# Patient Record
Sex: FEMALE | Race: WHITE | NOT HISPANIC OR LATINO | Employment: OTHER | URBAN - METROPOLITAN AREA
[De-identification: names, ages, dates, MRNs, and addresses within clinical notes are randomized per-mention and may not be internally consistent; named-entity substitution may affect disease eponyms.]

---

## 2017-04-05 ENCOUNTER — HOSPITAL ENCOUNTER (OUTPATIENT)
Dept: MAMMOGRAPHY | Facility: HOSPITAL | Age: 77
Discharge: HOME | End: 2017-04-05
Admitting: SURGERY

## 2017-04-05 DIAGNOSIS — Z12.31 VISIT FOR SCREENING MAMMOGRAM: ICD-10-CM

## 2017-04-05 PROCEDURE — G0202 SCR MAMMO BI INCL CAD: HCPCS

## 2017-04-05 PROCEDURE — 77063 BREAST TOMOSYNTHESIS BI: CPT | Performed by: RADIOLOGY

## 2017-04-05 PROCEDURE — G0202 SCR MAMMO BI INCL CAD: HCPCS | Performed by: RADIOLOGY

## 2017-04-05 PROCEDURE — 77063 BREAST TOMOSYNTHESIS BI: CPT

## 2017-05-03 ENCOUNTER — OFFICE VISIT (OUTPATIENT)
Dept: OBSTETRICS AND GYNECOLOGY | Facility: CLINIC | Age: 77
End: 2017-05-03

## 2017-05-03 VITALS
SYSTOLIC BLOOD PRESSURE: 118 MMHG | BODY MASS INDEX: 21.19 KG/M2 | HEIGHT: 67 IN | WEIGHT: 135 LBS | DIASTOLIC BLOOD PRESSURE: 76 MMHG | RESPIRATION RATE: 14 BRPM

## 2017-05-03 DIAGNOSIS — D05.11 BREAST NEOPLASM, TIS (DCIS), RIGHT: Primary | ICD-10-CM

## 2017-05-03 DIAGNOSIS — M81.0 OSTEOPOROSIS: ICD-10-CM

## 2017-05-03 PROBLEM — K58.0 IRRITABLE BOWEL SYNDROME WITH DIARRHEA: Status: ACTIVE | Noted: 2017-05-03

## 2017-05-03 PROCEDURE — 99214 OFFICE O/P EST MOD 30 MIN: CPT | Performed by: OBSTETRICS & GYNECOLOGY

## 2017-05-03 RX ORDER — MONTELUKAST SODIUM 4 MG/1
1 TABLET, CHEWABLE ORAL DAILY
COMMUNITY
End: 2020-03-15

## 2017-05-03 RX ORDER — LEVOTHYROXINE SODIUM 88 UG/1
88 TABLET ORAL
COMMUNITY
End: 2017-05-03

## 2017-05-03 RX ORDER — BUSPIRONE HYDROCHLORIDE 15 MG/1
15 TABLET ORAL
COMMUNITY
End: 2017-05-03

## 2017-05-03 RX ORDER — SUCRALFATE 1 G/1
1 TABLET ORAL
COMMUNITY
End: 2017-05-03

## 2017-05-03 RX ORDER — ZOLPIDEM TARTRATE 5 MG/1
TABLET ORAL
Refills: 0 | COMMUNITY
Start: 2017-04-14 | End: 2017-05-03

## 2017-05-03 RX ORDER — GABAPENTIN 300 MG/1
300 CAPSULE ORAL
COMMUNITY
End: 2017-05-03

## 2017-05-03 RX ORDER — MEDROXYPROGESTERONE ACETATE 10 MG/1
10 TABLET ORAL
COMMUNITY
End: 2017-05-03

## 2017-05-03 RX ORDER — ZOLPIDEM TARTRATE 5 MG/1
5 TABLET ORAL DAILY
COMMUNITY
Start: 2017-04-14 | End: 2017-05-03

## 2017-05-03 RX ORDER — HYDROCODONE BITARTRATE AND ACETAMINOPHEN 5; 325 MG/1; MG/1
1 TABLET ORAL EVERY 6 HOURS
COMMUNITY
Start: 2017-04-14 | End: 2017-05-03

## 2017-05-03 RX ORDER — RALOXIFENE HYDROCHLORIDE 60 MG/1
TABLET, FILM COATED ORAL
Qty: 90 TABLET | Refills: 3 | Status: SHIPPED | OUTPATIENT
Start: 2017-05-03 | End: 2017-05-24

## 2017-05-03 RX ORDER — CALCITONIN SALMON 200 [IU]/.09ML
1 SPRAY, METERED NASAL
COMMUNITY
End: 2017-05-03

## 2017-05-03 RX ORDER — CEPHALEXIN 500 MG/1
CAPSULE ORAL
Refills: 0 | COMMUNITY
Start: 2017-04-14 | End: 2017-05-03

## 2017-05-03 RX ORDER — RALOXIFENE HYDROCHLORIDE 60 MG/1
60 TABLET, FILM COATED ORAL DAILY
Qty: 30 TABLET | Refills: 12 | Status: SHIPPED | OUTPATIENT
Start: 2017-05-03 | End: 2017-05-03 | Stop reason: SDUPTHER

## 2017-05-03 RX ORDER — LORAZEPAM 0.5 MG/1
0.5 TABLET ORAL DAILY
COMMUNITY
End: 2017-05-03

## 2017-05-03 RX ORDER — CHOLECALCIFEROL (VITAMIN D3) 125 MCG
1 CAPSULE ORAL
COMMUNITY
End: 2017-05-03

## 2017-05-24 ENCOUNTER — TELEPHONE (OUTPATIENT)
Dept: OBSTETRICS AND GYNECOLOGY | Facility: CLINIC | Age: 77
End: 2017-05-24

## 2017-07-25 ENCOUNTER — TRANSCRIBE ORDERS (OUTPATIENT)
Dept: ADMINISTRATIVE | Facility: HOSPITAL | Age: 77
End: 2017-07-25

## 2017-07-25 ENCOUNTER — HOSPITAL ENCOUNTER (OUTPATIENT)
Dept: GENERAL RADIOLOGY | Facility: HOSPITAL | Age: 77
Discharge: HOME | End: 2017-07-25
Admitting: NURSE PRACTITIONER

## 2017-07-25 DIAGNOSIS — M54.5 LOW BACK PAIN, UNSPECIFIED BACK PAIN LATERALITY, UNSPECIFIED CHRONICITY, WITH SCIATICA PRESENCE UNSPECIFIED: Primary | ICD-10-CM

## 2017-07-25 DIAGNOSIS — M25.552 LEFT HIP PAIN: ICD-10-CM

## 2017-07-25 PROCEDURE — 72110 X-RAY EXAM L-2 SPINE 4/>VWS: CPT

## 2017-07-25 PROCEDURE — 73502 X-RAY EXAM HIP UNI 2-3 VIEWS: CPT

## 2018-05-04 ENCOUNTER — OFFICE VISIT (OUTPATIENT)
Dept: OBSTETRICS AND GYNECOLOGY | Facility: CLINIC | Age: 78
End: 2018-05-04

## 2018-05-04 VITALS
SYSTOLIC BLOOD PRESSURE: 122 MMHG | DIASTOLIC BLOOD PRESSURE: 76 MMHG | WEIGHT: 136 LBS | BODY MASS INDEX: 21.3 KG/M2 | RESPIRATION RATE: 14 BRPM

## 2018-05-04 DIAGNOSIS — Z01.419 WELL WOMAN EXAM WITH ROUTINE GYNECOLOGICAL EXAM: Primary | ICD-10-CM

## 2018-05-04 PROBLEM — D05.10 DCIS (DUCTAL CARCINOMA IN SITU): Status: ACTIVE | Noted: 2017-04-01

## 2018-05-04 PROCEDURE — G0101 CA SCREEN;PELVIC/BREAST EXAM: HCPCS | Performed by: OBSTETRICS & GYNECOLOGY

## 2018-05-04 RX ORDER — METOPROLOL TARTRATE 50 MG/1
50 TABLET, FILM COATED ORAL DAILY PRN
Status: ON HOLD
Start: 2018-05-04 | End: 2020-03-16

## 2018-05-04 RX ORDER — OCTISALATE, AVOBENZONE, HOMOSALATE, AND OCTOCRYLENE 29.4; 29.4; 49; 25.48 MG/ML; MG/ML; MG/ML; MG/ML
1 LOTION TOPICAL DAILY
COMMUNITY

## 2018-05-04 RX ORDER — SODIUM PHOSPHATE,MONO-DIBASIC 19G-7G/118
1 ENEMA (ML) RECTAL
Status: ON HOLD | COMMUNITY
End: 2020-04-16

## 2018-05-04 RX ORDER — CALCITONIN SALMON 200 [IU]/.09ML
SPRAY, METERED NASAL
Qty: 11.1 ML | Refills: 12 | Status: SHIPPED | OUTPATIENT
Start: 2018-05-04 | End: 2019-05-08 | Stop reason: SDUPTHER

## 2018-05-04 RX ORDER — CALCITONIN SALMON 200 [IU]/.09ML
SPRAY, METERED NASAL
Qty: 1 UNITS | Refills: 12 | Status: SHIPPED | OUTPATIENT
Start: 2018-05-04 | End: 2018-05-04 | Stop reason: SDUPTHER

## 2018-05-04 NOTE — PROGRESS NOTES
Subjective   Chief Complaint   Patient presents with   • Gynecologic Exam     Neema Richard is a 77 y.o. year old  presenting to be seen in follow up regarding annual exam.  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  No treatment has been prescribed.  She is getting yearly mammography and breast MRIs which have all been normal.  Last year she was also on Miacalcin for osteoporosis.  NOt sure why she stopped it.  She has never taken bisphosphonates due to a sensitive stomach inferior medication.  She did at one point use Evista but D/C's it because some kind of a rash.    This past year she has not on hormone replacement therapy.  There has not been vaginal bleeding in the last 12 months.  Menopausal symptoms are not present.    SEXUAL Hx:  She is not currently sexually active.  In the past year there she has not been sexually active.    Condoms are not needed because she is not sexually active.  She would not like to be screened for STD's at today's exam.  [place] is painful: n/a  HEALTH Hx:  She exercises regularly: yes.  She wears her seat belt: yes.  She has concerns about domestic violence: no.  She has noticed changes in height: no  OTHER THINGS SHE WANTS TO DISCUSS TODAY:  Nothing else    The following portions of the patient's history were reviewed and updated as appropriate:problem list, current medications, allergies, past family history, past medical history, past social history and past surgical history.    Smoking status: Never Smoker                                                                 Smokeless tobacco: Not on file                       Review of Systems  Constitutional POS: nothing reported    NEG: anorexia or night sweats   Genitourinary POS: nothing reported    NEG: dysuria or hematuria   Gastointestinal POS: nothing reported    NEG: bloating, change in bowel habits, melena or reflux symptoms   Integument POS: nothing reported    NEG: moles  that are changing in size, shape, color or rashes   Breast POS: nothing reported    NEG: persistent breast lump, skin dimpling or nipple discharge        Objective   /76   Resp 14   Wt 61.7 kg (136 lb)   LMP  (LMP Unknown)   Breastfeeding? No   BMI 21.30 kg/m²     General:  well developed; well nourished  no acute distress   Skin:  No suspicious lesions seen   Thyroid: normal to inspection and palpation   Breasts:  Examined in supine position  Symmetric without masses or skin dimpling  Nipples normal without inversion, lesions or discharge  There are no palpable axillary nodes   Abdomen: soft, non-tender; no masses  no umbilical or inginual hernias are present  no hepato-splenomegaly   Pelvis: Clinical staff was present for exam  External genitalia:  normal appearance of the external genitalia including Bartholin's and [place] C Jones's glands.  :  urethral meatus normal;  Vaginal:  normal pink mucosa without prolapse or lesions.  Cervix:  normal appearance.  Uterus:  normal size, shape and consistency.  Adnexa:  non palpable bilaterally.  Rectal:  digital rectal exam not performed; anus visually normal appearing.        Assessment   1. Normal GYN exam in menopause  2. Menopausal female currently not on HRT - without significant symptoms affecting activities of daily living  3. DCIS - seeing heme/onc for surveillance  4. Osteoporosis - untreated  5. She is up to date on all relevant gynecologic and colorectal screenings     Plan   1. Pap was not done today.  I explained to Neema that the Pap smears are no longer recommended in patient's after 65 years of age.   I stressed to Neema that she still should be seen to be seen yearly for a full physical including breast and pelvic exam.  2. She was encouraged to get yearly MRI and mammograms.  She should report any palpable breast lump(s) or skin changes regardless of mammographic findings.  I explained to Neema that notification regarding her mammogram results will come  from the center performing the study.  Our office will not be routinely calling with mammogram results.  It is her responsibility to make sure that the results from the mammogram are communicated to her by the breast center.  If she has any questions about the results, she is welcome to call our office anytime.  3. The following data needs to be obtained to update her medical records: last mammogram and last breast MRI.  4. Recommended and that we resume treatment for her osteoporosis.  Duration of treatment needs to be 10 years if otherwise clinically stable.  I think her options at this point include resumption of Miacalcin or Prolia.  She is hesitant about using a long-acting medicine such as per Maye.  She does not remember having trouble with Miacalcin and wants to go that direction.  5. The importance of keeping all planned follow-up and taking all medications as prescribed was emphasized.  6. Follow up for annual exam 1 year    New Medications Ordered This Visit   Medications   • calcitonin, salmon, (MIACALCIN) 200 UNIT/ACT nasal spray     Sig: I spray in alternating nostrils daily.     Dispense:  1 Units     Refill:  12          This note was electronically signed.    Walter Caro M.D.  May 4, 2018    Note: Speech recognition transcription software may have been used to create portions of this document.  An attempt at proofreading has been made but errors in transcription could still be present.

## 2018-11-16 RX ORDER — VALACYCLOVIR HYDROCHLORIDE 1 G/1
1000 TABLET, FILM COATED ORAL DAILY
Qty: 5 TABLET | Refills: 0 | Status: SHIPPED | OUTPATIENT
Start: 2018-11-16 | End: 2020-03-15

## 2018-11-28 ENCOUNTER — APPOINTMENT (OUTPATIENT)
Dept: LAB | Facility: HOSPITAL | Age: 78
End: 2018-11-28

## 2018-11-28 ENCOUNTER — OFFICE VISIT (OUTPATIENT)
Dept: OBSTETRICS AND GYNECOLOGY | Facility: CLINIC | Age: 78
End: 2018-11-28

## 2018-11-28 VITALS
DIASTOLIC BLOOD PRESSURE: 76 MMHG | BODY MASS INDEX: 21.46 KG/M2 | RESPIRATION RATE: 14 BRPM | SYSTOLIC BLOOD PRESSURE: 128 MMHG | WEIGHT: 137 LBS

## 2018-11-28 DIAGNOSIS — N39.46 MIXED URGE AND STRESS INCONTINENCE: Primary | ICD-10-CM

## 2018-11-28 DIAGNOSIS — N81.3 CYSTOCELE AND RECTOCELE WITH COMPLETE UTEROVAGINAL PROLAPSE: ICD-10-CM

## 2018-11-28 DIAGNOSIS — N36.2 URETHRAL CARUNCLE: ICD-10-CM

## 2018-11-28 DIAGNOSIS — R73.03 PREDIABETES: ICD-10-CM

## 2018-11-28 LAB
BACTERIA UR QL AUTO: NORMAL /HPF
BILIRUB UR QL STRIP: NEGATIVE
CLARITY UR: CLEAR
COLOR UR: YELLOW
GLUCOSE UR STRIP-MCNC: NEGATIVE MG/DL
HBA1C MFR BLD: 6.6 % (ref 4.8–5.6)
HGB UR QL STRIP.AUTO: ABNORMAL
HYALINE CASTS UR QL AUTO: NORMAL /LPF
KETONES UR QL STRIP: NEGATIVE
LEUKOCYTE ESTERASE UR QL STRIP.AUTO: NEGATIVE
NITRITE UR QL STRIP: NEGATIVE
PH UR STRIP.AUTO: 8 [PH] (ref 5–8)
PROT UR QL STRIP: ABNORMAL
RBC # UR: NORMAL /HPF
REF LAB TEST METHOD: NORMAL
SP GR UR STRIP: 1.01 (ref 1–1.03)
SQUAMOUS #/AREA URNS HPF: NORMAL /HPF
UROBILINOGEN UR QL STRIP: ABNORMAL
WBC UR QL AUTO: NORMAL /HPF

## 2018-11-28 PROCEDURE — 99214 OFFICE O/P EST MOD 30 MIN: CPT | Performed by: OBSTETRICS & GYNECOLOGY

## 2018-11-28 PROCEDURE — 36415 COLL VENOUS BLD VENIPUNCTURE: CPT | Performed by: OBSTETRICS & GYNECOLOGY

## 2018-11-28 PROCEDURE — 83036 HEMOGLOBIN GLYCOSYLATED A1C: CPT | Performed by: OBSTETRICS & GYNECOLOGY

## 2018-11-28 PROCEDURE — 81001 URINALYSIS AUTO W/SCOPE: CPT | Performed by: OBSTETRICS & GYNECOLOGY

## 2018-11-28 NOTE — PROGRESS NOTES
"Subjective   Chief Complaint   Patient presents with   • Vaginal Prolapse     Neema Richard is a 78 y.o. year old .  No LMP recorded (lmp unknown). Patient is postmenopausal.  She presents to be seen because of new onset issues with her bladder.  This is been going on for about 4 weeks' time now.  Over the last month she has been exercising more.  She's been working out with a  and doing a lot of raking leaves.  The leaking happens sometimes once a day and sometimes multiple times.  It's gotten to the point that she is now wearing a pad.  She's never wetter\" so long is a pad is present but without a pad sometime she has had to change her undergarments.  There has been no blood or pain with urination.  She is a prediabetic and last had levels checked in the recent summer.  She notices an urge to go at times but sometimes leaks without urgency.    OTHER THINGS SHE WANTS TO DISCUSS TODAY:  Nothing else    The following portions of the patient's history were reviewed and updated as appropriate:no additional history reviewed    Social History    Tobacco Use      Smoking status: Never Smoker    Review of Systems  Constitutional POS: nothing reported    NEG: anorexia or night sweats   Genitourinary POS: see HPI    NEG: dysuria or hematuria   Gastointestinal POS: nothing reported    NEG: bloating, change in bowel habits, melena or reflux symptoms   Integument POS: nothing reported    NEG: moles that are changing in size, shape, color or rashes   Breast POS: nothing reported    NEG: persistent breast lump, skin dimpling or nipple discharge         Objective   /76   Resp 14   Wt 62.1 kg (137 lb)   LMP  (LMP Unknown)   BMI 21.46 kg/m²     General:  well developed; well nourished  no acute distress   Skin:  No suspicious lesions seen   Pelvis: Clinical staff was present for exam  External genitalia:  normal appearance of the external genitalia including Bartholin's and [place]'s glands.  :  " urethral meatus normal; prominent caruncle present;  Vaginal:  normal pink mucosa without prolapse or lesions.  Cervix:  normal appearance.  Uterus:  normal size, shape and consistency.  Adnexa:  non palpable bilaterally.  Rectal:  digital rectal exam not performed; anus visually normal appearing.  Cystocele GRADE 2  Rectocele GRADE 2  Uterine GRADE 2     Lab Review   No data reviewed    Imaging   No data reviewed        Assessment   1. Mixed urinary incontinence.  This is a new problem for which additional workup is indicated.  May be related to hypoestrogenism or urethral caruncle.  May be related to her cystocele with uterine descensus  2. Pelvic organ prolapse - this is a new finding.  Other than the leaking, it is asymptomatic and not affecting her quality of life.  Anticipate it is worse related to the recent exercise and heavy lifting  3. History of ductal carcinoma in situ of breast.  Currently off estrogen.  May be contributing to her leaking.  4. Prediabetes.  Cannot exclude contributing but I think it is not likely     Plan   1. The following tests were ordered today: HgBA1c and UA with culture if indicated.  It was explained to Neema that all lab test should be back within the one week after they are performed. She will be notified about the results, regardless of the findings. If she has not been contacted by the office within 2 weeks after the test has been performed, it is her responsibility to contact us to learn about her results.  2. The importance of keeping all planned follow-up and taking all medications as prescribed was emphasized.  3. Follow up pending labs.  May benefit from topical estrogen to the vagina and urethral regions assuming there is no other etiology for her leaking  4. Impact of lifting on progression of her prolapse was reviewed             This note was electronically signed.    Walter Caro M.D.  November 28, 2018    Note: Speech recognition transcription software may  have been used to create portions of this document.  An attempt at proofreading has been made but errors in transcription could still be present.

## 2018-11-28 NOTE — PATIENT INSTRUCTIONS
You will be given a special clean-catch kit that contains sterile wipes.  1. Tear open the provided sterile wipes so they are ready to use.  2. Take the lid off the sterile collection cup so it is ready to use.  3. Sit on the toilet with the legs spread apart.  4. Use two fingers to completely spread open the labia.  They must be held open until the urine has been collected.  5. Use the first wipe to clean the inner folds of the labia. Wipe from the front to the back.  6. Repeat the process using the second wipe.    To collect the urine sample:  1. While keeping the labia open, begin to urinate.    2. WITHOUT STOPPING bring the sterile urine collection cup into the middle of the urine stream and fill the cup about half full.    3. Remove the cup from the urine stream and set aside.  4. Finish voiding.  5. Close the cup with the lid provided.

## 2018-11-29 ENCOUNTER — TELEPHONE (OUTPATIENT)
Dept: OBSTETRICS AND GYNECOLOGY | Facility: CLINIC | Age: 78
End: 2018-11-29

## 2018-11-29 RX ORDER — OXYBUTYNIN CHLORIDE 10 MG/1
10 TABLET, EXTENDED RELEASE ORAL DAILY
Qty: 30 TABLET | Refills: 3 | Status: SHIPPED | OUTPATIENT
Start: 2018-11-29 | End: 2019-01-03 | Stop reason: SINTOL

## 2018-11-29 NOTE — TELEPHONE ENCOUNTER
There are 2 things like her to begin doing.  The first is application of Premarin cream daily to the urethral opening.  Second is beginning oxybutynin.  I like to see her back in about 6 weeks' time and see how that's working.    New Medications Ordered This Visit   Medications   • conjugated estrogens (PREMARIN) 0.625 MG/GM vaginal cream     Sig: Apply a small dollop to the urethral opening daily     Dispense:  1 each     Refill:  2   • oxybutynin XL (DITROPAN XL) 10 MG 24 hr tablet     Sig: Take 1 tablet by mouth Daily.     Dispense:  30 tablet     Refill:  3

## 2018-11-29 NOTE — TELEPHONE ENCOUNTER
----- Message from Susanne Fuentes RN sent at 11/29/2018  1:42 PM EST -----  She wishes to start new medication, as well as talk to you about it.  She also states the lesion you two discussed has reoccurred today and she is very uncomfortable with it.  ----- Message -----  From: Walter Caro MD  Sent: 11/28/2018   5:37 PM  To: [facility]    Please call Neema and let her know her lab test results were normal.  Given these findings I think it would be reasonable if she wishes to try her on some medicine to see if this helps her urinary symptoms.  If she would like to try medication limit me know.  I would initially try her on some bladder medication such as oxybutynin.  If she wants me to call a prescription in and talk to her about side effects and effectiveness let me know and we'll take care of that.

## 2019-01-03 ENCOUNTER — OFFICE VISIT (OUTPATIENT)
Dept: OBSTETRICS AND GYNECOLOGY | Facility: CLINIC | Age: 79
End: 2019-01-03

## 2019-01-03 VITALS — BODY MASS INDEX: 21.77 KG/M2 | RESPIRATION RATE: 14 BRPM | WEIGHT: 139 LBS

## 2019-01-03 DIAGNOSIS — N39.46 MIXED URGE AND STRESS INCONTINENCE: Primary | ICD-10-CM

## 2019-01-03 PROCEDURE — 99213 OFFICE O/P EST LOW 20 MIN: CPT | Performed by: OBSTETRICS & GYNECOLOGY

## 2019-01-03 RX ORDER — OXYBUTYNIN CHLORIDE 5 MG/1
5 TABLET ORAL 2 TIMES DAILY PRN
Qty: 60 TABLET | Refills: 5 | Status: SHIPPED | OUTPATIENT
Start: 2019-01-03 | End: 2019-01-16 | Stop reason: SINTOL

## 2019-01-03 NOTE — PROGRESS NOTES
Subjective   Chief Complaint   Patient presents with   • Medication Reaction     Neema Richard is a 78 y.o. year old .  No LMP recorded (lmp unknown). Patient is postmenopausal.  She presents to be seen because of follow-up on her overactive bladder.  She was recently begun on oxybutynin.  She's taking it once a day.  It is helping her urinary symptoms but she has noticed a significant issue with dry mouth.  The urinary problems are greater than 95% improved.  The only issue she has appears to be the new-onset of dry mouth.    She has had issues in the past with trouble emptying her bowels.  She is seeing Dr. Smyth.  She has loose stool which she attributes to potentially doing a gallbladder.  He's talked about using InterStim but she gets regular MRIs realize this could be an issue.  She would like to talk about the use of pessaries as an option to help some of her prolapse symptoms.    The following portions of the patient's history were reviewed and updated as appropriate:current medications and allergies    Social History    Tobacco Use      Smoking status: Never Smoker         Objective   Resp 14   Wt 63 kg (139 lb)   LMP  (LMP Unknown)   BMI 21.77 kg/m²     Lab Review   No data reviewed    Imaging   No data reviewed        Assessment   1. Mixed urinary incontinence significantly improved with anticholinergic therapy  2. Dry mouth side effects from anticholinergic therapy  Pelvic organ prolapse - not significantly different from prior exams.  It is now symptomatic and affecting her quality of life.     Plan   1. I think the first step would try to be to reduce the strength of the oxybutynin and increase the frequency.  If that does not fix both problems next step may be going to Myrbetriq  2. The importance of keeping all planned follow-up and taking all medications as prescribed was emphasized.  3. Follow up for recheck of symptoms 2-3 months   4. Consider trial of pessary per patient request    New  Medications Ordered This Visit   Medications   • oxybutynin (DITROPAN) 5 MG tablet     Sig: Take 1 tablet by mouth 2 (Two) Times a Day As Needed (leaking).     Dispense:  60 tablet     Refill:  5          This note was electronically signed.    Walter Caro M.D.  January 3, 2019    Total time spent today with Neema  was 20 minutes (level 3).  Off this time, 90% was spent face-to-face time coordinating care, answering her questions and counseling regarding pathophysiology of her presenting problem along with plans for any diagnositc work-up and treatment.    Note: Speech recognition transcription software may have been used to create portions of this document.  An attempt at proofreading has been made but errors in transcription could still be present.

## 2019-01-16 ENCOUNTER — OFFICE VISIT (OUTPATIENT)
Dept: OBSTETRICS AND GYNECOLOGY | Facility: CLINIC | Age: 79
End: 2019-01-16

## 2019-01-16 VITALS
BODY MASS INDEX: 21.61 KG/M2 | RESPIRATION RATE: 14 BRPM | SYSTOLIC BLOOD PRESSURE: 124 MMHG | DIASTOLIC BLOOD PRESSURE: 76 MMHG | WEIGHT: 138 LBS

## 2019-01-16 DIAGNOSIS — N39.46 MIXED URGE AND STRESS INCONTINENCE: ICD-10-CM

## 2019-01-16 DIAGNOSIS — N81.3 CYSTOCELE AND RECTOCELE WITH COMPLETE UTEROVAGINAL PROLAPSE: Primary | ICD-10-CM

## 2019-01-16 PROBLEM — Z46.89 PESSARY MAINTENANCE: Status: ACTIVE | Noted: 2019-01-16

## 2019-01-16 PROCEDURE — A4562 PESSARY, NON RUBBER,ANY TYPE: HCPCS | Performed by: OBSTETRICS & GYNECOLOGY

## 2019-01-16 PROCEDURE — 57160 INSERT PESSARY/OTHER DEVICE: CPT | Performed by: OBSTETRICS & GYNECOLOGY

## 2019-01-16 NOTE — PROGRESS NOTES
Pessary insertion    Date of procedure:  1/16/2019    Risks and benefits discussed? yes  All questions answered? yes  Consents given by the patient  Written consent obtained? no    Pessary placed: Foldable ring w/ support - #3 w/o urethral bar       Pessary's attempted without good fit: Foldable ring w/ support - #2 w/o urethral bar     Post procedure instructions: Call ASAP if increasing pain or trouble passing urine or bowels    Follow up for pessary recheck 2-3 weeks    This note was electronically signed.    Walter Caro M.D.  January 16, 2019

## 2019-01-16 NOTE — PROGRESS NOTES
And comes in today for a trial of pessary.  She also wants to talk about her urinary incontinence.  She was unable to even take the lower dose of Ditropan due to significant dry mouth.  Estrogens have helped the urinary incontinence to a small degree.  I think would be reasonable to try her on Myrbetriq at this point to see if that helps.  I do suspect with the added estrogen the Myrbetriq will be more effective with up regulation of estrogen receptors.    New Medications Ordered This Visit   Medications   • Mirabegron ER (MYRBETRIQ) 50 MG tablet sustained-release 24 hour 24 hr tablet     Sig: Take 50 mg by mouth Daily.     Dispense:  30 tablet     Refill:  3       Walter Caro M.D.  January 16, 2019  1:39 PM

## 2019-02-07 ENCOUNTER — OFFICE VISIT (OUTPATIENT)
Dept: OBSTETRICS AND GYNECOLOGY | Facility: CLINIC | Age: 79
End: 2019-02-07

## 2019-02-07 VITALS
BODY MASS INDEX: 21.61 KG/M2 | DIASTOLIC BLOOD PRESSURE: 80 MMHG | SYSTOLIC BLOOD PRESSURE: 122 MMHG | RESPIRATION RATE: 14 BRPM | WEIGHT: 138 LBS

## 2019-02-07 DIAGNOSIS — N39.46 MIXED URGE AND STRESS INCONTINENCE: ICD-10-CM

## 2019-02-07 DIAGNOSIS — Z46.89 PESSARY MAINTENANCE: Primary | ICD-10-CM

## 2019-02-07 PROCEDURE — 99213 OFFICE O/P EST LOW 20 MIN: CPT | Performed by: OBSTETRICS & GYNECOLOGY

## 2019-02-07 NOTE — PROGRESS NOTES
Subjective   Chief Complaint   Patient presents with   • Pessary Check     Neema Richard is a 78 y.o. year old who presents to be seen for follow-up of her pessary and her leaking.  Since going up to the higher dose of Myrbetriq the incontinence is at least 85% improved.    Overall she is satisfied with how this pessary is doing.  She is not aware of it being present.  · She is not removing the pessary herself.  · She is able to empty were bladder without difficulty.  · There is not significant urinary incontinence.  She does not have trouble with urinary urgency or frequency.  · There is not significant vaginal discharge present.  She is using nothing to help decrease her vaginal discharge.  · She is not having difficulty with bowel function.     OTHER THINGS SHE WANTS TO DISCUSS TODAY:  Nothing else    The following portions of the patient's history were reviewed and updated as appropriate:no additional history reviewed    Review of Systems  Constitutional POS: nothing reported    NEG: anorexia or night sweats   Genitourinary POS: see HPI       NEG: dysuria or hematuria   Gastointestinal POS: see HPI    NEG: bloating, change in bowel habits, melena or reflux symptoms   Integument POS: nothing reported    NEG: moles that are changing in size, shape, color or rashes        Objective   /80   Resp 14   Wt 62.6 kg (138 lb)   LMP  (LMP Unknown)   Breastfeeding? No   BMI 21.61 kg/m²     General:  well developed; well nourished  no acute distress   Pelvis: Clinical staff was present for exam  External genitalia:  normal appearance of the external genitalia including Bartholin's and [place]'s glands.  :  urethral meatus normal;  Vaginal: normal pink mucosa without prolapse or lesions; adequate support with pessary in place; no area of erosion or excess granulation tissue seen after pessary removed;  Cervix:  normal appearance.  Cystocele GRADE 0 with pessary  Rectocele GRADE 0 with pessary  Uterine GRADE 0 with  pessary     Lab Review   No data reviewed    Imaging   No data reviewed        Assessment   1. Symptomatic prolapse - well controlled with current pessary.  2. JOCE -significantly improved with pessary and Myrbetriq     Plan   1. Her pessary was removed, cleaned and replaced.  2. The importance of keeping all planned follow-up and taking all medications as prescribed was emphasized.  3. Follow up for pessary recheck 3-4 months         This note was electronically signed.    Walter Caro M.D.  February 7, 2019    Note: Speech recognition transcription software may have been used to create portions of this document.  An attempt at proofreading has been made but errors in transcription could still be present.

## 2019-05-05 NOTE — PROGRESS NOTES
Subjective   Chief Complaint   Patient presents with   • Bladder Prolapse   • Osteoporosis   • DCIS     Neema Richard is a 78 y.o. year old  presenting to be seen in follow up regarding her bone loss and her pessary.  Also here in f/u for her breast cancer.      She continues to see her oncologist in [place].  There is been no evidence of breast cancer recurrence.  Breast MRI and mammograms are being done at [facility].  She did have a prior colon polyp and is due for f/u scope ~ .  Exceptionally happy with Myrbetriq.  Her bladder is significantly better.    Continues with the Miacalcin nasal spray.  Has been on it about 3 years time now.  She is having no problems or side effects with this medication.    This past year she has not been on hormone replacement therapy.  She has not had any vaginal bleeding in the last 12 months.   Menopausal symptoms are not present.    SEXUAL Hx:  She is not currently sexually active.  In the past year there she has not been sexually active.    Condoms are not needed because she is not sexually active.  She would not like to be screened for STD's at today's exam.  [place] is painful: n/a  HEALTH Hx:  She exercises regularly: yes.  She wears her seat belt: yes.  She has concerns about domestic violence: no.  She has noticed changes in height: no  OTHER THINGS SHE WANTS TO DISCUSS TODAY:  Nothing else    The following portions of the patient's history were reviewed and updated as appropriate:problem list, current medications, allergies, past family history, past medical history, past social history and past surgical history.    Social History    Tobacco Use      Smoking status: Never Smoker    Review of Systems  Constitutional POS: nothing reported    NEG: anorexia or night sweats   Genitourinary POS: nothing reported    NEG: dysuria or hematuria   Gastointestinal POS: nothing reported    NEG: bloating, change in bowel habits, melena or reflux symptoms    Integument POS: nothing reported    NEG: moles that are changing in size, shape, color or rashes   Breast POS: nothing reported    NEG: persistent breast lump, skin dimpling or nipple discharge        Objective   /80   Resp 14   Wt 62.1 kg (137 lb)   LMP  (LMP Unknown)   Breastfeeding? No   BMI 21.46 kg/m²     General:  well developed; well nourished  no acute distress   Skin:  No suspicious lesions seen   Thyroid: normal to inspection and palpation   Breasts:  Examined in supine position  Symmetric without masses or skin dimpling  Nipples normal without inversion, lesions or discharge  There are no palpable axillary nodes   Abdomen: soft, non-tender; no masses  no umbilical or inguinal hernias are present  no hepato-splenomegaly   Pelvis: Clinical staff was present for exam  External genitalia:  normal appearance of the external genitalia including Bartholin's and Alliance's glands.  :  urethral meatus normal;  Vaginal:  normal pink mucosa without prolapse or lesions.  Pessary is well situated without signs of erosion or irritation  Cervix:  normal appearance.  Uterus:  normal size, shape and consistency.  Adnexa:  non palpable bilaterally.  Rectal:  digital rectal exam not performed; anus visually normal appearing.        Assessment   1. Osteoporosis - on therapy 5/2018.  Primary care recently ordered bone density.  Those results are not available.  2. Menopausal female currently not on HRT other than vaginal ERT - without significant symptoms affecting activities of daily living  3. Pelvic organ prolapse - not significantly different from prior exams.  It is minimally symptomatic with the pessary use and it is not affecting her quality of life.  4. H/o DCIS - TESSA x 2 years.  Follow-up in imaging being done in [place]  5. H/o benign colon polyp 2016  6. OAB - better with meds.  7. She is up to date on all relevant gynecologic and colorectal screenings     Plan   1. Pap was not done today.  I  explained to Neema that the Pap smears are no longer recommended in patient's after 65 years of age.   I stressed to Neema that she still should be seen to be seen yearly for a full physical including breast and pelvic exam.  2. She was encouraged to get yearly mammograms along with yearly breast MRI.  The studies should set up to stagger every 6 months.  She should report any palpable breast lump(s) or skin changes regardless of mammographic findings.  I explained to Neema that notification regarding her mammogram results will come from the center performing the study.  Our office will not be routinely calling with mammogram results.  It is her responsibility to make sure that the results from the mammogram are communicated to her by the breast center.  If she has any questions about the results, she is welcome to call our office anytime.  3. Pessary was removed, cleaned and replaced.  4. The following data needs to be obtained to update her medical records: last DEXA from PCP  5. Follow up for annual exam 1 year    New Medications Ordered This Visit   Medications   • calcitonin, salmon, (MIACALCIN) 200 UNIT/ACT nasal spray     Si spray by Alternating Nares route Daily.     Dispense:  11.1 mL     Refill:  12     **Patient requests 90 days supply**   • Mirabegron ER (MYRBETRIQ) 50 MG tablet sustained-release 24 hour 24 hr tablet     Sig: Take 50 mg by mouth Daily.     Dispense:  30 tablet     Refill:  3          This note was electronically signed.    Walter Caro M.D.  May 8, 2019    Note: Speech recognition transcription software may have been used to create portions of this document.  An attempt at proofreading has been made but errors in transcription could still be present.

## 2019-05-08 ENCOUNTER — OFFICE VISIT (OUTPATIENT)
Dept: OBSTETRICS AND GYNECOLOGY | Facility: CLINIC | Age: 79
End: 2019-05-08

## 2019-05-08 VITALS
RESPIRATION RATE: 14 BRPM | BODY MASS INDEX: 21.46 KG/M2 | DIASTOLIC BLOOD PRESSURE: 80 MMHG | WEIGHT: 137 LBS | SYSTOLIC BLOOD PRESSURE: 132 MMHG

## 2019-05-08 DIAGNOSIS — Z46.89 PESSARY MAINTENANCE: ICD-10-CM

## 2019-05-08 DIAGNOSIS — M81.0 AGE-RELATED OSTEOPOROSIS WITHOUT CURRENT PATHOLOGICAL FRACTURE: Primary | ICD-10-CM

## 2019-05-08 DIAGNOSIS — N81.3 CYSTOCELE AND RECTOCELE WITH COMPLETE UTEROVAGINAL PROLAPSE: ICD-10-CM

## 2019-05-08 DIAGNOSIS — N39.46 MIXED URGE AND STRESS INCONTINENCE: ICD-10-CM

## 2019-05-08 PROBLEM — E78.00 HIGH CHOLESTEROL: Status: ACTIVE | Noted: 2019-01-01

## 2019-05-08 PROCEDURE — 99214 OFFICE O/P EST MOD 30 MIN: CPT | Performed by: OBSTETRICS & GYNECOLOGY

## 2019-05-08 RX ORDER — CALCITONIN SALMON 200 [IU]/.09ML
1 SPRAY, METERED NASAL DAILY
Qty: 11.1 ML | Refills: 12 | Status: ON HOLD | OUTPATIENT
Start: 2019-05-08 | End: 2020-04-16

## 2019-08-27 RX ORDER — MIRABEGRON 50 MG/1
TABLET, FILM COATED, EXTENDED RELEASE ORAL
Qty: 30 TABLET | Refills: 0 | Status: SHIPPED | OUTPATIENT
Start: 2019-08-27 | End: 2019-09-25 | Stop reason: SDUPTHER

## 2019-09-25 RX ORDER — MIRABEGRON 50 MG/1
TABLET, FILM COATED, EXTENDED RELEASE ORAL
Qty: 30 TABLET | Refills: 0 | Status: SHIPPED | OUTPATIENT
Start: 2019-09-25 | End: 2019-10-28 | Stop reason: SDUPTHER

## 2019-09-27 RX ORDER — MIRABEGRON 50 MG/1
TABLET, FILM COATED, EXTENDED RELEASE ORAL
Qty: 30 TABLET | Refills: 0 | OUTPATIENT
Start: 2019-09-27

## 2019-10-28 RX ORDER — MIRABEGRON 50 MG/1
TABLET, FILM COATED, EXTENDED RELEASE ORAL
Qty: 30 TABLET | Refills: 7 | Status: SHIPPED | OUTPATIENT
Start: 2019-10-28 | End: 2020-09-17 | Stop reason: SDUPTHER

## 2020-02-13 ENCOUNTER — TRANSCRIBE ORDERS (OUTPATIENT)
Dept: ADMINISTRATIVE | Facility: HOSPITAL | Age: 80
End: 2020-02-13

## 2020-02-13 DIAGNOSIS — R10.814 ABDOMINAL TENDERNESS OF LEFT LOWER QUADRANT, REBOUND TENDERNESS PRESENCE NOT SPECIFIED: Primary | ICD-10-CM

## 2020-02-25 ENCOUNTER — HOSPITAL ENCOUNTER (OUTPATIENT)
Dept: CT IMAGING | Facility: HOSPITAL | Age: 80
Discharge: HOME | End: 2020-02-25
Admitting: INTERNAL MEDICINE

## 2020-02-25 DIAGNOSIS — R10.814 ABDOMINAL TENDERNESS OF LEFT LOWER QUADRANT, REBOUND TENDERNESS PRESENCE NOT SPECIFIED: ICD-10-CM

## 2020-02-25 PROCEDURE — 63710000001 BARIUM 2 % SUSPENSION: Performed by: INTERNAL MEDICINE

## 2020-02-25 PROCEDURE — 74177 CT ABD & PELVIS W/CONTRAST: CPT

## 2020-02-25 PROCEDURE — A9270 NON-COVERED ITEM OR SERVICE: HCPCS | Performed by: INTERNAL MEDICINE

## 2020-02-25 PROCEDURE — 25010000002 IOPAMIDOL 61 % SOLUTION: Performed by: INTERNAL MEDICINE

## 2020-02-25 RX ADMIN — IOPAMIDOL 60 ML: 612 INJECTION, SOLUTION INTRAVENOUS at 14:39

## 2020-02-25 RX ADMIN — BARIUM SULFATE 450 ML: 21 SUSPENSION ORAL at 13:30

## 2020-03-15 ENCOUNTER — HOSPITAL ENCOUNTER (INPATIENT)
Facility: HOSPITAL | Age: 80
LOS: 11 days | Discharge: HOME HEALTH | End: 2020-03-26
Attending: EMERGENCY MEDICINE | Admitting: COLON & RECTAL SURGERY

## 2020-03-15 ENCOUNTER — APPOINTMENT (OUTPATIENT)
Dept: CT IMAGING | Facility: HOSPITAL | Age: 80
End: 2020-03-15

## 2020-03-15 DIAGNOSIS — Z74.09 IMPAIRED MOBILITY AND ACTIVITIES OF DAILY LIVING: Primary | ICD-10-CM

## 2020-03-15 DIAGNOSIS — K57.20 PERFORATION OF SIGMOID COLON DUE TO DIVERTICULITIS: ICD-10-CM

## 2020-03-15 DIAGNOSIS — Z78.9 IMPAIRED MOBILITY AND ACTIVITIES OF DAILY LIVING: Primary | ICD-10-CM

## 2020-03-15 DIAGNOSIS — I10 ESSENTIAL HYPERTENSION: ICD-10-CM

## 2020-03-15 DIAGNOSIS — K57.92 DIVERTICULITIS: ICD-10-CM

## 2020-03-15 DIAGNOSIS — K65.9 PERITONITIS (HCC): ICD-10-CM

## 2020-03-15 PROBLEM — E87.20 LACTIC ACIDOSIS: Status: ACTIVE | Noted: 2020-03-15

## 2020-03-15 PROBLEM — N32.81 OVERACTIVE BLADDER: Status: ACTIVE | Noted: 2020-03-15

## 2020-03-15 PROBLEM — A41.9 SEPSIS: Status: ACTIVE | Noted: 2020-03-15

## 2020-03-15 PROBLEM — N17.9 ACUTE KIDNEY INJURY: Status: ACTIVE | Noted: 2020-03-15

## 2020-03-15 PROBLEM — A41.89 SEPSIS DUE TO OTHER ETIOLOGY (HCC): Status: ACTIVE | Noted: 2020-03-15

## 2020-03-15 PROBLEM — R73.9 ACUTE HYPERGLYCEMIA: Status: ACTIVE | Noted: 2020-03-15

## 2020-03-15 PROBLEM — R10.13 DYSPEPSIA: Status: ACTIVE | Noted: 2020-03-15

## 2020-03-15 PROBLEM — D72.9 NEUTROPHILIC LEUKOCYTOSIS: Status: ACTIVE | Noted: 2020-03-15

## 2020-03-15 PROBLEM — M19.90 ARTHRITIS: Status: ACTIVE | Noted: 2020-03-15

## 2020-03-15 PROBLEM — D72.828 NEUTROPHILIC LEUKOCYTOSIS: Status: ACTIVE | Noted: 2020-03-15

## 2020-03-15 LAB
ALBUMIN SERPL-MCNC: 4.2 G/DL (ref 3.5–5.2)
ALBUMIN/GLOB SERPL: 1.2 G/DL
ALP SERPL-CCNC: 97 U/L (ref 39–117)
ALT SERPL W P-5'-P-CCNC: 9 U/L (ref 1–33)
ANION GAP SERPL CALCULATED.3IONS-SCNC: 15 MMOL/L (ref 5–15)
AST SERPL-CCNC: 16 U/L (ref 1–32)
BASOPHILS # BLD AUTO: 0.06 10*3/MM3 (ref 0–0.2)
BASOPHILS NFR BLD AUTO: 0.4 % (ref 0–1.5)
BILIRUB SERPL-MCNC: 0.7 MG/DL (ref 0.2–1.2)
BUN BLD-MCNC: 21 MG/DL (ref 8–23)
BUN/CREAT SERPL: 21.6 (ref 7–25)
CALCIUM SPEC-SCNC: 8.6 MG/DL (ref 8.6–10.5)
CHLORIDE SERPL-SCNC: 102 MMOL/L (ref 98–107)
CO2 SERPL-SCNC: 21 MMOL/L (ref 22–29)
CREAT BLD-MCNC: 0.97 MG/DL (ref 0.57–1)
CRP SERPL-MCNC: 6.5 MG/DL (ref 0–0.5)
D-LACTATE SERPL-SCNC: 1.1 MMOL/L (ref 0.5–2)
D-LACTATE SERPL-SCNC: 2.9 MMOL/L (ref 0.5–2)
DEPRECATED RDW RBC AUTO: 43 FL (ref 37–54)
EOSINOPHIL # BLD AUTO: 0.03 10*3/MM3 (ref 0–0.4)
EOSINOPHIL NFR BLD AUTO: 0.2 % (ref 0.3–6.2)
ERYTHROCYTE [DISTWIDTH] IN BLOOD BY AUTOMATED COUNT: 13.2 % (ref 12.3–15.4)
ERYTHROCYTE [SEDIMENTATION RATE] IN BLOOD: 19 MM/HR (ref 0–30)
GFR SERPL CREATININE-BSD FRML MDRD: 56 ML/MIN/1.73
GLOBULIN UR ELPH-MCNC: 3.5 GM/DL
GLUCOSE BLD-MCNC: 257 MG/DL (ref 65–99)
HCT VFR BLD AUTO: 42.6 % (ref 34–46.6)
HGB BLD-MCNC: 13.6 G/DL (ref 12–15.9)
HOLD SPECIMEN: NORMAL
HOLD SPECIMEN: NORMAL
IMM GRANULOCYTES # BLD AUTO: 0.05 10*3/MM3 (ref 0–0.05)
IMM GRANULOCYTES NFR BLD AUTO: 0.3 % (ref 0–0.5)
LACTATE HOLD SPECIMEN: NORMAL
LIPASE SERPL-CCNC: 24 U/L (ref 13–60)
LYMPHOCYTES # BLD AUTO: 0.84 10*3/MM3 (ref 0.7–3.1)
LYMPHOCYTES NFR BLD AUTO: 5.6 % (ref 19.6–45.3)
MCH RBC QN AUTO: 28.3 PG (ref 26.6–33)
MCHC RBC AUTO-ENTMCNC: 31.9 G/DL (ref 31.5–35.7)
MCV RBC AUTO: 88.6 FL (ref 79–97)
MONOCYTES # BLD AUTO: 0.8 10*3/MM3 (ref 0.1–0.9)
MONOCYTES NFR BLD AUTO: 5.3 % (ref 5–12)
NEUTROPHILS # BLD AUTO: 13.27 10*3/MM3 (ref 1.7–7)
NEUTROPHILS NFR BLD AUTO: 88.2 % (ref 42.7–76)
NRBC BLD AUTO-RTO: 0 /100 WBC (ref 0–0.2)
PLATELET # BLD AUTO: 384 10*3/MM3 (ref 140–450)
PMV BLD AUTO: 10.1 FL (ref 6–12)
POTASSIUM BLD-SCNC: 4 MMOL/L (ref 3.5–5.2)
PROT SERPL-MCNC: 7.7 G/DL (ref 6–8.5)
RBC # BLD AUTO: 4.81 10*6/MM3 (ref 3.77–5.28)
SODIUM BLD-SCNC: 138 MMOL/L (ref 136–145)
WBC NRBC COR # BLD: 15.05 10*3/MM3 (ref 3.4–10.8)
WHOLE BLOOD HOLD SPECIMEN: NORMAL
WHOLE BLOOD HOLD SPECIMEN: NORMAL

## 2020-03-15 PROCEDURE — 99284 EMERGENCY DEPT VISIT MOD MDM: CPT

## 2020-03-15 PROCEDURE — 83690 ASSAY OF LIPASE: CPT

## 2020-03-15 PROCEDURE — 74176 CT ABD & PELVIS W/O CONTRAST: CPT

## 2020-03-15 PROCEDURE — 25010000002 MEROPENEM PER 100 MG: Performed by: EMERGENCY MEDICINE

## 2020-03-15 PROCEDURE — 83605 ASSAY OF LACTIC ACID: CPT | Performed by: EMERGENCY MEDICINE

## 2020-03-15 PROCEDURE — 25010000002 ONDANSETRON PER 1 MG: Performed by: EMERGENCY MEDICINE

## 2020-03-15 PROCEDURE — 25010000002 HYDROMORPHONE PER 4 MG: Performed by: EMERGENCY MEDICINE

## 2020-03-15 PROCEDURE — 85025 COMPLETE CBC W/AUTO DIFF WBC: CPT

## 2020-03-15 PROCEDURE — 85652 RBC SED RATE AUTOMATED: CPT | Performed by: PHYSICIAN ASSISTANT

## 2020-03-15 PROCEDURE — 83605 ASSAY OF LACTIC ACID: CPT

## 2020-03-15 PROCEDURE — 80053 COMPREHEN METABOLIC PANEL: CPT

## 2020-03-15 PROCEDURE — 99223 1ST HOSP IP/OBS HIGH 75: CPT | Performed by: HOSPITALIST

## 2020-03-15 PROCEDURE — 86140 C-REACTIVE PROTEIN: CPT | Performed by: PHYSICIAN ASSISTANT

## 2020-03-15 RX ORDER — SUCRALFATE 1 G/1
1 TABLET ORAL
Status: DISCONTINUED | OUTPATIENT
Start: 2020-03-16 | End: 2020-03-26 | Stop reason: HOSPADM

## 2020-03-15 RX ORDER — HYDROMORPHONE HYDROCHLORIDE 1 MG/ML
0.25 INJECTION, SOLUTION INTRAMUSCULAR; INTRAVENOUS; SUBCUTANEOUS ONCE
Status: COMPLETED | OUTPATIENT
Start: 2020-03-15 | End: 2020-03-15

## 2020-03-15 RX ORDER — ONDANSETRON 2 MG/ML
4 INJECTION INTRAMUSCULAR; INTRAVENOUS ONCE
Status: COMPLETED | OUTPATIENT
Start: 2020-03-15 | End: 2020-03-15

## 2020-03-15 RX ORDER — MORPHINE SULFATE 4 MG/ML
4 INJECTION, SOLUTION INTRAMUSCULAR; INTRAVENOUS
Status: DISPENSED | OUTPATIENT
Start: 2020-03-15 | End: 2020-03-25

## 2020-03-15 RX ORDER — LEVOTHYROXINE SODIUM 88 UG/1
88 TABLET ORAL
Status: DISCONTINUED | OUTPATIENT
Start: 2020-03-16 | End: 2020-03-26 | Stop reason: HOSPADM

## 2020-03-15 RX ORDER — MORPHINE SULFATE 2 MG/ML
2 INJECTION, SOLUTION INTRAMUSCULAR; INTRAVENOUS
Status: DISPENSED | OUTPATIENT
Start: 2020-03-15 | End: 2020-03-25

## 2020-03-15 RX ORDER — SODIUM CHLORIDE 0.9 % (FLUSH) 0.9 %
10 SYRINGE (ML) INJECTION EVERY 12 HOURS SCHEDULED
Status: DISCONTINUED | OUTPATIENT
Start: 2020-03-16 | End: 2020-03-20 | Stop reason: SDUPTHER

## 2020-03-15 RX ORDER — SODIUM CHLORIDE 0.9 % (FLUSH) 0.9 %
10 SYRINGE (ML) INJECTION AS NEEDED
Status: DISCONTINUED | OUTPATIENT
Start: 2020-03-15 | End: 2020-03-26 | Stop reason: HOSPADM

## 2020-03-15 RX ORDER — GABAPENTIN 300 MG/1
300 CAPSULE ORAL NIGHTLY
Status: DISCONTINUED | OUTPATIENT
Start: 2020-03-16 | End: 2020-03-17

## 2020-03-15 RX ORDER — ONDANSETRON 2 MG/ML
4 INJECTION INTRAMUSCULAR; INTRAVENOUS EVERY 6 HOURS PRN
Status: DISCONTINUED | OUTPATIENT
Start: 2020-03-15 | End: 2020-03-26 | Stop reason: HOSPADM

## 2020-03-15 RX ADMIN — ONDANSETRON 4 MG: 2 INJECTION INTRAMUSCULAR; INTRAVENOUS at 19:00

## 2020-03-15 RX ADMIN — HYDROMORPHONE HYDROCHLORIDE 0.25 MG: 1 INJECTION, SOLUTION INTRAMUSCULAR; INTRAVENOUS; SUBCUTANEOUS at 19:00

## 2020-03-15 RX ADMIN — MEROPENEM 1 G: 1 INJECTION, POWDER, FOR SOLUTION INTRAVENOUS at 20:52

## 2020-03-15 RX ADMIN — SODIUM CHLORIDE 1000 ML: 9 INJECTION, SOLUTION INTRAVENOUS at 18:58

## 2020-03-15 NOTE — ED PROVIDER NOTES
Subjective   Ms. Neema Richard is a 79 y.o female presenting to the emergency department with complaints of abdominal pain. Her abdominal pain began this morning and is diffuse across her lower abdomen. She rates her current pain at a 7/10 and complains of fatigue, fever, difficulty urinating, and diarrhea. She initially believed her diarrhea to be due to new medication. She has a history of diverticulitis and confirms her most recent attack was in December 2019. Her current pain location is not similar to her previous episodes of diverticulitis because this is diffuse instead of solely on the left side. She denies back pain and dysuria. There are no other acute symptoms at this time.      She received a CT scan on 02/25/2020 and the results can be found below:       1. Sigmoid diverticulitis. No bowel obstruction or abscess.       2. Leiomyomatous uterus.      History provided by:  Patient  Abdominal Pain   Pain location:  Periumbilical  Pain radiates to:  Does not radiate  Pain severity:  Severe (7/10)  Onset quality:  Sudden  Duration:  1 day  Timing:  Constant  Progression:  Worsening  Chronicity:  New  Relieved by:  None tried  Worsened by:  Nothing  Ineffective treatments:  None tried  Associated symptoms: diarrhea, fatigue and fever    Associated symptoms: no constipation and no dysuria        Review of Systems   Constitutional: Positive for fatigue and fever.   Gastrointestinal: Positive for abdominal pain and diarrhea. Negative for constipation.   Genitourinary: Positive for difficulty urinating. Negative for dysuria.   Musculoskeletal: Negative for back pain.   All other systems reviewed and are negative.      Past Medical History:   Diagnosis Date   • Acquired hypothyroidism 1980   • Colon polyp - sees Dr. 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 ER and IA (+)   • Essential hypertension 2012   • High cholesterol 2019   • Hx of herpes genitalis 2013   •  IBS-d (takes Buspar)        Allergies   Allergen Reactions   • Evista [Raloxifene] Rash   • Cephalexin Diarrhea   • Hydrochlorothiazide Diarrhea   • Meloxicam Other (See Comments)   • Metronidazole Diarrhea   • Oxybutynin Cough   • Propylthiouracil Other (See Comments)     Severally reduce WBC's    • Sulfa Antibiotics Hives   • Sulfacetamide Sodium-Sulfur Hives   • Sulfur Dioxide Hives       Past Surgical History:   Procedure Laterality Date   • BILATERAL BREAST REDUCTION Bilateral 1995   • BREAST LUMPECTOMY Left 2009   • CATARACT EXTRACTION, BILATERAL Bilateral 2018   • CERVICAL CONIZATION  1983   • FACIAL COSMETIC SURGERY  2001   • FACIAL COSMETIC SURGERY  2012   • HEMORRHOIDECTOMY  1974   • LAPAROSCOPIC CHOLECYSTECTOMY  2010   • REDUCTION MAMMAPLASTY Bilateral 04/13/2017    DCIS   • SINUS SURGERY  1992   • THYROIDECTOMY, PARTIAL  1963   • TONSILLECTOMY  1953   • VARICOSE VEIN SURGERY Bilateral 1981       Family History   Problem Relation Age of Onset   • Breast cancer Neg Hx    • Ovarian cancer Neg Hx        Social History     Socioeconomic History   • Marital status:      Spouse name: Not on file   • Number of children: Not on file   • Years of education: Not on file   • Highest education level: Not on file   Tobacco Use   • Smoking status: Never Smoker   Substance and Sexual Activity   • Alcohol use: No   • Drug use: No         Objective   Physical Exam   Constitutional: She is oriented to person, place, and time. She appears well-developed and well-nourished. No distress.   She appears uncomfortable but nontoxic   HENT:   Head: Normocephalic and atraumatic.   Eyes: Conjunctivae are normal. No scleral icterus.   Neck: Normal range of motion.   Cardiovascular: Normal rate, regular rhythm and normal heart sounds. Exam reveals no gallop and no friction rub.   No murmur heard.  Pulmonary/Chest: Effort normal and breath sounds normal. No respiratory distress. She has no wheezes.   Abdominal: Soft. There is  tenderness. There is no rebound and no guarding.   Diffuse lower abdominal tenderness without lateralization.   Musculoskeletal: Normal range of motion. She exhibits no tenderness or deformity.   Neurological: She is alert and oriented to person, place, and time.   Skin: Skin is warm and dry. She is not diaphoretic.   Psychiatric: She has a normal mood and affect. Her behavior is normal.   Nursing note and vitals reviewed.      Procedures         ED Course  ED Course as of Mar 19 2029   Sun Mar 15, 2020   1648 Dr. Mackey called and provided hx on patient that she is currently being treated for diverticulitis with Vancomycin but sent to ED for worsening pain. He requested Sed Rate and CRP be performed on patient.     [RT]   1945 Dr. Steward paged Dr. Gan, surgery.     [HV]   1948 Dr. Steward spoke to Dr. Gan, surgery, who is looking at CT scan images at this time.    [HV]   1952 Dr. Steward paged the hospitalist for admission    [HV]   1956 Dr. Steward spoke to Dr. Gan, surgery. Case was discussed in detail. He is happy to help if Dr. Vazquez is unable.    [HV]   1956 Dr. Steward paged Dr. Perez, colorectal surgery, who is on call for Dr. Vazquez.    [HV]   1959 Dr. Steward spoke to Dr. Joy, who will consult on this patient.    [HV]      ED Course User Index  [HV] Katherine Ewing  [RT] Mercedez Reeder PA     Recent Results (from the past 24 hour(s))   Comprehensive Metabolic Panel    Collection Time: 03/15/20  4:38 PM   Result Value Ref Range    Glucose 257 (H) 65 - 99 mg/dL    BUN 21 8 - 23 mg/dL    Creatinine 0.97 0.57 - 1.00 mg/dL    Sodium 138 136 - 145 mmol/L    Potassium 4.0 3.5 - 5.2 mmol/L    Chloride 102 98 - 107 mmol/L    CO2 21.0 (L) 22.0 - 29.0 mmol/L    Calcium 8.6 8.6 - 10.5 mg/dL    Total Protein 7.7 6.0 - 8.5 g/dL    Albumin 4.20 3.50 - 5.20 g/dL    ALT (SGPT) 9 1 - 33 U/L    AST (SGOT) 16 1 - 32 U/L    Alkaline Phosphatase 97 39 - 117 U/L    Total Bilirubin 0.7 0.2 - 1.2 mg/dL    eGFR Non African  Amer 56 (L) >60 mL/min/1.73    Globulin 3.5 gm/dL    A/G Ratio 1.2 g/dL    BUN/Creatinine Ratio 21.6 7.0 - 25.0    Anion Gap 15.0 5.0 - 15.0 mmol/L   Lipase    Collection Time: 03/15/20  4:38 PM   Result Value Ref Range    Lipase 24 13 - 60 U/L   Light Blue Top    Collection Time: 03/15/20  4:38 PM   Result Value Ref Range    Extra Tube hold for add-on    Green Top (Gel)    Collection Time: 03/15/20  4:38 PM   Result Value Ref Range    Extra Tube Hold for add-ons.    Lavender Top    Collection Time: 03/15/20  4:38 PM   Result Value Ref Range    Extra Tube hold for add-on    Gold Top - SST    Collection Time: 03/15/20  4:38 PM   Result Value Ref Range    Extra Tube Hold for add-ons.    CBC Auto Differential    Collection Time: 03/15/20  4:38 PM   Result Value Ref Range    WBC 15.05 (H) 3.40 - 10.80 10*3/mm3    RBC 4.81 3.77 - 5.28 10*6/mm3    Hemoglobin 13.6 12.0 - 15.9 g/dL    Hematocrit 42.6 34.0 - 46.6 %    MCV 88.6 79.0 - 97.0 fL    MCH 28.3 26.6 - 33.0 pg    MCHC 31.9 31.5 - 35.7 g/dL    RDW 13.2 12.3 - 15.4 %    RDW-SD 43.0 37.0 - 54.0 fl    MPV 10.1 6.0 - 12.0 fL    Platelets 384 140 - 450 10*3/mm3    Neutrophil % 88.2 (H) 42.7 - 76.0 %    Lymphocyte % 5.6 (L) 19.6 - 45.3 %    Monocyte % 5.3 5.0 - 12.0 %    Eosinophil % 0.2 (L) 0.3 - 6.2 %    Basophil % 0.4 0.0 - 1.5 %    Immature Grans % 0.3 0.0 - 0.5 %    Neutrophils, Absolute 13.27 (H) 1.70 - 7.00 10*3/mm3    Lymphocytes, Absolute 0.84 0.70 - 3.10 10*3/mm3    Monocytes, Absolute 0.80 0.10 - 0.90 10*3/mm3    Eosinophils, Absolute 0.03 0.00 - 0.40 10*3/mm3    Basophils, Absolute 0.06 0.00 - 0.20 10*3/mm3    Immature Grans, Absolute 0.05 0.00 - 0.05 10*3/mm3    nRBC 0.0 0.0 - 0.2 /100 WBC   Sedimentation Rate    Collection Time: 03/15/20  4:38 PM   Result Value Ref Range    Sed Rate 19 0 - 30 mm/hr   C-reactive Protein    Collection Time: 03/15/20  4:38 PM   Result Value Ref Range    C-Reactive Protein 6.50 (H) 0.00 - 0.50 mg/dL   Lactic Acid, Plasma     "Collection Time: 03/15/20  4:43 PM   Result Value Ref Range    Lactate 2.9 (C) 0.5 - 2.0 mmol/L     Note: In addition to lab results from this visit, the labs listed above may include labs taken at another facility or during a different encounter within the last 24 hours. Please correlate lab times with ED admission and discharge times for further clarification of the services performed during this visit.    CT Abdomen Pelvis Without Contrast    (Results Pending)     Vitals:    03/15/20 1614   BP: 116/49   BP Location: Left arm   Patient Position: Sitting   Pulse: 108   Resp: 16   Temp: 98.9 °F (37.2 °C)   TempSrc: Oral   SpO2: 95%   Weight: 61.2 kg (135 lb)   Height: 170.2 cm (67\")     Medications   sodium chloride 0.9 % flush 10 mL (has no administration in time range)   sodium chloride 0.9 % bolus 1,000 mL (has no administration in time range)   HYDROmorphone (DILAUDID) injection 0.25 mg (has no administration in time range)   ondansetron (ZOFRAN) injection 4 mg (has no administration in time range)     ECG/EMG Results (last 24 hours)     ** No results found for the last 24 hours. **        No orders to display                                              MDM    Final diagnoses:   Peritonitis (CMS/HCC)   Diverticulitis       Documentation assistance provided by cosme Ewing.  Information recorded by the cosme was done at my direction and has been verified and validated by me.     Katherine Ewing  03/15/20 1306       Darshan Steward MD  03/19/20 2030    "

## 2020-03-16 LAB
ANION GAP SERPL CALCULATED.3IONS-SCNC: 13 MMOL/L (ref 5–15)
BUN BLD-MCNC: 23 MG/DL (ref 8–23)
BUN/CREAT SERPL: 27.7 (ref 7–25)
CALCIUM SPEC-SCNC: 8.4 MG/DL (ref 8.6–10.5)
CHLORIDE SERPL-SCNC: 105 MMOL/L (ref 98–107)
CO2 SERPL-SCNC: 20 MMOL/L (ref 22–29)
CREAT BLD-MCNC: 0.83 MG/DL (ref 0.57–1)
DEPRECATED RDW RBC AUTO: 44.4 FL (ref 37–54)
ERYTHROCYTE [DISTWIDTH] IN BLOOD BY AUTOMATED COUNT: 13.5 % (ref 12.3–15.4)
GFR SERPL CREATININE-BSD FRML MDRD: 66 ML/MIN/1.73
GLUCOSE BLD-MCNC: 134 MG/DL (ref 65–99)
HBA1C MFR BLD: 6 % (ref 4.8–5.6)
HCT VFR BLD AUTO: 37.3 % (ref 34–46.6)
HGB BLD-MCNC: 11.9 G/DL (ref 12–15.9)
MCH RBC QN AUTO: 28.5 PG (ref 26.6–33)
MCHC RBC AUTO-ENTMCNC: 31.9 G/DL (ref 31.5–35.7)
MCV RBC AUTO: 89.2 FL (ref 79–97)
PLATELET # BLD AUTO: 341 10*3/MM3 (ref 140–450)
PMV BLD AUTO: 10.4 FL (ref 6–12)
POTASSIUM BLD-SCNC: 3.8 MMOL/L (ref 3.5–5.2)
RBC # BLD AUTO: 4.18 10*6/MM3 (ref 3.77–5.28)
SODIUM BLD-SCNC: 138 MMOL/L (ref 136–145)
WBC NRBC COR # BLD: 15.45 10*3/MM3 (ref 3.4–10.8)

## 2020-03-16 PROCEDURE — 83036 HEMOGLOBIN GLYCOSYLATED A1C: CPT | Performed by: NURSE PRACTITIONER

## 2020-03-16 PROCEDURE — 87040 BLOOD CULTURE FOR BACTERIA: CPT | Performed by: NURSE PRACTITIONER

## 2020-03-16 PROCEDURE — 25010000002 ONDANSETRON PER 1 MG: Performed by: NURSE PRACTITIONER

## 2020-03-16 PROCEDURE — 85027 COMPLETE CBC AUTOMATED: CPT | Performed by: NURSE PRACTITIONER

## 2020-03-16 PROCEDURE — 25010000002 MORPHINE PER 10 MG: Performed by: NURSE PRACTITIONER

## 2020-03-16 PROCEDURE — 25010000002 ERTAPENEM PER 500 MG: Performed by: NURSE PRACTITIONER

## 2020-03-16 PROCEDURE — 02HV33Z INSERTION OF INFUSION DEVICE INTO SUPERIOR VENA CAVA, PERCUTANEOUS APPROACH: ICD-10-PCS | Performed by: INTERNAL MEDICINE

## 2020-03-16 PROCEDURE — 80048 BASIC METABOLIC PNL TOTAL CA: CPT | Performed by: NURSE PRACTITIONER

## 2020-03-16 PROCEDURE — 99233 SBSQ HOSP IP/OBS HIGH 50: CPT | Performed by: INTERNAL MEDICINE

## 2020-03-16 RX ORDER — CALCITONIN SALMON 200 [IU]/.09ML
1 SPRAY, METERED NASAL DAILY
Status: DISCONTINUED | OUTPATIENT
Start: 2020-03-17 | End: 2020-03-26 | Stop reason: HOSPADM

## 2020-03-16 RX ORDER — LORAZEPAM 0.5 MG/1
0.5 TABLET ORAL EVERY 8 HOURS PRN
Status: DISCONTINUED | OUTPATIENT
Start: 2020-03-16 | End: 2020-03-26 | Stop reason: HOSPADM

## 2020-03-16 RX ORDER — AMLODIPINE BESYLATE 2.5 MG/1
2.5 TABLET ORAL DAILY
COMMUNITY
End: 2020-03-26 | Stop reason: HOSPADM

## 2020-03-16 RX ORDER — METOPROLOL SUCCINATE 100 MG/1
100 TABLET, EXTENDED RELEASE ORAL DAILY
COMMUNITY
End: 2020-09-17

## 2020-03-16 RX ORDER — METOPROLOL SUCCINATE 100 MG/1
100 TABLET, EXTENDED RELEASE ORAL DAILY
Status: DISCONTINUED | OUTPATIENT
Start: 2020-03-17 | End: 2020-03-26 | Stop reason: HOSPADM

## 2020-03-16 RX ORDER — SODIUM CHLORIDE 9 MG/ML
125 INJECTION, SOLUTION INTRAVENOUS CONTINUOUS
Status: ACTIVE | OUTPATIENT
Start: 2020-03-16 | End: 2020-03-17

## 2020-03-16 RX ORDER — AMLODIPINE BESYLATE 2.5 MG/1
2.5 TABLET ORAL DAILY
Status: DISCONTINUED | OUTPATIENT
Start: 2020-03-17 | End: 2020-03-16

## 2020-03-16 RX ORDER — RAMIPRIL 5 MG/1
5 CAPSULE ORAL DAILY
COMMUNITY
End: 2020-03-26 | Stop reason: HOSPADM

## 2020-03-16 RX ORDER — PRAVASTATIN SODIUM 40 MG
80 TABLET ORAL DAILY
COMMUNITY
End: 2022-11-04

## 2020-03-16 RX ORDER — RAMIPRIL 5 MG/1
5 CAPSULE ORAL DAILY
Status: DISCONTINUED | OUTPATIENT
Start: 2020-03-17 | End: 2020-03-16

## 2020-03-16 RX ADMIN — SODIUM CHLORIDE, PRESERVATIVE FREE 10 ML: 5 INJECTION INTRAVENOUS at 21:18

## 2020-03-16 RX ADMIN — BUSPIRONE HYDROCHLORIDE 15 MG: 10 TABLET ORAL at 21:23

## 2020-03-16 RX ADMIN — ONDANSETRON 4 MG: 2 INJECTION INTRAMUSCULAR; INTRAVENOUS at 11:19

## 2020-03-16 RX ADMIN — MORPHINE SULFATE 2 MG: 2 INJECTION, SOLUTION INTRAMUSCULAR; INTRAVENOUS at 11:19

## 2020-03-16 RX ADMIN — SUCRALFATE 1 G: 1 TABLET ORAL at 08:02

## 2020-03-16 RX ADMIN — MORPHINE SULFATE 2 MG: 2 INJECTION, SOLUTION INTRAMUSCULAR; INTRAVENOUS at 02:54

## 2020-03-16 RX ADMIN — SODIUM CHLORIDE, PRESERVATIVE FREE 10 ML: 5 INJECTION INTRAVENOUS at 00:04

## 2020-03-16 RX ADMIN — ERTAPENEM SODIUM 1 G: 1 INJECTION, POWDER, LYOPHILIZED, FOR SOLUTION INTRAMUSCULAR; INTRAVENOUS at 09:23

## 2020-03-16 RX ADMIN — SODIUM CHLORIDE, PRESERVATIVE FREE 10 ML: 5 INJECTION INTRAVENOUS at 08:15

## 2020-03-16 RX ADMIN — MORPHINE SULFATE 2 MG: 2 INJECTION, SOLUTION INTRAMUSCULAR; INTRAVENOUS at 08:19

## 2020-03-16 RX ADMIN — LEVOTHYROXINE SODIUM 88 MCG: 88 TABLET ORAL at 06:22

## 2020-03-16 RX ADMIN — SODIUM CHLORIDE 125 ML/HR: 9 INJECTION, SOLUTION INTRAVENOUS at 21:10

## 2020-03-16 RX ADMIN — MORPHINE SULFATE 2 MG: 2 INJECTION, SOLUTION INTRAMUSCULAR; INTRAVENOUS at 00:04

## 2020-03-16 RX ADMIN — MORPHINE SULFATE 4 MG: 4 INJECTION, SOLUTION INTRAMUSCULAR; INTRAVENOUS at 17:04

## 2020-03-16 RX ADMIN — ONDANSETRON 4 MG: 2 INJECTION INTRAMUSCULAR; INTRAVENOUS at 17:04

## 2020-03-16 RX ADMIN — GABAPENTIN 300 MG: 300 CAPSULE ORAL at 21:18

## 2020-03-16 RX ADMIN — ONDANSETRON 4 MG: 2 INJECTION INTRAMUSCULAR; INTRAVENOUS at 22:37

## 2020-03-16 RX ADMIN — SODIUM CHLORIDE 125 ML/HR: 9 INJECTION, SOLUTION INTRAVENOUS at 11:19

## 2020-03-16 RX ADMIN — MORPHINE SULFATE 2 MG: 2 INJECTION, SOLUTION INTRAMUSCULAR; INTRAVENOUS at 06:25

## 2020-03-16 RX ADMIN — SODIUM CHLORIDE 125 ML/HR: 9 INJECTION, SOLUTION INTRAVENOUS at 03:11

## 2020-03-16 RX ADMIN — SUCRALFATE 1 G: 1 TABLET ORAL at 17:04

## 2020-03-16 NOTE — PLAN OF CARE
Problem: Patient Care Overview  Goal: Plan of Care Review  Flowsheets (Taken 3/16/2020 0421)  Progress: no change  Plan of Care Reviewed With: patient  Outcome Summary: PT admitted from ED.  Rates pain 6-7/10, prn IV pain meds given with relief.  PT denies any other concerns or complaints at this time.

## 2020-03-16 NOTE — H&P
[facility] Medicine Services  HISTORY AND PHYSICAL    Patient Name: Neema Richard  : 1940  MRN: [id number]  Primary Care Physician: David Mackey MD  Date of admission: 3/15/2020      Subjective   Subjective     Chief Complaint: Abd pain    HPI:    Neema Richard is a 79 y.o. female presents to ED tonight c/o acute onset abd pain with N/V that began this morning. Pt has been treated with oral Vanc for the past five days. She continues to rate her current pain at a 7/10 and endorses fatigue, fever (100), difficulty urinating, and diarrhea.    PMHx: diverticulitis with most recent flare Dec 2019 (Dr. Vazquez); arthritis; hypothyroidism    ED workup: lactate 2.9; WBC 15; CRP 6.5; CT abd revealed extensive diverticulitis involving the descending colon and sigmoid colon with stranding and wall thickening with extraluminal air as well suggesting a microperforation. There is free fluid in the pelvis with no evidence of loculation to suggest abscess at this time.    Pt was scheduled to see Dr. Vazquez tomorrow morning.    Review of Systems   Constitutional: Positive for activity change, appetite change, fatigue and fever.   Gastrointestinal: Positive for abdominal pain, nausea and vomiting.   Musculoskeletal: Positive for back pain.      All other systems reviewed and are negative.     Personal History     Past Medical History:   Diagnosis Date   • Acquired hypothyroidism    • Colon polyp - sees Dr. Vazquez 2016   • DCIS (ductal carcinoma in situ) 2017    Dx at the time of breast reduction. ER and CO (+)   • Essential hypertension    • High cholesterol    • Hx of herpes genitalis    • IBS-d (takes Buspar)        Past Surgical History:   Procedure Laterality Date   • BILATERAL BREAST REDUCTION Bilateral    • BREAST LUMPECTOMY Left    • CATARACT EXTRACTION, BILATERAL Bilateral    • CERVICAL CONIZATION     • FACIAL COSMETIC SURGERY     • FACIAL COSMETIC  SURGERY     • HEMORRHOIDECTOMY     • LAPAROSCOPIC CHOLECYSTECTOMY     • REDUCTION MAMMAPLASTY Bilateral 2017    DCIS   • SINUS SURGERY     • THYROIDECTOMY, PARTIAL  1963   • TONSILLECTOMY     • VARICOSE VEIN SURGERY Bilateral        Family History: Father  age 59 from [place]; mother  age 83 emphysema.   Otherwise pertinent FHx was reviewed and unremarkable.       Social History:  reports that she has never smoked. She does not have any smokeless tobacco history on file. She reports that she does not drink alcohol or use drugs.  Social History     Social History Narrative   • Not on file       Medications:  Available home medication information reviewed.      Allergies   Allergen Reactions   • Evista [Raloxifene] Rash   • Cephalexin Diarrhea   • Hydrochlorothiazide Diarrhea   • Meloxicam Other (See Comments)   • Metronidazole Diarrhea   • Oxybutynin Cough   • Propylthiouracil Other (See Comments)     Severally reduce WBC's    • Sulfa Antibiotics Hives   • Sulfacetamide Sodium-Sulfur Hives   • Sulfur Dioxide Hives       Objective   Objective     Vital Signs:   Temp:  [98.9 °F (37.2 °C)] 98.9 °F (37.2 °C)  Heart Rate:  [] 79  Resp:  [16] 16  BP: ()/(47-53) 112/53        Physical Exam   Constitutional: She is oriented to person, place, and time. No distress.   HENT:   Head: Normocephalic and atraumatic.   Eyes: Pupils are equal, round, and reactive to light. EOM are normal.   Neck: Neck supple.   Cardiovascular: Normal rate and regular rhythm.   Abdominal: She exhibits distension. There is tenderness.   Soft, tender, BSx4   Musculoskeletal: She exhibits no edema.   Neurological: She is alert and oriented to person, place, and time.   Skin: She is not diaphoretic.   C/D/I   Psychiatric: She has a normal mood and affect. Her behavior is normal. Judgment and thought content normal.          Results Reviewed:  I have personally reviewed current lab and radiology  data.    Results from last 7 days   Lab Units 03/15/20  1638   WBC 10*3/mm3 15.05*   HEMOGLOBIN g/dL 13.6   HEMATOCRIT % 42.6   PLATELETS 10*3/mm3 384     Results from last 7 days   Lab Units 03/15/20  1643 03/15/20  1638   SODIUM mmol/L  --  138   POTASSIUM mmol/L  --  4.0   CHLORIDE mmol/L  --  102   CO2 mmol/L  --  21.0*   BUN mg/dL  --  21   CREATININE mg/dL  --  0.97   GLUCOSE mg/dL  --  257*   CALCIUM mg/dL  --  8.6   ALT (SGPT) U/L  --  9   AST (SGOT) U/L  --  16   LACTATE mmol/L 2.9*  --      Estimated Creatinine Clearance: 46.2 mL/min (by C-G formula based on SCr of 0.97 mg/dL).  Brief Urine Lab Results     None        Imaging Results (Last 24 Hours)     Procedure Component Value Units Date/Time    CT Abdomen Pelvis Without Contrast [[id number]] Collected:  03/15/20 1853     Updated:  03/15/20 1904    Narrative:       EXAMINATION: CT ABDOMEN/PELVIS WO CONTRAST - 03/15/2020      INDICATION: Lower abdominal pain and left flank pain, vomiting and  diarrhea.     TECHNIQUE: Multiple axial CT imaging is obtained of the abdomen and  pelvis without the administration of oral or intravenous contrast.     The radiation dose reduction device was turned on for each scan per the  ALARA (As Low as Reasonably Achievable) protocol.     COMPARISON: 02/25/2020     FINDINGS:      ABDOMEN: Atelectatic changes seen in the lung bases bilaterally. The  gallbladder has been surgically removed. The liver is homogeneous in  appearance. The spleen is unremarkable. Visualized pancreas is  homogeneous in appearance. The kidneys and adrenal glands are within  normal limits. No abdominal or retroperitoneal lymphadenopathy. Vascular  calcification seen of the abdominal aorta and iliac vessels. There is  moderate amount of stool seen diffusely throughout the colon. There are  diverticula identified throughout the entire colon. There is abnormal  wall thickening identified of the distal descending colon and entire  sigmoid colon suggesting  an extensive diverticulitis however there is  surrounding stranding and some fluid with some mild extraluminal air  identified just above the uterus suggesting a perforation. There is some  scattered air is well seen within the retroperitoneum. Again no definite  abscess.     PELVIS: A pessary is in place. There is some fluid identified in the  pelvis with extraluminal air suggesting a perforation with extensive  diverticulitis involving the distal descending and sigmoid colon. No  evidence of abscess. No pelvic adenopathy. Bony structures reveal  degenerative changes seen within the spine.       Impression:       There is extensive diverticulitis involving the distal  descending colon and sigmoid colon with stranding and wall thickening  with extraluminal air as well suggesting a microperforation. There is  free fluid in the pelvis with no evidence of loculation to suggest  abscess at this time.     DICTATED:   03/15/2020  EDITED/ls :   03/15/2020                    Assessment/Plan   Assessment & Plan     Active Hospital Problems    Diagnosis POA   • **Sepsis (CMS/HCC) [A41.9] Yes   • Acute diverticulitis [K57.92] Yes     Priority: High   • Perforation of sigmoid colon due to diverticulitis [K57.20] Unknown     Priority: High   • Dyspepsia [R10.13] Unknown     Priority: Low   • Lactic acidosis [E87.2] Unknown   • Neutrophilic leukocytosis [D72.9] Unknown   • Acute kidney injury (CMS/HCC) [N17.9] Unknown   • Acute hyperglycemia [R73.9] Unknown   • Arthritis [M19.90] Unknown   • Overactive bladder [N32.81] Unknown   • Essential hypertension [I10] Unknown   • Acquired hypothyroidism [E03.9] Yes     79yoF with acute abd pain found to have extensive diverticulitis with microperforation; admitted for sepsis.       Sepsis (glucose 257), (WBC 15), tachycardia, diverticulitis)  Neutrophilic leukocytosis  Lactic Acidosis (lactate 2.9)  Diverticulitis with microperforation  Sepsis protocol  Pain mgmt (avoid  NSAIDs)  Antiemetics  Abx: merrem  IV fluids  NPO  Labs in am  BCx2  Consult colorectal surgery, Dr. Vazquez - pt had appt with him on Monday  Consult general surgery      Acute Kidney Injury  GFR 56; CrCl 46  IV fluids  Monitor      Hyperglycemia in absence of Diabetes  Proteinuria   Glucose 257  Assess A1c (Nov 2018 - 6.6)  Monitor - may need SSI      HTN  Hold antihypertensives for now given hypotension - restart when appropriate      Hypothyroidism  Continue with levothyroxine      Arthritis  Continue gabapentin      Anxiety  Takes about 5mg Buspar daily (cuts 15mg pill into third)  Does not take ativan often (half tablet every other month) - hold for now      Overactive bladder  Continue Myrbetriq - pt to bring her own medication      Dyspepsia  Continue Carafate - pt tolerates well      DVT prophylaxis: keith/scds    CODE STATUS: Pt does have Advanced Directives. She has two children. Pt states dtr, Theodora can make decisions for her in the event she is unable. Theodora Mullen ([phone]) has access to pt's Advanced Directives, which are in her lock box.     Code Status and Medical Interventions:   Ordered at: 03/15/20 2134     Code Status:    CPR     Medical Interventions (Level of Support Prior to Arrest):    Full       Admission Status:  I believe this patient meets INPATIENT status due to recurrent diverticulitis with microperforation.  I feel patient’s risk for adverse outcomes and need for care warrant INPATIENT evaluation and I predict the patient’s care encounter to likely last beyond 2 midnights.      Electronically signed by LAUREEN Rodriguez, 03/15/20, 8:59 PM.      Attending   Admission Attestation       I have seen and examined the patient, performing an independent face-to-face diagnostic evaluation with plan of care reviewed and developed with the advanced practice clinician (APC).      Brief Summary Statement:   Neema Richard is a 79 y.o. female with past medical history significant for  "hypothyroidism, hypertension, hyperlipidemia, overactive bladder, and diverticular disease.  Patient presented to the ED today complaining of diffuse abdominal pain with associated nausea without vomiting.  She also reports subjective fever and chills, as well as diarrhea without any blood or mucus.  She reported that she has been struggling with her diverticular disease for years, but recently since December 2019, her disease has gotten worse.  She reported that she was treated in December with a 10-day course of antibiotics, which she cannot recall the name of, however her symptoms did not completely resolved.  She had another recurrent attack sometime in January 2020 where she was treated with again.  She recently was doing relatively well up until earlier this week when she reported some abdominal pain and follow-up with her PCP who started her on a medication, but again, patient cannot recall the name.  She stated that it has \"mycin in the name\".  Her PCP also referred her to Dr. Nuñez for evaluation and appointment was made, but patient felt that her abdominal pain was increasing and she cannot wait any further, so she presented to the ED instead.  Patient reported that Dr. Nuñez has performed her previous colonoscopies, last one was about 2 to 3 years ago.  No other complaints at this time, including chest pain, cough, shortness of air, dysuria, or gross hematuria.  Of note, patient reported that she has not been able to eat or drink anything much due to pain and nausea.    Remainder of detailed HPI is as noted by APC and has been reviewed and/or edited by me for completeness.    Attending Physical Exam:  General Assessment: No acute cardiopulmonary distress. Well developed and well nourished.  Nontoxic appearing.    HEENT: NCAT, PERRL, MM slightly dry.    Neck: Supple    CVS: RRR, S1S2 normal, no murmurs    Resp: CTAB, no adventitious sound    Abd: soft, diffusely tender, especially in the left lower " quadrant area, ND, normal BS, no guarding or peritoneal signs    Ext: No edema, both calves are symmetric and NTTP    Neuro: Nonfocal    Skin: W/D/I. No rash.    Psych: Affect is appropriate        Brief Assessment/Plan :  See detailed assessment and plan developed with APC which I have reviewed and/or edited for completeness.    Electronically signed by Mariana Cannon MD, 03/15/20, 11:47 PM.

## 2020-03-16 NOTE — PROGRESS NOTES
Discharge Planning Assessment  [facility]     Patient Name: Neema Richard  MRN: [id number]  Today's Date: 3/16/2020    Admit Date: 3/15/2020    Discharge Needs Assessment     Row Name 03/16/20 1145       Living Environment    Lives With  alone;other (see comments)    Name(s) of Who Lives With Patient  Patient reports having a tenant (rents a room) that is a RN.     Current Living Arrangements  home/apartment/condo    Primary Care Provided by  self    Provides Primary Care For  no one    Family Caregiver if Needed  other (see comments) Patient reports having a tenant (rents a room) that is a RN.     Quality of Family Relationships  supportive    Able to Return to Prior Arrangements  yes       Resource/Environmental Concerns    Transportation Concerns  car, none       Transition Planning    Transportation Anticipated  family or friend will provide       Discharge Needs Assessment    Readmission Within the Last 30 Days  no previous admission in last 30 days    Equipment Currently Used at Home  none    Provided Post Acute Provider List?  N/A    N/A Provider List Comment  as needed        Discharge Plan     Row Name 03/16/20 1153       Plan                                            Home    Plan Comments   Met with patient at discharge to discuss discharge planning.  Patient lives in [facility] with her tenant who is a RN. She reports being independent of ADL's. NO DME or HH. She reports her friend or tenant can transport her home.    Final Discharge Disposition Code  01 - home or self-care    Row Name 03/16/20 1152       Plan    Plan  Home        Destination      Coordination has not been started for this encounter.      Durable Medical Equipment      Coordination has not been started for this encounter.      Dialysis/Infusion      Coordination has not been started for this encounter.      Home Medical Care      Coordination has not been started for this encounter.      Therapy      Coordination has not been started for  this encounter.      Community Resources      Coordination has not been started for this encounter.          Demographic Summary     Row Name 03/16/20 1142       General Information    Admission Type  inpatient    Arrived From  emergency department    Referral Source  admission list    Reason for Consult  discharge planning        Functional Status     Row Name 03/16/20 1143       Functional Status    Usual Activity Tolerance  good    Current Activity Tolerance  good       Functional Status, IADL    Medications  independent    Meal Preparation  independent    Housekeeping  independent    Laundry  independent    Shopping  independent       Employment/    Employment/ Comments  Patient has Medicare can afford her medications.        Psychosocial    No documentation.       Abuse/Neglect    No documentation.       Legal    No documentation.       Substance Abuse    No documentation.       Patient Forms    No documentation.           Elisa Brown) GOLDIE Kim

## 2020-03-16 NOTE — CONSULTS
Chief Complaint: Abd pain     HPI:     Neema Richard is a 79 y.o. female presents to ED last night c/o acute on chronic onset abd pain with N/V that began yesterday.     Pt has been treated with oral Vanc for the past five days. She continues to rate her current pain at a 7/10 and endorses fatigue, fever (100), difficulty urinating, and diarrhea.     PMHx: diverticulitis with most recent flare Dec 2019 (Dr. Vazquez); arthritis; hypothyroidism    ED workup: lactate 2.9; WBC 15; CRP 6.5; CT abd revealed extensive diverticulitis involving the descending colon and sigmoid colon with stranding and wall thickening with extraluminal air as well suggesting a microperforation. There is free fluid in the pelvis with no evidence of loculation to suggest abscess at this time.       Review of Systems   Constitutional: Positive for activity change, appetite change, fatigue and fever.   Gastrointestinal: Positive for abdominal pain, nausea and vomiting.   Musculoskeletal: Positive for back pain.      All other systems reviewed and are negative.      Personal History      Medical History        Past Medical History:   Diagnosis Date   • Acquired hypothyroidism 1980   • Colon polyp - sees Dr. 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 ER and [place] (+)   • Essential hypertension 2012   • High cholesterol 2019   • Hx of herpes genitalis 2013   • IBS-d (takes Buspar)              Surgical History         Past Surgical History:   Procedure Laterality Date   • BILATERAL BREAST REDUCTION Bilateral 1995   • BREAST LUMPECTOMY Left 2009   • CATARACT EXTRACTION, BILATERAL Bilateral 2018   • CERVICAL CONIZATION   1983   • FACIAL COSMETIC SURGERY   2001   • FACIAL COSMETIC SURGERY   2012   • HEMORRHOIDECTOMY   1974   • LAPAROSCOPIC CHOLECYSTECTOMY   2010   • REDUCTION MAMMAPLASTY Bilateral 04/13/2017     DCIS   • SINUS SURGERY   1992   • THYROIDECTOMY, PARTIAL   1963   • TONSILLECTOMY   1953   •  VARICOSE VEIN SURGERY Bilateral             Family History: Father  age 59 from MI; mother  age 83 emphysema.   Otherwise pertinent FHx was reviewed and unremarkable.         Social History:  reports that she has never smoked. She does not have any smokeless tobacco history on file. She reports that she does not drink alcohol or use drugs.  Social History          Social History Narrative   • Not on file         Medications:  Available home medication information reviewed.              Allergies   Allergen Reactions   • Evista [Raloxifene] Rash   • Cephalexin Diarrhea   • Hydrochlorothiazide Diarrhea   • Meloxicam Other (See Comments)   • Metronidazole Diarrhea   • Oxybutynin Cough   • Propylthiouracil Other (See Comments)       Severally reduce WBC's    • Sulfa Antibiotics Hives   • Sulfacetamide Sodium-Sulfur Hives   • Sulfur Dioxide Hives            Objective      Objective      Vital Signs:   Temp:  [98.9 °F (37.2 °C)] 98.9 °F (37.2 °C)  Heart Rate:  [] 79  Resp:  [16] 16  BP: ()/(47-53) 112/53      Physical Exam   Constitutional: She is oriented to person, place, and time. No distress.   HENT:   Head: Normocephalic and atraumatic.   Eyes: Pupils are equal, round, and reactive to light. EOM are normal.   Neck: Neck supple.   Cardiovascular: Normal rate and regular rhythm.   Abdominal: She exhibits distension. There is tenderness.   Soft, tender, BSx4   Guarding and rebound in the suprapubic area.  Digital rectal exam without palpable deep pelvic abscess.  No palpable distal rectal mass.  Musculoskeletal: She exhibits no edema.   Neurological: She is alert and oriented to person, place, and time.   Skin: She is not diaphoretic.   C/D/I   Psychiatric: She has a normal mood and affect. Her behavior is normal. Judgment and thought content normal.            Results Reviewed:  I have personally reviewed current lab and radiology data.          Results from last 7 days   Lab Units  03/15/20  1638   WBC 10*3/mm3 15.05*   HEMOGLOBIN g/dL 13.6   HEMATOCRIT % 42.6   PLATELETS 10*3/mm3 384            Results from last 7 days   Lab Units 03/15/20  1643 03/15/20  1638   SODIUM mmol/L  --  138   POTASSIUM mmol/L  --  4.0   CHLORIDE mmol/L  --  102   CO2 mmol/L  --  21.0*   BUN mg/dL  --  21   CREATININE mg/dL  --  0.97   GLUCOSE mg/dL  --  257*   CALCIUM mg/dL  --  8.6   ALT (SGPT) U/L  --  9   AST (SGOT) U/L  --  16   LACTATE mmol/L 2.9*  --       Estimated Creatinine Clearance: 46.2 mL/min (by C-G formula based on SCr of 0.97 mg/dL).      Brief Urine Lab Results      None                   Imaging Results (Last 24 Hours)      Procedure Component Value Units Date/Time     CT Abdomen Pelvis Without Contrast [[id number]] Collected:  03/15/20 1853       Updated:  03/15/20 1904     Narrative:        EXAMINATION: CT ABDOMEN/PELVIS WO CONTRAST - 03/15/2020      INDICATION: Lower abdominal pain and left flank pain, vomiting and  diarrhea.     TECHNIQUE: Multiple axial CT imaging is obtained of the abdomen and  pelvis without the administration of oral or intravenous contrast.     The radiation dose reduction device was turned on for each scan per the  ALARA (As Low as Reasonably Achievable) protocol.     COMPARISON: 02/25/2020     FINDINGS:      ABDOMEN: Atelectatic changes seen in the lung bases bilaterally. The  gallbladder has been surgically removed. The liver is homogeneous in  appearance. The spleen is unremarkable. Visualized pancreas is  homogeneous in appearance. The kidneys and adrenal glands are within  normal limits. No abdominal or retroperitoneal lymphadenopathy. Vascular  calcification seen of the abdominal aorta and iliac vessels. There is  moderate amount of stool seen diffusely throughout the colon. There are  diverticula identified throughout the entire colon. There is abnormal  wall thickening identified of the distal descending colon and entire  sigmoid colon suggesting an extensive  diverticulitis however there is  surrounding stranding and some fluid with some mild extraluminal air  identified just above the uterus suggesting a perforation. There is some  scattered air is well seen within the retroperitoneum. Again no definite  abscess.     PELVIS: A pessary is in place. There is some fluid identified in the  pelvis with extraluminal air suggesting a perforation with extensive  diverticulitis involving the distal descending and sigmoid colon. No  evidence of abscess. No pelvic adenopathy. Bony structures reveal  degenerative changes seen within the spine.        Impression:        There is extensive diverticulitis involving the distal  descending colon and sigmoid colon with stranding and wall thickening  with extraluminal air as well suggesting a microperforation. There is  free fluid in the pelvis with no evidence of loculation to suggest  abscess at this time.     DICTATED:   03/15/2020  EDITED/ls :   03/15/2020                       Assessment/Plan      Assessment & Plan            Active Hospital Problems     Diagnosis POA   • **Sepsis (CMS/HCC) [A41.9] Yes   • Acute diverticulitis [K57.92] Yes       Priority: High   • Perforation of sigmoid colon due to diverticulitis [K57.20] Unknown       Priority: High   • Dyspepsia [R10.13] Unknown       Priority: Low   • Lactic acidosis [E87.2] Unknown   • Neutrophilic leukocytosis [D72.9] Unknown   • Acute kidney injury (CMS/HCC) [N17.9] Unknown   • Acute hyperglycemia [R73.9] Unknown   • Arthritis [M19.90] Unknown   • Overactive bladder [N32.81] Unknown   • Essential hypertension [I10] Unknown   • Acquired hypothyroidism [E03.9] Yes      79yoF with acute abd pain found to have extensive diverticulitis with microperforation; admitted for sepsis.         Sepsis (glucose 257), (WBC 15), tachycardia, diverticulitis)  Neutrophilic leukocytosis  Lactic Acidosis (lactate 2.9)  Diverticulitis with microperforation  Sepsis protocol  Pain mgmt (avoid  NSAIDs)  Antiemetics  Abx: merrem  IV fluids  NPO  Labs in am  BCx2       Acute Kidney Injury  GFR 56; CrCl 46  IV fluids  Monitor        Hyperglycemia in absence of Diabetes  Proteinuria   Glucose 257  Assess A1c (Nov 2018 - 6.6)  Monitor - may need SSI        HTN  Hold antihypertensives for now given hypotension - restart when appropriate        Hypothyroidism  Continue with levothyroxine        Arthritis  Continue gabapentin        Anxiety  Takes about 5mg Buspar daily (cuts 15mg pill into third)  Does not take ativan often (half tablet every other month) - hold for now        DVT prophylaxis: keith/scds      Contacted this weekend by Dr. Mackey regarding abdominal pain, history, suspected diverticulitis.    Further evaluation in the emergency room with findings of sigmoid inflammation, microperforation, likely diverticulitis.    Resolving sepsis, without ongoing tachycardia.    Still impressive exam with guarding and rebound.    Plans for consultation with ID for likely IV antibiotics as an outpatient if improves.    If lack of improvement or uncontrolled systemic inflammatory process or infection refractory to antibiotic therapy, may require surgery on this admission.  If surgery is required on this admission, likely to be urgent and good chance to require stoma.    We will follow closely.    Appreciate Dr. Mackey's communication over the weekend to facilitate care.

## 2020-03-16 NOTE — PLAN OF CARE
Problem: Pain, Chronic (Adult)  Goal: Identify Related Risk Factors and Signs and Symptoms  Outcome: Ongoing (interventions implemented as appropriate)  Note:   Pt. C/o abdominal pain this shift, given morphine and zofran prn. Rested in chair most of day, voiding well. Patient states not much appetite d/t nausea. Will continue to monitor.

## 2020-03-16 NOTE — PROGRESS NOTES
[facility] Medicine Services  PROGRESS NOTE    Patient Name: Neema Richard  : 1940  MRN: [id number]    Date of Admission: 3/15/2020  Primary Care Physician: David Mackey MD    Subjective   Subjective     CC: F/U diverticulitis with microperforation    HPI:  Feels weak.  Still with abdominal pain but slightly improved.    Review of Systems  Gen- No fevers, chills  CV- No chest pain, palpitations  Resp- No cough, dyspnea  GI- + abdominal pain, + bowel movement      Objective   Objective     Vital Signs:   Temp:  [98 °F (36.7 °C)-99.3 °F (37.4 °C)] 99.3 °F (37.4 °C)  Heart Rate:  [71-93] 93  Resp:  [16-18] 18  BP: ()/(45-60) 127/60        Physical Exam:  Constitutional: No acute distress, awake, alert, sitting up in chair  HENT: NCAT, mucous membranes moist  Respiratory: Clear to auscultation bilaterally, respiratory effort normal   Cardiovascular: RRR, no murmurs, rubs, or gallops  Gastrointestinal: Positive bowel sounds, soft, diffusely tender to palpation, nondistended  Musculoskeletal: No bilateral ankle edema  Psychiatric: Appropriate affect, cooperative  Neurologic: Cranial Nerves grossly intact to confrontation, speech clear  Skin: No rashes    Results Reviewed:  Results from last 7 days   Lab Units 20  0018 03/15/20  1638   WBC 10*3/mm3 15.45* 15.05*   HEMOGLOBIN g/dL 11.9* 13.6   HEMATOCRIT % 37.3 42.6   PLATELETS 10*3/mm3 341 384     Results from last 7 days   Lab Units 20  0018 03/15/20  1638   SODIUM mmol/L 138 138   POTASSIUM mmol/L 3.8 4.0   CHLORIDE mmol/L 105 102   CO2 mmol/L 20.0* 21.0*   BUN mg/dL 23 21   CREATININE mg/dL 0.83 0.97   GLUCOSE mg/dL 134* 257*   CALCIUM mg/dL 8.4* 8.6   ALT (SGPT) U/L  --  9   AST (SGOT) U/L  --  16     Estimated Creatinine Clearance: 54 mL/min (by C-G formula based on SCr of 0.83 mg/dL).    Microbiology Results Abnormal     None          Imaging Results (Last 24 Hours)     Procedure Component Value Units Date/Time     CT Abdomen Pelvis Without Contrast [[id number]] Collected:  03/15/20 1853     Updated:  03/16/20 0955    Narrative:       EXAMINATION: CT ABDOMEN/PELVIS WO CONTRAST - 03/15/2020      INDICATION: Lower abdominal pain and left flank pain, vomiting and  diarrhea.     TECHNIQUE: Multiple axial CT imaging is obtained of the abdomen and  pelvis without the administration of oral or intravenous contrast.     The radiation dose reduction device was turned on for each scan per the  ALARA (As Low as Reasonably Achievable) protocol.     COMPARISON: 02/25/2020     FINDINGS:      ABDOMEN: Atelectatic changes seen in the lung bases bilaterally. The  gallbladder has been surgically removed. The liver is homogeneous in  appearance. The spleen is unremarkable. Visualized pancreas is  homogeneous in appearance. The kidneys and adrenal glands are within  normal limits. No abdominal or retroperitoneal lymphadenopathy. Vascular  calcification seen of the abdominal aorta and iliac vessels. There is  moderate amount of stool seen diffusely throughout the colon. There are  diverticula identified throughout the entire colon. There is abnormal  wall thickening identified of the distal descending colon and entire  sigmoid colon suggesting an extensive diverticulitis however there is  surrounding stranding and some fluid with some mild extraluminal air  identified just above the uterus suggesting a perforation. There is some  scattered air is well seen within the retroperitoneum. Again no definite  abscess.     PELVIS: A pessary is in place. There is some fluid identified in the  pelvis with extraluminal air suggesting a perforation with extensive  diverticulitis involving the distal descending and sigmoid colon. No  evidence of abscess. No pelvic adenopathy. Bony structures reveal  degenerative changes seen within the spine.       Impression:       There is extensive diverticulitis involving the distal  descending colon and sigmoid colon  with stranding and wall thickening  with extraluminal air as well suggesting a microperforation. There is  free fluid in the pelvis with no evidence of loculation to suggest  abscess at this time.     DICTATED:   03/15/2020  EDITED/ls :   03/15/2020         This report was finalized on 3/16/2020 9:52 AM by Dr. Daria Malone MD.                  I have reviewed the medications:  Scheduled Meds:  busPIRone 15 mg Oral Daily   [START ON 3/17/2020] calcitonin (salmon) 1 spray Alternating Nares Daily   ertapenem 1 g Intravenous Q24H   gabapentin 300 mg Oral Nightly   levothyroxine 88 mcg Oral Q AM   [START ON 3/17/2020] metoprolol succinate  mg Oral Daily   [START ON 3/17/2020] Mirabegron ER 50 mg Oral Daily   sodium chloride 10 mL Intravenous Q12H   sucralfate 1 g Oral BID AC     Continuous Infusions:  sodium chloride 125 mL/hr Last Rate: 125 mL/hr (03/16/20 1119)     PRN Meds:.LORazepam  •  Morphine **OR** Morphine  •  ondansetron  •  sodium chloride  •  sodium chloride  •  sodium chloride    Assessment/Plan   Assessment & Plan     Active Hospital Problems    Diagnosis  POA   • **Perforation of sigmoid colon due to diverticulitis [K57.20]  Unknown   • Acute diverticulitis [K57.92]  Yes   • Sepsis (CMS/HCC) [A41.9]  Yes   • Lactic acidosis [E87.2]  Unknown   • Neutrophilic leukocytosis [D72.9]  Unknown   • Acute kidney injury (CMS/HCC) [N17.9]  Unknown   • Acute hyperglycemia [R73.9]  Unknown   • Arthritis [M19.90]  Unknown   • Overactive bladder [N32.81]  Unknown   • Dyspepsia [R10.13]  Unknown   • Essential hypertension [I10]  Unknown   • Acquired hypothyroidism [E03.9]  Yes      Resolved Hospital Problems   No resolved problems to display.        Brief Hospital Course to date:  Neema Richard is a 79 y.o. female admitted with diverticulitis/microperforation and sepsis.    Sepsis-resolved  Neutrophilic leukocytosis  Lactic Acidosis (lactate 2.9)  Diverticulitis with microperforation  -Continue Invanz per  ID  -Dr. Vazquez following  -Pain control      Hyperglycemia in absence of Diabetes  Proteinuria   -A1c 6.0%     HTN  -Restart metoprolol.  Resume other antihypertensives as tolerated.     Hypothyroidism  -Continue levothyroxine     Arthritis  -Continue gabapentin     Anxiety  -Resume buspar and ativan     Overactive bladder  -Continue Myrbetriq, patient supplied     Dyspepsia  -Continue Carafate     DVT Prophylaxis:  Mechanical    Disposition: I expect the patient to be discharged 2-3 days depending on clinical improvement    CODE STATUS:   Code Status and Medical Interventions:   Ordered at: 03/15/20 2134     Code Status:    CPR     Medical Interventions (Level of Support Prior to Arrest):    Full         Electronically signed by Angelina Dougherty MD, 03/16/20, 18:53.

## 2020-03-16 NOTE — CONSULTS
INFECTIOUS DISEASE CONSULT/INITIAL HOSPITAL VISIT    Neema Richard  1940  [id number]    Date of Consult: 3/16/2020    Admission Date: 3/15/2020      Requesting Provider: No ref. provider found  Evaluating Physician: Darshan Bejarano MD    Reason for Consultation: Acute sigmoid diverticulitis, with microperforation     History of present illness:    3/16/2020: Patient is a 79 y.o. female, seen today for acute sigmoid diverticulitis with microperforation.  She has had several episodes of diverticulitis in the past, the last being in December 2019, treated with an oral antibiotic by her PCP.   Her symptoms minimally improved. She began experiencing diarrhea, with low grade fever, and yesterday, developed worsening abdominal pain, prompting her presentation to the ED.  An abdominal Ct with extensive diverticulitis involving the descending colon and sigmoid colon with stranding and wall thickening, with extraluminal air suggesting a microperforation.   Admitting labs with a leukocytosis of 15, 000, lactic acidosis, and she has been afebrile. She is currently on Merrem and we were consulted for evaluation and treatment.     Past Medical History:   Diagnosis Date   • Acquired hypothyroidism 1980   • Colon polyp - sees Dr. 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 ER and [place] (+)   • Essential hypertension 2012   • High cholesterol 2019   • Hx of herpes genitalis 2013   • IBS-d (takes Buspar)        Past Surgical History:   Procedure Laterality Date   • BILATERAL BREAST REDUCTION Bilateral 1995   • BREAST LUMPECTOMY Left 2009   • CATARACT EXTRACTION, BILATERAL Bilateral 2018   • CERVICAL CONIZATION  1983   • FACIAL COSMETIC SURGERY  2001   • FACIAL COSMETIC SURGERY  2012   • HEMORRHOIDECTOMY  1974   • LAPAROSCOPIC CHOLECYSTECTOMY  2010   • REDUCTION MAMMAPLASTY Bilateral 04/13/2017    DCIS   • SINUS SURGERY  1992   • THYROIDECTOMY, PARTIAL  1963   • TONSILLECTOMY   1953   • VARICOSE VEIN SURGERY Bilateral 1981       Family History   Problem Relation Age of Onset   • Breast cancer Neg Hx    • Ovarian cancer Neg Hx        Social History     Socioeconomic History   • Marital status:      Spouse name: Not on file   • Number of children: Not on file   • Years of education: Not on file   • Highest education level: Not on file   Tobacco Use   • Smoking status: Never Smoker   Substance and Sexual Activity   • Alcohol use: No   • Drug use: No       Allergies   Allergen Reactions   • Evista [Raloxifene] Rash   • Cephalexin Diarrhea   • Hydrochlorothiazide Diarrhea   • Meloxicam Other (See Comments)   • Metronidazole Diarrhea   • Oxybutynin Cough   • Propylthiouracil Other (See Comments)     Severally reduce WBC's    • Sulfa Antibiotics Hives   • Sulfacetamide Sodium-Sulfur Hives   • Sulfur Dioxide Hives         Medication:    Current Facility-Administered Medications:   •  busPIRone (BUSPAR) tablet 15 mg, 15 mg, Oral, Daily, Angelina Dougherty MD  •  [START ON 3/17/2020] calcitonin (salmon) (MIACALCIN) nasal spray 1 spray, 1 spray, Alternating Nares, Daily, Angelina Dougherty MD  •  ertapenem (INVanz) 1 g/100 mL 0.9% NS VTB (mbp), 1 g, Intravenous, Q24H, Esperanza Benton, APRN, 1 g at 03/16/20 0923  •  gabapentin (NEURONTIN) capsule 300 mg, 300 mg, Oral, Nightly, Trang Cedillo, APRN  •  levothyroxine (SYNTHROID, LEVOTHROID) tablet 88 mcg, 88 mcg, Oral, Q AM, Trang Cedillo APRN, 88 mcg at 03/16/20 0622  •  LORazepam (ATIVAN) tablet 0.5 mg, 0.5 mg, Oral, Q8H PRN, Angelina Dougherty MD  •  [START ON 3/17/2020] metoprolol succinate XL (TOPROL-XL) 24 hr tablet 100 mg, 100 mg, Oral, Daily, Angelina Dougherty MD  •  [START ON 3/17/2020] Mirabegron ER (MYRBETRIQ) 24 hr tablet 50 mg- patient supplied med, 50 mg, Oral, Daily, Angelina Dougherty MD  •  morphine injection 2 mg, 2 mg, Intravenous, Q2H PRN, 2 mg at 03/16/20 1119 **OR** Morphine sulfate (PF) injection 4 mg, 4 mg, Intravenous, Q2H  PRN, Trang Cedillo, APRN, 4 mg at 03/16/20 1704  •  ondansetron (ZOFRAN) injection 4 mg, 4 mg, Intravenous, Q6H PRN, Trang Cedillo APRN, 4 mg at 03/16/20 1704  •  sodium chloride 0.9 % bolus 1,836 mL, 30 mL/kg, Intravenous, PRN, Trang Cedillo H, APRN  •  sodium chloride 0.9 % flush 10 mL, 10 mL, Intravenous, PRN, Darshan Steward MD  •  sodium chloride 0.9 % flush 10 mL, 10 mL, Intravenous, Q12H, Trang Cedillo, APRN, 10 mL at 03/16/20 0815  •  sodium chloride 0.9 % flush 10 mL, 10 mL, Intravenous, PRN, Trang Cedillo, APRN  •  sodium chloride 0.9 % infusion, 125 mL/hr, Intravenous, Continuous, Trang Cedillo, APRN, Last Rate: 125 mL/hr at 03/16/20 1119, 125 mL/hr at 03/16/20 1119  •  sucralfate (CARAFATE) tablet 1 g, 1 g, Oral, BID AC, Trang Cedillo APRN, 1 g at 03/16/20 1704    Antibiotics:  Anti-Infectives (From admission, onward)    Ordered     Dose/Rate Route Frequency Start Stop    03/16/20 0811  ertapenem (INVanz) 1 g/100 mL 0.9% NS VTB (mbp)     Joni Alfaro, [facility] reviewed the order on 03/16/20 1033.   Ordering Provider:  Esperanza Benton APRN    1 g  over 30 Minutes Intravenous Every 24 Hours 03/16/20 0900 03/23/20 0859    03/15/20 1954  meropenem (MERREM) 1 g/100 mL 0.9% NS VTB (mbp)     Ordering Provider:  Darshan Steward MD    1 g  over 30 Minutes Intravenous Once 03/15/20 1955 03/15/20 2122            Review of Systems:  Constitutional-- subjective  Fever, no chills or sweats.  + fatigue   HEENT-- No new vision, hearing or throat complaints.  No epistaxis or oral sores.  Denies odynophagia or dysphagia. No headache, photophobia or neck stiffness.  CV-- No chest pain, palpitation or syncope  Resp-- No SOB/cough/Hemoptysis  GI- + nausea,  No vomiting, + diarrhea.  No hematochezia, melena, or hematemesis. Denies jaundice or chronic liver disease. + diffuse abdominal pain   -- + overactive bladder  Lymph- no swollen lymph nodes in neck/axilla or groin.   Heme- No active  bruising or bleeding; no Hx of DVT or PE.  MS-- no swelling or pain in the bones or joints of arms/legs.  No new back pain.  Neuro-- No acute focal weakness or numbness in the arms or legs.  No seizures.  Skin--No rashes or lesions      Physical Exam:   Vital Signs  Temp (24hrs), Av.6 °F (37 °C), Min:98 °F (36.7 °C), Max:99.3 °F (37.4 °C)    Temp  Min: 98 °F (36.7 °C)  Max: 99.3 °F (37.4 °C)  BP  Min: 96/47  Max: 127/60  Pulse  Min: 71  Max: 93  Resp  Min: 16  Max: 18  SpO2  Min: 91 %  Max: 95 %    GENERAL: Awake and alert, in no acute distress.   HEENT: Normocephalic, atraumatic.   No conjunctival injection. No icterus. Oropharynx clear without evidence of thrush or exudate. No evidence of peridontal disease.    HEART: RRR; No murmur, rubs, gallops.   LUNGS: Clear to auscultation bilaterally without wheezing, rales, rhonchi. Normal respiratory effort. Nonlabored. No dullness.  ABDOMEN:  +tenderness bilateral lower quadrants, distended. Positive bowel sounds.  EXT:  No cyanosis, clubbing or edema. No cord.  :  Without Gaytan catheter.  MSK:  No joint effusions   SKIN: Warm and dry without cutaneous eruptions on Inspection/palpation.    NEURO: Oriented to PPT.  PSYCHIATRIC: Normal insight and judgement. Cooperative with PE    Laboratory Data    Results from last 7 days   Lab Units 20  0018 03/15/20  1638   WBC 10*3/mm3 15.45* 15.05*   HEMOGLOBIN g/dL 11.9* 13.6   HEMATOCRIT % 37.3 42.6   PLATELETS 10*3/mm3 341 384     Results from last 7 days   Lab Units 20  0018   SODIUM mmol/L 138   POTASSIUM mmol/L 3.8   CHLORIDE mmol/L 105   CO2 mmol/L 20.0*   BUN mg/dL 23   CREATININE mg/dL 0.83   GLUCOSE mg/dL 134*   CALCIUM mg/dL 8.4*     Results from last 7 days   Lab Units 03/15/20  1638   ALK PHOS U/L 97   BILIRUBIN mg/dL 0.7   ALT (SGPT) U/L 9   AST (SGOT) U/L 16     Results from last 7 days   Lab Units 03/15/20  1638   SED RATE mm/hr 19     Results from last 7 days   Lab Units 03/15/20  1638   CRP mg/dL  6.50*     Results from last 7 days   Lab Units 03/15/20  2226   LACTATE mmol/L 1.1             Estimated Creatinine Clearance: 54 mL/min (by C-G formula based on SCr of 0.83 mg/dL).  Lactate 2.9     Microbiology:    3/16: BC pending       Radiology:  Imaging Results (Last 72 Hours)     Procedure Component Value Units Date/Time    CT Abdomen Pelvis Without Contrast [[id number]] Collected:  03/15/20 1853     Updated:  03/16/20 0955    Narrative:       EXAMINATION: CT ABDOMEN/PELVIS WO CONTRAST - 03/15/2020      INDICATION: Lower abdominal pain and left flank pain, vomiting and  diarrhea.     TECHNIQUE: Multiple axial CT imaging is obtained of the abdomen and  pelvis without the administration of oral or intravenous contrast.     The radiation dose reduction device was turned on for each scan per the  ALARA (As Low as Reasonably Achievable) protocol.     COMPARISON: 02/25/2020     FINDINGS:      ABDOMEN: Atelectatic changes seen in the lung bases bilaterally. The  gallbladder has been surgically removed. The liver is homogeneous in  appearance. The spleen is unremarkable. Visualized pancreas is  homogeneous in appearance. The kidneys and adrenal glands are within  normal limits. No abdominal or retroperitoneal lymphadenopathy. Vascular  calcification seen of the abdominal aorta and iliac vessels. There is  moderate amount of stool seen diffusely throughout the colon. There are  diverticula identified throughout the entire colon. There is abnormal  wall thickening identified of the distal descending colon and entire  sigmoid colon suggesting an extensive diverticulitis however there is  surrounding stranding and some fluid with some mild extraluminal air  identified just above the uterus suggesting a perforation. There is some  scattered air is well seen within the retroperitoneum. Again no definite  abscess.     PELVIS: A pessary is in place. There is some fluid identified in the  pelvis with extraluminal air suggesting  a perforation with extensive  diverticulitis involving the distal descending and sigmoid colon. No  evidence of abscess. No pelvic adenopathy. Bony structures reveal  degenerative changes seen within the spine.       Impression:       There is extensive diverticulitis involving the distal  descending colon and sigmoid colon with stranding and wall thickening  with extraluminal air as well suggesting a microperforation. There is  free fluid in the pelvis with no evidence of loculation to suggest  abscess at this time.     DICTATED:   03/15/2020  EDITED/ls :   03/15/2020         This report was finalized on 3/16/2020 9:52 AM by Dr. Daria Malone MD.           I read her CT scan.    Impression:   1.  Acute sigmoid diverticulitis- with perforation   2.  Severe sepsis- with leukocytosis, tachycardia, lactic acidosis, uncontrolled diabetes mellitus, and known focus of infection   3.  Leukocytosis/neutrophilia, secondary to above     PLAN/RECOMMENDATIONS:   Thank you for asking us to see Neema Richard, I recommend the followin. Discontinue Merrem  2.  Ertapenem 1 g IV daily  3.  PICC line placement    Dr. Bejarano has obtained the history, performed the physical exam and formulated the above treatment plan.        Darshan Bejarano MD  3/16/2020  18:14

## 2020-03-17 ENCOUNTER — ANESTHESIA EVENT (OUTPATIENT)
Dept: PERIOP | Facility: HOSPITAL | Age: 80
End: 2020-03-17

## 2020-03-17 ENCOUNTER — ANESTHESIA (OUTPATIENT)
Dept: PERIOP | Facility: HOSPITAL | Age: 80
End: 2020-03-17

## 2020-03-17 ENCOUNTER — APPOINTMENT (OUTPATIENT)
Dept: CT IMAGING | Facility: HOSPITAL | Age: 80
End: 2020-03-17

## 2020-03-17 LAB
ABO GROUP BLD: NORMAL
ABO GROUP BLD: NORMAL
ANION GAP SERPL CALCULATED.3IONS-SCNC: 12 MMOL/L (ref 5–15)
BACTERIA UR QL AUTO: ABNORMAL /HPF
BILIRUB UR QL STRIP: NEGATIVE
BLD GP AB SCN SERPL QL: NEGATIVE
BUN BLD-MCNC: 28 MG/DL (ref 8–23)
BUN/CREAT SERPL: 41.2 (ref 7–25)
CALCIUM SPEC-SCNC: 8.6 MG/DL (ref 8.6–10.5)
CHLORIDE SERPL-SCNC: 108 MMOL/L (ref 98–107)
CLARITY UR: CLEAR
CO2 SERPL-SCNC: 19 MMOL/L (ref 22–29)
COARSE GRAN CASTS URNS QL MICRO: ABNORMAL /LPF
COLOR UR: YELLOW
CREAT BLD-MCNC: 0.68 MG/DL (ref 0.57–1)
D-LACTATE SERPL-SCNC: 1.4 MMOL/L (ref 0.5–2)
DEPRECATED RDW RBC AUTO: 47.5 FL (ref 37–54)
ERYTHROCYTE [DISTWIDTH] IN BLOOD BY AUTOMATED COUNT: 14 % (ref 12.3–15.4)
GFR SERPL CREATININE-BSD FRML MDRD: 84 ML/MIN/1.73
GLUCOSE BLD-MCNC: 94 MG/DL (ref 65–99)
GLUCOSE UR STRIP-MCNC: ABNORMAL MG/DL
HCT VFR BLD AUTO: 34.8 % (ref 34–46.6)
HGB BLD-MCNC: 10.8 G/DL (ref 12–15.9)
HGB UR QL STRIP.AUTO: ABNORMAL
HYALINE CASTS UR QL AUTO: ABNORMAL /LPF
KETONES UR QL STRIP: ABNORMAL
LEUKOCYTE ESTERASE UR QL STRIP.AUTO: NEGATIVE
MCH RBC QN AUTO: 28.8 PG (ref 26.6–33)
MCHC RBC AUTO-ENTMCNC: 31 G/DL (ref 31.5–35.7)
MCV RBC AUTO: 92.8 FL (ref 79–97)
NITRITE UR QL STRIP: NEGATIVE
PH UR STRIP.AUTO: <=5 [PH] (ref 5–8)
PLATELET # BLD AUTO: 280 10*3/MM3 (ref 140–450)
PMV BLD AUTO: 10.7 FL (ref 6–12)
POTASSIUM BLD-SCNC: 3.9 MMOL/L (ref 3.5–5.2)
PROT UR QL STRIP: ABNORMAL
RBC # BLD AUTO: 3.75 10*6/MM3 (ref 3.77–5.28)
RBC # UR: ABNORMAL /HPF
REF LAB TEST METHOD: ABNORMAL
RH BLD: NEGATIVE
RH BLD: NEGATIVE
SODIUM BLD-SCNC: 139 MMOL/L (ref 136–145)
SP GR UR STRIP: 1.04 (ref 1–1.03)
SQUAMOUS #/AREA URNS HPF: ABNORMAL /HPF
T&S EXPIRATION DATE: NORMAL
TRANS CELLS #/AREA URNS HPF: ABNORMAL /HPF
UROBILINOGEN UR QL STRIP: ABNORMAL
WBC NRBC COR # BLD: 14.46 10*3/MM3 (ref 3.4–10.8)
WBC UR QL AUTO: ABNORMAL /HPF

## 2020-03-17 PROCEDURE — 25010000002 ONDANSETRON PER 1 MG: Performed by: NURSE PRACTITIONER

## 2020-03-17 PROCEDURE — 88302 TISSUE EXAM BY PATHOLOGIST: CPT | Performed by: COLON & RECTAL SURGERY

## 2020-03-17 PROCEDURE — 86900 BLOOD TYPING SEROLOGIC ABO: CPT

## 2020-03-17 PROCEDURE — C1751 CATH, INF, PER/CENT/MIDLINE: HCPCS

## 2020-03-17 PROCEDURE — 25010000002 HYDROMORPHONE PER 4 MG: Performed by: NURSE ANESTHETIST, CERTIFIED REGISTERED

## 2020-03-17 PROCEDURE — 85027 COMPLETE CBC AUTOMATED: CPT | Performed by: INTERNAL MEDICINE

## 2020-03-17 PROCEDURE — 25010000002 KETOROLAC TROMETHAMINE PER 15 MG: Performed by: COLON & RECTAL SURGERY

## 2020-03-17 PROCEDURE — 86901 BLOOD TYPING SEROLOGIC RH(D): CPT

## 2020-03-17 PROCEDURE — 99232 SBSQ HOSP IP/OBS MODERATE 35: CPT | Performed by: INTERNAL MEDICINE

## 2020-03-17 PROCEDURE — 0D1L0Z4 BYPASS TRANSVERSE COLON TO CUTANEOUS, OPEN APPROACH: ICD-10-PCS | Performed by: COLON & RECTAL SURGERY

## 2020-03-17 PROCEDURE — 25010000002 MORPHINE PER 10 MG: Performed by: NURSE PRACTITIONER

## 2020-03-17 PROCEDURE — 87116 MYCOBACTERIA CULTURE: CPT | Performed by: COLON & RECTAL SURGERY

## 2020-03-17 PROCEDURE — 86850 RBC ANTIBODY SCREEN: CPT | Performed by: COLON & RECTAL SURGERY

## 2020-03-17 PROCEDURE — 88307 TISSUE EXAM BY PATHOLOGIST: CPT | Performed by: COLON & RECTAL SURGERY

## 2020-03-17 PROCEDURE — C1894 INTRO/SHEATH, NON-LASER: HCPCS

## 2020-03-17 PROCEDURE — 25010000002 IOPAMIDOL 61 % SOLUTION: Performed by: INTERNAL MEDICINE

## 2020-03-17 PROCEDURE — 80048 BASIC METABOLIC PNL TOTAL CA: CPT | Performed by: INTERNAL MEDICINE

## 2020-03-17 PROCEDURE — 0DTN0ZZ RESECTION OF SIGMOID COLON, OPEN APPROACH: ICD-10-PCS | Performed by: COLON & RECTAL SURGERY

## 2020-03-17 PROCEDURE — 25010000002 DEXAMETHASONE SODIUM PHOSPHATE 10 MG/ML SOLUTION: Performed by: ANESTHESIOLOGY

## 2020-03-17 PROCEDURE — 86901 BLOOD TYPING SEROLOGIC RH(D): CPT | Performed by: COLON & RECTAL SURGERY

## 2020-03-17 PROCEDURE — 25010000002 PHENYLEPHRINE PER 1 ML: Performed by: NURSE ANESTHETIST, CERTIFIED REGISTERED

## 2020-03-17 PROCEDURE — 0UT60ZZ RESECTION OF LEFT FALLOPIAN TUBE, OPEN APPROACH: ICD-10-PCS | Performed by: COLON & RECTAL SURGERY

## 2020-03-17 PROCEDURE — 25010000002 ONDANSETRON PER 1 MG: Performed by: COLON & RECTAL SURGERY

## 2020-03-17 PROCEDURE — 88305 TISSUE EXAM BY PATHOLOGIST: CPT | Performed by: COLON & RECTAL SURGERY

## 2020-03-17 PROCEDURE — 0UT10ZZ RESECTION OF LEFT OVARY, OPEN APPROACH: ICD-10-PCS | Performed by: COLON & RECTAL SURGERY

## 2020-03-17 PROCEDURE — 87205 SMEAR GRAM STAIN: CPT | Performed by: COLON & RECTAL SURGERY

## 2020-03-17 PROCEDURE — 87070 CULTURE OTHR SPECIMN AEROBIC: CPT | Performed by: COLON & RECTAL SURGERY

## 2020-03-17 PROCEDURE — 74178 CT ABD&PLV WO CNTR FLWD CNTR: CPT

## 2020-03-17 PROCEDURE — 25010000002 ONDANSETRON PER 1 MG: Performed by: ANESTHESIOLOGY

## 2020-03-17 PROCEDURE — 25010000002 FENTANYL CITRATE (PF) 100 MCG/2ML SOLUTION: Performed by: NURSE ANESTHETIST, CERTIFIED REGISTERED

## 2020-03-17 PROCEDURE — 25010000002 HEPARIN (PORCINE) PER 1000 UNITS: Performed by: COLON & RECTAL SURGERY

## 2020-03-17 PROCEDURE — 25010000002 NEOSTIGMINE 10 MG/10ML SOLUTION: Performed by: ANESTHESIOLOGY

## 2020-03-17 PROCEDURE — 87075 CULTR BACTERIA EXCEPT BLOOD: CPT | Performed by: COLON & RECTAL SURGERY

## 2020-03-17 PROCEDURE — 86900 BLOOD TYPING SEROLOGIC ABO: CPT | Performed by: COLON & RECTAL SURGERY

## 2020-03-17 PROCEDURE — 25010000002 PROPOFOL 10 MG/ML EMULSION: Performed by: NURSE ANESTHETIST, CERTIFIED REGISTERED

## 2020-03-17 PROCEDURE — 25810000003 SODIUM CHLORIDE 0.9 % WITH KCL 20 MEQ 20-0.9 MEQ/L-% SOLUTION: Performed by: COLON & RECTAL SURGERY

## 2020-03-17 PROCEDURE — 25010000002 BUPRENORPHINE PER 0.1 MG: Performed by: ANESTHESIOLOGY

## 2020-03-17 PROCEDURE — 87102 FUNGUS ISOLATION CULTURE: CPT | Performed by: COLON & RECTAL SURGERY

## 2020-03-17 PROCEDURE — 25010000002 ALBUMIN HUMAN 5% PER 50 ML: Performed by: ANESTHESIOLOGY

## 2020-03-17 PROCEDURE — 83605 ASSAY OF LACTIC ACID: CPT | Performed by: COLON & RECTAL SURGERY

## 2020-03-17 PROCEDURE — 25010000002 ERTAPENEM PER 500 MG: Performed by: NURSE PRACTITIONER

## 2020-03-17 PROCEDURE — 87206 SMEAR FLUORESCENT/ACID STAI: CPT | Performed by: COLON & RECTAL SURGERY

## 2020-03-17 PROCEDURE — 81001 URINALYSIS AUTO W/SCOPE: CPT | Performed by: EMERGENCY MEDICINE

## 2020-03-17 PROCEDURE — 0DTJ0ZZ RESECTION OF APPENDIX, OPEN APPROACH: ICD-10-PCS | Performed by: COLON & RECTAL SURGERY

## 2020-03-17 PROCEDURE — P9041 ALBUMIN (HUMAN),5%, 50ML: HCPCS | Performed by: ANESTHESIOLOGY

## 2020-03-17 DEVICE — PROXIMATE RELOADABLE LINEAR CUTTER WITH SAFETY LOCK-OUT, 75MM
Type: IMPLANTABLE DEVICE | Status: FUNCTIONAL
Brand: PROXIMATE

## 2020-03-17 DEVICE — CONTOUR CURVED CUTTER STAPLER
Type: IMPLANTABLE DEVICE | Status: FUNCTIONAL
Brand: CONTOUR

## 2020-03-17 DEVICE — PROXIMATE LINEAR CUTTER RELOAD, BLUE, 75MM
Type: IMPLANTABLE DEVICE | Status: FUNCTIONAL
Brand: PROXIMATE

## 2020-03-17 RX ORDER — HYDROMORPHONE HYDROCHLORIDE 1 MG/ML
0.25 INJECTION, SOLUTION INTRAMUSCULAR; INTRAVENOUS; SUBCUTANEOUS
Status: DISCONTINUED | OUTPATIENT
Start: 2020-03-17 | End: 2020-03-17 | Stop reason: HOSPADM

## 2020-03-17 RX ORDER — FENTANYL CITRATE 50 UG/ML
50 INJECTION, SOLUTION INTRAMUSCULAR; INTRAVENOUS
Status: DISCONTINUED | OUTPATIENT
Start: 2020-03-17 | End: 2020-03-17 | Stop reason: HOSPADM

## 2020-03-17 RX ORDER — ALBUMIN, HUMAN INJ 5% 5 %
500 SOLUTION INTRAVENOUS ONCE
Status: COMPLETED | OUTPATIENT
Start: 2020-03-17 | End: 2020-03-17

## 2020-03-17 RX ORDER — HYDROMORPHONE HYDROCHLORIDE 1 MG/ML
0.5 INJECTION, SOLUTION INTRAMUSCULAR; INTRAVENOUS; SUBCUTANEOUS
Status: DISCONTINUED | OUTPATIENT
Start: 2020-03-17 | End: 2020-03-26 | Stop reason: HOSPADM

## 2020-03-17 RX ORDER — HYDROCODONE BITARTRATE AND ACETAMINOPHEN 7.5; 325 MG/1; MG/1
1 TABLET ORAL EVERY 4 HOURS PRN
Status: DISCONTINUED | OUTPATIENT
Start: 2020-03-17 | End: 2020-03-19

## 2020-03-17 RX ORDER — SODIUM CHLORIDE AND POTASSIUM CHLORIDE 150; 900 MG/100ML; MG/100ML
75 INJECTION, SOLUTION INTRAVENOUS CONTINUOUS
Status: DISCONTINUED | OUTPATIENT
Start: 2020-03-17 | End: 2020-03-21

## 2020-03-17 RX ORDER — DEXAMETHASONE SODIUM PHOSPHATE 10 MG/ML
INJECTION, SOLUTION INTRAMUSCULAR; INTRAVENOUS
Status: COMPLETED | OUTPATIENT
Start: 2020-03-17 | End: 2020-03-17

## 2020-03-17 RX ORDER — GLYCOPYRROLATE 0.2 MG/ML
INJECTION INTRAMUSCULAR; INTRAVENOUS AS NEEDED
Status: DISCONTINUED | OUTPATIENT
Start: 2020-03-17 | End: 2020-03-17 | Stop reason: SURG

## 2020-03-17 RX ORDER — NALOXONE HCL 0.4 MG/ML
0.4 VIAL (ML) INJECTION
Status: DISCONTINUED | OUTPATIENT
Start: 2020-03-17 | End: 2020-03-26 | Stop reason: HOSPADM

## 2020-03-17 RX ORDER — MAGNESIUM HYDROXIDE 1200 MG/15ML
LIQUID ORAL AS NEEDED
Status: DISCONTINUED | OUTPATIENT
Start: 2020-03-17 | End: 2020-03-17 | Stop reason: HOSPADM

## 2020-03-17 RX ORDER — BUPIVACAINE HYDROCHLORIDE 2.5 MG/ML
INJECTION, SOLUTION EPIDURAL; INFILTRATION; INTRACAUDAL
Status: COMPLETED | OUTPATIENT
Start: 2020-03-17 | End: 2020-03-17

## 2020-03-17 RX ORDER — ONDANSETRON 2 MG/ML
4 INJECTION INTRAMUSCULAR; INTRAVENOUS EVERY 6 HOURS PRN
Status: DISCONTINUED | OUTPATIENT
Start: 2020-03-17 | End: 2020-03-20 | Stop reason: SDUPTHER

## 2020-03-17 RX ORDER — ROCURONIUM BROMIDE 10 MG/ML
INJECTION, SOLUTION INTRAVENOUS AS NEEDED
Status: DISCONTINUED | OUTPATIENT
Start: 2020-03-17 | End: 2020-03-17 | Stop reason: SURG

## 2020-03-17 RX ORDER — DIAZEPAM 5 MG/1
5 TABLET ORAL EVERY 6 HOURS PRN
Status: DISCONTINUED | OUTPATIENT
Start: 2020-03-17 | End: 2020-03-26 | Stop reason: HOSPADM

## 2020-03-17 RX ORDER — SODIUM CHLORIDE 0.9 % (FLUSH) 0.9 %
10 SYRINGE (ML) INJECTION AS NEEDED
Status: DISCONTINUED | OUTPATIENT
Start: 2020-03-17 | End: 2020-03-26 | Stop reason: HOSPADM

## 2020-03-17 RX ORDER — HEPARIN SODIUM 5000 [USP'U]/ML
5000 INJECTION, SOLUTION INTRAVENOUS; SUBCUTANEOUS EVERY 8 HOURS SCHEDULED
Status: DISCONTINUED | OUTPATIENT
Start: 2020-03-17 | End: 2020-03-17 | Stop reason: SDUPTHER

## 2020-03-17 RX ORDER — SODIUM CHLORIDE 0.9 % (FLUSH) 0.9 %
10 SYRINGE (ML) INJECTION EVERY 12 HOURS SCHEDULED
Status: DISCONTINUED | OUTPATIENT
Start: 2020-03-17 | End: 2020-03-26 | Stop reason: HOSPADM

## 2020-03-17 RX ORDER — LIDOCAINE HYDROCHLORIDE 10 MG/ML
INJECTION, SOLUTION EPIDURAL; INFILTRATION; INTRACAUDAL; PERINEURAL AS NEEDED
Status: DISCONTINUED | OUTPATIENT
Start: 2020-03-17 | End: 2020-03-17 | Stop reason: SURG

## 2020-03-17 RX ORDER — PROPOFOL 10 MG/ML
VIAL (ML) INTRAVENOUS AS NEEDED
Status: DISCONTINUED | OUTPATIENT
Start: 2020-03-17 | End: 2020-03-17 | Stop reason: SURG

## 2020-03-17 RX ORDER — NEOSTIGMINE METHYLSULFATE 1 MG/ML
INJECTION, SOLUTION INTRAVENOUS AS NEEDED
Status: DISCONTINUED | OUTPATIENT
Start: 2020-03-17 | End: 2020-03-17 | Stop reason: SURG

## 2020-03-17 RX ORDER — SODIUM CHLORIDE 0.9 % (FLUSH) 0.9 %
10 SYRINGE (ML) INJECTION EVERY 12 HOURS SCHEDULED
Status: DISCONTINUED | OUTPATIENT
Start: 2020-03-17 | End: 2020-03-17 | Stop reason: HOSPADM

## 2020-03-17 RX ORDER — SODIUM CHLORIDE 0.9 % (FLUSH) 0.9 %
10 SYRINGE (ML) INJECTION AS NEEDED
Status: DISCONTINUED | OUTPATIENT
Start: 2020-03-17 | End: 2020-03-17 | Stop reason: HOSPADM

## 2020-03-17 RX ORDER — SODIUM CHLORIDE, SODIUM LACTATE, POTASSIUM CHLORIDE, CALCIUM CHLORIDE 600; 310; 30; 20 MG/100ML; MG/100ML; MG/100ML; MG/100ML
INJECTION, SOLUTION INTRAVENOUS CONTINUOUS PRN
Status: DISCONTINUED | OUTPATIENT
Start: 2020-03-17 | End: 2020-03-17 | Stop reason: SURG

## 2020-03-17 RX ORDER — ONDANSETRON 2 MG/ML
INJECTION INTRAMUSCULAR; INTRAVENOUS AS NEEDED
Status: DISCONTINUED | OUTPATIENT
Start: 2020-03-17 | End: 2020-03-17 | Stop reason: SURG

## 2020-03-17 RX ORDER — GABAPENTIN 300 MG/1
300 CAPSULE ORAL NIGHTLY
Status: DISCONTINUED | OUTPATIENT
Start: 2020-03-20 | End: 2020-03-19

## 2020-03-17 RX ORDER — HEPARIN SODIUM 5000 [USP'U]/ML
5000 INJECTION, SOLUTION INTRAVENOUS; SUBCUTANEOUS EVERY 8 HOURS SCHEDULED
Status: DISCONTINUED | OUTPATIENT
Start: 2020-03-18 | End: 2020-03-26 | Stop reason: HOSPADM

## 2020-03-17 RX ORDER — BUPRENORPHINE HYDROCHLORIDE 0.32 MG/ML
INJECTION INTRAMUSCULAR; INTRAVENOUS
Status: COMPLETED | OUTPATIENT
Start: 2020-03-17 | End: 2020-03-17

## 2020-03-17 RX ORDER — KETOROLAC TROMETHAMINE 15 MG/ML
15 INJECTION, SOLUTION INTRAMUSCULAR; INTRAVENOUS EVERY 6 HOURS
Status: COMPLETED | OUTPATIENT
Start: 2020-03-17 | End: 2020-03-19

## 2020-03-17 RX ORDER — FAMOTIDINE 10 MG/ML
20 INJECTION, SOLUTION INTRAVENOUS
Status: COMPLETED | OUTPATIENT
Start: 2020-03-17 | End: 2020-03-17

## 2020-03-17 RX ORDER — ACETAMINOPHEN 325 MG/1
650 TABLET ORAL EVERY 8 HOURS
Status: DISCONTINUED | OUTPATIENT
Start: 2020-03-17 | End: 2020-03-26 | Stop reason: HOSPADM

## 2020-03-17 RX ORDER — GABAPENTIN 300 MG/1
300 CAPSULE ORAL 2 TIMES DAILY
Status: DISCONTINUED | OUTPATIENT
Start: 2020-03-17 | End: 2020-03-18

## 2020-03-17 RX ADMIN — LIDOCAINE HYDROCHLORIDE 30 MG: 10 INJECTION, SOLUTION EPIDURAL; INFILTRATION; INTRACAUDAL; PERINEURAL at 15:28

## 2020-03-17 RX ADMIN — PHENYLEPHRINE HYDROCHLORIDE 100 MCG: 10 INJECTION, SOLUTION INTRAMUSCULAR; INTRAVENOUS; SUBCUTANEOUS at 15:46

## 2020-03-17 RX ADMIN — GABAPENTIN 300 MG: 300 CAPSULE ORAL at 20:28

## 2020-03-17 RX ADMIN — ONDANSETRON 4 MG: 2 INJECTION INTRAMUSCULAR; INTRAVENOUS at 20:28

## 2020-03-17 RX ADMIN — LORAZEPAM 0.5 MG: 0.5 TABLET ORAL at 20:28

## 2020-03-17 RX ADMIN — ROCURONIUM BROMIDE 40 MG: 10 INJECTION INTRAVENOUS at 15:28

## 2020-03-17 RX ADMIN — PHENYLEPHRINE HYDROCHLORIDE 100 MCG: 10 INJECTION, SOLUTION INTRAMUSCULAR; INTRAVENOUS; SUBCUTANEOUS at 15:34

## 2020-03-17 RX ADMIN — ALBUMIN HUMAN 500 ML: 0.05 INJECTION, SOLUTION INTRAVENOUS at 18:06

## 2020-03-17 RX ADMIN — DEXAMETHASONE SODIUM PHOSPHATE 4 MG: 10 INJECTION INTRAMUSCULAR; INTRAVENOUS at 15:31

## 2020-03-17 RX ADMIN — CALCITONIN SALMON 1 SPRAY: 200 SPRAY, METERED NASAL at 08:17

## 2020-03-17 RX ADMIN — IOPAMIDOL 75 ML: 612 INJECTION, SOLUTION INTRAVENOUS at 10:21

## 2020-03-17 RX ADMIN — KETOROLAC TROMETHAMINE 15 MG: 15 INJECTION, SOLUTION INTRAMUSCULAR; INTRAVENOUS at 20:28

## 2020-03-17 RX ADMIN — ONDANSETRON 4 MG: 2 INJECTION INTRAMUSCULAR; INTRAVENOUS at 17:17

## 2020-03-17 RX ADMIN — ACETAMINOPHEN 650 MG: 325 TABLET, FILM COATED ORAL at 20:29

## 2020-03-17 RX ADMIN — SODIUM CHLORIDE, PRESERVATIVE FREE 10 ML: 5 INJECTION INTRAVENOUS at 08:19

## 2020-03-17 RX ADMIN — MORPHINE SULFATE 2 MG: 2 INJECTION, SOLUTION INTRAMUSCULAR; INTRAVENOUS at 08:17

## 2020-03-17 RX ADMIN — HEPARIN SODIUM 5000 UNITS: 5000 INJECTION, SOLUTION INTRAVENOUS; SUBCUTANEOUS at 12:43

## 2020-03-17 RX ADMIN — FAMOTIDINE 20 MG: 10 INJECTION INTRAVENOUS at 13:39

## 2020-03-17 RX ADMIN — HYDROMORPHONE HYDROCHLORIDE 0.25 MG: 1 INJECTION, SOLUTION INTRAMUSCULAR; INTRAVENOUS; SUBCUTANEOUS at 18:30

## 2020-03-17 RX ADMIN — GLYCOPYRROLATE 0.4 MG: 0.2 INJECTION INTRAMUSCULAR; INTRAVENOUS at 17:17

## 2020-03-17 RX ADMIN — FENTANYL CITRATE 50 MCG: 50 INJECTION INTRAMUSCULAR; INTRAVENOUS at 18:10

## 2020-03-17 RX ADMIN — POTASSIUM CHLORIDE AND SODIUM CHLORIDE 100 ML/HR: 900; 150 INJECTION, SOLUTION INTRAVENOUS at 19:15

## 2020-03-17 RX ADMIN — PHENYLEPHRINE HYDROCHLORIDE 100 MCG: 10 INJECTION, SOLUTION INTRAMUSCULAR; INTRAVENOUS; SUBCUTANEOUS at 15:52

## 2020-03-17 RX ADMIN — ROCURONIUM BROMIDE 10 MG: 10 INJECTION INTRAVENOUS at 15:58

## 2020-03-17 RX ADMIN — LEVOTHYROXINE SODIUM 88 MCG: 88 TABLET ORAL at 05:19

## 2020-03-17 RX ADMIN — BUPRENORPHINE HYDROCHLORIDE 0.3 MG: 0.32 INJECTION INTRAMUSCULAR; INTRAVENOUS at 15:31

## 2020-03-17 RX ADMIN — PHENYLEPHRINE HYDROCHLORIDE 200 MCG: 10 INJECTION, SOLUTION INTRAMUSCULAR; INTRAVENOUS; SUBCUTANEOUS at 15:35

## 2020-03-17 RX ADMIN — SODIUM CHLORIDE, POTASSIUM CHLORIDE, SODIUM LACTATE AND CALCIUM CHLORIDE: 600; 310; 30; 20 INJECTION, SOLUTION INTRAVENOUS at 17:01

## 2020-03-17 RX ADMIN — ERTAPENEM SODIUM 1 G: 1 INJECTION, POWDER, LYOPHILIZED, FOR SOLUTION INTRAMUSCULAR; INTRAVENOUS at 08:17

## 2020-03-17 RX ADMIN — PHENYLEPHRINE HYDROCHLORIDE 100 MCG: 10 INJECTION, SOLUTION INTRAMUSCULAR; INTRAVENOUS; SUBCUTANEOUS at 17:24

## 2020-03-17 RX ADMIN — FENTANYL CITRATE 50 MCG: 50 INJECTION INTRAMUSCULAR; INTRAVENOUS at 18:20

## 2020-03-17 RX ADMIN — SODIUM CHLORIDE 500 ML: 4.5 INJECTION, SOLUTION INTRAVENOUS at 08:17

## 2020-03-17 RX ADMIN — BUSPIRONE HYDROCHLORIDE 15 MG: 10 TABLET ORAL at 08:17

## 2020-03-17 RX ADMIN — NEOSTIGMINE 3 MG: 1 INJECTION INTRAVENOUS at 17:17

## 2020-03-17 RX ADMIN — SUCRALFATE 1 G: 1 TABLET ORAL at 05:19

## 2020-03-17 RX ADMIN — PROPOFOL 200 MG: 10 INJECTION, EMULSION INTRAVENOUS at 15:28

## 2020-03-17 RX ADMIN — ONDANSETRON 4 MG: 2 INJECTION INTRAMUSCULAR; INTRAVENOUS at 05:19

## 2020-03-17 RX ADMIN — SODIUM CHLORIDE, POTASSIUM CHLORIDE, SODIUM LACTATE AND CALCIUM CHLORIDE: 600; 310; 30; 20 INJECTION, SOLUTION INTRAVENOUS at 15:20

## 2020-03-17 RX ADMIN — MIRABEGRON 50 MG: 50 TABLET, FILM COATED, EXTENDED RELEASE ORAL at 08:17

## 2020-03-17 RX ADMIN — BUPIVACAINE HYDROCHLORIDE 60 ML: 2.5 INJECTION, SOLUTION EPIDURAL; INFILTRATION; INTRACAUDAL; PERINEURAL at 15:31

## 2020-03-17 NOTE — PROGRESS NOTES
Adult Nutrition  Assessment/PES    Patient Name:  Neema Richard  YOB: 1940  MRN: [id number]  Admit Date:  3/15/2020    Assessment Date:  3/17/2020        Reason for Assessment     Row Name 03/17/20 1340          Reason for Assessment    Reason For Assessment  -- education consult. 15 mins     Diagnosis  -- diverticullitis with micro-perforation, abd pain and free air per CT; pt in OR currently. H/o dyspepsia, HTN, hypothyroid.           Anthropometrics     Row Name 03/17/20 1342          Admit Weight    Admit Weight  -- ht=67in, pm=560my per stated wt on 3/15; BMI=21.1         Labs/Tests/Procedures/Meds     Row Name 03/17/20 1343          Labs/Procedures/Meds    Lab Results Reviewed  reviewed             Nutrition Prescription Ordered     Row Name 03/17/20 1350          Nutrition Prescription PO    Current PO Diet  NPO           Intervention Goal     Row Name 03/17/20 1351          Intervention Goal    PO  Initiate feeding   as approp post-op         Nutrition Intervention     Row Name 03/17/20 1351          Nutrition Intervention    RD/Tech Action  Follow Tx progress           Education/Evaluation     Row Name 03/17/20 1351 03/17/20 1338       Education    Education  --  Education not appropriate at this time pt in OR at this time; noted Dr Vazquez's notes today of anticipated colon resection/colostomy.  Will follow for initiation of diet orders inpt and for use at d/c,and will educate pt as approp.       Monitor/Evaluation    Monitor  Per protocol  --          Electronically signed by:  Becki Dutta, MS,RD,LD  03/17/20 13:51

## 2020-03-17 NOTE — OP NOTE
[facility] ([facility])    Open Low Anterior Resection  Mobilization of Splenic Flexure  Drainage of lower abdominal abscesses, cultures obtained.  Appendectomy  Left salpingo-oophorectomy  Distal Transverse End Colostomy - (redundant transverse colon noted)      Procedure Note    Neema Richard  3/17/2020    Pre-op Diagnosis: Diverticulitis, increased pain, CT with free air.    Post-op Diagnosis:   Feculent diffuse peritonitis, pelvic abscesses, lower abdominal pain    Anesthesia: General with Block    Staff:   Circulator: Marisa Kirkpatrick RN; Dominga Chavez, FELISHA; Palmira Arteaga, FELISHA  Scrub Person: Sujatha Mahan; Zachariah Manjarrez  Assistant: Flora De Santiago RNFA    Estimated Blood Loss: 100 cc    Specimens: Left and sigmoid colon, appendix, left ovary and tube        Drains: Lower abdominal/pelvic RADHA drain.    Findings: As above    Complications: None evident    The procedure was discussed at the bedside with patient and her daughter on 5 S.    There was increased pain and tenderness on evaluation earlier this morning, bolus was initiated.  CT demonstrated free air.  Heart rate has been about 100.    The procedure was again reviewed in the preoperative area.  The patient been counseled and marked by a stomal therapist.    With continued antibiotic therapy, noting initial response to therapy, and subsequent loss of response, the patient was taken to the operating room.    There is DVT prophylaxis      In the operating room general anesthesia was initiated, positioning was modified lithotomy, block was performed, the abdomen and perineum was prepped and draped.      Timeout was utilized.    Lower abdominal midline incision was performed with feculent peritonitis.  Copious washing with ultimately a total of 3 L warm fluid was used.  The contamination appeared to be arising from the sigmoid.  There was a contained abscess that appeared to have ruptured.  Cultures were obtained.  The  sigmoid and left colon were mobilized, the left ureter was identified and left posterior lateral to the plane of dissection.  The left ovary and tube was involved with the abscess/phlegmon.  The left colon was involved.  The distal transverse colon appeared suitable for creation of stoma.    The splenic flexure was completely mobilized.    The distal transverse colon was divided with JARON.  The mesentery was sequentially divided.  The left colic artery was suture-ligated.  The inferior mesenteric artery was suture-ligated.  The dissection continued to the distal sigmoid which was circumferentially cleared and divided with JARON.    The left ovary and tube was included in the specimen with suture ligation of the left ovarian vein and artery and tube at the base of the uterus.    Appendectomy was performed with LigaSure division of mesoappendix and JARON division of the cecum.    Copious washing ensued with finger fracture of loculations.    The colostomy site was developed through the rectus on the left.  The colon was delivered with ease noting thin abdominal wall and well mobilized colon and a redundant transverse colon.    There is good hemostasis throughout on exploration prior to delivery of the colostomy.  10 flat RADHA was used in the lower abdomen and pelvis.    The final counts were announced as correct, the midline was closed with internal retention of oh Vicryl No. 1 loop PDS    The incision was irrigated with dilute aseptic solution.  The midline was reapproximated skin clips with 2 Telfa carlos.    The colostomy was then developed with Brianna eversion.  There is good patency on digital exam.    The final counts were announced as correct.    The procedure appeared well tolerated.      Sravan Vazquez MD     Date: 3/17/2020  Time: 17:52

## 2020-03-17 NOTE — ANESTHESIA POSTPROCEDURE EVALUATION
Patient: Neema Richard    Procedure Summary     Date:  03/17/20 Room / Location:   [facility]  [facility]    Anesthesia Start:  1519 Anesthesia Stop:  1748    Procedure:  LOW ANTERIOR COLON RESECTION, MOBILIZATION OF SPLENIC FLEXURE, DRAINAGE OF ABSCESS AND COLOSTOMY CREATION (N/A Abdomen) Diagnosis:      Surgeon:  Sravan Vazquez MD Provider:  Simba Moore MD    Anesthesia Type:  general ASA Status:  2          Anesthesia Type: general    Vitals  Vitals Value Taken Time   BP 84/43 3/17/2020  5:48 PM   Temp 98.2 °F (36.8 °C) 3/17/2020  5:48 PM   Pulse 85 3/17/2020  5:48 PM   Resp     SpO2 94 % 3/17/2020  5:48 PM           Post Anesthesia Care and Evaluation    Patient location during evaluation: PACU  Patient participation: complete - patient participated  Level of consciousness: awake and alert  Pain score: 0  Pain management: adequate  Airway patency: patent  Anesthetic complications: No anesthetic complications  PONV Status: none  Cardiovascular status: hemodynamically stable and acceptable  Respiratory status: nonlabored ventilation, acceptable and nasal cannula  Hydration status: acceptable

## 2020-03-17 NOTE — PLAN OF CARE
Problem: Pain, Chronic (Adult)  Goal: Acceptable Pain/Comfort Level and Functional Ability  Outcome: Ongoing (interventions implemented as appropriate)  Flowsheets (Taken 3/17/2020 1652)  Acceptable Pain/Comfort Level and Functional Ability: making progress toward outcome     Problem: Patient Care Overview  Goal: Plan of Care Review  Outcome: Ongoing (interventions implemented as appropriate)  Flowsheets  Taken 3/16/2020 0421 by Piero Menjivar, RN  Progress: no change  Taken 3/17/2020 1607 by Marisa Kirkpatrick, RN  Plan of Care Reviewed With: patient  Taken 3/17/2020 1652 by Sylvia Lamb, RN  Outcome Summary: VSS, pt went to surgery for exploratory lap and ostomy placement.

## 2020-03-17 NOTE — PLAN OF CARE
Problem: Pain, Chronic (Adult)  Goal: Identify Related Risk Factors and Signs and Symptoms  Outcome: Outcome(s) achieved  Flowsheets (Taken 3/17/2020 5954)  Related Risk Factors (Chronic Pain): disease process; coping ineffective  Signs and Symptoms (Chronic Pain): verbalization of pain/discomfort for a prolonged time period     Problem: Patient Care Overview  Goal: Discharge Needs Assessment  Outcome: Ongoing (interventions implemented as appropriate)

## 2020-03-17 NOTE — ANESTHESIA PROCEDURE NOTES
Airway  Urgency: elective    Date/Time: 3/17/2020 3:29 PM  Airway not difficult    General Information and Staff    Patient location during procedure: OR  CRNA: oRsina Cooper CRNA    Indications and Patient Condition  Indications for airway management: airway protection    Preoxygenated: yes  MILS maintained throughout  Mask difficulty assessment: 2 - vent by mask + OA or adjuvant +/- NMBA    Final Airway Details  Final airway type: endotracheal airway      Successful airway: ETT  Cuffed: yes   Successful intubation technique: direct laryngoscopy  Facilitating devices/methods: cricoid pressure (Cricoid pressure for view)  Endotracheal tube insertion site: oral  Blade: Mullen  Blade size: 2  ETT size (mm): 7.0  Cormack-Lehane Classification: grade I - full view of glottis  Placement verified by: chest auscultation and capnometry   Cuff volume (mL): 6  Measured from: lips  ETT/EBT  to lips (cm): 22  Number of attempts at approach: 1  Assessment: lips, teeth, and gum same as pre-op and atraumatic intubation    Additional Comments  Pt to OR 11. Pt moved self to OR table. ASA monitors placed. Pre-O2 with 100% Oxygen. SIVI. Atraumatic intubation. +ETCO2, +BBS.

## 2020-03-17 NOTE — PROGRESS NOTES
[facility] Medicine Services  PROGRESS NOTE    Patient Name: Neema Richard  : 1940  MRN: [id number]    Date of Admission: 3/15/2020  Primary Care Physician: David Mackey MD    Subjective   Subjective     CC:  Abdominal pain, diverticulitis    HPI:  Complaining of worsening abdominal pain across bilateral lower quadrants.  No fevers    Review of Systems  Gen- No fevers, chills  CV- No chest pain, palpitations  Resp- No cough, dyspnea  GI- No N/V/D,  + abd pain       Objective   Objective     Vital Signs:   Temp:  [98.3 °F (36.8 °C)-99.3 °F (37.4 °C)] 99.2 °F (37.3 °C)  Heart Rate:  [] 110  Resp:  [18-20] 20  BP: (118-130)/(49-65) 130/65        Physical Exam:  Constitutional: No acute distress, awake, alert  HENT: NCAT, mucous membranes moist  Respiratory: Clear to auscultation bilaterally, respiratory effort normal   Cardiovascular: RRR, no murmurs, rubs, or gallops, palpable pedal pulses bilaterally  Gastrointestinal: Positive bowel sounds, soft, + tenderness diffusely across lower abdomen, nondistended  Musculoskeletal: No bilateral ankle edema  Psychiatric: Appropriate affect, cooperative  Neurologic: Oriented x 3, speech clear  Skin: No rashes    Results Reviewed:  Results from last 7 days   Lab Units 20  0451 20  0018 03/15/20  1638   WBC 10*3/mm3 14.46* 15.45* 15.05*   HEMOGLOBIN g/dL 10.8* 11.9* 13.6   HEMATOCRIT % 34.8 37.3 42.6   PLATELETS 10*3/mm3 280 341 384     Results from last 7 days   Lab Units 20  0451 20  0018 03/15/20  1638   SODIUM mmol/L 139 138 138   POTASSIUM mmol/L 3.9 3.8 4.0   CHLORIDE mmol/L 108* 105 102   CO2 mmol/L 19.0* 20.0* 21.0*   BUN mg/dL 28* 23 21   CREATININE mg/dL 0.68 0.83 0.97   GLUCOSE mg/dL 94 134* 257*   CALCIUM mg/dL 8.6 8.4* 8.6   ALT (SGPT) U/L  --   --  9   AST (SGOT) U/L  --   --  16     Estimated Creatinine Clearance: 56 mL/min (by C-G formula based on SCr of 0.68 mg/dL).    Microbiology Results Abnormal      Procedure Component Value - Date/Time    Blood Culture - Blood, Arm, Right [[id number]] Collected:  03/16/20 0018    Lab Status:  Preliminary result Specimen:  Blood from Arm, Right Updated:  03/17/20 0515     Blood Culture No growth at 24 hours    Blood Culture - Blood, Arm, Left [[id number]] Collected:  03/16/20 0018    Lab Status:  Preliminary result Specimen:  Blood from Arm, Left Updated:  03/17/20 0515     Blood Culture No growth at 24 hours          Imaging Results (Last 24 Hours)     Procedure Component Value Units Date/Time    CT Abdomen Pelvis With & Without Contrast [[id number]] Collected:  03/17/20 1046     Updated:  03/17/20 1150    Narrative:       EXAMINATION: CT ABDOMEN AND PELVIS W WO CONTRAST-03/17/2020:      INDICATION: Ongoing pain.     TECHNIQUE: 5 mm unenhanced images through the abdomen and pelvis with  subsequent post-IV contrast 5 mm portal venous phase and delayed venous  phase images.     The radiation dose reduction device was turned on for each scan per the  ALARA (As Low as Reasonably Achievable) protocol.     COMPARISON: 03/15/2020 abdomen and pelvis CT scan.     FINDINGS: The patient history indicates diverticulitis, persistent  abdominal pain. Previous 03/15/2020 exam report indicated extensive  distal descending colon and sigmoid colon diverticulitis, small amount  of extraluminal air, and some free fluid. Today's exam, however, shows  moderate intra-abdominal free air. There is new moderate right lower  lobe atelectasis and mild left lower lobe atelectasis. No significant  abnormalities are seen of the liver, spleen, pancreas, adrenal glands,  or kidneys. The bowel loops are normal in caliber.     Regarding the lower abdomen and pelvis, inflammatory fat stranding  around the descending colon is approximately stable. There appears to be  mild, new fat stranding of the retroperitoneum but this may actually  represent a trace amount of fluid extending along the paracolic  gutters,  and along the peritoneal margin anteriorly, rather than true  retroperitoneal inflammation. Free fluid in the pelvis appears mildly  increased. Note is again made of the patient's enlarged uterus and the  patient's ring-type pessary. Fluid and air collection seen on axial  portal venous phase image 64, series 6 is difficult to distinguish as  intraluminal within bowel, or contained fluid and air, especially seen  on the axial images. The coronal images show this collection,  respectively coronal images 46-36 suggest that this may be extraluminal.  No other potential discrete, drainable abscess is identified. The  bladder is normally distended and normal in appearance. Delayed images  show normal caliber renal collecting systems and ureters.       Impression:       1.  Interval development of intra-abdominal free air consistent with  perforation from the patient's diverticulitis.  2.  Mildly increased intrapelvic fat stranding as described, mildly  increased intrapelvic free fluid.  3.  Questionable 5 cm fluid and air collection in the central pelvis as  described above. Please refer to initial axial image 64, series 3.  4.  New moderate right lower lobe atelectasis and mild left lower lobe  atelectasis.     NOTE: Preliminary report was called to Dr. Sravan Vazquez at approximately  10:50 AM 03/17/2020.     D:  03/17/2020  E:  03/17/2020                        I have reviewed the medications:  Scheduled Meds:  [MAR Hold] busPIRone 15 mg Oral Daily   [MAR Hold] calcitonin (salmon) 1 spray Alternating Nares Daily   [MAR Hold] ertapenem 1 g Intravenous Q24H   gabapentin 300 mg Oral Nightly   [MAR Hold] heparin (porcine) 5,000 Units Subcutaneous Q8H   [MAR Hold] levothyroxine 88 mcg Oral Q AM   metoprolol succinate  mg Oral Daily   [MAR Hold] Mirabegron ER 50 mg Oral Daily   [MAR Hold] sodium chloride 10 mL Intravenous Q12H   [MAR Hold] sodium chloride 10 mL Intravenous Q12H   sodium chloride 10 mL  Intravenous Q12H   [MAR Hold] sucralfate 1 g Oral BID AC     Continuous Infusions:   PRN Meds:.[MAR Hold] LORazepam  •  [MAR Hold] Morphine **OR** [MAR Hold] Morphine  •  [MAR Hold] ondansetron  •  [MAR Hold] sodium chloride  •  [MAR Hold] sodium chloride  •  [MAR Hold] sodium chloride  •  [MAR Hold] sodium chloride  •  sodium chloride    Assessment/Plan   Assessment & Plan     Active Hospital Problems    Diagnosis  POA   • **Perforation of sigmoid colon due to diverticulitis [K57.20]  Unknown   • Acute diverticulitis [K57.92]  Yes   • Sepsis (CMS/HCC) [A41.9]  Yes   • Lactic acidosis [E87.2]  Unknown   • Neutrophilic leukocytosis [D72.9]  Unknown   • Acute kidney injury (CMS/HCC) [N17.9]  Unknown   • Acute hyperglycemia [R73.9]  Unknown   • Arthritis [M19.90]  Unknown   • Overactive bladder [N32.81]  Unknown   • Dyspepsia [R10.13]  Unknown   • Essential hypertension [I10]  Unknown   • Acquired hypothyroidism [E03.9]  Yes      Resolved Hospital Problems   No resolved problems to display.        Brief Hospital Course to date:  Neema Richard is a 79 y.o. female admitted for diverticulitis S microperforation and sepsis.  She underwent repeat CT imaging concerning for free air.  She is scheduled for OR this afternoon with possible colostomy.    Leukocytosis  Sepsis resolved  Diverticulitis with free air on CT  HTN  Controlled continue current medicines  Hypothyroid  Continue levothyroxine  Arthritis  Continue gabapentin  Overactive bladder  Dyspepsia      DVT Prophylaxis: Mechanical    Disposition: I expect the patient to be discharged to be determined depending on clinical improvement    CODE STATUS:   Code Status and Medical Interventions:   Ordered at: 03/15/20 2134     Code Status:    CPR     Medical Interventions (Level of Support Prior to Arrest):    Full         Electronically signed by Filomena Fuentes DO, 03/17/20, 15:13.

## 2020-03-17 NOTE — PROGRESS NOTES
INFECTIOUS DISEASE PROGRESS NOTE   Neema Richard  1940  [id number]      Admission Date: 3/15/2020      Requesting Provider: Filomena Fuentes DO  Evaluating Physician: Darshan Bejarano MD    Reason for Consultation: Acute sigmoid diverticulitis, with microperforation     History of present illness:  3/16/20:    3/16/2020: Patient is a 79 y.o. female, seen today for acute sigmoid diverticulitis with microperforation.  She has had several episodes of diverticulitis in the past, the last being in December 2019, treated with an oral antibiotic by her PCP.   Her symptoms minimally improved. She began experiencing diarrhea, with low grade fever, and yesterday, developed worsening abdominal pain, prompting her presentation to the ED.  An abdominal Ct with extensive diverticulitis involving the descending colon and sigmoid colon with stranding and wall thickening, with extraluminal air suggesting a microperforation.   Admitting labs with a leukocytosis of 15, 000, lactic acidosis, and she has been afebrile. She is currently on Merrem and we were consulted for evaluation and treatment.   3/17/20:  She feels better but still with abdominal tenderness.  Her daughter is coming to from [place] and she wants her tested for Covid-19.  She remains afebrile.  I saw her earlier this morning but she has now deteriorated.  She now has free air on her CT scan and is currently in the OR.    Past Medical History:   Diagnosis Date   • Acquired hypothyroidism 1980   • Colon polyp - sees Dr. 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 ER and MA (+)   • Essential hypertension 2012   • High cholesterol 2019   • Hx of herpes genitalis 2013   • IBS-d (takes Buspar)        Past Surgical History:   Procedure Laterality Date   • BILATERAL BREAST REDUCTION Bilateral 1995   • BREAST LUMPECTOMY Left 2009   • CATARACT EXTRACTION, BILATERAL Bilateral 2018   • CERVICAL CONIZATION  1983   •  FACIAL COSMETIC SURGERY  2001   • FACIAL COSMETIC SURGERY  2012   • HEMORRHOIDECTOMY  1974   • LAPAROSCOPIC CHOLECYSTECTOMY  2010   • REDUCTION MAMMAPLASTY Bilateral 04/13/2017    DCIS   • SINUS SURGERY  1992   • THYROIDECTOMY, PARTIAL  1963   • TONSILLECTOMY  1953   • VARICOSE VEIN SURGERY Bilateral 1981       Family History   Problem Relation Age of Onset   • Breast cancer Neg Hx    • Ovarian cancer Neg Hx        Social History     Socioeconomic History   • Marital status:      Spouse name: Not on file   • Number of children: Not on file   • Years of education: Not on file   • Highest education level: Not on file   Tobacco Use   • Smoking status: Never Smoker   Substance and Sexual Activity   • Alcohol use: No   • Drug use: No       Allergies   Allergen Reactions   • Evista [Raloxifene] Rash   • Cephalexin Diarrhea   • Hydrochlorothiazide Diarrhea   • Meloxicam Other (See Comments)   • Metronidazole Diarrhea   • Oxybutynin Cough   • Propylthiouracil Other (See Comments)     Severally reduce WBC's    • Sulfa Antibiotics Hives   • Sulfacetamide Sodium-Sulfur Hives   • Sulfur Dioxide Hives         Medication:    Current Facility-Administered Medications:   •  [MAR Hold] busPIRone (BUSPAR) tablet 15 mg, 15 mg, Oral, Daily, Angelina Dougherty MD, 15 mg at 03/17/20 0817  •  [MAR Hold] calcitonin (salmon) (MIACALCIN) nasal spray 1 spray, 1 spray, Alternating Nares, Daily, Angelina Dougherty MD, 1 spray at 03/17/20 0817  •  [MAR Hold] ertapenem (INVanz) 1 g/100 mL 0.9% NS VTB (mbp), 1 g, Intravenous, Q24H, Esperanza Benton APRN, 1 g at 03/17/20 0817  •  gabapentin (NEURONTIN) capsule 300 mg, 300 mg, Oral, Nightly, Trang Cedillo APRN, 300 mg at 03/16/20 2118  •  [MAR Hold] heparin (porcine) 5000 UNIT/ML injection 5,000 Units, 5,000 Units, Subcutaneous, Q8H, Sravan Vazquez MD, 5,000 Units at 03/17/20 1243  •  [MAR Hold] levothyroxine (SYNTHROID, LEVOTHROID) tablet 88 mcg, 88 mcg, Oral, Q AM, Trang Cedillo,  APRN, 88 mcg at 03/17/20 0519  •  [MAR Hold] LORazepam (ATIVAN) tablet 0.5 mg, 0.5 mg, Oral, Q8H PRN, Angelina Dougherty MD  •  metoprolol succinate XL (TOPROL-XL) 24 hr tablet 100 mg, 100 mg, Oral, Daily, Angelina Dougherty MD  •  [MAR Hold] Mirabegron ER (MYRBETRIQ) 24 hr tablet 50 mg- patient supplied med, 50 mg, Oral, Daily, Angelina Dougherty MD, 50 mg at 03/17/20 0817  •  [MAR Hold] morphine injection 2 mg, 2 mg, Intravenous, Q2H PRN, 2 mg at 03/17/20 0817 **OR** [MAR Hold] Morphine sulfate (PF) injection 4 mg, 4 mg, Intravenous, Q2H PRN, Trang Cedillo, APRN, 4 mg at 03/16/20 1704  •  [MAR Hold] ondansetron (ZOFRAN) injection 4 mg, 4 mg, Intravenous, Q6H PRN, Trang Cedillo, APRN, 4 mg at 03/17/20 0519  •  sodium chloride (NS) irrigation solution, , , PRN, Sravan Vazquez MD, 1,000 mL at 03/17/20 1603  •  [MAR Hold] sodium chloride 0.9 % bolus 1,836 mL, 30 mL/kg, Intravenous, PRN, Raphael Cedillossica H, APRN  •  [MAR Hold] sodium chloride 0.9 % flush 10 mL, 10 mL, Intravenous, PRN, Darshan Steward MD  •  [MAR Hold] sodium chloride 0.9 % flush 10 mL, 10 mL, Intravenous, Q12H, Raphael Cedillossica H, APRN, 10 mL at 03/17/20 0819  •  [MAR Hold] sodium chloride 0.9 % flush 10 mL, 10 mL, Intravenous, PRN, Raphael Cedillossica H, APRN  •  [MAR Hold] sodium chloride 0.9 % flush 10 mL, 10 mL, Intravenous, Q12H, Darshan Bejarano MD  •  [MAR Hold] sodium chloride 0.9 % flush 10 mL, 10 mL, Intravenous, PRN, Darshan Bejarano MD  •  sodium chloride 0.9 % flush 10 mL, 10 mL, Intravenous, Q12H, Simba Moore MD  •  sodium chloride 0.9 % flush 10 mL, 10 mL, Intravenous, PRN, Simba Moore MD  •  sterile water irrigation solution, , , PRN, Sravan Vazquez MD, 1,000 mL at 03/17/20 1553  •  [MAR Hold] sucralfate (CARAFATE) tablet 1 g, 1 g, Oral, BID AC, Trang Cedillo, APRN, 1 g at 03/17/20 0519    Facility-Administered Medications Ordered in Other Encounters:   •  lactated ringers infusion, , Intravenous, Continuous  PRN, Kenneth Rosina Maheshjennifer Casey CRNA  •  lidocaine PF 1% (XYLOCAINE) injection, , , PRN, Kenneth Rosina Hair Carmela CRNA, 30 mg at 20 1528  •  phenylephrine (HANS-SYNEPHRINE) injection, , Intravenous, PRN, Kenneth, Rosina Aragonjennifer Casey CRNA, 100 mcg at 20 1552  •  Propofol (DIPRIVAN) injection, , Intravenous, PRN, Kenneth, Rosina Hair Carmela CRNA, 200 mg at 20 1528  •  rocuronium (ZEMURON) injection, , Intravenous, PRN, KennethRosinajennifer Casey CRNA, 10 mg at 20 1558    Antibiotics:  Anti-Infectives (From admission, onward)    Ordered     Dose/Rate Route Frequency Start Stop    20 0811  [MAR Hold]  ertapenem (INVanz) 1 g/100 mL 0.9% NS VTB (mbp)     (MAR Hold since Tue 3/17/2020 at 1312. Reason: Unreviewed Transfer Orders.)   Joni Alfaro [facility] reviewed the order on 20 1033.   Ordering Provider:  Esperanza Benton APRN    1 g  over 30 Minutes Intravenous Every 24 Hours 20 0900 20 0859    03/15/20 1954  meropenem (MERREM) 1 g/100 mL 0.9% NS VTB (mbp)     Ordering Provider:  Darshan Steward MD    1 g  over 30 Minutes Intravenous Once 03/15/20 1955 03/15/20 2122                  Physical Exam:   Vital Signs  Temp (24hrs), Av.8 °F (37.1 °C), Min:98.3 °F (36.8 °C), Max:99.2 °F (37.3 °C)    Temp  Min: 98.3 °F (36.8 °C)  Max: 99.2 °F (37.3 °C)  BP  Min: 118/49  Max: 130/65  Pulse  Min: 81  Max: 110  Resp  Min: 18  Max: 20  SpO2  Min: 93 %  Max: 93 %    GENERAL: Awake and alert, in no acute distress.   HEENT: Normocephalic, atraumatic.   No conjunctival injection. No icterus. Oropharynx clear without evidence of thrush or exudate. No evidence of peridontal disease.    HEART: RRR; No murmur, rubs, gallops.   LUNGS: Clear to auscultation bilaterally without wheezing, rales, rhonchi. Normal respiratory effort. Nonlabored. No dullness.  ABDOMEN:   Slight decreased in abdominal tenderness bilateral lower quadrants, distended. Positive bowel sounds.  EXT:  No cyanosis, clubbing or edema. No cord.  :  Without  Gaytan catheter.  MSK:  No joint effusions   SKIN: Warm and dry without cutaneous eruptions on Inspection/palpation.    NEURO: Oriented to PPT.  PSYCHIATRIC: Normal insight and judgement. Cooperative with PE    Laboratory Data    Results from last 7 days   Lab Units 03/17/20  0451 03/16/20  0018 03/15/20  1638   WBC 10*3/mm3 14.46* 15.45* 15.05*   HEMOGLOBIN g/dL 10.8* 11.9* 13.6   HEMATOCRIT % 34.8 37.3 42.6   PLATELETS 10*3/mm3 280 341 384     Results from last 7 days   Lab Units 03/17/20  0451   SODIUM mmol/L 139   POTASSIUM mmol/L 3.9   CHLORIDE mmol/L 108*   CO2 mmol/L 19.0*   BUN mg/dL 28*   CREATININE mg/dL 0.68   GLUCOSE mg/dL 94   CALCIUM mg/dL 8.6     Results from last 7 days   Lab Units 03/15/20  1638   ALK PHOS U/L 97   BILIRUBIN mg/dL 0.7   ALT (SGPT) U/L 9   AST (SGOT) U/L 16     Results from last 7 days   Lab Units 03/15/20  1638   SED RATE mm/hr 19     Results from last 7 days   Lab Units 03/15/20  1638   CRP mg/dL 6.50*     Results from last 7 days   Lab Units 03/17/20  1143   LACTATE mmol/L 1.4             Estimated Creatinine Clearance: 56 mL/min (by C-G formula based on SCr of 0.68 mg/dL).  Lactate 2.9     Microbiology:    3/16: BC no growth        Radiology:  Imaging Results (Last 72 Hours)     Procedure Component Value Units Date/Time    CT Abdomen Pelvis With & Without Contrast [[id number]] Collected:  03/17/20 1046     Updated:  03/17/20 1150    Narrative:       EXAMINATION: CT ABDOMEN AND PELVIS W WO CONTRAST-03/17/2020:      INDICATION: Ongoing pain.     TECHNIQUE: 5 mm unenhanced images through the abdomen and pelvis with  subsequent post-IV contrast 5 mm portal venous phase and delayed venous  phase images.     The radiation dose reduction device was turned on for each scan per the  ALARA (As Low as Reasonably Achievable) protocol.     COMPARISON: 03/15/2020 abdomen and pelvis CT scan.     FINDINGS: The patient history indicates diverticulitis, persistent  abdominal pain. Previous  03/15/2020 exam report indicated extensive  distal descending colon and sigmoid colon diverticulitis, small amount  of extraluminal air, and some free fluid. Today's exam, however, shows  moderate intra-abdominal free air. There is new moderate right lower  lobe atelectasis and mild left lower lobe atelectasis. No significant  abnormalities are seen of the liver, spleen, pancreas, adrenal glands,  or kidneys. The bowel loops are normal in caliber.     Regarding the lower abdomen and pelvis, inflammatory fat stranding  around the descending colon is approximately stable. There appears to be  mild, new fat stranding of the retroperitoneum but this may actually  represent a trace amount of fluid extending along the paracolic gutters,  and along the peritoneal margin anteriorly, rather than true  retroperitoneal inflammation. Free fluid in the pelvis appears mildly  increased. Note is again made of the patient's enlarged uterus and the  patient's ring-type pessary. Fluid and air collection seen on axial  portal venous phase image 64, series 6 is difficult to distinguish as  intraluminal within bowel, or contained fluid and air, especially seen  on the axial images. The coronal images show this collection,  respectively coronal images 46-36 suggest that this may be extraluminal.  No other potential discrete, drainable abscess is identified. The  bladder is normally distended and normal in appearance. Delayed images  show normal caliber renal collecting systems and ureters.       Impression:       1.  Interval development of intra-abdominal free air consistent with  perforation from the patient's diverticulitis.  2.  Mildly increased intrapelvic fat stranding as described, mildly  increased intrapelvic free fluid.  3.  Questionable 5 cm fluid and air collection in the central pelvis as  described above. Please refer to initial axial image 64, series 3.  4.  New moderate right lower lobe atelectasis and mild left lower  lobe  atelectasis.     NOTE: Preliminary report was called to Dr. Sravan Vazquez at approximately  10:50 AM 03/17/2020.     D:  03/17/2020  E:  03/17/2020             CT Abdomen Pelvis Without Contrast [[id number]] Collected:  03/15/20 1853     Updated:  03/16/20 0955    Narrative:       EXAMINATION: CT ABDOMEN/PELVIS WO CONTRAST - 03/15/2020      INDICATION: Lower abdominal pain and left flank pain, vomiting and  diarrhea.     TECHNIQUE: Multiple axial CT imaging is obtained of the abdomen and  pelvis without the administration of oral or intravenous contrast.     The radiation dose reduction device was turned on for each scan per the  ALARA (As Low as Reasonably Achievable) protocol.     COMPARISON: 02/25/2020     FINDINGS:      ABDOMEN: Atelectatic changes seen in the lung bases bilaterally. The  gallbladder has been surgically removed. The liver is homogeneous in  appearance. The spleen is unremarkable. Visualized pancreas is  homogeneous in appearance. The kidneys and adrenal glands are within  normal limits. No abdominal or retroperitoneal lymphadenopathy. Vascular  calcification seen of the abdominal aorta and iliac vessels. There is  moderate amount of stool seen diffusely throughout the colon. There are  diverticula identified throughout the entire colon. There is abnormal  wall thickening identified of the distal descending colon and entire  sigmoid colon suggesting an extensive diverticulitis however there is  surrounding stranding and some fluid with some mild extraluminal air  identified just above the uterus suggesting a perforation. There is some  scattered air is well seen within the retroperitoneum. Again no definite  abscess.     PELVIS: A pessary is in place. There is some fluid identified in the  pelvis with extraluminal air suggesting a perforation with extensive  diverticulitis involving the distal descending and sigmoid colon. No  evidence of abscess. No pelvic adenopathy. Bony structures  reveal  degenerative changes seen within the spine.       Impression:       There is extensive diverticulitis involving the distal  descending colon and sigmoid colon with stranding and wall thickening  with extraluminal air as well suggesting a microperforation. There is  free fluid in the pelvis with no evidence of loculation to suggest  abscess at this time.     DICTATED:   03/15/2020  EDITED/ls :   03/15/2020         This report was finalized on 3/16/2020 9:52 AM by Dr. Daria Malone MD.               Impression:   1.  Acute sigmoid diverticulitis- with perforation and now with free air by CT scan  2.  Severe sepsis- with leukocytosis, tachycardia, lactic acidosis, uncontrolled diabetes mellitus, and known focus of infection   3.  Leukocytosis/neutrophilia, secondary to above     PLAN/RECOMMENDATIONS:   1.  Ertapenem 1 g IV daily  2.  PICC line placement  3.  Urgent surgical intervention    Dr. Bejarano has obtained the history, performed the physical exam and formulated the above treatment plan.        Darshan Bejarano MD  3/17/2020  16:08

## 2020-03-17 NOTE — PROGRESS NOTES
Full note to follow       Increased BUN    Tender in suprapubic region  Moderate distention    Persistent leukocytosis.  Will bolus and re-image to evaluate for abscess... Or other complications of sigmoid inflammation.

## 2020-03-17 NOTE — NURSING NOTE
Patient marked in her LLQ for a colostomy per Dr. Vazquez. Questions answered and encouragement provided.   Daughter at bedside.     Will follow daily for stomal support and education.   Thanks

## 2020-03-17 NOTE — PROGRESS NOTES
"[facility] ([facility])      Increasing abdominal pain  CT with free air  Heart rate about 100  Responded to bolus earlier  Briefly seen earlier prompting CT given abdominal pain and abdominal exam  Daughter at bedside, just came in from [place]      Vital Signs:  Blood pressure 118/49, pulse 81, temperature 98.3 °F (36.8 °C), temperature source Oral, resp. rate 18, height 170.2 cm (67\"), weight 61.2 kg (135 lb), SpO2 95 %, not currently breastfeeding.    Labs past 24 hours:  Lab Results (last 24 hours)     Procedure Component Value Units Date/Time    Lactic Acid, Plasma [[id number]] Collected:  03/17/20 1143    Specimen:  Blood Updated:  03/17/20 1200    Basic Metabolic Panel [[id number]]  (Abnormal) Collected:  03/17/20 0451    Specimen:  Blood Updated:  03/17/20 0717     Glucose 94 mg/dL      BUN 28 mg/dL      Creatinine 0.68 mg/dL      Sodium 139 mmol/L      Potassium 3.9 mmol/L      Chloride 108 mmol/L      CO2 19.0 mmol/L      Calcium 8.6 mg/dL      eGFR Non African Amer 84 mL/min/1.73      BUN/Creatinine Ratio 41.2     Anion Gap 12.0 mmol/L     Narrative:       GFR Normal >60  Chronic Kidney Disease <60  Kidney Failure <15      CBC (No Diff) [[id number]]  (Abnormal) Collected:  03/17/20 0451    Specimen:  Blood Updated:  03/17/20 0602     WBC 14.46 10*3/mm3      RBC 3.75 10*6/mm3      Hemoglobin 10.8 g/dL      Hematocrit 34.8 %      MCV 92.8 fL      MCH 28.8 pg      MCHC 31.0 g/dL      RDW 14.0 %      RDW-SD 47.5 fl      MPV 10.7 fL      Platelets 280 10*3/mm3     Blood Culture - Blood, Arm, Right [[id number]] Collected:  03/16/20 0018    Specimen:  Blood from Arm, Right Updated:  03/17/20 0515     Blood Culture No growth at 24 hours    Blood Culture - Blood, Arm, Left [[id number]] Collected:  03/16/20 0018    Specimen:  Blood from Arm, Left Updated:  03/17/20 0515     Blood Culture No growth at 24 hours          I/O last shift:  I/O this shift:  In: 360 [P.O.:360]  Out: 800 " [Urine:800]     PHYSICAL EXAM-  Uncomfortable but not in distress  I removed patient's tray of food out of the room, we have discussed n.p.o. status  cv- rsr, borderline tachycardia  Chest- clear, =  Abd-firm, progressive tenderness, now with lower abdominal guarding and rebound.      Assessment and Plan:  Progressive inflammatory and septic type changes  Responding to bolus  CT with free air  We have discussed plans for exploratory laparotomy, anticipate sigmoid resection, likely colostomy.  Baseline incontinence difficulties  Patient states that she may consider keeping the colostomy long-term  Depending on clinical course and how she feels with function.  With the patient and her daughter at the bedside, we have discussed surgery with risks including but not limited to bleeding, infection, other body system failure, potential ICU care, plans for stoma likely, consideration of interval surgery depending on clinical course and interval function, potential to require urologic reconstruction or interventions depending on findings and clinical course, goals of early ambulation postoperatively.    I have notified the OR after evaluating the patient, Dr Mackey updated as well...    His phone call to me on Sunday facilitated close hospital follow-up.      Sravan Vazquez MD  03/17/20  12:20

## 2020-03-17 NOTE — ANESTHESIA PROCEDURE NOTES
Bilateral TAP blocks      Patient reassessed immediately prior to procedure    Patient location during procedure: OR  Reason for block: at surgeon's request and post-op pain management  Performed by  CRNA: Rosina Cooper CRNA  Assisted by: Marisa Kirkpatrick RN  Preanesthetic Checklist  Completed: patient identified, site marked, surgical consent, pre-op evaluation, timeout performed, IV checked, risks and benefits discussed and monitors and equipment checked  Prep:  Pt Position: supine  Sterile barriers:cap, gloves, sterile barriers and mask  Prep: ChloraPrep  Patient monitoring: blood pressure monitoring, continuous pulse oximetry and EKG  Procedure  Performed under: general  Guidance:ultrasound guided  Images:still images not obtained    Laterality:Bilateral  Block Type:TAP  Injection Technique:single-shot  Needle Type:short-bevel and echogenic  Needle Gauge:20 G  Resistance on Injection: none    Medications Used: bupivacaine PF (MARCAINE) 0.25 % injection, 60 mL  dexamethasone sodium phosphate injection, 4 mg  buprenorphine (BUPRENEX) injection, 0.3 mg  Med admintered at 3/17/2020 3:31 PM      Medications  Comment:Block Injection:  LA dose divided between Right and Left block        Post Assessment  Injection Assessment: negative aspiration for heme, incremental injection and no paresthesia on injection  Patient Tolerance:comfortable throughout block  Complications:no  Additional Notes      Under Ultrasound guidance, a BBraun 4inch 360 degree needle was advanced with Normal Saline hydro dissection of tissue.  The Internal Oblique and Transversus Abdominus muscles where visualized.  At or before the aponeurosis of Internal Oblique, local anesthetic spread was visualized in the Transversus Abdominus Plane. Injection was made incrementally with aspiration every 5 mls.  There was no  intravascular injection,  injection pressure was normal, there was no neural injection, and the procedure was completed without  difficulty.  Thank You.

## 2020-03-17 NOTE — ANESTHESIA PREPROCEDURE EVALUATION
Anesthesia Evaluation     Patient summary reviewed and Nursing notes reviewed   no history of anesthetic complications:  NPO Solid Status: > 8 hours  NPO Liquid Status: > 8 hours           Airway   Mallampati: II  TM distance: >3 FB  Neck ROM: full  No difficulty expected  Dental      Pulmonary - negative pulmonary ROS and normal exam   Cardiovascular - normal exam    (+) hypertension, hyperlipidemia,       Neuro/Psych- negative ROS  GI/Hepatic/Renal/Endo    (+)   renal disease,     Musculoskeletal     Abdominal    Substance History      OB/GYN          Other   arthritis,    history of cancer                    Anesthesia Plan    ASA 2     general   (Tap)  intravenous induction     Anesthetic plan, all risks, benefits, and alternatives have been provided, discussed and informed consent has been obtained with: patient.    Plan discussed with CRNA.

## 2020-03-18 LAB
ANION GAP SERPL CALCULATED.3IONS-SCNC: 14 MMOL/L (ref 5–15)
BASOPHILS # BLD AUTO: 0.06 10*3/MM3 (ref 0–0.2)
BASOPHILS NFR BLD AUTO: 0.5 % (ref 0–1.5)
BUN BLD-MCNC: 30 MG/DL (ref 8–23)
BUN/CREAT SERPL: 41.1 (ref 7–25)
CALCIUM SPEC-SCNC: 8.4 MG/DL (ref 8.6–10.5)
CHLORIDE SERPL-SCNC: 109 MMOL/L (ref 98–107)
CO2 SERPL-SCNC: 19 MMOL/L (ref 22–29)
CREAT BLD-MCNC: 0.73 MG/DL (ref 0.57–1)
DEPRECATED RDW RBC AUTO: 47.1 FL (ref 37–54)
EOSINOPHIL # BLD AUTO: 0.04 10*3/MM3 (ref 0–0.4)
EOSINOPHIL NFR BLD AUTO: 0.3 % (ref 0.3–6.2)
ERYTHROCYTE [DISTWIDTH] IN BLOOD BY AUTOMATED COUNT: 13.9 % (ref 12.3–15.4)
GFR SERPL CREATININE-BSD FRML MDRD: 77 ML/MIN/1.73
GLUCOSE BLD-MCNC: 118 MG/DL (ref 65–99)
HCT VFR BLD AUTO: 31.2 % (ref 34–46.6)
HGB BLD-MCNC: 9.8 G/DL (ref 12–15.9)
IMM GRANULOCYTES # BLD AUTO: 0.04 10*3/MM3 (ref 0–0.05)
IMM GRANULOCYTES NFR BLD AUTO: 0.3 % (ref 0–0.5)
LYMPHOCYTES # BLD AUTO: 0.61 10*3/MM3 (ref 0.7–3.1)
LYMPHOCYTES NFR BLD AUTO: 5.1 % (ref 19.6–45.3)
MCH RBC QN AUTO: 28.7 PG (ref 26.6–33)
MCHC RBC AUTO-ENTMCNC: 31.4 G/DL (ref 31.5–35.7)
MCV RBC AUTO: 91.2 FL (ref 79–97)
MONOCYTES # BLD AUTO: 1.03 10*3/MM3 (ref 0.1–0.9)
MONOCYTES NFR BLD AUTO: 8.6 % (ref 5–12)
NEUTROPHILS # BLD AUTO: 10.19 10*3/MM3 (ref 1.7–7)
NEUTROPHILS NFR BLD AUTO: 85.2 % (ref 42.7–76)
NRBC BLD AUTO-RTO: 0 /100 WBC (ref 0–0.2)
PLATELET # BLD AUTO: 334 10*3/MM3 (ref 140–450)
PMV BLD AUTO: 10.4 FL (ref 6–12)
POTASSIUM BLD-SCNC: 4.1 MMOL/L (ref 3.5–5.2)
RBC # BLD AUTO: 3.42 10*6/MM3 (ref 3.77–5.28)
SODIUM BLD-SCNC: 142 MMOL/L (ref 136–145)
WBC NRBC COR # BLD: 11.97 10*3/MM3 (ref 3.4–10.8)

## 2020-03-18 PROCEDURE — 80048 BASIC METABOLIC PNL TOTAL CA: CPT | Performed by: COLON & RECTAL SURGERY

## 2020-03-18 PROCEDURE — 97165 OT EVAL LOW COMPLEX 30 MIN: CPT

## 2020-03-18 PROCEDURE — 25010000002 HEPARIN (PORCINE) PER 1000 UNITS: Performed by: COLON & RECTAL SURGERY

## 2020-03-18 PROCEDURE — 99232 SBSQ HOSP IP/OBS MODERATE 35: CPT | Performed by: INTERNAL MEDICINE

## 2020-03-18 PROCEDURE — 25010000002 ERTAPENEM PER 500 MG: Performed by: COLON & RECTAL SURGERY

## 2020-03-18 PROCEDURE — 97162 PT EVAL MOD COMPLEX 30 MIN: CPT

## 2020-03-18 PROCEDURE — 25810000003 SODIUM CHLORIDE 0.9 % WITH KCL 20 MEQ 20-0.9 MEQ/L-% SOLUTION: Performed by: COLON & RECTAL SURGERY

## 2020-03-18 PROCEDURE — 85025 COMPLETE CBC W/AUTO DIFF WBC: CPT | Performed by: COLON & RECTAL SURGERY

## 2020-03-18 PROCEDURE — 25010000002 KETOROLAC TROMETHAMINE PER 15 MG: Performed by: COLON & RECTAL SURGERY

## 2020-03-18 RX ADMIN — MIRABEGRON 50 MG: 50 TABLET, FILM COATED, EXTENDED RELEASE ORAL at 20:38

## 2020-03-18 RX ADMIN — ERTAPENEM SODIUM 1 G: 1 INJECTION, POWDER, LYOPHILIZED, FOR SOLUTION INTRAMUSCULAR; INTRAVENOUS at 08:59

## 2020-03-18 RX ADMIN — ACETAMINOPHEN 650 MG: 325 TABLET, FILM COATED ORAL at 20:37

## 2020-03-18 RX ADMIN — KETOROLAC TROMETHAMINE 15 MG: 15 INJECTION, SOLUTION INTRAMUSCULAR; INTRAVENOUS at 08:59

## 2020-03-18 RX ADMIN — HEPARIN SODIUM 5000 UNITS: 5000 INJECTION, SOLUTION INTRAVENOUS; SUBCUTANEOUS at 05:27

## 2020-03-18 RX ADMIN — KETOROLAC TROMETHAMINE 15 MG: 15 INJECTION, SOLUTION INTRAMUSCULAR; INTRAVENOUS at 20:37

## 2020-03-18 RX ADMIN — GABAPENTIN 300 MG: 300 CAPSULE ORAL at 09:00

## 2020-03-18 RX ADMIN — CALCITONIN SALMON 1 SPRAY: 200 SPRAY, METERED NASAL at 08:59

## 2020-03-18 RX ADMIN — ACETAMINOPHEN 650 MG: 325 TABLET, FILM COATED ORAL at 05:27

## 2020-03-18 RX ADMIN — HEPARIN SODIUM 5000 UNITS: 5000 INJECTION, SOLUTION INTRAVENOUS; SUBCUTANEOUS at 20:37

## 2020-03-18 RX ADMIN — LORAZEPAM 0.5 MG: 0.5 TABLET ORAL at 05:38

## 2020-03-18 RX ADMIN — KETOROLAC TROMETHAMINE 15 MG: 15 INJECTION, SOLUTION INTRAMUSCULAR; INTRAVENOUS at 13:48

## 2020-03-18 RX ADMIN — POTASSIUM CHLORIDE AND SODIUM CHLORIDE 100 ML/HR: 900; 150 INJECTION, SOLUTION INTRAVENOUS at 05:25

## 2020-03-18 RX ADMIN — POTASSIUM CHLORIDE AND SODIUM CHLORIDE 100 ML/HR: 900; 150 INJECTION, SOLUTION INTRAVENOUS at 16:02

## 2020-03-18 RX ADMIN — BUSPIRONE HYDROCHLORIDE 15 MG: 10 TABLET ORAL at 20:37

## 2020-03-18 RX ADMIN — SUCRALFATE 1 G: 1 TABLET ORAL at 05:27

## 2020-03-18 RX ADMIN — LEVOTHYROXINE SODIUM 88 MCG: 88 TABLET ORAL at 05:27

## 2020-03-18 RX ADMIN — SODIUM CHLORIDE, PRESERVATIVE FREE 10 ML: 5 INJECTION INTRAVENOUS at 20:38

## 2020-03-18 RX ADMIN — KETOROLAC TROMETHAMINE 15 MG: 15 INJECTION, SOLUTION INTRAMUSCULAR; INTRAVENOUS at 01:24

## 2020-03-18 RX ADMIN — MIRABEGRON 50 MG: 50 TABLET, FILM COATED, EXTENDED RELEASE ORAL at 01:24

## 2020-03-18 RX ADMIN — HEPARIN SODIUM 5000 UNITS: 5000 INJECTION, SOLUTION INTRAVENOUS; SUBCUTANEOUS at 13:48

## 2020-03-18 NOTE — PROGRESS NOTES
No problems  A little confusion, but very oriented and sharp at the same time  Daughter at bedside    Appears nontoxic  Abdomen soft  Edematous stoma    Surgical findings reviewed  Frequent ambulation encouraged.  NG fell out, will keep it out  Replace NG if nausea and vomiting  Okay for ice chips.

## 2020-03-18 NOTE — PROGRESS NOTES
[facility] Medicine Services  PROGRESS NOTE    Patient Name: Neema Richard  : 1940  MRN: [id number]    Date of Admission: 3/15/2020  Primary Care Physician: David Mackey MD    Subjective   Subjective     CC:  Abdominal pain/diverticulitis    HPI:  Status postop day 1 of distal transverse end colostomy.  Pain overall well controlled.  Daughter is at bedside    Review of Systems  Gen- No fevers, chills  CV- No chest pain, palpitations  Resp- No cough, dyspnea  GI- No N/V/D,  + abd pain +NG    Objective   Objective     Vital Signs:   Temp:  [97.7 °F (36.5 °C)-99.2 °F (37.3 °C)] 98.1 °F (36.7 °C)  Heart Rate:  [74-90] 80  Resp:  [16-22] 16  BP: ()/(38-62) 133/62        Physical Exam:  Constitutional: No acute distress, awake, alert  HENT: NCAT, mucous membranes moist +NG  Respiratory: Clear to auscultation bilaterally, respiratory effort normal   Cardiovascular: RRR, no murmurs  Gastrointestinal: Positive bowel sounds, soft, +ostomy with stoma  Musculoskeletal: No bilateral ankle edema  Psychiatric: Appropriate affect, cooperative  Neurologic: Oriented x 3, speech clear  Skin: No rashes    Results Reviewed:  Results from last 7 days   Lab Units 20  0444 20  0451 20  0018   WBC 10*3/mm3 11.97* 14.46* 15.45*   HEMOGLOBIN g/dL 9.8* 10.8* 11.9*   HEMATOCRIT % 31.2* 34.8 37.3   PLATELETS 10*3/mm3 334 280 341     Results from last 7 days   Lab Units 20  0539 20  0451 20  0018 03/15/20  1638   SODIUM mmol/L 142 139 138 138   POTASSIUM mmol/L 4.1 3.9 3.8 4.0   CHLORIDE mmol/L 109* 108* 105 102   CO2 mmol/L 19.0* 19.0* 20.0* 21.0*   BUN mg/dL 30* 28* 23 21   CREATININE mg/dL 0.73 0.68 0.83 0.97   GLUCOSE mg/dL 118* 94 134* 257*   CALCIUM mg/dL 8.4* 8.6 8.4* 8.6   ALT (SGPT) U/L  --   --   --  9   AST (SGOT) U/L  --   --   --  16     Estimated Creatinine Clearance: 56 mL/min (by C-G formula based on SCr of 0.73 mg/dL).    Microbiology Results Abnormal      Procedure Component Value - Date/Time    AFB Culture - Swab, Peritoneum [[id number]] Collected:  03/17/20 1559    Lab Status:  Preliminary result Specimen:  Swab from Peritoneum Updated:  03/18/20 1240     AFB Stain No acid fast bacilli seen on concentrated smear    Wound Culture - Swab, Peritoneum [[id number]]  (Abnormal) Collected:  03/17/20 1559    Lab Status:  Preliminary result Specimen:  Swab from Peritoneum Updated:  03/18/20 1016     Wound Culture Scant growth (1+) Gram Negative Bacilli     Gram Stain Many (4+) Red blood cells      Moderate (3+) WBCs seen      No organisms seen    Blood Culture - Blood, Arm, Right [[id number]] Collected:  03/16/20 0018    Lab Status:  Preliminary result Specimen:  Blood from Arm, Right Updated:  03/18/20 0515     Blood Culture No growth at 2 days    Blood Culture - Blood, Arm, Left [[id number]] Collected:  03/16/20 0018    Lab Status:  Preliminary result Specimen:  Blood from Arm, Left Updated:  03/18/20 0515     Blood Culture No growth at 2 days          Imaging Results (Last 24 Hours)     ** No results found for the last 24 hours. **               I have reviewed the medications:  Scheduled Meds:  acetaminophen 650 mg Oral Q8H   busPIRone 15 mg Oral Daily   calcitonin (salmon) 1 spray Alternating Nares Daily   ertapenem 1 g Intravenous Q24H   gabapentin 300 mg Oral BID   [START ON 3/20/2020] gabapentin 300 mg Oral Nightly   heparin (porcine) 5,000 Units Subcutaneous Q8H   ketorolac 15 mg Intravenous Q6H   levothyroxine 88 mcg Oral Q AM   metoprolol succinate  mg Oral Daily   Mirabegron ER 50 mg Oral Daily   sodium chloride 500 mL Intravenous Once   sodium chloride 10 mL Intravenous Q12H   sodium chloride 10 mL Intravenous Q12H   sucralfate 1 g Oral BID AC     Continuous Infusions:  sodium chloride 0.9 % with KCl 20 mEq 100 mL/hr Last Rate: 100 mL/hr (03/18/20 0525)     PRN Meds:.diazePAM  •  HYDROcodone-acetaminophen  •  HYDROmorphone **AND** naloxone  •  LORazepam  •   Morphine **OR** Morphine  •  ondansetron  •  ondansetron  •  sodium chloride  •  sodium chloride  •  sodium chloride  •  sodium chloride    Assessment/Plan   Assessment & Plan     Active Hospital Problems    Diagnosis  POA   • **Perforation of sigmoid colon due to diverticulitis [K57.20]  Unknown   • Acute diverticulitis [K57.92]  Yes   • Sepsis (CMS/HCC) [A41.9]  Yes   • Lactic acidosis [E87.2]  Unknown   • Neutrophilic leukocytosis [D72.9]  Unknown   • Acute kidney injury (CMS/HCC) [N17.9]  Unknown   • Acute hyperglycemia [R73.9]  Unknown   • Arthritis [M19.90]  Unknown   • Overactive bladder [N32.81]  Unknown   • Dyspepsia [R10.13]  Unknown   • Essential hypertension [I10]  Unknown   • Acquired hypothyroidism [E03.9]  Yes      Resolved Hospital Problems   No resolved problems to display.        Brief Hospital Course to date:  Neema Richard is a 79 y.o. female admitted for diverticulitis S microperforation and sepsis.  She underwent repeat CT imaging concerning for free air.  She went to surgery last night and is now postop day 1 transverse colostomy.  Her white count is improving    Leukocytosis  Sepsis resolved  Diverticulitis with free air on CT  S/p drainage of lower abdominal abcess, creation of distal transverse colostomy  Continue Invanz for total of 7 days    HTN  Controlled continue current medicines  Hypothyroid  Continue levothyroxine  Arthritis  Continue gabapentin  Will need HHC at discharge    Overactive bladder  Dyspepsia      DVT Prophylaxis: Mechanical secondary to recent surgery    Disposition: I expect the patient to be discharged TBD    CODE STATUS:   Code Status and Medical Interventions:   Ordered at: 03/17/20 1903     Level Of Support Discussed With:    Patient     Code Status:    CPR     Medical Interventions (Level of Support Prior to Arrest):    Full         Electronically signed by Filomena Fuentes DO, 03/18/20, 14:22.

## 2020-03-18 NOTE — PLAN OF CARE
Problem: Patient Care Overview  Goal: Plan of Care Review  Outcome: Ongoing (interventions implemented as appropriate)  Flowsheets (Taken 3/18/2020 1053)  Plan of Care Reviewed With: patient; daughter  Outcome Summary: PT carlos completed this date.  Min A x 2 for bed mobility.  Min A  x2 for STS transfers from EOB.  Repeated VC and tactile cues for hand placement. Ambulated ~20 feet with Min A x 2 and repeated assistance with direction changes and AD management. Skilled PT services warranted to improve safety and mobility.  Recommend  DC home with HH.

## 2020-03-18 NOTE — PLAN OF CARE
Problem: Pain, Chronic (Adult)  Goal: Acceptable Pain/Comfort Level and Functional Ability  Outcome: Ongoing (interventions implemented as appropriate)  Flowsheets (Taken 3/18/2020 0403 by Beatriz Jones, RN)  Acceptable Pain/Comfort Level and Functional Ability: making progress toward outcome     Problem: Patient Care Overview  Goal: Plan of Care Review  Outcome: Ongoing (interventions implemented as appropriate)  Flowsheets  Taken 3/18/2020 0403 by Beatriz Jones, RN  Progress: no change  Taken 3/18/2020 1055 by Aniket Mazariegos PT Student  Plan of Care Reviewed With: patient;daughter  Taken 3/18/2020 1507 by Sylvia Lamb, RN  Outcome Summary: VSS, pt up with PT, very drowsy. Denies pain or discomfort. Will continue to monitor.

## 2020-03-18 NOTE — PLAN OF CARE
Problem: Pain, Chronic (Adult)  Goal: Acceptable Pain/Comfort Level and Functional Ability  Outcome: Ongoing (interventions implemented as appropriate)  Flowsheets (Taken 3/18/2020 0403)  Acceptable Pain/Comfort Level and Functional Ability: making progress toward outcome     Problem: Patient Care Overview  Goal: Plan of Care Review  Outcome: Ongoing (interventions implemented as appropriate)  Flowsheets (Taken 3/18/2020 0403)  Progress: no change  Plan of Care Reviewed With: patient  Outcome Summary: VSS. 2L via NC. AxOx4. Pain controlled by scheduled meds. Rested well throughout night. Will continue to monitor.

## 2020-03-18 NOTE — PLAN OF CARE
Problem: Patient Care Overview  Goal: Plan of Care Review  Outcome: Ongoing (interventions implemented as appropriate)  Flowsheets (Taken 3/18/2020 0652)  Outcome Summary: Pt currently mod to max assist with LB ADLs following surgery. Pt declined mobility due to drowsiness and pain control. Provided AE to assist with safety and I with ADLs. Recommend pt home with assist and home health.

## 2020-03-18 NOTE — NURSING NOTE
Patient is a new distal transverse colostomy per Dr. Vazquez s/p low anterior resection r/t diverticulitis, abscesses, and free air on CT.     Dropped off education folder. Appliance is intact.   Stoma looks good and is viable.   Serosanguinous drainage in pouch.   NG-tube anchored and to LWS.     Patient very drowsy this morning.   Discussed POC and encouraged ambulation.   Will follow daily for stomal support and education.     Thanks

## 2020-03-18 NOTE — PROGRESS NOTES
"INFECTIOUS DISEASE PROGRESS NOTE   Neema Richard  1940  [id number]      Admission Date: 3/15/2020      Requesting Provider: Filomena Fuentes DO  Evaluating Physician: Darshan Bejarano MD    Reason for Consultation: Acute sigmoid diverticulitis, with microperforation     History of present illness:  3/16/20:    3/16/2020: Patient is a 79 y.o. female, seen today for acute sigmoid diverticulitis with microperforation.  She has had several episodes of diverticulitis in the past, the last being in December 2019, treated with an oral antibiotic by her PCP.   Her symptoms minimally improved. She began experiencing diarrhea, with low grade fever, and yesterday, developed worsening abdominal pain, prompting her presentation to the ED.  An abdominal Ct with extensive diverticulitis involving the descending colon and sigmoid colon with stranding and wall thickening, with extraluminal air suggesting a microperforation.   Admitting labs with a leukocytosis of 15, 000, lactic acidosis, and she has been afebrile. She is currently on Merrem and we were consulted for evaluation and treatment.   3/17/20:  She feels better but still with abdominal tenderness.  Her daughter is coming to from [place] and she wants her tested for Covid-19.  She remains afebrile.  I saw her earlier this morning but she has now deteriorated.  She now has free air on her CT scan and is currently in the OR.  3/18/20:  She is feeling \"some better this am\".  She denies any nausea. She complains of hiccups. She remains afebrile.      Past Medical History:   Diagnosis Date   • Acquired hypothyroidism 1980   • Colon polyp - sees Dr. 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 ER and [place] (+)   • Essential hypertension 2012   • High cholesterol 2019   • Hx of herpes genitalis 2013   • IBS-d (takes Buspar)        Past Surgical History:   Procedure Laterality Date   • BILATERAL BREAST REDUCTION Bilateral " 1995   • BREAST LUMPECTOMY Left 2009   • CATARACT EXTRACTION, BILATERAL Bilateral 2018   • CERVICAL CONIZATION  1983   • COLON RESECTION N/A 3/17/2020    Procedure: LOW ANTERIOR COLON RESECTION, MOBILIZATION OF SPLENIC FLEXURE, DRAINAGE OF ABSCESS AND COLOSTOMY CREATION;  Surgeon: Sravan Vazquez MD;  Location: [facility];  Service: General;  Laterality: N/A;   • FACIAL COSMETIC SURGERY  2001   • FACIAL COSMETIC SURGERY  2012   • HEMORRHOIDECTOMY  1974   • LAPAROSCOPIC CHOLECYSTECTOMY  2010   • REDUCTION MAMMAPLASTY Bilateral 04/13/2017    DCIS   • SINUS SURGERY  1992   • THYROIDECTOMY, PARTIAL  1963   • TONSILLECTOMY  1953   • VARICOSE VEIN SURGERY Bilateral 1981       Family History   Problem Relation Age of Onset   • Breast cancer Neg Hx    • Ovarian cancer Neg Hx        Social History     Socioeconomic History   • Marital status:      Spouse name: Not on file   • Number of children: Not on file   • Years of education: Not on file   • Highest education level: Not on file   Tobacco Use   • Smoking status: Never Smoker   Substance and Sexual Activity   • Alcohol use: No   • Drug use: No       Allergies   Allergen Reactions   • Evista [Raloxifene] Rash   • Cephalexin Diarrhea   • Hydrochlorothiazide Diarrhea   • Meloxicam Other (See Comments)   • Metronidazole Diarrhea   • Oxybutynin Cough   • Propylthiouracil Other (See Comments)     Severally reduce WBC's    • Sulfa Antibiotics Hives   • Sulfacetamide Sodium-Sulfur Hives   • Sulfur Dioxide Hives         Medication:    Current Facility-Administered Medications:   •  acetaminophen (TYLENOL) tablet 650 mg, 650 mg, Oral, Q8H, Sravan Vazquez MD, 650 mg at 03/18/20 0527  •  busPIRone (BUSPAR) tablet 15 mg, 15 mg, Oral, Daily, Sravan Vazquez MD, 15 mg at 03/17/20 0817  •  calcitonin (salmon) (MIACALCIN) nasal spray 1 spray, 1 spray, Alternating Nares, Daily, Sravan Vazquez MD, 1 spray at 03/18/20 0859  •  diazePAM (VALIUM) tablet 5 mg, 5 mg, Oral, Q6H PRN,  Sravan Vazquez MD  •  ertapenem (INVanz) 1 g/100 mL 0.9% NS VTB (mbp), 1 g, Intravenous, Q24H, Sravan Vazquez MD, 1 g at 03/18/20 0859  •  gabapentin (NEURONTIN) capsule 300 mg, 300 mg, Oral, BID, Sravan Vazquez MD, 300 mg at 03/18/20 0900  •  [START ON 3/20/2020] gabapentin (NEURONTIN) capsule 300 mg, 300 mg, Oral, Nightly, Sravan Vazquez MD  •  heparin (porcine) 5000 UNIT/ML injection 5,000 Units, 5,000 Units, Subcutaneous, Q8H, Sravan Vazquez MD, 5,000 Units at 03/18/20 1348  •  HYDROcodone-acetaminophen (NORCO) 7.5-325 MG per tablet 1 tablet, 1 tablet, Oral, Q4H PRN, Sravan Vazquez MD  •  HYDROmorphone (DILAUDID) injection 0.5 mg, 0.5 mg, Intravenous, Q2H PRN **AND** naloxone (NARCAN) injection 0.4 mg, 0.4 mg, Intravenous, Q5 Min PRN, Sravan Vazquez MD  •  ketorolac (TORADOL) injection 15 mg, 15 mg, Intravenous, Q6H, Sravan Vazquez MD, 15 mg at 03/18/20 1348  •  levothyroxine (SYNTHROID, LEVOTHROID) tablet 88 mcg, 88 mcg, Oral, Q AM, Sravan Vazquez MD, 88 mcg at 03/18/20 0527  •  LORazepam (ATIVAN) tablet 0.5 mg, 0.5 mg, Oral, Q8H PRN, Sravan Vazquez MD, 0.5 mg at 03/18/20 0538  •  metoprolol succinate XL (TOPROL-XL) 24 hr tablet 100 mg, 100 mg, Oral, Daily, Sravan Vazquez MD, Stopped at 03/18/20 0900  •  Mirabegron ER (MYRBETRIQ) 24 hr tablet 50 mg- patient supplied med, 50 mg, Oral, Daily, Sravan Vazquez MD, 50 mg at 03/18/20 0124  •  morphine injection 2 mg, 2 mg, Intravenous, Q2H PRN, 2 mg at 03/17/20 0817 **OR** Morphine sulfate (PF) injection 4 mg, 4 mg, Intravenous, Q2H PRN, Sravan Vazquez MD, 4 mg at 03/16/20 1704  •  ondansetron (ZOFRAN) injection 4 mg, 4 mg, Intravenous, Q6H PRN, Sravan Vazquez MD, 4 mg at 03/17/20 2028  •  ondansetron (ZOFRAN) injection 4 mg, 4 mg, Intravenous, Q6H PRN, Sravan Vazquez MD  •  sodium chloride 0.45 % bolus 500 mL, 500 mL, Intravenous, Once, Sravan Vazquez MD  •  sodium chloride 0.9 % bolus 1,836 mL, 30 mL/kg, Intravenous, PRN, Sravan Vazquez MD  •   sodium chloride 0.9 % flush 10 mL, 10 mL, Intravenous, PRN, Sravan Vazquez MD  •  sodium chloride 0.9 % flush 10 mL, 10 mL, Intravenous, Q12H, Sravan Vazquez MD, 10 mL at 20 0819  •  sodium chloride 0.9 % flush 10 mL, 10 mL, Intravenous, PRKELLIE, Sravan Vazquez MD  •  sodium chloride 0.9 % flush 10 mL, 10 mL, Intravenous, Q12H, Sravan Vazquez MD  •  sodium chloride 0.9 % flush 10 mL, 10 mL, Intravenous, PRN, Sravan Vazquez MD  •  sodium chloride 0.9 % with KCl 20 mEq/L infusion, 100 mL/hr, Intravenous, Continuous, Sravan Vazquez MD, Last Rate: 100 mL/hr at 20 1602, 100 mL/hr at 20 1602  •  sucralfate (CARAFATE) tablet 1 g, 1 g, Oral, BID AC, Sravan Vazquez MD, Stopped at 20 1730    Antibiotics:  Anti-Infectives (From admission, onward)    Ordered     Dose/Rate Route Frequency Start Stop    20 0811  ertapenem (INVanz) 1 g/100 mL 0.9% NS VTB (mbp)     Joni Alfaro, [facility] reviewed the order on 20 1033.   Ordering Provider:  Sravan Vazquez MD    1 g  over 30 Minutes Intravenous Every 24 Hours 20 0900 20 0859    03/15/20 1954  meropenem (MERREM) 1 g/100 mL 0.9% NS VTB (mbp)     Ordering Provider:  Darshan Steward MD    1 g  over 30 Minutes Intravenous Once 03/15/20 1955 03/15/20 2122                  Physical Exam:   Vital Signs  Temp (24hrs), Av.3 °F (36.8 °C), Min:97.7 °F (36.5 °C), Max:99.2 °F (37.3 °C)    Temp  Min: 97.7 °F (36.5 °C)  Max: 99.2 °F (37.3 °C)  BP  Min: 104/47  Max: 134/60  Pulse  Min: 76  Max: 92  Resp  Min: 16  Max: 20  SpO2  Min: 91 %  Max: 98 %    GENERAL: Awake and alert, in no acute distress.   HEENT: Normocephalic, atraumatic.   No conjunctival injection. No icterus. Oropharynx clear without evidence of thrush or exudate. No evidence of peridontal disease.    HEART: RRR; No murmur, rubs, gallops.   LUNGS: Clear to auscultation bilaterally without wheezing, rales, rhonchi. Normal respiratory effort. Nonlabored. No dullness.  ABDOMEN:   Ostomy/abdominal dressings in place   EXT:  No cyanosis, clubbing or edema. No cord.  :  Without Gaytan catheter.  MSK:  No joint effusions   SKIN: Warm and dry without cutaneous eruptions on Inspection/palpation.    NEURO: Oriented to PPT.  PSYCHIATRIC: Normal insight and judgement. Cooperative with PE  Right PICC site without redness    Laboratory Data    Results from last 7 days   Lab Units 03/18/20  0444 03/17/20  0451 03/16/20  0018   WBC 10*3/mm3 11.97* 14.46* 15.45*   HEMOGLOBIN g/dL 9.8* 10.8* 11.9*   HEMATOCRIT % 31.2* 34.8 37.3   PLATELETS 10*3/mm3 334 280 341     Results from last 7 days   Lab Units 03/18/20  0539   SODIUM mmol/L 142   POTASSIUM mmol/L 4.1   CHLORIDE mmol/L 109*   CO2 mmol/L 19.0*   BUN mg/dL 30*   CREATININE mg/dL 0.73   GLUCOSE mg/dL 118*   CALCIUM mg/dL 8.4*     Results from last 7 days   Lab Units 03/15/20  1638   ALK PHOS U/L 97   BILIRUBIN mg/dL 0.7   ALT (SGPT) U/L 9   AST (SGOT) U/L 16     Results from last 7 days   Lab Units 03/15/20  1638   SED RATE mm/hr 19     Results from last 7 days   Lab Units 03/15/20  1638   CRP mg/dL 6.50*     Results from last 7 days   Lab Units 03/17/20  1143   LACTATE mmol/L 1.4             Estimated Creatinine Clearance: 56 mL/min (by C-G formula based on SCr of 0.73 mg/dL).  Lactate 2.9     Microbiology:    3/16: BC no growth    3/17:  Peritoneum gms moderate WBCs, nos    Radiology:  Imaging Results (Last 72 Hours)     Procedure Component Value Units Date/Time    CT Abdomen Pelvis With & Without Contrast [[id number]] Collected:  03/17/20 1046     Updated:  03/17/20 1150    Narrative:       EXAMINATION: CT ABDOMEN AND PELVIS W WO CONTRAST-03/17/2020:      INDICATION: Ongoing pain.     TECHNIQUE: 5 mm unenhanced images through the abdomen and pelvis with  subsequent post-IV contrast 5 mm portal venous phase and delayed venous  phase images.     The radiation dose reduction device was turned on for each scan per the  ALARA (As Low as Reasonably  Achievable) protocol.     COMPARISON: 03/15/2020 abdomen and pelvis CT scan.     FINDINGS: The patient history indicates diverticulitis, persistent  abdominal pain. Previous 03/15/2020 exam report indicated extensive  distal descending colon and sigmoid colon diverticulitis, small amount  of extraluminal air, and some free fluid. Today's exam, however, shows  moderate intra-abdominal free air. There is new moderate right lower  lobe atelectasis and mild left lower lobe atelectasis. No significant  abnormalities are seen of the liver, spleen, pancreas, adrenal glands,  or kidneys. The bowel loops are normal in caliber.     Regarding the lower abdomen and pelvis, inflammatory fat stranding  around the descending colon is approximately stable. There appears to be  mild, new fat stranding of the retroperitoneum but this may actually  represent a trace amount of fluid extending along the paracolic gutters,  and along the peritoneal margin anteriorly, rather than true  retroperitoneal inflammation. Free fluid in the pelvis appears mildly  increased. Note is again made of the patient's enlarged uterus and the  patient's ring-type pessary. Fluid and air collection seen on axial  portal venous phase image 64, series 6 is difficult to distinguish as  intraluminal within bowel, or contained fluid and air, especially seen  on the axial images. The coronal images show this collection,  respectively coronal images 46-36 suggest that this may be extraluminal.  No other potential discrete, drainable abscess is identified. The  bladder is normally distended and normal in appearance. Delayed images  show normal caliber renal collecting systems and ureters.       Impression:       1.  Interval development of intra-abdominal free air consistent with  perforation from the patient's diverticulitis.  2.  Mildly increased intrapelvic fat stranding as described, mildly  increased intrapelvic free fluid.  3.  Questionable 5 cm fluid and air  collection in the central pelvis as  described above. Please refer to initial axial image 64, series 3.  4.  New moderate right lower lobe atelectasis and mild left lower lobe  atelectasis.     NOTE: Preliminary report was called to Dr. Sravan Vazquez at approximately  10:50 AM 03/17/2020.     D:  03/17/2020  E:  03/17/2020             CT Abdomen Pelvis Without Contrast [[id number]] Collected:  03/15/20 1853     Updated:  03/16/20 0955    Narrative:       EXAMINATION: CT ABDOMEN/PELVIS WO CONTRAST - 03/15/2020      INDICATION: Lower abdominal pain and left flank pain, vomiting and  diarrhea.     TECHNIQUE: Multiple axial CT imaging is obtained of the abdomen and  pelvis without the administration of oral or intravenous contrast.     The radiation dose reduction device was turned on for each scan per the  ALARA (As Low as Reasonably Achievable) protocol.     COMPARISON: 02/25/2020     FINDINGS:      ABDOMEN: Atelectatic changes seen in the lung bases bilaterally. The  gallbladder has been surgically removed. The liver is homogeneous in  appearance. The spleen is unremarkable. Visualized pancreas is  homogeneous in appearance. The kidneys and adrenal glands are within  normal limits. No abdominal or retroperitoneal lymphadenopathy. Vascular  calcification seen of the abdominal aorta and iliac vessels. There is  moderate amount of stool seen diffusely throughout the colon. There are  diverticula identified throughout the entire colon. There is abnormal  wall thickening identified of the distal descending colon and entire  sigmoid colon suggesting an extensive diverticulitis however there is  surrounding stranding and some fluid with some mild extraluminal air  identified just above the uterus suggesting a perforation. There is some  scattered air is well seen within the retroperitoneum. Again no definite  abscess.     PELVIS: A pessary is in place. There is some fluid identified in the  pelvis with extraluminal air  suggesting a perforation with extensive  diverticulitis involving the distal descending and sigmoid colon. No  evidence of abscess. No pelvic adenopathy. Bony structures reveal  degenerative changes seen within the spine.       Impression:       There is extensive diverticulitis involving the distal  descending colon and sigmoid colon with stranding and wall thickening  with extraluminal air as well suggesting a microperforation. There is  free fluid in the pelvis with no evidence of loculation to suggest  abscess at this time.     DICTATED:   03/15/2020  EDITED/ls :   03/15/2020         This report was finalized on 3/16/2020 9:52 AM by Dr. Daria Malone MD.               Impression:   1.  Acute sigmoid diverticulitis- s/p resection 3/17  2.  Severe sepsis- with leukocytosis, tachycardia, lactic acidosis, uncontrolled diabetes mellitus, and known focus of infection   3.  Leukocytosis/neutrophilia, secondary to above, improving   4.  Pelvic abscess    PLAN/RECOMMENDATIONS:   1.  Ertapenem 1 g IV daily  2.  Monitor cultures   3.  PICC line placement-done    Dr. Bejarano has obtained the history, performed the physical exam and formulated the above treatment plan.        Darshan Bejarano MD  3/18/2020  18:19

## 2020-03-18 NOTE — THERAPY EVALUATION
Patient Name: Neema Richard  : 1940    MRN: [id number]                              Today's Date: 3/18/2020       Admit Date: 3/15/2020    Visit Dx:     ICD-10-CM ICD-9-CM   1. Peritonitis (CMS/HCC) K65.9 567.9   2. Diverticulitis K57.92 562.11     Patient Active Problem List   Diagnosis   • Well woman exam with routine gynecological exam   • Osteoporosis - GYN   • Essential hypertension   • Acquired hypothyroidism   • IBS-d (takes Buspar)   • DCIS (ductal carcinoma in situ)   • Cystocele and rectocele with complete uterovaginal prolapse   • Mixed urge and stress incontinence   • Pessary maintenance   • High cholesterol   • Acute diverticulitis   • Sepsis (CMS/HCC)   • Lactic acidosis   • Neutrophilic leukocytosis   • Perforation of sigmoid colon due to diverticulitis   • Acute kidney injury (CMS/HCC)   • Acute hyperglycemia   • Arthritis   • Overactive bladder   • Dyspepsia     Past Medical History:   Diagnosis Date   • Acquired hypothyroidism    • Colon polyp - sees Dr. Vazquez 2016   • DCIS (ductal carcinoma in situ) 2017    Dx at the time of breast reduction. ER and NC (+)   • Essential hypertension    • High cholesterol    • Hx of herpes genitalis    • IBS-d (takes Buspar)      Past Surgical History:   Procedure Laterality Date   • BILATERAL BREAST REDUCTION Bilateral    • BREAST LUMPECTOMY Left    • CATARACT EXTRACTION, BILATERAL Bilateral    • CERVICAL CONIZATION     • COLON RESECTION N/A 3/17/2020    Procedure: LOW ANTERIOR COLON RESECTION, MOBILIZATION OF SPLENIC FLEXURE, DRAINAGE OF ABSCESS AND COLOSTOMY CREATION;  Surgeon: Sravan Vazquez MD;  Location: [facility];  Service: General;  Laterality: N/A;   • FACIAL COSMETIC SURGERY     • FACIAL COSMETIC SURGERY     • HEMORRHOIDECTOMY     • LAPAROSCOPIC CHOLECYSTECTOMY     • REDUCTION MAMMAPLASTY Bilateral 2017    DCIS   • SINUS SURGERY     • THYROIDECTOMY, PARTIAL  1963   • TONSILLECTOMY   1953   • VARICOSE VEIN SURGERY Bilateral 1981     General Information     [facility] Name 03/18/20 1055          PT Evaluation Time/Intention    Document Type  evaluation  (Pended)   -KM     Mode of Treatment  individual therapy;physical therapy  (Pended)   -[facility] Name 03/18/20 1055          General Information    Patient Profile Reviewed?  yes  (Pended)   -KM     Prior Level of Function  independent:;all household mobility;community mobility;ADL's  (Pended)   -KM     Existing Precautions/Restrictions  fall;other (see comments)  (Pended)  RADHA drain, Abd Incision, Colostomy, PICC line, NG Tube  -KM     Barriers to Rehab  cognitive status  (Pended)  Some confusion noted that daughted reports is atypical for her.    -[facility] Name 03/18/20 1055          Relationship/Environment    Lives With  alone  (Pended)   -KM     Row Name 03/18/20 1055          Resource/Environmental Concerns    Current Living Arrangements  home/apartment/condo  (Pended)   -KM     Row Name 03/18/20 1055          Home Main Entrance    Number of Stairs, Main Entrance  none  (Pended)   -KM     Stair Railings, Main Entrance  railings on both sides of stairs  (Pended)   -KM     Row Name 03/18/20 1055          Stairs Within Home, Primary    Number of Stairs, Within Home, Primary  ten  (Pended)   -KM     Stair Railings, Within Home, Primary  railings on both sides of stairs  (Pended)   -[facility] Name 03/18/20 1055          Cognitive Assessment/Intervention- PT/OT    Orientation Status (Cognition)  oriented x 3  (Pended)   -KM     Row Name 03/18/20 1055          Safety Issues, Functional Mobility    Safety Issues Affecting Function (Mobility)  ability to follow commands;at risk behavior observed;awareness of need for assistance;insight into deficits/self awareness;judgment;positioning of assistive device;safety precaution awareness;safety precautions follow-through/compliance;sequencing abilities  (Pended)   -KM     Impairments Affecting Function  (Mobility)  cognition;strength;balance;pain  (Pended)   -KM       User Key  (r) = Recorded By, (t) = Taken By, (c) = Cosigned By    Initials Name Provider Type    Aniket Sadler, PT Student PT Student        Mobility     Row Name 03/18/20 1055          Bed Mobility Assessment/Treatment    Bed Mobility Assessment/Treatment  rolling left;scooting/bridging;supine-sit;sit-supine  (Pended)   -KM     Rolling Left [place] (Bed Mobility)  minimum assist (75% patient effort);2 person assist  (Pended)   -KM     Scooting/Bridging [place] (Bed Mobility)  minimum assist (75% patient effort);2 person assist  (Pended)   -KM     Supine-Sit [place] (Bed Mobility)  2 person assist;minimum assist (75% patient effort)  (Pended)   -KM     Sit-Supine [place] (Bed Mobility)  minimum assist (75% patient effort);2 person assist  (Pended)   -KM     Assistive Device (Bed Mobility)  bed rails;head of bed elevated  (Pended)   -KM     Comment (Bed Mobility)  Educated on log rolling technique to minimize pain with bed mobility.    (Pended)   -KM     Row Name 03/18/20 1055          Transfer Assessment/Treatment    Comment (Transfers)  Repeated VC and tactile cues for hand placement, difficulty with performance secondary to confusion.    (Pended)   -     Row Name 03/18/20 1055          Sit-Stand Transfer    Sit-Stand [place] (Transfers)  minimum assist (75% patient effort);2 person assist  (Pended)   -KM     Assistive Device (Sit-Stand Transfers)  walker, front-wheeled  (Pended)   -KM     Row Name 03/18/20 1055          Gait/Stairs Assessment/Training    Gait/Stairs Assessment/Training  gait/ambulation independence;gait/ambulation assistive device;distance ambulated  (Pended)   -KM     [place] Level (Gait)  minimum assist (75% patient effort);2 person assist  (Pended)   -KM     Assistive Device (Gait)  walker, front-wheeled  (Pended)   -KM     Distance in Feet (Gait)  20 feet  (Pended)   -KM     Pattern (Gait)   "step-to  (Pended)   -KM     Deviations/Abnormal Patterns (Gait)  gait speed decreased;altagracia decreased;base of support, narrow  (Pended)   -KM     Bilateral Gait Deviations  forward flexed posture  (Pended)   -KM     [place] Level (Stairs)  not tested  (Pended)   -KM     Comment (Gait/Stairs)  Pt appeared \"out of it\" during ambulation.  VSS.  Repeated verbal and tactile cueing required for walker management around obstacles and doorways secondary to confusion.    (Pended)   -KM       User Key  (r) = Recorded By, (t) = Taken By, (c) = Cosigned By    Initials Name Provider Type     Aniket Mazariegos, PT Student PT Student        Obj/Interventions     Row Name 03/18/20 1055          General ROM    GENERAL ROM COMMENTS  BLE AROM Grossly WFL  (Pended)   -     Row Name 03/18/20 1055          MMT (Manual Muscle Testing)    General MMT Comments  B LE MMT Grossly assessed at 4/5  (Pended)   -[facility] Name 03/18/20 1055          Static Sitting Balance    Level of [place] (Unsupported Sitting, Static Balance)  contact guard assist;1 person assist  (Pended)   -KM     Sitting Position (Unsupported Sitting, Static Balance)  sitting on edge of bed  (Pended)   -KM     Time Able to Maintain Position (Unsupported Sitting, Static Balance)  1 to 2 minutes  (Pended)   -KM     Row Name 03/18/20 1055          Dynamic Sitting Balance    Level of [place], Reaches Outside Midline (Sitting, Dynamic Balance)  unable to perform activity  (Pended)   -KM     Row Name 03/18/20 1055          Static Standing Balance    Level of [place] (Supported Standing, Static Balance)  contact guard assist;2 person assist  (Pended)   -KM     Time Able to Maintain Position (Supported Standing, Static Balance)  45 to 60 seconds  (Pended)   -     Row Name 03/18/20 1055          Dynamic Standing Balance    Level of [place], Reaches Outside Midline (Standing, Dynamic Balance)  minimal assist, 75% patient effort;2 person assist  " (Pended)   -KM     Time Able to Maintain Position, Reaches Outside Midline (Standing, Dynamic Balance)  1 to 2 minutes  (Pended)   -KM     Assistive Device Utilized (Supported Standing, Dynamic Balance)  walker, rolling  (Pended)   -     Row Name 03/18/20 1055          Sensory Assessment/Intervention    Sensory General Assessment  no sensation deficits identified  (Pended)  B   -       User Key  (r) = Recorded By, (t) = Taken By, (c) = Cosigned By    Initials Name Provider Type    Aniket Sadler A, PT Student PT Student        Goals/Plan     Row Name 03/18/20 1055          Bed Mobility Goal 1 (PT)    Activity/Assistive Device (Bed Mobility Goal 1, PT)  bridging;rolling to left;rolling to right;sit to supine;supine to sit;scooting  (Pended)   -KM     [place] Level/Cues Needed (Bed Mobility Goal 1, PT)  independent  (Pended)   -KM     Time Frame (Bed Mobility Goal 1, PT)  long term goal (LTG);10 days  (Pended)   -KM     Progress/Outcomes (Bed Mobility Goal 1, PT)  goal ongoing  (Pended)   -     Row Name 03/18/20 1051          Transfer Goal 1 (PT)    Activity/Assistive Device (Transfer Goal 1, PT)  bed-to-chair/chair-to-bed;sit-to-stand/stand-to-sit  (Pended)   -KM     [place] Level/Cues Needed (Transfer Goal 1, PT)  independent  (Pended)   -KM     Time Frame (Transfer Goal 1, PT)  long term goal (LTG);10 days  (Pended)   -KM     Progress/Outcome (Transfer Goal 1, PT)  goal ongoing  (Pended)   -     Row Name 03/18/20 1056          Gait Training Goal 1 (PT)    Activity/Assistive Device (Gait Training Goal 1, PT)  gait (walking locomotion);decrease fall risk  (Pended)   -KM     [place] Level (Gait Training Goal 1, PT)  independent  (Pended)   -KM     Distance (Gait Goal 1, PT)  150 feet  (Pended)   -KM     Time Frame (Gait Training Goal 1, PT)  long term goal (LTG);10 days  (Pended)   -KM     Progress/Outcome (Gait Training Goal 1, PT)  goal ongoing  (Pended)   -     Row Name 03/18/20 1052           Stairs Goal 1 (PT)    Activity/Assistive Device (Stairs Goal 1, PT)  ascending stairs;descending stairs  (Pended)   -KM     [place] Level/Cues Needed (Stairs Goal 1, PT)  conditional independence  (Pended)   -KM     Number of Stairs (Stairs Goal 1, PT)  10 stairs  (Pended)   -KM     Time Frame (Stairs Goal 1, PT)  long term goal (LTG);10 days  (Pended)   -KM     Progress/Outcome (Stairs Goal 1, PT)  goal ongoing  (Pended)   -KM       User Key  (r) = Recorded By, (t) = Taken By, (c) = Cosigned By    Initials Name Provider Type     Aniket Mazariegos, PT Student PT Student        Clinical Impression     Row Name 03/18/20 1055          Pain Assessment    Additional Documentation  Pain Scale: Numbers Pre/Post-Treatment (Group)  (Pended)   -     Row Name 03/18/20 1055          Pain Scale: Numbers Pre/Post-Treatment    Pain Scale: Numbers, Pretreatment  4/10  (Pended)   -KM     Pain Scale: Numbers, Post-Treatment  5/10  (Pended)   -KM     Pain Location - Orientation  generalized  (Pended)   -KM     Pain Location  abdomen  (Pended)   -KM     Pain Intervention(s)  Repositioned;Ambulation/increased activity  (Pended)   -     Row Name 03/18/20 1055          Plan of Care Review    Plan of Care Reviewed With  patient;daughter  (Pended)   -KM     Outcome Summary  PT eval completed this date.  Min A x 2 for bed mobility.  Min A  x2 for STS transfers from EOB.  Repeated VC for hand placement.  Ambulated ~20 feet with Min A x 2 and repeated assistance with direction changes and AD management.  Recommend  DC home with HH.    (Pended)   -     Row Name 03/18/20 1055          Physical Therapy Clinical Impression    Criteria for Skilled Interventions Met (PT Clinical Impression)  yes;treatment indicated  (Pended)   -KM     Rehab Potential (PT Clinical Summary)  good, to achieve stated therapy goals  (Pended)   -     Row Name 03/18/20 1055          Vital Signs    Pre Systolic BP Rehab  133  (Pended)   -KM     Pre  Treatment Diastolic BP  62  (Pended)   -KM     Post Systolic BP Rehab  145  (Pended)   -KM     Post Treatment Diastolic BP  55  (Pended)   -KM     Pretreatment Heart Rate (beats/min)  96  (Pended)   -KM     Pre SpO2 (%)  95  (Pended)   -KM     O2 Delivery Pre Treatment  room air  (Pended)   -KM     Post SpO2 (%)  96  (Pended)   -KM     O2 Delivery Post Treatment  room air  (Pended)   -KM     Pre Patient Position  Supine  (Pended)   -KM     Post Patient Position  Sitting  (Pended)   -KM     Row Name 03/18/20 1055          Positioning and Restraints    Pre-Treatment Position  in bed  (Pended)   -KM     Post Treatment Position  chair  (Pended)   -KM     In Chair  reclined;call light within reach;encouraged to call for assist;exit alarm on;with family/caregiver;notified nsg;waffle cushion  (Pended)   -KM       User Key  (r) = Recorded By, (t) = Taken By, (c) = Cosigned By    Initials Name Provider Type    Aniket Sadler, PT Student PT Student        Outcome Measures     Row Name 03/18/20 1055          How much help from another person do you currently need...    Turning from your back to your side while in flat bed without using bedrails?  3  (Pended)   -KM     Moving from lying on back to sitting on the side of a flat bed without bedrails?  3  (Pended)   -KM     Moving to and from a bed to a chair (including a wheelchair)?  3  (Pended)   -KM     Standing up from a chair using your arms (e.g., wheelchair, bedside chair)?  3  (Pended)   -KM     Climbing 3-5 steps with a railing?  2  (Pended)   -KM     To walk in hospital room?  3  (Pended)   -KM     AM-PAC 6 Clicks Score (PT)  17  (Pended)   -CM (r) KM (t)     Row Name 03/18/20 1055          Functional Assessment    Outcome Measure Options  AM-PAC 6 Clicks Basic Mobility (PT)  (Pended)   -KM       User Key  (r) = Recorded By, (t) = Taken By, (c) = Cosigned By    Initials Name Provider Type    Sylvia Howard, RN Registered Nurse    Aniket Sadler, PT  Student PT Student          PT Recommendation and Plan  Planned Therapy Interventions (PT Eval): (P) balance training, bed mobility training, gait training, home exercise program, motor coordination training, neuromuscular re-education, patient/family education, postural re-education, stair training, strengthening, transfer training  Outcome Summary/Treatment Plan (PT)  Anticipated Discharge Disposition (PT): (P) home, home with home health  Plan of Care Reviewed With: (P) patient, daughter  Outcome Summary: (P) PT eval completed this date.  Min A x 2 for bed mobility.  Min A  x2 for STS transfers from EOB.  Repeated VC for hand placement.  Ambulated ~20 feet with Min A x 2 and repeated assistance with direction changes and AD management.  Recommend  DC home with HH.       Time Calculation:   PT Charges     Row Name 03/18/20 1055             Time Calculation    Start Time  1055  (Pended)   -KM      PT Received On  03/18/20  (Pended)   -KM      PT Goal Re-Cert Due Date  03/28/20  (Pended)   -KM        User Key  (r) = Recorded By, (t) = Taken By, (c) = Cosigned By    Initials Name Provider Type    Aniket Sadler, PT Student PT Student        Therapy Charges for Today     Code Description Service Date Service Provider Modifiers Qty    [id number] HC PT EVAL MOD COMPLEXITY 4 3/18/2020 Aniket Mazariegos, PT Student GP 1          PT G-Codes  Outcome Measure Options: (P) AM-PAC 6 Clicks Basic Mobility (PT)  AM-PAC 6 Clicks Score (PT): (P) 17    Aniket Mazariegos PT Student  3/18/2020

## 2020-03-18 NOTE — PROGRESS NOTES
Continued Stay Note  [facility]     Patient Name: Neema Richard  MRN: [id number]  Today's Date: 3/18/2020    Admit Date: 3/15/2020    Discharge Plan     Row Name 03/18/20 1200       Plan    Plan  Home with friend    Plan Comments    Met with patient and daughter Theodora at bedside to discuss discharge plan. Plan  Home with Home health with friend transporting. Discussed with patient and daughter home health options. Patient selected [facility]. Spoke with Brenden from [facility] to submit referral for home health services.       Final Discharge Disposition Code  01 - home or self-care        Discharge Codes    No documentation.             GOLDIE Mack (Kay)

## 2020-03-18 NOTE — THERAPY EVALUATION
Acute Care - Occupational Therapy Initial Evaluation  [facility]     Patient Name: Neema Richard  : 1940  MRN: [id number]  Today's Date: 3/18/2020             Admit Date: 3/15/2020       ICD-10-CM ICD-9-CM   1. Peritonitis (CMS/HCC) K65.9 567.9   2. Diverticulitis K57.92 562.11     Patient Active Problem List   Diagnosis   • Well woman exam with routine gynecological exam   • Osteoporosis - GYN   • Essential hypertension   • Acquired hypothyroidism   • IBS-d (takes Buspar)   • DCIS (ductal carcinoma in situ)   • Cystocele and rectocele with complete uterovaginal prolapse   • Mixed urge and stress incontinence   • Pessary maintenance   • High cholesterol   • Acute diverticulitis   • Sepsis (CMS/HCC)   • Lactic acidosis   • Neutrophilic leukocytosis   • Perforation of sigmoid colon due to diverticulitis   • Acute kidney injury (CMS/HCC)   • Acute hyperglycemia   • Arthritis   • Overactive bladder   • Dyspepsia     Past Medical History:   Diagnosis Date   • Acquired hypothyroidism    • Colon polyp - sees Dr. Vazquez 2016   • DCIS (ductal carcinoma in situ) 2017    Dx at the time of breast reduction. ER and HI (+)   • Essential hypertension    • High cholesterol    • Hx of herpes genitalis    • IBS-d (takes Buspar)      Past Surgical History:   Procedure Laterality Date   • BILATERAL BREAST REDUCTION Bilateral    • BREAST LUMPECTOMY Left    • CATARACT EXTRACTION, BILATERAL Bilateral    • CERVICAL CONIZATION     • COLON RESECTION N/A 3/17/2020    Procedure: LOW ANTERIOR COLON RESECTION, MOBILIZATION OF SPLENIC FLEXURE, DRAINAGE OF ABSCESS AND COLOSTOMY CREATION;  Surgeon: Sravan Vazquez MD;  Location: [facility];  Service: General;  Laterality: N/A;   • FACIAL COSMETIC SURGERY     • FACIAL COSMETIC SURGERY     • HEMORRHOIDECTOMY     • LAPAROSCOPIC CHOLECYSTECTOMY     • REDUCTION MAMMAPLASTY Bilateral 2017    DCIS   • SINUS SURGERY     • THYROIDECTOMY,  PARTIAL  1963   • TONSILLECTOMY  1953   • VARICOSE VEIN SURGERY Bilateral 1981          OT ASSESSMENT FLOWSHEET (last 12 hours)      Occupational Therapy Evaluation     Row Name 03/18/20 1427                   OT Evaluation Time/Intention    Subjective Information  complains of;fatigue  -AN        Document Type  evaluation  -AN        Mode of Treatment  occupational therapy  -AN        Symptoms Noted During/After Treatment  none  -AN           General Information    Patient Profile Reviewed?  yes  -AN        Patient Observations  cooperative;alert  -AN        Patient/Family Observations  family present  -AN        Prior Level of Function  independent:;all household mobility;community mobility;gait;transfer;ADL's  -AN        Equipment Currently Used at Home  shower chair built in   -AN        Pertinent History of Current Functional Problem  --  -AN        Existing Precautions/Restrictions  fall;other (see comments) abd incision, RADHA drain, NG tube, PICC, colostomy  -AN        Risks Reviewed  patient and family:;LOB;dizziness;increased discomfort;change in vital signs  -AN        Benefits Reviewed  patient and family:;improve function;increase independence;increase strength;increase balance  -AN           Relationship/Environment    Lives With  alone  -AN        Family Caregiver if Needed  child(sary), adult;other (see comments)  -AN           Resource/Environmental Concerns    Current Living Arrangements  home/apartment/condo  -AN           Home Main Entrance    Number of Stairs, Main Entrance  three  -AN           Stairs Within Home, Primary    Number of Stairs, Within Home, Primary  ten  -AN        Stair Railings, Within Home, Primary  railings on both sides of stairs  -AN           Cognitive Assessment/Interventions    Additional Documentation  Cognitive Assessment/Intervention (Group)  -AN           Cognitive Assessment/Intervention- PT/OT    Affect/Mental Status (Cognitive)  low arousal/lethargic  -AN         Orientation Status (Cognition)  oriented x 3  -AN        Follows Commands (Cognition)  WFL  -AN           Bed Mobility Assessment/Treatment    Comment (Bed Mobility)  pt up in chair  -AN           Transfer Assessment/Treatment    Comment (Transfers)  pt declined due to drowsiness, pain control  -AN           ADL Assessment/Intervention    BADL Assessment/Intervention  bathing;lower body dressing  -AN           Bathing Assessment/Intervention    Comment (Bathing)  provided long sponge with education for LB bathing  -AN           Lower Body Dressing Assessment/Training    Lower Body Dressing [place] Level  don;socks;minimum assist (75% patient effort)  -AN        Assistive Devices (Lower Body Dressing)  reacher;sock-aid  -AN        Comment (Lower Body Dressing)  provided sockaid, reacher, long shoe horn to assist with LB dressing  -AN           BADL Safety/Performance    Skilled BADL Treatment/Intervention  adaptive equipment training  -AN           General ROM    GENERAL ROM COMMENTS  BUE wFL  -AN           MMT (Manual Muscle Testing)    General MMT Comments  hands WFL, not formally tested UE due to abd incision and precautions  -AN           Motor Assessment/Interventions    Additional Documentation  Balance (Group);Gross Motor Coordination (Group);Therapeutic Exercise (Group)  -AN           Gross Motor Coordination    Gross Motor Skill, Impairments Detail  WFL  -AN           Positioning and Restraints    Pre-Treatment Position  sitting in chair/recliner  -AN        Post Treatment Position  chair  -AN        In Chair  reclined;call light within reach;encouraged to call for assist;exit alarm on;with family/caregiver  -AN           Pain Scale: Numbers Pre/Post-Treatment    Pain Scale: Numbers, Pretreatment  3/10  -AN        Pain Scale: Numbers, Post-Treatment  3/10  -AN        Pain Location  abdomen  -AN        Pain Intervention(s)  Rest  -AN           Wound 03/17/20 1714 upper;midline abdomen Incision    Wound -  Properties Group Date first assessed: 03/17/20  -EL Time first assessed: 1714  -EL Present on Hospital Admission: N  -EL Orientation: upper;midline  -EL Location: abdomen  -EL Primary Wound Type: Incision  -EL       Plan of Care Review    Plan of Care Reviewed With  patient;daughter  -AN           Clinical Impression (OT)    OT Diagnosis  impaired ADl I  -AN        Patient/Family Goals Statement (OT Eval)  agreeable to OT  -AN        Criteria for Skilled Therapeutic Interventions Met (OT Eval)  yes;treatment indicated  -AN        Rehab Potential (OT Eval)  good, to achieve stated therapy goals  -AN        Therapy Frequency (OT Eval)  daily  -AN        Anticipated Discharge Disposition (OT)  home with assist;home with home health  -AN           OT Goals    Transfer Goal Selection (OT)  transfer, OT goal 1  -AN        Bathing Goal Selection (OT)  bathing, OT goal 1  -AN        Dressing Goal Selection (OT)  dressing, OT goal 1  -AN        Toileting Goal Selection (OT)  --  -AN           Transfer Goal 1 (OT)    Activity/Assistive Device (Transfer Goal 1, OT)  bed-to-chair/chair-to-bed  -AN        Thor Level/Cues Needed (Transfer Goal 1, OT)  contact guard assist  -AN        Time Frame (Transfer Goal 1, OT)  10 days  -AN        Progress/Outcome (Transfer Goal 1, OT)  goal ongoing  -AN           Bathing Goal 1 (OT)    Activity/Assistive Device (Bathing Goal 1, OT)  lower body bathing;long-handled sponge;shower chair  -AN        Thor Level/Cues Needed (Bathing Goal 1, OT)  minimum assist (75% or more patient effort)  -AN        Time Frame (Bathing Goal 1, OT)  10 days  -AN        Progress/Outcomes (Bathing Goal 1, OT)  goal ongoing  -AN           Dressing Goal 1 (OT)    Activity/Assistive Device (Dressing Goal 1, OT)  other (see comments);reacher;sock-aid don pants socks  -AN        Thor/Cues Needed (Dressing Goal 1, OT)  minimum assist (75% or more patient effort)  -AN        Time Frame (Dressing  Goal 1, OT)  10 days  -AN        Progress/Outcome (Dressing Goal 1, OT)  goal ongoing  -AN           Living Environment    Home Accessibility  stairs to enter home;tub/shower is not walk in  -AN          User Key  (r) = Recorded By, (t) = Taken By, (c) = Cosigned By    Initials Name Effective Dates    Airam Clark OT 06/22/15 -     Dominga Earl RN 04/10/19 -                OT Recommendation and Plan  Outcome Summary/Treatment Plan (OT)  Anticipated Discharge Disposition (OT): home with assist, home with home health  Therapy Frequency (OT Eval): daily  Plan of Care Review  Plan of Care Reviewed With: patient, daughter  Plan of Care Reviewed With: patient, daughter  Outcome Summary: Pt currently mod to max assist with LB ADLs following surgery. Pt declined mobility due to drowsiness and pain control. Provided AE to assist with safety and I with ADLs. Recommend pt home with assist and home health.    Outcome Measures     Row Name 03/18/20 1427             How much help from another is currently needed...    Putting on and taking off regular lower body clothing?  2  -AN      Bathing (including washing, rinsing, and drying)  2  -AN      Toileting (which includes using toilet bed pan or urinal)  2  -AN      Putting on and taking off regular upper body clothing  2  -AN      Taking care of personal grooming (such as brushing teeth)  3  -AN      Eating meals  3  -AN      AM-PAC 6 Clicks Score (OT)  14  -AN         Functional Assessment    Outcome Measure Options  AM-PAC 6 Clicks Daily Activity (OT)  -AN        User Key  (r) = Recorded By, (t) = Taken By, (c) = Cosigned By    Initials Name Provider Type    Airam Clark OT Occupational Therapist          Time Calculation:   Time Calculation- OT     Row Name 03/18/20 1520             Time Calculation- OT    OT Start Time  1427  -AN      Total Timed Code Minutes- OT  0 minute(s)  -AN      OT Received On  03/18/20  -AN      OT Goal Re-Cert Due Date  03/28/20   -AN        User Key  (r) = Recorded By, (t) = Taken By, (c) = Cosigned By    Initials Name Provider Type    Airam Clark OT Occupational Therapist        Therapy Charges for Today     Code Description Service Date Service Provider Modifiers Qty    [id number]  OT EVAL LOW COMPLEXITY 4 3/18/2020 Airam Avilez OT GO 1               Airam Avilez OT  3/18/2020

## 2020-03-19 LAB
ALBUMIN SERPL-MCNC: 2.3 G/DL (ref 3.5–5.2)
ANION GAP SERPL CALCULATED.3IONS-SCNC: 11 MMOL/L (ref 5–15)
BASOPHILS # BLD AUTO: 0.04 10*3/MM3 (ref 0–0.2)
BASOPHILS NFR BLD AUTO: 0.3 % (ref 0–1.5)
BUN BLD-MCNC: 41 MG/DL (ref 8–23)
BUN/CREAT SERPL: 67.2 (ref 7–25)
CALCIUM SPEC-SCNC: 8 MG/DL (ref 8.6–10.5)
CHLORIDE SERPL-SCNC: 113 MMOL/L (ref 98–107)
CO2 SERPL-SCNC: 19 MMOL/L (ref 22–29)
CREAT BLD-MCNC: 0.61 MG/DL (ref 0.57–1)
CYTO UR: NORMAL
DEPRECATED RDW RBC AUTO: 46.8 FL (ref 37–54)
EOSINOPHIL # BLD AUTO: 0.05 10*3/MM3 (ref 0–0.4)
EOSINOPHIL NFR BLD AUTO: 0.3 % (ref 0.3–6.2)
ERYTHROCYTE [DISTWIDTH] IN BLOOD BY AUTOMATED COUNT: 14.1 % (ref 12.3–15.4)
GFR SERPL CREATININE-BSD FRML MDRD: 95 ML/MIN/1.73
GLUCOSE BLD-MCNC: 95 MG/DL (ref 65–99)
HCT VFR BLD AUTO: 28.8 % (ref 34–46.6)
HGB BLD-MCNC: 9.1 G/DL (ref 12–15.9)
IMM GRANULOCYTES # BLD AUTO: 0.13 10*3/MM3 (ref 0–0.05)
IMM GRANULOCYTES NFR BLD AUTO: 0.9 % (ref 0–0.5)
LAB AP CASE REPORT: NORMAL
LAB AP CLINICAL INFORMATION: NORMAL
LYMPHOCYTES # BLD AUTO: 1.09 10*3/MM3 (ref 0.7–3.1)
LYMPHOCYTES NFR BLD AUTO: 7.4 % (ref 19.6–45.3)
MCH RBC QN AUTO: 28.7 PG (ref 26.6–33)
MCHC RBC AUTO-ENTMCNC: 31.6 G/DL (ref 31.5–35.7)
MCV RBC AUTO: 90.9 FL (ref 79–97)
MONOCYTES # BLD AUTO: 1.42 10*3/MM3 (ref 0.1–0.9)
MONOCYTES NFR BLD AUTO: 9.6 % (ref 5–12)
NEUTROPHILS # BLD AUTO: 12.09 10*3/MM3 (ref 1.7–7)
NEUTROPHILS NFR BLD AUTO: 81.5 % (ref 42.7–76)
NRBC BLD AUTO-RTO: 0 /100 WBC (ref 0–0.2)
PATH REPORT.FINAL DX SPEC: NORMAL
PATH REPORT.GROSS SPEC: NORMAL
PHOSPHATE SERPL-MCNC: 2.9 MG/DL (ref 2.5–4.5)
PLATELET # BLD AUTO: 345 10*3/MM3 (ref 140–450)
PMV BLD AUTO: 10.1 FL (ref 6–12)
POTASSIUM BLD-SCNC: 4.2 MMOL/L (ref 3.5–5.2)
RBC # BLD AUTO: 3.17 10*6/MM3 (ref 3.77–5.28)
SODIUM BLD-SCNC: 143 MMOL/L (ref 136–145)
WBC NRBC COR # BLD: 14.82 10*3/MM3 (ref 3.4–10.8)

## 2020-03-19 PROCEDURE — 97530 THERAPEUTIC ACTIVITIES: CPT

## 2020-03-19 PROCEDURE — 25810000003 SODIUM CHLORIDE 0.9 % WITH KCL 20 MEQ 20-0.9 MEQ/L-% SOLUTION: Performed by: COLON & RECTAL SURGERY

## 2020-03-19 PROCEDURE — 97110 THERAPEUTIC EXERCISES: CPT

## 2020-03-19 PROCEDURE — 25010000002 ERTAPENEM PER 500 MG: Performed by: COLON & RECTAL SURGERY

## 2020-03-19 PROCEDURE — 25010000002 HEPARIN (PORCINE) PER 1000 UNITS: Performed by: COLON & RECTAL SURGERY

## 2020-03-19 PROCEDURE — 97116 GAIT TRAINING THERAPY: CPT

## 2020-03-19 PROCEDURE — 25010000002 KETOROLAC TROMETHAMINE PER 15 MG: Performed by: COLON & RECTAL SURGERY

## 2020-03-19 PROCEDURE — 85025 COMPLETE CBC W/AUTO DIFF WBC: CPT | Performed by: INTERNAL MEDICINE

## 2020-03-19 PROCEDURE — 99232 SBSQ HOSP IP/OBS MODERATE 35: CPT | Performed by: INTERNAL MEDICINE

## 2020-03-19 PROCEDURE — 80069 RENAL FUNCTION PANEL: CPT | Performed by: INTERNAL MEDICINE

## 2020-03-19 PROCEDURE — 97535 SELF CARE MNGMENT TRAINING: CPT

## 2020-03-19 RX ORDER — HYDROCODONE BITARTRATE AND ACETAMINOPHEN 5; 325 MG/1; MG/1
1 TABLET ORAL EVERY 4 HOURS PRN
Status: DISCONTINUED | OUTPATIENT
Start: 2020-03-19 | End: 2020-03-26 | Stop reason: HOSPADM

## 2020-03-19 RX ORDER — GABAPENTIN 300 MG/1
300 CAPSULE ORAL NIGHTLY
Status: DISCONTINUED | OUTPATIENT
Start: 2020-03-19 | End: 2020-03-26 | Stop reason: HOSPADM

## 2020-03-19 RX ADMIN — KETOROLAC TROMETHAMINE 15 MG: 15 INJECTION, SOLUTION INTRAMUSCULAR; INTRAVENOUS at 01:31

## 2020-03-19 RX ADMIN — KETOROLAC TROMETHAMINE 15 MG: 15 INJECTION, SOLUTION INTRAMUSCULAR; INTRAVENOUS at 09:14

## 2020-03-19 RX ADMIN — KETOROLAC TROMETHAMINE 15 MG: 15 INJECTION, SOLUTION INTRAMUSCULAR; INTRAVENOUS at 14:41

## 2020-03-19 RX ADMIN — SUCRALFATE 1 G: 1 TABLET ORAL at 17:11

## 2020-03-19 RX ADMIN — HEPARIN SODIUM 5000 UNITS: 5000 INJECTION, SOLUTION INTRAVENOUS; SUBCUTANEOUS at 14:41

## 2020-03-19 RX ADMIN — POTASSIUM CHLORIDE AND SODIUM CHLORIDE 100 ML/HR: 900; 150 INJECTION, SOLUTION INTRAVENOUS at 12:09

## 2020-03-19 RX ADMIN — POTASSIUM CHLORIDE AND SODIUM CHLORIDE 100 ML/HR: 900; 150 INJECTION, SOLUTION INTRAVENOUS at 22:54

## 2020-03-19 RX ADMIN — METOPROLOL SUCCINATE 100 MG: 100 TABLET, EXTENDED RELEASE ORAL at 09:14

## 2020-03-19 RX ADMIN — ACETAMINOPHEN 650 MG: 325 TABLET, FILM COATED ORAL at 05:24

## 2020-03-19 RX ADMIN — LEVOTHYROXINE SODIUM 88 MCG: 88 TABLET ORAL at 05:24

## 2020-03-19 RX ADMIN — ERTAPENEM SODIUM 1 G: 1 INJECTION, POWDER, LYOPHILIZED, FOR SOLUTION INTRAMUSCULAR; INTRAVENOUS at 09:15

## 2020-03-19 RX ADMIN — SUCRALFATE 1 G: 1 TABLET ORAL at 05:24

## 2020-03-19 RX ADMIN — HYDROCODONE BITARTRATE AND ACETAMINOPHEN 1 TABLET: 7.5; 325 TABLET ORAL at 11:42

## 2020-03-19 RX ADMIN — SODIUM CHLORIDE, PRESERVATIVE FREE 10 ML: 5 INJECTION INTRAVENOUS at 09:16

## 2020-03-19 RX ADMIN — POTASSIUM CHLORIDE AND SODIUM CHLORIDE 100 ML/HR: 900; 150 INJECTION, SOLUTION INTRAVENOUS at 01:36

## 2020-03-19 RX ADMIN — HEPARIN SODIUM 5000 UNITS: 5000 INJECTION, SOLUTION INTRAVENOUS; SUBCUTANEOUS at 05:24

## 2020-03-19 RX ADMIN — CALCITONIN SALMON 1 SPRAY: 200 SPRAY, METERED NASAL at 09:15

## 2020-03-19 RX ADMIN — ACETAMINOPHEN 650 MG: 325 TABLET, FILM COATED ORAL at 14:41

## 2020-03-19 NOTE — PROGRESS NOTES
White count up a little today but otherwise improving by all parameters  RADHA output without significant change.  Some air bubbling out of colostomy  Reflux and feeling [place], may need NG tube back  Taking clears, I have cautioned against significant oral intake until reflux type sensation resolves and more evidence of bowel function    Ongoing frequent ambulation encouraged  Abdomen is soft  Stoma appears pink and edematous  Without tachycardia    Ongoing antibiotic therapy  RADHA drainage will continue  CBC in the morning.  Diet as tolerated, do not force.  Packing removed from incision today, incision appears satisfactory.

## 2020-03-19 NOTE — THERAPY TREATMENT NOTE
Patient Name: Neema Richard  : 1940    MRN: [id number]                              Today's Date: 3/19/2020       Admit Date: 3/15/2020    Visit Dx:     ICD-10-CM ICD-9-CM   1. Peritonitis (CMS/HCC) K65.9 567.9   2. Diverticulitis K57.92 562.11     Patient Active Problem List   Diagnosis   • Well woman exam with routine gynecological exam   • Osteoporosis - GYN   • Essential hypertension   • Acquired hypothyroidism   • IBS-d (takes Buspar)   • DCIS (ductal carcinoma in situ)   • Cystocele and rectocele with complete uterovaginal prolapse   • Mixed urge and stress incontinence   • Pessary maintenance   • High cholesterol   • Acute diverticulitis   • Sepsis (CMS/HCC)   • Lactic acidosis   • Neutrophilic leukocytosis   • Perforation of sigmoid colon due to diverticulitis   • Acute kidney injury (CMS/HCC)   • Acute hyperglycemia   • Arthritis   • Overactive bladder   • Dyspepsia     Past Medical History:   Diagnosis Date   • Acquired hypothyroidism    • Colon polyp - sees Dr. Vazquez 2016   • DCIS (ductal carcinoma in situ) 2017    Dx at the time of breast reduction. ER and [place] (+)   • Essential hypertension    • High cholesterol    • Hx of herpes genitalis    • IBS-d (takes Buspar)      Past Surgical History:   Procedure Laterality Date   • BILATERAL BREAST REDUCTION Bilateral    • BREAST LUMPECTOMY Left    • CATARACT EXTRACTION, BILATERAL Bilateral    • CERVICAL CONIZATION     • COLON RESECTION N/A 3/17/2020    Procedure: LOW ANTERIOR COLON RESECTION, MOBILIZATION OF SPLENIC FLEXURE, DRAINAGE OF ABSCESS AND COLOSTOMY CREATION;  Surgeon: Sravan Vazquez MD;  Location: [facility];  Service: General;  Laterality: N/A;   • FACIAL COSMETIC SURGERY     • FACIAL COSMETIC SURGERY     • HEMORRHOIDECTOMY     • LAPAROSCOPIC CHOLECYSTECTOMY     • REDUCTION MAMMAPLASTY Bilateral 2017    DCIS   • SINUS SURGERY     • THYROIDECTOMY, PARTIAL  1963   • TONSILLECTOMY   1953   • VARICOSE VEIN SURGERY Bilateral 1981     General Information     Row Name 03/19/20 1008          PT Evaluation Time/Intention    Document Type  therapy note (daily note)  (Pended)   -KM     Mode of Treatment  individual therapy;physical therapy  (Pended)   -     Row Name 03/19/20 1008          General Information    Patient Profile Reviewed?  yes  (Pended)   -KM     Existing Precautions/Restrictions  fall;other (see comments)  (Pended)  abd incision, RADHA drain, NG tube, PICC, colostomy  -     Row Name 03/19/20 1008          Cognitive Assessment/Intervention- PT/OT    Orientation Status (Cognition)  oriented x 3  (Pended)   -     Cognitive Assessment/Intervention Comment  Patient continues to demonstrate difficulty with following commands secondary to confusion.    (Pended)   -     Row Name 03/19/20 1008          Safety Issues, Functional Mobility    Safety Issues Affecting Function (Mobility)  ability to follow commands;at risk behavior observed;awareness of need for assistance;insight into deficits/self awareness;positioning of assistive device;problem solving;judgment;safety precaution awareness;safety precautions follow-through/compliance;sequencing abilities  (Pended)   -KM     Impairments Affecting Function (Mobility)  cognition;strength;balance;pain  (Pended)   -KM       User Key  (r) = Recorded By, (t) = Taken By, (c) = Cosigned By    Initials Name Provider Type    Aniket Sadler, PT Student PT Student        Mobility     Row Name 03/19/20 1008          Bed Mobility Assessment/Treatment    Bed Mobility Assessment/Treatment  rolling left;scooting/bridging;supine-sit  (Pended)   -KM     Rolling Left [place] (Bed Mobility)  minimum assist (75% patient effort);2 person assist  (Pended)   -KM     Scooting/Bridging [place] (Bed Mobility)  minimum assist (75% patient effort);2 person assist  (Pended)   -KM     Supine-Sit [place] (Bed Mobility)  2 person assist;minimum assist (75%  patient effort)  (Pended)   -KM     Assistive Device (Bed Mobility)  bed rails;head of bed elevated  (Pended)   -KM     Comment (Bed Mobility)  Verbal and tactile cues were given with little follow through.  Does not appear that patient is understanding or able to accurately follow cues.    (Pended)   -KM     Row Name 03/19/20 1008          Transfer Assessment/Treatment    Comment (Transfers)  Verbal and tactile cues were given with little follow through.  Does not appear that patient is understanding or able to accurately follow cues.    (Pended)   -KM     Row Name 03/19/20 1008          Sit-Stand Transfer    Sit-Stand [place] (Transfers)  minimum assist (75% patient effort);2 person assist  (Pended)   -KM     Assistive Device (Sit-Stand Transfers)  walker, front-wheeled  (Pended)   -KM     Row Name 03/19/20 1008          Gait/Stairs Assessment/Training    Gait/Stairs Assessment/Training  gait/ambulation independence;gait/ambulation assistive device;distance ambulated  (Pended)   -KM     [place] Level (Gait)  minimum assist (75% patient effort);2 person assist  (Pended)   -KM     Assistive Device (Gait)  walker, front-wheeled  (Pended)   -KM     Distance in Feet (Gait)  20 feet  (Pended)   -KM     Pattern (Gait)  step-to  (Pended)   -KM     Deviations/Abnormal Patterns (Gait)  gait speed decreased;altagracia decreased;base of support, narrow  (Pended)   -KM     Comment (Gait/Stairs)  Pt continues to have difficulty following verbal and visual cues throughout ambulation.  Chair follow for safety this date.  2 person assist required secondary to poor safety awarness and sequencing abilities throughout.    (Pended)   -KM       User Key  (r) = Recorded By, (t) = Taken By, (c) = Cosigned By    Initials Name Provider Type    Aniket Sadler, PT Student PT Student        Obj/Interventions     Row Name 03/19/20 1008          Therapeutic Exercise    Lower Extremity (Therapeutic Exercise)  quad sets,  bilateral;gluteal sets  (Pended)   -KM     Lower Extremity Range of Motion (Therapeutic Exercise)  ankle dorsiflexion/plantar flexion, bilateral;hip abduction/adduction, bilateral  (Pended)   -KM     Exercise Type (Therapeutic Exercise)  AROM (active range of motion)  (Pended)   -KM     Sets/Reps (Therapeutic Exercise)  10x each  (Pended)   -KM     Expected Outcome (Therapeutic Exercise)  facilitate normal movement patterns;improve functional stability  (Pended)   -KM     Comment (Therapeutic Exercise)  Repeated VC for proper range.    (Pended)   -KM     Row Name 03/19/20 1008          Static Sitting Balance    Level of [place] (Unsupported Sitting, Static Balance)  contact guard assist;1 person assist  (Pended)   -KM     Sitting Position (Unsupported Sitting, Static Balance)  sitting on edge of bed  (Pended)   -KM     Time Able to Maintain Position (Unsupported Sitting, Static Balance)  1 to 2 minutes  (Pended)   -KM     Row Name 03/19/20 1008          Dynamic Sitting Balance    Level of [place], Reaches Outside Midline (Sitting, Dynamic Balance)  unable to perform activity  (Pended)   -KM     Row Name 03/19/20 1008          Static Standing Balance    Level of [place] (Supported Standing, Static Balance)  minimal assist, 75% patient effort;2 person assist  (Pended)   -KM     Time Able to Maintain Position (Supported Standing, Static Balance)  45 to 60 seconds  (Pended)   -KM     Assistive Device Utilized (Supported Standing, Static Balance)  walker, rolling  (Pended)   -KM     Row Name 03/19/20 1008          Dynamic Standing Balance    Level of [place], Reaches Outside Midline (Standing, Dynamic Balance)  minimal assist, 75% patient effort;2 person assist  (Pended)   -KM     Time Able to Maintain Position, Reaches Outside Midline (Standing, Dynamic Balance)  1 to 2 minutes  (Pended)   -KM     Assistive Device Utilized (Supported Standing, Dynamic Balance)  walker, rolling  (Pended)   -KM      Comment, Reaches Outside Midline (Standing, Dynamic Balance)  Ambulation  (Pended)   -       User Key  (r) = Recorded By, (t) = Taken By, (c) = Cosigned By    Initials Name Provider Type    Aniket Sadler, PT Student PT Student        Goals/Plan    No documentation.       Clinical Impression     Row Name 03/19/20 1008          Pain Assessment    Additional Documentation  Pain Scale: Numbers Pre/Post-Treatment (Group)  (Pended)   -     Row Name 03/19/20 1008          Pain Scale: Numbers Pre/Post-Treatment    Pain Scale: Numbers, Pretreatment  3/10  (Pended)   -KM     Pain Scale: Numbers, Post-Treatment  3/10  (Pended)   -KM     Pain Location - Orientation  generalized  (Pended)   -KM     Pain Location  abdomen  (Pended)   -KM     Pain Intervention(s)  Repositioned;Ambulation/increased activity  (Pended)   -     Row Name 03/19/20 1008          Plan of Care Review    Plan of Care Reviewed With  patient;daughter  (Pended)   -     Progress  no change  (Pended)   -     Row Name 03/19/20 1008          Physical Therapy Clinical Impression    Criteria for Skilled Interventions Met (PT Clinical Impression)  yes;treatment indicated  (Pended)   -KM     Rehab Potential (PT Clinical Summary)  good, to achieve stated therapy goals  (Pended)   -     Row Name 03/19/20 1008          Vital Signs    Pre Systolic BP Rehab  166  (Pended)   -KM     Pre Treatment Diastolic BP  76  (Pended)   -KM     Intra Systolic BP Rehab  169  (Pended)  173  -KM     Intra Treatment Diastolic BP  111  (Pended)  72  -KM     Post Systolic BP Rehab  166  (Pended)   -KM     Post Treatment Diastolic BP  78  (Pended)   -KM     Pretreatment Heart Rate (beats/min)  90  (Pended)   -KM     Intratreatment Heart Rate (beats/min)  99  (Pended)   -KM     Posttreatment Heart Rate (beats/min)  92  (Pended)   -KM     Pre SpO2 (%)  91  (Pended)   -KM     O2 Delivery Pre Treatment  room air  (Pended)   -KM     Post SpO2 (%)  90  (Pended)   -KM     O2  Delivery Post Treatment  room air  (Pended)   -KM     Pre Patient Position  Supine  (Pended)   -KM     Intra Patient Position  Sitting  (Pended)   -KM     Post Patient Position  Sitting  (Pended)   -KM     Row Name 03/19/20 1008          Positioning and Restraints    Pre-Treatment Position  in bed  (Pended)   -KM     Post Treatment Position  chair  (Pended)   -KM     In Chair  reclined;call light within reach;encouraged to call for assist;exit alarm on;with family/caregiver;notified nsg  (Pended)   -KM       User Key  (r) = Recorded By, (t) = Taken By, (c) = Cosigned By    Initials Name Provider Type    Aniket Sadler, PT Student PT Student        Outcome Measures     Row Name 03/19/20 1008          How much help from another person do you currently need...    Turning from your back to your side while in flat bed without using bedrails?  3  (Pended)   -KM     Moving from lying on back to sitting on the side of a flat bed without bedrails?  3  (Pended)   -KM     Moving to and from a bed to a chair (including a wheelchair)?  3  (Pended)   -KM     Standing up from a chair using your arms (e.g., wheelchair, bedside chair)?  3  (Pended)   -KM     Climbing 3-5 steps with a railing?  2  (Pended)   -KM     To walk in hospital room?  3  (Pended)   -KM     AM-PAC 6 Clicks Score (PT)  17  (Pended)   -KM     Row Name 03/19/20 1008          Functional Assessment    Outcome Measure Options  AM-PAC 6 Clicks Basic Mobility (PT)  (Pended)   -KM       User Key  (r) = Recorded By, (t) = Taken By, (c) = Cosigned By    Initials Name Provider Type    Aniket Sadler, PT Student PT Student          PT Recommendation and Plan  Planned Therapy Interventions (PT Eval): balance training, bed mobility training, gait training, home exercise program, motor coordination training, neuromuscular re-education, patient/family education, postural re-education, stair training, strengthening, transfer training  Outcome Summary/Treatment Plan  (PT)  Anticipated Discharge Disposition (PT): (P) home, home with home health  Plan of Care Reviewed With: (P) patient, daughter  Progress: (P) no change  Outcome Summary: (P) Pt continues to be confused and demonstrate difficulty with following all verbal and tactile cues.  Also attempted to sit before properly positioned multiple times during treatment.  /111 when initially standing, nursing was notified.  Bed mobility Min A x 2.  Transfers Min A x 2 for STS and chair to chair.  Ambulates approximately 20 feet with FWW and Min A x 2 to assist with equipment management.  Chair follow for safety. BP decreased to 166/78 at end of session. Continue with current POC.     Time Calculation:   PT Charges     Row Name 03/19/20 1008             Time Calculation    Start Time  1008  (Pended)   -KM      PT Received On  03/19/20  (Pended)   -KM      PT Goal Re-Cert Due Date  03/28/20  (Pended)   -KM         Timed Charges    75820 - PT Therapeutic Exercise Minutes  8  (Pended)   -KM      55168 - Gait Training Minutes   16  (Pended)   -KM      79280 - PT Therapeutic Activity Minutes  17  (Pended)   -KM        User Key  (r) = Recorded By, (t) = Taken By, (c) = Cosigned By    Initials Name Provider Type    Aniket Sadler, PT Student PT Student        Therapy Charges for Today     Code Description Service Date Service Provider Modifiers Qty    81619002976 HC PT EVAL MOD COMPLEXITY 4 3/18/2020 Aniket Mazariegos, PT Student GP 1    49346191390 HC PT THER PROC EA 15 MIN 3/19/2020 Aniket Mazariegos, PT Student GP 1    45526032481 HC GAIT TRAINING EA 15 MIN 3/19/2020 Aniket Mazariegos, PT Student GP 1    48407727313 HC PT THERAPEUTIC ACT EA 15 MIN 3/19/2020 Aniket Mazariegos, PT Student GP 1          PT G-Codes  Outcome Measure Options: (P) AM-PAC 6 Clicks Basic Mobility (PT)  AM-PAC 6 Clicks Score (PT): (P) 17  AM-PAC 6 Clicks Score (OT): 14    Ainket Mazariegos PT Student  3/19/2020

## 2020-03-19 NOTE — PROGRESS NOTES
[facility]  PROGRESS NOTE    Patient Name: Neema Richard  : 1940  MRN: [id number]    Date of Admission: 3/15/2020  Primary Care Physician: David Mackey MD    Subjective   Subjective     CC:  Abdominal pain, diverticulitis    HPI:  Asking to eat more than ice chips and boost, pain overall well controlled.  She seems a little confused today but able to answer most questions correctly      Review of Systems  Gen- No fevers, chills  CV- No chest pain, palpitations  Resp- No cough, dyspnea  GI- No N/V/D, + abd pain      Objective   Objective     Vital Signs:   Temp:  [97.4 °F (36.3 °C)-98.1 °F (36.7 °C)] 98 °F (36.7 °C)  Heart Rate:  [73-92] 88  Resp:  [16-18] 18  BP: (141-145)/(60-68) 145/68        Physical Exam:  Constitutional: No acute distress, awake, alert   HENT: NCAT, mucous membranes moist  Respiratory: Clear to auscultation bilaterally, mild conversational dypsnea  Cardiovascular: RRR, no murmurs  Gastrointestinal: Positive bowel sounds, soft, +ostomy with pink stoma  Musculoskeletal: No bilateral ankle edema  Psychiatric: Appropriate affect, cooperative  Neurologic: Oriented x 3,speech clear  Skin: No rashes    Results Reviewed:  Results from last 7 days   Lab Units 20  0515 20  0444 20  0451   WBC 10*3/mm3 14.82* 11.97* 14.46*   HEMOGLOBIN g/dL 9.1* 9.8* 10.8*   HEMATOCRIT % 28.8* 31.2* 34.8   PLATELETS 10*3/mm3 345 334 280     Results from last 7 days   Lab Units 20  0515 20  0539 20  0451  03/15/20  1638   SODIUM mmol/L 143 142 139   < > 138   POTASSIUM mmol/L 4.2 4.1 3.9   < > 4.0   CHLORIDE mmol/L 113* 109* 108*   < > 102   CO2 mmol/L 19.0* 19.0* 19.0*   < > 21.0*   BUN mg/dL 41* 30* 28*   < > 21   CREATININE mg/dL 0.61 0.73 0.68   < > 0.97   GLUCOSE mg/dL 95 118* 94   < > 257*   CALCIUM mg/dL 8.0* 8.4* 8.6   < > 8.6   ALT (SGPT) U/L  --   --   --   --  9   AST (SGOT) U/L  --   --   --   --  16    < > = values in  this interval not displayed.     Estimated Creatinine Clearance: 56 mL/min (by C-G formula based on SCr of 0.61 mg/dL).    Microbiology Results Abnormal     Procedure Component Value - Date/Time    Wound Culture - Swab, Peritoneum [[id number]] Collected:  03/17/20 1559    Lab Status:  Preliminary result Specimen:  Swab from Peritoneum Updated:  03/19/20 0731     Wound Culture Light growth (2+) Normal Fecal Syeda     Gram Stain Many (4+) Red blood cells      Moderate (3+) WBCs seen      No organisms seen    Blood Culture - Blood, Arm, Right [[id number]] Collected:  03/16/20 0018    Lab Status:  Preliminary result Specimen:  Blood from Arm, Right Updated:  03/19/20 0515     Blood Culture No growth at 3 days    Blood Culture - Blood, Arm, Left [[id number]] Collected:  03/16/20 0018    Lab Status:  Preliminary result Specimen:  Blood from Arm, Left Updated:  03/19/20 0515     Blood Culture No growth at 3 days    AFB Culture - Swab, Peritoneum [[id number]] Collected:  03/17/20 1559    Lab Status:  Preliminary result Specimen:  Swab from Peritoneum Updated:  03/18/20 1240     AFB Stain No acid fast bacilli seen on concentrated smear          Imaging Results (Last 24 Hours)     ** No results found for the last 24 hours. **               I have reviewed the medications:  Scheduled Meds:  acetaminophen 650 mg Oral Q8H   busPIRone 15 mg Oral Daily   calcitonin (salmon) 1 spray Alternating Nares Daily   ertapenem 1 g Intravenous Q24H   [START ON 3/20/2020] gabapentin 300 mg Oral Nightly   heparin (porcine) 5,000 Units Subcutaneous Q8H   ketorolac 15 mg Intravenous Q6H   levothyroxine 88 mcg Oral Q AM   metoprolol succinate  mg Oral Daily   Mirabegron ER 50 mg Oral Daily   sodium chloride 500 mL Intravenous Once   sodium chloride 10 mL Intravenous Q12H   sodium chloride 10 mL Intravenous Q12H   sucralfate 1 g Oral BID AC     Continuous Infusions:  sodium chloride 0.9 % with KCl 20 mEq 100 mL/hr Last Rate: 100 mL/hr (03/19/20  5239)     PRN Meds:.diazePAM  •  HYDROcodone-acetaminophen  •  HYDROmorphone **AND** naloxone  •  LORazepam  •  Morphine **OR** Morphine  •  ondansetron  •  ondansetron  •  sodium chloride  •  sodium chloride  •  sodium chloride  •  sodium chloride    Assessment/Plan   Assessment & Plan     Active Hospital Problems    Diagnosis  POA   • **Perforation of sigmoid colon due to diverticulitis [K57.20]  Unknown   • Acute diverticulitis [K57.92]  Yes   • Sepsis (CMS/HCC) [A41.9]  Yes   • Lactic acidosis [E87.2]  Unknown   • Neutrophilic leukocytosis [D72.9]  Unknown   • Acute kidney injury (CMS/HCC) [N17.9]  Unknown   • Acute hyperglycemia [R73.9]  Unknown   • Arthritis [M19.90]  Unknown   • Overactive bladder [N32.81]  Unknown   • Dyspepsia [R10.13]  Unknown   • Essential hypertension [I10]  Unknown   • Acquired hypothyroidism [E03.9]  Yes      Resolved Hospital Problems   No resolved problems to display.        Brief Hospital Course to date:  Neema Richard is a 79 y.o. female admitted for diverticulitis S microperforation and sepsis.  She underwent repeat CT imaging concerning for free air.  She went to surgery last night and is now postop day 1 transverse colostomy.  Her white count is improving     Leukocytosis  Sepsis resolved  Diverticulitis with free air on CT  S/p drainage of lower abdominal abcess, creation of distal transverse colostomy  Continue Invanz for total of 7 days     Dyspnea  Decrease IVF to 50 cc/hr, if worsening will heplock    HTN  Controlled continue current medicines  Hypothyroid  Continue levothyroxine  Arthritis  Continue gabapentin  Will need HHC at discharge     Overactive bladder  Dyspepsia DVT Prophylaxis: heparin    Disposition: I expect the patient to be discharged once medically stable from surgery standpoint    CODE STATUS:   Code Status and Medical Interventions:   Ordered at: 03/17/20 1903     Level Of Support Discussed With:    Patient     Code Status:    CPR     Medical Interventions  (Level of Support Prior to Arrest):    Full         Electronically signed by Filomena Stuart DO, 03/19/20, 13:43.

## 2020-03-19 NOTE — THERAPY TREATMENT NOTE
Acute Care - Occupational Therapy Treatment Note   [facility]     Patient Name: Neema Richard  : 1940  MRN: [id number]  Today's Date: 3/19/2020             Admit Date: 3/15/2020       ICD-10-CM ICD-9-CM   1. Peritonitis (CMS/HCC) K65.9 567.9   2. Diverticulitis K57.92 562.11     Patient Active Problem List   Diagnosis   • Well woman exam with routine gynecological exam   • Osteoporosis - GYN   • Essential hypertension   • Acquired hypothyroidism   • IBS-d (takes Buspar)   • DCIS (ductal carcinoma in situ)   • Cystocele and rectocele with complete uterovaginal prolapse   • Mixed urge and stress incontinence   • Pessary maintenance   • High cholesterol   • Acute diverticulitis   • Sepsis (CMS/HCC)   • Lactic acidosis   • Neutrophilic leukocytosis   • Perforation of sigmoid colon due to diverticulitis   • Acute kidney injury (CMS/HCC)   • Acute hyperglycemia   • Arthritis   • Overactive bladder   • Dyspepsia     Past Medical History:   Diagnosis Date   • Acquired hypothyroidism    • Colon polyp - sees Dr. Vazquez 2016   • DCIS (ductal carcinoma in situ) 2017    Dx at the time of breast reduction. ER and ID (+)   • Essential hypertension    • High cholesterol    • Hx of herpes genitalis    • IBS-d (takes Buspar)      Past Surgical History:   Procedure Laterality Date   • BILATERAL BREAST REDUCTION Bilateral    • BREAST LUMPECTOMY Left    • CATARACT EXTRACTION, BILATERAL Bilateral    • CERVICAL CONIZATION     • COLON RESECTION N/A 3/17/2020    Procedure: LOW ANTERIOR COLON RESECTION, MOBILIZATION OF SPLENIC FLEXURE, DRAINAGE OF ABSCESS AND COLOSTOMY CREATION;  Surgeon: Sravan Vazquez MD;  Location: [facility];  Service: General;  Laterality: N/A;   • FACIAL COSMETIC SURGERY     • FACIAL COSMETIC SURGERY     • HEMORRHOIDECTOMY     • LAPAROSCOPIC CHOLECYSTECTOMY     • REDUCTION MAMMAPLASTY Bilateral 2017    DCIS   • SINUS SURGERY     • THYROIDECTOMY,  PARTIAL  1963   • TONSILLECTOMY  1953   • VARICOSE VEIN SURGERY Bilateral 1981       Therapy Treatment    Rehabilitation Treatment Summary     Row Name 03/19/20 1345             Treatment Time/Intention    Discipline  occupational therapist  -AC      Document Type  therapy note (daily note)  -AC      Patient Effort  good  -AC      Existing Precautions/Restrictions  fall abdominal incision, RADHA drain, colostomy, PICC  -AC      Recorded by [AC] Sho Jimenez, OT 03/19/20 1451      Row Name 03/19/20 1345             Vital Signs    Pre Systolic BP Rehab  166  -AC      Pre Treatment Diastolic BP  78  -AC      Post Systolic BP Rehab  146  -AC      Post Treatment Diastolic BP  66  -AC      Pretreatment Heart Rate (beats/min)  68  -AC      Posttreatment Heart Rate (beats/min)  71  -AC      Pre SpO2 (%)  98  -AC      O2 Delivery Pre Treatment  supplemental O2  -AC      Post SpO2 (%)  96  -AC      O2 Delivery Post Treatment  supplemental O2  -AC      Pre Patient Position  Sitting  -AC      Intra Patient Position  Standing  -AC      Post Patient Position  Supine  -AC      Recorded by [AC] Sho Jimenez, OT 03/19/20 1451      Row Name 03/19/20 1345             Cognitive Assessment/Intervention    Additional Documentation  Cognitive Assessment/Intervention (Group)  -AC      Recorded by [AC] Sho Jimenez, OT 03/19/20 1451      Row Name 03/19/20 1345             Cognitive Assessment/Intervention- PT/OT    Affect/Mental Status (Cognitive)  low arousal/lethargic;confused  -AC      Orientation Status (Cognition)  oriented x 3  -AC      Follows Commands (Cognition)  follows one step commands;over 90% accuracy;verbal cues/prompting required  -AC      Recorded by [AC] Sho Jimenez, OT 03/19/20 1451      Row Name 03/19/20 1345             Safety Issues, Functional Mobility    Safety Issues Affecting Function (Mobility)  at risk behavior observed;judgment;safety precaution awareness;safety precautions follow-through/compliance  -AC       Impairments Affecting Function (Mobility)  balance;cognition;pain;strength  -AC      Recorded by [AC] Sho Jimenez, OT 03/19/20 1451      Row Name 03/19/20 1345             Bed Mobility Assessment/Treatment    Bed Mobility Assessment/Treatment  sit-supine  -AC      Sit-Supine [place] (Bed Mobility)  minimum assist (75% patient effort)  -AC      Assistive Device (Bed Mobility)  bed rails;head of bed elevated  -AC      Recorded by [AC] Sho Jimenez, OT 03/19/20 1451      Row Name 03/19/20 1345             Functional Mobility    Functional Mobility- Ind. Level  minimum assist (75% patient effort)  -AC      Functional Mobility- Device  rolling walker  -AC      Functional Mobility-Distance (Feet)  16  -AC      Recorded by [AC] Sho Jimenez, OT 03/19/20 1451      Row Name 03/19/20 1345             Transfer Assessment/Treatment    Transfer Assessment/Treatment  sit-stand transfer;stand-sit transfer;toilet transfer  -AC      Comment (Transfers)  VCs for hand placement  -AC      Recorded by [AC] Sho Jimenez, OT 03/19/20 1451      Row Name 03/19/20 1345             Sit-Stand Transfer    Sit-Stand [place] (Transfers)  verbal cues;minimum assist (75% patient effort)  -AC      Assistive Device (Sit-Stand Transfers)  walker, front-wheeled  -AC      Recorded by [AC] Sho Jimenez, OT 03/19/20 1451      Row Name 03/19/20 1345             Stand-Sit Transfer    Stand-Sit [place] (Transfers)  verbal cues;contact guard  -AC      Assistive Device (Stand-Sit Transfers)  walker, front-wheeled  -AC      Recorded by [AC] Sho Jimenez, OT 03/19/20 1451      Row Name 03/19/20 1345             Toilet Transfer    Type (Toilet Transfer)  sit-stand;stand-sit  -AC      [place] Level (Toilet Transfer)  minimum assist (75% patient effort)  -AC      Assistive Device (Toilet Transfer)  walker, 4-wheeled  -AC      Recorded by [AC] Sho Jimenez, OT 03/19/20 1451      Row Name 03/19/20 1345             ADL  Assessment/Intervention    71630 - OT Self Care/Mgmt Minutes  25  -AC      BADL Assessment/Intervention  lower body dressing;grooming;toileting  -AC      Recorded by [AC] Sho Jimenez, OT 03/19/20 1451      Row Name 03/19/20 1345             Lower Body Dressing Assessment/Training    Lower Body Dressing [place] Level  doff;don;socks;minimum assist (75% patient effort)  -AC      Assistive Devices (Lower Body Dressing)  reacher;sock-aid  -AC      Lower Body Dressing Position  unsupported sitting  -AC      Comment (Lower Body Dressing)  increased time to don sock on aide  -AC      Recorded by [AC] Sho Jimenez, OT 03/19/20 1451      Row Name 03/19/20 1345             Grooming Assessment/Training    [place] Level (Grooming)  oral care regimen;supervision  -AC      Grooming Position  sink side;supported standing  -AC      Recorded by [AC] Sho Jimenez, OT 03/19/20 1451      Row Name 03/19/20 1345             Toileting Assessment/Training    [place] Level (Toileting)  adjust/manage clothing;perform perineal hygiene;supervision;set up  -AC      Toileting Position  supported sitting;supported standing  -AC      Recorded by [AC] Sho Jimenez, OT 03/19/20 1451      Row Name 03/19/20 1345             BADL Safety/Performance    Impairments, BADL Safety/Performance  balance;cognition;pain;strength  -AC      Skilled BADL Treatment/Intervention  adaptive equipment training  -AC      Progress in BADL Status  improvement noted  -AC      Recorded by [AC] Sho Jimenez, OT 03/19/20 1451      Row Name 03/19/20 1345             Motor Skills Assessment/Interventions    Additional Documentation  Therapeutic Exercise (Group);Therapeutic Exercise Interventions (Group)  -AC      Recorded by [AC] Sho Jimenez, OT 03/19/20 1451      Row Name 03/19/20 1345             Therapeutic Exercise    20819 - OT Therapeutic Exercise Minutes  5  -AC      86498 - OT Therapeutic Activity Minutes  10  -AC      Recorded by [AC]  Sho Jimenez, OT 03/19/20 1451      Row Name 03/19/20 1345             Therapeutic Exercise    Upper Extremity Range of Motion (Therapeutic Exercise)  shoulder flexion/extension, bilateral;shoulder horizontal abduction/adduction, bilateral;elbow flexion/extension, bilateral  -AC      Exercise Type (Therapeutic Exercise)  AROM (active range of motion)  -AC      Position (Therapeutic Exercise)  supine  -AC      Sets/Reps (Therapeutic Exercise)  1/15  -AC      Expected Outcome (Therapeutic Exercise)  improve functional tolerance, self-care activity  -AC      Recorded by [AC] Sho Jimenez, OT 03/19/20 1451      Row Name 03/19/20 1345             Positioning and Restraints    Pre-Treatment Position  sitting in chair/recliner  -AC      Post Treatment Position  bed  -AC      In Chair  reclined;call light within reach;encouraged to call for assist;exit alarm on  -AC      Recorded by [AC] Sho Jimenez, OT 03/19/20 1451      Row Name 03/19/20 1345             Pain Scale: Numbers Pre/Post-Treatment    Pain Scale: Numbers, Pretreatment  2/10  -AC      Pain Scale: Numbers, Post-Treatment  2/10  -AC      Pain Location  abdomen  -AC      Pain Intervention(s)  Repositioned  -AC      Recorded by [AC] Sho Jimenez, OT 03/19/20 1451      Row Name                Wound 03/17/20 1714 upper;midline abdomen Incision    Wound - Properties Group Date first assessed: 03/17/20 [EL] Time first assessed: 1714 [EL] Present on Hospital Admission: N [EL] Orientation: upper;midline [EL] Location: abdomen [EL] Primary Wound Type: Incision [EL] Recorded by:  [EL] Dominga Chavez RN 03/17/20 1714    Row Name 03/19/20 1345             Plan of Care Review    Plan of Care Reviewed With  patient;daughter  -AC      Outcome Summary  Pt reuired min A to don/doff socks with sockaide,  supervision sinkside grooming,  supervsion with post toileting hygiene.   Pt required min A STS,  min A to ambulate 16 ft with RW.  pt is progressing, but limited by  decreased safety awareness, weakness and pain.  -AC      Recorded by [AC] Sho Jimenez, OT 03/19/20 1451      Row Name 03/19/20 1345             Outcome Summary/Treatment Plan (OT)    Daily Summary of Progress (OT)  progress toward functional goals is good  -AC      Anticipated Discharge Disposition (OT)  home with assist;home with home health  -AC      Recorded by [AC] Sho Jimenez, OT 03/19/20 1451        User Key  (r) = Recorded By, (t) = Taken By, (c) = Cosigned By    Initials Name Effective Dates Discipline    AC Sho Jimenez, OT 06/23/15 -  OT    Dominga Earl RN 04/10/19 -  Nurse        Wound 03/17/20 1714 upper;midline abdomen Incision (Active)   Dressing Appearance dry;intact 3/18/2020  8:00 PM   Closure FLOR 3/18/2020  8:00 PM   Base dressing in place, unable to visualize 3/18/2020  8:00 PM   Dressing Care, Wound low-adherent 3/18/2020  8:00 PM           OT Recommendation and Plan  Outcome Summary/Treatment Plan (OT)  Daily Summary of Progress (OT): progress toward functional goals is good  Anticipated Discharge Disposition (OT): home with assist, home with home health  Daily Summary of Progress (OT): progress toward functional goals is good  Plan of Care Review  Plan of Care Reviewed With: patient, daughter  Plan of Care Reviewed With: patient, daughter  Outcome Summary: Pt reuired min A to don/doff socks with sockaide,  supervision sinkside grooming,  supervsion with post toileting hygiene.   Pt required min A STS,  min A to ambulate 16 ft with RW.  pt is progressing, but limited by decreased safety awareness, weakness and pain.  Outcome Measures     Row Name 03/19/20 1345 03/18/20 1427          How much help from another is currently needed...    Putting on and taking off regular lower body clothing?  2  -AC  2  -AN     Bathing (including washing, rinsing, and drying)  2  -AC  2  -AN     Toileting (which includes using toilet bed pan or urinal)  3  -AC  2  -AN     Putting on and taking off  regular upper body clothing  3  -AC  2  -AN     Taking care of personal grooming (such as brushing teeth)  3  -AC  3  -AN     Eating meals  3 water and chips  -AC  3  -AN     AM-PAC 6 Clicks Score (OT)  16  -AC  14  -AN        Functional Assessment    Outcome Measure Options  AM-PAC 6 Clicks Daily Activity (OT)  -AC  AM-PAC 6 Clicks Daily Activity (OT)  -AN       User Key  (r) = Recorded By, (t) = Taken By, (c) = Cosigned By    Initials Name Provider Type     Sho Jimenez, OT Occupational Therapist    Airam Clark, OT Occupational Therapist           Time Calculation:   Time Calculation- OT     Row Name 03/19/20 1345 03/19/20 1008          Time Calculation- OT    OT Start Time  1345  -  --     OT Received On  03/19/20  -  --     OT Goal Re-Cert Due Date  03/28/20  -  --        Timed Charges    17097 - OT Therapeutic Exercise Minutes  5  -AC  --     08360 - Gait Training Minutes   --  16  (Pended)   -     50820 - OT Therapeutic Activity Minutes  10  -  --     47984 - OT Self Care/Mgmt Minutes  25  -AC  --       User Key  (r) = Recorded By, (t) = Taken By, (c) = Cosigned By    Initials Name Provider Type     Sho Jimenez, OT Occupational Therapist     Aniket Mazariegos, PT Student PT Student        Therapy Charges for Today     Code Description Service Date Service Provider Modifiers Qty    88427144340 HC OT THERAPEUTIC ACT EA 15 MIN 3/19/2020 Sho Jimenez, OT GO 1    09325599402 HC OT SELF CARE/MGMT/TRAIN EA 15 MIN 3/19/2020 Sho Jimenez OT GO 2               Sho Jimenez OT  3/19/2020

## 2020-03-19 NOTE — PLAN OF CARE
Problem: Pain, Chronic (Adult)  Goal: Acceptable Pain/Comfort Level and Functional Ability  Outcome: Ongoing (interventions implemented as appropriate)  Flowsheets (Taken 3/19/2020 4682)  Acceptable Pain/Comfort Level and Functional Ability: making progress toward outcome

## 2020-03-19 NOTE — PLAN OF CARE
Problem: Patient Care Overview  Goal: Plan of Care Review  Flowsheets (Taken 3/19/2020 9433)  Plan of Care Reviewed With: patient;daughter  Outcome Summary: Pt reuired min A to don/doff socks with sockaide,  supervision sinkside grooming,  supervsion with post toileting hygiene.   Pt required min A STS,  min A to ambulate 16 ft with RW.  Pt is progressing, but limited by decreased safety awareness, weakness and pain.

## 2020-03-19 NOTE — PLAN OF CARE
Problem: Patient Care Overview  Goal: Plan of Care Review  Outcome: Ongoing (interventions implemented as appropriate)  Flowsheets (Taken 3/19/2020 1008)  Progress: no change (Pended)  Plan of Care Reviewed With: patient;daughter (Pended)  Outcome Summary: Pt continues to be confused and demonstrate difficulty with following all verbal and tactile cues.  Also attempted to sit before properly positioned multiple times during treatment.  /111 when initially standing, nursing was notified.  Bed mobility Min A x 2.  Transfers Min A x 2 for STS and chair to chair.  Ambulates approximately 20 feet with FWW and Min A x 2 to assist with equipment management.  Chair follow for safety. BP decreased to 166/78 at end of session. Continue with current POC. (Pended)

## 2020-03-19 NOTE — NURSING NOTE
Appliance change performed r/t drainage form RADHA-drain site.     Stoma looks good. Some flatus.   Peristomal skin is slightly denuded. She has very sensitive skin and reacted poorly to Covaderm dressing over incision. Area cleaned and applied barrier spray to surface aorund her midline incision. Placed Optifoam dressing and Xeroform over her incision site. Hopefully this will no irritate her skin.     Placed a [place] Premier 8331.   Discussed POC, showering, and care needs.   Will plan for follow-up education tomorrow and have patient participate in stomal care.  Encouraged ambulation and questions answered.   Please contact [facility] nurse if needs arise.     Thanks

## 2020-03-19 NOTE — PROGRESS NOTES
"INFECTIOUS DISEASE PROGRESS NOTE   Neema Richard  1940  [id number]      Admission Date: 3/15/2020      Requesting Provider: Filomena Stuart DO  Evaluating Physician: Darshan Bejarano MD    Reason for Consultation: Acute sigmoid diverticulitis, with microperforation     History of present illness:  3/16/20:    3/16/2020: Patient is a 79 y.o. female, seen today for acute sigmoid diverticulitis with microperforation.  She has had several episodes of diverticulitis in the past, the last being in December 2019, treated with an oral antibiotic by her PCP.   Her symptoms minimally improved. She began experiencing diarrhea, with low grade fever, and yesterday, developed worsening abdominal pain, prompting her presentation to the ED.  An abdominal Ct with extensive diverticulitis involving the descending colon and sigmoid colon with stranding and wall thickening, with extraluminal air suggesting a microperforation.   Admitting labs with a leukocytosis of 15, 000, lactic acidosis, and she has been afebrile. She is currently on Merrem and we were consulted for evaluation and treatment.   3/17/20:  She feels better but still with abdominal tenderness.  Her daughter is coming to from [place] and she wants her tested for Covid-19.  She remains afebrile.  I saw her earlier this morning but she has now deteriorated.  She now has free air on her CT scan and is currently in the OR.  3/18/20:  She is feeling \"some better this am\".  She denies any nausea. She complains of hiccups. She remains afebrile.    3/19/20: She accidentally pulled NGT out yesterday. She is having some nausea, no vomiting.   She complains of not being able to get any rest.      Past Medical History:   Diagnosis Date   • Acquired hypothyroidism 1980   • Colon polyp - sees Dr. 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 ER and IA (+)   • Essential hypertension 2012   • High cholesterol 2019   • Hx of " herpes genitalis 2013   • IBS-d (takes Buspar)        Past Surgical History:   Procedure Laterality Date   • BILATERAL BREAST REDUCTION Bilateral 1995   • BREAST LUMPECTOMY Left 2009   • CATARACT EXTRACTION, BILATERAL Bilateral 2018   • CERVICAL CONIZATION  1983   • COLON RESECTION N/A 3/17/2020    Procedure: LOW ANTERIOR COLON RESECTION, MOBILIZATION OF SPLENIC FLEXURE, DRAINAGE OF ABSCESS AND COLOSTOMY CREATION;  Surgeon: Sravan Vazquez MD;  Location: [facility];  Service: General;  Laterality: N/A;   • FACIAL COSMETIC SURGERY  2001   • FACIAL COSMETIC SURGERY  2012   • HEMORRHOIDECTOMY  1974   • LAPAROSCOPIC CHOLECYSTECTOMY  2010   • REDUCTION MAMMAPLASTY Bilateral 04/13/2017    DCIS   • SINUS SURGERY  1992   • THYROIDECTOMY, PARTIAL  1963   • TONSILLECTOMY  1953   • VARICOSE VEIN SURGERY Bilateral 1981       Family History   Problem Relation Age of Onset   • Breast cancer Neg Hx    • Ovarian cancer Neg Hx        Social History     Socioeconomic History   • Marital status:      Spouse name: Not on file   • Number of children: Not on file   • Years of education: Not on file   • Highest education level: Not on file   Tobacco Use   • Smoking status: Never Smoker   Substance and Sexual Activity   • Alcohol use: No   • Drug use: No       Allergies   Allergen Reactions   • Evista [Raloxifene] Rash   • Cephalexin Diarrhea   • Hydrochlorothiazide Diarrhea   • Meloxicam Other (See Comments)   • Metronidazole Diarrhea   • Oxybutynin Cough   • Propylthiouracil Other (See Comments)     Severally reduce WBC's    • Sulfa Antibiotics Hives   • Sulfacetamide Sodium-Sulfur Hives   • Sulfur Dioxide Hives         Medication:    Current Facility-Administered Medications:   •  acetaminophen (TYLENOL) tablet 650 mg, 650 mg, Oral, Q8H, Sravan Vazquez MD, 650 mg at 03/19/20 1441  •  busPIRone (BUSPAR) tablet 15 mg, 15 mg, Oral, Daily, Sravan Vazquez MD, 15 mg at 03/18/20 2037  •  calcitonin (salmon) (MIACALCIN) nasal spray 1  spray, 1 spray, Alternating Nares, Daily, Sravan Vazquez MD, 1 spray at 03/19/20 0915  •  diazePAM (VALIUM) tablet 5 mg, 5 mg, Oral, Q6H PRN, Sravan Vazquez MD  •  ertapenem (INVanz) 1 g/100 mL 0.9% NS VTB (mbp), 1 g, Intravenous, Q24H, Sravan Vazquez MD, Last Rate: 200 mL/hr at 03/19/20 0915, 1 g at 03/19/20 0915  •  [START ON 3/20/2020] gabapentin (NEURONTIN) capsule 300 mg, 300 mg, Oral, Nightly, Sravan Vazquez MD  •  heparin (porcine) 5000 UNIT/ML injection 5,000 Units, 5,000 Units, Subcutaneous, Q8H, Sravan Vazquez MD, 5,000 Units at 03/19/20 1441  •  HYDROcodone-acetaminophen (NORCO) 7.5-325 MG per tablet 1 tablet, 1 tablet, Oral, Q4H PRN, Sravan Vazquez MD, 1 tablet at 03/19/20 1142  •  HYDROmorphone (DILAUDID) injection 0.5 mg, 0.5 mg, Intravenous, Q2H PRN **AND** naloxone (NARCAN) injection 0.4 mg, 0.4 mg, Intravenous, Q5 Min PRN, Sravan Vazquez MD  •  levothyroxine (SYNTHROID, LEVOTHROID) tablet 88 mcg, 88 mcg, Oral, Q AM, Sravan Vazquez MD, 88 mcg at 03/19/20 0524  •  LORazepam (ATIVAN) tablet 0.5 mg, 0.5 mg, Oral, Q8H PRN, Sravan Vazquez MD, 0.5 mg at 03/18/20 0538  •  metoprolol succinate XL (TOPROL-XL) 24 hr tablet 100 mg, 100 mg, Oral, Daily, Sravan Vazquez MD, 100 mg at 03/19/20 0914  •  Mirabegron ER (MYRBETRIQ) 24 hr tablet 50 mg- patient supplied med, 50 mg, Oral, Daily, Sravan Vazquez MD, 50 mg at 03/18/20 2038  •  morphine injection 2 mg, 2 mg, Intravenous, Q2H PRN, 2 mg at 03/17/20 0817 **OR** Morphine sulfate (PF) injection 4 mg, 4 mg, Intravenous, Q2H PRN, Sravan Vazquez MD, 4 mg at 03/16/20 1704  •  ondansetron (ZOFRAN) injection 4 mg, 4 mg, Intravenous, Q6H PRN, Sravan Vazquez MD, 4 mg at 03/17/20 2028  •  ondansetron (ZOFRAN) injection 4 mg, 4 mg, Intravenous, Q6H PRN, Sravan Vazquez MD  •  sodium chloride 0.45 % bolus 500 mL, 500 mL, Intravenous, Once, Sravan Vazquez MD  •  sodium chloride 0.9 % bolus 1,836 mL, 30 mL/kg, Intravenous, PRN, Sravan Vazquez MD  •  sodium  chloride 0.9 % flush 10 mL, 10 mL, Intravenous, PRN, Sravan Vazquez MD  •  sodium chloride 0.9 % flush 10 mL, 10 mL, Intravenous, Q12H, Sravan Vazquez MD, 10 mL at 20 2038  •  sodium chloride 0.9 % flush 10 mL, 10 mL, Intravenous, PRN, Sravan Vazquez MD  •  sodium chloride 0.9 % flush 10 mL, 10 mL, Intravenous, Q12H, Sravan Vazquez MD, 10 mL at 20 0916  •  sodium chloride 0.9 % flush 10 mL, 10 mL, Intravenous, PRN, Sravan Vazquez MD  •  sodium chloride 0.9 % with KCl 20 mEq/L infusion, 100 mL/hr, Intravenous, Continuous, Sravan Vazquez MD, Last Rate: 100 mL/hr at 20 1209, 100 mL/hr at 20 1209  •  sucralfate (CARAFATE) tablet 1 g, 1 g, Oral, BID AC, Sravan Vazquez MD, 1 g at 20 1711    Antibiotics:  Anti-Infectives (From admission, onward)    Ordered     Dose/Rate Route Frequency Start Stop    20 0811  ertapenem (INVanz) 1 g/100 mL 0.9% NS VTB (mbp)     Joni Aflaro, [facility] reviewed the order on 20 1033.   Ordering Provider:  Sravan Vazquez MD    1 g  over 30 Minutes Intravenous Every 24 Hours 20 0900 20 0859    03/15/20 1954  meropenem (MERREM) 1 g/100 mL 0.9% NS VTB (mbp)     Ordering Provider:  Darshan Steward MD    1 g  over 30 Minutes Intravenous Once 03/15/20 1955 03/15/20 2122                  Physical Exam:   Vital Signs  Temp (24hrs), Av.7 °F (36.5 °C), Min:97.4 °F (36.3 °C), Max:98 °F (36.7 °C)    Temp  Min: 97.4 °F (36.3 °C)  Max: 98 °F (36.7 °C)  BP  Min: 141/60  Max: 145/68  Pulse  Min: 66  Max: 91  Resp  Min: 16  Max: 18  SpO2  Min: 93 %  Max: 98 %    GENERAL: Awake and alert, in no acute distress.   HEENT: Normocephalic, atraumatic.   No conjunctival injection. No icterus. Oropharynx clear without evidence of thrush or exudate. No evidence of peridontal disease.    HEART: RRR; No murmur, rubs, gallops.   LUNGS: Clear to auscultation bilaterally without wheezing, rales, rhonchi. Normal respiratory effort. Nonlabored. No dullness.  ABDOMEN:   Ostomy/abdominal dressings in place RADHA in place   EXT:  No cyanosis, clubbing or edema. No cord.  :  Without Gaytan catheter.  MSK:  No joint effusions   SKIN: Warm and dry without cutaneous eruptions on Inspection/palpation.    NEURO: Oriented to PPT.  She appears to be mildly confused.  Right PICC site without redness    Laboratory Data    Results from last 7 days   Lab Units 03/19/20  0515 03/18/20  0444 03/17/20  0451   WBC 10*3/mm3 14.82* 11.97* 14.46*   HEMOGLOBIN g/dL 9.1* 9.8* 10.8*   HEMATOCRIT % 28.8* 31.2* 34.8   PLATELETS 10*3/mm3 345 334 280     Results from last 7 days   Lab Units 03/19/20  0515   SODIUM mmol/L 143   POTASSIUM mmol/L 4.2   CHLORIDE mmol/L 113*   CO2 mmol/L 19.0*   BUN mg/dL 41*   CREATININE mg/dL 0.61   GLUCOSE mg/dL 95   CALCIUM mg/dL 8.0*     Results from last 7 days   Lab Units 03/15/20  1638   ALK PHOS U/L 97   BILIRUBIN mg/dL 0.7   ALT (SGPT) U/L 9   AST (SGOT) U/L 16     Results from last 7 days   Lab Units 03/15/20  1638   SED RATE mm/hr 19     Results from last 7 days   Lab Units 03/15/20  1638   CRP mg/dL 6.50*     Results from last 7 days   Lab Units 03/17/20  1143   LACTATE mmol/L 1.4             Estimated Creatinine Clearance: 56 mL/min (by C-G formula based on SCr of 0.61 mg/dL).  Lactate 2.9     Microbiology:    3/16: BC no growth    3/17:  Peritoneum gms moderate WBCs, nos- Gram negative bacilli     Radiology:  Imaging Results (Last 72 Hours)     Procedure Component Value Units Date/Time    CT Abdomen Pelvis With & Without Contrast [[id number]] Collected:  03/17/20 1046     Updated:  03/17/20 1150    Narrative:       EXAMINATION: CT ABDOMEN AND PELVIS W WO CONTRAST-03/17/2020:      INDICATION: Ongoing pain.     TECHNIQUE: 5 mm unenhanced images through the abdomen and pelvis with  subsequent post-IV contrast 5 mm portal venous phase and delayed venous  phase images.     The radiation dose reduction device was turned on for each scan per the  ALARA (As Low as Reasonably  Achievable) protocol.     COMPARISON: 03/15/2020 abdomen and pelvis CT scan.     FINDINGS: The patient history indicates diverticulitis, persistent  abdominal pain. Previous 03/15/2020 exam report indicated extensive  distal descending colon and sigmoid colon diverticulitis, small amount  of extraluminal air, and some free fluid. Today's exam, however, shows  moderate intra-abdominal free air. There is new moderate right lower  lobe atelectasis and mild left lower lobe atelectasis. No significant  abnormalities are seen of the liver, spleen, pancreas, adrenal glands,  or kidneys. The bowel loops are normal in caliber.     Regarding the lower abdomen and pelvis, inflammatory fat stranding  around the descending colon is approximately stable. There appears to be  mild, new fat stranding of the retroperitoneum but this may actually  represent a trace amount of fluid extending along the paracolic gutters,  and along the peritoneal margin anteriorly, rather than true  retroperitoneal inflammation. Free fluid in the pelvis appears mildly  increased. Note is again made of the patient's enlarged uterus and the  patient's ring-type pessary. Fluid and air collection seen on axial  portal venous phase image 64, series 6 is difficult to distinguish as  intraluminal within bowel, or contained fluid and air, especially seen  on the axial images. The coronal images show this collection,  respectively coronal images 46-36 suggest that this may be extraluminal.  No other potential discrete, drainable abscess is identified. The  bladder is normally distended and normal in appearance. Delayed images  show normal caliber renal collecting systems and ureters.       Impression:       1.  Interval development of intra-abdominal free air consistent with  perforation from the patient's diverticulitis.  2.  Mildly increased intrapelvic fat stranding as described, mildly  increased intrapelvic free fluid.  3.  Questionable 5 cm fluid and air  collection in the central pelvis as  described above. Please refer to initial axial image 64, series 3.  4.  New moderate right lower lobe atelectasis and mild left lower lobe  atelectasis.     NOTE: Preliminary report was called to Dr. Sravan Vazquez at approximately  10:50 AM 03/17/2020.     D:  03/17/2020  E:  03/17/2020                     Impression:   1.  Acute sigmoid diverticulitis- s/p resection 3/17  2.  Severe sepsis- with leukocytosis, tachycardia, lactic acidosis, uncontrolled diabetes mellitus, and known focus of infection   3.  Leukocytosis/neutrophilia, secondary to above,  4.  Pelvic abscess    PLAN/RECOMMENDATIONS:   1.  Continue ertapenem 1 g IV daily  2.  Monitor cultures       Dr. Bejarano has obtained the history, performed the physical exam and formulated the above treatment plan.        Darshan Bejarano MD  3/19/2020  19:56

## 2020-03-20 ENCOUNTER — APPOINTMENT (OUTPATIENT)
Dept: GENERAL RADIOLOGY | Facility: HOSPITAL | Age: 80
End: 2020-03-20

## 2020-03-20 ENCOUNTER — APPOINTMENT (OUTPATIENT)
Dept: CT IMAGING | Facility: HOSPITAL | Age: 80
End: 2020-03-20

## 2020-03-20 LAB
BACTERIA SPEC AEROBE CULT: NORMAL
BASOPHILS # BLD AUTO: 0.07 10*3/MM3 (ref 0–0.2)
BASOPHILS NFR BLD AUTO: 0.4 % (ref 0–1.5)
D-LACTATE SERPL-SCNC: 1.2 MMOL/L (ref 0.5–2)
DEPRECATED RDW RBC AUTO: 47.5 FL (ref 37–54)
EOSINOPHIL # BLD AUTO: 0.29 10*3/MM3 (ref 0–0.4)
EOSINOPHIL NFR BLD AUTO: 1.6 % (ref 0.3–6.2)
ERYTHROCYTE [DISTWIDTH] IN BLOOD BY AUTOMATED COUNT: 14.3 % (ref 12.3–15.4)
GRAM STN SPEC: NORMAL
HCT VFR BLD AUTO: 29.8 % (ref 34–46.6)
HGB BLD-MCNC: 9.6 G/DL (ref 12–15.9)
IMM GRANULOCYTES # BLD AUTO: 0.52 10*3/MM3 (ref 0–0.05)
IMM GRANULOCYTES NFR BLD AUTO: 2.9 % (ref 0–0.5)
LYMPHOCYTES # BLD AUTO: 1.46 10*3/MM3 (ref 0.7–3.1)
LYMPHOCYTES NFR BLD AUTO: 8.3 % (ref 19.6–45.3)
MCH RBC QN AUTO: 29.1 PG (ref 26.6–33)
MCHC RBC AUTO-ENTMCNC: 32.2 G/DL (ref 31.5–35.7)
MCV RBC AUTO: 90.3 FL (ref 79–97)
MONOCYTES # BLD AUTO: 1.46 10*3/MM3 (ref 0.1–0.9)
MONOCYTES NFR BLD AUTO: 8.3 % (ref 5–12)
NEUTROPHILS # BLD AUTO: 13.88 10*3/MM3 (ref 1.7–7)
NEUTROPHILS NFR BLD AUTO: 78.5 % (ref 42.7–76)
NRBC BLD AUTO-RTO: 0 /100 WBC (ref 0–0.2)
PLATELET # BLD AUTO: 401 10*3/MM3 (ref 140–450)
PMV BLD AUTO: 10 FL (ref 6–12)
RBC # BLD AUTO: 3.3 10*6/MM3 (ref 3.77–5.28)
WBC NRBC COR # BLD: 17.68 10*3/MM3 (ref 3.4–10.8)

## 2020-03-20 PROCEDURE — 25010000002 ONDANSETRON PER 1 MG: Performed by: COLON & RECTAL SURGERY

## 2020-03-20 PROCEDURE — 3E0336Z INTRODUCTION OF NUTRITIONAL SUBSTANCE INTO PERIPHERAL VEIN, PERCUTANEOUS APPROACH: ICD-10-PCS | Performed by: FAMILY MEDICINE

## 2020-03-20 PROCEDURE — 02HV33Z INSERTION OF INFUSION DEVICE INTO SUPERIOR VENA CAVA, PERCUTANEOUS APPROACH: ICD-10-PCS | Performed by: COLON & RECTAL SURGERY

## 2020-03-20 PROCEDURE — 85025 COMPLETE CBC W/AUTO DIFF WBC: CPT | Performed by: COLON & RECTAL SURGERY

## 2020-03-20 PROCEDURE — 0100U HC BIOFIRE FILMARRAY RESP PANEL 2: CPT | Performed by: INTERNAL MEDICINE

## 2020-03-20 PROCEDURE — 25010000002 IOPAMIDOL 61 % SOLUTION: Performed by: INTERNAL MEDICINE

## 2020-03-20 PROCEDURE — 83605 ASSAY OF LACTIC ACID: CPT | Performed by: COLON & RECTAL SURGERY

## 2020-03-20 PROCEDURE — 74178 CT ABD&PLV WO CNTR FLWD CNTR: CPT

## 2020-03-20 PROCEDURE — 71046 X-RAY EXAM CHEST 2 VIEWS: CPT

## 2020-03-20 PROCEDURE — 25010000002 HEPARIN (PORCINE) PER 1000 UNITS: Performed by: COLON & RECTAL SURGERY

## 2020-03-20 PROCEDURE — 25810000003 SODIUM CHLORIDE 0.9 % WITH KCL 20 MEQ 20-0.9 MEQ/L-% SOLUTION: Performed by: COLON & RECTAL SURGERY

## 2020-03-20 PROCEDURE — 87641 MR-STAPH DNA AMP PROBE: CPT | Performed by: INTERNAL MEDICINE

## 2020-03-20 PROCEDURE — 97116 GAIT TRAINING THERAPY: CPT

## 2020-03-20 PROCEDURE — 25010000002 ERTAPENEM PER 500 MG: Performed by: COLON & RECTAL SURGERY

## 2020-03-20 PROCEDURE — 97110 THERAPEUTIC EXERCISES: CPT

## 2020-03-20 PROCEDURE — 97530 THERAPEUTIC ACTIVITIES: CPT

## 2020-03-20 PROCEDURE — 99232 SBSQ HOSP IP/OBS MODERATE 35: CPT | Performed by: INTERNAL MEDICINE

## 2020-03-20 RX ADMIN — HEPARIN SODIUM 5000 UNITS: 5000 INJECTION, SOLUTION INTRAVENOUS; SUBCUTANEOUS at 22:21

## 2020-03-20 RX ADMIN — MIRABEGRON 50 MG: 50 TABLET, FILM COATED, EXTENDED RELEASE ORAL at 22:15

## 2020-03-20 RX ADMIN — HYDROCODONE BITARTRATE AND ACETAMINOPHEN 1 TABLET: 5; 325 TABLET ORAL at 04:11

## 2020-03-20 RX ADMIN — HEPARIN SODIUM 5000 UNITS: 5000 INJECTION, SOLUTION INTRAVENOUS; SUBCUTANEOUS at 16:01

## 2020-03-20 RX ADMIN — SUCRALFATE 1 G: 1 TABLET ORAL at 06:59

## 2020-03-20 RX ADMIN — SODIUM CHLORIDE, PRESERVATIVE FREE 10 ML: 5 INJECTION INTRAVENOUS at 22:21

## 2020-03-20 RX ADMIN — ONDANSETRON 4 MG: 2 INJECTION INTRAMUSCULAR; INTRAVENOUS at 17:32

## 2020-03-20 RX ADMIN — ACETAMINOPHEN 650 MG: 325 TABLET, FILM COATED ORAL at 22:21

## 2020-03-20 RX ADMIN — SODIUM CHLORIDE, PRESERVATIVE FREE 10 ML: 5 INJECTION INTRAVENOUS at 09:20

## 2020-03-20 RX ADMIN — METOPROLOL SUCCINATE 100 MG: 100 TABLET, EXTENDED RELEASE ORAL at 08:10

## 2020-03-20 RX ADMIN — ERTAPENEM SODIUM 1 G: 1 INJECTION, POWDER, LYOPHILIZED, FOR SOLUTION INTRAMUSCULAR; INTRAVENOUS at 08:07

## 2020-03-20 RX ADMIN — IOPAMIDOL 85 ML: 612 INJECTION, SOLUTION INTRAVENOUS at 19:00

## 2020-03-20 RX ADMIN — BUSPIRONE HYDROCHLORIDE 15 MG: 10 TABLET ORAL at 22:21

## 2020-03-20 RX ADMIN — LEVOTHYROXINE SODIUM 88 MCG: 88 TABLET ORAL at 06:59

## 2020-03-20 RX ADMIN — CALCITONIN SALMON 1 SPRAY: 200 SPRAY, METERED NASAL at 08:10

## 2020-03-20 RX ADMIN — ONDANSETRON 4 MG: 2 INJECTION INTRAMUSCULAR; INTRAVENOUS at 04:12

## 2020-03-20 RX ADMIN — DOXYCYCLINE 100 MG: 100 INJECTION, POWDER, LYOPHILIZED, FOR SOLUTION INTRAVENOUS at 23:58

## 2020-03-20 RX ADMIN — GABAPENTIN 300 MG: 300 CAPSULE ORAL at 22:15

## 2020-03-20 RX ADMIN — POTASSIUM CHLORIDE AND SODIUM CHLORIDE 100 ML/HR: 900; 150 INJECTION, SOLUTION INTRAVENOUS at 08:07

## 2020-03-20 RX ADMIN — HEPARIN SODIUM 5000 UNITS: 5000 INJECTION, SOLUTION INTRAVENOUS; SUBCUTANEOUS at 06:59

## 2020-03-20 RX ADMIN — ACETAMINOPHEN 650 MG: 325 TABLET, FILM COATED ORAL at 16:00

## 2020-03-20 RX ADMIN — ACETAMINOPHEN 650 MG: 325 TABLET, FILM COATED ORAL at 06:59

## 2020-03-20 NOTE — PROGRESS NOTES
Continued Stay Note  [facility]     Patient Name: Neema Richard  MRN: [id number]  Today's Date: 3/20/2020    Admit Date: 3/15/2020    Discharge Plan     Row Name 03/20/20 1247       Plan    Plan  Home with [facility]    Plan Comments  Met with patient at bedside. Plan Home [facility] with friend transporting. Will need order for home health. Patient denies any needs at this time.    Final Discharge Disposition Code  06 - home with home health care        Discharge Codes    No documentation.             GOLDIE Mack (Kay)

## 2020-03-20 NOTE — PROGRESS NOTES
Without stoma output  A little nauseated  Not tachycardic  Increasing white count now 17,000  Daughter at bedside  Limited physical activity    Awake, unsettling dreams last night  Heart rate is regular  Abdomen is mild to moderately distended  Incision satisfactory yesterday, carlos are out  Edematous stoma    Plans for further evaluation of leukocytosis with CT scan of abdomen and pelvis  PICC line and TPN for nutritional supplementation until bowels working  Activity with walking in the hallways has been encouraged.  NG tube if nausea and vomiting.

## 2020-03-20 NOTE — PROGRESS NOTES
Decreased breath sounds at right base on further eval after review of labs    She may have pneumonia, on 2L NC    Will place NG and Scan to evaluate abdomen/ lower lung fields.  Will check CXR  Will check Lactic Acid.    I appreciate Ritu, RNs help.

## 2020-03-20 NOTE — PLAN OF CARE
Problem: Patient Care Overview  Goal: Plan of Care Review  Flowsheets (Taken 3/20/2020 0643)  Progress: no change  Plan of Care Reviewed With: patient  Outcome Summary: VSS, pt confused and appears anxious, pt repeatedly ask to go home and scared about corona virus, pt alert but disoriented to place, time, and situation. pt refused all medication and BP taken.  Daughter was  called to help her relax and reoriented her.  pt appears more relax and less anxious after daughter's present. will continue to monitor.     Problem: Fall Risk (Adult)  Goal: Absence of Fall  Flowsheets (Taken 3/20/2020 0643)  Absence of Fall: making progress toward outcome

## 2020-03-20 NOTE — PLAN OF CARE
Problem: Patient Care Overview  Goal: Plan of Care Review  Flowsheets  Taken 3/20/2020 1054  Plan of Care Reviewed With: patient  Taken 3/20/2020 1052  Outcome Summary: Pt alert and following directives, transitions to eob with cues and min assist, transfers and ambul with c.g.assist 30 ft with r wx. Fatigues with activity but demonstrating progress

## 2020-03-20 NOTE — PROGRESS NOTES
Continued Stay Note  [facility]     Patient Name: Neema Richard  MRN: [id number]  Today's Date: 3/20/2020    Admit Date: 3/15/2020    Discharge Plan     Row Name 03/20/20 9360       Plan    Plan Comments  CM notified by MIRI Vega to arrange home health for skilled nursing for ostomy care and physical therapy for pt at time of discharge through [facility]. CM left message with Brenden at [facility] in regards to orders and CM will notify at time of pt's discharge. ANAIS/MIRI will continue to follow.     Row Name 03/20/20 1247       Plan    Plan  Home with [facility]    Plan Comments  Met with patient at bedside. Plan Home [facility] with friend transporting. Will need order for home health. Patient denies any needs at this time.    Final Discharge Disposition Code  06 - home with home health care        Discharge Codes    No documentation.             Ayleen Ricks RN

## 2020-03-20 NOTE — PROGRESS NOTES
Clinical Nutrition   Reason For Visit: [facility]    Patient Name: Neema Richard  YOB: 1940  MRN: [id number]  Date of Encounter: 03/20/20 14:02  Admission date: 3/15/2020      RD recommends initiating nutrition support if patient's bowel function does not return and nutrition intake does not improve within the next 1-2 days - patient has had minimal PO intake the past 1 week during this admission.      Nutrition Assessment     Admission Problem List:  N/V/Abdominal pain  Sigmoid diverticulitis with microperforation  Peritonitis  Pelvic abscesses  Sepsis  DASH  Lactic acidosis      Applicable PMH:  HTN  IBS-D  Dyspepsia  Diverticulitis  S/p CCY      Applicable medical tests/procedures since admission:  (3/17) s/p low anterior resection, mobilization splenic flexure, drainage of abdominal abscesses, appendectomy, left salpingo-oophorectomy, distal transverse end colostomy.       Reported/Observed/Food/Nutrition Related History   Patient reports she started solid food diet today, ate only a few bites of eggs and cereal. Patient aware that MD wants patient to take PO very slowly until bowel function returns. Patient requests vanilla Boost, prefers over Ensure. States she weighed 135 lbs prior to admission. Patient denies any colostomy stool output at this time. RD notes patient is having some flatus per MD note.      Anthropometrics   Height: 67 in  Weight: 135 lbs (stated weight 3/15 per [facility] doc)  BMI: 21.1  BMI classification: Normal: 18.5-24.9kg/m2   IBW: 135 lbs    UBW: 135 lbs per pt  Weight change: RD suspects patient has lost weight and now weighs less than 135 lbs; bed scale inaccurate at this time.      Labs reviewed   Labs reviewed: Yes    Medications reviewed   Medications reviewed: Yes  Pertinent: antibiotic, carafate  GTT: IVF @ 100 ml/hr  PRN: [email or web address]    Current Nutrition Prescription   PO: Diet Regular; GI Soft  Oral Nutrition Supplement: Boost Plus 3x daily; Ensure 3x daily (RD notes duplicate orders,  "will fix)    Evaluation of Received Nutrient/Fluid Intake: insufficient data      Nutrition Diagnosis     Problem Inadequate oral intake   Etiology Clinical condition   Signs/Symptoms Patient with minimal PO intake during this admission, just began solid food diet and ate only \"bites\" at breakfast     Intervention   Intervention: Follow treatment progress, Care plan reviewed, Adjusted supplement, Encourage intake     RD recommends initiating nutrition support if patient's bowel function does not return and nutrition intake does not improve within the next 1-2 days - patient has had minimal PO intake the past 1 week during this admission.    Goal:   General: Nutrition support treatment  PO: Tolerate PO, Increase intake     Monitoring/Evaluation:     Monitoring/Evaluation: Per protocol, I&O, PO intake, Supplement intake, Pertinent labs, Weight, GI status, Symptoms  Will Continue to follow per protocol  Karen Menjivar RD  Time Spent: 40 min  "

## 2020-03-20 NOTE — THERAPY TREATMENT NOTE
Acute Care - Occupational Therapy Treatment Note   [place]     Patient Name: Neema Richard  : 1940  MRN: [id number]  Today's Date: 3/20/2020             Admit Date: 3/15/2020       ICD-10-CM ICD-9-CM   1. Impaired mobility and activities of daily living Z74.09 799.89   2. Peritonitis (CMS/HCC) K65.9 567.9   3. Diverticulitis K57.92 562.11   4. Essential hypertension I10 401.9   5. Perforation of sigmoid colon due to diverticulitis K57.20 562.11     Patient Active Problem List   Diagnosis   • Well woman exam with routine gynecological exam   • Osteoporosis - GYN   • Essential hypertension   • Acquired hypothyroidism   • IBS-d (takes Buspar)   • DCIS (ductal carcinoma in situ)   • Cystocele and rectocele with complete uterovaginal prolapse   • Mixed urge and stress incontinence   • Pessary maintenance   • High cholesterol   • Acute diverticulitis   • Sepsis (CMS/HCC)   • Lactic acidosis   • Neutrophilic leukocytosis   • Perforation of sigmoid colon due to diverticulitis   • Acute kidney injury (CMS/HCC)   • Acute hyperglycemia   • Arthritis   • Overactive bladder   • Dyspepsia     Past Medical History:   Diagnosis Date   • Acquired hypothyroidism    • Colon polyp - sees Dr. Vazquez 2016   • DCIS (ductal carcinoma in situ) 2017    Dx at the time of breast reduction. ER and VT (+)   • Essential hypertension    • High cholesterol    • Hx of herpes genitalis    • IBS-d (takes Buspar)      Past Surgical History:   Procedure Laterality Date   • BILATERAL BREAST REDUCTION Bilateral    • BREAST LUMPECTOMY Left    • CATARACT EXTRACTION, BILATERAL Bilateral    • CERVICAL CONIZATION     • COLON RESECTION N/A 3/17/2020    Procedure: LOW ANTERIOR COLON RESECTION, MOBILIZATION OF SPLENIC FLEXURE, DRAINAGE OF ABSCESS AND COLOSTOMY CREATION;  Surgeon: Sravan Vazquez MD;  Location: [facility];  Service: General;  Laterality: N/A;   • FACIAL COSMETIC SURGERY     • FACIAL COSMETIC  SURGERY  2012   • HEMORRHOIDECTOMY  1974   • LAPAROSCOPIC CHOLECYSTECTOMY  2010   • REDUCTION MAMMAPLASTY Bilateral 04/13/2017    DCIS   • SINUS SURGERY  1992   • THYROIDECTOMY, PARTIAL  1963   • TONSILLECTOMY  1953   • VARICOSE VEIN SURGERY Bilateral 1981       Therapy Treatment    Rehabilitation Treatment Summary     Row Name 03/20/20 1435             Treatment Time/Intention    Discipline  occupational therapist  -AC      Document Type  therapy note (daily note)  -AC      Patient Effort  good  -AC      Existing Precautions/Restrictions  fall RADHA drain, ostomy, RADHA drain, confusion  -AC      Recorded by [AC] Sho Jimenez, OT 03/20/20 1528      Row Name 03/20/20 1435             Vital Signs    O2 Delivery Pre Treatment  room air  -AC      Intra SpO2 (%)  82  -AC      O2 Delivery Intra Treatment  room air  -AC      Post SpO2 (%)  95  -AC      O2 Delivery Post Treatment  supplemental O2  -AC      Pre Patient Position  Supine  -AC      Intra Patient Position  Standing  -AC      Post Patient Position  Sitting  -AC      Recorded by [AC] Sho Jimenez, OT 03/20/20 1528      Row Name 03/20/20 1435             Cognitive Assessment/Intervention- PT/OT    Affect/Mental Status (Cognitive)  confused;low arousal/lethargic  -AC      Orientation Status (Cognition)  oriented x 3  -AC      Follows Commands (Cognition)  follows one step commands;75-90% accuracy;increased processing time needed;repetition of directions required  -AC      Recorded by [AC] Sho Jimenez, OT 03/20/20 1528      Row Name 03/20/20 1435             Safety Issues, Functional Mobility    Safety Issues Affecting Function (Mobility)  judgment;safety precaution awareness;safety precautions follow-through/compliance  -AC      Impairments Affecting Function (Mobility)  balance;cognition;endurance/activity tolerance;pain;strength  -AC      Recorded by [AC] Sho Jimenez, OT 03/20/20 1528      Row Name 03/20/20 1435             Bed Mobility Assessment/Treatment     Bed Mobility Assessment/Treatment  supine-sit  -AC      Supine-Sit [place] (Bed Mobility)  minimum assist (75% patient effort)  -AC      Assistive Device (Bed Mobility)  bed rails;head of bed elevated  -AC      Recorded by [AC] Sho Jimenez, OT 03/20/20 1528      Row Name 03/20/20 1435             Functional Mobility    Functional Mobility- Ind. Level  minimum assist (75% patient effort)  -AC      Functional Mobility- Device  rolling walker  -AC      Functional Mobility-Distance (Feet)  20  -AC      Functional Mobility- Comment  VCs to maneuver walker safely  -AC      Recorded by [AC] Sho Jimenez, OT 03/20/20 1528      Row Name 03/20/20 1435             Transfer Assessment/Treatment    Transfer Assessment/Treatment  sit-stand transfer;stand-sit transfer;toilet transfer  -AC      Recorded by [AC] Sho Jimenez, OT 03/20/20 1528      Row Name 03/20/20 1435             Sit-Stand Transfer    Sit-Stand [place] (Transfers)  verbal cues;minimum assist (75% patient effort)  -AC      Assistive Device (Sit-Stand Transfers)  walker, front-wheeled  -AC      Recorded by [AC] Sho Jimenez, OT 03/20/20 1528      Row Name 03/20/20 1435             Stand-Sit Transfer    Stand-Sit [place] (Transfers)  verbal cues;contact guard  -AC      Assistive Device (Stand-Sit Transfers)  walker, front-wheeled  -AC      Recorded by [AC] Sho Jimenez, OT 03/20/20 1528      Row Name 03/20/20 1435             Toilet Transfer    Type (Toilet Transfer)  sit-stand;stand-sit  -AC      [place] Level (Toilet Transfer)  minimum assist (75% patient effort)  -AC      Assistive Device (Toilet Transfer)  walker, front-wheeled  -AC      Recorded by [AC] Sho Jimenez, OT 03/20/20 1528      Row Name 03/20/20 1435             ADL Assessment/Intervention    30319 - OT Self Care/Mgmt Minutes  5  -AC      BADL Assessment/Intervention  toileting  -AC      Recorded by [AC] Sho Jimenez, OT 03/20/20 1528      Row Name 03/20/20 1435              Lower Body Dressing Assessment/Training    Comment (Lower Body Dressing)  pt declined LBD  -AC      Recorded by [AC] Sho Jimenez, OT 03/20/20 1528      Row Name 03/20/20 1435             Grooming Assessment/Training    Comment (Grooming)  pt declined grooming stating she just had a bath  -AC      Recorded by [AC] Sho Jimenez, OT 03/20/20 1528      Row Name 03/20/20 1435             Toileting Assessment/Training    [place] Level (Toileting)  perform perineal hygiene;supervision;adjust/manage clothing;minimum assist (75% patient effort)  -AC      Toileting Position  supported sitting;supported standing  -AC      Recorded by [AC] Sho Jimenez, OT 03/20/20 1528      Row Name 03/20/20 1435             BADL Safety/Performance    Impairments, BADL Safety/Performance  balance;cognition;endurance/activity tolerance;pain;strength  -AC      Recorded by [AC] Sho Jimenez, OT 03/20/20 1528      Row Name 03/20/20 1435             Therapeutic Exercise    60357 - OT Therapeutic Exercise Minutes  8  -AC      39719 - OT Therapeutic Activity Minutes  10  -AC      Recorded by [AC] Sho Jimenez, OT 03/20/20 1528      Row Name 03/20/20 1435             Therapeutic Exercise    Upper Extremity Range of Motion (Therapeutic Exercise)  shoulder flexion/extension, bilateral;shoulder horizontal abduction/adduction, bilateral;elbow flexion/extension, bilateral  -AC      Exercise Type (Therapeutic Exercise)  AROM (active range of motion)  -AC      Position (Therapeutic Exercise)  seated  -AC      Sets/Reps (Therapeutic Exercise)  1/15  -AC      Expected Outcome (Therapeutic Exercise)  improve functional tolerance, self-care activity  -AC      Recorded by [AC] Sho Jimenez, OT 03/20/20 1528      Row Name 03/20/20 1435             Positioning and Restraints    Pre-Treatment Position  in bed  -AC      Post Treatment Position  chair  -AC      In Chair  reclined;call light within reach;encouraged to call for assist;exit  alarm on  -AC      Recorded by [AC] Sho Jimenez, OT 03/20/20 1528      Row Name 03/20/20 1435             Pain Assessment    Additional Documentation  Pain Scale: Numbers Pre/Post-Treatment (Group)  -AC      Recorded by [AC] Sho Jimenez, OT 03/20/20 1528      Row Name 03/20/20 1435             Pain Scale: Numbers Pre/Post-Treatment    Pain Scale: Numbers, Pretreatment  3/10  -AC      Pain Scale: Numbers, Post-Treatment  5/10  -AC      Pain Location - Orientation  incisional  -AC      Pain Location  abdomen  -AC      Pain Intervention(s)  Repositioned  -AC      Recorded by [AC] Sho Jimenez, OT 03/20/20 1528      Row Name                Wound 03/17/20 1714 upper;midline abdomen Incision    Wound - Properties Group Date first assessed: 03/17/20 [EL] Time first assessed: 1714 [EL] Present on Hospital Admission: N [EL] Orientation: upper;midline [EL] Location: abdomen [EL] Primary Wound Type: Incision [EL] Recorded by:  [EL] Dominga Chavez RN 03/17/20 1714    Row Name 03/20/20 1435             Plan of Care Review    Plan of Care Reviewed With  patient  -AC      Outcome Summary  Pt  continues with some confusion and decreased safety awareness.  Pt required min A supine to sit,  min A with transfers and to ambulate 20 ft with RW.  Pt required supervsion with post toileting hygiene, min A clothing mgmt. Pt with good tolerance to complete 15 reps BUE AROM.  Continue with OT POC to advance pt toward increased independence and safety with self care.   -AC      Recorded by [AC] Sho Jimenez, OT 03/20/20 1528      Row Name 03/20/20 1435             Outcome Summary/Treatment Plan (OT)    Daily Summary of Progress (OT)  progress toward functional goals is good  -AC      Recorded by [AC] Sho Jimenez, OT 03/20/20 1528        User Key  (r) = Recorded By, (t) = Taken By, (c) = Cosigned By    Initials Name Effective Dates Discipline    Sho [place], OT 06/23/15 -  OT    Dominga Earl RN 04/10/19 -  Nurse         Wound 03/17/20 1714 upper;midline abdomen Incision (Active)   Dressing Appearance dry;intact 3/19/2020  8:00 PM   Closure Sutures 3/19/2020  8:00 PM   Base blanchable;clean;dry;pink 3/19/2020  8:00 PM   Periwound intact;dry;pink;blanchable 3/19/2020  8:00 PM   Periwound Temperature warm 3/19/2020  8:00 PM   Periwound Skin Turgor soft 3/19/2020  8:00 PM   Edges rolled/closed 3/19/2020  8:00 PM   Drainage Characteristics/Odor serosanguineous 3/19/2020  5:00 PM   Drainage Amount scant 3/19/2020  5:00 PM   Dressing Care, Wound dressing changed;low-adherent 3/19/2020  5:00 PM   Periwound Care, Wound dry periwound area maintained 3/19/2020  8:00 PM           OT Recommendation and Plan  Outcome Summary/Treatment Plan (OT)  Daily Summary of Progress (OT): progress toward functional goals is good  Anticipated Discharge Disposition (OT): home with assist, home with home health  Daily Summary of Progress (OT): progress toward functional goals is good  Plan of Care Review  Plan of Care Reviewed With: patient  Plan of Care Reviewed With: patient  Outcome Summary: Pt  continues with some confusion and decreased safety awareness.  Pt required min A supine to sit,  min A with transfers and to ambulate 20 ft with RW.  Pt required supervsion with post toileting hygiene, min A clothing mgmt. Pt with good tolerance to complete 15 reps BUE AROM.  Continue with OT POC to advance pt toward increased independence and safety with self care.   Outcome Measures     Row Name 03/20/20 1435 03/19/20 1345 03/18/20 1427       How much help from another is currently needed...    Putting on and taking off regular lower body clothing?  2  -AC  2  -AC  2  -AN    Bathing (including washing, rinsing, and drying)  2  -AC  2  -AC  2  -AN    Toileting (which includes using toilet bed pan or urinal)  3  -AC  3  -AC  2  -AN    Putting on and taking off regular upper body clothing  3  -AC  3  -AC  2  -AN    Taking care of personal grooming (such as  brushing teeth)  3  -AC  3  -AC  3  -AN    Eating meals  3  -AC  3 water and chips  -AC  3  -AN    AM-PAC 6 Clicks Score (OT)  16  -AC  16  -AC  14  -AN       Functional Assessment    Outcome Measure Options  --  AM-PAC 6 Clicks Daily Activity (OT)  -AC  AM-PAC 6 Clicks Daily Activity (OT)  -AN      User Key  (r) = Recorded By, (t) = Taken By, (c) = Cosigned By    Initials Name Provider Type     Sho Jimenez, OT Occupational Therapist    Airam Clark, OT Occupational Therapist           Time Calculation:   Time Calculation- OT     Row Name 03/20/20 1435 03/20/20 1055          Time Calculation- OT    OT Start Time  1435  -  --     OT Received On  03/20/20  -  --     OT Goal Re-Cert Due Date  03/28/20  -  --        Timed Charges    71135 - OT Therapeutic Exercise Minutes  8  -AC  --     51611 - Gait Training Minutes   --  11  -KM     68208 - OT Therapeutic Activity Minutes  10  -AC  --     51941 - OT Self Care/Mgmt Minutes  5  -AC  --       User Key  (r) = Recorded By, (t) = Taken By, (c) = Cosigned By    Initials Name Provider Type     Sho Jimenez, OT Occupational Therapist    Liliya Ahn, PT Physical Therapist        Therapy Charges for Today     Code Description Service Date Service Provider Modifiers Qty    70662024244 HC OT THERAPEUTIC ACT EA 15 MIN 3/19/2020 Sho Jimenez OT GO 1    26926198401 HC OT SELF CARE/MGMT/TRAIN EA 15 MIN 3/19/2020 Sho Jimenez, OT GO 2    50492968273 HC OT THER PROC EA 15 MIN 3/20/2020 Sho Jimenez, OT GO 1    09368214620 HC OT THERAPEUTIC ACT EA 15 MIN 3/20/2020 Sho Jimenez, OT GO 1               Sho Jimenez OT  3/20/2020

## 2020-03-20 NOTE — PROGRESS NOTES
[facility] Medicine Services  PROGRESS NOTE    Patient Name: Neema Richard  : 1940  MRN: [id number]    Date of Admission: 3/15/2020  Primary Care Physician: David Mackey MD    Subjective   Subjective     CC:  Abdominal pain, diverticulitis    HPI:  Daughter says she had a rough night, not sleeping well, feeling nauseous. She tried to eat breakfast this am but I told her to hold eating anything if she feels sick    Review of Systems  Gen- No fevers, chills  CV- No chest pain, palpitations  Resp- No cough, dyspnea  GI- + N no V/D, mild abd pain       Objective   Objective     Vital Signs:   Temp:  [97.7 °F (36.5 °C)-98 °F (36.7 °C)] 97.7 °F (36.5 °C)  Heart Rate:  [] 84  Resp:  [16-20] 20  BP: (143-153)/(67-74) 153/74        Physical Exam:  Constitutional: No acute distress, awake, alert  HENT: NCAT, mucous membranes moist  Respiratory: Clear to auscultation bilaterally, respiratory effort normal   Cardiovascular: RRR, no murmurs  Gastrointestinal: Positive bowel sounds, soft, tender, + ostomy with pink stoma, no output  Musculoskeletal: No bilateral ankle edema  Psychiatric: Appropriate affect, cooperative  Neurologic: Oriented x 3, strength symmetric in all extremities, speech clear  Skin: No rashes    Results Reviewed:  Results from last 7 days   Lab Units 20  0604 20  0515 20  0444   WBC 10*3/mm3 17.68* 14.82* 11.97*   HEMOGLOBIN g/dL 9.6* 9.1* 9.8*   HEMATOCRIT % 29.8* 28.8* 31.2*   PLATELETS 10*3/mm3 401 345 334     Results from last 7 days   Lab Units 20  0515 20  0539 20  0451  03/15/20  1638   SODIUM mmol/L 143 142 139   < > 138   POTASSIUM mmol/L 4.2 4.1 3.9   < > 4.0   CHLORIDE mmol/L 113* 109* 108*   < > 102   CO2 mmol/L 19.0* 19.0* 19.0*   < > 21.0*   BUN mg/dL 41* 30* 28*   < > 21   CREATININE mg/dL 0.61 0.73 0.68   < > 0.97   GLUCOSE mg/dL 95 118* 94   < > 257*   CALCIUM mg/dL 8.0* 8.4* 8.6   < > 8.6   ALT (SGPT) U/L  --   --    --   --  9   AST (SGOT) U/L  --   --   --   --  16    < > = values in this interval not displayed.     Estimated Creatinine Clearance: 56 mL/min (by C-G formula based on SCr of 0.61 mg/dL).    Microbiology Results Abnormal     Procedure Component Value - Date/Time    Wound Culture - Swab, Peritoneum [[id number]] Collected:  03/17/20 1559    Lab Status:  Final result Specimen:  Swab from Peritoneum Updated:  03/20/20 0805     Wound Culture Light growth (2+) Normal Fecal Syeda     Gram Stain Many (4+) Red blood cells      Moderate (3+) WBCs seen      No organisms seen    Anaerobic Culture - Swab, Peritoneum [[id number]] Collected:  03/17/20 1559    Lab Status:  Preliminary result Specimen:  Swab from Peritoneum Updated:  03/20/20 0709     Anaerobic Culture No anaerobes isolated at 3 days    Blood Culture - Blood, Arm, Right [[id number]] Collected:  03/16/20 0018    Lab Status:  Preliminary result Specimen:  Blood from Arm, Right Updated:  03/20/20 0515     Blood Culture No growth at 4 days    Blood Culture - Blood, Arm, Left [[id number]] Collected:  03/16/20 0018    Lab Status:  Preliminary result Specimen:  Blood from Arm, Left Updated:  03/20/20 0515     Blood Culture No growth at 4 days    AFB Culture - Swab, Peritoneum [[id number]] Collected:  03/17/20 1559    Lab Status:  Preliminary result Specimen:  Swab from Peritoneum Updated:  03/18/20 1240     AFB Stain No acid fast bacilli seen on concentrated smear          Imaging Results (Last 24 Hours)     ** No results found for the last 24 hours. **               I have reviewed the medications:  Scheduled Meds:  acetaminophen 650 mg Oral Q8H   busPIRone 15 mg Oral Daily   calcitonin (salmon) 1 spray Alternating Nares Daily   ertapenem 1 g Intravenous Q24H   gabapentin 300 mg Oral Nightly   heparin (porcine) 5,000 Units Subcutaneous Q8H   levothyroxine 88 mcg Oral Q AM   metoprolol succinate  mg Oral Daily   Mirabegron ER 50 mg Oral Daily   sodium chloride 500 mL  Intravenous Once   sodium chloride 10 mL Intravenous Q12H   sucralfate 1 g Oral BID AC     Continuous Infusions:  sodium chloride 0.9 % with KCl 20 mEq 100 mL/hr Last Rate: 100 mL/hr (03/20/20 0807)     PRN Meds:.diazePAM  •  HYDROcodone-acetaminophen  •  HYDROmorphone **AND** naloxone  •  LORazepam  •  Morphine **OR** Morphine  •  ondansetron  •  sodium chloride  •  sodium chloride  •  sodium chloride  •  sodium chloride    Assessment/Plan   Assessment & Plan     Active Hospital Problems    Diagnosis  POA   • **Perforation of sigmoid colon due to diverticulitis [K57.20]  Unknown   • Acute diverticulitis [K57.92]  Yes   • Sepsis (CMS/HCC) [A41.9]  Yes   • Lactic acidosis [E87.2]  Unknown   • Neutrophilic leukocytosis [D72.9]  Unknown   • Acute kidney injury (CMS/HCC) [N17.9]  Unknown   • Acute hyperglycemia [R73.9]  Unknown   • Arthritis [M19.90]  Unknown   • Overactive bladder [N32.81]  Unknown   • Dyspepsia [R10.13]  Unknown   • Essential hypertension [I10]  Unknown   • Acquired hypothyroidism [E03.9]  Yes      Resolved Hospital Problems   No resolved problems to display.        Brief Hospital Course to date:  Neema Richard is a 79 y.o. female  admitted for diverticulitis S microperforation and sepsis.  She underwent repeat CT imaging concerning for free air.  She went to surgery last night and is post-op day 2 of transverse colostomy.  Her white count is up today.  She is feeling more nauseous. Currently on invanz per ID      Leukocytosis  Worsening  Continue ertapenem  ID following    Sepsis resolved    Diverticulitis with free air on CT  S/p drainage of lower abdominal abcess, creation of distal transverse colostomy  Continue Invanz for total of 7 days     Dyspnea  Decrease IVF to 50 cc/hr, if worsening will heplock     HTN  Controlled continue current medicines    Hypothyroid  Continue levothyroxine    Arthritis  Continue gabapentin  Will need HHC at discharge     Overactive bladder  Dyspepsia     DVT  Prophylaxis:  heparin    Disposition: I expect the patient to be discharged once stable from surgery standpoint    CODE STATUS:   Code Status and Medical Interventions:   Ordered at: 03/17/20 1903     Level Of Support Discussed With:    Patient     Code Status:    CPR     Medical Interventions (Level of Support Prior to Arrest):    Full         Electronically signed by Filomena Stuart DO, 03/20/20, 13:45.

## 2020-03-20 NOTE — PLAN OF CARE
Problem: Patient Care Overview  Goal: Plan of Care Review  Flowsheets (Taken 3/20/2020 9073)  Plan of Care Reviewed With: patient  Outcome Summary: Pt  continues with some confusion and decreased safety awareness.  Pt required min A supine to sit,  min A with transfers and to ambulate 20 ft with RW.  Pt required supervsion with post toileting hygiene, min A clothing mgmt. Pt with good tolerance to complete 15 reps BUE AROM.  Continue with OT POC to advance pt toward increased independence and safety with self care.

## 2020-03-20 NOTE — NURSING NOTE
Appliance is intact. Stoma looks good.   Some flatus. No output.     Patient stated that she had a very bad night.   Very drowsy at this time. Education not appropriate.     Pain is managed at this time.  Encouraged ambulation.  Questions answered and encouragement provided.     Will need education over the weekend or Monday depending on how she is doing.     Thanks

## 2020-03-20 NOTE — THERAPY TREATMENT NOTE
Patient Name: Neema Richard  : 1940    MRN: [id number]                              Today's Date: 3/20/2020       Admit Date: 3/15/2020    Visit Dx:     ICD-10-CM ICD-9-CM   1. Peritonitis (CMS/HCC) K65.9 567.9   2. Diverticulitis K57.92 562.11     Patient Active Problem List   Diagnosis   • Well woman exam with routine gynecological exam   • Osteoporosis - GYN   • Essential hypertension   • Acquired hypothyroidism   • IBS-d (takes Buspar)   • DCIS (ductal carcinoma in situ)   • Cystocele and rectocele with complete uterovaginal prolapse   • Mixed urge and stress incontinence   • Pessary maintenance   • High cholesterol   • Acute diverticulitis   • Sepsis (CMS/HCC)   • Lactic acidosis   • Neutrophilic leukocytosis   • Perforation of sigmoid colon due to diverticulitis   • Acute kidney injury (CMS/HCC)   • Acute hyperglycemia   • Arthritis   • Overactive bladder   • Dyspepsia     Past Medical History:   Diagnosis Date   • Acquired hypothyroidism    • Colon polyp - sees Dr. Vazquez 2016   • DCIS (ductal carcinoma in situ) 2017    Dx at the time of breast reduction. ER and CA (+)   • Essential hypertension    • High cholesterol    • Hx of herpes genitalis    • IBS-d (takes Buspar)      Past Surgical History:   Procedure Laterality Date   • BILATERAL BREAST REDUCTION Bilateral    • BREAST LUMPECTOMY Left    • CATARACT EXTRACTION, BILATERAL Bilateral    • CERVICAL CONIZATION     • COLON RESECTION N/A 3/17/2020    Procedure: LOW ANTERIOR COLON RESECTION, MOBILIZATION OF SPLENIC FLEXURE, DRAINAGE OF ABSCESS AND COLOSTOMY CREATION;  Surgeon: Sravan Vazquez MD;  Location: [facility];  Service: General;  Laterality: N/A;   • FACIAL COSMETIC SURGERY     • FACIAL COSMETIC SURGERY     • HEMORRHOIDECTOMY     • LAPAROSCOPIC CHOLECYSTECTOMY     • REDUCTION MAMMAPLASTY Bilateral 2017    DCIS   • SINUS SURGERY     • THYROIDECTOMY, PARTIAL  1963   • TONSILLECTOMY   1953   • VARICOSE VEIN SURGERY Bilateral 1981     General Information     Row Name 03/20/20 1045          PT Evaluation Time/Intention    Document Type  therapy note (daily note)  -KM     Mode of Treatment  physical therapy  -     Row Name 03/20/20 1045          General Information    Patient Profile Reviewed?  yes  -KM     Existing Precautions/Restrictions  fall RADHA drain, ostomy, abdominal incision  -KM     Row Name 03/20/20 1045          Cognitive Assessment/Intervention- PT/OT    Orientation Status (Cognition)  oriented x 3  -KM     Row Name 03/20/20 1045          Safety Issues, Functional Mobility    Safety Issues Affecting Function (Mobility)  safety precaution awareness;safety precautions follow-through/compliance  -KM     Impairments Affecting Function (Mobility)  balance;cognition;pain;strength  -KM       User Key  (r) = Recorded By, (t) = Taken By, (c) = Cosigned By    Initials Name Provider Type    KM Liliya Mendoza, PT Physical Therapist        Mobility     Row Name 03/20/20 1048          Bed Mobility Assessment/Treatment    Bed Mobility Assessment/Treatment  sit-supine;scooting/bridging  -KM     Scooting/Bridging [place] (Bed Mobility)  minimum assist (75% patient effort)  -KM     Supine-Sit [place] (Bed Mobility)  minimum assist (75% patient effort)  -KM     Assistive Device (Bed Mobility)  bed rails;head of bed elevated  -     Row Name 03/20/20 1048          Sit-Stand Transfer    Sit-Stand [place] (Transfers)  verbal cues;minimum assist (75% patient effort)  -KM     Assistive Device (Sit-Stand Transfers)  walker, front-wheeled  -KM     Row Name 03/20/20 1048          Gait/Stairs Assessment/Training    Gait/Stairs Assessment/Training  gait/ambulation independence;gait/ambulation assistive device;distance ambulated  -KM     [place] Level (Gait)  contact guard assist with iv pole and O2  -KM     Assistive Device (Gait)  walker, front-wheeled  -KM     Distance in Feet (Gait)   30  -KM     Pattern (Gait)  step-through  -KM     Deviations/Abnormal Patterns (Gait)  gait speed decreased;altagracia decreased;base of support, narrow  -KM     Bilateral Gait Deviations  forward flexed posture  -KM     Comment (Gait/Stairs)  Pt ambul bed to bathroom and to recliner with c.g.assist and cues for sequencing r wx. LImited by fatigue and pain  -KM       User Key  (r) = Recorded By, (t) = Taken By, (c) = Cosigned By    Initials Name Provider Type    Liliya Ahn, PT Physical Therapist        Obj/Interventions     Row Name 03/20/20 1051          Therapeutic Exercise    Lower Extremity (Therapeutic Exercise)  gastroc stretch, bilateral  -KM     Lower Extremity Range of Motion (Therapeutic Exercise)  ankle dorsiflexion/plantar flexion, bilateral;hip internal/external rotation, bilateral  -KM     Exercise Type (Therapeutic Exercise)  AROM (active range of motion)  -KM     Position (Therapeutic Exercise)  seated  -KM     Sets/Reps (Therapeutic Exercise)  10x  -KM     Row Name 03/20/20 1051          Static Sitting Balance    Level of [place] (Unsupported Sitting, Static Balance)  standby assist  -KM     Sitting Position (Unsupported Sitting, Static Balance)  sitting on edge of bed  -KM     Row Name 03/20/20 1051          Static Standing Balance    Level of [place] (Supported Standing, Static Balance)  contact guard assist  -KM     Assistive Device Utilized (Supported Standing, Static Balance)  walker, rolling  -KM     Row Name 03/20/20 1051          Dynamic Standing Balance    Level of [place], Reaches Outside Midline (Standing, Dynamic Balance)  minimal assist, 75% patient effort  -KM     Assistive Device Utilized (Supported Standing, Dynamic Balance)  walker, rolling  -KM       User Key  (r) = Recorded By, (t) = Taken By, (c) = Cosigned By    Initials Name Provider Type    Liliya Ahn, PT Physical Therapist        Goals/Plan    No documentation.       Clinical  Impression     Row Name 03/20/20 1052          Pain Scale: Numbers Pre/Post-Treatment    Pain Scale: Numbers, Pretreatment  3/10  -KM     Pain Scale: Numbers, Post-Treatment  3/10  -KM     Pain Location - Orientation  incisional  -KM     Pain Location  abdomen  -KM     Row Name 03/20/20 1052          Plan of Care Review    Plan of Care Reviewed With  patient;caregiver  -KM     Progress  improving  -KM     Outcome Summary  Pt alert and following directives, transitions to eob with cues and min assist, transfers and ambul with c.g.assist 30 ft with r wx. Fatigues with activity but demonstrating progress  -KM     Row Name 03/20/20 1052          Positioning and Restraints    Pre-Treatment Position  in bed  -KM     Post Treatment Position  chair  -KM     In Chair  reclined;call light within reach;encouraged to call for assist;exit alarm on;with family/caregiver  -KM       User Key  (r) = Recorded By, (t) = Taken By, (c) = Cosigned By    Initials Name Provider Type    Liliya Ahn, PT Physical Therapist        Outcome Measures     Row Name 03/20/20 1054          How much help from another person do you currently need...    Turning from your back to your side while in flat bed without using bedrails?  3  -KM     Moving from lying on back to sitting on the side of a flat bed without bedrails?  3  -KM     Moving to and from a bed to a chair (including a wheelchair)?  3  -KM     Standing up from a chair using your arms (e.g., wheelchair, bedside chair)?  3  -KM     Climbing 3-5 steps with a railing?  2  -KM     To walk in hospital room?  3  -KM     AM-PAC 6 Clicks Score (PT)  17  -KM     Row Name 03/20/20 1054          Functional Assessment    Outcome Measure Options  AM-PAC 6 Clicks Basic Mobility (PT)  -KM       User Key  (r) = Recorded By, (t) = Taken By, (c) = Cosigned By    Initials Name Provider Type    Liliya Ahn, PT Physical Therapist          PT Recommendation and Plan     Plan of Care  Reviewed With: patient  Progress: improving  Outcome Summary: Pt alert and following directives, transitions to eob with cues and min assist, transfers and ambul with c.g.assist 30 ft with r wx. Fatigues with activity but demonstrating progress     Time Calculation:   PT Charges     Row Name 03/20/20 1055             Time Calculation    Start Time  0846  -KM      PT Received On  03/20/20  -KM      PT Goal Re-Cert Due Date  03/28/20  -KM         Time Calculation- PT    Total Timed Code Minutes- PT  23 minute(s)  -KM         Timed Charges    95916 - Gait Training Minutes   11  -KM      94957 - PT Therapeutic Activity Minutes  12  -KM        User Key  (r) = Recorded By, (t) = Taken By, (c) = Cosigned By    Initials Name Provider Type    Liliya Ahn, PT Physical Therapist        Therapy Charges for Today     Code Description Service Date Service Provider Modifiers Qty    41187036743 HC GAIT TRAINING EA 15 MIN 3/20/2020 Liliya Mendoza, PT GP 1    82808146573 HC PT THERAPEUTIC ACT EA 15 MIN 3/20/2020 Liliya Mendoza, PT GP 1          PT G-Codes  Outcome Measure Options: AM-PAC 6 Clicks Basic Mobility (PT)  AM-PAC 6 Clicks Score (PT): 17  AM-PAC 6 Clicks Score (OT): 16    Liliya Mendoza PT  3/20/2020

## 2020-03-21 LAB
ALBUMIN SERPL-MCNC: 2 G/DL (ref 3.5–5.2)
ALBUMIN SERPL-MCNC: 2 G/DL (ref 3.5–5.2)
ALBUMIN/GLOB SERPL: 0.7 G/DL
ALP SERPL-CCNC: 68 U/L (ref 39–117)
ALT SERPL W P-5'-P-CCNC: 7 U/L (ref 1–33)
ANION GAP SERPL CALCULATED.3IONS-SCNC: 11 MMOL/L (ref 5–15)
ANION GAP SERPL CALCULATED.3IONS-SCNC: 13 MMOL/L (ref 5–15)
AST SERPL-CCNC: 13 U/L (ref 1–32)
B PARAPERT DNA SPEC QL NAA+PROBE: NOT DETECTED
B PERT DNA SPEC QL NAA+PROBE: NOT DETECTED
BACTERIA SPEC AEROBE CULT: NORMAL
BACTERIA SPEC AEROBE CULT: NORMAL
BASOPHILS # BLD AUTO: 0.14 10*3/MM3 (ref 0–0.2)
BASOPHILS NFR BLD AUTO: 0.8 % (ref 0–1.5)
BILIRUB SERPL-MCNC: 0.2 MG/DL (ref 0.2–1.2)
BUN BLD-MCNC: 19 MG/DL (ref 8–23)
BUN BLD-MCNC: 21 MG/DL (ref 8–23)
BUN/CREAT SERPL: 52.5 (ref 7–25)
BUN/CREAT SERPL: 55.9 (ref 7–25)
BURR CELLS BLD QL SMEAR: NORMAL
C PNEUM DNA NPH QL NAA+NON-PROBE: NOT DETECTED
CA-I SERPL ISE-MCNC: 1.22 MMOL/L (ref 1.12–1.32)
CALCIUM SPEC-SCNC: 7.5 MG/DL (ref 8.6–10.5)
CALCIUM SPEC-SCNC: 8 MG/DL (ref 8.6–10.5)
CHLORIDE SERPL-SCNC: 106 MMOL/L (ref 98–107)
CHLORIDE SERPL-SCNC: 108 MMOL/L (ref 98–107)
CHOLEST SERPL-MCNC: 70 MG/DL (ref 0–200)
CO2 SERPL-SCNC: 20 MMOL/L (ref 22–29)
CO2 SERPL-SCNC: 21 MMOL/L (ref 22–29)
CREAT BLD-MCNC: 0.34 MG/DL (ref 0.57–1)
CREAT BLD-MCNC: 0.4 MG/DL (ref 0.57–1)
CRP SERPL-MCNC: 19.18 MG/DL (ref 0–0.5)
CRP SERPL-MCNC: 19.98 MG/DL (ref 0–0.5)
DEPRECATED RDW RBC AUTO: 49.7 FL (ref 37–54)
EOSINOPHIL # BLD AUTO: 0.73 10*3/MM3 (ref 0–0.4)
EOSINOPHIL NFR BLD AUTO: 4.4 % (ref 0.3–6.2)
ERYTHROCYTE [DISTWIDTH] IN BLOOD BY AUTOMATED COUNT: 14.5 % (ref 12.3–15.4)
FLUAV H1 2009 PAND RNA NPH QL NAA+PROBE: NOT DETECTED
FLUAV H1 HA GENE NPH QL NAA+PROBE: NOT DETECTED
FLUAV H3 RNA NPH QL NAA+PROBE: NOT DETECTED
FLUAV SUBTYP SPEC NAA+PROBE: NOT DETECTED
FLUBV RNA ISLT QL NAA+PROBE: NOT DETECTED
GFR SERPL CREATININE-BSD FRML MDRD: >150 ML/MIN/1.73
GFR SERPL CREATININE-BSD FRML MDRD: >150 ML/MIN/1.73
GLOBULIN UR ELPH-MCNC: 2.7 GM/DL
GLUCOSE BLD-MCNC: 88 MG/DL (ref 65–99)
GLUCOSE BLD-MCNC: 92 MG/DL (ref 65–99)
GLUCOSE BLDC GLUCOMTR-MCNC: 81 MG/DL (ref 70–130)
HADV DNA SPEC NAA+PROBE: NOT DETECTED
HCOV 229E RNA SPEC QL NAA+PROBE: NOT DETECTED
HCOV HKU1 RNA SPEC QL NAA+PROBE: NOT DETECTED
HCOV NL63 RNA SPEC QL NAA+PROBE: NOT DETECTED
HCOV OC43 RNA SPEC QL NAA+PROBE: NOT DETECTED
HCT VFR BLD AUTO: 32.2 % (ref 34–46.6)
HGB BLD-MCNC: 9.9 G/DL (ref 12–15.9)
HMPV RNA NPH QL NAA+NON-PROBE: NOT DETECTED
HPIV1 RNA SPEC QL NAA+PROBE: NOT DETECTED
HPIV2 RNA SPEC QL NAA+PROBE: NOT DETECTED
HPIV3 RNA NPH QL NAA+PROBE: NOT DETECTED
HPIV4 P GENE NPH QL NAA+PROBE: NOT DETECTED
IMM GRANULOCYTES # BLD AUTO: 0.95 10*3/MM3 (ref 0–0.05)
IMM GRANULOCYTES NFR BLD AUTO: 5.8 % (ref 0–0.5)
LDH SERPL-CCNC: 243 U/L (ref 135–214)
LYMPHOCYTES # BLD AUTO: 2.33 10*3/MM3 (ref 0.7–3.1)
LYMPHOCYTES NFR BLD AUTO: 14.1 % (ref 19.6–45.3)
M PNEUMO IGG SER IA-ACNC: NOT DETECTED
MAGNESIUM SERPL-MCNC: 1.6 MG/DL (ref 1.6–2.4)
MAGNESIUM SERPL-MCNC: 1.8 MG/DL (ref 1.6–2.4)
MCH RBC QN AUTO: 28.9 PG (ref 26.6–33)
MCHC RBC AUTO-ENTMCNC: 30.7 G/DL (ref 31.5–35.7)
MCV RBC AUTO: 93.9 FL (ref 79–97)
MONOCYTES # BLD AUTO: 1.38 10*3/MM3 (ref 0.1–0.9)
MONOCYTES NFR BLD AUTO: 8.4 % (ref 5–12)
MRSA DNA SPEC QL NAA+PROBE: NEGATIVE
NEUTROPHILS # BLD AUTO: 10.96 10*3/MM3 (ref 1.7–7)
NEUTROPHILS NFR BLD AUTO: 66.5 % (ref 42.7–76)
NRBC BLD AUTO-RTO: 0 /100 WBC (ref 0–0.2)
NT-PROBNP SERPL-MCNC: 2558 PG/ML (ref 5–1800)
PHOSPHATE SERPL-MCNC: 2.8 MG/DL (ref 2.5–4.5)
PHOSPHATE SERPL-MCNC: 3.4 MG/DL (ref 2.5–4.5)
PLAT MORPH BLD: NORMAL
PLATELET # BLD AUTO: 418 10*3/MM3 (ref 140–450)
PMV BLD AUTO: 9.6 FL (ref 6–12)
POTASSIUM BLD-SCNC: 4.1 MMOL/L (ref 3.5–5.2)
POTASSIUM BLD-SCNC: 4.5 MMOL/L (ref 3.5–5.2)
PREALB SERPL-MCNC: 3.7 MG/DL (ref 20–40)
PROCALCITONIN SERPL-MCNC: 1.67 NG/ML (ref 0.1–0.25)
PROT SERPL-MCNC: 4.7 G/DL (ref 6–8.5)
RBC # BLD AUTO: 3.43 10*6/MM3 (ref 3.77–5.28)
RHINOVIRUS RNA SPEC NAA+PROBE: NOT DETECTED
RSV RNA NPH QL NAA+NON-PROBE: NOT DETECTED
S PNEUM AG SPEC QL LA: NEGATIVE
SODIUM BLD-SCNC: 139 MMOL/L (ref 136–145)
SODIUM BLD-SCNC: 140 MMOL/L (ref 136–145)
TOXIC GRANULATION: NORMAL
TRIGL SERPL-MCNC: 172 MG/DL (ref 0–150)
WBC NRBC COR # BLD: 16.49 10*3/MM3 (ref 3.4–10.8)

## 2020-03-21 PROCEDURE — 99233 SBSQ HOSP IP/OBS HIGH 50: CPT | Performed by: INTERNAL MEDICINE

## 2020-03-21 PROCEDURE — 83615 LACTATE (LD) (LDH) ENZYME: CPT | Performed by: INTERNAL MEDICINE

## 2020-03-21 PROCEDURE — 85007 BL SMEAR W/DIFF WBC COUNT: CPT | Performed by: COLON & RECTAL SURGERY

## 2020-03-21 PROCEDURE — 80069 RENAL FUNCTION PANEL: CPT | Performed by: INTERNAL MEDICINE

## 2020-03-21 PROCEDURE — 83735 ASSAY OF MAGNESIUM: CPT

## 2020-03-21 PROCEDURE — 25010000002 MAGNESIUM SULFATE PER 500 MG OF MAGNESIUM

## 2020-03-21 PROCEDURE — 86140 C-REACTIVE PROTEIN: CPT

## 2020-03-21 PROCEDURE — 25010000003 MAGNESIUM SULFATE 4 GM/100ML SOLUTION: Performed by: INTERNAL MEDICINE

## 2020-03-21 PROCEDURE — 87040 BLOOD CULTURE FOR BACTERIA: CPT | Performed by: INTERNAL MEDICINE

## 2020-03-21 PROCEDURE — 84100 ASSAY OF PHOSPHORUS: CPT

## 2020-03-21 PROCEDURE — 86140 C-REACTIVE PROTEIN: CPT | Performed by: INTERNAL MEDICINE

## 2020-03-21 PROCEDURE — 82962 GLUCOSE BLOOD TEST: CPT

## 2020-03-21 PROCEDURE — 25010000002 HEPARIN (PORCINE) PER 1000 UNITS: Performed by: COLON & RECTAL SURGERY

## 2020-03-21 PROCEDURE — 25010000002 VANCOMYCIN 10 G RECONSTITUTED SOLUTION

## 2020-03-21 PROCEDURE — 25810000003 SODIUM CHLORIDE 0.9 % WITH KCL 20 MEQ 20-0.9 MEQ/L-% SOLUTION: Performed by: COLON & RECTAL SURGERY

## 2020-03-21 PROCEDURE — 85025 COMPLETE CBC W/AUTO DIFF WBC: CPT | Performed by: COLON & RECTAL SURGERY

## 2020-03-21 PROCEDURE — 25010000002 ERTAPENEM PER 500 MG: Performed by: COLON & RECTAL SURGERY

## 2020-03-21 PROCEDURE — 82330 ASSAY OF CALCIUM: CPT

## 2020-03-21 PROCEDURE — 80053 COMPREHEN METABOLIC PANEL: CPT

## 2020-03-21 PROCEDURE — U0002 COVID-19 LAB TEST NON-CDC: HCPCS | Performed by: INTERNAL MEDICINE

## 2020-03-21 PROCEDURE — 25010000002 ONDANSETRON PER 1 MG: Performed by: COLON & RECTAL SURGERY

## 2020-03-21 PROCEDURE — 82465 ASSAY BLD/SERUM CHOLESTEROL: CPT

## 2020-03-21 PROCEDURE — 83880 ASSAY OF NATRIURETIC PEPTIDE: CPT | Performed by: INTERNAL MEDICINE

## 2020-03-21 PROCEDURE — 87635 SARS-COV-2 COVID-19 AMP PRB: CPT | Performed by: INTERNAL MEDICINE

## 2020-03-21 PROCEDURE — 84145 PROCALCITONIN (PCT): CPT | Performed by: INTERNAL MEDICINE

## 2020-03-21 PROCEDURE — 25010000002 POTASSIUM CHLORIDE PER 2 MEQ OF POTASSIUM

## 2020-03-21 PROCEDURE — 25010000002 CALCIUM GLUCONATE PER 10 ML

## 2020-03-21 PROCEDURE — 84134 ASSAY OF PREALBUMIN: CPT

## 2020-03-21 PROCEDURE — 84478 ASSAY OF TRIGLYCERIDES: CPT

## 2020-03-21 PROCEDURE — 87899 AGENT NOS ASSAY W/OPTIC: CPT | Performed by: INTERNAL MEDICINE

## 2020-03-21 RX ORDER — MAGNESIUM SULFATE HEPTAHYDRATE 40 MG/ML
2 INJECTION, SOLUTION INTRAVENOUS AS NEEDED
Status: DISCONTINUED | OUTPATIENT
Start: 2020-03-21 | End: 2020-03-26 | Stop reason: HOSPADM

## 2020-03-21 RX ORDER — MAGNESIUM SULFATE HEPTAHYDRATE 40 MG/ML
4 INJECTION, SOLUTION INTRAVENOUS AS NEEDED
Status: DISCONTINUED | OUTPATIENT
Start: 2020-03-21 | End: 2020-03-26 | Stop reason: HOSPADM

## 2020-03-21 RX ADMIN — MAGNESIUM SULFATE 4 G: 4 INJECTION INTRAVENOUS at 16:52

## 2020-03-21 RX ADMIN — CALCIUM GLUCONATE: 94 INJECTION, SOLUTION INTRAVENOUS at 17:01

## 2020-03-21 RX ADMIN — SUCRALFATE 1 G: 1 TABLET ORAL at 16:53

## 2020-03-21 RX ADMIN — GABAPENTIN 300 MG: 300 CAPSULE ORAL at 21:07

## 2020-03-21 RX ADMIN — DOXYCYCLINE 100 MG: 100 INJECTION, POWDER, LYOPHILIZED, FOR SOLUTION INTRAVENOUS at 21:08

## 2020-03-21 RX ADMIN — SMOFLIPID 200 ML: 6; 6; 5; 3 INJECTION, EMULSION INTRAVENOUS at 17:02

## 2020-03-21 RX ADMIN — MIRABEGRON 50 MG: 50 TABLET, FILM COATED, EXTENDED RELEASE ORAL at 21:07

## 2020-03-21 RX ADMIN — POTASSIUM CHLORIDE AND SODIUM CHLORIDE 75 ML/HR: 900; 150 INJECTION, SOLUTION INTRAVENOUS at 11:23

## 2020-03-21 RX ADMIN — ERTAPENEM SODIUM 1 G: 1 INJECTION, POWDER, LYOPHILIZED, FOR SOLUTION INTRAMUSCULAR; INTRAVENOUS at 09:52

## 2020-03-21 RX ADMIN — ACETAMINOPHEN 650 MG: 325 TABLET, FILM COATED ORAL at 21:07

## 2020-03-21 RX ADMIN — HEPARIN SODIUM 5000 UNITS: 5000 INJECTION, SOLUTION INTRAVENOUS; SUBCUTANEOUS at 13:45

## 2020-03-21 RX ADMIN — ACETAMINOPHEN 650 MG: 325 TABLET, FILM COATED ORAL at 13:46

## 2020-03-21 RX ADMIN — CALCITONIN SALMON 1 SPRAY: 200 SPRAY, METERED NASAL at 09:53

## 2020-03-21 RX ADMIN — DOXYCYCLINE 100 MG: 100 INJECTION, POWDER, LYOPHILIZED, FOR SOLUTION INTRAVENOUS at 12:14

## 2020-03-21 RX ADMIN — SODIUM CHLORIDE, PRESERVATIVE FREE 10 ML: 5 INJECTION INTRAVENOUS at 21:08

## 2020-03-21 RX ADMIN — SODIUM CHLORIDE, PRESERVATIVE FREE 10 ML: 5 INJECTION INTRAVENOUS at 09:53

## 2020-03-21 RX ADMIN — LEVOTHYROXINE SODIUM 88 MCG: 88 TABLET ORAL at 05:59

## 2020-03-21 RX ADMIN — METOPROLOL SUCCINATE 100 MG: 100 TABLET, EXTENDED RELEASE ORAL at 09:52

## 2020-03-21 RX ADMIN — VANCOMYCIN HYDROCHLORIDE 1250 MG: 10 INJECTION, POWDER, LYOPHILIZED, FOR SOLUTION INTRAVENOUS at 02:11

## 2020-03-21 RX ADMIN — ONDANSETRON 4 MG: 2 INJECTION INTRAMUSCULAR; INTRAVENOUS at 13:46

## 2020-03-21 RX ADMIN — ACETAMINOPHEN 650 MG: 325 TABLET, FILM COATED ORAL at 05:59

## 2020-03-21 RX ADMIN — HEPARIN SODIUM 5000 UNITS: 5000 INJECTION, SOLUTION INTRAVENOUS; SUBCUTANEOUS at 21:07

## 2020-03-21 RX ADMIN — BUSPIRONE HYDROCHLORIDE 15 MG: 10 TABLET ORAL at 21:07

## 2020-03-21 RX ADMIN — SUCRALFATE 1 G: 1 TABLET ORAL at 09:52

## 2020-03-21 RX ADMIN — HEPARIN SODIUM 5000 UNITS: 5000 INJECTION, SOLUTION INTRAVENOUS; SUBCUTANEOUS at 05:59

## 2020-03-21 NOTE — PROGRESS NOTES
"Adult Nutrition  Assessment/PES    Patient Name:  Neema Richard  YOB: 1940  MRN: [id number]  Admit Date:  3/15/2020    Assessment Date:  3/21/2020    Comments:      Reason for Assessment     Row Name 03/21/20 1240          Reason for Assessment    Reason For Assessment  other (see comments) RECEIVED CALL FROM [facility] RE: STARTING TPN.      Diagnosis  -- SEE PREVIOUS NUTRITION NOTES. POSSIBLE PNA           Anthropometrics     Row Name 03/21/20 1242          Anthropometrics    Height  170.2 cm (67\")        Ideal Body Weight (IBW)    Ideal Body Weight (IBW) (kg)  61.86         Labs/Tests/Procedures/Meds     Row Name 03/21/20 1241          Labs/Procedures/Meds    Lab Results Reviewed  reviewed        Medications    Pertinent Medications Reviewed  reviewed         Physical Findings     Row Name 03/21/20 1242          Physical Findings    Gastrointestinal  other (see comments) HYPO BS. ABD DISTENDED & TENDER.      Skin  surgical incision PER NURSING DOCUMENTATION         Estimated/Assessed Needs     Row Name 03/21/20 1242          Calculation Measurements    Weight Used For Calculations  61.2 kg (135 lb)     Height  170.2 cm (67\")        Estimated/Assessed Needs    Additional Documentation  Calpine-St. Jeor Equation (Group);Calorie Requirements (Group);Protein Requirements (Group);Fluid Requirements (Group)        Calorie Requirements    Estimated Calorie Requirement (kcal/day)  1471     Estimated Calorie Need Method  Calpine-St Jeor        Calpine-St. Jeor Equation    RMR (Calpine-St. Jeor Equation)  1124.985        Protein Requirements    Weight Used For Protein Calculations  61.2 kg (135 lb)     Est Protein Requirement Amount (gms/kg)  -- 1.25-1.5 G/KG/DAY. EST PROTEIN NEEDS RANGE: 77-92 G/DAY         Fluid Requirements    Estimated Fluid Requirements (mL/day)  1842     Estimated Fluid Requirement Method  RDA Method 30 ML/KG/DAY     RDA Method (mL)  1842               Nutrition Prescription Ordered     Row " Name 03/21/20 1248          Nutrition Prescription PO    Current PO Diet  NPO                 Problem/Interventions:  Problem 1     Row Name 03/21/20 1248          Nutrition Diagnoses Problem 1    Problem 1  Inadequate Nutrient Intake     Etiology (related to)  MNT for Treatment/Condition     Signs/Symptoms (evidenced by)  NPO               Intervention Goal     Row Name 03/21/20 1248          Intervention Goal    General  Nutrition support treatment     TF/PN  Inititiate TF/PN         Nutrition Intervention     Row Name 03/21/20 1248          Nutrition Intervention    RD/Tech Action  Care plan reviewd;Follow Tx progress;Recommend/ordered     Recommended/Ordered  PN         Nutrition Prescription     Row Name 03/21/20 1248          Nutrition Prescription PN    Parenteral Prescription  PN begin/change;Parenteral to supply     PN Route  PICC     Dextrose Concentration (%)  -- 12%     Amino Acid Concentration (%)  5.0%     PN Goal Rate (mL/hr)  70 mL/hr     PN Starting Rate (mL/hr)  30 mL/hr, WITH INCREASE IN RATE BY 30 ML/HR Q 8 HRS UNTIL GOAL RATE ACHIEVED.      PN Goal Volume (mL)  1680 mL     Lipid Concentration (%)  20% SMOF LIPIDS     Lipid Volume (mL)  200 mL     Lipid Frequency  Daily     New PN Prescription Ordered?  -- SPOKE WITH [facility] WHO WILL ENTER ORDERS        PN to Supply    NPC/Day  1085 NPC/day     Kcal/Day  1421 Kcal/day     Protein (gm/day)  84 gm/day        Other Orders    Other  WILL ORDER NUTRITION PANEL FOR MONITORING TPN         Education/Evaluation     Row Name 03/21/20 1254          Monitor/Evaluation    Monitor  Per protocol;I&O;Pertinent labs;PN delivery/tolerance;Weight;Skin status;Symptoms           Electronically signed by:  Maddie Steele RD  03/21/20 12:53

## 2020-03-21 NOTE — PLAN OF CARE
Problem: Patient Care Overview  Goal: Plan of Care Review  Flowsheets (Taken 3/21/2020 1260)  Plan of Care Reviewed With: patient  Note:   Pt a/o, VSS, 1.5 LNC, no c/o of pain at this time. Good colostomy output. Pt was up to the toilet several times a day.  TPN started today. Continue monitoring.

## 2020-03-21 NOTE — SIGNIFICANT NOTE
"    [facility] Medicine Services  CROSS COVER NOTE        EVENT:  CT abdomen findings    Nursing called, ground glass infiltrates RML on CT abdomen, radiology report state \"COVID 19 cannot be excluded.\"    Chart reviewed, here with acute sigmoid diverticulitis, s/p resection 3/17    Patient denies fever or chills. She denies sick contacts or exposure to individual with COVID 19. No recent travel. No dyspnea. She does endorse an occasional non-productive cough.        OBJECTIVE DATA:  Vitals:    03/20/20 1904   BP: 165/92   Pulse: 80   Resp: 18   Temp: 98.2 °F (36.8 °C)   SpO2:      Focused exam  Gen - alert and oriented, non toxic  Pulm- crackles RML and RLL  ABD- Ostomy with Drain, mild distention and mild tenderness generalized.    ACTION TAKEN:      Possible pneumonia, novel coronavirus (COVID 19) appears unlikely given history, notwithstanding CT findings  - will treat as pneumonia for now  >>> blood cultures x 2  >>> check viral PCR panel, sputum culture, procalcitonin, CRP, LDH, proBNP, S pneumonia urinary antigen   >>> start Vanc and doxycycline, check MRSA nares  >>> continue Invanz      FOLLOW UP REQUIRED:  Follow up respiratory PCR, am labs.      Electronically signed by Kirill Grant MD, 03/20/20, 10:51 PM.              "

## 2020-03-21 NOTE — PROGRESS NOTES
Pharmacy Parenteral Nutrition Evaluation    Neema Richard is a  79 y.o. female receiving TPN.     Indication: Ileus associated with diffuse peritonitis  Consulting Physician: George    Labs  Results from last 7 days   Lab Units 03/21/20  0705 03/19/20  0515 03/18/20  0539  03/15/20  1638   SODIUM mmol/L 139 143 142   < > 138   POTASSIUM mmol/L 4.1 4.2 4.1   < > 4.0   CHLORIDE mmol/L 106 113* 109*   < > 102   CO2 mmol/L 20.0* 19.0* 19.0*   < > 21.0*   BUN mg/dL 21 41* 30*   < > 21   CREATININE mg/dL 0.40* 0.61 0.73   < > 0.97   CALCIUM mg/dL 8.0* 8.0* 8.4*   < > 8.6   BILIRUBIN mg/dL  --   --   --   --  0.7   ALK PHOS U/L  --   --   --   --  97   ALT (SGPT) U/L  --   --   --   --  9   AST (SGOT) U/L  --   --   --   --  16   GLUCOSE mg/dL 92 95 118*   < > 257*    < > = values in this interval not displayed.       Results from last 7 days   Lab Units 03/21/20  0705 03/19/20  0515   PHOSPHORUS mg/dL 2.8 2.9       Results from last 7 days   Lab Units 03/21/20  0705 03/20/20  0604 03/19/20  0515   WBC 10*3/mm3 16.49* 17.68* 14.82*   HEMOGLOBIN g/dL 9.9* 9.6* 9.1*   HEMATOCRIT % 32.2* 29.8* 28.8*   PLATELETS 10*3/mm3 418 401 345       No results found for: TRIG    estimated creatinine clearance is 56 mL/min (A) (by C-G formula based on SCr of 0.4 mg/dL (L)).    Intake & Output (last 3 days)         03/18 0701 - 03/19 0700 03/19 0701 - 03/20 0700 03/20 0701 - 03/21 0700 03/21 0701 - 03/22 0700    P.O.  120      I.V. (mL/kg)  1621.3 (26.5) 842.6 (13.8)     IV Piggyback   450     Total Intake(mL/kg)  1741.3 (28.5) 1292.6 (21.1)     Urine (mL/kg/hr) 550 (0.4) 800 (0.5) 1900 (1.3) 0 (0)    Drains 60 100 80     Stool   0 50    Total Output  50    Net -610 +841.3 -687.4 -50            Urine Unmeasured Occurrence 251 x 4 x 2 x 3 x            Dietitian Recommendations  Diet Orders (active) (From admission, onward)       Start     Ordered    03/21/20 0001  NPO Diet  Diet Effective Midnight      03/20/20 1632    03/20/20  1414  Dietary Nutrition Supplements Boost Plus; vanilla  Daily With Breakfast, Lunch & Dinner     Comments:  Boost Plus (vanilla) 3x daily    03/20/20 1413    03/17/20 2200  Snack: Chewing gum x30 minutes three times daily to increase saliva flow and provide gas pain relief. Do not give to ileostomy patients.  3 Times Daily     Comments:  Chewing gum x30 minutes three times daily to increase saliva flow and provide gas pain relief.      Do not give to ileostomy patients.    03/17/20 1903                      Current TPN Regimen Recommendation:  Dextrose 12% / Amino Acid 5% at goal rate of 70 mL/hr.  20% Lipid Emulsion 200mL every 24 hours.    Assessment/Plan:  Pharmacy to dose TPN ordered for ileus associated with diffuse peritonitis. TPN to be started 3/21/20 through PICC line.  Initial macronutrient recommendations per RD. 12%D, 5%AA, starting at a rate of 30mL/hr and increase by 30mL/hr Q8hr until goal rate of 70mL/hr is reached. Standard electrolytes ordered. Will consider adjustments tomorrow after TPN at goal rate.  Low dose SSI ordered per protocol. Patient is diabetic, A1C of 6.00%.  Will order electrolyte replacements exogenously if needed.  Pharmacy will continue to follow and adjust dose based on renal function, electrolyte levels, and clinical status.    Eveline Rosas, PharmD  Pharmacy Resident  3/21/2020  13:07

## 2020-03-21 NOTE — PROGRESS NOTES
Moved to pulmonary isolation in the coronavirus jiang on the [facility].    Patient states that she feels well  Good stoma output, gas and stool per stoma  Returning bowel function  Abdomen is softer    Constellation of findings suggest pulmonary infection with slightly delayed return of bowel function  Patient appears to be improving without evidence of acute respiratory distress at this time.  Appears well-hydrated at this point.    Patient has been n.p.o.  Regular diet ordered with Vanella flavor protein supplements at patient request.  Frequent ambulation encouraged.    I am hopeful the patient can be cleared to return to 5 [address]., postop surgery floor.

## 2020-03-21 NOTE — NURSING NOTE
PICC consult noted, patient now has working IV and single lumen PICC in place. IF PICC is still needed we will check back on Monday. Thank you.

## 2020-03-21 NOTE — PROGRESS NOTES
"INFECTIOUS DISEASE PROGRESS NOTE       Neema Richard  1940  [id number]      Admission Date: 3/15/2020      Requesting Provider: Filomena Stuart DO  Evaluating Physician: Darshan Bejarano MD    Reason for Consultation: Acute sigmoid diverticulitis, with microperforation     History of present illness:  3/16/20:    3/16/2020: Patient is a 79 y.o. female, seen today for acute sigmoid diverticulitis with microperforation.  She has had several episodes of diverticulitis in the past, the last being in December 2019, treated with an oral antibiotic by her PCP.   Her symptoms minimally improved. She began experiencing diarrhea, with low grade fever, and yesterday, developed worsening abdominal pain, prompting her presentation to the ED.  An abdominal Ct with extensive diverticulitis involving the descending colon and sigmoid colon with stranding and wall thickening, with extraluminal air suggesting a microperforation.   Admitting labs with a leukocytosis of 15, 000, lactic acidosis, and she has been afebrile. She is currently on Merrem and we were consulted for evaluation and treatment.   3/17/20:  She feels better but still with abdominal tenderness.  Her daughter is coming to from [place] and she wants her tested for Covid-19.  She remains afebrile.  I saw her earlier this morning but she has now deteriorated.  She now has free air on her CT scan and is currently in the OR.  3/18/20:  She is feeling \"some better this am\".  She denies any nausea. She complains of hiccups. She remains afebrile.    3/19/20: She accidentally pulled NGT out yesterday. She is having some nausea, no vomiting.   She complains of not being able to get any rest.    3/20/2020: She had increased abdominal discomfort earlier today but it is decreased today.  She and her daughter indicate that her confusion has improved and her narcotic doses have been reduced.  She remains afebrile.  3/21/2020: She was moved to airborne precautions/contact " precautions due to her chest CT findings.  She has been afebrile over the day.  She has now been started on TPN.  The RN staff indicates that she has been stable over the day.  She has denied dyspnea. A COVID19 PCR is currently pending at  and should be back tomorrow.    Past Medical History:   Diagnosis Date   • Acquired hypothyroidism 1980   • Colon polyp - sees Dr. 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 ER and [place] (+)   • Essential hypertension 2012   • High cholesterol 2019   • Hx of herpes genitalis 2013   • IBS-d (takes Buspar)        Past Surgical History:   Procedure Laterality Date   • BILATERAL BREAST REDUCTION Bilateral 1995   • BREAST LUMPECTOMY Left 2009   • CATARACT EXTRACTION, BILATERAL Bilateral 2018   • CERVICAL CONIZATION  1983   • COLON RESECTION N/A 3/17/2020    Procedure: LOW ANTERIOR COLON RESECTION, MOBILIZATION OF SPLENIC FLEXURE, DRAINAGE OF ABSCESS AND COLOSTOMY CREATION;  Surgeon: Sravan Vazquez MD;  Location: [facility];  Service: General;  Laterality: N/A;   • FACIAL COSMETIC SURGERY  2001   • FACIAL COSMETIC SURGERY  2012   • HEMORRHOIDECTOMY  1974   • LAPAROSCOPIC CHOLECYSTECTOMY  2010   • REDUCTION MAMMAPLASTY Bilateral 04/13/2017    DCIS   • SINUS SURGERY  1992   • THYROIDECTOMY, PARTIAL  1963   • TONSILLECTOMY  1953   • VARICOSE VEIN SURGERY Bilateral 1981       Family History   Problem Relation Age of Onset   • Breast cancer Neg Hx    • Ovarian cancer Neg Hx        Social History     Socioeconomic History   • Marital status:      Spouse name: Not on file   • Number of children: Not on file   • Years of education: Not on file   • Highest education level: Not on file   Tobacco Use   • Smoking status: Never Smoker   Substance and Sexual Activity   • Alcohol use: No   • Drug use: No       Allergies   Allergen Reactions   • Evista [Raloxifene] Rash   • Cephalexin Diarrhea   • Hydrochlorothiazide Diarrhea   • Meloxicam  Other (See Comments)   • Metronidazole Diarrhea   • Oxybutynin Cough   • Propylthiouracil Other (See Comments)     Severally reduce WBC's    • Sulfa Antibiotics Hives   • Sulfacetamide Sodium-Sulfur Hives   • Sulfur Dioxide Hives         Medication:    Current Facility-Administered Medications:   •  acetaminophen (TYLENOL) tablet 650 mg, 650 mg, Oral, Q8H, Sravan Vazquez MD, 650 mg at 03/21/20 1346  •  Adult Central 2-in-1 TPN, , Intravenous, Cyclic TPN - See Admin Instructions, Eveline Rosas, [facility], Last Rate: 30 mL/hr at 03/21/20 1701  •  busPIRone (BUSPAR) tablet 15 mg, 15 mg, Oral, Daily, Sravan Vazquez MD, 15 mg at 03/20/20 2221  •  calcitonin (salmon) (MIACALCIN) nasal spray 1 spray, 1 spray, Alternating Nares, Daily, Sravan Vazquez MD, 1 spray at 03/21/20 0953  •  diazePAM (VALIUM) tablet 5 mg, 5 mg, Oral, Q6H PRN, Sravan Vazquez MD  •  doxycycline (VIBRAMYCIN) 100 mg/100 mL 0.9% NS MBP, 100 mg, Intravenous, Q12H, Kirill Grant MD, 100 mg at 03/21/20 1214  •  ertapenem (INVanz) 1 g/100 mL 0.9% NS VTB (mbp), 1 g, Intravenous, Q24H, Darshan Bejarano MD, Last Rate: 0 mL/hr at 03/20/20 0840, 1 g at 03/21/20 0952  •  Fat Emul Fish Oil/Plant Based (SMOFLIPID) 20 % emulsion 200 mL, 200 mL, Intravenous, Q24H (TPN), Eveline Rosas, [facility], Last Rate: 16.67 mL/hr at 03/21/20 1702, 200 mL at 03/21/20 1702  •  gabapentin (NEURONTIN) capsule 300 mg, 300 mg, Oral, Nightly, Minerva Steele APRN, 300 mg at 03/20/20 2215  •  heparin (porcine) 5000 UNIT/ML injection 5,000 Units, 5,000 Units, Subcutaneous, Q8H, Sravan Vazquez MD, 5,000 Units at 03/21/20 1345  •  HYDROcodone-acetaminophen (NORCO) 5-325 MG per tablet 1 tablet, 1 tablet, Oral, Q4H PRN, Sravan Vazquez MD, 1 tablet at 03/20/20 0411  •  HYDROmorphone (DILAUDID) injection 0.5 mg, 0.5 mg, Intravenous, Q2H PRN **AND** naloxone (NARCAN) injection 0.4 mg, 0.4 mg, Intravenous, Q5 Min PRN, Sravan Vazquez MD  •  insulin regular (humuLIN R,novoLIN R) injection  2-7 Units, 2-7 Units, Subcutaneous, Q6H, Eveline Rosas, RPH, Stopped at 03/21/20 1322  •  levothyroxine (SYNTHROID, LEVOTHROID) tablet 88 mcg, 88 mcg, Oral, Q AM, Sravan Vazquez MD, 88 mcg at 03/21/20 0559  •  LORazepam (ATIVAN) tablet 0.5 mg, 0.5 mg, Oral, Q8H PRN, Sravan Vazquez MD, 0.5 mg at 03/18/20 0538  •  Magnesium Sulfate 2 gram Bolus, followed by 8 gram infusion (total Mg dose 10 grams)- Mg less than or equal to 1mg/dL, 2 g, Intravenous, PRN **OR** Magnesium Sulfate 2 gram / 50mL Infusion (GIVE X 3 BAGS TO EQUAL 6GM TOTAL DOSE) - Mg 1.1 - 1.5 mg/dl, 2 g, Intravenous, PRN **OR** Magnesium Sulfate 4 gram infusion- Mg 1.6-1.9 mg/dL, 4 g, Intravenous, PRN, Jennifer Laurent DO, Last Rate: 25 mL/hr at 03/21/20 1652, 4 g at 03/21/20 1652  •  metoprolol succinate XL (TOPROL-XL) 24 hr tablet 100 mg, 100 mg, Oral, Daily, Sravan Vazquez MD, 100 mg at 03/21/20 0952  •  Mirabegron ER (MYRBETRIQ) 24 hr tablet 50 mg- patient supplied med, 50 mg, Oral, Daily, Sravan Vazquez MD, 50 mg at 03/20/20 2215  •  morphine injection 2 mg, 2 mg, Intravenous, Q2H PRN, 2 mg at 03/17/20 0817 **OR** Morphine sulfate (PF) injection 4 mg, 4 mg, Intravenous, Q2H PRN, Sravan Vazquez MD, 4 mg at 03/16/20 1704  •  ondansetron (ZOFRAN) injection 4 mg, 4 mg, Intravenous, Q6H PRN, Sravan Vazquez MD, 4 mg at 03/21/20 1346  •  Pharmacy to Dose TPN, , Does not apply, Continuous PRN, Sravan Vazquez MD  •  sodium chloride 0.45 % bolus 500 mL, 500 mL, Intravenous, Once, Sravan Vazquez MD  •  sodium chloride 0.9 % bolus 1,836 mL, 30 mL/kg, Intravenous, PRN, Sravan Vazquez MD  •  sodium chloride 0.9 % flush 10 mL, 10 mL, Intravenous, PRN, Sravan Vazquez MD  •  sodium chloride 0.9 % flush 10 mL, 10 mL, Intravenous, PRN, Sravan Vazquez MD  •  sodium chloride 0.9 % flush 10 mL, 10 mL, Intravenous, Q12H, Sravan Vazquez MD, 10 mL at 03/21/20 0953  •  sodium chloride 0.9 % flush 10 mL, 10 mL, Intravenous, PRN, Sravan Vazquez MD  •  sucralfate  (CARAFATE) tablet 1 g, 1 g, Oral, BID AC, Sravan Vazquez MD, 1 g at 20 1653    Antibiotics:  Anti-Infectives (From admission, onward)    Ordered     Dose/Rate Route Frequency Start Stop    20 2250  doxycycline (VIBRAMYCIN) 100 mg/100 mL 0.9% NS MBP     Eveline Rosas [facility] reviewed the order on 20 1145.   Ordering Provider:  Kirill Grant MD    100 mg  over 60 Minutes Intravenous Every 12 Hours Scheduled 20 2359 20 225  vancomycin 1250 mg/250 mL 0.9% NS IVPB ([facility])     Ordering Provider:  Alan Raygoza [facility]    20 mg/kg × 61.2 kg  over 90 Minutes Intravenous Once 20 2345 20 0341    20 0811  ertapenem (INVanz) 1 g/100 mL 0.9% NS VTB (mbp)     Eveline Rosas RPH reviewed the order on 20 1145.   Ordering Provider:  Darshan Bejarano MD    1 g  over 30 Minutes Intravenous Every 24 Hours 20 0900 20 0859    03/15/20 1954  meropenem (MERREM) 1 g/100 mL 0.9% NS VTB (mbp)     Ordering Provider:  Darshan Steward MD    1 g  over 30 Minutes Intravenous Once 03/15/20 1955 03/15/20 2122                  Physical Exam:   Vital Signs  Temp (24hrs), Av.1 °F (36.7 °C), Min:97.7 °F (36.5 °C), Max:98.4 °F (36.9 °C)    Temp  Min: 97.7 °F (36.5 °C)  Max: 98.4 °F (36.9 °C)  BP  Min: 143/72  Max: 165/92  Pulse  Min: 68  Max: 97  Resp  Min: 16  Max: 20  SpO2  Min: 90 %  Max: 94 %    I did not enter room today due to the fact that she is on airborne precautions and we have a critical shortage of personal protective equipment.    Laboratory Data    Results from last 7 days   Lab Units 20  0705 20  0604 20  0515   WBC 10*3/mm3 16.49* 17.68* 14.82*   HEMOGLOBIN g/dL 9.9* 9.6* 9.1*   HEMATOCRIT % 32.2* 29.8* 28.8*   PLATELETS 10*3/mm3 418 401 345     Results from last 7 days   Lab Units 20  1525   SODIUM mmol/L 140   POTASSIUM mmol/L 4.5   CHLORIDE mmol/L 108*   CO2 mmol/L 21.0*   BUN mg/dL 19   CREATININE mg/dL 0.34*   GLUCOSE  mg/dL 88   CALCIUM mg/dL 7.5*     Results from last 7 days   Lab Units 03/21/20  1525   ALK PHOS U/L 68   BILIRUBIN mg/dL 0.2   ALT (SGPT) U/L 7   AST (SGOT) U/L 13     Results from last 7 days   Lab Units 03/15/20  1638   SED RATE mm/hr 19     Results from last 7 days   Lab Units 03/21/20  1525   CRP mg/dL 19.98*     Results from last 7 days   Lab Units 03/20/20  1911   LACTATE mmol/L 1.2             Estimated Creatinine Clearance: 56 mL/min (A) (by C-G formula based on SCr of 0.34 mg/dL (L)).  Lactate 2.9     Microbiology:    3/16: BC no growth    3/17:  Peritoneum gms moderate WBCs, nos- Gram negative bacilli     Radiology:  Imaging Results (Last 72 Hours)     Procedure Component Value Units Date/Time    CT Abdomen Pelvis With & Without Contrast [[id number]] Collected:  03/20/20 1817     Updated:  03/21/20 1019    Narrative:       EXAMINATION: CT ABDOMEN/PELVIS WWO CONTRAST - 03/20/2020     INDICATION: Z74.09-Other reduced mobility; K65.9-Peritonitis,  unspecified; K57.92-Diverticulitis of intestine, part unspecified,  without perforation or abscess without bleeding; I10-Essential (primary)  hypertension; K57.20-Diverticulitis of large intestine with perforation  and abscess without bleeding.      TECHNIQUE: CT abdomen and pelvis with and without intravenous contrast.     The radiation dose reduction device was turned on for each scan per the  ALARA (As Low as Reasonably Achievable) protocol.     COMPARISON: CT dated 03/17/2020.     FINDINGS: Lung bases demonstrate abnormal midlung opacifications  greatest in the right middle lobe new from prior recent comparison as  these are  developed in the interim along with increasing but trace to  small volume bilateral pleural effusions without adjacent atelectasis.  Liver without focal lesion. Gallbladder is surgically absent. No biliary  dilatation. Pancreas and spleen unremarkable with perisplenic and  perihepatic ascites as well as mesenteric edema. Adrenals  without  distinct nodule. Kidneys without hydronephrosis or hydroureter.  Atherosclerotic nonaneurysmal abdominal aorta. Left lower quadrant  ostomy in place with soft tissue drain and pessary in place within the  pelvis without loculated fluid collection. Postsurgical changes from  ventral abdominal wall repair with diffuse body wall edema.       Impression:       Partially visualized lung bases demonstrate development of  groundglass opacifications in the right middle lobe inferiorly scattered  with concern for atypical etiology or aspiration, however COVID-19 not  excluded given interval development and overall appearance on partial  imaging of the lungs. Trace to small bilateral pleural effusions with  increase in size from recent 03/17/2020 comparison. Postsurgical changes  of the abdomen without intra-abdominal free air demonstrating early  postoperative appearance of trace ascites with soft tissue drain in  place and mesenteric edema however no loculated fluid collection with  left lower quadrant ostomy formation and ventral abdominal wall  postsurgical changes. Diffuse body wall edema.     DICTATED:   03/20/2020  EDITED/ls :   03/20/2020      This report was finalized on 3/21/2020 10:16 AM by Dr. Arturo Joseph.       XR Chest PA & Lateral [[id number]] Collected:  03/20/20 1805     Updated:  03/21/20 1019    Narrative:          EXAMINATION: XR CHEST PA AND LATERAL - 03/20/2020     INDICATION: Z74.09-Other reduced mobility; K65.9-Peritonitis,  unspecified; K57.92-Diverticulitis of intestine, part unspecified,  without perforation or abscess without bleeding; I10-Essential (primary)  hypertension; K57.20-Diverticulitis of large intestine with perforation  and abscess without bleeding.      COMPARISON: None.     FINDINGS: Cardiac size borderline enlarged. Bibasilar opacifications  adjacent to small volume bilateral pleural effusions with right arm PICC  in place and terminating at the cavoatrial junction. No  pneumothorax.             Impression:       Bibasilar opacifications of atelectasis and/or airspace  disease greatest in the right adjacent to small volume bilateral pleural  effusions with right arm PICC in place and terminating at the cavoatrial  junction.     DICTATED:   03/20/2020  EDITED/ls :   03/20/2020      This report was finalized on 3/21/2020 10:16 AM by Dr. Arturo Joseph.       CT Abdomen Pelvis With & Without Contrast [[id number]] Collected:  03/17/20 1046     Updated:  03/20/20 1612    Narrative:       EXAMINATION: CT ABDOMEN AND PELVIS W WO CONTRAST-03/17/2020:      INDICATION: Ongoing pain.     TECHNIQUE: 5 mm unenhanced images through the abdomen and pelvis with  subsequent post-IV contrast 5 mm portal venous phase and delayed venous  phase images.     The radiation dose reduction device was turned on for each scan per the  ALARA (As Low as Reasonably Achievable) protocol.     COMPARISON: 03/15/2020 abdomen and pelvis CT scan.     FINDINGS: The patient history indicates diverticulitis, persistent  abdominal pain. Previous 03/15/2020 exam report indicated extensive  distal descending colon and sigmoid colon diverticulitis, small amount  of extraluminal air, and some free fluid. Today's exam, however, shows  moderate intra-abdominal free air. There is new moderate right lower  lobe atelectasis and mild left lower lobe atelectasis. No significant  abnormalities are seen of the liver, spleen, pancreas, adrenal glands,  or kidneys. The bowel loops are normal in caliber.     Regarding the lower abdomen and pelvis, inflammatory fat stranding  around the descending colon is approximately stable. There appears to be  mild, new fat stranding of the retroperitoneum but this may actually  represent a trace amount of fluid extending along the paracolic gutters,  and along the peritoneal margin anteriorly, rather than true  retroperitoneal inflammation. Free fluid in the pelvis appears mildly  increased. Note is  again made of the patient's enlarged uterus and the  patient's ring-type pessary. Fluid and air collection seen on axial  portal venous phase image 64, series 6 is difficult to distinguish as  intraluminal within bowel, or contained fluid and air, especially seen  on the axial images. The coronal images show this collection,  respectively coronal images 46-36 suggest that this may be extraluminal.  No other potential discrete, drainable abscess is identified. The  bladder is normally distended and normal in appearance. Delayed images  show normal caliber renal collecting systems and ureters.       Impression:       1.  Interval development of intra-abdominal free air consistent with  perforation from the patient's diverticulitis.  2.  Mildly increased intrapelvic fat stranding as described, mildly  increased intrapelvic free fluid.  3.  Questionable 5 cm fluid and air collection in the central pelvis as  described above. Please refer to initial axial image 64, series 3.  4.  New moderate right lower lobe atelectasis and mild left lower lobe  atelectasis.     NOTE: Preliminary report was called to Dr. Sravan Vazquez at approximately  10:50 AM 03/17/2020.     D:  03/17/2020  E:  03/17/2020           This report was finalized on 3/20/2020 4:09 PM by Dr. Rasheed Vidales MD.           CT scan from 3/20:  Impression:     Partially visualized lung bases demonstrate development of  groundglass opacifications in the right middle lobe inferiorly scattered  with concern for atypical etiology or aspiration, however COVID-19 not  excluded given interval development and overall appearance on partial  imaging of the lungs. Trace to small bilateral pleural effusions with  increase in size from recent 03/17/2020 comparison. Postsurgical changes  of the abdomen without intra-abdominal free air demonstrating early  postoperative appearance of trace ascites with soft tissue drain in  place and mesenteric edema however no loculated fluid  collection with  left lower quadrant ostomy formation and ventral abdominal wall  postsurgical changes. Diffuse body wall edema.     I read her CT scan      Impression:   1.  Acute sigmoid diverticulitis- s/p resection 3/17  2.  Severe sepsis- with leukocytosis, tachycardia, lactic acidosis, uncontrolled diabetes mellitus, and known focus of infection   3.  Leukocytosis/neutrophilia- secondary to above,  4.  Pelvic abscess  5.  Encephalopathy-this is most likely secondary to narcotics but if this worsens, I will consider switching her ertapenem to alternative antibiotic therapy.  6.  Pulmonary infiltrate/atelectasis- she is being evaluated for COVID19 although this seems unlikely since she clearly presented with severe diverticulitis with perforation and intra-abdominal infection.  I think this is probably more atelectasis.  She is clinically stable today with no fevers or worsening hypoxemia.    PLAN/RECOMMENDATIONS:   1.  Continue ertapenem 1 g IV daily  2.  Monitor cultures       I discussed this very complex and difficult issue with her daughter and detail via telephone.  I also discussed her situation with Dr. Laurent.  I discussed her situation with Dr. Vazquez and the RN staff.  I spent over 45 minutes on her care today.    Darshan Bejarano MD  3/21/2020  18:45

## 2020-03-21 NOTE — PLAN OF CARE
Problem: Pain, Chronic (Adult)  Goal: Acceptable Pain/Comfort Level and Functional Ability  Outcome: Ongoing (interventions implemented as appropriate)  Flowsheets (Taken 3/21/2020 0447)  Acceptable Pain/Comfort Level and Functional Ability: making progress toward outcome     Problem: Patient Care Overview  Goal: Plan of Care Review  Outcome: Ongoing (interventions implemented as appropriate)  Flowsheets (Taken 3/21/2020 0447)  Plan of Care Reviewed With: patient  Outcome Summary: Pt transferred to  for precautionary reasons. Possible rule out covid. Rested well. No complaints of pain. Did require 2L NC to maintain sats greater than 90%.  IV abx initiated. Plan of care discussed with pt. Verbalized understanding. Will continue to monitor.  Goal: Individualization and Mutuality  Outcome: Ongoing (interventions implemented as appropriate)  Goal: Discharge Needs Assessment  Outcome: Ongoing (interventions implemented as appropriate)  Goal: Interprofessional Rounds/Family Conf  Outcome: Ongoing (interventions implemented as appropriate)     Problem: Fall Risk (Adult)  Goal: Identify Related Risk Factors and Signs and Symptoms  Outcome: Ongoing (interventions implemented as appropriate)  Flowsheets (Taken 3/21/2020 0447)  Related Risk Factors (Fall Risk): age-related changes; gait/mobility problems; fatigue/slow reaction  Signs and Symptoms (Fall Risk): presence of risk factors  Goal: Absence of Fall  Outcome: Ongoing (interventions implemented as appropriate)  Flowsheets (Taken 3/21/2020 0447)  Absence of Fall: making progress toward outcome     Problem: Infection, Risk/Actual (Adult)  Goal: Identify Related Risk Factors and Signs and Symptoms  Outcome: Ongoing (interventions implemented as appropriate)  Flowsheets (Taken 3/21/2020 0447)  Related Risk Factors (Infection, Risk/Actual): skin integrity impairment; age extremes; treatment plan  Signs and Symptoms (Infection, Risk/Actual): skin cool/clammy; lab value  changes; weakness; respiratory rate increase  Goal: Infection Prevention/Resolution  Outcome: Ongoing (interventions implemented as appropriate)  Flowsheets (Taken 3/21/2020 6169)  Infection Prevention/Resolution: making progress toward outcome

## 2020-03-21 NOTE — PROGRESS NOTES
"INFECTIOUS DISEASE PROGRESS NOTE   Neema Richard  1940  [id number]      Admission Date: 3/15/2020      Requesting Provider: Filomena Stuart DO  Evaluating Physician: Darshan Bejarano MD    Reason for Consultation: Acute sigmoid diverticulitis, with microperforation     History of present illness:  3/16/20:    3/16/2020: Patient is a 79 y.o. female, seen today for acute sigmoid diverticulitis with microperforation.  She has had several episodes of diverticulitis in the past, the last being in December 2019, treated with an oral antibiotic by her PCP.   Her symptoms minimally improved. She began experiencing diarrhea, with low grade fever, and yesterday, developed worsening abdominal pain, prompting her presentation to the ED.  An abdominal Ct with extensive diverticulitis involving the descending colon and sigmoid colon with stranding and wall thickening, with extraluminal air suggesting a microperforation.   Admitting labs with a leukocytosis of 15, 000, lactic acidosis, and she has been afebrile. She is currently on Merrem and we were consulted for evaluation and treatment.   3/17/20:  She feels better but still with abdominal tenderness.  Her daughter is coming to from [place] and she wants her tested for Covid-19.  She remains afebrile.  I saw her earlier this morning but she has now deteriorated.  She now has free air on her CT scan and is currently in the OR.  3/18/20:  She is feeling \"some better this am\".  She denies any nausea. She complains of hiccups. She remains afebrile.    3/19/20: She accidentally pulled NGT out yesterday. She is having some nausea, no vomiting.   She complains of not being able to get any rest.    3/20/2020: She had increased abdominal discomfort earlier today but it is decreased today.  She and her daughter indicate that her confusion has improved and her narcotic doses have been reduced.  She remains afebrile.    Past Medical History:   Diagnosis Date   • Acquired " hypothyroidism 1980   • Colon polyp - sees Dr. 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 ER and AL (+)   • Essential hypertension 2012   • High cholesterol 2019   • Hx of herpes genitalis 2013   • IBS-d (takes Buspar)        Past Surgical History:   Procedure Laterality Date   • BILATERAL BREAST REDUCTION Bilateral 1995   • BREAST LUMPECTOMY Left 2009   • CATARACT EXTRACTION, BILATERAL Bilateral 2018   • CERVICAL CONIZATION  1983   • COLON RESECTION N/A 3/17/2020    Procedure: LOW ANTERIOR COLON RESECTION, MOBILIZATION OF SPLENIC FLEXURE, DRAINAGE OF ABSCESS AND COLOSTOMY CREATION;  Surgeon: Sravan Vazquez MD;  Location: [facility];  Service: General;  Laterality: N/A;   • FACIAL COSMETIC SURGERY  2001   • FACIAL COSMETIC SURGERY  2012   • HEMORRHOIDECTOMY  1974   • LAPAROSCOPIC CHOLECYSTECTOMY  2010   • REDUCTION MAMMAPLASTY Bilateral 04/13/2017    DCIS   • SINUS SURGERY  1992   • THYROIDECTOMY, PARTIAL  1963   • TONSILLECTOMY  1953   • VARICOSE VEIN SURGERY Bilateral 1981       Family History   Problem Relation Age of Onset   • Breast cancer Neg Hx    • Ovarian cancer Neg Hx        Social History     Socioeconomic History   • Marital status:      Spouse name: Not on file   • Number of children: Not on file   • Years of education: Not on file   • Highest education level: Not on file   Tobacco Use   • Smoking status: Never Smoker   Substance and Sexual Activity   • Alcohol use: No   • Drug use: No       Allergies   Allergen Reactions   • Evista [Raloxifene] Rash   • Cephalexin Diarrhea   • Hydrochlorothiazide Diarrhea   • Meloxicam Other (See Comments)   • Metronidazole Diarrhea   • Oxybutynin Cough   • Propylthiouracil Other (See Comments)     Severally reduce WBC's    • Sulfa Antibiotics Hives   • Sulfacetamide Sodium-Sulfur Hives   • Sulfur Dioxide Hives         Medication:    Current Facility-Administered Medications:   •  acetaminophen (TYLENOL)  tablet 650 mg, 650 mg, Oral, Q8H, Sravan Vazquez MD, 650 mg at 03/20/20 1600  •  busPIRone (BUSPAR) tablet 15 mg, 15 mg, Oral, Daily, Sravan Vazquez MD, 15 mg at 03/18/20 2037  •  calcitonin (salmon) (MIACALCIN) nasal spray 1 spray, 1 spray, Alternating Nares, Daily, Sravan Vazquez MD, 1 spray at 03/20/20 0810  •  diazePAM (VALIUM) tablet 5 mg, 5 mg, Oral, Q6H PRN, Sravan Vazquez MD  •  ertapenem (INVanz) 1 g/100 mL 0.9% NS VTB (mbp), 1 g, Intravenous, Q24H, Sravan Vazquez MD, Stopped at 03/20/20 0840  •  gabapentin (NEURONTIN) capsule 300 mg, 300 mg, Oral, Nightly, Minerva Steele APRN  •  heparin (porcine) 5000 UNIT/ML injection 5,000 Units, 5,000 Units, Subcutaneous, Q8H, Sravan Vazquez MD, 5,000 Units at 03/20/20 1601  •  HYDROcodone-acetaminophen (NORCO) 5-325 MG per tablet 1 tablet, 1 tablet, Oral, Q4H PRN, Sravan Vazquez MD, 1 tablet at 03/20/20 0411  •  HYDROmorphone (DILAUDID) injection 0.5 mg, 0.5 mg, Intravenous, Q2H PRN **AND** naloxone (NARCAN) injection 0.4 mg, 0.4 mg, Intravenous, Q5 Min PRN, Sravan Vazquez MD  •  levothyroxine (SYNTHROID, LEVOTHROID) tablet 88 mcg, 88 mcg, Oral, Q AM, Sravan Vazquez MD, 88 mcg at 03/20/20 0659  •  LORazepam (ATIVAN) tablet 0.5 mg, 0.5 mg, Oral, Q8H PRN, Sravan Vazquez MD, 0.5 mg at 03/18/20 0538  •  metoprolol succinate XL (TOPROL-XL) 24 hr tablet 100 mg, 100 mg, Oral, Daily, Sravan Vazquez MD, 100 mg at 03/20/20 0810  •  Mirabegron ER (MYRBETRIQ) 24 hr tablet 50 mg- patient supplied med, 50 mg, Oral, Daily, Sravan Vazquez MD, 50 mg at 03/18/20 2038  •  morphine injection 2 mg, 2 mg, Intravenous, Q2H PRN, 2 mg at 03/17/20 0817 **OR** Morphine sulfate (PF) injection 4 mg, 4 mg, Intravenous, Q2H PRN, Sravan Vazquez MD, 4 mg at 03/16/20 1704  •  ondansetron (ZOFRAN) injection 4 mg, 4 mg, Intravenous, Q6H PRN, Sravan Vazquez MD, 4 mg at 03/20/20 1732  •  Pharmacy to Dose TPN, , Does not apply, Continuous PRN, Sravan Vazquez MD  •  sodium chloride  0.45 % bolus 500 mL, 500 mL, Intravenous, Once, Sravan Vazquez MD  •  sodium chloride 0.9 % bolus 1,836 mL, 30 mL/kg, Intravenous, PRN, Sravan Vazquez MD  •  sodium chloride 0.9 % flush 10 mL, 10 mL, Intravenous, PRN, Sravan Vazquez MD  •  sodium chloride 0.9 % flush 10 mL, 10 mL, Intravenous, PRN, Sravan Vazquez MD  •  sodium chloride 0.9 % flush 10 mL, 10 mL, Intravenous, Q12H, Sravan Vazquez MD, 10 mL at 20 0920  •  sodium chloride 0.9 % flush 10 mL, 10 mL, Intravenous, PRN, Sravan Vazquez MD  •  sodium chloride 0.9 % with KCl 20 mEq/L infusion, 75 mL/hr, Intravenous, Continuous, Sravan Vazquez MD, Last Rate: 75 mL/hr at 20, 75 mL/hr at 20  •  sucralfate (CARAFATE) tablet 1 g, 1 g, Oral, BID AC, Sravan Vazquez MD, 1 g at 20 0659    Antibiotics:  Anti-Infectives (From admission, onward)    Ordered     Dose/Rate Route Frequency Start Stop    20 0811  ertapenem (INVanz) 1 g/100 mL 0.9% NS VTB (mbp)     Joni Alfaro [facility] reviewed the order on 20 1033.   Ordering Provider:  Sravan Vazquez MD    1 g  over 30 Minutes Intravenous Every 24 Hours 20 0900 20 0859    03/15/20 1954  meropenem (MERREM) 1 g/100 mL 0.9% NS VTB (mbp)     Ordering Provider:  Darshan Steward MD    1 g  over 30 Minutes Intravenous Once 03/15/20 1955 03/15/20 2122                  Physical Exam:   Vital Signs  Temp (24hrs), Av °F (36.7 °C), Min:97.7 °F (36.5 °C), Max:98.2 °F (36.8 °C)    Temp  Min: 97.7 °F (36.5 °C)  Max: 98.2 °F (36.8 °C)  BP  Min: 153/74  Max: 165/92  Pulse  Min: 77  Max: 100  Resp  Min: 18  Max: 20  SpO2  Min: 93 %  Max: 93 %    GENERAL: Awake and alert, in no acute distress.   HEENT: Normocephalic, atraumatic.   No conjunctival injection. No icterus. Oropharynx clear without evidence of thrush or exudate. No evidence of peridontal disease.    HEART: RRR; No murmur, rubs, gallops.   LUNGS: Clear to auscultation bilaterally without wheezing, rales, rhonchi.  Normal respiratory effort. Nonlabored. No dullness.  ABDOMEN:  Ostomy/abdominal dressings in place RADHA in place   EXT:  No cyanosis, clubbing or edema. No cord.  :  Without Gaytan catheter.  MSK:  No joint effusions   SKIN: Warm and dry without cutaneous eruptions on Inspection/palpation.    NEURO: Oriented to PPT.  She appears to be mildly confused.  Right PICC site without redness    Laboratory Data    Results from last 7 days   Lab Units 03/20/20  0604 03/19/20  0515 03/18/20  0444   WBC 10*3/mm3 17.68* 14.82* 11.97*   HEMOGLOBIN g/dL 9.6* 9.1* 9.8*   HEMATOCRIT % 29.8* 28.8* 31.2*   PLATELETS 10*3/mm3 401 345 334     Results from last 7 days   Lab Units 03/19/20  0515   SODIUM mmol/L 143   POTASSIUM mmol/L 4.2   CHLORIDE mmol/L 113*   CO2 mmol/L 19.0*   BUN mg/dL 41*   CREATININE mg/dL 0.61   GLUCOSE mg/dL 95   CALCIUM mg/dL 8.0*     Results from last 7 days   Lab Units 03/15/20  1638   ALK PHOS U/L 97   BILIRUBIN mg/dL 0.7   ALT (SGPT) U/L 9   AST (SGOT) U/L 16     Results from last 7 days   Lab Units 03/15/20  1638   SED RATE mm/hr 19     Results from last 7 days   Lab Units 03/15/20  1638   CRP mg/dL 6.50*     Results from last 7 days   Lab Units 03/20/20  1911   LACTATE mmol/L 1.2             Estimated Creatinine Clearance: 56 mL/min (by C-G formula based on SCr of 0.61 mg/dL).  Lactate 2.9     Microbiology:    3/16: BC no growth    3/17:  Peritoneum gms moderate WBCs, nos- Gram negative bacilli     Radiology:  Imaging Results (Last 72 Hours)     Procedure Component Value Units Date/Time    XR Chest PA & Lateral [[id number]] Collected:  03/20/20 1805     Updated:  03/20/20 1924    Narrative:          EXAMINATION: XR CHEST PA AND LATERAL - 03/20/2020     INDICATION: Z74.09-Other reduced mobility; K65.9-Peritonitis,  unspecified; K57.92-Diverticulitis of intestine, part unspecified,  without perforation or abscess without bleeding; I10-Essential (primary)  hypertension; K57.20-Diverticulitis of large intestine  with perforation  and abscess without bleeding.      COMPARISON: None.     FINDINGS: Cardiac size borderline enlarged. Bibasilar opacifications  adjacent to small volume bilateral pleural effusions with right arm PICC  in place and terminating at the cavoatrial junction. No pneumothorax.             Impression:       Bibasilar opacifications of atelectasis and/or airspace  disease greatest in the right adjacent to small volume bilateral pleural  effusions with right arm PICC in place and terminating at the cavoatrial  junction.     DICTATED:   03/20/2020  EDITED/ls :   03/20/2020        CT Abdomen Pelvis With & Without Contrast [[id number]] Collected:  03/20/20 1817     Updated:  03/20/20 1923    Narrative:       EXAMINATION: CT ABDOMEN/PELVIS WWO CONTRAST - 03/20/2020     INDICATION: Z74.09-Other reduced mobility; K65.9-Peritonitis,  unspecified; K57.92-Diverticulitis of intestine, part unspecified,  without perforation or abscess without bleeding; I10-Essential (primary)  hypertension; K57.20-Diverticulitis of large intestine with perforation  and abscess without bleeding.      TECHNIQUE: CT abdomen and pelvis with and without intravenous contrast.     The radiation dose reduction device was turned on for each scan per the  ALARA (As Low as Reasonably Achievable) protocol.     COMPARISON: CT dated 03/17/2020.     FINDINGS: Lung bases demonstrate abnormal midlung opacifications  greatest in the right middle lobe new from prior recent comparison as  these are  developed in the interim along with increasing but trace to  small volume bilateral pleural effusions without adjacent atelectasis.  Liver without focal lesion. Gallbladder is surgically absent. No biliary  dilatation. Pancreas and spleen unremarkable with perisplenic and  perihepatic ascites as well as mesenteric edema. Adrenals without  distinct nodule. Kidneys without hydronephrosis or hydroureter.  Atherosclerotic nonaneurysmal abdominal aorta. Left lower  quadrant  ostomy in place with soft tissue drain and pessary in place within the  pelvis without loculated fluid collection. Postsurgical changes from  ventral abdominal wall repair with diffuse body wall edema.       Impression:       Partially visualized lung bases demonstrate development of  groundglass opacifications in the right middle lobe inferiorly scattered  with concern for atypical etiology or aspiration, however COVID-19 not  excluded given interval development and overall appearance on partial  imaging of the lungs. Trace to small bilateral pleural effusions with  increase in size from recent 03/17/2020 comparison. Postsurgical changes  of the abdomen without intra-abdominal free air demonstrating early  postoperative appearance of trace ascites with soft tissue drain in  place and mesenteric edema however no loculated fluid collection with  left lower quadrant ostomy formation and ventral abdominal wall  postsurgical changes. Diffuse body wall edema.     DICTATED:   03/20/2020  EDITED/ls :   03/20/2020        CT Abdomen Pelvis With & Without Contrast [[id number]] Collected:  03/17/20 1046     Updated:  03/20/20 1612    Narrative:       EXAMINATION: CT ABDOMEN AND PELVIS W WO CONTRAST-03/17/2020:      INDICATION: Ongoing pain.     TECHNIQUE: 5 mm unenhanced images through the abdomen and pelvis with  subsequent post-IV contrast 5 mm portal venous phase and delayed venous  phase images.     The radiation dose reduction device was turned on for each scan per the  ALARA (As Low as Reasonably Achievable) protocol.     COMPARISON: 03/15/2020 abdomen and pelvis CT scan.     FINDINGS: The patient history indicates diverticulitis, persistent  abdominal pain. Previous 03/15/2020 exam report indicated extensive  distal descending colon and sigmoid colon diverticulitis, small amount  of extraluminal air, and some free fluid. Today's exam, however, shows  moderate intra-abdominal free air. There is new moderate  right lower  lobe atelectasis and mild left lower lobe atelectasis. No significant  abnormalities are seen of the liver, spleen, pancreas, adrenal glands,  or kidneys. The bowel loops are normal in caliber.     Regarding the lower abdomen and pelvis, inflammatory fat stranding  around the descending colon is approximately stable. There appears to be  mild, new fat stranding of the retroperitoneum but this may actually  represent a trace amount of fluid extending along the paracolic gutters,  and along the peritoneal margin anteriorly, rather than true  retroperitoneal inflammation. Free fluid in the pelvis appears mildly  increased. Note is again made of the patient's enlarged uterus and the  patient's ring-type pessary. Fluid and air collection seen on axial  portal venous phase image 64, series 6 is difficult to distinguish as  intraluminal within bowel, or contained fluid and air, especially seen  on the axial images. The coronal images show this collection,  respectively coronal images 46-36 suggest that this may be extraluminal.  No other potential discrete, drainable abscess is identified. The  bladder is normally distended and normal in appearance. Delayed images  show normal caliber renal collecting systems and ureters.       Impression:       1.  Interval development of intra-abdominal free air consistent with  perforation from the patient's diverticulitis.  2.  Mildly increased intrapelvic fat stranding as described, mildly  increased intrapelvic free fluid.  3.  Questionable 5 cm fluid and air collection in the central pelvis as  described above. Please refer to initial axial image 64, series 3.  4.  New moderate right lower lobe atelectasis and mild left lower lobe  atelectasis.     NOTE: Preliminary report was called to Dr. Sravan Vazquez at approximately  10:50 AM 03/17/2020.     D:  03/17/2020  E:  03/17/2020           This report was finalized on 3/20/2020 4:09 PM by Dr. Rasheed Vidales MD.           CT scan  from 3/20:  Impression:     Partially visualized lung bases demonstrate development of  groundglass opacifications in the right middle lobe inferiorly scattered  with concern for atypical etiology or aspiration, however COVID-19 not  excluded given interval development and overall appearance on partial  imaging of the lungs. Trace to small bilateral pleural effusions with  increase in size from recent 03/17/2020 comparison. Postsurgical changes  of the abdomen without intra-abdominal free air demonstrating early  postoperative appearance of trace ascites with soft tissue drain in  place and mesenteric edema however no loculated fluid collection with  left lower quadrant ostomy formation and ventral abdominal wall  postsurgical changes. Diffuse body wall edema.     I read her CT scan      Impression:   1.  Acute sigmoid diverticulitis- s/p resection 3/17  2.  Severe sepsis- with leukocytosis, tachycardia, lactic acidosis, uncontrolled diabetes mellitus, and known focus of infection   3.  Leukocytosis/neutrophilia- secondary to above,  4.  Pelvic abscess  5.  Encephalopathy-this is most likely secondary to narcotics but if this worsens, I will consider switching her ertapenem to alternative antibiotic therapy.    PLAN/RECOMMENDATIONS:   1.  Continue ertapenem 1 g IV daily  2.  Monitor cultures       3/21/2020: Addendum  Comment: I reviewed her CT scan with Dr. Vidales last evening.  This is not a classic presentation for COVID19.  I do not think she needs to be evaluated for COVID19.  I have asked the radiologist to restructure the way CT scans are being read so that CT scans revealing atelectasis are not read as: Cannot exclude COVID19.  Unfortunately, I meant to put this comment in the chart last night but have been overwhelmed with tasks, telephone calls, and issues regarding COVID19.          Darshan Bejarano MD  3/20/2020  20:10

## 2020-03-21 NOTE — PROGRESS NOTES
[facility] Medicine Services  PROGRESS NOTE    Patient Name: Neema Richard  : 1940  MRN: [id number]    Date of Admission: 3/15/2020  Primary Care Physician: David Mackey MD    Subjective   Subjective     CC:  Diverticulitis; concern for covid 19 with ground glass opacities     HPI:  Last night, patient with imaging concerning for ground glass opacities. Transferred to  with covid testing pending. Reviewed with patient. Abd soft and has been tolerating boost.     Review of Systems  Gen- No fevers, chills  CV- No chest pain, palpitations  Resp- No cough, dyspnea  GI- No N/V/D, + abd pain    Objective   Objective     Vital Signs:   Temp:  [97.7 °F (36.5 °C)-98.4 °F (36.9 °C)] 98.1 °F (36.7 °C)  Heart Rate:  [68-97] 79  Resp:  [16-18] 16  BP: (143-165)/(63-92) 150/63        Physical Exam:  Constitutional: No acute distress, awake, alert  HENT: NCAT, mucous membranes moist  Respiratory: Clear to auscultation bilaterally, respiratory effort normal   Cardiovascular: RRR, no murmurs, rubs, or gallops, palpable pedal pulses bilaterally  Gastrointestinal: Positive bowel sounds, soft, ostomy with good output   Musculoskeletal: No bilateral ankle edema  Psychiatric: Appropriate affect, cooperative  Neurologic: Oriented x 3, strength symmetric in all extremities, Cranial Nerves grossly intact to confrontation, speech clear  Skin: No rashes    Results Reviewed:  Results from last 7 days   Lab Units 20  0705 20  0126 20  0604 20  0515   WBC 10*3/mm3 16.49*  --  17.68* 14.82*   HEMOGLOBIN g/dL 9.9*  --  9.6* 9.1*   HEMATOCRIT % 32.2*  --  29.8* 28.8*   PLATELETS 10*3/mm3 418  --  401 345   PROCALCITONIN ng/mL  --  1.67*  --   --      Results from last 7 days   Lab Units 20  0705 20  0515 20  0539  03/15/20  1638   SODIUM mmol/L 139 143 142   < > 138   POTASSIUM mmol/L 4.1 4.2 4.1   < > 4.0   CHLORIDE mmol/L 106 113* 109*   < > 102   CO2 mmol/L 20.0*  19.0* 19.0*   < > 21.0*   BUN mg/dL 21 41* 30*   < > 21   CREATININE mg/dL 0.40* 0.61 0.73   < > 0.97   GLUCOSE mg/dL 92 95 118*   < > 257*   CALCIUM mg/dL 8.0* 8.0* 8.4*   < > 8.6   ALT (SGPT) U/L  --   --   --   --  9   AST (SGOT) U/L  --   --   --   --  16   PROBNP pg/mL 2,558.0*  --   --   --   --     < > = values in this interval not displayed.     Estimated Creatinine Clearance: 56 mL/min (A) (by C-G formula based on SCr of 0.4 mg/dL (L)).    Microbiology Results Abnormal     Procedure Component Value - Date/Time    S. Pneumo Ag Urine or CSF - Urine, Urine, Clean Catch [[id number]]  (Normal) Collected:  03/21/20 0618    Lab Status:  Final result Specimen:  Urine, Clean Catch Updated:  03/21/20 1338     Strep Pneumo Ag Negative    MRSA Screen, PCR - Swab, Nares [[id number]]  (Normal) Collected:  03/20/20 2359    Lab Status:  Final result Specimen:  Swab from Nares Updated:  03/21/20 0745     MRSA PCR Negative    Narrative:       MRSA Negative    Blood Culture - Blood, Arm, Right [[id number]] Collected:  03/16/20 0018    Lab Status:  Final result Specimen:  Blood from Arm, Right Updated:  03/21/20 0515     Blood Culture No growth at 5 days    Blood Culture - Blood, Arm, Left [[id number]] Collected:  03/16/20 0018    Lab Status:  Final result Specimen:  Blood from Arm, Left Updated:  03/21/20 0515     Blood Culture No growth at 5 days    Respiratory Panel, PCR - Swab, Nasopharynx [[id number]]  (Normal) Collected:  03/20/20 2359    Lab Status:  Final result Specimen:  Swab from Nasopharynx Updated:  03/21/20 0208     ADENOVIRUS, PCR Not Detected     Coronavirus 229E Not Detected     Coronavirus HKU1 Not Detected     Coronavirus NL63 Not Detected     Coronavirus OC43 Not Detected     Human Metapneumovirus Not Detected     Human Rhinovirus/Enterovirus Not Detected     Influenza B PCR Not Detected     Parainfluenza Virus 1 Not Detected     Parainfluenza Virus 2 Not Detected     Parainfluenza Virus 3 Not Detected      Parainfluenza Virus 4 Not Detected     Bordetella pertussis pcr Not Detected     Influenza A H1 2009 PCR Not Detected     Chlamydophila pneumoniae PCR Not Detected     Mycoplasma pneumo by PCR Not Detected     Influenza A PCR Not Detected     Influenza A H3 Not Detected     Influenza A H1 Not Detected     RSV, PCR Not Detected     Bordetella parapertussis PCR Not Detected    Narrative:       The coronavirus on the RVP is NOT COVID-19 and is NOT indicative of infection with COVID-19.     Wound Culture - Swab, Peritoneum [[id number]] Collected:  03/17/20 1559    Lab Status:  Final result Specimen:  Swab from Peritoneum Updated:  03/20/20 0805     Wound Culture Light growth (2+) Normal Fecal Syeda     Gram Stain Many (4+) Red blood cells      Moderate (3+) WBCs seen      No organisms seen    Anaerobic Culture - Swab, Peritoneum [[id number]] Collected:  03/17/20 1559    Lab Status:  Preliminary result Specimen:  Swab from Peritoneum Updated:  03/20/20 0709     Anaerobic Culture No anaerobes isolated at 3 days    AFB Culture - Swab, Peritoneum [[id number]] Collected:  03/17/20 1559    Lab Status:  Preliminary result Specimen:  Swab from Peritoneum Updated:  03/18/20 1240     AFB Stain No acid fast bacilli seen on concentrated smear          Imaging Results (Last 24 Hours)     Procedure Component Value Units Date/Time    CT Abdomen Pelvis With & Without Contrast [[id number]] Collected:  03/20/20 1817     Updated:  03/21/20 1019    Narrative:       EXAMINATION: CT ABDOMEN/PELVIS WWO CONTRAST - 03/20/2020     INDICATION: Z74.09-Other reduced mobility; K65.9-Peritonitis,  unspecified; K57.92-Diverticulitis of intestine, part unspecified,  without perforation or abscess without bleeding; I10-Essential (primary)  hypertension; K57.20-Diverticulitis of large intestine with perforation  and abscess without bleeding.      TECHNIQUE: CT abdomen and pelvis with and without intravenous contrast.     The radiation dose reduction device  was turned on for each scan per the  ALARA (As Low as Reasonably Achievable) protocol.     COMPARISON: CT dated 03/17/2020.     FINDINGS: Lung bases demonstrate abnormal midlung opacifications  greatest in the right middle lobe new from prior recent comparison as  these are  developed in the interim along with increasing but trace to  small volume bilateral pleural effusions without adjacent atelectasis.  Liver without focal lesion. Gallbladder is surgically absent. No biliary  dilatation. Pancreas and spleen unremarkable with perisplenic and  perihepatic ascites as well as mesenteric edema. Adrenals without  distinct nodule. Kidneys without hydronephrosis or hydroureter.  Atherosclerotic nonaneurysmal abdominal aorta. Left lower quadrant  ostomy in place with soft tissue drain and pessary in place within the  pelvis without loculated fluid collection. Postsurgical changes from  ventral abdominal wall repair with diffuse body wall edema.       Impression:       Partially visualized lung bases demonstrate development of  groundglass opacifications in the right middle lobe inferiorly scattered  with concern for atypical etiology or aspiration, however COVID-19 not  excluded given interval development and overall appearance on partial  imaging of the lungs. Trace to small bilateral pleural effusions with  increase in size from recent 03/17/2020 comparison. Postsurgical changes  of the abdomen without intra-abdominal free air demonstrating early  postoperative appearance of trace ascites with soft tissue drain in  place and mesenteric edema however no loculated fluid collection with  left lower quadrant ostomy formation and ventral abdominal wall  postsurgical changes. Diffuse body wall edema.     DICTATED:   03/20/2020  EDITED/ls :   03/20/2020      This report was finalized on 3/21/2020 10:16 AM by Dr. Arturo Joseph.       XR Chest PA & Lateral [[id number]] Collected:  03/20/20 1806     Updated:  03/21/20 1010     Narrative:          EXAMINATION: XR CHEST PA AND LATERAL - 03/20/2020     INDICATION: Z74.09-Other reduced mobility; K65.9-Peritonitis,  unspecified; K57.92-Diverticulitis of intestine, part unspecified,  without perforation or abscess without bleeding; I10-Essential (primary)  hypertension; K57.20-Diverticulitis of large intestine with perforation  and abscess without bleeding.      COMPARISON: None.     FINDINGS: Cardiac size borderline enlarged. Bibasilar opacifications  adjacent to small volume bilateral pleural effusions with right arm PICC  in place and terminating at the cavoatrial junction. No pneumothorax.             Impression:       Bibasilar opacifications of atelectasis and/or airspace  disease greatest in the right adjacent to small volume bilateral pleural  effusions with right arm PICC in place and terminating at the cavoatrial  junction.     DICTATED:   03/20/2020  EDITED/ls :   03/20/2020      This report was finalized on 3/21/2020 10:16 AM by Dr. Arturo Joseph.       CT Abdomen Pelvis With & Without Contrast [[id number]] Collected:  03/17/20 1046     Updated:  03/20/20 1612    Narrative:       EXAMINATION: CT ABDOMEN AND PELVIS W WO CONTRAST-03/17/2020:      INDICATION: Ongoing pain.     TECHNIQUE: 5 mm unenhanced images through the abdomen and pelvis with  subsequent post-IV contrast 5 mm portal venous phase and delayed venous  phase images.     The radiation dose reduction device was turned on for each scan per the  ALARA (As Low as Reasonably Achievable) protocol.     COMPARISON: 03/15/2020 abdomen and pelvis CT scan.     FINDINGS: The patient history indicates diverticulitis, persistent  abdominal pain. Previous 03/15/2020 exam report indicated extensive  distal descending colon and sigmoid colon diverticulitis, small amount  of extraluminal air, and some free fluid. Today's exam, however, shows  moderate intra-abdominal free air. There is new moderate right lower  lobe atelectasis and mild  left lower lobe atelectasis. No significant  abnormalities are seen of the liver, spleen, pancreas, adrenal glands,  or kidneys. The bowel loops are normal in caliber.     Regarding the lower abdomen and pelvis, inflammatory fat stranding  around the descending colon is approximately stable. There appears to be  mild, new fat stranding of the retroperitoneum but this may actually  represent a trace amount of fluid extending along the paracolic gutters,  and along the peritoneal margin anteriorly, rather than true  retroperitoneal inflammation. Free fluid in the pelvis appears mildly  increased. Note is again made of the patient's enlarged uterus and the  patient's ring-type pessary. Fluid and air collection seen on axial  portal venous phase image 64, series 6 is difficult to distinguish as  intraluminal within bowel, or contained fluid and air, especially seen  on the axial images. The coronal images show this collection,  respectively coronal images 46-36 suggest that this may be extraluminal.  No other potential discrete, drainable abscess is identified. The  bladder is normally distended and normal in appearance. Delayed images  show normal caliber renal collecting systems and ureters.       Impression:       1.  Interval development of intra-abdominal free air consistent with  perforation from the patient's diverticulitis.  2.  Mildly increased intrapelvic fat stranding as described, mildly  increased intrapelvic free fluid.  3.  Questionable 5 cm fluid and air collection in the central pelvis as  described above. Please refer to initial axial image 64, series 3.  4.  New moderate right lower lobe atelectasis and mild left lower lobe  atelectasis.     NOTE: Preliminary report was called to Dr. Sravan Vazquez at approximately  10:50 AM 03/17/2020.     D:  03/17/2020  E:  03/17/2020           This report was finalized on 3/20/2020 4:09 PM by Dr. Rasheed Vidales MD.                  I have reviewed the  medications:  Scheduled Meds:  acetaminophen 650 mg Oral Q8H   busPIRone 15 mg Oral Daily   calcitonin (salmon) 1 spray Alternating Nares Daily   doxycycline 100 mg Intravenous Q12H   ertapenem 1 g Intravenous Q24H   Fat Emul Fish Oil/Plant Based 200 mL Intravenous Q24H (TPN)   gabapentin 300 mg Oral Nightly   heparin (porcine) 5,000 Units Subcutaneous Q8H   insulin regular 2-7 Units Subcutaneous Q6H   levothyroxine 88 mcg Oral Q AM   metoprolol succinate  mg Oral Daily   Mirabegron ER 50 mg Oral Daily   sodium chloride 500 mL Intravenous Once   sodium chloride 10 mL Intravenous Q12H   sucralfate 1 g Oral BID AC     Continuous Infusions:  Adult Central 2-in-1 TPN     Pharmacy to Dose TPN     sodium chloride 0.9 % with KCl 20 mEq 75 mL/hr Last Rate: 75 mL/hr (03/21/20 1123)     PRN Meds:.diazePAM  •  HYDROcodone-acetaminophen  •  HYDROmorphone **AND** naloxone  •  LORazepam  •  magnesium sulfate **OR** magnesium sulfate **OR** magnesium sulfate  •  Morphine **OR** Morphine  •  ondansetron  •  Pharmacy to Dose TPN  •  sodium chloride  •  sodium chloride  •  sodium chloride  •  sodium chloride    Assessment/Plan   Assessment & Plan     Active Hospital Problems    Diagnosis  POA   • **Perforation of sigmoid colon due to diverticulitis [K57.20]  Unknown   • Acute diverticulitis [K57.92]  Yes   • Sepsis (CMS/HCC) [A41.9]  Yes   • Lactic acidosis [E87.2]  Unknown   • Neutrophilic leukocytosis [D72.9]  Unknown   • Acute kidney injury (CMS/HCC) [N17.9]  Unknown   • Acute hyperglycemia [R73.9]  Unknown   • Arthritis [M19.90]  Unknown   • Overactive bladder [N32.81]  Unknown   • Dyspepsia [R10.13]  Unknown   • Essential hypertension [I10]  Unknown   • Acquired hypothyroidism [E03.9]  Yes      Resolved Hospital Problems   No resolved problems to display.        Brief Hospital Course to date:  Neema Richard is a 79 y.o. female  admitted for diverticulitis S microperforation and sepsis.  She underwent repeat CT imaging  concerning for free air.  She went to surgery 3/17 for transverse colostomy.  Patient had CT abd/pel 3/20 with noted ground glass opacities that could not rule out Covid -19. Patient was started on vanc and doxy in addition to invanz and transferred to  3/21. Covid - 19 testing sent to [facility].       Ground glass opacities  Hypoxia   Likely PNA   - Can't rule out covid 19 on imaging read; resp PCR negative, strep pneumo negative   - Covid 19 - [facility] pending   - airborne precautions   - Continue invanz and doxy -- vanc discontinued with negative PCR   - Currently stable on 2L    Leukocytosis - stable   Continue ertapenem/doxy   ID following     Sepsis resolved     Diverticulitis with free air on CT  S/p drainage of lower abdominal abcess, creation of distal transverse colostomy  Continue Invanz for total of 7 days  - repeat imaging reviewed  - Continue TPN and GI soft diet      HTN  Controlled continue current medicines     Hypothyroid  Continue levothyroxine     Arthritis  Continue gabapentin  Will need HHC at discharge     Overactive bladder  Dyspepsia      DVT Prophylaxis:  heparin     Disposition: I expect the patient to be discharged once stable from surgery standpoint      COVID-19 RISK SCREEN     1. Has the patient been exposed to a known COVID-19 (+) person or a person under investigation (PUI) for COVID-19 infection?  -- No     2. Has the patient traveled to or are they from a Level III endemic country? --  No    3. Has the patient traveled to or are they from a local community endemic area?   --  Yes    4. Does the patient have baseline higher exposure risk such as working in healthcare field or currently residing in healthcare facility?  --  No   Helpful websites:  https://www.cdc.gov/coronavirus/2019-ncov/travelers/map-and-travel-notices.html#travel-1  [email or web address]            CODE STATUS:   Code Status and Medical Interventions:   Ordered at: 03/17/20 1903     Level Of  Support Discussed With:    Patient     Code Status:    CPR     Medical Interventions (Level of Support Prior to Arrest):    Full         Electronically signed by Jennifer Laurent DO, 03/21/20, 15:48.

## 2020-03-22 LAB
ALBUMIN SERPL-MCNC: 2.2 G/DL (ref 3.5–5.2)
ALBUMIN/GLOB SERPL: 0.8 G/DL
ALP SERPL-CCNC: 61 U/L (ref 39–117)
ALT SERPL W P-5'-P-CCNC: 6 U/L (ref 1–33)
ANION GAP SERPL CALCULATED.3IONS-SCNC: 9 MMOL/L (ref 5–15)
AST SERPL-CCNC: 12 U/L (ref 1–32)
BACTERIA SPEC ANAEROBE CULT: NORMAL
BASOPHILS # BLD AUTO: 0.11 10*3/MM3 (ref 0–0.2)
BASOPHILS NFR BLD AUTO: 0.6 % (ref 0–1.5)
BILIRUB SERPL-MCNC: 0.2 MG/DL (ref 0.2–1.2)
BUN BLD-MCNC: 16 MG/DL (ref 8–23)
BUN/CREAT SERPL: 36.4 (ref 7–25)
CA-I SERPL ISE-MCNC: 1.23 MMOL/L (ref 1.12–1.32)
CALCIUM SPEC-SCNC: 8 MG/DL (ref 8.6–10.5)
CHLORIDE SERPL-SCNC: 103 MMOL/L (ref 98–107)
CO2 SERPL-SCNC: 25 MMOL/L (ref 22–29)
CREAT BLD-MCNC: 0.44 MG/DL (ref 0.57–1)
DEPRECATED RDW RBC AUTO: 46.5 FL (ref 37–54)
EOSINOPHIL # BLD AUTO: 1.34 10*3/MM3 (ref 0–0.4)
EOSINOPHIL NFR BLD AUTO: 7.1 % (ref 0.3–6.2)
ERYTHROCYTE [DISTWIDTH] IN BLOOD BY AUTOMATED COUNT: 14.3 % (ref 12.3–15.4)
GFR SERPL CREATININE-BSD FRML MDRD: 138 ML/MIN/1.73
GLOBULIN UR ELPH-MCNC: 2.8 GM/DL
GLUCOSE BLD-MCNC: 152 MG/DL (ref 65–99)
GLUCOSE BLDC GLUCOMTR-MCNC: 132 MG/DL (ref 70–130)
GLUCOSE BLDC GLUCOMTR-MCNC: 145 MG/DL (ref 70–130)
GLUCOSE BLDC GLUCOMTR-MCNC: 168 MG/DL (ref 70–130)
GLUCOSE BLDC GLUCOMTR-MCNC: 182 MG/DL (ref 70–130)
HCT VFR BLD AUTO: 31.5 % (ref 34–46.6)
HGB BLD-MCNC: 10.1 G/DL (ref 12–15.9)
IMM GRANULOCYTES # BLD AUTO: 1.74 10*3/MM3 (ref 0–0.05)
IMM GRANULOCYTES NFR BLD AUTO: 9.2 % (ref 0–0.5)
LARGE PLATELETS: NORMAL
LYMPHOCYTES # BLD AUTO: 1.85 10*3/MM3 (ref 0.7–3.1)
LYMPHOCYTES NFR BLD AUTO: 9.8 % (ref 19.6–45.3)
MAGNESIUM SERPL-MCNC: 2.1 MG/DL (ref 1.6–2.4)
MCH RBC QN AUTO: 28.7 PG (ref 26.6–33)
MCHC RBC AUTO-ENTMCNC: 32.1 G/DL (ref 31.5–35.7)
MCV RBC AUTO: 89.5 FL (ref 79–97)
MONOCYTES # BLD AUTO: 1.24 10*3/MM3 (ref 0.1–0.9)
MONOCYTES NFR BLD AUTO: 6.5 % (ref 5–12)
NEUTROPHILS # BLD AUTO: 12.68 10*3/MM3 (ref 1.7–7)
NEUTROPHILS NFR BLD AUTO: 66.8 % (ref 42.7–76)
NRBC BLD AUTO-RTO: 0.1 /100 WBC (ref 0–0.2)
PHOSPHATE SERPL-MCNC: 2.7 MG/DL (ref 2.5–4.5)
PLATELET # BLD AUTO: 500 10*3/MM3 (ref 140–450)
PMV BLD AUTO: 9.9 FL (ref 6–12)
POTASSIUM BLD-SCNC: 3.6 MMOL/L (ref 3.5–5.2)
PREALB SERPL-MCNC: 4.1 MG/DL (ref 20–40)
PROT SERPL-MCNC: 5 G/DL (ref 6–8.5)
RBC # BLD AUTO: 3.52 10*6/MM3 (ref 3.77–5.28)
RBC MORPH BLD: NORMAL
REF LAB TEST METHOD: NORMAL
SARS-COV-2 RNA RESP QL NAA+PROBE: NOT DETECTED
SODIUM BLD-SCNC: 137 MMOL/L (ref 136–145)
WBC MORPH BLD: NORMAL
WBC NRBC COR # BLD: 18.96 10*3/MM3 (ref 3.4–10.8)

## 2020-03-22 PROCEDURE — 99232 SBSQ HOSP IP/OBS MODERATE 35: CPT | Performed by: INTERNAL MEDICINE

## 2020-03-22 PROCEDURE — 82330 ASSAY OF CALCIUM: CPT

## 2020-03-22 PROCEDURE — 25010000002 ERTAPENEM PER 500 MG: Performed by: INTERNAL MEDICINE

## 2020-03-22 PROCEDURE — 63710000001 INSULIN REGULAR HUMAN PER 5 UNITS

## 2020-03-22 PROCEDURE — 25010000002 MAGNESIUM SULFATE PER 500 MG OF MAGNESIUM

## 2020-03-22 PROCEDURE — 82962 GLUCOSE BLOOD TEST: CPT

## 2020-03-22 PROCEDURE — 84100 ASSAY OF PHOSPHORUS: CPT

## 2020-03-22 PROCEDURE — 85007 BL SMEAR W/DIFF WBC COUNT: CPT | Performed by: COLON & RECTAL SURGERY

## 2020-03-22 PROCEDURE — 83735 ASSAY OF MAGNESIUM: CPT

## 2020-03-22 PROCEDURE — 84134 ASSAY OF PREALBUMIN: CPT | Performed by: COLON & RECTAL SURGERY

## 2020-03-22 PROCEDURE — 25010000002 CALCIUM GLUCONATE PER 10 ML

## 2020-03-22 PROCEDURE — 25010000002 HEPARIN (PORCINE) PER 1000 UNITS: Performed by: COLON & RECTAL SURGERY

## 2020-03-22 PROCEDURE — 25010000002 ONDANSETRON PER 1 MG: Performed by: COLON & RECTAL SURGERY

## 2020-03-22 PROCEDURE — 85025 COMPLETE CBC W/AUTO DIFF WBC: CPT | Performed by: COLON & RECTAL SURGERY

## 2020-03-22 PROCEDURE — 80053 COMPREHEN METABOLIC PANEL: CPT

## 2020-03-22 PROCEDURE — 25010000002 POTASSIUM CHLORIDE PER 2 MEQ OF POTASSIUM

## 2020-03-22 RX ADMIN — SODIUM CHLORIDE, PRESERVATIVE FREE 10 ML: 5 INJECTION INTRAVENOUS at 09:23

## 2020-03-22 RX ADMIN — HEPARIN SODIUM 5000 UNITS: 5000 INJECTION, SOLUTION INTRAVENOUS; SUBCUTANEOUS at 06:48

## 2020-03-22 RX ADMIN — METOPROLOL SUCCINATE 100 MG: 100 TABLET, EXTENDED RELEASE ORAL at 09:21

## 2020-03-22 RX ADMIN — INSULIN HUMAN 2 UNITS: 100 INJECTION, SOLUTION PARENTERAL at 13:20

## 2020-03-22 RX ADMIN — SUCRALFATE 1 G: 1 TABLET ORAL at 18:36

## 2020-03-22 RX ADMIN — SUCRALFATE 1 G: 1 TABLET ORAL at 06:48

## 2020-03-22 RX ADMIN — SODIUM CHLORIDE, PRESERVATIVE FREE 10 ML: 5 INJECTION INTRAVENOUS at 23:13

## 2020-03-22 RX ADMIN — INSULIN HUMAN 2 UNITS: 100 INJECTION, SOLUTION PARENTERAL at 18:25

## 2020-03-22 RX ADMIN — HEPARIN SODIUM 5000 UNITS: 5000 INJECTION, SOLUTION INTRAVENOUS; SUBCUTANEOUS at 23:12

## 2020-03-22 RX ADMIN — ERTAPENEM SODIUM 1 G: 1 INJECTION, POWDER, LYOPHILIZED, FOR SOLUTION INTRAMUSCULAR; INTRAVENOUS at 09:18

## 2020-03-22 RX ADMIN — MIRABEGRON 50 MG: 50 TABLET, FILM COATED, EXTENDED RELEASE ORAL at 21:50

## 2020-03-22 RX ADMIN — ONDANSETRON 4 MG: 2 INJECTION INTRAMUSCULAR; INTRAVENOUS at 15:42

## 2020-03-22 RX ADMIN — CALCIUM GLUCONATE: 94 INJECTION, SOLUTION INTRAVENOUS at 18:26

## 2020-03-22 RX ADMIN — BUSPIRONE HYDROCHLORIDE 2.5 MG: 10 TABLET ORAL at 22:37

## 2020-03-22 RX ADMIN — SMOFLIPID 200 ML: 6; 6; 5; 3 INJECTION, EMULSION INTRAVENOUS at 18:26

## 2020-03-22 RX ADMIN — LEVOTHYROXINE SODIUM 88 MCG: 88 TABLET ORAL at 06:47

## 2020-03-22 RX ADMIN — GABAPENTIN 300 MG: 300 CAPSULE ORAL at 22:39

## 2020-03-22 RX ADMIN — ACETAMINOPHEN 650 MG: 325 TABLET, FILM COATED ORAL at 13:21

## 2020-03-22 RX ADMIN — CALCITONIN SALMON 1 SPRAY: 200 SPRAY, METERED NASAL at 09:23

## 2020-03-22 RX ADMIN — DOXYCYCLINE 100 MG: 100 INJECTION, POWDER, LYOPHILIZED, FOR SOLUTION INTRAVENOUS at 22:44

## 2020-03-22 RX ADMIN — ACETAMINOPHEN 650 MG: 325 TABLET, FILM COATED ORAL at 06:48

## 2020-03-22 RX ADMIN — HEPARIN SODIUM 5000 UNITS: 5000 INJECTION, SOLUTION INTRAVENOUS; SUBCUTANEOUS at 13:20

## 2020-03-22 RX ADMIN — DOXYCYCLINE 100 MG: 100 INJECTION, POWDER, LYOPHILIZED, FOR SOLUTION INTRAVENOUS at 09:19

## 2020-03-22 NOTE — PLAN OF CARE
Problem: Patient Care Overview  Goal: Plan of Care Review  Outcome: Ongoing (interventions implemented as appropriate)  Flowsheets (Taken 3/22/2020 0346)  Progress: improving  Plan of Care Reviewed With: patient  Outcome Summary: No resp distress noted; Cont on O2@1.5L; Pain controlled with sched med; Cont on ABX therapy without adverse reaction noted; Maintained on contact and airborne precaution; Abdominal dressing CDI; VSS; Will cont to monitor

## 2020-03-22 NOTE — PROGRESS NOTES
Discussed with Dr Bejarano face-to face yesterday  Discussed with Dr Laurent by phone    I have been asked to see her on an as-needed basis by both providers in an effort to preserve PPE equipment on the rule out coronavirus jiang.    Yesterday, I did examine the patient prior to these discussions.    I was excited to see stoma output, gas, stool.    Differential including aspiration pneumonia, coronavirus, abdominal source of leukocytosis in a delayed fashion.  It could be a rebound leukocytosis noting surgical intervention likely with an hours of profound contamination of the peritoneal cavity without time to mount leukocytosis prior to surgery.     I have also looked for other sources of progressive leukocytosis which included CT scan of the abdomen about 40 hours ago which included imaging of the lower chest culminating in findings suggestive of aspiration and less likely coronavirus.    It is reassuring that she is not tachycardic and that she appeared to clinically be making progress yesterday.    Incisional carlos have been removed, incision should be at low risk for complications by virtue of having kept the skin open in multiple locations to permit drainage given the contaminated nature of the procedure.    I am concerned based on the increasing white count and the malnutrition evident by clinical course and low prealbumin level checked today.    Soft diet and protein supplement drinks discussed with patient yesterday to be taken as needed    TPN until evidence of enteral function and caloric absorption given fragile nutritional status.    Please let me know if questions arise.

## 2020-03-22 NOTE — PROGRESS NOTES
"INFECTIOUS DISEASE PROGRESS NOTE       Neema Richard  1940  [id number]      Admission Date: 3/15/2020      Requesting Provider: Filomena Stuart DO  Evaluating Physician: Darshan Bejarano MD    Reason for Consultation: Acute sigmoid diverticulitis, with microperforation     History of present illness:  3/16/20:    3/16/2020: Patient is a 79 y.o. female, seen today for acute sigmoid diverticulitis with microperforation.  She has had several episodes of diverticulitis in the past, the last being in December 2019, treated with an oral antibiotic by her PCP.   Her symptoms minimally improved. She began experiencing diarrhea, with low grade fever, and yesterday, developed worsening abdominal pain, prompting her presentation to the ED.  An abdominal Ct with extensive diverticulitis involving the descending colon and sigmoid colon with stranding and wall thickening, with extraluminal air suggesting a microperforation.   Admitting labs with a leukocytosis of 15, 000, lactic acidosis, and she has been afebrile. She is currently on Merrem and we were consulted for evaluation and treatment.   3/17/20:  She feels better but still with abdominal tenderness.  Her daughter is coming to from [place] and she wants her tested for Covid-19.  She remains afebrile.  I saw her earlier this morning but she has now deteriorated.  She now has free air on her CT scan and is currently in the OR.  3/18/20:  She is feeling \"some better this am\".  She denies any nausea. She complains of hiccups. She remains afebrile.    3/19/20: She accidentally pulled NGT out yesterday. She is having some nausea, no vomiting.   She complains of not being able to get any rest.    3/20/2020: She had increased abdominal discomfort earlier today but it is decreased today.  She and her daughter indicate that her confusion has improved and her narcotic doses have been reduced.  She remains afebrile.  3/21/2020: She was moved to airborne precautions/contact " precautions due to her chest CT findings.  She has been afebrile over the day.  She has now been started on TPN.  The RN staff indicates that she has been stable over the day.  She has denied dyspnea. A COVID19 PCR is currently pending at  and should be back tomorrow.  3/22/2020: Her COVID19 PCR was negative.  She has decreased dyspnea.  She denies nausea and vomiting.  She has some abdominal distention but denies severe abdominal pain.    Past Medical History:   Diagnosis Date   • Acquired hypothyroidism 1980   • Colon polyp - sees Dr. 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 ER and NC (+)   • Essential hypertension 2012   • High cholesterol 2019   • Hx of herpes genitalis 2013   • IBS-d (takes Buspar)        Past Surgical History:   Procedure Laterality Date   • BILATERAL BREAST REDUCTION Bilateral 1995   • BREAST LUMPECTOMY Left 2009   • CATARACT EXTRACTION, BILATERAL Bilateral 2018   • CERVICAL CONIZATION  1983   • COLON RESECTION N/A 3/17/2020    Procedure: LOW ANTERIOR COLON RESECTION, MOBILIZATION OF SPLENIC FLEXURE, DRAINAGE OF ABSCESS AND COLOSTOMY CREATION;  Surgeon: Sravan Vazquez MD;  Location: [facility];  Service: General;  Laterality: N/A;   • FACIAL COSMETIC SURGERY  2001   • FACIAL COSMETIC SURGERY  2012   • HEMORRHOIDECTOMY  1974   • LAPAROSCOPIC CHOLECYSTECTOMY  2010   • REDUCTION MAMMAPLASTY Bilateral 04/13/2017    DCIS   • SINUS SURGERY  1992   • THYROIDECTOMY, PARTIAL  1963   • TONSILLECTOMY  1953   • VARICOSE VEIN SURGERY Bilateral 1981       Family History   Problem Relation Age of Onset   • Breast cancer Neg Hx    • Ovarian cancer Neg Hx        Social History     Socioeconomic History   • Marital status:      Spouse name: Not on file   • Number of children: Not on file   • Years of education: Not on file   • Highest education level: Not on file   Tobacco Use   • Smoking status: Never Smoker   Substance and Sexual Activity    • Alcohol use: No   • Drug use: No       Allergies   Allergen Reactions   • Evista [Raloxifene] Rash   • Cephalexin Diarrhea   • Hydrochlorothiazide Diarrhea   • Meloxicam Other (See Comments)   • Metronidazole Diarrhea   • Oxybutynin Cough   • Propylthiouracil Other (See Comments)     Severally reduce WBC's    • Sulfa Antibiotics Hives   • Sulfacetamide Sodium-Sulfur Hives   • Sulfur Dioxide Hives         Medication:    Current Facility-Administered Medications:   •  acetaminophen (TYLENOL) tablet 650 mg, 650 mg, Oral, Q8H, Sravan Vazquez MD, 650 mg at 03/22/20 1321  •  Adult Central 2-in-1 TPN, , Intravenous, Continuous, Eveline Rosas, [facility], Last Rate: 70 mL/hr at 03/22/20 1826  •  busPIRone (BUSPAR) tablet 15 mg, 15 mg, Oral, Daily, Sravan Vazquez MD, 15 mg at 03/21/20 2107  •  calcitonin (salmon) (MIACALCIN) nasal spray 1 spray, 1 spray, Alternating Nares, Daily, Sravan Vazquez MD, 1 spray at 03/22/20 0923  •  diazePAM (VALIUM) tablet 5 mg, 5 mg, Oral, Q6H PRN, Sravan Vazquez MD  •  doxycycline (VIBRAMYCIN) 100 mg/100 mL 0.9% NS MBP, 100 mg, Intravenous, Q12H, Kirill Grant MD, 100 mg at 03/22/20 0919  •  ertapenem (INVanz) 1 g/100 mL 0.9% NS VTB (mbp), 1 g, Intravenous, Q24H, Darshan Bejarano MD, Last Rate: 0 mL/hr at 03/20/20 0840, 1 g at 03/22/20 0918  •  Fat Emul Fish Oil/Plant Based (SMOFLIPID) 20 % emulsion 200 mL, 200 mL, Intravenous, Q24H (TPN), Eveline Rosas XIOMARA, Last Rate: 16.67 mL/hr at 03/22/20 1826, 200 mL at 03/22/20 1826  •  gabapentin (NEURONTIN) capsule 300 mg, 300 mg, Oral, Nightly, Minerva Steele, APRN, 300 mg at 03/21/20 2107  •  heparin (porcine) 5000 UNIT/ML injection 5,000 Units, 5,000 Units, Subcutaneous, Q8H, Sravan Vazquez MD, 5,000 Units at 03/22/20 1320  •  HYDROcodone-acetaminophen (NORCO) 5-325 MG per tablet 1 tablet, 1 tablet, Oral, Q4H PRN, Sravan Vazquez MD, 1 tablet at 03/20/20 0411  •  HYDROmorphone (DILAUDID) injection 0.5 mg, 0.5 mg, Intravenous, Q2H PRN  **AND** naloxone (NARCAN) injection 0.4 mg, 0.4 mg, Intravenous, Q5 Min PRN, Sravan Vazquez MD  •  insulin regular (humuLIN R,novoLIN R) injection 2-7 Units, 2-7 Units, Subcutaneous, Q6H, Eveline Rosas, [facility], 2 Units at 03/22/20 1825  •  levothyroxine (SYNTHROID, LEVOTHROID) tablet 88 mcg, 88 mcg, Oral, Q AM, Sravan Vazquez MD, 88 mcg at 03/22/20 0647  •  LORazepam (ATIVAN) tablet 0.5 mg, 0.5 mg, Oral, Q8H PRN, Sravan Vazquez MD, 0.5 mg at 03/18/20 0538  •  Magnesium Sulfate 2 gram Bolus, followed by 8 gram infusion (total Mg dose 10 grams)- Mg less than or equal to 1mg/dL, 2 g, Intravenous, PRN **OR** Magnesium Sulfate 2 gram / 50mL Infusion (GIVE X 3 BAGS TO EQUAL 6GM TOTAL DOSE) - Mg 1.1 - 1.5 mg/dl, 2 g, Intravenous, PRN **OR** Magnesium Sulfate 4 gram infusion- Mg 1.6-1.9 mg/dL, 4 g, Intravenous, PRN, Jennifer Laurent DO, Last Rate: 25 mL/hr at 03/21/20 1652, 4 g at 03/21/20 1652  •  metoprolol succinate XL (TOPROL-XL) 24 hr tablet 100 mg, 100 mg, Oral, Daily, Sravan Vazquez MD, 100 mg at 03/22/20 0921  •  Mirabegron ER (MYRBETRIQ) 24 hr tablet 50 mg- patient supplied med, 50 mg, Oral, Daily, Sravan Vazquez MD, 50 mg at 03/21/20 2107  •  morphine injection 2 mg, 2 mg, Intravenous, Q2H PRN, 2 mg at 03/17/20 0817 **OR** Morphine sulfate (PF) injection 4 mg, 4 mg, Intravenous, Q2H PRN, Sravan Vazquez MD, 4 mg at 03/16/20 1704  •  ondansetron (ZOFRAN) injection 4 mg, 4 mg, Intravenous, Q6H PRN, Sravan Vazquez MD, 4 mg at 03/22/20 1542  •  Pharmacy to Dose TPN, , Does not apply, Continuous PRN, Sravan Vazquez MD  •  sodium chloride 0.45 % bolus 500 mL, 500 mL, Intravenous, Once, Sravan Vazquez MD  •  sodium chloride 0.9 % bolus 1,836 mL, 30 mL/kg, Intravenous, PRN, Sravan aVzquez MD  •  sodium chloride 0.9 % flush 10 mL, 10 mL, Intravenous, PRN, Sravan Vazquez MD  •  sodium chloride 0.9 % flush 10 mL, 10 mL, Intravenous, PRN, Sravan Vazquez MD  •  sodium chloride 0.9 % flush 10 mL, 10 mL, Intravenous,  Q12H, Sravan Vazquez MD, 10 mL at 20 0923  •  sodium chloride 0.9 % flush 10 mL, 10 mL, Intravenous, PRN, Sravan Vazquez MD  •  sucralfate (CARAFATE) tablet 1 g, 1 g, Oral, BID AC, Sravan Vazquez MD, 1 g at 20 1836    Antibiotics:  Anti-Infectives (From admission, onward)    Ordered     Dose/Rate Route Frequency Start Stop    20 2250  doxycycline (VIBRAMYCIN) 100 mg/100 mL 0.9% NS MBP     Eveline Rosas [facility] reviewed the order on 20 1145.   Ordering Provider:  Kirill Grant MD    100 mg  over 60 Minutes Intravenous Every 12 Hours Scheduled 20 2359 20 225  vancomycin 1250 mg/250 mL 0.9% NS IVPB ([facility])     Ordering Provider:  Alan Raygoza RPH    20 mg/kg × 61.2 kg  over 90 Minutes Intravenous Once 20 2345 20 0341    20 0811  ertapenem (INVanz) 1 g/100 mL 0.9% NS VTB (mbp)     Eveline Rosas XIOMARA reviewed the order on 20 1145.   Ordering Provider:  Darshan Bejarano MD    1 g  over 30 Minutes Intravenous Every 24 Hours 20 0900 20 0859    03/15/20 1954  meropenem (MERREM) 1 g/100 mL 0.9% NS VTB (mbp)     Ordering Provider:  Darshan Steward MD    1 g  over 30 Minutes Intravenous Once 03/15/20 1955 03/15/20 2122                  Physical Exam:   Vital Signs  Temp (24hrs), Av.5 °F (36.9 °C), Min:97.7 °F (36.5 °C), Max:100.3 °F (37.9 °C)    Temp  Min: 97.7 °F (36.5 °C)  Max: 100.3 °F (37.9 °C)  BP  Min: 131/59  Max: 156/69  Pulse  Min: 61  Max: 101  Resp  Min: 16  Max: 18  SpO2  Min: 91 %  Max: 94 %    General: She is alert and in no acute distress.  She appears less ill.  Skin: She has no diffuse rash.  HEENT: She has no labial ulcers.  Lungs: She has minimal scattered rhonchi.  Cardiovascular: She has no murmur  Abdomen: She has mild abdominal distention.  Neurologic: She is alert and oriented x3.  Motor exam reveals 5/5 strength bilaterally      Laboratory Data    Results from last 7 days   Lab Units 20  0639  03/21/20  0705 03/20/20  0604   WBC 10*3/mm3 18.96* 16.49* 17.68*   HEMOGLOBIN g/dL 10.1* 9.9* 9.6*   HEMATOCRIT % 31.5* 32.2* 29.8*   PLATELETS 10*3/mm3 500* 418 401     Results from last 7 days   Lab Units 03/22/20  0639   SODIUM mmol/L 137   POTASSIUM mmol/L 3.6   CHLORIDE mmol/L 103   CO2 mmol/L 25.0   BUN mg/dL 16   CREATININE mg/dL 0.44*   GLUCOSE mg/dL 152*   CALCIUM mg/dL 8.0*     Results from last 7 days   Lab Units 03/22/20  0639   ALK PHOS U/L 61   BILIRUBIN mg/dL 0.2   ALT (SGPT) U/L 6   AST (SGOT) U/L 12         Results from last 7 days   Lab Units 03/21/20  1525   CRP mg/dL 19.98*     Results from last 7 days   Lab Units 03/20/20  1911   LACTATE mmol/L 1.2             Estimated Creatinine Clearance: 57.9 mL/min (A) (by C-G formula based on SCr of 0.44 mg/dL (L)).  Lactate 2.9     Microbiology:    3/16: BC no growth    3/17:  Peritoneum gms moderate WBCs, nos- Gram negative bacilli     Radiology:  Imaging Results (Last 72 Hours)     Procedure Component Value Units Date/Time    CT Abdomen Pelvis With & Without Contrast [[id number]] Collected:  03/20/20 1817     Updated:  03/21/20 1019    Narrative:       EXAMINATION: CT ABDOMEN/PELVIS WWO CONTRAST - 03/20/2020     INDICATION: Z74.09-Other reduced mobility; K65.9-Peritonitis,  unspecified; K57.92-Diverticulitis of intestine, part unspecified,  without perforation or abscess without bleeding; I10-Essential (primary)  hypertension; K57.20-Diverticulitis of large intestine with perforation  and abscess without bleeding.      TECHNIQUE: CT abdomen and pelvis with and without intravenous contrast.     The radiation dose reduction device was turned on for each scan per the  ALARA (As Low as Reasonably Achievable) protocol.     COMPARISON: CT dated 03/17/2020.     FINDINGS: Lung bases demonstrate abnormal midlung opacifications  greatest in the right middle lobe new from prior recent comparison as  these are  developed in the interim along with increasing but  trace to  small volume bilateral pleural effusions without adjacent atelectasis.  Liver without focal lesion. Gallbladder is surgically absent. No biliary  dilatation. Pancreas and spleen unremarkable with perisplenic and  perihepatic ascites as well as mesenteric edema. Adrenals without  distinct nodule. Kidneys without hydronephrosis or hydroureter.  Atherosclerotic nonaneurysmal abdominal aorta. Left lower quadrant  ostomy in place with soft tissue drain and pessary in place within the  pelvis without loculated fluid collection. Postsurgical changes from  ventral abdominal wall repair with diffuse body wall edema.       Impression:       Partially visualized lung bases demonstrate development of  groundglass opacifications in the right middle lobe inferiorly scattered  with concern for atypical etiology or aspiration, however COVID-19 not  excluded given interval development and overall appearance on partial  imaging of the lungs. Trace to small bilateral pleural effusions with  increase in size from recent 03/17/2020 comparison. Postsurgical changes  of the abdomen without intra-abdominal free air demonstrating early  postoperative appearance of trace ascites with soft tissue drain in  place and mesenteric edema however no loculated fluid collection with  left lower quadrant ostomy formation and ventral abdominal wall  postsurgical changes. Diffuse body wall edema.     DICTATED:   03/20/2020  EDITED/ls :   03/20/2020      This report was finalized on 3/21/2020 10:16 AM by Dr. Arturo Joseph.       XR Chest PA & Lateral [[id number]] Collected:  03/20/20 1805     Updated:  03/21/20 1019    Narrative:          EXAMINATION: XR CHEST PA AND LATERAL - 03/20/2020     INDICATION: Z74.09-Other reduced mobility; K65.9-Peritonitis,  unspecified; K57.92-Diverticulitis of intestine, part unspecified,  without perforation or abscess without bleeding; I10-Essential (primary)  hypertension; K57.20-Diverticulitis of large intestine  with perforation  and abscess without bleeding.      COMPARISON: None.     FINDINGS: Cardiac size borderline enlarged. Bibasilar opacifications  adjacent to small volume bilateral pleural effusions with right arm PICC  in place and terminating at the cavoatrial junction. No pneumothorax.             Impression:       Bibasilar opacifications of atelectasis and/or airspace  disease greatest in the right adjacent to small volume bilateral pleural  effusions with right arm PICC in place and terminating at the cavoatrial  junction.     DICTATED:   03/20/2020  EDITED/ls :   03/20/2020      This report was finalized on 3/21/2020 10:16 AM by Dr. Arturo Joseph.       CT Abdomen Pelvis With & Without Contrast [[id number]] Collected:  03/17/20 1046     Updated:  03/20/20 1612    Narrative:       EXAMINATION: CT ABDOMEN AND PELVIS W WO CONTRAST-03/17/2020:      INDICATION: Ongoing pain.     TECHNIQUE: 5 mm unenhanced images through the abdomen and pelvis with  subsequent post-IV contrast 5 mm portal venous phase and delayed venous  phase images.     The radiation dose reduction device was turned on for each scan per the  ALARA (As Low as Reasonably Achievable) protocol.     COMPARISON: 03/15/2020 abdomen and pelvis CT scan.     FINDINGS: The patient history indicates diverticulitis, persistent  abdominal pain. Previous 03/15/2020 exam report indicated extensive  distal descending colon and sigmoid colon diverticulitis, small amount  of extraluminal air, and some free fluid. Today's exam, however, shows  moderate intra-abdominal free air. There is new moderate right lower  lobe atelectasis and mild left lower lobe atelectasis. No significant  abnormalities are seen of the liver, spleen, pancreas, adrenal glands,  or kidneys. The bowel loops are normal in caliber.     Regarding the lower abdomen and pelvis, inflammatory fat stranding  around the descending colon is approximately stable. There appears to be  mild, new fat stranding  of the retroperitoneum but this may actually  represent a trace amount of fluid extending along the paracolic gutters,  and along the peritoneal margin anteriorly, rather than true  retroperitoneal inflammation. Free fluid in the pelvis appears mildly  increased. Note is again made of the patient's enlarged uterus and the  patient's ring-type pessary. Fluid and air collection seen on axial  portal venous phase image 64, series 6 is difficult to distinguish as  intraluminal within bowel, or contained fluid and air, especially seen  on the axial images. The coronal images show this collection,  respectively coronal images 46-36 suggest that this may be extraluminal.  No other potential discrete, drainable abscess is identified. The  bladder is normally distended and normal in appearance. Delayed images  show normal caliber renal collecting systems and ureters.       Impression:       1.  Interval development of intra-abdominal free air consistent with  perforation from the patient's diverticulitis.  2.  Mildly increased intrapelvic fat stranding as described, mildly  increased intrapelvic free fluid.  3.  Questionable 5 cm fluid and air collection in the central pelvis as  described above. Please refer to initial axial image 64, series 3.  4.  New moderate right lower lobe atelectasis and mild left lower lobe  atelectasis.     NOTE: Preliminary report was called to Dr. Sravan Vazquez at approximately  10:50 AM 03/17/2020.     D:  03/17/2020  E:  03/17/2020           This report was finalized on 3/20/2020 4:09 PM by Dr. Rasheed Vidales MD.           CT scan from 3/20:  Impression:     Partially visualized lung bases demonstrate development of  groundglass opacifications in the right middle lobe inferiorly scattered  with concern for atypical etiology or aspiration, however COVID-19 not  excluded given interval development and overall appearance on partial  imaging of the lungs. Trace to small bilateral pleural effusions  with  increase in size from recent 03/17/2020 comparison. Postsurgical changes  of the abdomen without intra-abdominal free air demonstrating early  postoperative appearance of trace ascites with soft tissue drain in  place and mesenteric edema however no loculated fluid collection with  left lower quadrant ostomy formation and ventral abdominal wall  postsurgical changes. Diffuse body wall edema.     I read her CT scan      Impression:   1.  Acute sigmoid diverticulitis- s/p resection 3/17  2.  Severe sepsis- with leukocytosis, tachycardia, lactic acidosis, uncontrolled diabetes mellitus, and known focus of infection   3.  Leukocytosis/neutrophilia-I suspect that her leukocytosis is due to residual intra-abdominal infection.  She will be at risk for abscess.  If her leukocytosis fails to significantly improve, we may need to perform a follow-up abdominal/pelvic CT scan in a few days.  4.  Pelvic abscess  5.  Encephalopathy-this is most likely secondary to narcotics but if this worsens, I will consider switching her ertapenem to alternative antibiotic therapy.  6.  Pulmonary infiltrate/atelectasis- she clinically appears stable.      PLAN/RECOMMENDATIONS:   1.  Continue ertapenem 1 g IV daily  2.  Discontinue precautions for COVID19      I discussed her very complex situation with Dr. Laurent today.  I discussed her situation in detail with her and her daughter today.  I communicated with Dr. Vazquez regarding her clinical situation today.  I spent over 35 minutes on her care today.        Darshan Bejarano MD  3/22/2020  19:25

## 2020-03-22 NOTE — PROGRESS NOTES
Pharmacy Parenteral Nutrition Evaluation    Neema Richard is a  79 y.o. female receiving TPN.     Indication: Ileus associated with diffuse peritonitis  Consulting Physician: George    Labs  Results from last 7 days   Lab Units 03/22/20  0639 03/21/20  1525 03/21/20  0705  03/15/20  1638   SODIUM mmol/L 137 140 139   < > 138   POTASSIUM mmol/L 3.6 4.5 4.1   < > 4.0   CHLORIDE mmol/L 103 108* 106   < > 102   CO2 mmol/L 25.0 21.0* 20.0*   < > 21.0*   BUN mg/dL 16 19 21   < > 21   CREATININE mg/dL 0.44* 0.34* 0.40*   < > 0.97   CALCIUM mg/dL 8.0* 7.5* 8.0*   < > 8.6   BILIRUBIN mg/dL 0.2 0.2  --   --  0.7   ALK PHOS U/L 61 68  --   --  97   ALT (SGPT) U/L 6 7  --   --  9   AST (SGOT) U/L 12 13  --   --  16   GLUCOSE mg/dL 152* 88 92   < > 257*    < > = values in this interval not displayed.       Results from last 7 days   Lab Units 03/22/20  0639 03/21/20  1525 03/21/20  0705   MAGNESIUM mg/dL 2.1 1.6 1.8   PHOSPHORUS mg/dL 2.7 3.4 2.8   PREALBUMIN mg/dL  --  3.7*  --        Results from last 7 days   Lab Units 03/22/20  0639 03/21/20  0705 03/20/20  0604   WBC 10*3/mm3 18.96* 16.49* 17.68*   HEMOGLOBIN g/dL 10.1* 9.9* 9.6*   HEMATOCRIT % 31.5* 32.2* 29.8*   PLATELETS 10*3/mm3 500* 418 401       Triglycerides   Date Value Ref Range Status   03/21/2020 172 (H) 0 - 150 mg/dL Final     Comment:     Falsely depressed results may occur on samples drawn from patients receiving N-Acetylcysteine (NAC) or Metamizole.       estimated creatinine clearance is 57.9 mL/min (A) (by C-G formula based on SCr of 0.44 mg/dL (L)).    Intake & Output (last 3 days)         03/19 0701 - 03/20 0700 03/20 0701 - 03/21 0700 03/21 0701 - 03/22 0700 03/22 0701 - 03/23 0700    P.O. 120       I.V. (mL/kg) 1621.3 (26.5) 842.6 (13.8)      IV Piggyback  450      Total Intake(mL/kg) 1741.3 (28.5) 1292.6 (21.1)      Urine (mL/kg/hr) 800 (0.5) 1900 (1.3) 800 (0.5) 300 (1)    Drains 100 80 15     Stool  0 350     Total Output 900 1980 1165 300    Net +841.3  -687.4 -1165 -300            Urine Unmeasured Occurrence 4 x 2 x 3 x             Dietitian Recommendations  Diet Orders (active) (From admission, onward)       Start     Ordered    03/21/20 0001  NPO Diet  Diet Effective Midnight      03/20/20 1632    03/20/20 1414  Dietary Nutrition Supplements Boost Plus; vanilla  Daily With Breakfast, Lunch & Dinner     Comments:  Boost Plus (vanilla) 3x daily    03/20/20 1413    03/17/20 2200  Snack: Chewing gum x30 minutes three times daily to increase saliva flow and provide gas pain relief. Do not give to ileostomy patients.  3 Times Daily     Comments:  Chewing gum x30 minutes three times daily to increase saliva flow and provide gas pain relief.      Do not give to ileostomy patients.    03/17/20 1903                      Current TPN Regimen Recommendation:  Dextrose 12% / Amino Acid 5% at goal rate of 70 mL/hr.  20% Lipid Emulsion 200mL every 24 hours.    Assessment/Plan:  Pharmacy to dose TPN ordered for ileus associated with diffuse peritonitis. TPN started 3/21/20 through PICC line.  Macronutrient recommendations per RD 3/21 , 12%D, 5%AA at goal rate of 70mL/hr. Standard electrolytes ordered.  Low dose SSI ordered per protocol. Patient is diabetic, A1C of 6.00%.  Will replace electrolytes exogenously if needed, patient currently only has magnesium replacement protocol.  Pharmacy will continue to follow and adjust dose based on renal function, electrolyte levels, and clinical status.    Eveline Rosas, PharmD  Pharmacy Resident  3/22/2020  11:32

## 2020-03-22 NOTE — PROGRESS NOTES
[facility] Medicine Services  PROGRESS NOTE    Patient Name: Neema Richard  : 1940  MRN: [id number]    Date of Admission: 3/15/2020  Primary Care Physician: David Mackey MD    Subjective   Subjective     CC:  Perforated diverticulitis     HPI:  No acute events. Had BM today which made her anxious. Anxious about being in the hospital. Tolerating some of her diet. Pain controlled except with standing up     Review of Systems  Gen- No fevers, chills  CV- No chest pain, palpitations  Resp- No cough, dyspnea  GI- No N/V/D, + abd pain    Objective   Objective     Vital Signs:   Temp:  [97.7 °F (36.5 °C)-100.3 °F (37.9 °C)] 100.3 °F (37.9 °C)  Heart Rate:  [] 101  Resp:  [18-20] 18  BP: (138-149)/(63-70) 145/69        Physical Exam:  Constitutional: No acute distress, awake, alert  HENT: NCAT, mucous membranes moist  Respiratory: Clear to auscultation bilaterally, respiratory effort normal   Cardiovascular: RRR, no murmurs, rubs, or gallops, palpable pedal pulses bilaterally  Gastrointestinal: Positive bowel sounds, soft, ostomy with output   Musculoskeletal: No bilateral ankle edema  Psychiatric: Appropriate affect, cooperative  Neurologic: Oriented x 3, strength symmetric in all extremities, Cranial Nerves grossly intact to confrontation, speech clear  Skin: No rashes    Results Reviewed:  Results from last 7 days   Lab Units 20  0639 20  0705 20  0126 20  0604   WBC 10*3/mm3 18.96* 16.49*  --  17.68*   HEMOGLOBIN g/dL 10.1* 9.9*  --  9.6*   HEMATOCRIT % 31.5* 32.2*  --  29.8*   PLATELETS 10*3/mm3 500* 418  --  401   PROCALCITONIN ng/mL  --   --  1.67*  --      Results from last 7 days   Lab Units 20  0639 20  1525 20  0705  03/15/20  1638   SODIUM mmol/L 137 140 139   < > 138   POTASSIUM mmol/L 3.6 4.5 4.1   < > 4.0   CHLORIDE mmol/L 103 108* 106   < > 102   CO2 mmol/L 25.0 21.0* 20.0*   < > 21.0*   BUN mg/dL 16 19 21   < > 21    CREATININE mg/dL 0.44* 0.34* 0.40*   < > 0.97   GLUCOSE mg/dL 152* 88 92   < > 257*   CALCIUM mg/dL 8.0* 7.5* 8.0*   < > 8.6   ALT (SGPT) U/L 6 7  --   --  9   AST (SGOT) U/L 12 13  --   --  16   PROBNP pg/mL  --   --  2,558.0*  --   --     < > = values in this interval not displayed.     Estimated Creatinine Clearance: 57.9 mL/min (A) (by C-G formula based on SCr of 0.44 mg/dL (L)).    Microbiology Results Abnormal     Procedure Component Value - Date/Time    CORONAVIRUS (COVID-19), REFERENCE LABORATORY - Swab, Nasopharynx [[id number]] Collected:  03/21/20 1316    Lab Status:  Final result Specimen:  Swab from Nasopharynx Updated:  03/22/20 1151     Reference Lab Report --     Comment: See scanned report          COVID19 Not Detected    Anaerobic Culture - Swab, Peritoneum [[id number]] Collected:  03/17/20 1559    Lab Status:  Final result Specimen:  Swab from Peritoneum Updated:  03/22/20 0726     Anaerobic Culture No anaerobes isolated at 5 days    Blood Culture - Blood, Hand, Left [[id number]] Collected:  03/21/20 0110    Lab Status:  Preliminary result Specimen:  Blood from Hand, Left Updated:  03/22/20 0315     Blood Culture No growth at 24 hours    Blood Culture - Blood, Arm, Right [[id number]] Collected:  03/21/20 0127    Lab Status:  Preliminary result Specimen:  Blood from Arm, Right Updated:  03/22/20 0315     Blood Culture No growth at 24 hours    S. Pneumo Ag Urine or CSF - Urine, Urine, Clean Catch [[id number]]  (Normal) Collected:  03/21/20 0618    Lab Status:  Final result Specimen:  Urine, Clean Catch Updated:  03/21/20 1338     Strep Pneumo Ag Negative    MRSA Screen, PCR - Swab, Nares [[id number]]  (Normal) Collected:  03/20/20 2359    Lab Status:  Final result Specimen:  Swab from Nares Updated:  03/21/20 0745     MRSA PCR Negative    Narrative:       MRSA Negative    Blood Culture - Blood, Arm, Right [[id number]] Collected:  03/16/20 0018    Lab Status:  Final result Specimen:  Blood from Arm, Right  Updated:  03/21/20 0515     Blood Culture No growth at 5 days    Blood Culture - Blood, Arm, Left [[id number]] Collected:  03/16/20 0018    Lab Status:  Final result Specimen:  Blood from Arm, Left Updated:  03/21/20 0515     Blood Culture No growth at 5 days    Respiratory Panel, PCR - Swab, Nasopharynx [[id number]]  (Normal) Collected:  03/20/20 8544    Lab Status:  Final result Specimen:  Swab from Nasopharynx Updated:  03/21/20 0208     ADENOVIRUS, PCR Not Detected     Coronavirus 229E Not Detected     Coronavirus HKU1 Not Detected     Coronavirus NL63 Not Detected     Coronavirus OC43 Not Detected     Human Metapneumovirus Not Detected     Human Rhinovirus/Enterovirus Not Detected     Influenza B PCR Not Detected     Parainfluenza Virus 1 Not Detected     Parainfluenza Virus 2 Not Detected     Parainfluenza Virus 3 Not Detected     Parainfluenza Virus 4 Not Detected     Bordetella pertussis pcr Not Detected     Influenza A H1 2009 PCR Not Detected     Chlamydophila pneumoniae PCR Not Detected     Mycoplasma pneumo by PCR Not Detected     Influenza A PCR Not Detected     Influenza A H3 Not Detected     Influenza A H1 Not Detected     RSV, PCR Not Detected     Bordetella parapertussis PCR Not Detected    Narrative:       The coronavirus on the RVP is NOT COVID-19 and is NOT indicative of infection with COVID-19.     Wound Culture - Swab, Peritoneum [[id number]] Collected:  03/17/20 1559    Lab Status:  Final result Specimen:  Swab from Peritoneum Updated:  03/20/20 0805     Wound Culture Light growth (2+) Normal Fecal Syeda     Gram Stain Many (4+) Red blood cells      Moderate (3+) WBCs seen      No organisms seen    AFB Culture - Swab, Peritoneum [[id number]] Collected:  03/17/20 1559    Lab Status:  Preliminary result Specimen:  Swab from Peritoneum Updated:  03/18/20 1240     AFB Stain No acid fast bacilli seen on concentrated smear          Imaging Results (Last 24 Hours)     ** No results found for the last 24  hours. **               I have reviewed the medications:  Scheduled Meds:  acetaminophen 650 mg Oral Q8H   busPIRone 15 mg Oral Daily   calcitonin (salmon) 1 spray Alternating Nares Daily   doxycycline 100 mg Intravenous Q12H   ertapenem 1 g Intravenous Q24H   Fat Emul Fish Oil/Plant Based 200 mL Intravenous Q24H (TPN)   gabapentin 300 mg Oral Nightly   heparin (porcine) 5,000 Units Subcutaneous Q8H   insulin regular 2-7 Units Subcutaneous Q6H   levothyroxine 88 mcg Oral Q AM   metoprolol succinate  mg Oral Daily   Mirabegron ER 50 mg Oral Daily   sodium chloride 500 mL Intravenous Once   sodium chloride 10 mL Intravenous Q12H   sucralfate 1 g Oral BID AC     Continuous Infusions:  Adult Central 2-in-1 TPN  Last Rate: 70 mL/hr at 03/22/20 0925   Adult Central 2-in-1 TPN     Pharmacy to Dose TPN       PRN Meds:.diazePAM  •  HYDROcodone-acetaminophen  •  HYDROmorphone **AND** naloxone  •  LORazepam  •  magnesium sulfate **OR** magnesium sulfate **OR** magnesium sulfate  •  Morphine **OR** Morphine  •  ondansetron  •  Pharmacy to Dose TPN  •  sodium chloride  •  sodium chloride  •  sodium chloride  •  sodium chloride    Assessment/Plan   Assessment & Plan     Active Hospital Problems    Diagnosis  POA   • **Perforation of sigmoid colon due to diverticulitis [K57.20]  Unknown   • Acute diverticulitis [K57.92]  Yes   • Sepsis (CMS/HCC) [A41.9]  Yes   • Lactic acidosis [E87.2]  Unknown   • Neutrophilic leukocytosis [D72.9]  Unknown   • Acute kidney injury (CMS/HCC) [N17.9]  Unknown   • Acute hyperglycemia [R73.9]  Unknown   • Arthritis [M19.90]  Unknown   • Overactive bladder [N32.81]  Unknown   • Dyspepsia [R10.13]  Unknown   • Essential hypertension [I10]  Unknown   • Acquired hypothyroidism [E03.9]  Yes      Resolved Hospital Problems   No resolved problems to display.        Brief Hospital Course to date:  Neema Richard is a 79 y.o. female  admitted for diverticulitis S microperforation and sepsis.  She underwent  repeat CT imaging concerning for free air.  She went to surgery 3/17 for transverse colostomy.  Patient had CT abd/pel 3/20 with noted ground glass opacities that could not rule out Covid -19. Patient was started on vanc and doxy in addition to invanz and transferred to  3/21. Covid - 19 testing sent to [facility] and negative.       Ground glass opacities  Hypoxia   Likely PNA   -  resp PCR negative, strep pneumo negative   - Covid 19 - [facility] negative   - Continue invanz and doxy -- vanc discontinued with negative PCR   - Currently stable on 2L     Leukocytosis   - CT abd/pel with post surgical changes, no evidence of abscess; ground glass opacities in lungs; afebrile  - possibly reactive   Continue ertapenem/doxy   ID following     Sepsis resolved     Diverticulitis with free air on CT  S/p drainage of lower abdominal abcess, creation of distal transverse colostomy  Continue Invanz for total of 7 days  - repeat imaging reviewed  - Continue TPN and GI soft diet      HTN  Controlled continue current medicines     Hypothyroid  Continue levothyroxine     Arthritis  Continue gabapentin  Will need HHC at discharge     Overactive bladder  Dyspepsia      DVT Prophylaxis:  heparin     Disposition: I expect the patient to be discharged once stable from surgery standpoint        COVID-19 RISK SCREEN      1. Has the patient been exposed to a known COVID-19 (+) person or a person under investigation (PUI) for COVID-19 infection?  -- No      2. Has the patient traveled to or are they from a Level III endemic country? --  No     3. Has the patient traveled to or are they from a local community endemic area?   --  Yes     4. Does the patient have baseline higher exposure risk such as working in healthcare field or currently residing in healthcare facility?  --  No   Helpful  websites:  https://www.cdc.gov/coronavirus/2019-ncov/travelers/map-and-travel-notices.html#travel-1  [email or web address]://[email or web address]         CODE STATUS:   Code Status and Medical Interventions:   Ordered at: 03/17/20 1903     Level Of Support Discussed With:    Patient     Code Status:    CPR     Medical Interventions (Level of Support Prior to Arrest):    Full         Electronically signed by Jennifer Laurent DO, 03/22/20, 15:16.

## 2020-03-23 ENCOUNTER — APPOINTMENT (OUTPATIENT)
Dept: GENERAL RADIOLOGY | Facility: HOSPITAL | Age: 80
End: 2020-03-23

## 2020-03-23 LAB
ALBUMIN SERPL-MCNC: 1.9 G/DL (ref 3.5–5.2)
ALBUMIN SERPL-MCNC: 2.2 G/DL (ref 3.5–5.2)
ALBUMIN/GLOB SERPL: 0.6 G/DL
ALBUMIN/GLOB SERPL: 0.8 G/DL
ALP SERPL-CCNC: 58 U/L (ref 39–117)
ALP SERPL-CCNC: 60 U/L (ref 39–117)
ALT SERPL W P-5'-P-CCNC: 6 U/L (ref 1–33)
ALT SERPL W P-5'-P-CCNC: 7 U/L (ref 1–33)
ANION GAP SERPL CALCULATED.3IONS-SCNC: 9 MMOL/L (ref 5–15)
ANION GAP SERPL CALCULATED.3IONS-SCNC: 9 MMOL/L (ref 5–15)
AST SERPL-CCNC: 13 U/L (ref 1–32)
AST SERPL-CCNC: 13 U/L (ref 1–32)
BASOPHILS # BLD AUTO: 0.08 10*3/MM3 (ref 0–0.2)
BASOPHILS NFR BLD AUTO: 0.4 % (ref 0–1.5)
BILIRUB SERPL-MCNC: <0.2 MG/DL (ref 0.2–1.2)
BILIRUB SERPL-MCNC: <0.2 MG/DL (ref 0.2–1.2)
BUN BLD-MCNC: 17 MG/DL (ref 8–23)
BUN BLD-MCNC: 17 MG/DL (ref 8–23)
BUN/CREAT SERPL: 45.9 (ref 7–25)
BUN/CREAT SERPL: 53.1 (ref 7–25)
BURR CELLS BLD QL SMEAR: NORMAL
CA-I SERPL ISE-MCNC: 1.23 MMOL/L (ref 1.12–1.32)
CA-I SERPL ISE-MCNC: 1.25 MMOL/L (ref 1.12–1.32)
CALCIUM SPEC-SCNC: 7.9 MG/DL (ref 8.6–10.5)
CALCIUM SPEC-SCNC: 8.2 MG/DL (ref 8.6–10.5)
CHLORIDE SERPL-SCNC: 104 MMOL/L (ref 98–107)
CHLORIDE SERPL-SCNC: 98 MMOL/L (ref 98–107)
CO2 SERPL-SCNC: 23 MMOL/L (ref 22–29)
CO2 SERPL-SCNC: 24 MMOL/L (ref 22–29)
CREAT BLD-MCNC: 0.32 MG/DL (ref 0.57–1)
CREAT BLD-MCNC: 0.37 MG/DL (ref 0.57–1)
CRP SERPL-MCNC: 13.32 MG/DL (ref 0–0.5)
DEPRECATED RDW RBC AUTO: 46.3 FL (ref 37–54)
EOSINOPHIL # BLD AUTO: 0.93 10*3/MM3 (ref 0–0.4)
EOSINOPHIL NFR BLD AUTO: 4.2 % (ref 0.3–6.2)
ERYTHROCYTE [DISTWIDTH] IN BLOOD BY AUTOMATED COUNT: 14.3 % (ref 12.3–15.4)
GFR SERPL CREATININE-BSD FRML MDRD: >150 ML/MIN/1.73
GFR SERPL CREATININE-BSD FRML MDRD: >150 ML/MIN/1.73
GLOBULIN UR ELPH-MCNC: 2.9 GM/DL
GLOBULIN UR ELPH-MCNC: 3 GM/DL
GLUCOSE BLD-MCNC: 170 MG/DL (ref 65–99)
GLUCOSE BLD-MCNC: 173 MG/DL (ref 65–99)
GLUCOSE BLDC GLUCOMTR-MCNC: 142 MG/DL (ref 70–130)
GLUCOSE BLDC GLUCOMTR-MCNC: 169 MG/DL (ref 70–130)
GLUCOSE BLDC GLUCOMTR-MCNC: 189 MG/DL (ref 70–130)
HCT VFR BLD AUTO: 29.1 % (ref 34–46.6)
HGB BLD-MCNC: 9.4 G/DL (ref 12–15.9)
IMM GRANULOCYTES # BLD AUTO: 1.96 10*3/MM3 (ref 0–0.05)
IMM GRANULOCYTES NFR BLD AUTO: 8.9 % (ref 0–0.5)
LYMPHOCYTES # BLD AUTO: 1.99 10*3/MM3 (ref 0.7–3.1)
LYMPHOCYTES NFR BLD AUTO: 9 % (ref 19.6–45.3)
MAGNESIUM SERPL-MCNC: 1.9 MG/DL (ref 1.6–2.4)
MCH RBC QN AUTO: 28.8 PG (ref 26.6–33)
MCHC RBC AUTO-ENTMCNC: 32.3 G/DL (ref 31.5–35.7)
MCV RBC AUTO: 89.3 FL (ref 79–97)
MONOCYTES # BLD AUTO: 1.48 10*3/MM3 (ref 0.1–0.9)
MONOCYTES NFR BLD AUTO: 6.7 % (ref 5–12)
NEUTROPHILS # BLD AUTO: 15.67 10*3/MM3 (ref 1.7–7)
NEUTROPHILS NFR BLD AUTO: 70.8 % (ref 42.7–76)
NRBC BLD AUTO-RTO: 0 /100 WBC (ref 0–0.2)
PHOSPHATE SERPL-MCNC: 3.1 MG/DL (ref 2.5–4.5)
PLAT MORPH BLD: NORMAL
PLATELET # BLD AUTO: 503 10*3/MM3 (ref 140–450)
PMV BLD AUTO: 9.8 FL (ref 6–12)
POTASSIUM BLD-SCNC: 3.9 MMOL/L (ref 3.5–5.2)
POTASSIUM BLD-SCNC: 4 MMOL/L (ref 3.5–5.2)
PREALB SERPL-MCNC: 5.9 MG/DL (ref 20–40)
PROCALCITONIN SERPL-MCNC: 0.7 NG/ML (ref 0.1–0.25)
PROT SERPL-MCNC: 4.9 G/DL (ref 6–8.5)
PROT SERPL-MCNC: 5.1 G/DL (ref 6–8.5)
RBC # BLD AUTO: 3.26 10*6/MM3 (ref 3.77–5.28)
SODIUM BLD-SCNC: 130 MMOL/L (ref 136–145)
SODIUM BLD-SCNC: 137 MMOL/L (ref 136–145)
WBC MORPH BLD: NORMAL
WBC NRBC COR # BLD: 22.11 10*3/MM3 (ref 3.4–10.8)

## 2020-03-23 PROCEDURE — 25010000002 CALCIUM GLUCONATE PER 10 ML: Performed by: COLON & RECTAL SURGERY

## 2020-03-23 PROCEDURE — 97530 THERAPEUTIC ACTIVITIES: CPT | Performed by: PHYSICAL THERAPIST

## 2020-03-23 PROCEDURE — 80053 COMPREHEN METABOLIC PANEL: CPT

## 2020-03-23 PROCEDURE — 82962 GLUCOSE BLOOD TEST: CPT

## 2020-03-23 PROCEDURE — 87077 CULTURE AEROBIC IDENTIFY: CPT | Performed by: INTERNAL MEDICINE

## 2020-03-23 PROCEDURE — 97116 GAIT TRAINING THERAPY: CPT | Performed by: PHYSICAL THERAPIST

## 2020-03-23 PROCEDURE — 84145 PROCALCITONIN (PCT): CPT

## 2020-03-23 PROCEDURE — 87186 SC STD MICRODIL/AGAR DIL: CPT | Performed by: INTERNAL MEDICINE

## 2020-03-23 PROCEDURE — 97530 THERAPEUTIC ACTIVITIES: CPT

## 2020-03-23 PROCEDURE — 84134 ASSAY OF PREALBUMIN: CPT

## 2020-03-23 PROCEDURE — 25010000002 POTASSIUM CHLORIDE PER 2 MEQ OF POTASSIUM: Performed by: COLON & RECTAL SURGERY

## 2020-03-23 PROCEDURE — 93010 ELECTROCARDIOGRAM REPORT: CPT | Performed by: INTERNAL MEDICINE

## 2020-03-23 PROCEDURE — 97535 SELF CARE MNGMENT TRAINING: CPT

## 2020-03-23 PROCEDURE — 86140 C-REACTIVE PROTEIN: CPT

## 2020-03-23 PROCEDURE — 82330 ASSAY OF CALCIUM: CPT

## 2020-03-23 PROCEDURE — 93005 ELECTROCARDIOGRAM TRACING: CPT | Performed by: INTERNAL MEDICINE

## 2020-03-23 PROCEDURE — 85025 COMPLETE CBC W/AUTO DIFF WBC: CPT | Performed by: COLON & RECTAL SURGERY

## 2020-03-23 PROCEDURE — 99232 SBSQ HOSP IP/OBS MODERATE 35: CPT | Performed by: INTERNAL MEDICINE

## 2020-03-23 PROCEDURE — 84100 ASSAY OF PHOSPHORUS: CPT

## 2020-03-23 PROCEDURE — 25010000002 PIPERACILLIN SOD-TAZOBACTAM PER 1 G: Performed by: INTERNAL MEDICINE

## 2020-03-23 PROCEDURE — 25010000002 MAGNESIUM SULFATE PER 500 MG OF MAGNESIUM: Performed by: COLON & RECTAL SURGERY

## 2020-03-23 PROCEDURE — 25010000002 ERTAPENEM PER 500 MG: Performed by: INTERNAL MEDICINE

## 2020-03-23 PROCEDURE — 25010000002 ONDANSETRON PER 1 MG: Performed by: COLON & RECTAL SURGERY

## 2020-03-23 PROCEDURE — 83735 ASSAY OF MAGNESIUM: CPT

## 2020-03-23 PROCEDURE — 85007 BL SMEAR W/DIFF WBC COUNT: CPT | Performed by: COLON & RECTAL SURGERY

## 2020-03-23 PROCEDURE — 25010000002 HEPARIN (PORCINE) PER 1000 UNITS: Performed by: COLON & RECTAL SURGERY

## 2020-03-23 PROCEDURE — 87205 SMEAR GRAM STAIN: CPT | Performed by: INTERNAL MEDICINE

## 2020-03-23 PROCEDURE — 87070 CULTURE OTHR SPECIMN AEROBIC: CPT | Performed by: INTERNAL MEDICINE

## 2020-03-23 PROCEDURE — 71045 X-RAY EXAM CHEST 1 VIEW: CPT

## 2020-03-23 RX ORDER — POTASSIUM CHLORIDE 29.8 MG/ML
20 INJECTION INTRAVENOUS
Status: DISCONTINUED | OUTPATIENT
Start: 2020-03-23 | End: 2020-03-26 | Stop reason: HOSPADM

## 2020-03-23 RX ADMIN — MIRABEGRON 50 MG: 50 TABLET, FILM COATED, EXTENDED RELEASE ORAL at 21:26

## 2020-03-23 RX ADMIN — INSULIN HUMAN 2 UNITS: 100 INJECTION, SOLUTION PARENTERAL at 06:43

## 2020-03-23 RX ADMIN — ACETAMINOPHEN 650 MG: 325 TABLET, FILM COATED ORAL at 20:34

## 2020-03-23 RX ADMIN — SMOFLIPID 200 ML: 6; 6; 5; 3 INJECTION, EMULSION INTRAVENOUS at 17:57

## 2020-03-23 RX ADMIN — CALCITONIN SALMON 1 SPRAY: 200 SPRAY, METERED NASAL at 09:53

## 2020-03-23 RX ADMIN — HEPARIN SODIUM 5000 UNITS: 5000 INJECTION, SOLUTION INTRAVENOUS; SUBCUTANEOUS at 20:34

## 2020-03-23 RX ADMIN — SODIUM CHLORIDE, PRESERVATIVE FREE 10 ML: 5 INJECTION INTRAVENOUS at 21:00

## 2020-03-23 RX ADMIN — HEPARIN SODIUM 5000 UNITS: 5000 INJECTION, SOLUTION INTRAVENOUS; SUBCUTANEOUS at 17:57

## 2020-03-23 RX ADMIN — GABAPENTIN 300 MG: 300 CAPSULE ORAL at 20:34

## 2020-03-23 RX ADMIN — SUCRALFATE 1 G: 1 TABLET ORAL at 17:58

## 2020-03-23 RX ADMIN — SUCRALFATE 1 G: 1 TABLET ORAL at 06:38

## 2020-03-23 RX ADMIN — HEPARIN SODIUM 5000 UNITS: 5000 INJECTION, SOLUTION INTRAVENOUS; SUBCUTANEOUS at 06:39

## 2020-03-23 RX ADMIN — METOPROLOL SUCCINATE 100 MG: 100 TABLET, EXTENDED RELEASE ORAL at 09:54

## 2020-03-23 RX ADMIN — TAZOBACTAM SODIUM AND PIPERACILLIN SODIUM 4.5 G: 500; 4 INJECTION, SOLUTION INTRAVENOUS at 21:36

## 2020-03-23 RX ADMIN — LEVOTHYROXINE SODIUM 88 MCG: 88 TABLET ORAL at 06:38

## 2020-03-23 RX ADMIN — CALCIUM GLUCONATE: 94 INJECTION, SOLUTION INTRAVENOUS at 17:57

## 2020-03-23 RX ADMIN — ONDANSETRON 4 MG: 2 INJECTION INTRAMUSCULAR; INTRAVENOUS at 02:58

## 2020-03-23 RX ADMIN — ERTAPENEM SODIUM 1 G: 1 INJECTION, POWDER, LYOPHILIZED, FOR SOLUTION INTRAMUSCULAR; INTRAVENOUS at 09:53

## 2020-03-23 RX ADMIN — BUSPIRONE HYDROCHLORIDE 15 MG: 10 TABLET ORAL at 20:33

## 2020-03-23 RX ADMIN — LORAZEPAM 0.5 MG: 0.5 TABLET ORAL at 18:27

## 2020-03-23 RX ADMIN — ACETAMINOPHEN 650 MG: 325 TABLET, FILM COATED ORAL at 06:39

## 2020-03-23 RX ADMIN — DOXYCYCLINE 100 MG: 100 INJECTION, POWDER, LYOPHILIZED, FOR SOLUTION INTRAVENOUS at 11:45

## 2020-03-23 NOTE — PROGRESS NOTES
[facility] Medicine Services  PROGRESS NOTE    Patient Name: Neema Richard  : 1940  MRN: [id number]    Date of Admission: 3/15/2020  Primary Care Physician: David Mackey MD    Subjective   Subjective     CC:  Perforated diverticulitis     HPI:  Patient resting in bed. Her daughter is at bedside.  Slightly worsening breathing per her report- will get stat XR  Abd pain controlled    Review of Systems  Gen- No fevers, chills  CV- No chest pain, palpitations  Resp- No cough, +dyspnea  GI- No N/V/D, + abd pain    Objective   Objective     Vital Signs:   Temp:  [97.7 °F (36.5 °C)-100.3 °F (37.9 °C)] 98.3 °F (36.8 °C)  Heart Rate:  [] 74  Resp:  [16-18] 16  BP: (131-157)/(59-72) 157/72        Physical Exam:  GEN- no acute distress noted, resting in bed, awake  HEENT- atraumatic, normocephlic, eomi, PICC present in RUE   NECK- supple, trachea midline, no masses  RESP: ctab, normal effort  CV: no murmurs, s1/s2, rrr  GI: soft, slight tenderness, midline incision in place c/d/i +ostomy   MSK: no edema noted, spontaneous movement of all extremities  NEURO: alert, oriented, no focal deficits  SKIN: no rashes  PSYCH: appropriate mood and affect       Results Reviewed:  Results from last 7 days   Lab Units 20  0658 20  0639 20  0705 20  0126   WBC 10*3/mm3 22.11* 18.96* 16.49*  --    HEMOGLOBIN g/dL 9.4* 10.1* 9.9*  --    HEMATOCRIT % 29.1* 31.5* 32.2*  --    PLATELETS 10*3/mm3 503* 500* 418  --    PROCALCITONIN ng/mL 0.70*  --   --  1.67*     Results from last 7 days   Lab Units 20  0658 20  0639 20  1525 20  0705   SODIUM mmol/L 130* 137 140 139   POTASSIUM mmol/L 3.9 3.6 4.5 4.1   CHLORIDE mmol/L 98 103 108* 106   CO2 mmol/L 23.0 25.0 21.0* 20.0*   BUN mg/dL 17 16 19 21   CREATININE mg/dL 0.32* 0.44* 0.34* 0.40*   GLUCOSE mg/dL 173* 152* 88 92   CALCIUM mg/dL 7.9* 8.0* 7.5* 8.0*   ALT (SGPT) U/L 7 6 7  --    AST (SGOT) U/L 13 12 13  --     PROBNP pg/mL  --   --   --  2,558.0*     Estimated Creatinine Clearance: 58.3 mL/min (A) (by C-G formula based on SCr of 0.32 mg/dL (L)).    Microbiology Results Abnormal     Procedure Component Value - Date/Time    Blood Culture - Blood, Hand, Left [[id number]] Collected:  03/21/20 0110    Lab Status:  Preliminary result Specimen:  Blood from Hand, Left Updated:  03/23/20 0315     Blood Culture No growth at 2 days    Blood Culture - Blood, Arm, Right [[id number]] Collected:  03/21/20 0127    Lab Status:  Preliminary result Specimen:  Blood from Arm, Right Updated:  03/23/20 0315     Blood Culture No growth at 2 days    Fungus Culture - Swab, Peritoneum [[id number]] Collected:  03/17/20 1559    Lab Status:  Preliminary result Specimen:  Swab from Peritoneum Updated:  03/22/20 1815     Fungus Culture No fungus isolated at less than 1 week    AFB Culture - Swab, Peritoneum [[id number]] Collected:  03/17/20 1559    Lab Status:  Preliminary result Specimen:  Swab from Peritoneum Updated:  03/22/20 1815     AFB Culture No AFB isolated at less than 1 week     AFB Stain No acid fast bacilli seen on concentrated smear    CORONAVIRUS (COVID-19), REFERENCE LABORATORY - Swab, Nasopharynx [[id number]] Collected:  03/21/20 1316    Lab Status:  Final result Specimen:  Swab from Nasopharynx Updated:  03/22/20 1151     Reference Lab Report --     Comment: See scanned report          COVID19 Not Detected    Anaerobic Culture - Swab, Peritoneum [[id number]] Collected:  03/17/20 1559    Lab Status:  Final result Specimen:  Swab from Peritoneum Updated:  03/22/20 0726     Anaerobic Culture No anaerobes isolated at 5 days    S. Pneumo Ag Urine or CSF - Urine, Urine, Clean Catch [[id number]]  (Normal) Collected:  03/21/20 0618    Lab Status:  Final result Specimen:  Urine, Clean Catch Updated:  03/21/20 1338     Strep Pneumo Ag Negative    MRSA Screen, PCR - Swab, Nares [[id number]]  (Normal) Collected:  03/20/20 7948    Lab Status:  Final  result Specimen:  Swab from Nares Updated:  03/21/20 0745     MRSA PCR Negative    Narrative:       MRSA Negative    Blood Culture - Blood, Arm, Right [[id number]] Collected:  03/16/20 0018    Lab Status:  Final result Specimen:  Blood from Arm, Right Updated:  03/21/20 0515     Blood Culture No growth at 5 days    Blood Culture - Blood, Arm, Left [[id number]] Collected:  03/16/20 0018    Lab Status:  Final result Specimen:  Blood from Arm, Left Updated:  03/21/20 0515     Blood Culture No growth at 5 days    Respiratory Panel, PCR - Swab, Nasopharynx [[id number]]  (Normal) Collected:  03/20/20 0519    Lab Status:  Final result Specimen:  Swab from Nasopharynx Updated:  03/21/20 0208     ADENOVIRUS, PCR Not Detected     Coronavirus 229E Not Detected     Coronavirus HKU1 Not Detected     Coronavirus NL63 Not Detected     Coronavirus OC43 Not Detected     Human Metapneumovirus Not Detected     Human Rhinovirus/Enterovirus Not Detected     Influenza B PCR Not Detected     Parainfluenza Virus 1 Not Detected     Parainfluenza Virus 2 Not Detected     Parainfluenza Virus 3 Not Detected     Parainfluenza Virus 4 Not Detected     Bordetella pertussis pcr Not Detected     Influenza A H1 2009 PCR Not Detected     Chlamydophila pneumoniae PCR Not Detected     Mycoplasma pneumo by PCR Not Detected     Influenza A PCR Not Detected     Influenza A H3 Not Detected     Influenza A H1 Not Detected     RSV, PCR Not Detected     Bordetella parapertussis PCR Not Detected    Narrative:       The coronavirus on the RVP is NOT COVID-19 and is NOT indicative of infection with COVID-19.     Wound Culture - Swab, Peritoneum [[id number]] Collected:  03/17/20 1559    Lab Status:  Final result Specimen:  Swab from Peritoneum Updated:  03/20/20 0805     Wound Culture Light growth (2+) Normal Fecal Syeda     Gram Stain Many (4+) Red blood cells      Moderate (3+) WBCs seen      No organisms seen          Imaging Results (Last 24 Hours)     ** No  results found for the last 24 hours. **               I have reviewed the medications:  Scheduled Meds:    acetaminophen 650 mg Oral Q8H   busPIRone 15 mg Oral Daily   calcitonin (salmon) 1 spray Alternating Nares Daily   doxycycline 100 mg Intravenous Q12H   ertapenem 1 g Intravenous Q24H   Fat Emul Fish Oil/Plant Based 200 mL Intravenous Q24H (TPN)   gabapentin 300 mg Oral Nightly   heparin (porcine) 5,000 Units Subcutaneous Q8H   insulin regular 2-7 Units Subcutaneous Q6H   levothyroxine 88 mcg Oral Q AM   metoprolol succinate  mg Oral Daily   Mirabegron ER 50 mg Oral Daily   sodium chloride 500 mL Intravenous Once   sodium chloride 10 mL Intravenous Q12H   sucralfate 1 g Oral BID AC     Continuous Infusions:    Adult Central 2-in-1 TPN  Last Rate: 70 mL/hr at 03/23/20 0647   Pharmacy to Dose TPN       PRN Meds:.diazePAM  •  HYDROcodone-acetaminophen  •  HYDROmorphone **AND** naloxone  •  LORazepam  •  magnesium sulfate **OR** magnesium sulfate **OR** magnesium sulfate  •  Morphine **OR** Morphine  •  ondansetron  •  Pharmacy to Dose TPN  •  sodium chloride  •  sodium chloride  •  sodium chloride  •  sodium chloride    Assessment/Plan   Assessment & Plan     Active Hospital Problems    Diagnosis  POA   • **Perforation of sigmoid colon due to diverticulitis [K57.20]  Unknown   • Acute diverticulitis [K57.92]  Yes   • Sepsis (CMS/HCC) [A41.9]  Yes   • Lactic acidosis [E87.2]  Unknown   • Neutrophilic leukocytosis [D72.9]  Unknown   • Acute kidney injury (CMS/HCC) [N17.9]  Unknown   • Acute hyperglycemia [R73.9]  Unknown   • Arthritis [M19.90]  Unknown   • Overactive bladder [N32.81]  Unknown   • Dyspepsia [R10.13]  Unknown   • Essential hypertension [I10]  Unknown   • Acquired hypothyroidism [E03.9]  Yes      Resolved Hospital Problems   No resolved problems to display.        Brief Hospital Course to date:  Neema Richard is a 79 y.o. female  admitted for diverticulitis S microperforation and sepsis.  She  underwent repeat CT imaging concerning for free air.  She went to surgery 3/17 for transverse colostomy.  Patient had CT abd/pel 3/20 with noted ground glass opacities that could not rule out Covid -19. Patient was started on vanc and doxy in addition to invanz and transferred to  3/21. Covid - 19 testing sent to [facility] and negative.     This patient's problems and plans were partially entered by my partner and updated as appropriate by me 03/23/20.          Ground glass opacities  Hypoxia   Likely PNA   Leukocytosis , worsening   Sepsis resolved  -  resp PCR negative, strep pneumo negative   - Covid 19 - [facility] negative   - Continue invanz , abx management per ID   - Slightly worsening leukocytosis with increased dyspnea- will obtain xray this AM to r/o aspiration or other infectious process        Diverticulitis with free air on CT  S/p drainage of lower abdominal abcess, creation of distal transverse colostomy  Continue Invanz for total of 7 days, ID is assisting  - repeat imaging reviewed  - Continue TPN and GI soft diet      HTN  Controlled continue current medicines     Hypothyroid  Continue levothyroxine     Arthritis  Continue gabapentin  Will need HHC at discharge     Overactive bladder  Dyspepsia      DVT Prophylaxis:  heparin     Disposition: I expect the patient to be discharged once stable from surgery standpoint            CODE STATUS:   Code Status and Medical Interventions:   Ordered at: 03/17/20 1903     Level Of Support Discussed With:    Patient     Code Status:    CPR     Medical Interventions (Level of Support Prior to Arrest):    Full         Electronically signed by Viv Siddiqi MD, 03/23/20, 08:21.

## 2020-03-23 NOTE — THERAPY TREATMENT NOTE
Acute Care - Occupational Therapy Treatment Note   [place]     Patient Name: Neema Richard  : 1940  MRN: [id number]  Today's Date: 3/23/2020             Admit Date: 3/15/2020       ICD-10-CM ICD-9-CM   1. Impaired mobility and activities of daily living Z74.09 799.89   2. Peritonitis (CMS/HCC) K65.9 567.9   3. Diverticulitis K57.92 562.11   4. Essential hypertension I10 401.9   5. Perforation of sigmoid colon due to diverticulitis K57.20 562.11     Patient Active Problem List   Diagnosis   • Well woman exam with routine gynecological exam   • Osteoporosis - GYN   • Essential hypertension   • Acquired hypothyroidism   • IBS-d (takes Buspar)   • DCIS (ductal carcinoma in situ)   • Cystocele and rectocele with complete uterovaginal prolapse   • Mixed urge and stress incontinence   • Pessary maintenance   • High cholesterol   • Acute diverticulitis   • Sepsis (CMS/HCC)   • Lactic acidosis   • Neutrophilic leukocytosis   • Perforation of sigmoid colon due to diverticulitis   • Acute kidney injury (CMS/HCC)   • Acute hyperglycemia   • Arthritis   • Overactive bladder   • Dyspepsia     Past Medical History:   Diagnosis Date   • Acquired hypothyroidism    • Colon polyp - sees Dr. Vazquez 2016   • DCIS (ductal carcinoma in situ) 2017    Dx at the time of breast reduction. [place] and [place] (+)   • Essential hypertension    • High cholesterol    • Hx of herpes genitalis    • IBS-d (takes Buspar)      Past Surgical History:   Procedure Laterality Date   • BILATERAL BREAST REDUCTION Bilateral    • BREAST LUMPECTOMY Left    • CATARACT EXTRACTION, BILATERAL Bilateral    • CERVICAL CONIZATION     • COLON RESECTION N/A 3/17/2020    Procedure: LOW ANTERIOR COLON RESECTION, MOBILIZATION OF SPLENIC FLEXURE, DRAINAGE OF ABSCESS AND COLOSTOMY CREATION;  Surgeon: Sravan Vazquez MD;  [facility];  Service: General;  Laterality: N/A;   • FACIAL COSMETIC SURGERY     • FACIAL COSMETIC  SURGERY  2012   • HEMORRHOIDECTOMY  1974   • LAPAROSCOPIC CHOLECYSTECTOMY  2010   • REDUCTION MAMMAPLASTY Bilateral 04/13/2017    DCIS   • SINUS SURGERY  1992   • THYROIDECTOMY, PARTIAL  1963   • TONSILLECTOMY  1953   • VARICOSE VEIN SURGERY Bilateral 1981       Therapy Treatment    Rehabilitation Treatment Summary     Row Name 03/23/20 1032             Treatment Time/Intention    Discipline  occupational therapist  -AC      Document Type  therapy note (daily note)  -AC      Patient Effort  good  -AC      Existing Precautions/Restrictions  fall RADHA drain,ostomy, recently tested (-) for Covid 19  -AC2      Recorded by [AC] Sho Jimenez, OT 03/23/20 1143  [AC2] Sho Jimenez, OT 03/23/20 1144      Row Name 03/23/20 1032             Vital Signs    Pre Systolic BP Rehab  122  -AC      Pre Treatment Diastolic BP  57  -AC      Pretreatment Heart Rate (beats/min)  71  -AC      Posttreatment Heart Rate (beats/min)  73  -AC      Pre SpO2 (%)  96  -AC      O2 Delivery Pre Treatment  supplemental O2  -AC      Post SpO2 (%)  94  -AC      O2 Delivery Post Treatment  supplemental O2  -AC      Pre Patient Position  Supine  -AC      Intra Patient Position  Standing  -AC      Post Patient Position  Supine  -AC      Recorded by [AC] Sho Jimenez, OT 03/23/20 1143      Row Name 03/23/20 1032             Cognitive Assessment/Intervention- PT/OT    Orientation Status (Cognition)  oriented x 3  -AC      Follows Commands (Cognition)  follows one step commands;75-90% accuracy  -AC      Recorded by [AC] Sho Jimenez, OT 03/23/20 1143      Row Name 03/23/20 1032             Safety Issues, Functional Mobility    Safety Issues Affecting Function (Mobility)  impulsivity;judgment;safety precautions follow-through/compliance;sequencing abilities  -AC      Impairments Affecting Function (Mobility)  balance;endurance/activity tolerance;pain;strength  -AC      Recorded by [AC] Sho Jimenez, OT 03/23/20 1143      Row Name 03/23/20 1032              Bed Mobility Assessment/Treatment    Bed Mobility Assessment/Treatment  supine-sit;sit-supine  -AC      Supine-Sit [place] (Bed Mobility)  minimum assist (75% patient effort)  -AC      Sit-Supine [place] (Bed Mobility)  supervision  -AC      Assistive Device (Bed Mobility)  bed rails;head of bed elevated  -AC      Recorded by [AC] Sho Jimenez, OT 03/23/20 1143      Row Name 03/23/20 1032             Functional Mobility    Functional Mobility- Ind. Level  minimum assist (75% patient effort)  -AC      Functional Mobility- Device  rolling walker  -AC      Functional Mobility-Distance (Feet)  20  -AC      Functional Mobility- Comment  VCs for safety with walker  -AC      Recorded by [AC] Sho Jimenez, OT 03/23/20 1143      Row Name 03/23/20 1032             Transfer Assessment/Treatment    Transfer Assessment/Treatment  sit-stand transfer;stand-sit transfer;toilet transfer  -AC      Recorded by [AC] Sho Jimenez, OT 03/23/20 1143      Row Name 03/23/20 1032             Sit-Stand Transfer    Sit-Stand [place] (Transfers)  verbal cues;contact guard  -AC      Assistive Device (Sit-Stand Transfers)  walker, front-wheeled  -AC      Recorded by [AC] Sho Jimenez, OT 03/23/20 1143      Row Name 03/23/20 1032             Stand-Sit Transfer    Stand-Sit [place] (Transfers)  verbal cues;contact guard  -AC      Assistive Device (Stand-Sit Transfers)  walker, front-wheeled  -AC      Recorded by [AC] Sho Jimenez, OT 03/23/20 1143      Row Name 03/23/20 1032             Toilet Transfer    Type (Toilet Transfer)  sit-stand;stand-sit  -AC      [place] Level (Toilet Transfer)  contact guard  -AC      Assistive Device (Toilet Transfer)  commode;grab bars/safety frame  -AC      Recorded by [AC] Sho Jimenez, OT 03/23/20 1143      Row Name 03/23/20 1032             ADL Assessment/Intervention    33496 - OT Self Care/Mgmt Minutes  8  -AC      BADL Assessment/Intervention  lower body  dressing;toileting;grooming  -AC      Recorded by [AC] Sho Jimenez, OT 03/23/20 1143      Row Name 03/23/20 1032             Lower Body Dressing Assessment/Training    Lower Body Dressing [place] Level  doff;don;socks;minimum assist (75% patient effort)  -AC      Lower Body Dressing Position  unsupported sitting  -AC      Comment (Lower Body Dressing)  crossleg technique  -AC      Recorded by [AC] Sho Jimenez, OT 03/23/20 1143      Row Name 03/23/20 1032             Grooming Assessment/Training    [place] Level (Grooming)  oral care regimen;wash face, hands;supervision  -AC      Grooming Position  sink side;supported standing  -AC      Recorded by [AC] Sho Jimenez, OT 03/23/20 1143      Row Name 03/23/20 1032             Toileting Assessment/Training    [place] Level (Toileting)  adjust/manage clothing;perform perineal hygiene;supervision  -AC      Toileting Position  supported sitting;supported standing  -AC      Recorded by [AC] Sho Jimenez, OT 03/23/20 1143      Row Name 03/23/20 1032             BADL Safety/Performance    Impairments, BADL Safety/Performance  balance;cognition;endurance/activity tolerance;pain;strength  -AC      Skilled BADL Treatment/Intervention  BADL process/adaptation training  -AC      Progress in BADL Status  improvement noted  -AC      Recorded by [AC] Sho Jimenez, OT 03/23/20 1143      Row Name 03/23/20 1032             Therapeutic Exercise    85222 - OT Therapeutic Exercise Minutes  5  -AC      92448 - OT Therapeutic Activity Minutes  10  -AC      Recorded by [AC] Sho Jimenez, OT 03/23/20 1143      Row Name 03/23/20 1032             Therapeutic Exercise    Upper Extremity Range of Motion (Therapeutic Exercise)  shoulder flexion/extension, bilateral;shoulder horizontal abduction/adduction, bilateral;elbow flexion/extension, bilateral  -AC      Exercise Type (Therapeutic Exercise)  AROM (active range of motion)  -AC      Position (Therapeutic Exercise)   seated  -AC      Sets/Reps (Therapeutic Exercise)  1/15  -AC      Expected Outcome (Therapeutic Exercise)  facilitate normal movement patterns;improve functional tolerance, self-care activity  -AC      Recorded by [AC] Sho Jimenez, OT 03/23/20 1143      Row Name 03/23/20 1032             Balance    Balance  static sitting balance;static standing balance;dynamic sitting balance;dynamic standing balance  -AC      Recorded by [AC] Sho Jimenez, OT 03/23/20 1143      Row Name 03/23/20 1032             Static Sitting Balance    Level of [place] (Unsupported Sitting, Static Balance)  standby assist  -AC      Sitting Position (Unsupported Sitting, Static Balance)  sitting on edge of bed  -AC      Time Able to Maintain Position (Unsupported Sitting, Static Balance)  1 to 2 minutes  -AC      Recorded by [AC] Sho Jimenez, OT 03/23/20 1143      Row Name 03/23/20 1032             Dynamic Sitting Balance    Level of [place], Reaches Outside Midline (Sitting, Dynamic Balance)  contact guard assist  -AC      Sitting Position, Reaches Outside Midline (Sitting, Dynamic Balance)  sitting on edge of bed  -AC      Recorded by [AC] Sho Jimenez, OT 03/23/20 1143      Row Name 03/23/20 1032             Static Standing Balance    Level of [place] (Supported Standing, Static Balance)  contact guard assist  -AC      Time Able to Maintain Position (Supported Standing, Static Balance)  1 to 2 minutes  -AC      Assistive Device Utilized (Supported Standing, Static Balance)  walker, rolling  -AC      Recorded by [AC] Sho Jimenez, OT 03/23/20 1143      Row Name 03/23/20 1032             Dynamic Standing Balance    Level of [place], Reaches Outside Midline (Standing, Dynamic Balance)  minimal assist, 75% patient effort  -AC      Time Able to Maintain Position, Reaches Outside Midline (Standing, Dynamic Balance)  1 to 2 minutes  -AC      Assistive Device Utilized (Supported Standing, Dynamic Balance)  walker,  rolling  -AC      Recorded by [AC] Sho Jimenez, OT 03/23/20 1143      Row Name 03/23/20 1032             Positioning and Restraints    Pre-Treatment Position  in bed  -AC      Post Treatment Position  bed  -AC      In Bed  supine;call light within reach;encouraged to call for assist;exit alarm on  -AC      Recorded by [AC] Sho Jimenez, OT 03/23/20 1143      Row Name 03/23/20 1032             Pain Assessment    Additional Documentation  Pain Scale: Numbers Pre/Post-Treatment (Group)  -AC      Recorded by [AC] Sho Jimenez, OT 03/23/20 1143      Row Name 03/23/20 1032             Pain Scale: Numbers Pre/Post-Treatment    Pain Scale: Numbers, Pretreatment  2/10  -AC      Pain Scale: Numbers, Post-Treatment  6/10  -AC      Pain Location - Orientation  incisional  -AC      Pain Location  abdomen  -AC      Pain Intervention(s)  Repositioned  -AC      Recorded by [AC] Sho Jimenez, OT 03/23/20 1143      Row Name                Wound 03/17/20 1714 upper;midline abdomen Incision    Wound - Properties Group Date first assessed: 03/17/20 [EL] Time first assessed: 1714 [EL] Present on Hospital Admission: N [EL] Orientation: upper;midline [EL] Location: abdomen [EL] Primary Wound Type: Incision [EL] Recorded by:  [EL] Dominga Chavez RN 03/17/20 1714    Row Name 03/23/20 1032             Plan of Care Review    Plan of Care Reviewed With  patient  -AC      Progress  improving  -AC      Outcome Summary  Pt required supervsion with sinkside grooming, supervision with post toielting hygiene and clothing mgmt, min A to don socks using crossleg technique.  Pt required min a supine to sit, CGA STS,  min a to ambulate 20 ft with RW.  Pt progressing, but limtied by decreased safety awareness, weakness and pain.   -AC      Recorded by [AC] Sho Jimenez, OT 03/23/20 1143      Row Name 03/23/20 1032             Outcome Summary/Treatment Plan (OT)    Daily Summary of Progress (OT)  progress toward functional goals is good  -AC       Recorded by [AC] Jimenez Sho LINDA, OT 03/23/20 1143        User Key  (r) = Recorded By, (t) = Taken By, (c) = Cosigned By    Initials Name Effective Dates Discipline    AC Tony Sho LINDA, OT 06/23/15 -  OT    Dominga Earl RN 04/10/19 -  Nurse        Wound 03/17/20 1717 upper;midline abdomen Incision (Active)   Dressing Appearance dry;intact 3/23/2020  8:00 AM   Closure FLOR 3/23/2020  8:00 AM   Base dressing in place, unable to visualize 3/23/2020  8:00 AM   Periwound intact;dry 3/23/2020  8:00 AM   Periwound Temperature warm 3/23/2020  8:00 AM   Periwound Skin Turgor soft 3/23/2020  8:00 AM   Edges rolled/closed 3/23/2020  8:00 AM   Drainage Characteristics/Odor serosanguineous 3/23/2020  8:00 AM   Drainage Amount scant 3/23/2020  8:00 AM           OT Recommendation and Plan  Outcome Summary/Treatment Plan (OT)  Daily Summary of Progress (OT): progress toward functional goals is good  Anticipated Discharge Disposition (OT): home with assist, home with home health  Daily Summary of Progress (OT): progress toward functional goals is good  Plan of Care Review  Plan of Care Reviewed With: patient  Plan of Care Reviewed With: patient  Outcome Summary: Pt required supervsion with sinkside grooming, supervision with post toielting hygiene and clothing mgmt, min A to don socks using crossleg technique.  Pt required min a supine to sit, CGA STS,  min a to ambulate 20 ft with RW.  Pt progressing, but limtied by decreased safety awareness, weakness and pain.   Outcome Measures     Row Name 03/23/20 1032 03/20/20 1435          How much help from another is currently needed...    Putting on and taking off regular lower body clothing?  3  -AC  2  -AC     Bathing (including washing, rinsing, and drying)  2  -AC  2  -AC     Toileting (which includes using toilet bed pan or urinal)  3  -AC  3  -AC     Putting on and taking off regular upper body clothing  3  -AC  3  -AC     Taking care of personal grooming (such as  brushing teeth)  3  -AC  3  -AC     Eating meals  4  -AC  3  -AC     AM-PAC 6 Clicks Score (OT)  18  -AC  16  -AC        Functional Assessment    Outcome Measure Options  AM-PAC 6 Clicks Daily Activity (OT)  -AC  --       User Key  (r) = Recorded By, (t) = Taken By, (c) = Cosigned By    Initials Name Provider Type     Sho Jimenez, OT Occupational Therapist           Time Calculation:   Time Calculation- OT     Row Name 03/23/20 1032             Time Calculation- OT    OT Start Time  1032  -      OT Received On  03/23/20  -      OT Goal Re-Cert Due Date  03/28/20  -         Timed Charges    44439 - OT Therapeutic Exercise Minutes  5  -AC      22824 - OT Therapeutic Activity Minutes  10  -AC      35610 - OT Self Care/Mgmt Minutes  8  -AC        User Key  (r) = Recorded By, (t) = Taken By, (c) = Cosigned By    Initials Name Provider Type     Sho Jimenez, OT Occupational Therapist        Therapy Charges for Today     Code Description Service Date Service Provider Modifiers Qty    [id number]  OT THERAPEUTIC ACT EA 15 MIN 3/23/2020 Sho Jimenez OT GO 1    [id number] HC OT SELF CARE/MGMT/TRAIN EA 15 MIN 3/23/2020 Sho Jimenez, OT GO 1               Sho Jimenez OT  3/23/2020

## 2020-03-23 NOTE — THERAPY TREATMENT NOTE
Patient Name: Neema Richard  : 1940    MRN: [id number]                              Today's Date: 3/23/2020       Admit Date: 3/15/2020    Visit Dx:     ICD-10-CM ICD-9-CM   1. Impaired mobility and activities of daily living Z74.09 799.89   2. Peritonitis (CMS/HCC) K65.9 567.9   3. Diverticulitis K57.92 562.11   4. Essential hypertension I10 401.9   5. Perforation of sigmoid colon due to diverticulitis K57.20 562.11     Patient Active Problem List   Diagnosis   • Well woman exam with routine gynecological exam   • Osteoporosis - GYN   • Essential hypertension   • Acquired hypothyroidism   • IBS-d (takes Buspar)   • DCIS (ductal carcinoma in situ)   • Cystocele and rectocele with complete uterovaginal prolapse   • Mixed urge and stress incontinence   • Pessary maintenance   • High cholesterol   • Acute diverticulitis   • Sepsis (CMS/HCC)   • Lactic acidosis   • Neutrophilic leukocytosis   • Perforation of sigmoid colon due to diverticulitis   • Acute kidney injury (CMS/HCC)   • Acute hyperglycemia   • Arthritis   • Overactive bladder   • Dyspepsia     Past Medical History:   Diagnosis Date   • Acquired hypothyroidism    • Colon polyp - sees Dr. Vazquez 2016   • DCIS (ductal carcinoma in situ) 2017    Dx at the time of breast reduction. ER and PA (+)   • Essential hypertension    • High cholesterol    • Hx of herpes genitalis    • IBS-d (takes Buspar)      Past Surgical History:   Procedure Laterality Date   • BILATERAL BREAST REDUCTION Bilateral    • BREAST LUMPECTOMY Left    • CATARACT EXTRACTION, BILATERAL Bilateral    • CERVICAL CONIZATION     • COLON RESECTION N/A 3/17/2020    Procedure: LOW ANTERIOR COLON RESECTION, MOBILIZATION OF SPLENIC FLEXURE, DRAINAGE OF ABSCESS AND COLOSTOMY CREATION;  Surgeon: Sravan Vazquez MD;  Location: [facility];  Service: General;  Laterality: N/A;   • FACIAL COSMETIC SURGERY     • FACIAL COSMETIC SURGERY     • HEMORRHOIDECTOMY   1974   • LAPAROSCOPIC CHOLECYSTECTOMY  2010   • REDUCTION MAMMAPLASTY Bilateral 04/13/2017    DCIS   • SINUS SURGERY  1992   • THYROIDECTOMY, PARTIAL  1963   • TONSILLECTOMY  1953   • VARICOSE VEIN SURGERY Bilateral 1981     General Information     Row Name 03/23/20 1325          PT Evaluation Time/Intention    Document Type  therapy note (daily note)  -LM     Mode of Treatment  individual therapy;physical therapy  -LM     Row Name 03/23/20 1325          General Information    Patient Profile Reviewed?  yes  -LM     Existing Precautions/Restrictions  fall;other (see comments) RADHA drain; Colostomy; Recently tested (-) for COVID-19  -LM     Row Name 03/23/20 1325          Cognitive Assessment/Intervention- PT/OT    Orientation Status (Cognition)  oriented x 3  -LM     Cognitive Assessment/Intervention Comment  Pt confused throughout tx - states she hears [employer]pel music.  -LM     Row Name 03/23/20 1325          Safety Issues, Functional Mobility    Safety Issues Affecting Function (Mobility)  awareness of need for assistance;impulsivity;insight into deficits/self awareness;judgment;safety precaution awareness;safety precautions follow-through/compliance  -LM     Impairments Affecting Function (Mobility)  balance;endurance/activity tolerance;pain;strength  -LM       User Key  (r) = Recorded By, (t) = Taken By, (c) = Cosigned By    Initials Name Provider Type    LM Wendy Mendez, PT Physical Therapist        Mobility     Row Name 03/23/20 1325          Bed Mobility Assessment/Treatment    Bed Mobility Assessment/Treatment  supine-sit;sit-supine  -LM     Supine-Sit [place] (Bed Mobility)  minimum assist (75% patient effort);1 person assist  -LM     Sit-Supine [place] (Bed Mobility)  supervision  -LM     Assistive Device (Bed Mobility)  bed rails;head of bed elevated  -LM     Comment (Bed Mobility)  Vc's for sequencing.  -LM     Row Name 03/23/20 1328          Transfer Assessment/Treatment    Comment (Transfers)  Pt  first ambulated to restroom.  When pt trying to complete pericare pt had LOB requiring Beverly to correct.  -LM     Row Name 03/23/20 1320          Sit-Stand Transfer    Sit-Stand [place] (Transfers)  contact guard;1 person assist  -LM     Assistive Device (Sit-Stand Transfers)  -- No AD  -LM     Row Name 03/23/20 1324          Gait/Stairs Assessment/Training    Gait/Stairs Assessment/Training  gait/ambulation independence;gait/ambulation assistive device;distance ambulated  -LM     [place] Level (Gait)  minimum assist (75% patient effort);1 person assist  -LM     Assistive Device (Gait)  -- No AD  -LM     Distance in Feet (Gait)  16 feet x 2; 6 feet x 2; 20 feet  -LM     Deviations/Abnormal Patterns (Gait)  gait speed decreased;altagracia decreased;base of support, narrow  -LM     Bilateral Gait Deviations  forward flexed posture  -LM     Comment (Gait/Stairs)  Pt impulsive upon standing and began ambulating to restroom before PT ready with IV pole.  Mainly CGA with ambulation - however, pt had one LOB requiring Beverly to correct.  Pt fatigued very quickly and became SOA - pt educated on PLB.  -LM       User Key  (r) = Recorded By, (t) = Taken By, (c) = Cosigned By    Initials Name Provider Type    Wendy Avalos PT Physical Therapist        Obj/Interventions     Row Name 03/23/20 4124          Therapeutic Exercise    Lower Extremity (Therapeutic Exercise)  heel slides, bilateral;SLR (straight leg raise), bilateral  -LM     Lower Extremity Range of Motion (Therapeutic Exercise)  ankle dorsiflexion/plantar flexion, bilateral;hip abduction/adduction, bilateral  -LM     Exercise Type (Therapeutic Exercise)  AROM (active range of motion)  -LM     Position (Therapeutic Exercise)  supine  -LM     Sets/Reps (Therapeutic Exercise)  x10 each  -LM     Expected Outcome (Therapeutic Exercise)  improve functional stability;improve performance, gait skills;improve performance, transfer skills  -LM     Row Name 03/23/20 2320           Static Sitting Balance    Level of [place] (Unsupported Sitting, Static Balance)  standby assist  -LM     Sitting Position (Unsupported Sitting, Static Balance)  sitting on edge of bed  -LM     Time Able to Maintain Position (Unsupported Sitting, Static Balance)  2 to 3 minutes  -LM     Row Name 03/23/20 1325          Static Standing Balance    Level of [place] (Supported Standing, Static Balance)  contact guard assist;1 person assist  -LM     Time Able to Maintain Position (Supported Standing, Static Balance)  1 to 2 minutes  -LM     Row Name 03/23/20 1325          Dynamic Standing Balance    Level of [place], Reaches Outside Midline (Standing, Dynamic Balance)  minimal assist, 75% patient effort;1 person assist  -LM     Time Able to Maintain Position, Reaches Outside Midline (Standing, Dynamic Balance)  1 to 2 minutes  -LM       User Key  (r) = Recorded By, (t) = Taken By, (c) = Cosigned By    Initials Name Provider Type    LM Wendy Mendez, PT Physical Therapist        Goals/Plan    No documentation.       Clinical Impression     Row Name 03/23/20 1325          Pain Assessment    Additional Documentation  Pain Scale: Numbers Pre/Post-Treatment (Group)  -LM     Row Name 03/23/20 1325          Pain Scale: Numbers Pre/Post-Treatment    Pain Scale: Numbers, Pretreatment  2/10  -LM     Pain Scale: Numbers, Post-Treatment  2/10  -LM     Pain Location - Orientation  incisional  -LM     Pain Location  abdomen  -LM     Pain Intervention(s)  Repositioned;Ambulation/increased activity  -LM     Row Name 03/23/20 1325          Plan of Care Review    Plan of Care Reviewed With  patient  -LM     Row Name 03/23/20 1325          Positioning and Restraints    Pre-Treatment Position  in bed  -LM     Post Treatment Position  bed  -LM     In Bed  fowlers;call light within reach;encouraged to call for assist;exit alarm on;notified nsg;with other staff with radiology staff about to have a chest xray  -LM        User Key  (r) = Recorded By, (t) = Taken By, (c) = Cosigned By    Initials Name Provider Type    Wendy Avalos PT Physical Therapist        Outcome Measures     Row Name 03/23/20 1325          How much help from another person do you currently need...    Turning from your back to your side while in flat bed without using bedrails?  3  -LM     Moving from lying on back to sitting on the side of a flat bed without bedrails?  3  -LM     Moving to and from a bed to a chair (including a wheelchair)?  3  -LM     Standing up from a chair using your arms (e.g., wheelchair, bedside chair)?  3  -LM     Climbing 3-5 steps with a railing?  2  -LM     To walk in hospital room?  3  -LM     AM-PAC 6 Clicks Score (PT)  17  -LM     Row Name 03/23/20 1325          Functional Assessment    Outcome Measure Options  AM-PAC 6 Clicks Basic Mobility (PT)  -LM       User Key  (r) = Recorded By, (t) = Taken By, (c) = Cosigned By    Initials Name Provider Type    Wendy Avalos PT Physical Therapist          PT Recommendation and Plan     Plan of Care Reviewed With: patient  Outcome Summary: Pt mildly confused throughout PT session - states she hears [employer] music playing (assured pt there was no music playing).  Pt transferred supine-->sit with MinAx1, stood x 3 with CGA, and ambulated 16' x 2 + 6' x 2 + 20' mainly with CGA.  However, pt had one LOB when standing to complete pericare and one LOB during ambulation requiring Beverly to correct.  Pt completed BLE ther ex without complaint.  Continue with skilled PT to improve mobility and safety.     Time Calculation:   PT Charges     Row Name 03/23/20 1325             Time Calculation    Start Time  1325  -LM      PT Received On  03/23/20  -LM      PT Goal Re-Cert Due Date  03/28/20  -LM         Timed Charges    78003 - PT Therapeutic Exercise Minutes  5  -LM      87232 - Gait Training Minutes   19  -LM      50800 - PT Therapeutic Activity Minutes  8  -LM        User Key  (r) = Recorded  By, (t) = Taken By, (c) = Cosigned By    Initials Name Provider Type    LM Wendy Mendez, PT Physical Therapist        Therapy Charges for Today     Code Description Service Date Service Provider Modifiers Qty    87313467527 HC GAIT TRAINING EA 15 MIN 3/23/2020 Wendy Mendez, PT GP 1    00254138267 HC PT THERAPEUTIC ACT EA 15 MIN 3/23/2020 Wendy Mendez, PT GP 1          PT G-Codes  Outcome Measure Options: AM-PAC 6 Clicks Basic Mobility (PT)  AM-PAC 6 Clicks Score (PT): 17  AM-PAC 6 Clicks Score (OT): 18    Wendy Mendez PT  3/23/2020

## 2020-03-23 NOTE — PROGRESS NOTES
"INFECTIOUS DISEASE PROGRESS NOTE       Neema Richard  1940  [id number]      Admission Date: 3/15/2020      Requesting Provider: Filomena Stuart DO  Evaluating Physician: Darshan Bejarano MD    Reason for Consultation: Acute sigmoid diverticulitis, with microperforation     History of present illness:  3/16/20:    3/16/2020: Patient is a 79 y.o. female, seen today for acute sigmoid diverticulitis with microperforation.  She has had several episodes of diverticulitis in the past, the last being in December 2019, treated with an oral antibiotic by her PCP.   Her symptoms minimally improved. She began experiencing diarrhea, with low grade fever, and yesterday, developed worsening abdominal pain, prompting her presentation to the ED.  An abdominal Ct with extensive diverticulitis involving the descending colon and sigmoid colon with stranding and wall thickening, with extraluminal air suggesting a microperforation.   Admitting labs with a leukocytosis of 15, 000, lactic acidosis, and she has been afebrile. She is currently on Merrem and we were consulted for evaluation and treatment.   3/17/20:  She feels better but still with abdominal tenderness.  Her daughter is coming to from [place] and she wants her tested for Covid-19.  She remains afebrile.  I saw her earlier this morning but she has now deteriorated.  She now has free air on her CT scan and is currently in the OR.  3/18/20:  She is feeling \"some better this am\".  She denies any nausea. She complains of hiccups. She remains afebrile.    3/19/20: She accidentally pulled NGT out yesterday. She is having some nausea, no vomiting.   She complains of not being able to get any rest.    3/20/2020: She had increased abdominal discomfort earlier today but it is decreased today.  She and her daughter indicate that her confusion has improved and her narcotic doses have been reduced.  She remains afebrile.  3/21/2020: She was moved to airborne precautions/contact " precautions due to her chest CT findings.  She has been afebrile over the day.  She has now been started on TPN.  The RN staff indicates that she has been stable over the day.  She has denied dyspnea. A COVID19 PCR is currently pending at  and should be back tomorrow.  3/22/2020: Her COVID19 PCR was negative.  She has decreased dyspnea.  She denies nausea and vomiting.  She has some abdominal distention but denies severe abdominal pain.  3/23/20:  Her confusion is improving per her daughter. She still complains of abdominal soreness.  No shortness of breath or increased cough.  She does have some increased drainage on her wound dressing.  She denies severe abdominal pain.      Past Medical History:   Diagnosis Date   • Acquired hypothyroidism 1980   • Colon polyp - sees Dr. 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 ER and [place] (+)   • Essential hypertension 2012   • High cholesterol 2019   • Hx of herpes genitalis 2013   • IBS-d (takes Buspar)        Past Surgical History:   Procedure Laterality Date   • BILATERAL BREAST REDUCTION Bilateral 1995   • BREAST LUMPECTOMY Left 2009   • CATARACT EXTRACTION, BILATERAL Bilateral 2018   • CERVICAL CONIZATION  1983   • COLON RESECTION N/A 3/17/2020    Procedure: LOW ANTERIOR COLON RESECTION, MOBILIZATION OF SPLENIC FLEXURE, DRAINAGE OF ABSCESS AND COLOSTOMY CREATION;  Surgeon: Sravan Vazquez MD;  Location: [facility];  Service: General;  Laterality: N/A;   • FACIAL COSMETIC SURGERY  2001   • FACIAL COSMETIC SURGERY  2012   • HEMORRHOIDECTOMY  1974   • LAPAROSCOPIC CHOLECYSTECTOMY  2010   • REDUCTION MAMMAPLASTY Bilateral 04/13/2017    DCIS   • SINUS SURGERY  1992   • THYROIDECTOMY, PARTIAL  1963   • TONSILLECTOMY  1953   • VARICOSE VEIN SURGERY Bilateral 1981       Family History   Problem Relation Age of Onset   • Breast cancer Neg Hx    • Ovarian cancer Neg Hx        Social History     Socioeconomic History   •  Marital status:      Spouse name: Not on file   • Number of children: Not on file   • Years of education: Not on file   • Highest education level: Not on file   Tobacco Use   • Smoking status: Never Smoker   Substance and Sexual Activity   • Alcohol use: No   • Drug use: No       Allergies   Allergen Reactions   • Evista [Raloxifene] Rash   • Cephalexin Diarrhea   • Hydrochlorothiazide Diarrhea   • Meloxicam Other (See Comments)   • Metronidazole Diarrhea   • Oxybutynin Cough   • Propylthiouracil Other (See Comments)     Severally reduce WBC's    • Sulfa Antibiotics Hives   • Sulfacetamide Sodium-Sulfur Hives   • Sulfur Dioxide Hives         Medication:    Current Facility-Administered Medications:   •  acetaminophen (TYLENOL) tablet 650 mg, 650 mg, Oral, Q8H, Sravan Vazquez MD, 650 mg at 03/23/20 2034  •  Adult Central 2-in-1 TPN, , Intravenous, Continuous, Sravan Vazquez MD, Last Rate: 70 mL/hr at 03/23/20 1757  •  busPIRone (BUSPAR) tablet 15 mg, 15 mg, Oral, Daily, Sravan Vazquez MD, 15 mg at 03/23/20 2033  •  calcitonin (salmon) (MIACALCIN) nasal spray 1 spray, 1 spray, Alternating Nares, Daily, Sravan Vazquez MD, 1 spray at 03/23/20 0953  •  diazePAM (VALIUM) tablet 5 mg, 5 mg, Oral, Q6H PRN, Sravan Vazquez MD  •  Fat Emul Fish Oil/Plant Based (SMOFLIPID) 20 % emulsion 200 mL, 200 mL, Intravenous, Q24H (TPN), Eveline Rosas, [facility], Last Rate: 16.67 mL/hr at 03/23/20 1757, 200 mL at 03/23/20 1757  •  gabapentin (NEURONTIN) capsule 300 mg, 300 mg, Oral, Nightly, Minerva Steele APRN, 300 mg at 03/23/20 2034  •  heparin (porcine) 5000 UNIT/ML injection 5,000 Units, 5,000 Units, Subcutaneous, Q8H, Sravan Vazquez MD, 5,000 Units at 03/23/20 2034  •  HYDROcodone-acetaminophen (NORCO) 5-325 MG per tablet 1 tablet, 1 tablet, Oral, Q4H PRN, Sravan Vazquez MD, 1 tablet at 03/20/20 0411  •  HYDROmorphone (DILAUDID) injection 0.5 mg, 0.5 mg, Intravenous, Q2H PRN **AND** naloxone (NARCAN) injection 0.4  mg, 0.4 mg, Intravenous, Q5 Min PRN, Sravan Vazquez MD  •  insulin regular (humuLIN R,novoLIN R) injection 2-7 Units, 2-7 Units, Subcutaneous, Q6H, Eveline Rosas, [facility], 2 Units at 03/23/20 0643  •  levothyroxine (SYNTHROID, LEVOTHROID) tablet 88 mcg, 88 mcg, Oral, Q AM, Sravan Vazquez MD, 88 mcg at 03/23/20 0638  •  LORazepam (ATIVAN) tablet 0.5 mg, 0.5 mg, Oral, Q8H PRN, Sravan Vazquez MD, 0.5 mg at 03/23/20 1827  •  Magnesium Sulfate 2 gram Bolus, followed by 8 gram infusion (total Mg dose 10 grams)- Mg less than or equal to 1mg/dL, 2 g, Intravenous, PRN **OR** Magnesium Sulfate 2 gram / 50mL Infusion (GIVE X 3 BAGS TO EQUAL 6GM TOTAL DOSE) - Mg 1.1 - 1.5 mg/dl, 2 g, Intravenous, PRN **OR** Magnesium Sulfate 4 gram infusion- Mg 1.6-1.9 mg/dL, 4 g, Intravenous, PRN, Jennifer Laurent DO, Last Rate: 25 mL/hr at 03/21/20 1652, 4 g at 03/21/20 1652  •  metoprolol succinate XL (TOPROL-XL) 24 hr tablet 100 mg, 100 mg, Oral, Daily, Sravan Vazquez MD, 100 mg at 03/23/20 0954  •  Mirabegron ER (MYRBETRIQ) 24 hr tablet 50 mg- patient supplied med, 50 mg, Oral, Daily, Sravan Vazquez MD, 50 mg at 03/22/20 2150  •  morphine injection 2 mg, 2 mg, Intravenous, Q2H PRN, 2 mg at 03/17/20 0817 **OR** Morphine sulfate (PF) injection 4 mg, 4 mg, Intravenous, Q2H PRN, Sravan Vazquez MD, 4 mg at 03/16/20 1704  •  ondansetron (ZOFRAN) injection 4 mg, 4 mg, Intravenous, Q6H PRN, Sravan Vazquez MD, 4 mg at 03/23/20 0258  •  Pharmacy to Dose TPN, , Does not apply, Continuous PRN, Sravan Vazquez MD  •  piperacillin-tazobactam (ZOSYN) 4.5 g in iso-osmotic dextrose 100 mL IVPB (premix), 4.5 g, Intravenous, Once, Darshan Bejarano MD  •  [START ON 3/24/2020] piperacillin-tazobactam (ZOSYN) 4.5 g in iso-osmotic dextrose 100 mL IVPB (premix), 4.5 g, Intravenous, Q8H, Darshan Bejarano MD  •  potassium chloride 20 mEq in 50 mL IVPB, 20 mEq, Intravenous, Q1H PRN, Sravan Vazquez MD  •  potassium phosphate 45 mmol in sodium chloride 0.9 %  250 mL infusion, 45 mmol, Intravenous, PRN **OR** potassium phosphate 30 mmol in sodium chloride 0.9 % 100 mL infusion, 30 mmol, Intravenous, PRN **OR** potassium phosphate 15 mmol in sodium chloride 0.9 % 100 mL infusion, 15 mmol, Intravenous, PRN **OR** sodium phosphates 45 mmol in sodium chloride 0.9 % 250 mL IVPB, 45 mmol, Intravenous, PRN **OR** sodium phosphates 30 mmol in sodium chloride 0.9 % 100 mL IVPB, 30 mmol, Intravenous, PRN **OR** sodium phosphates 15 mmol in sodium chloride 0.9 % 100 mL IVPB, 15 mmol, Intravenous, PRN, Sravan Vazquez MD  •  sodium chloride 0.45 % bolus 500 mL, 500 mL, Intravenous, Once, Sravan Vazquez MD  •  sodium chloride 0.9 % bolus 1,836 mL, 30 mL/kg, Intravenous, PRGeorge HOPKINS Bruce M, MD  •  sodium chloride 0.9 % flush 10 mL, 10 mL, Intravenous, PRGeorge HOPKINS Bruce M, MD  •  sodium chloride 0.9 % flush 10 mL, 10 mL, Intravenous, PRNGeorge Bruce M, MD  •  sodium chloride 0.9 % flush 10 mL, 10 mL, Intravenous, Q12H, Sravan Vazquez MD, 10 mL at 03/22/20 2313  •  sodium chloride 0.9 % flush 10 mL, 10 mL, Intravenous, PRGeorge HOPKINS Bruce M, MD  •  sucralfate (CARAFATE) tablet 1 g, 1 g, Oral, BID AC, Sravan Vazquez MD, 1 g at 03/23/20 1758    Antibiotics:  Anti-Infectives (From admission, onward)    Ordered     Dose/Rate Route Frequency Start Stop    03/23/20 2107  piperacillin-tazobactam (ZOSYN) 4.5 g in iso-osmotic dextrose 100 mL IVPB (premix)     Ordering Provider:  Darshan Bejarano MD    4.5 g  over 4 Hours Intravenous Every 8 Hours 03/24/20 0400 04/03/20 0359    03/23/20 2107  piperacillin-tazobactam (ZOSYN) 4.5 g in iso-osmotic dextrose 100 mL IVPB (premix)     Ordering Provider:  Darshan Bejarano MD    4.5 g  over 30 Minutes Intravenous Once 03/23/20 2200      03/20/20 2252  vancomycin 1250 mg/250 mL 0.9% NS IVPB ([facility])     Ordering Provider:  Alan Raygoza, [facility]    20 mg/kg × 61.2 kg  over 90 Minutes Intravenous Once 03/20/20 2345 03/21/20 0341    03/16/20 0811   ertapenem (INVanz) 1 g/100 mL 0.9% NS VTB (mbp)     Eveline Rosas, [facility] reviewed the order on 20 1145.   Ordering Provider:  Darshan Bejarano MD    1 g  over 30 Minutes Intravenous Every 24 Hours 20 0900 20 1023    03/15/20 1954  meropenem (MERREM) 1 g/100 mL 0.9% NS VTB (mbp)     Ordering Provider:  Darshan Steward MD    1 g  over 30 Minutes Intravenous Once 03/15/20 1955 03/15/20 2122                  Physical Exam:   Vital Signs  Temp (24hrs), Av.2 °F (36.8 °C), Min:97.8 °F (36.6 °C), Max:98.4 °F (36.9 °C)    Temp  Min: 97.8 °F (36.6 °C)  Max: 98.4 °F (36.9 °C)  BP  Min: 122/57  Max: 157/72  Pulse  Min: 67  Max: 82  Resp  Min: 16  Max: 24  SpO2  Min: 91 %  Max: 92 %    General: She is alert and in no acute distress.  She appears less ill.  Skin: She has no diffuse rash.  HEENT: She has no labial ulcers.  Lungs: She has minimal scattered rhonchi.  Cardiovascular: She has no murmur  Abdomen: She has mild abdominal distention. Midline incision with dressing in place.  I remove the dressing today and this revealed some slightly greenish slimy drainage on the dressing.  There is no surrounding erythema.  There is a small area of very slight wound dehiscence.  Ostomy   Neurologic: She is alert and oriented x3.  Motor exam reveals 5/5 strength bilaterally  PICC right without redness     Laboratory Data    Results from last 7 days   Lab Units 20  0658 20  0639 20  0705   WBC 10*3/mm3 22.11* 18.96* 16.49*   HEMOGLOBIN g/dL 9.4* 10.1* 9.9*   HEMATOCRIT % 29.1* 31.5* 32.2*   PLATELETS 10*3/mm3 503* 500* 418     Results from last 7 days   Lab Units 20  1306   SODIUM mmol/L 137   POTASSIUM mmol/L 4.0   CHLORIDE mmol/L 104   CO2 mmol/L 24.0   BUN mg/dL 17   CREATININE mg/dL 0.37*   GLUCOSE mg/dL 170*   CALCIUM mg/dL 8.2*     Results from last 7 days   Lab Units 20  1306   ALK PHOS U/L 60   BILIRUBIN mg/dL <0.2*   ALT (SGPT) U/L 6   AST (SGOT) U/L 13         Results from  last 7 days   Lab Units 03/23/20  1306   CRP mg/dL 13.32*     Results from last 7 days   Lab Units 03/20/20  1911   LACTATE mmol/L 1.2             Estimated Creatinine Clearance: 58.3 mL/min (A) (by C-G formula based on SCr of 0.37 mg/dL (L)).  Lactate 2.9     Microbiology:    3/16: BC no growth    3/17:  Peritoneum gms moderate WBCs, nos- Gram negative bacilli - normal fecal edgar     Radiology:  Imaging Results (Last 72 Hours)     Procedure Component Value Units Date/Time    XR Chest 1 View [[id number]] Collected:  03/23/20 1407     Updated:  03/23/20 1444    Narrative:       EXAMINATION: XR CHEST 1 VW-      INDICATION: Concern for aspiration; Z74.09-Other reduced mobility;  K65.9-Peritonitis, unspecified; K57.92-Diverticulitis of intestine, part  unspecified, without perforation or abscess without bleeding;  I10-Essential (primary) hypertension; K57.20-Diverticulitis of large  intestine with perforation and abscess without bleeding.      COMPARISON: 03/20/2020.     FINDINGS: Portable chest reveals ill-defined opacification again seen  within the right lung base with small right pleural effusion. PICC line  catheter on the right with tip in the SVC. Mild increased markings are  also seen at the left lung base with small left pleural effusion. The  upper lung fields are clear.           Impression:       PICC line catheter in satisfactory position in the SVC with  ill-defined opacification lung bases bilaterally and small bilateral  pleural effusions. Lung volumes remain low.     D:  03/23/2020  E:  03/23/2020     This report was finalized on 3/23/2020 2:41 PM by Dr. Daria Malone MD.       CT Abdomen Pelvis With & Without Contrast [[id number]] Collected:  03/20/20 1817     Updated:  03/21/20 1019    Narrative:       EXAMINATION: CT ABDOMEN/PELVIS WWO CONTRAST - 03/20/2020     INDICATION: Z74.09-Other reduced mobility; K65.9-Peritonitis,  unspecified; K57.92-Diverticulitis of intestine, part  unspecified,  without perforation or abscess without bleeding; I10-Essential (primary)  hypertension; K57.20-Diverticulitis of large intestine with perforation  and abscess without bleeding.      TECHNIQUE: CT abdomen and pelvis with and without intravenous contrast.     The radiation dose reduction device was turned on for each scan per the  ALARA (As Low as Reasonably Achievable) protocol.     COMPARISON: CT dated 03/17/2020.     FINDINGS: Lung bases demonstrate abnormal midlung opacifications  greatest in the right middle lobe new from prior recent comparison as  these are  developed in the interim along with increasing but trace to  small volume bilateral pleural effusions without adjacent atelectasis.  Liver without focal lesion. Gallbladder is surgically absent. No biliary  dilatation. Pancreas and spleen unremarkable with perisplenic and  perihepatic ascites as well as mesenteric edema. Adrenals without  distinct nodule. Kidneys without hydronephrosis or hydroureter.  Atherosclerotic nonaneurysmal abdominal aorta. Left lower quadrant  ostomy in place with soft tissue drain and pessary in place within the  pelvis without loculated fluid collection. Postsurgical changes from  ventral abdominal wall repair with diffuse body wall edema.       Impression:       Partially visualized lung bases demonstrate development of  groundglass opacifications in the right middle lobe inferiorly scattered  with concern for atypical etiology or aspiration, however COVID-19 not  excluded given interval development and overall appearance on partial  imaging of the lungs. Trace to small bilateral pleural effusions with  increase in size from recent 03/17/2020 comparison. Postsurgical changes  of the abdomen without intra-abdominal free air demonstrating early  postoperative appearance of trace ascites with soft tissue drain in  place and mesenteric edema however no loculated fluid collection with  left lower quadrant ostomy  formation and ventral abdominal wall  postsurgical changes. Diffuse body wall edema.     DICTATED:   03/20/2020  EDITED/ls :   03/20/2020      This report was finalized on 3/21/2020 10:16 AM by Dr. Arturo Joseph.       XR Chest PA & Lateral [[id number]] Collected:  03/20/20 1805     Updated:  03/21/20 1019    Narrative:          EXAMINATION: XR CHEST PA AND LATERAL - 03/20/2020     INDICATION: Z74.09-Other reduced mobility; K65.9-Peritonitis,  unspecified; K57.92-Diverticulitis of intestine, part unspecified,  without perforation or abscess without bleeding; I10-Essential (primary)  hypertension; K57.20-Diverticulitis of large intestine with perforation  and abscess without bleeding.      COMPARISON: None.     FINDINGS: Cardiac size borderline enlarged. Bibasilar opacifications  adjacent to small volume bilateral pleural effusions with right arm PICC  in place and terminating at the cavoatrial junction. No pneumothorax.             Impression:       Bibasilar opacifications of atelectasis and/or airspace  disease greatest in the right adjacent to small volume bilateral pleural  effusions with right arm PICC in place and terminating at the cavoatrial  junction.     DICTATED:   03/20/2020  EDITED/ls :   03/20/2020      This report was finalized on 3/21/2020 10:16 AM by Dr. Arturo Joseph.           CT scan from 3/20:  Impression:     Partially visualized lung bases demonstrate development of  groundglass opacifications in the right middle lobe inferiorly scattered  with concern for atypical etiology or aspiration, however COVID-19 not  excluded given interval development and overall appearance on partial  imaging of the lungs. Trace to small bilateral pleural effusions with  increase in size from recent 03/17/2020 comparison. Postsurgical changes  of the abdomen without intra-abdominal free air demonstrating early  postoperative appearance of trace ascites with soft tissue drain in  place and mesenteric edema however no  loculated fluid collection with  left lower quadrant ostomy formation and ventral abdominal wall  postsurgical changes. Diffuse body wall edema.           Impression:   1.  Acute sigmoid diverticulitis- s/p resection 3/17  2.  Severe sepsis  3.  Leukocytosis/neutrophilia-I suspect her worsening leukocytosis is due to residual intra-abdominal infection.  I will obtain a wound culture today.  I will plan to switch her from Invanz to intravenous Zosyn to expand her gram-negative coverage.  We will check a follow-up chest x-ray.  4.  Pelvic abscess  5.  Encephalopathy-this is most likely secondary to narcotics, and improved   6.  Pulmonary infiltrate/atelectasis- she clinically appears stable.      PLAN/RECOMMENDATIONS:   1.  Discontinue Invanz  2.  Abdominal wound Gram stain and culture in the area of slight wound dehiscence  3.  Intravenous Zosyn  4.  Chest x-ray    Dr. Bejarano has obtained the history, performed the physical exam and formulated the above treatment plan.     Discussed her situation with Dr. Siddiqi today.    Darshan Bejarano MD  3/23/2020  21:07

## 2020-03-23 NOTE — PLAN OF CARE
Problem: Pain, Chronic (Adult)  Goal: Acceptable Pain/Comfort Level and Functional Ability  Flowsheets (Taken 3/23/2020 0413)  Acceptable Pain/Comfort Level and Functional Ability: achieves outcome     Problem: Patient Care Overview  Goal: Plan of Care Review  Flowsheets  Taken 3/23/2020 0413  Progress: improving  Outcome Summary: Patient able to walk in room several times during shift. Pt states she is uncomfortable at times but refuses pain meds and scheduled tylenol. Pt on 1.5 LPM NC. Pt alert and oriented, but did have some confusion before her daughter arrived per pt request. Pt has some soa when ambulating to the bathroom. Pt urinating adequately.  Cont to enc IS. VSS.  No new concerns at this time.  Taken 3/22/2020 5357  Plan of Care Reviewed With: patient;daughter (daughter now at bedside)     Problem: Fall Risk (Adult)  Goal: Identify Related Risk Factors and Signs and Symptoms  Flowsheets  Taken 3/22/2020 0521 by Luz Dawson RN  Related Risk Factors (Fall Risk): age-related changes;fatigue/slow reaction;gait/mobility problems;environment unfamiliar  Taken 3/23/2020 0413 by Adriana Bob, RN  Signs and Symptoms (Fall Risk): presence of risk factors  Goal: Absence of Fall  Flowsheets (Taken 3/23/2020 0413)  Absence of Fall: achieves outcome     Problem: Infection, Risk/Actual (Adult)  Goal: Infection Prevention/Resolution  Flowsheets (Taken 3/23/2020 0413)  Infection Prevention/Resolution: achieves outcome

## 2020-03-23 NOTE — PROGRESS NOTES
Clinical Nutrition   Reason For Visit: Follow-up protocol, PN/PO    Patient Name: Neema Richard  YOB: 1940  MRN: [id number]  Date of Encounter: 03/23/20 14:33  Admission date: 3/15/2020      RD recommends the following TPN regimen:  D14.5%, AA5.0% @ 70 ml/hr. 200 ml SMOF lipids daily.  At goal rate this regimen provides 1680 ml, 1564 calories (101% est needs), 84 g protein (105% est needs), GIR = 2.7    RD will continue to monitor and make TPN adjustment recommendations as medically appropriate.    RD recommends MD consider liberalizing diet from GI soft to Regular to potentially increase PO intake. Patient does not appear to enjoy the meal options she is receiving on GI soft diet.      Nutrition Assessment     Admission Problem List:  N/V/Abdominal pain  Sigmoid diverticulitis with microperforation  Peritonitis  Pelvic abscesses  Sepsis  DASH  Lactic acidosis  Encephalopathy      Applicable PMH:  HTN  IBS-D  Dyspepsia  Diverticulitis  S/p CCY      Applicable medical tests/procedures since admission:  (3/17) s/p low anterior resection, mobilization splenic flexure, drainage of abdominal abscesses, appendectomy, left salpingo-oophorectomy, distal transverse end colostomy.  (3/21) TPN initiated via PICC 2/2 poor PO intake       Reported/Observed/Food/Nutrition Related History   Patient reports she continues to eat very little at this time. Drinking some Boost Plus but only a very small amount daily. States it would help if the food weren't so dry and tasted better.    Colostomy output x 24 hours (per nsg doc) = 100 ml    Anthropometrics   Height: 67 in  Weight: 140 lbs (bed scale weight 3/23 per nsg doc)  BMI: 22.0  BMI classification: Normal: 18.5-24.9kg/m2   IBW: 135 lbs    Per RD note (3/20):  UBW: 135 lbs per pt  Weight change: RD suspects patient has lost weight and now weighs less than 135 lbs; bed scale inaccurate at this time.    Labs reviewed   Labs reviewed: Yes    Medications reviewed    Medications reviewed: Yes  Pertinent: antibiotic, carafate, insulin  PRN: zofran    Estimated Nutrition Needs   Height used: 67 in/170.2 cm  Weight used: 140 lbs/63.6 kg (actual wt)    Estimated calorie needs: 1550 calories  MSJ = 1149 x 1.3 = 1494  25 kcal/kg = 1590    Estimated protein needs: 80 g protein  1.2-1.5 g/kg = 76-95    Current Nutrition Prescription   PO: Regular / GI soft  Oral Nutrition Supplement: Boost Plus 3x daily    PN: D12%, AA5.0% @ 70 ml/hr. 200 ml SMOF lipids daily.  Route: PICC  Nutrition provided at goal rate: 1680 ml, 1421 calories (92% est needs), 84 g protein (105% est needs), GIR = 2.2    Evaluation of Received Nutrient/Fluid Intake: 0% at breakfast this morning per nsg doc      Nutrition Diagnosis     3/20 (updated 3/21, 3/23)  Problem Inadequate oral intake   Etiology Clinical condition   Signs/Symptoms Patient with minimal PO intake, supplemental TPN via PICC; no intake at breakfast this morning per pt   Status: ongoing    Intervention   Intervention: Follow treatment progress, Care plan reviewed, Adjusted supplement, Encourage intake     RD recommends the following TPN regimen:  D14.5%, AA5.0% @ 70 ml/hr. 200 ml SMOF lipids daily.  At goal rate this regimen provides 1680 ml, 1564 calories (101% est needs), 84 g protein (105% est needs), GIR = 2.7    RD will continue to monitor and make TPN adjustment recommendations as medically appropriate.    RD recommends MD consider liberalizing diet from GI soft to Regular to potentially increase PO intake. Patient does not appear to enjoy the meal options she is receiving on GI soft diet.      Goal:   General: Nutrition support treatment   PO: Tolerate PO, Increase intake   PN: Adjust PN    Monitoring/Evaluation:     Monitoring/Evaluation: Per protocol, I&O, PO intake, Supplement intake, Pertinent labs, PN delivery/tolerance, Weight, GI status, Symptoms  Will Continue to follow per protocol  Karen Menjivar RD  Time Spent: 45 min

## 2020-03-23 NOTE — PROGRESS NOTES
Pharmacy Parenteral Nutrition Evaluation  Neema Richard is a  79 y.o. female receiving TPN.     Indication: Ileus associated with diffuse peritonitis  Consulting Physician: George    Labs  Results from last 7 days   Lab Units 03/23/20  0658 03/22/20  0639 03/21/20  1525   SODIUM mmol/L 130* 137 140   POTASSIUM mmol/L 3.9 3.6 4.5   CHLORIDE mmol/L 98 103 108*   CO2 mmol/L 23.0 25.0 21.0*   BUN mg/dL 17 16 19   CREATININE mg/dL 0.32* 0.44* 0.34*   CALCIUM mg/dL 7.9* 8.0* 7.5*   BILIRUBIN mg/dL <0.2* 0.2 0.2   ALK PHOS U/L 58 61 68   ALT (SGPT) U/L 7 6 7   AST (SGOT) U/L 13 12 13   GLUCOSE mg/dL 173* 152* 88     Results from last 7 days   Lab Units 03/23/20  0658 03/22/20  0639 03/21/20  1525   MAGNESIUM mg/dL 1.9 2.1 1.6   PHOSPHORUS mg/dL 3.1 2.7 3.4   PREALBUMIN mg/dL  --  4.1* 3.7*     Results from last 7 days   Lab Units 03/23/20  0658 03/22/20  0639 03/21/20  0705   WBC 10*3/mm3 22.11* 18.96* 16.49*   HEMOGLOBIN g/dL 9.4* 10.1* 9.9*   HEMATOCRIT % 29.1* 31.5* 32.2*   PLATELETS 10*3/mm3 503* 500* 418     Triglycerides   Date Value Ref Range Status   03/21/2020 172 (H) 0 - 150 mg/dL Final     Comment:     Falsely depressed results may occur on samples drawn from patients receiving N-Acetylcysteine (NAC) or Metamizole.       estimated creatinine clearance is 58.3 mL/min (A) (by C-G formula based on SCr of 0.32 mg/dL (L)).    Intake & Output (last 3 days)         03/20 0701 - 03/21 0700 03/21 0701 - 03/22 0700 03/22 0701 - 03/23 0700 03/23 0701 - 03/24 0700    P.O.    360    I.V. (mL/kg) 842.6 (13.8)       IV Piggyback 450  100     TPN   2300     Total Intake(mL/kg) 1292.6 (21.1)  2400 (37.7) 360 (5.7)    Urine (mL/kg/hr) 1900 (1.3) 800 (0.5) 300 (0.2)     Drains 80 15 16     Stool 0 350 100     Total Output 1980 1165 416     Net -687.4 -1165 +1984 +360            Urine Unmeasured Occurrence 2 x 3 x 12 x     Stool Unmeasured Occurrence   1 x           Dietitian Recommendations  Diet Orders (active) (From admission,  onward)       Start     Ordered    03/21/20 0001  NPO Diet  Diet Effective Midnight      03/20/20 1632    03/20/20 1414  Dietary Nutrition Supplements Boost Plus; vanilla  Daily With Breakfast, Lunch & Dinner     Comments:  Boost Plus (vanilla) 3x daily    03/20/20 1413    03/17/20 2200  Snack: Chewing gum x30 minutes three times daily to increase saliva flow and provide gas pain relief. Do not give to ileostomy patients.  3 Times Daily     Comments:  Chewing gum x30 minutes three times daily to increase saliva flow and provide gas pain relief.      Do not give to ileostomy patients.    03/17/20 1903                  Current TPN Regimen Recommendation:  Dextrose 14.5% / Amino Acid 5% at goal rate of 70 mL/hr.  20% Lipid Emulsion 200mL every 24 hours.    Assessment/Plan  1. Pharmacy to dose TPN ordered for ileus associated with diffuse peritonitis. TPN started 3/21/20 through PICC line.  2. Macronutrient recommendations per RD 3/21, 14.5%D, 5%AA at goal rate of 70mL/hr. Standard electrolytes ordered.  3. Low dose SSI ordered per protocol. Patient is diabetic, A1C of 6.00%.  4. Will replace electrolytes exogenously if needed - magnesium, potassium, and phosphorus replacement protocols ordered.  5. Pharmacy will continue to follow and adjust dose based on renal function, electrolyte levels, and clinical status.    Neema Peoples [facility]  3/23/2020  12:18

## 2020-03-23 NOTE — NURSING NOTE
Inpatient Ostomy Therapy Note    Date of Surgery: 3/17/20      Days Post Procedure:  6 Days Post-Op    Surgeon:  Sravan Vazquez MD    Ostomy:  Colostomy- LUQ    Appliance Type:  Coloplast, 1 Piece and Flat    Stoma Description: Viable, Red and Moist    Stoma Size:  51mm    Peristomal Skin:  Intact and Other: skin tear under appliance tape at 7:00    Last Appliance Change:  3/23/20    Accessories:  Stoma Powder and Barrier Spray    Education:  Diet, Showering, Emptying, Changing Appliance, Ordering Supplies, Outpatient Followup, [facility] [place], Ostomyse[employer], Peristomal Skin Issues and Crusting    Dressing Change:  No    Supplies Provided:  No    Education Folder Provided:  Yes    Plan of Care:  WOC Visit    Teaching provided to:  Patient and Children    Comments:  WOC nurse f/u for colostomy. Stoma red and moist; protruding nicely above skin level. Pt has skin tear at edge of appliance at 7:00 - crusted with stoma powder and barrier spray. Other peristomal skin pink and intact. 100mL loose brown stool emptied from pouch. Appliance change done with some assistance from pt; daughter observed, using Coloplast 1 piece flat - to avoid skin tear.     Summary:  Stomal education performed as above with pt and daughter. WOC nurse will f/u. Please contact WOC nurse as needed for concerns.     Lizette Collier, LAUREEN - 03/23/20, 3:42 PM

## 2020-03-23 NOTE — PLAN OF CARE
Problem: Patient Care Overview  Goal: Plan of Care Review  Outcome: Ongoing (interventions implemented as appropriate)  Flowsheets (Taken 3/23/2020 1325)  Outcome Summary: Pt mildly confused throughout PT session - states she hears gospel music playing (assured pt there was no music playing).  Pt transferred supine-->sit with MinAx1, stood x 3 with CGA, and ambulated 16' x 2 + 6' x 2 + 20' mainly with CGA.  However, pt had one LOB when standing to complete pericare and one LOB during ambulation requiring Beverly to correct.  Pt completed BLE ther ex without complaint.  Continue with skilled PT to improve mobility and safety.

## 2020-03-23 NOTE — PLAN OF CARE
Problem: Patient Care Overview  Goal: Plan of Care Review  Flowsheets (Taken 3/23/2020 1032)  Plan of Care Reviewed With: patient  Outcome Summary: Pt required supervsion with sinkside grooming, supervision with post toielting hygiene and clothing mgmt, min A to don socks using crossleg technique.  Pt required min a supine to sit, CGA STS,  min a to ambulate 20 ft with RW.  Pt progressing, but limited by decreased safety awareness, weakness and pain.

## 2020-03-23 NOTE — PROGRESS NOTES
Appears nontoxic, white count 22,000.  Some stoma output  Nauseated but being fairly aggressive with enteral nutrition  TPN running    Abdomen is mild to moderately distended  Incision with drainage, left open to permit drainage noting contaminated/dirty nature of procedure  Pink stoma with some stool on an irregular basis    Awaiting more regular bowel function  Frequent ambulation encouraged    Rule out C. difficile in the setting of watery stool, white count 22,000, longstanding history of antibiotic therapy.  CT scan tomorrow to look for abscess or anything slowing progress.

## 2020-03-23 NOTE — PROGRESS NOTES
Continued Stay Note  [facility]     Patient Name: Neema Richard  MRN: [id number]  Today's Date: 3/23/2020    Admit Date: 3/15/2020    Discharge Plan     Row Name 03/23/20 1149       Plan    Plan  Home with [facility]    Plan Comments  Met with patient at bedside to discuss discharge plan. Plan home with [facility] and friend will transport. Denies any discharge needs at this time.        Discharge Codes    No documentation.             GOLDIE Mack (Kay)

## 2020-03-24 ENCOUNTER — APPOINTMENT (OUTPATIENT)
Dept: CT IMAGING | Facility: HOSPITAL | Age: 80
End: 2020-03-24

## 2020-03-24 LAB
ANION GAP SERPL CALCULATED.3IONS-SCNC: 11 MMOL/L (ref 5–15)
BASOPHILS # BLD AUTO: 0.09 10*3/MM3 (ref 0–0.2)
BASOPHILS NFR BLD AUTO: 0.5 % (ref 0–1.5)
BUN BLD-MCNC: 19 MG/DL (ref 8–23)
BUN/CREAT SERPL: 40.4 (ref 7–25)
C DIFF TOX GENS STL QL NAA+PROBE: NOT DETECTED
CALCIUM SPEC-SCNC: 7.9 MG/DL (ref 8.6–10.5)
CHLORIDE SERPL-SCNC: 107 MMOL/L (ref 98–107)
CO2 SERPL-SCNC: 23 MMOL/L (ref 22–29)
CREAT BLD-MCNC: 0.47 MG/DL (ref 0.57–1)
DEPRECATED RDW RBC AUTO: 47.2 FL (ref 37–54)
EOSINOPHIL # BLD AUTO: 0.96 10*3/MM3 (ref 0–0.4)
EOSINOPHIL NFR BLD AUTO: 5.7 % (ref 0.3–6.2)
ERYTHROCYTE [DISTWIDTH] IN BLOOD BY AUTOMATED COUNT: 14.6 % (ref 12.3–15.4)
GFR SERPL CREATININE-BSD FRML MDRD: 128 ML/MIN/1.73
GLUCOSE BLD-MCNC: 163 MG/DL (ref 65–99)
GLUCOSE BLDC GLUCOMTR-MCNC: 144 MG/DL (ref 70–130)
GLUCOSE BLDC GLUCOMTR-MCNC: 148 MG/DL (ref 70–130)
GLUCOSE BLDC GLUCOMTR-MCNC: 153 MG/DL (ref 70–130)
GLUCOSE BLDC GLUCOMTR-MCNC: 165 MG/DL (ref 70–130)
GLUCOSE BLDC GLUCOMTR-MCNC: 212 MG/DL (ref 70–130)
HCT VFR BLD AUTO: 25.3 % (ref 34–46.6)
HGB BLD-MCNC: 8 G/DL (ref 12–15.9)
IMM GRANULOCYTES # BLD AUTO: 1.39 10*3/MM3 (ref 0–0.05)
IMM GRANULOCYTES NFR BLD AUTO: 8.2 % (ref 0–0.5)
LYMPHOCYTES # BLD AUTO: 1.8 10*3/MM3 (ref 0.7–3.1)
LYMPHOCYTES NFR BLD AUTO: 10.6 % (ref 19.6–45.3)
MAGNESIUM SERPL-MCNC: 1.8 MG/DL (ref 1.6–2.4)
MCH RBC QN AUTO: 28.3 PG (ref 26.6–33)
MCHC RBC AUTO-ENTMCNC: 31.6 G/DL (ref 31.5–35.7)
MCV RBC AUTO: 89.4 FL (ref 79–97)
MONOCYTES # BLD AUTO: 1.1 10*3/MM3 (ref 0.1–0.9)
MONOCYTES NFR BLD AUTO: 6.5 % (ref 5–12)
NEUTROPHILS # BLD AUTO: 11.59 10*3/MM3 (ref 1.7–7)
NEUTROPHILS NFR BLD AUTO: 68.5 % (ref 42.7–76)
NRBC BLD AUTO-RTO: 0 /100 WBC (ref 0–0.2)
PHOSPHATE SERPL-MCNC: 3 MG/DL (ref 2.5–4.5)
PLAT MORPH BLD: NORMAL
PLATELET # BLD AUTO: 475 10*3/MM3 (ref 140–450)
PMV BLD AUTO: 10.3 FL (ref 6–12)
POTASSIUM BLD-SCNC: 3.7 MMOL/L (ref 3.5–5.2)
RBC # BLD AUTO: 2.83 10*6/MM3 (ref 3.77–5.28)
RBC MORPH BLD: NORMAL
SODIUM BLD-SCNC: 141 MMOL/L (ref 136–145)
WBC MORPH BLD: NORMAL
WBC NRBC COR # BLD: 16.93 10*3/MM3 (ref 3.4–10.8)

## 2020-03-24 PROCEDURE — 85007 BL SMEAR W/DIFF WBC COUNT: CPT | Performed by: COLON & RECTAL SURGERY

## 2020-03-24 PROCEDURE — 87493 C DIFF AMPLIFIED PROBE: CPT | Performed by: COLON & RECTAL SURGERY

## 2020-03-24 PROCEDURE — 25010000002 LORAZEPAM PER 2 MG: Performed by: FAMILY MEDICINE

## 2020-03-24 PROCEDURE — 25010000002 HALOPERIDOL LACTATE PER 5 MG: Performed by: NURSE PRACTITIONER

## 2020-03-24 PROCEDURE — 25010000002 IOPAMIDOL 61 % SOLUTION: Performed by: INTERNAL MEDICINE

## 2020-03-24 PROCEDURE — 82962 GLUCOSE BLOOD TEST: CPT

## 2020-03-24 PROCEDURE — 85025 COMPLETE CBC W/AUTO DIFF WBC: CPT | Performed by: COLON & RECTAL SURGERY

## 2020-03-24 PROCEDURE — 84100 ASSAY OF PHOSPHORUS: CPT

## 2020-03-24 PROCEDURE — 25010000002 HEPARIN (PORCINE) PER 1000 UNITS: Performed by: COLON & RECTAL SURGERY

## 2020-03-24 PROCEDURE — 25010000003 MAGNESIUM SULFATE 4 GM/100ML SOLUTION: Performed by: INTERNAL MEDICINE

## 2020-03-24 PROCEDURE — 25010000002 POTASSIUM CHLORIDE PER 2 MEQ OF POTASSIUM: Performed by: COLON & RECTAL SURGERY

## 2020-03-24 PROCEDURE — 83735 ASSAY OF MAGNESIUM: CPT

## 2020-03-24 PROCEDURE — 25010000002 CALCIUM GLUCONATE PER 10 ML: Performed by: COLON & RECTAL SURGERY

## 2020-03-24 PROCEDURE — 25010000002 MAGNESIUM SULFATE PER 500 MG OF MAGNESIUM: Performed by: COLON & RECTAL SURGERY

## 2020-03-24 PROCEDURE — 80048 BASIC METABOLIC PNL TOTAL CA: CPT | Performed by: COLON & RECTAL SURGERY

## 2020-03-24 PROCEDURE — 63710000001 INSULIN REGULAR HUMAN PER 5 UNITS

## 2020-03-24 PROCEDURE — 99233 SBSQ HOSP IP/OBS HIGH 50: CPT | Performed by: INTERNAL MEDICINE

## 2020-03-24 PROCEDURE — 25010000002 PIPERACILLIN SOD-TAZOBACTAM PER 1 G: Performed by: INTERNAL MEDICINE

## 2020-03-24 PROCEDURE — 74178 CT ABD&PLV WO CNTR FLWD CNTR: CPT

## 2020-03-24 RX ORDER — LORATADINE 10 MG/1
10 TABLET ORAL DAILY
Status: ON HOLD | COMMUNITY
End: 2022-11-30

## 2020-03-24 RX ORDER — HALOPERIDOL 5 MG/ML
2 INJECTION INTRAMUSCULAR ONCE
Status: COMPLETED | OUTPATIENT
Start: 2020-03-24 | End: 2020-03-24

## 2020-03-24 RX ORDER — LORAZEPAM 2 MG/ML
0.5 INJECTION INTRAMUSCULAR ONCE
Status: COMPLETED | OUTPATIENT
Start: 2020-03-24 | End: 2020-03-24

## 2020-03-24 RX ORDER — LORATADINE 10 MG/1
10 TABLET ORAL DAILY
Status: DISCONTINUED | OUTPATIENT
Start: 2020-03-24 | End: 2020-03-26 | Stop reason: HOSPADM

## 2020-03-24 RX ADMIN — HALOPERIDOL LACTATE 2 MG: 5 INJECTION, SOLUTION INTRAMUSCULAR at 03:16

## 2020-03-24 RX ADMIN — CALCIUM GLUCONATE: 94 INJECTION, SOLUTION INTRAVENOUS at 18:26

## 2020-03-24 RX ADMIN — TAZOBACTAM SODIUM AND PIPERACILLIN SODIUM 4.5 G: 500; 4 INJECTION, SOLUTION INTRAVENOUS at 21:04

## 2020-03-24 RX ADMIN — SMOFLIPID 200 ML: 6; 6; 5; 3 INJECTION, EMULSION INTRAVENOUS at 18:26

## 2020-03-24 RX ADMIN — METOPROLOL SUCCINATE 100 MG: 100 TABLET, EXTENDED RELEASE ORAL at 11:08

## 2020-03-24 RX ADMIN — CALCITONIN SALMON 1 SPRAY: 200 SPRAY, METERED NASAL at 11:09

## 2020-03-24 RX ADMIN — GABAPENTIN 300 MG: 300 CAPSULE ORAL at 21:17

## 2020-03-24 RX ADMIN — LORATADINE 10 MG: 10 TABLET ORAL at 11:09

## 2020-03-24 RX ADMIN — HEPARIN SODIUM 5000 UNITS: 5000 INJECTION, SOLUTION INTRAVENOUS; SUBCUTANEOUS at 12:50

## 2020-03-24 RX ADMIN — MIRABEGRON 50 MG: 50 TABLET, FILM COATED, EXTENDED RELEASE ORAL at 21:18

## 2020-03-24 RX ADMIN — IOPAMIDOL 75 ML: 612 INJECTION, SOLUTION INTRAVENOUS at 10:08

## 2020-03-24 RX ADMIN — TAZOBACTAM SODIUM AND PIPERACILLIN SODIUM 4.5 G: 500; 4 INJECTION, SOLUTION INTRAVENOUS at 12:49

## 2020-03-24 RX ADMIN — ACETAMINOPHEN 650 MG: 325 TABLET, FILM COATED ORAL at 21:16

## 2020-03-24 RX ADMIN — BUSPIRONE HYDROCHLORIDE 15 MG: 10 TABLET ORAL at 21:16

## 2020-03-24 RX ADMIN — LORAZEPAM 0.5 MG: 2 INJECTION INTRAMUSCULAR; INTRAVENOUS at 03:42

## 2020-03-24 RX ADMIN — INSULIN HUMAN 3 UNITS: 100 INJECTION, SOLUTION PARENTERAL at 12:51

## 2020-03-24 RX ADMIN — TAZOBACTAM SODIUM AND PIPERACILLIN SODIUM 4.5 G: 500; 4 INJECTION, SOLUTION INTRAVENOUS at 03:30

## 2020-03-24 RX ADMIN — MAGNESIUM SULFATE 4 G: 4 INJECTION INTRAVENOUS at 21:04

## 2020-03-24 RX ADMIN — HEPARIN SODIUM 5000 UNITS: 5000 INJECTION, SOLUTION INTRAVENOUS; SUBCUTANEOUS at 21:16

## 2020-03-24 RX ADMIN — SUCRALFATE 1 G: 1 TABLET ORAL at 19:08

## 2020-03-24 RX ADMIN — INSULIN HUMAN 2 UNITS: 100 INJECTION, SOLUTION PARENTERAL at 18:26

## 2020-03-24 NOTE — PROGRESS NOTES
[facility] Medicine Services  PROGRESS NOTE    Patient Name: Nemea Richard  : 1940  MRN: [id number]    Date of Admission: 3/15/2020  Primary Care Physician: David Mackey MD    Subjective   Subjective     CC:  Perforated diverticulitis     HPI:  Patient was confused overnight, climbing out of bed, requiring restraints.   Thinks she is in [place], [place]. Belly pain is stable, seems slightly more distended today.   CT pending. Less output in ostomy.     Review of Systems  Gen- No fevers, chills  CV- No chest pain, palpitations  Resp- No cough, +dyspnea  GI- No N/V/D, + abd pain +distension    Objective   Objective     Vital Signs:   Temp:  [97.8 °F (36.6 °C)-99.5 °F (37.5 °C)] 99.5 °F (37.5 °C)  Heart Rate:  [67-90] 90  Resp:  [18-24] 18  BP: (122-152)/(62-91) 152/65        Physical Exam:  GEN- no acute distress noted, resting in bed, awake  HEENT- atraumatic, normocephlic, eomi, PICC present in RUE   NECK- supple, trachea midline, no masses  RESP: ctab, normal effort  CV: no murmurs, s1/s2, rrr  GI: soft, slight tenderness, slightly more distended, midline incision in place c/d/i +ostomy   MSK: no edema noted, spontaneous movement of all extremities  NEURO: awake but not oriented, no focal deficits noted   SKIN: no rashes  PSYCH: appropriate mood and affect , but confused       Results Reviewed:  Results from last 7 days   Lab Units 20  0420 20  0658 20  0639  20  0126   WBC 10*3/mm3 16.93* 22.11* 18.96*   < >  --    HEMOGLOBIN g/dL 8.0* 9.4* 10.1*   < >  --    HEMATOCRIT % 25.3* 29.1* 31.5*   < >  --    PLATELETS 10*3/mm3 475* 503* 500*   < >  --    PROCALCITONIN ng/mL  --  0.70*  --   --  1.67*    < > = values in this interval not displayed.     Results from last 7 days   Lab Units 20  0420 20  1306 20  0658 20  0639  20  0705   SODIUM mmol/L 141 137 130* 137   < > 139   POTASSIUM mmol/L 3.7 4.0 3.9 3.6   < > 4.1   CHLORIDE mmol/L  107 104 98 103   < > 106   CO2 mmol/L 23.0 24.0 23.0 25.0   < > 20.0*   BUN mg/dL 19 17 17 16   < > 21   CREATININE mg/dL 0.47* 0.37* 0.32* 0.44*   < > 0.40*   GLUCOSE mg/dL 163* 170* 173* 152*   < > 92   CALCIUM mg/dL 7.9* 8.2* 7.9* 8.0*   < > 8.0*   ALT (SGPT) U/L  --  6 7 6   < >  --    AST (SGOT) U/L  --  13 13 12   < >  --    PROBNP pg/mL  --   --   --   --   --  2,558.0*    < > = values in this interval not displayed.     Estimated Creatinine Clearance: 57.5 mL/min (A) (by C-G formula based on SCr of 0.47 mg/dL (L)).    Microbiology Results Abnormal     Procedure Component Value - Date/Time    Clostridium Difficile Toxin - Stool, Per Rectum [[id number]] Collected:  03/24/20 0758    Lab Status:  Final result Specimen:  Stool from Per Rectum Updated:  03/24/20 0937    Narrative:       The following orders were created for panel order Clostridium Difficile Toxin - Stool, Per Rectum.  Procedure                               Abnormality         Status                     ---------                               -----------         ------                     Clostridium Difficile To...[[id number]]  Normal              Final result                 Please view results for these tests on the individual orders.    Clostridium Difficile Toxin, PCR - Stool, Per Rectum [[id number]]  (Normal) Collected:  03/24/20 0758    Lab Status:  Final result Specimen:  Stool from Per Rectum Updated:  03/24/20 0937     C. Difficile Toxins by PCR Not Detected    Narrative:       Performance characteristics of test not established for patients <2 years of age.  Negative for Toxigenic C. Difficile    Wound Culture - Surgical Site, Abdominal Cavity [[id number]] Collected:  03/23/20 1430    Lab Status:  Preliminary result Specimen:  Surgical Site from Abdominal Cavity Updated:  03/24/20 0657     Wound Culture Growth present, too young to evaluate     Gram Stain No WBCs or organisms seen    Blood Culture - Blood, Hand, Left [[id number]] Collected:   03/21/20 0110    Lab Status:  Preliminary result Specimen:  Blood from Hand, Left Updated:  03/24/20 0315     Blood Culture No growth at 3 days    Blood Culture - Blood, Arm, Right [[id number]] Collected:  03/21/20 0127    Lab Status:  Preliminary result Specimen:  Blood from Arm, Right Updated:  03/24/20 0315     Blood Culture No growth at 3 days    Fungus Culture - Swab, Peritoneum [[id number]] Collected:  03/17/20 1559    Lab Status:  Preliminary result Specimen:  Swab from Peritoneum Updated:  03/22/20 1815     Fungus Culture No fungus isolated at less than 1 week    AFB Culture - Swab, Peritoneum [[id number]] Collected:  03/17/20 1559    Lab Status:  Preliminary result Specimen:  Swab from Peritoneum Updated:  03/22/20 1815     AFB Culture No AFB isolated at less than 1 week     AFB Stain No acid fast bacilli seen on concentrated smear    CORONAVIRUS (COVID-19), REFERENCE LABORATORY - Swab, Nasopharynx [[id number]] Collected:  03/21/20 1316    Lab Status:  Final result Specimen:  Swab from Nasopharynx Updated:  03/22/20 1151     Reference Lab Report --     Comment: See scanned report          COVID19 Not Detected    Anaerobic Culture - Swab, Peritoneum [[id number]] Collected:  03/17/20 1559    Lab Status:  Final result Specimen:  Swab from Peritoneum Updated:  03/22/20 0726     Anaerobic Culture No anaerobes isolated at 5 days    S. Pneumo Ag Urine or CSF - Urine, Urine, Clean Catch [[id number]]  (Normal) Collected:  03/21/20 0618    Lab Status:  Final result Specimen:  Urine, Clean Catch Updated:  03/21/20 1338     Strep Pneumo Ag Negative    MRSA Screen, PCR - Swab, Nares [[id number]]  (Normal) Collected:  03/20/20 2359    Lab Status:  Final result Specimen:  Swab from Nares Updated:  03/21/20 0745     MRSA PCR Negative    Narrative:       MRSA Negative    Blood Culture - Blood, Arm, Right [[id number]] Collected:  03/16/20 0018    Lab Status:  Final result Specimen:  Blood from Arm, Right Updated:  03/21/20 0515      Blood Culture No growth at 5 days    Blood Culture - Blood, Arm, Left [[id number]] Collected:  03/16/20 0018    Lab Status:  Final result Specimen:  Blood from Arm, Left Updated:  03/21/20 0515     Blood Culture No growth at 5 days    Respiratory Panel, PCR - Swab, Nasopharynx [[id number]]  (Normal) Collected:  03/20/20 2703    Lab Status:  Final result Specimen:  Swab from Nasopharynx Updated:  03/21/20 0208     ADENOVIRUS, PCR Not Detected     Coronavirus 229E Not Detected     Coronavirus HKU1 Not Detected     Coronavirus NL63 Not Detected     Coronavirus OC43 Not Detected     Human Metapneumovirus Not Detected     Human Rhinovirus/Enterovirus Not Detected     Influenza B PCR Not Detected     Parainfluenza Virus 1 Not Detected     Parainfluenza Virus 2 Not Detected     Parainfluenza Virus 3 Not Detected     Parainfluenza Virus 4 Not Detected     Bordetella pertussis pcr Not Detected     Influenza A H1 2009 PCR Not Detected     Chlamydophila pneumoniae PCR Not Detected     Mycoplasma pneumo by PCR Not Detected     Influenza A PCR Not Detected     Influenza A H3 Not Detected     Influenza A H1 Not Detected     RSV, PCR Not Detected     Bordetella parapertussis PCR Not Detected    Narrative:       The coronavirus on the RVP is NOT COVID-19 and is NOT indicative of infection with COVID-19.     Wound Culture - Swab, Peritoneum [[id number]] Collected:  03/17/20 1559    Lab Status:  Final result Specimen:  Swab from Peritoneum Updated:  03/20/20 0805     Wound Culture Light growth (2+) Normal Fecal Syeda     Gram Stain Many (4+) Red blood cells      Moderate (3+) WBCs seen      No organisms seen          Imaging Results (Last 24 Hours)     Procedure Component Value Units Date/Time    CT Abdomen Pelvis With & Without Contrast [[id number]] Collected:  03/24/20 1022     Updated:  03/24/20 1028    Narrative:       EXAMINATION: CT ABDOMEN PELVIS W WO CONTRAST-      INDICATION: Sepsis; Z74.09-Other reduced mobility;  K65.9-Peritonitis,  unspecified; K57.92-Diverticulitis of intestine, part unspecified,  without perforation or abscess without bleeding; I10-Essential (primary)  hypertension; K57.20-Diverticulitis of large intestine with perforation  and abscess without bleeding      TECHNIQUE: CT abdomen and pelvis with and without intravenous contrast     The radiation dose reduction device was turned on for each scan per the  ALARA (As Low as Reasonably Achievable) protocol.     COMPARISON: CT dated 03/20/2020     FINDINGS: Lung bases demonstrate increase in opacifications within the  right midlung fairly groundglass appearance concerning for acute  airspace disease progression from prior with small right and trace to  small left pleural effusions. Liver without focal lesion however mild  diffuse low pedicle parenchymal attenuation of hepatic steatosis.  Gallbladder surgically absent. No biliary dilatation. Pancreas and  spleen unremarkable. Adrenals without distinct nodule. Kidneys without  hydronephrosis or hydroureter. No bulky retroperitoneal adenopathy.  Atherosclerotic nonaneurysmal patent abdominal. Portal veins and IVC  patent. GI tract evaluation demonstrates postsurgical change from left  lower quadrant and ostomy formation and compartment pouch appearance  with trace ascites noted throughout the abdomen and mesenteric edema of  the soft tissue drain the left lower quadrant extending to the right  pelvis without loculated fluid collection. Liver is of moderate  distention without focal thickening. Pessary in place. Fibroid uterus  with intramural fibroid creating mass effect on the left hemipelvis  however no bulky pelvic adenopathy. Diffuse body wall edema. No  aggressive osseous lesions.       Impression:       1. Increasing pulmonary opacifications in the partially visualized right  midlung from prior comparison concerning for worsening airspace disease  along with increasing small volume right and trace to small  volume left  pleural effusion from prior.  2. Trace abdominopelvic ascites and postsurgical changes with left lower  quadrant and ostomy formation and sigmoid colectomy with soft tissue  drain in place. No loculated fluid collection to suggest abscess or  intra-abdominal free air.  3. Diffuse body wall edema.          XR Chest 1 View [[id number]] Collected:  03/23/20 1407     Updated:  03/23/20 1444    Narrative:       EXAMINATION: XR CHEST 1 VW-      INDICATION: Concern for aspiration; Z74.09-Other reduced mobility;  K65.9-Peritonitis, unspecified; K57.92-Diverticulitis of intestine, part  unspecified, without perforation or abscess without bleeding;  I10-Essential (primary) hypertension; K57.20-Diverticulitis of large  intestine with perforation and abscess without bleeding.      COMPARISON: 03/20/2020.     FINDINGS: Portable chest reveals ill-defined opacification again seen  within the right lung base with small right pleural effusion. PICC line  catheter on the right with tip in the SVC. Mild increased markings are  also seen at the left lung base with small left pleural effusion. The  upper lung fields are clear.           Impression:       PICC line catheter in satisfactory position in the SVC with  ill-defined opacification lung bases bilaterally and small bilateral  pleural effusions. Lung volumes remain low.     D:  03/23/2020  E:  03/23/2020     This report was finalized on 3/23/2020 2:41 PM by Dr. Daria Malone MD.                  I have reviewed the medications:  Scheduled Meds:    acetaminophen 650 mg Oral Q8H   busPIRone 15 mg Oral Daily   calcitonin (salmon) 1 spray Alternating Nares Daily   Fat Emul Fish Oil/Plant Based 200 mL Intravenous Q24H (TPN)   gabapentin 300 mg Oral Nightly   heparin (porcine) 5,000 Units Subcutaneous Q8H   insulin regular 2-7 Units Subcutaneous Q6H   levothyroxine 88 mcg Oral Q AM   loratadine 10 mg Oral Daily   metoprolol succinate  mg Oral Daily   Mirabegron  ER 50 mg Oral Daily   piperacillin-tazobactam 4.5 g Intravenous Q8H   sodium chloride 500 mL Intravenous Once   sodium chloride 10 mL Intravenous Q12H   sucralfate 1 g Oral BID AC     Continuous Infusions:    Adult Central 2-in-1 TPN  Last Rate: 70 mL/hr at 03/23/20 1757   Pharmacy to Dose TPN       PRN Meds:.diazePAM  •  HYDROcodone-acetaminophen  •  HYDROmorphone **AND** naloxone  •  LORazepam  •  magnesium sulfate **OR** magnesium sulfate **OR** magnesium sulfate  •  Morphine **OR** Morphine  •  ondansetron  •  Pharmacy to Dose TPN  •  potassium chloride  •  potassium phosphate infusion greater than 15 mMoles **OR** potassium phosphate infusion greater than 15 mMoles **OR** potassium phosphate infusion 15 mMol or less **OR** sodium phosphate IVPB **OR** sodium phosphate IVPB **OR** sodium phosphate IVPB  •  sodium chloride  •  sodium chloride  •  sodium chloride  •  sodium chloride    Assessment/Plan   Assessment & Plan     Active Hospital Problems    Diagnosis  POA   • **Perforation of sigmoid colon due to diverticulitis [K57.20]  Unknown   • Acute diverticulitis [K57.92]  Yes   • Sepsis (CMS/HCC) [A41.9]  Yes   • Lactic acidosis [E87.2]  Unknown   • Neutrophilic leukocytosis [D72.9]  Unknown   • Acute kidney injury (CMS/HCC) [N17.9]  Unknown   • Acute hyperglycemia [R73.9]  Unknown   • Arthritis [M19.90]  Unknown   • Overactive bladder [N32.81]  Unknown   • Dyspepsia [R10.13]  Unknown   • Essential hypertension [I10]  Unknown   • Acquired hypothyroidism [E03.9]  Yes      Resolved Hospital Problems   No resolved problems to display.        Brief Hospital Course to date:  Neema Richard is a 79 y.o. female  admitted for diverticulitis S microperforation and sepsis.  She underwent repeat CT imaging concerning for free air.  She went to surgery 3/17 for transverse colostomy.  Patient had CT abd/pel 3/20 with noted ground glass opacities that could not rule out Covid -19. Patient was started on vanc and doxy in  addition to invanz and transferred to  3/21. Covid - 19 testing sent to [facility] and negative. Patient now transitioned to regular telemetry bed, currently being treated with IV abx for PNA as well as diverticulitis. Post-op course being assisted in management by CTS. Some concerns of worsening distension/leukocytosis- CT A&P done 3/24 with notes of worsening airspace dz with no evidence of intraabdominal abscess.    This patient's problems and plans were partially entered by my partner and updated as appropriate by me 03/24/20.          Ground glass opacities  Hypoxia   Worsening airspace dz  Leukocytosis , improving   Sepsis resolved  -  resp PCR negative, strep pneumo negative   - Covid 19 - [facility] negative   - Continue invanz , abx management per ID   - Slightly worsening leukocytosis with increased dyspnea 3/23- CXR repeated with worsening airspace dz, CT A&P also notes worsening airspace dz      Diverticulitis with free air on CT s/p surgical repair   S/p drainage of lower abdominal abcess, creation of distal transverse colostomy  Continue Invanz, ID is assisting  - noted repeat CT A&P 3/24 with no evidence of abscess  - Continue TPN and GI soft diet     Encephalopathy  Confusion  - multifactorial given worsening infectious process with PNA as well as some sundowning  - better when family present, daughter has been cleared for visitor privileges  - continue conservative reorientation, anti-psychotics if needed, did require haldol 3/23 evening     HTN  Controlled continue current medicines     Hypothyroid  Continue levothyroxine     Arthritis  Continue gabapentin  Will need HHC at discharge     Overactive bladder  Dyspepsia      DVT Prophylaxis:  heparin     Disposition: I expect the patient to be discharged once improved from a respiratory standpoint, cleared by surgery, final ID plan in place.             CODE STATUS:   Code Status and Medical Interventions:   Ordered at: 03/17/20 1903     Level Of  Support Discussed With:    Patient     Code Status:    CPR     Medical Interventions (Level of Support Prior to Arrest):    Full         Electronically signed by Viv Siddiqi MD, 03/24/20, 11:08.

## 2020-03-24 NOTE — PLAN OF CARE
Problem: Patient Care Overview  Goal: Plan of Care Review  Outcome: Ongoing (interventions implemented as appropriate)  Flowsheets (Taken 3/24/2020 1045)  Progress: no change  Plan of Care Reviewed With: patient; other (see comments) (Margaret RN)  Note:   WOC nurse f/u for colostomy. Appliance intact with no leakage; stoma red and moist; small amount creamy brown stool in pouch. Pt remains moderately confused, but cooperative. Daughter in room. Colostomy education reviewed with daughter. WOC nurse will f/u. Please contact WOC nurse as needed for concerns.

## 2020-03-24 NOTE — PROGRESS NOTES
"INFECTIOUS DISEASE PROGRESS NOTE       Neema Richard  1940  [id number]      Admission Date: 3/15/2020      Requesting Provider: Filomena Stuart DO  Evaluating Physician: Darshan Bejarano MD    Reason for Consultation: Acute sigmoid diverticulitis, with microperforation     History of present illness:  3/16/20:    3/16/2020: Patient is a 79 y.o. female, seen today for acute sigmoid diverticulitis with microperforation.  She has had several episodes of diverticulitis in the past, the last being in December 2019, treated with an oral antibiotic by her PCP.   Her symptoms minimally improved. She began experiencing diarrhea, with low grade fever, and yesterday, developed worsening abdominal pain, prompting her presentation to the ED.  An abdominal Ct with extensive diverticulitis involving the descending colon and sigmoid colon with stranding and wall thickening, with extraluminal air suggesting a microperforation.   Admitting labs with a leukocytosis of 15, 000, lactic acidosis, and she has been afebrile. She is currently on Merrem and we were consulted for evaluation and treatment.   3/17/20:  She feels better but still with abdominal tenderness.  Her daughter is coming to from [place] and she wants her tested for Covid-19.  She remains afebrile.  I saw her earlier this morning but she has now deteriorated.  She now has free air on her CT scan and is currently in the OR.  3/18/20:  She is feeling \"some better this am\".  She denies any nausea. She complains of hiccups. She remains afebrile.    3/19/20: She accidentally pulled NGT out yesterday. She is having some nausea, no vomiting.   She complains of not being able to get any rest.    3/20/2020: She had increased abdominal discomfort earlier today but it is decreased today.  She and her daughter indicate that her confusion has improved and her narcotic doses have been reduced.  She remains afebrile.  3/21/2020: She was moved to airborne precautions/contact " precautions due to her chest CT findings.  She has been afebrile over the day.  She has now been started on TPN.  The RN staff indicates that she has been stable over the day.  She has denied dyspnea. A COVID19 PCR is currently pending at  and should be back tomorrow.  3/22/2020: Her COVID19 PCR was negative.  She has decreased dyspnea.  She denies nausea and vomiting.  She has some abdominal distention but denies severe abdominal pain.  3/23/20:  Her confusion is improving per her daughter. She still complains of abdominal soreness.  No shortness of breath or increased cough.  She does have some increased drainage on her wound dressing.  She denies severe abdominal pain.  3/24/20:  She became more confused and combative last pm.  She is now restrained.  Just given Ativan per nursing. CT scheduled for today.       Past Medical History:   Diagnosis Date   • Acquired hypothyroidism 1980   • Colon polyp - sees Dr. 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 ER and MD (+)   • Essential hypertension 2012   • High cholesterol 2019   • Hx of herpes genitalis 2013   • IBS-d (takes Buspar)        Past Surgical History:   Procedure Laterality Date   • BILATERAL BREAST REDUCTION Bilateral 1995   • BREAST LUMPECTOMY Left 2009   • CATARACT EXTRACTION, BILATERAL Bilateral 2018   • CERVICAL CONIZATION  1983   • COLON RESECTION N/A 3/17/2020    Procedure: LOW ANTERIOR COLON RESECTION, MOBILIZATION OF SPLENIC FLEXURE, DRAINAGE OF ABSCESS AND COLOSTOMY CREATION;  Surgeon: Sravan Vazquez MD;  Location: [facility];  Service: General;  Laterality: N/A;   • FACIAL COSMETIC SURGERY  2001   • FACIAL COSMETIC SURGERY  2012   • HEMORRHOIDECTOMY  1974   • LAPAROSCOPIC CHOLECYSTECTOMY  2010   • REDUCTION MAMMAPLASTY Bilateral 04/13/2017    DCIS   • SINUS SURGERY  1992   • THYROIDECTOMY, PARTIAL  1963   • TONSILLECTOMY  1953   • VARICOSE VEIN SURGERY Bilateral 1981       Family History    Problem Relation Age of Onset   • Breast cancer Neg Hx    • Ovarian cancer Neg Hx        Social History     Socioeconomic History   • Marital status:      Spouse name: Not on file   • Number of children: Not on file   • Years of education: Not on file   • Highest education level: Not on file   Tobacco Use   • Smoking status: Never Smoker   Substance and Sexual Activity   • Alcohol use: No   • Drug use: No       Allergies   Allergen Reactions   • Evista [Raloxifene] Rash   • Cephalexin Diarrhea   • Hydrochlorothiazide Diarrhea   • Meloxicam Other (See Comments)   • Metronidazole Diarrhea   • Oxybutynin Cough   • Propylthiouracil Other (See Comments)     Severally reduce WBC's    • Sulfa Antibiotics Hives   • Sulfacetamide Sodium-Sulfur Hives   • Sulfur Dioxide Hives         Medication:    Current Facility-Administered Medications:   •  acetaminophen (TYLENOL) tablet 650 mg, 650 mg, Oral, Q8H, Sravan Vazquez MD, 650 mg at 03/23/20 2034  •  Adult Central 2-in-1 TPN, , Intravenous, Continuous, Sravan Vazquez MD, Last Rate: 70 mL/hr at 03/24/20 1826  •  busPIRone (BUSPAR) tablet 15 mg, 15 mg, Oral, Daily, Sravan Vazquez MD, 15 mg at 03/23/20 2033  •  calcitonin (salmon) (MIACALCIN) nasal spray 1 spray, 1 spray, Alternating Nares, Daily, Sravan Vazquez MD, 1 spray at 03/24/20 1109  •  diazePAM (VALIUM) tablet 5 mg, 5 mg, Oral, Q6H PRN, Sravan Vazquez MD  •  Fat Emul Fish Oil/Plant Based (SMOFLIPID) 20 % emulsion 200 mL, 200 mL, Intravenous, Q24H (TPN), Eveline Rosas [facility], Last Rate: 16.67 mL/hr at 03/24/20 1826, 200 mL at 03/24/20 1826  •  gabapentin (NEURONTIN) capsule 300 mg, 300 mg, Oral, Nightly, Minerva Steele APRN, 300 mg at 03/23/20 2034  •  heparin (porcine) 5000 UNIT/ML injection 5,000 Units, 5,000 Units, Subcutaneous, Q8H, Sravan Vazquez MD, 5,000 Units at 03/24/20 1250  •  HYDROcodone-acetaminophen (NORCO) 5-325 MG per tablet 1 tablet, 1 tablet, Oral, Q4H PRN, Sravan Vazquez MD, 1  tablet at 03/20/20 0411  •  HYDROmorphone (DILAUDID) injection 0.5 mg, 0.5 mg, Intravenous, Q2H PRN **AND** naloxone (NARCAN) injection 0.4 mg, 0.4 mg, Intravenous, Q5 Min PRN, Sravan Vazquez MD  •  insulin regular (humuLIN R,novoLIN R) injection 2-7 Units, 2-7 Units, Subcutaneous, Q6H, Eveline Rosas, [facility], 2 Units at 03/24/20 1826  •  levothyroxine (SYNTHROID, LEVOTHROID) tablet 88 mcg, 88 mcg, Oral, Q AM, Sravan Vazquez MD, 88 mcg at 03/23/20 0638  •  loratadine (CLARITIN) tablet 10 mg -- PATIENT SUPPLIED MEDICATION, 10 mg, Oral, Daily, Viv Siddiqi MD, 10 mg at 03/24/20 1109  •  LORazepam (ATIVAN) tablet 0.5 mg, 0.5 mg, Oral, Q8H PRN, Sravan Vazquez MD, 0.5 mg at 03/23/20 1827  •  Magnesium Sulfate 2 gram Bolus, followed by 8 gram infusion (total Mg dose 10 grams)- Mg less than or equal to 1mg/dL, 2 g, Intravenous, PRN **OR** Magnesium Sulfate 2 gram / 50mL Infusion (GIVE X 3 BAGS TO EQUAL 6GM TOTAL DOSE) - Mg 1.1 - 1.5 mg/dl, 2 g, Intravenous, PRN **OR** Magnesium Sulfate 4 gram infusion- Mg 1.6-1.9 mg/dL, 4 g, Intravenous, PRN, Jennifer Laurent, , Last Rate: 25 mL/hr at 03/21/20 1652, 4 g at 03/21/20 1652  •  metoprolol succinate XL (TOPROL-XL) 24 hr tablet 100 mg, 100 mg, Oral, Daily, Sravan Vazquez MD, 100 mg at 03/24/20 1108  •  Mirabegron ER (MYRBETRIQ) 24 hr tablet 50 mg- patient supplied med, 50 mg, Oral, Daily, Sravan Vazquez MD, 50 mg at 03/23/20 2126  •  morphine injection 2 mg, 2 mg, Intravenous, Q2H PRN, 2 mg at 03/17/20 0817 **OR** Morphine sulfate (PF) injection 4 mg, 4 mg, Intravenous, Q2H PRN, Sravan Vazquez MD, 4 mg at 03/16/20 1704  •  ondansetron (ZOFRAN) injection 4 mg, 4 mg, Intravenous, Q6H PRN, Sravan Vazquez MD, 4 mg at 03/23/20 0258  •  Pharmacy to Dose TPN, , Does not apply, Continuous PRN, Sravan Vazquez MD  •  piperacillin-tazobactam (ZOSYN) 4.5 g in iso-osmotic dextrose 100 mL IVPB (premix), 4.5 g, Intravenous, Q8H, Darshan Bejarano MD, 4.5 g at 03/24/20 1249  •   potassium chloride 20 mEq in 50 mL IVPB, 20 mEq, Intravenous, Q1H PRN, Sravan Vazquez MD  •  potassium phosphate 45 mmol in sodium chloride 0.9 % 250 mL infusion, 45 mmol, Intravenous, PRN **OR** potassium phosphate 30 mmol in sodium chloride 0.9 % 100 mL infusion, 30 mmol, Intravenous, PRN **OR** potassium phosphate 15 mmol in sodium chloride 0.9 % 100 mL infusion, 15 mmol, Intravenous, PRN **OR** sodium phosphates 45 mmol in sodium chloride 0.9 % 250 mL IVPB, 45 mmol, Intravenous, PRN **OR** sodium phosphates 30 mmol in sodium chloride 0.9 % 100 mL IVPB, 30 mmol, Intravenous, PRN **OR** sodium phosphates 15 mmol in sodium chloride 0.9 % 100 mL IVPB, 15 mmol, Intravenous, PRNGeorge Bruce M, MD  •  sodium chloride 0.45 % bolus 500 mL, 500 mL, Intravenous, Once, Sravan Vazquez MD  •  sodium chloride 0.9 % bolus 1,836 mL, 30 mL/kg, Intravenous, PRNGeorge Bruce M, MD  •  sodium chloride 0.9 % flush 10 mL, 10 mL, Intravenous, PRNGeorge Bruce M, MD  •  sodium chloride 0.9 % flush 10 mL, 10 mL, Intravenous, PRGeorge HOPKINS Bruce M, MD  •  sodium chloride 0.9 % flush 10 mL, 10 mL, Intravenous, Q12H, Sravan Vazquez MD, 10 mL at 03/23/20 2100  •  sodium chloride 0.9 % flush 10 mL, 10 mL, Intravenous, PRGeorge HOPKINS Bruce M, MD  •  sucralfate (CARAFATE) tablet 1 g, 1 g, Oral, BID AC, Sravan Vazquez MD, 1 g at 03/23/20 1758    Antibiotics:  Anti-Infectives (From admission, onward)    Ordered     Dose/Rate Route Frequency Start Stop    03/23/20 2107  piperacillin-tazobactam (ZOSYN) 4.5 g in iso-osmotic dextrose 100 mL IVPB (premix)  Review   Ordering Provider:  Darshan Bejarano MD    4.5 g  over 4 Hours Intravenous Every 8 Hours 03/24/20 0400 04/03/20 0359    03/23/20 2107  piperacillin-tazobactam (ZOSYN) 4.5 g in iso-osmotic dextrose 100 mL IVPB (premix)     Ordering Provider:  Darshan Bejarano MD    4.5 g  over 30 Minutes Intravenous Once 03/23/20 2200 03/23/20 2206 03/20/20 2252  vancomycin 1250 mg/250 mL 0.9% NS  IVPB ([facility])     Ordering Provider:  Alan Raygoza, [facility]    20 mg/kg × 61.2 kg  over 90 Minutes Intravenous Once 20 2345 20 0341    20 0811  ertapenem (INVanz) 1 g/100 mL 0.9% NS VTB (mbp)     Eveline Rosas [facility] reviewed the order on 20 1145.   Ordering Provider:  Darshan Bejarano MD    1 g  over 30 Minutes Intravenous Every 24 Hours 20 0900 20 1023    03/15/20 1954  meropenem (MERREM) 1 g/100 mL 0.9% NS VTB (mbp)     Ordering Provider:  Darshan Steward MD    1 g  over 30 Minutes Intravenous Once 03/15/20 1955 03/15/20 2122                  Physical Exam:   Vital Signs  Temp (24hrs), Av.5 °F (36.9 °C), Min:97.8 °F (36.6 °C), Max:99.5 °F (37.5 °C)    Temp  Min: 97.8 °F (36.6 °C)  Max: 99.5 °F (37.5 °C)  BP  Min: 122/62  Max: 152/65  Pulse  Min: 67  Max: 96  Resp  Min: 18  Max: 24  SpO2  Min: 90 %  Max: 92 %    General: She is alert and in no acute distress.  She appears less ill.  Skin: She has no diffuse rash.  HEENT: She has no labial ulcers.  Lungs: She has minimal scattered rhonchi.  Cardiovascular: She has no murmur  Abdomen: She has mild abdominal distention. Midline incision with dressing in place.  I remove the dressing today and this revealed some slightly greenish slimy drainage on the dressing.  There is no surrounding erythema.  There is a small area of very slight wound dehiscence.  Ostomy   Neurologic: She is alert and oriented x3.  Motor exam reveals 5/5 strength bilaterally  PICC right without redness     Laboratory Data    Results from last 7 days   Lab Units 20  0420 20  0658 20  0639   WBC 10*3/mm3 16.93* 22.11* 18.96*   HEMOGLOBIN g/dL 8.0* 9.4* 10.1*   HEMATOCRIT % 25.3* 29.1* 31.5*   PLATELETS 10*3/mm3 475* 503* 500*     Results from last 7 days   Lab Units 20  0420   SODIUM mmol/L 141   POTASSIUM mmol/L 3.7   CHLORIDE mmol/L 107   CO2 mmol/L 23.0   BUN mg/dL 19   CREATININE mg/dL 0.47*   GLUCOSE mg/dL 163*   CALCIUM mg/dL 7.9*      Results from last 7 days   Lab Units 03/23/20  1306   ALK PHOS U/L 60   BILIRUBIN mg/dL <0.2*   ALT (SGPT) U/L 6   AST (SGOT) U/L 13         Results from last 7 days   Lab Units 03/23/20  1306   CRP mg/dL 13.32*     Results from last 7 days   Lab Units 03/20/20  1911   LACTATE mmol/L 1.2             Estimated Creatinine Clearance: 57.5 mL/min (A) (by C-G formula based on SCr of 0.47 mg/dL (L)).  Lactate 2.9     Microbiology:    3/16: BC no growth    3/17:  Peritoneum gms moderate WBCs, nos- Gram negative bacilli - normal fecal edgar   3/23: wound gms;  No WBC, nos   Radiology:  Imaging Results (Last 72 Hours)     Procedure Component Value Units Date/Time    CT Abdomen Pelvis With & Without Contrast [[id number]] Collected:  03/24/20 1022     Updated:  03/24/20 1656    Narrative:       EXAMINATION: CT ABDOMEN AND PELVIS W WO CONTRAST-      INDICATION: Sepsis; Z74.09-Other reduced mobility; K65.9-Peritonitis,  unspecified; K57.92-Diverticulitis of intestine, part unspecified,  without perforation or abscess without bleeding; I10-Essential (primary)  hypertension; K57.20-Diverticulitis of large intestine with perforation  and abscess without bleeding.      TECHNIQUE: CT abdomen and pelvis with and without intravenous contrast.     The radiation dose reduction device was turned on for each scan per the  ALARA (As Low as Reasonably Achievable) protocol.     COMPARISON: CT dated 03/20/2020.     FINDINGS: Lung bases demonstrate increase in opacifications within the  right midlung fairly groundglass appearance concerning for acute  airspace disease progression from prior with small right and trace to  small left pleural effusions. Liver without focal lesion, however, mild  diffuse hepatic parenchymal attenuation of hepatic steatosis.  Gallbladder is surgically absent. No biliary dilatation. Pancreas and  spleen unremarkable. Adrenals without distinct nodule. Kidneys without  hydronephrosis or hydroureter. No bulky  retroperitoneal adenopathy.  Atherosclerotic nonaneurysmal patent abdominal aorta. Portal veins and  IVC patent. GI tract evaluation demonstrates postsurgical change from  left lower quadrant in ostomy formation and compartment pouch appearance  with trace ascites noted throughout the abdomen and mesenteric edema  along with the soft tissue drain to the left lower quadrant extending to  the right pelvis without loculated fluid collection. Liver is of  moderate distention without focal thickening. Pessary in place. Fibroid  uterus with intramural fibroid creating mass effect on the left  hemipelvis, however no bulky pelvic adenopathy. Diffuse body wall edema.  No aggressive osseous lesions.       Impression:       1. Increasing pulmonary opacifications in the partially visualized right  midlung from prior comparison concerning for worsening airspace disease  along with increasing small volume right and trace to small volume left  pleural effusion from prior.  2. Trace abdominopelvic ascites and postsurgical changes with left lower  quadrant and ostomy formation and sigmoid colectomy with soft tissue  drain in place. No loculated fluid collection to suggest abscess or  intraabdominal free air.  3. Diffuse body wall edema.     D:  03/24/2020  E:  03/24/2020     This report was finalized on 3/24/2020 4:53 PM by Dr. Arturo Joseph.       XR Chest 1 View [[id number]] Collected:  03/23/20 1407     Updated:  03/23/20 1444    Narrative:       EXAMINATION: XR CHEST 1 VW-      INDICATION: Concern for aspiration; Z74.09-Other reduced mobility;  K65.9-Peritonitis, unspecified; K57.92-Diverticulitis of intestine, part  unspecified, without perforation or abscess without bleeding;  I10-Essential (primary) hypertension; K57.20-Diverticulitis of large  intestine with perforation and abscess without bleeding.      COMPARISON: 03/20/2020.     FINDINGS: Portable chest reveals ill-defined opacification again seen  within the right lung  base with small right pleural effusion. PICC line  catheter on the right with tip in the SVC. Mild increased markings are  also seen at the left lung base with small left pleural effusion. The  upper lung fields are clear.           Impression:       PICC line catheter in satisfactory position in the SVC with  ill-defined opacification lung bases bilaterally and small bilateral  pleural effusions. Lung volumes remain low.     D:  03/23/2020  E:  03/23/2020     This report was finalized on 3/23/2020 2:41 PM by Dr. Daria Malone MD.           CT scan from 3/20:  Impression:     Partially visualized lung bases demonstrate development of  groundglass opacifications in the right middle lobe inferiorly scattered  with concern for atypical etiology or aspiration, however COVID-19 not  excluded given interval development and overall appearance on partial  imaging of the lungs. Trace to small bilateral pleural effusions with  increase in size from recent 03/17/2020 comparison. Postsurgical changes  of the abdomen without intra-abdominal free air demonstrating early  postoperative appearance of trace ascites with soft tissue drain in  place and mesenteric edema however no loculated fluid collection with  left lower quadrant ostomy formation and ventral abdominal wall  postsurgical changes. Diffuse body wall edema.       CXR  Impression:     PICC line catheter in satisfactory position in the SVC with  ill-defined opacification lung bases bilaterally and small bilateral  pleural effusions. Lung volumes remain low.     D:  03/23/2020  E:  03/23/2020              Impression:   1.  Acute sigmoid diverticulitis- s/p resection 3/17  2.  Severe sepsis  3.  Leukocytosis/neutrophilia-I suspect her worsening leukocytosis is due to residual intra-abdominal infection- CT ordered for today.  Her leukocytosis is partially improved today.  4.  Pelvic abscess  5.  Encephalopathy-this is most likely secondary to narcotics   6.  Pulmonary  infiltrate/atelectasis- she clinically appears stable.      PLAN/RECOMMENDATIONS:   1  Abdominal wound Gram stain and culture in the area of slight wound dehiscence- pending   2.  Continue intravenous Zosyn  3.  CT abdomen, pending  4.  CDiff PCR pending     Dr. Bejarano has obtained the history, performed the physical exam and formulated the above treatment plan.       Darshan Bejarano MD  3/24/2020  18:36

## 2020-03-24 NOTE — PROGRESS NOTES
Pharmacy Parenteral Nutrition Evaluation  Neema Richard is a  79 y.o. female receiving TPN.     Indication: ileus associated with diffuse peritonitis  Consulting Physician: George    Labs  Results from last 7 days   Lab Units 03/24/20  0420 03/23/20  1306 03/23/20  0658 03/22/20  0639   SODIUM mmol/L 141 137 130* 137   POTASSIUM mmol/L 3.7 4.0 3.9 3.6   CHLORIDE mmol/L 107 104 98 103   CO2 mmol/L 23.0 24.0 23.0 25.0   BUN mg/dL 19 17 17 16   CREATININE mg/dL 0.47* 0.37* 0.32* 0.44*   CALCIUM mg/dL 7.9* 8.2* 7.9* 8.0*   BILIRUBIN mg/dL  --  <0.2* <0.2* 0.2   ALK PHOS U/L  --  60 58 61   ALT (SGPT) U/L  --  6 7 6   AST (SGOT) U/L  --  13 13 12   GLUCOSE mg/dL 163* 170* 173* 152*   Ionized Calcium: 1.25 (3/23)    Results from last 7 days   Lab Units 03/24/20  0420 03/23/20  1306 03/23/20  0658 03/22/20  0639 03/21/20  1525   MAGNESIUM mg/dL 1.8  --  1.9 2.1 1.6   PHOSPHORUS mg/dL 3.0  --  3.1 2.7 3.4   PREALBUMIN mg/dL  --  5.9*  --  4.1* 3.7*     Results from last 7 days   Lab Units 03/24/20  0420 03/23/20  0658 03/22/20  0639   WBC 10*3/mm3 16.93* 22.11* 18.96*   HEMOGLOBIN g/dL 8.0* 9.4* 10.1*   HEMATOCRIT % 25.3* 29.1* 31.5*   PLATELETS 10*3/mm3 475* 503* 500*     Triglycerides   Date Value Ref Range Status   03/21/2020 172 (H) 0 - 150 mg/dL Final     Comment:     Falsely depressed results may occur on samples drawn from patients receiving N-Acetylcysteine (NAC) or Metamizole.       estimated creatinine clearance is 57.5 mL/min (A) (by C-G formula based on SCr of 0.47 mg/dL (L)).    Intake & Output (last 3 days)         03/21 0701 - 03/22 0700 03/22 0701 - 03/23 0700 03/23 0701 - 03/24 0700 03/24 0701 - 03/25 0700    P.O.   600 120    I.V. (mL/kg)        IV Piggyback  100 200     TPN  2300 755     Total Intake(mL/kg)  2400 (37.7) 1555 (24.7) 120 (1.9)    Urine (mL/kg/hr) 800 (0.5) 300 (0.2) 0 (0)     Drains 15 16      Stool 350 100 100     Total Output 1165 416 100     Net -1165 +1984 +1455 +120            Urine  Unmeasured Occurrence 3 x 12 x 7 x 1 x    Stool Unmeasured Occurrence  1 x            Dietitian Recommendations  Diet Orders (active) (From admission, onward)       Start     Ordered    03/21/20 0001  NPO Diet  Diet Effective Midnight      03/20/20 1632    03/20/20 1414  Dietary Nutrition Supplements Boost Plus; vanilla  Daily With Breakfast, Lunch & Dinner     Comments:  Boost Plus (vanilla) 3x daily    03/20/20 1413    03/17/20 2200  Snack: Chewing gum x30 minutes three times daily to increase saliva flow and provide gas pain relief. Do not give to ileostomy patients.  3 Times Daily     Comments:  Chewing gum x30 minutes three times daily to increase saliva flow and provide gas pain relief.      Do not give to ileostomy patients.    03/17/20 1903                  Current TPN Regimen Recommendation:  Dextrose 14.5% / Amino Acid 5% at goal rate of 70 mL/hr.  20% Lipid Emulsion 200mL every 24 hours.    Assessment/Plan  Pharmacy to dose TPN for ileus associated with diffuse peritonitis.  Macronutrient recommendations per RD - 14.5%D, 5%AA at goal rate of 70mL/hr. Standard electrolytes ordered.  Low dose SSI ordered per protocol. Patient is diabetic, A1C of 6.00%.  Will replace electrolytes exogenously if needed - magnesium, potassium, and phosphorus replacement protocols ordered. All are WNL today.  Pharmacy will continue to follow and adjust dose based on renal function, electrolyte levels, and clinical status.    Neema Peoples [facility]  3/24/2020  11:27

## 2020-03-24 NOTE — CONSULTS
Patient has single lumen PICC in place and has been maintaining working IV. If new PICC is needed in the future please re consult the [facility] PICC nurses. Thank you.

## 2020-03-24 NOTE — PLAN OF CARE
Problem: Pain, Chronic (Adult)  Goal: Acceptable Pain/Comfort Level and Functional Ability  Outcome: Ongoing (interventions implemented as appropriate)     Problem: Patient Care Overview  Goal: Plan of Care Review  Outcome: Ongoing (interventions implemented as appropriate)  Flowsheets  Taken 3/24/2020 8789  Progress: no change  Outcome Summary: Pt continuously confused throughout the night. Escalated to being combative, and risking harming herself and staff members. Pt was put into 4 point restraints. Continuing to monitor at this time. VSS. Pt continues to be agitated.  Taken 3/24/2020 0326  Plan of Care Reviewed With: patient     Problem: Patient Care Overview  Goal: Individualization and Mutuality  Outcome: Ongoing (interventions implemented as appropriate)     Problem: Patient Care Overview  Goal: Discharge Needs Assessment  Outcome: Ongoing (interventions implemented as appropriate)     Problem: Patient Care Overview  Goal: Interprofessional Rounds/Family Conf  Outcome: Ongoing (interventions implemented as appropriate)     Problem: Fall Risk (Adult)  Goal: Identify Related Risk Factors and Signs and Symptoms  Outcome: Ongoing (interventions implemented as appropriate)     Problem: Fall Risk (Adult)  Goal: Absence of Fall  Outcome: Ongoing (interventions implemented as appropriate)     Problem: Infection, Risk/Actual (Adult)  Goal: Identify Related Risk Factors and Signs and Symptoms  Outcome: Ongoing (interventions implemented as appropriate)     Problem: Infection, Risk/Actual (Adult)  Goal: Infection Prevention/Resolution  Outcome: Ongoing (interventions implemented as appropriate)     Problem: Restraint, Nonbehavioral (Nonviolent)  Goal: Rationale and Justification  Outcome: Ongoing (interventions implemented as appropriate)     Problem: Restraint, Nonbehavioral (Nonviolent)  Goal: Nonbehavioral (Nonviolent) Restraint: Absence of Injury/Harm  Outcome: Ongoing (interventions implemented as appropriate)      Problem: Restraint, Nonbehavioral (Nonviolent)  Goal: Nonbehavioral (Nonviolent) Restraint: Achievement of Discontinuation Criteria  Outcome: Ongoing (interventions implemented as appropriate)     Problem: Restraint, Nonbehavioral (Nonviolent)  Goal: Nonbehavioral (Nonviolent) Restraint: Preservation of Dignity and Wellbeing  Outcome: Ongoing (interventions implemented as appropriate)

## 2020-03-24 NOTE — SIGNIFICANT NOTE
Petros Becker called d/t pt attempting to hit and kick staff. Pt is confused and agitated. All attempts at reorientation and de-escalation failed. Pt continued to exhibit aggressive behavior and pulled at RADHA tube. Pt placed in restraints and was safely medicated per orders.     Parveen Mccracken RN/[facility]

## 2020-03-24 NOTE — PROGRESS NOTES
Continued Stay Note  [facility]     Patient Name: Neema Richard  MRN: [id number]  Today's Date: 3/24/2020    Admit Date: 3/15/2020    Discharge Plan     Row Name 03/24/20 1352       Plan    Plan  discharge plan    Patient/Family in Agreement with Plan  yes    Plan Comments  Plan is home with , waiting on [facility]'s final recommendations. CM will cont to follow        Discharge Codes    No documentation.             Pippa Ovalle RN

## 2020-03-24 NOTE — PLAN OF CARE
Removed from restraints 0800, minimal complaints of pain, confusion not improved, daughter ok to stay with pt per Dr ramirez/Nevaeh to help with agitation and confusion, no deepa /stoma output,abdomen distended,Pt rested well and less agitated once daughter arrived, will cont to monitor

## 2020-03-25 LAB
ALBUMIN SERPL-MCNC: 2.1 G/DL (ref 3.5–5.2)
ALBUMIN/GLOB SERPL: 0.7 G/DL
ALP SERPL-CCNC: 70 U/L (ref 39–117)
ALT SERPL W P-5'-P-CCNC: 10 U/L (ref 1–33)
ANION GAP SERPL CALCULATED.3IONS-SCNC: 13 MMOL/L (ref 5–15)
AST SERPL-CCNC: 16 U/L (ref 1–32)
BASOPHILS # BLD AUTO: 0.17 10*3/MM3 (ref 0–0.2)
BASOPHILS NFR BLD AUTO: 0.7 % (ref 0–1.5)
BILIRUB SERPL-MCNC: 0.2 MG/DL (ref 0.2–1.2)
BUN BLD-MCNC: 17 MG/DL (ref 8–23)
BUN/CREAT SERPL: 41.5 (ref 7–25)
CALCIUM SPEC-SCNC: 7.6 MG/DL (ref 8.6–10.5)
CHLORIDE SERPL-SCNC: 106 MMOL/L (ref 98–107)
CO2 SERPL-SCNC: 22 MMOL/L (ref 22–29)
CREAT BLD-MCNC: 0.41 MG/DL (ref 0.57–1)
DEPRECATED RDW RBC AUTO: 48.2 FL (ref 37–54)
EOSINOPHIL # BLD AUTO: 1.4 10*3/MM3 (ref 0–0.4)
EOSINOPHIL NFR BLD AUTO: 5.9 % (ref 0.3–6.2)
ERYTHROCYTE [DISTWIDTH] IN BLOOD BY AUTOMATED COUNT: 15.1 % (ref 12.3–15.4)
GFR SERPL CREATININE-BSD FRML MDRD: 150 ML/MIN/1.73
GLOBULIN UR ELPH-MCNC: 3 GM/DL
GLUCOSE BLD-MCNC: 147 MG/DL (ref 65–99)
GLUCOSE BLDC GLUCOMTR-MCNC: 122 MG/DL (ref 70–130)
GLUCOSE BLDC GLUCOMTR-MCNC: 156 MG/DL (ref 70–130)
GLUCOSE BLDC GLUCOMTR-MCNC: 159 MG/DL (ref 70–130)
HCT VFR BLD AUTO: 28.1 % (ref 34–46.6)
HGB BLD-MCNC: 8.9 G/DL (ref 12–15.9)
IMM GRANULOCYTES # BLD AUTO: 1.43 10*3/MM3 (ref 0–0.05)
IMM GRANULOCYTES NFR BLD AUTO: 6 % (ref 0–0.5)
LYMPHOCYTES # BLD AUTO: 2.27 10*3/MM3 (ref 0.7–3.1)
LYMPHOCYTES NFR BLD AUTO: 9.6 % (ref 19.6–45.3)
MAGNESIUM SERPL-MCNC: 2.4 MG/DL (ref 1.6–2.4)
MCH RBC QN AUTO: 28.3 PG (ref 26.6–33)
MCHC RBC AUTO-ENTMCNC: 31.7 G/DL (ref 31.5–35.7)
MCV RBC AUTO: 89.5 FL (ref 79–97)
MONOCYTES # BLD AUTO: 1.63 10*3/MM3 (ref 0.1–0.9)
MONOCYTES NFR BLD AUTO: 6.9 % (ref 5–12)
NEUTROPHILS # BLD AUTO: 16.77 10*3/MM3 (ref 1.7–7)
NEUTROPHILS NFR BLD AUTO: 70.9 % (ref 42.7–76)
NRBC BLD AUTO-RTO: 0 /100 WBC (ref 0–0.2)
PLATELET # BLD AUTO: 556 10*3/MM3 (ref 140–450)
PMV BLD AUTO: 10.2 FL (ref 6–12)
POTASSIUM BLD-SCNC: 4.2 MMOL/L (ref 3.5–5.2)
PROCALCITONIN SERPL-MCNC: 0.38 NG/ML (ref 0.1–0.25)
PROT SERPL-MCNC: 5.1 G/DL (ref 6–8.5)
RBC # BLD AUTO: 3.14 10*6/MM3 (ref 3.77–5.28)
SODIUM BLD-SCNC: 141 MMOL/L (ref 136–145)
WBC NRBC COR # BLD: 23.67 10*3/MM3 (ref 3.4–10.8)

## 2020-03-25 PROCEDURE — 83735 ASSAY OF MAGNESIUM: CPT | Performed by: COLON & RECTAL SURGERY

## 2020-03-25 PROCEDURE — 82962 GLUCOSE BLOOD TEST: CPT

## 2020-03-25 PROCEDURE — 85025 COMPLETE CBC W/AUTO DIFF WBC: CPT | Performed by: COLON & RECTAL SURGERY

## 2020-03-25 PROCEDURE — 25010000002 PIPERACILLIN SOD-TAZOBACTAM PER 1 G: Performed by: INTERNAL MEDICINE

## 2020-03-25 PROCEDURE — 97116 GAIT TRAINING THERAPY: CPT

## 2020-03-25 PROCEDURE — 84145 PROCALCITONIN (PCT): CPT | Performed by: INTERNAL MEDICINE

## 2020-03-25 PROCEDURE — 97110 THERAPEUTIC EXERCISES: CPT

## 2020-03-25 PROCEDURE — 80053 COMPREHEN METABOLIC PANEL: CPT | Performed by: COLON & RECTAL SURGERY

## 2020-03-25 PROCEDURE — 25010000002 HEPARIN (PORCINE) PER 1000 UNITS: Performed by: COLON & RECTAL SURGERY

## 2020-03-25 PROCEDURE — 99232 SBSQ HOSP IP/OBS MODERATE 35: CPT | Performed by: INTERNAL MEDICINE

## 2020-03-25 RX ADMIN — SUCRALFATE 1 G: 1 TABLET ORAL at 08:36

## 2020-03-25 RX ADMIN — LORATADINE 10 MG: 10 TABLET ORAL at 08:37

## 2020-03-25 RX ADMIN — BUSPIRONE HYDROCHLORIDE 15 MG: 10 TABLET ORAL at 20:38

## 2020-03-25 RX ADMIN — LEVOTHYROXINE SODIUM 88 MCG: 88 TABLET ORAL at 05:42

## 2020-03-25 RX ADMIN — HEPARIN SODIUM 5000 UNITS: 5000 INJECTION, SOLUTION INTRAVENOUS; SUBCUTANEOUS at 14:21

## 2020-03-25 RX ADMIN — ACETAMINOPHEN 650 MG: 325 TABLET, FILM COATED ORAL at 05:42

## 2020-03-25 RX ADMIN — HEPARIN SODIUM 5000 UNITS: 5000 INJECTION, SOLUTION INTRAVENOUS; SUBCUTANEOUS at 22:05

## 2020-03-25 RX ADMIN — TAZOBACTAM SODIUM AND PIPERACILLIN SODIUM 4.5 G: 500; 4 INJECTION, SOLUTION INTRAVENOUS at 12:11

## 2020-03-25 RX ADMIN — INSULIN HUMAN 2 UNITS: 100 INJECTION, SOLUTION PARENTERAL at 12:11

## 2020-03-25 RX ADMIN — TAZOBACTAM SODIUM AND PIPERACILLIN SODIUM 4.5 G: 500; 4 INJECTION, SOLUTION INTRAVENOUS at 20:36

## 2020-03-25 RX ADMIN — INSULIN HUMAN 2 UNITS: 100 INJECTION, SOLUTION PARENTERAL at 05:55

## 2020-03-25 RX ADMIN — METOPROLOL SUCCINATE 100 MG: 100 TABLET, EXTENDED RELEASE ORAL at 08:36

## 2020-03-25 RX ADMIN — GABAPENTIN 300 MG: 300 CAPSULE ORAL at 20:38

## 2020-03-25 RX ADMIN — ACETAMINOPHEN 650 MG: 325 TABLET, FILM COATED ORAL at 14:21

## 2020-03-25 RX ADMIN — HEPARIN SODIUM 5000 UNITS: 5000 INJECTION, SOLUTION INTRAVENOUS; SUBCUTANEOUS at 05:42

## 2020-03-25 RX ADMIN — TAZOBACTAM SODIUM AND PIPERACILLIN SODIUM 4.5 G: 500; 4 INJECTION, SOLUTION INTRAVENOUS at 04:15

## 2020-03-25 RX ADMIN — SODIUM CHLORIDE, PRESERVATIVE FREE 10 ML: 5 INJECTION INTRAVENOUS at 08:37

## 2020-03-25 RX ADMIN — HYDROCODONE BITARTRATE AND ACETAMINOPHEN 0.5 TABLET: 5; 325 TABLET ORAL at 18:26

## 2020-03-25 RX ADMIN — MIRABEGRON 50 MG: 50 TABLET, FILM COATED, EXTENDED RELEASE ORAL at 20:37

## 2020-03-25 RX ADMIN — SUCRALFATE 1 G: 1 TABLET ORAL at 16:37

## 2020-03-25 RX ADMIN — SODIUM CHLORIDE, PRESERVATIVE FREE 10 ML: 5 INJECTION INTRAVENOUS at 20:39

## 2020-03-25 RX ADMIN — CALCITONIN SALMON 1 SPRAY: 200 SPRAY, METERED NASAL at 08:37

## 2020-03-25 RX ADMIN — ACETAMINOPHEN 650 MG: 325 TABLET, FILM COATED ORAL at 22:05

## 2020-03-25 NOTE — THERAPY TREATMENT NOTE
Patient Name: Neema Richard  : 1940    MRN: [id number]                              Today's Date: 3/25/2020       Admit Date: 3/15/2020    Visit Dx:     ICD-10-CM ICD-9-CM   1. Impaired mobility and activities of daily living Z74.09 799.89   2. Peritonitis (CMS/HCC) K65.9 567.9   3. Diverticulitis K57.92 562.11   4. Essential hypertension I10 401.9   5. Perforation of sigmoid colon due to diverticulitis K57.20 562.11     Patient Active Problem List   Diagnosis   • Well woman exam with routine gynecological exam   • Osteoporosis - GYN   • Essential hypertension   • Acquired hypothyroidism   • IBS-d (takes Buspar)   • DCIS (ductal carcinoma in situ)   • Cystocele and rectocele with complete uterovaginal prolapse   • Mixed urge and stress incontinence   • Pessary maintenance   • High cholesterol   • Acute diverticulitis   • Sepsis (CMS/HCC)   • Lactic acidosis   • Neutrophilic leukocytosis   • Perforation of sigmoid colon due to diverticulitis   • Acute kidney injury (CMS/HCC)   • Acute hyperglycemia   • Arthritis   • Overactive bladder   • Dyspepsia     Past Medical History:   Diagnosis Date   • Acquired hypothyroidism    • Colon polyp - sees Dr. Vazquez 2016   • DCIS (ductal carcinoma in situ) 2017    Dx at the time of breast reduction. ER and [place] (+)   • Essential hypertension    • High cholesterol    • Hx of herpes genitalis    • IBS-d (takes Buspar)      Past Surgical History:   Procedure Laterality Date   • BILATERAL BREAST REDUCTION Bilateral    • BREAST LUMPECTOMY Left    • CATARACT EXTRACTION, BILATERAL Bilateral    • CERVICAL CONIZATION     • COLON RESECTION N/A 3/17/2020    Procedure: LOW ANTERIOR COLON RESECTION, MOBILIZATION OF SPLENIC FLEXURE, DRAINAGE OF ABSCESS AND COLOSTOMY CREATION;  Surgeon: Sravan Vazquez MD;  Location: [facility];  Service: General;  Laterality: N/A;   • FACIAL COSMETIC SURGERY     • FACIAL COSMETIC SURGERY     • HEMORRHOIDECTOMY   1974   • LAPAROSCOPIC CHOLECYSTECTOMY  2010   • REDUCTION MAMMAPLASTY Bilateral 04/13/2017    DCIS   • SINUS SURGERY  1992   • THYROIDECTOMY, PARTIAL  1963   • TONSILLECTOMY  1953   • VARICOSE VEIN SURGERY Bilateral 1981     General Information     Row Name 03/25/20 1143          PT Evaluation Time/Intention    Document Type  therapy note (daily note)  -KM     Mode of Treatment  physical therapy  -     Row Name 03/25/20 1143          General Information    Patient Profile Reviewed?  yes  -KM     Row Name 03/25/20 1143          Cognitive Assessment/Intervention- PT/OT    Orientation Status (Cognition)  oriented x 3  -KM     Row Name 03/25/20 1143          Safety Issues, Functional Mobility    Safety Issues Affecting Function (Mobility)  impulsivity;safety precaution awareness;safety precautions follow-through/compliance;awareness of need for assistance  -KM     Impairments Affecting Function (Mobility)  endurance/activity tolerance;pain  -KM       User Key  (r) = Recorded By, (t) = Taken By, (c) = Cosigned By    Initials Name Provider Type     Liliya Mendoza D, PT Physical Therapist        Mobility     Row Name 03/25/20 1144          Bed Mobility Assessment/Treatment    Comment (Bed Mobility)  UIC  -     Row Name 03/25/20 1144          Sit-Stand Transfer    Sit-Stand [place] (Transfers)  contact guard  -KM     Assistive Device (Sit-Stand Transfers)  walker, front-wheeled from recliner and toilet  -KM     Row Name 03/25/20 1144          Gait/Stairs Assessment/Training    [place] Level (Gait)  contact guard;1 person to manage equipment  -KM     Assistive Device (Gait)  walker, front-wheeled  -KM     Distance in Feet (Gait)  1x15, 1x150  -KM     Pattern (Gait)  step-through  -KM     Deviations/Abnormal Patterns (Gait)  gait speed decreased  -KM     Bilateral Gait Deviations  forward flexed posture  -KM     Comment (Gait/Stairs)  Pt impulsive requiring cues for safety, pt demonstrates improved  endurance with no S.O.B.  -KM       User Key  (r) = Recorded By, (t) = Taken By, (c) = Cosigned By    Initials Name Provider Type    Liliya Ahn, PT Physical Therapist        Obj/Interventions     Row Name 03/25/20 1146          Static Sitting Balance    Level of [place] (Unsupported Sitting, Static Balance)  standby assist  -KM     Sitting Position (Unsupported Sitting, Static Balance)  sitting in chair  -KM     Row Name 03/25/20 1146          Static Standing Balance    Level of [place] (Supported Standing, Static Balance)  standby assist  -KM     Assistive Device Utilized (Supported Standing, Static Balance)  walker, rolling  -KM     Row Name 03/25/20 1146          Dynamic Standing Balance    Level of [place], Reaches Outside Midline (Standing, Dynamic Balance)  contact guard assist  -KM     Assistive Device Utilized (Supported Standing, Dynamic Balance)  walker, rolling  -KM       User Key  (r) = Recorded By, (t) = Taken By, (c) = Cosigned By    Initials Name Provider Type    Liliya Ahn, PT Physical Therapist        Goals/Plan    No documentation.       Clinical Impression     Row Name 03/25/20 1147          Pain Scale: Numbers Pre/Post-Treatment    Pain Scale: Numbers, Pretreatment  2/10  -KM     Pain Scale: Numbers, Post-Treatment  2/10  -KM     Pain Location - Orientation  incisional  -     Pain Location  abdomen  -     Row Name 03/25/20 1147          Plan of Care Review    Plan of Care Reviewed With  patient  -KM     Progress  improving  -     Outcome Summary  Pt transfers with c.g.assist, cues for safety due to impulsivity and presence of lines. Ambul 150 ft with c.g.assist with rw, recommend supplemental mobility with floor staff in [place]  -KM     Row Name 03/25/20 1147          Positioning and Restraints    Pre-Treatment Position  sitting in chair/recliner  -KM     Post Treatment Position  chair  -KM     In Chair  reclined;call light within  reach;encouraged to call for assist;exit alarm on  -KM       User Key  (r) = Recorded By, (t) = Taken By, (c) = Cosigned By    Initials Name Provider Type    Liliya Ahn, PT Physical Therapist        Outcome Measures     Row Name 03/25/20 1149          How much help from another person do you currently need...    Turning from your back to your side while in flat bed without using bedrails?  3  -KM     Moving from lying on back to sitting on the side of a flat bed without bedrails?  3  -KM     Moving to and from a bed to a chair (including a wheelchair)?  3  -KM     Standing up from a chair using your arms (e.g., wheelchair, bedside chair)?  3  -KM     Climbing 3-5 steps with a railing?  2  -KM     To walk in hospital room?  3  -KM     AM-PAC 6 Clicks Score (PT)  17  -KM     Row Name 03/25/20 1149          Functional Assessment    Outcome Measure Options  AM-PAC 6 Clicks Basic Mobility (PT)  -KM       User Key  (r) = Recorded By, (t) = Taken By, (c) = Cosigned By    Initials Name Provider Type    Liliya Ahn, PT Physical Therapist          PT Recommendation and Plan     Plan of Care Reviewed With: patient  Progress: improving  Outcome Summary: Pt transfers with c.g.assist, cues for safety due to impulsivity and presence of lines. Ambul 150 ft with c.g.assist with rw, recommend supplemental mobility with floor staff in [place]     Time Calculation:   PT Charges     Row Name 03/25/20 1150             Time Calculation    Start Time  1118  -KM      PT Received On  03/25/20  -KM      PT Goal Re-Cert Due Date  03/28/20  -KM         Time Calculation- PT    Total Timed Code Minutes- PT  15 minute(s)  -KM         Timed Charges    33515 - Gait Training Minutes   15  -KM        User Key  (r) = Recorded By, (t) = Taken By, (c) = Cosigned By    Initials Name Provider Type    Liliya Ahn, PT Physical Therapist        Therapy Charges for Today     Code Description Service Date Service  Provider Modifiers Qty    [id number]  GAIT TRAINING EA 15 MIN 3/25/2020 Liliya Mendoza, PT GP 1    [id number] HC PT THER SUPP EA 15 MIN 3/25/2020 Liliya Mendoza, PT GP 1          PT G-Codes  Outcome Measure Options: AM-PAC 6 Clicks Basic Mobility (PT)  AM-PAC 6 Clicks Score (PT): 17  AM-PAC 6 Clicks Score (OT): 19    Liliya Mendoza, PT  3/25/2020

## 2020-03-25 NOTE — NURSING NOTE
Dr. Vazquez at bedside, ok to remove RADHA drain. Plan was for staples to be removed in office or by nursing friend depending on social distancing status. However, Dr. Vazquez later came in and stated to remove staples here and steri-strip abdomen. Dr. Vazquez present on RADHA removal. Verified order for staple removal. 43 staples removed. Abdomen incision clean and dry, no dehiscence noted, except for lower incision area that was already present. Steri-strips placed, clean guaze and dressing in place.

## 2020-03-25 NOTE — PLAN OF CARE
Problem: Patient Care Overview  Goal: Plan of Care Review  Flowsheets  Taken 3/25/2020 1149  Progress: improving  Plan of Care Reviewed With: patient  Taken 3/25/2020 1147  Outcome Summary: Pt transfers with c.g.assist, cues for safety due to impulsivity and presence of lines. Ambul 150 ft with c.g.assist with rw, recommend supplemental mobility with floor staff in [place]

## 2020-03-25 NOTE — PROGRESS NOTES
"INFECTIOUS DISEASE PROGRESS NOTE       Neema Richard  1940  [id number]      Admission Date: 3/15/2020      Requesting Provider: Filomena Stuart DO  Evaluating Physician: Darshan Bejarano MD    Reason for Consultation: Acute sigmoid diverticulitis, with microperforation     History of present illness:  3/16/20:    3/16/2020: Patient is a 79 y.o. female, seen today for acute sigmoid diverticulitis with microperforation.  She has had several episodes of diverticulitis in the past, the last being in December 2019, treated with an oral antibiotic by her PCP.   Her symptoms minimally improved. She began experiencing diarrhea, with low grade fever, and yesterday, developed worsening abdominal pain, prompting her presentation to the ED.  An abdominal Ct with extensive diverticulitis involving the descending colon and sigmoid colon with stranding and wall thickening, with extraluminal air suggesting a microperforation.   Admitting labs with a leukocytosis of 15, 000, lactic acidosis, and she has been afebrile. She is currently on Merrem and we were consulted for evaluation and treatment.   3/17/20:  She feels better but still with abdominal tenderness.  Her daughter is coming to from [place] and she wants her tested for Covid-19.  She remains afebrile.  I saw her earlier this morning but she has now deteriorated.  She now has free air on her CT scan and is currently in the OR.  3/18/20:  She is feeling \"some better this am\".  She denies any nausea. She complains of hiccups. She remains afebrile.    3/19/20: She accidentally pulled NGT out yesterday. She is having some nausea, no vomiting.   She complains of not being able to get any rest.    3/20/2020: She had increased abdominal discomfort earlier today but it is decreased today.  She and her daughter indicate that her confusion has improved and her narcotic doses have been reduced.  She remains afebrile.  3/21/2020: She was moved to airborne precautions/contact " precautions due to her chest CT findings.  She has been afebrile over the day.  She has now been started on TPN.  The RN staff indicates that she has been stable over the day.  She has denied dyspnea. A COVID19 PCR is currently pending at  and should be back tomorrow.  3/22/2020: Her COVID19 PCR was negative.  She has decreased dyspnea.  She denies nausea and vomiting.  She has some abdominal distention but denies severe abdominal pain.  3/23/20:  Her confusion is improving per her daughter. She still complains of abdominal soreness.  No shortness of breath or increased cough.  She does have some increased drainage on her wound dressing.  She denies severe abdominal pain.  3/24/20:  She became more confused and combative last pm.  She is now restrained.  Just given Ativan per nursing. CT scheduled for today.   3/25/20:  Much less confused.  She is regaining her appetite.  Daughter at bedside.  tmax 99.5 degrees.       Past Medical History:   Diagnosis Date   • Acquired hypothyroidism 1980   • Colon polyp - sees Dr. 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 ER and VT (+)   • Essential hypertension 2012   • High cholesterol 2019   • Hx of herpes genitalis 2013   • IBS-d (takes Buspar)        Past Surgical History:   Procedure Laterality Date   • BILATERAL BREAST REDUCTION Bilateral 1995   • BREAST LUMPECTOMY Left 2009   • CATARACT EXTRACTION, BILATERAL Bilateral 2018   • CERVICAL CONIZATION  1983   • COLON RESECTION N/A 3/17/2020    Procedure: LOW ANTERIOR COLON RESECTION, MOBILIZATION OF SPLENIC FLEXURE, DRAINAGE OF ABSCESS AND COLOSTOMY CREATION;  Surgeon: Sravan Vazquez MD;  Location: Counts include [facility] beds at the [facility];  Service: General;  Laterality: N/A;   • FACIAL COSMETIC SURGERY  2001   • FACIAL COSMETIC SURGERY  2012   • HEMORRHOIDECTOMY  1974   • LAPAROSCOPIC CHOLECYSTECTOMY  2010   • REDUCTION MAMMAPLASTY Bilateral 04/13/2017    DCIS   • SINUS SURGERY  1992   •  THYROIDECTOMY, PARTIAL  1963   • TONSILLECTOMY  1953   • VARICOSE VEIN SURGERY Bilateral 1981       Family History   Problem Relation Age of Onset   • Breast cancer Neg Hx    • Ovarian cancer Neg Hx        Social History     Socioeconomic History   • Marital status:      Spouse name: Not on file   • Number of children: Not on file   • Years of education: Not on file   • Highest education level: Not on file   Tobacco Use   • Smoking status: Never Smoker   Substance and Sexual Activity   • Alcohol use: No   • Drug use: No       Allergies   Allergen Reactions   • Evista [Raloxifene] Rash   • Cephalexin Diarrhea   • Hydrochlorothiazide Diarrhea   • Meloxicam Other (See Comments)   • Metronidazole Diarrhea   • Oxybutynin Cough   • Propylthiouracil Other (See Comments)     Severally reduce WBC's    • Sulfa Antibiotics Hives   • Sulfacetamide Sodium-Sulfur Hives   • Sulfur Dioxide Hives         Medication:    Current Facility-Administered Medications:   •  acetaminophen (TYLENOL) tablet 650 mg, 650 mg, Oral, Q8H, Sravan Vazquez MD, 650 mg at 03/25/20 1421  •  busPIRone (BUSPAR) tablet 15 mg, 15 mg, Oral, Daily, Sravan Vazquez MD, 15 mg at 03/24/20 2116  •  calcitonin (salmon) (MIACALCIN) nasal spray 1 spray, 1 spray, Alternating Nares, Daily, Sravan Vazquez MD, 1 spray at 03/25/20 0837  •  diazePAM (VALIUM) tablet 5 mg, 5 mg, Oral, Q6H PRN, Sravan Vazquez MD  •  gabapentin (NEURONTIN) capsule 300 mg, 300 mg, Oral, Nightly, Minerva Steele, APRN, 300 mg at 03/24/20 2117  •  heparin (porcine) 5000 UNIT/ML injection 5,000 Units, 5,000 Units, Subcutaneous, Q8H, Sravan Vazquez MD, 5,000 Units at 03/25/20 1421  •  HYDROcodone-acetaminophen (NORCO) 5-325 MG per tablet 1 tablet, 1 tablet, Oral, Q4H PRN, Sravan Vazquez MD, 1 tablet at 03/20/20 0411  •  HYDROmorphone (DILAUDID) injection 0.5 mg, 0.5 mg, Intravenous, Q2H PRN **AND** naloxone (NARCAN) injection 0.4 mg, 0.4 mg, Intravenous, Q5 Min PRN, Sravan Vazquez  MD HIREN  •  insulin regular (humuLIN R,novoLIN R) injection 2-7 Units, 2-7 Units, Subcutaneous, Q6H, Eveline Rosas, [facility], located within [facility], 2 Units at 03/25/20 1211  •  levothyroxine (SYNTHROID, LEVOTHROID) tablet 88 mcg, 88 mcg, Oral, Q AM, Sravan Vazquez MD, 88 mcg at 03/25/20 0542  •  loratadine (CLARITIN) tablet 10 mg -- PATIENT SUPPLIED MEDICATION, 10 mg, Oral, Daily, Viv Siddiqi MD, 10 mg at 03/25/20 0837  •  LORazepam (ATIVAN) tablet 0.5 mg, 0.5 mg, Oral, Q8H PRN, Sravan Vazquez MD, 0.5 mg at 03/23/20 1827  •  Magnesium Sulfate 2 gram Bolus, followed by 8 gram infusion (total Mg dose 10 grams)- Mg less than or equal to 1mg/dL, 2 g, Intravenous, PRN **OR** Magnesium Sulfate 2 gram / 50mL Infusion (GIVE X 3 BAGS TO EQUAL 6GM TOTAL DOSE) - Mg 1.1 - 1.5 mg/dl, 2 g, Intravenous, PRN **OR** Magnesium Sulfate 4 gram infusion- Mg 1.6-1.9 mg/dL, 4 g, Intravenous, PRN, Jennifer Laurent DO, Last Rate: 25 mL/hr at 03/24/20 2104, 4 g at 03/24/20 2104  •  metoprolol succinate XL (TOPROL-XL) 24 hr tablet 100 mg, 100 mg, Oral, Daily, Sravan Vazquez MD, 100 mg at 03/25/20 0836  •  Mirabegron ER (MYRBETRIQ) 24 hr tablet 50 mg- patient supplied med, 50 mg, Oral, Daily, Sravan Vazquez MD, 50 mg at 03/24/20 2118  •  morphine injection 2 mg, 2 mg, Intravenous, Q2H PRN, 2 mg at 03/17/20 0817 **OR** Morphine sulfate (PF) injection 4 mg, 4 mg, Intravenous, Q2H PRN, Sravan Vazquez MD, 4 mg at 03/16/20 1704  •  ondansetron (ZOFRAN) injection 4 mg, 4 mg, Intravenous, Q6H PRN, Sravan Vazquez MD, 4 mg at 03/23/20 0258  •  piperacillin-tazobactam (ZOSYN) 4.5 g in iso-osmotic dextrose 100 mL IVPB (premix), 4.5 g, Intravenous, Q8H, Darshan Bejarano MD, 4.5 g at 03/25/20 1211  •  potassium chloride 20 mEq in 50 mL IVPB, 20 mEq, Intravenous, Q1H PRN, Sravan Vazquez MD  •  potassium phosphate 45 mmol in sodium chloride 0.9 % 250 mL infusion, 45 mmol, Intravenous, PRN **OR** potassium phosphate 30 mmol in sodium chloride 0.9 % 100 mL infusion, 30  mmol, Intravenous, PRN **OR** potassium phosphate 15 mmol in sodium chloride 0.9 % 100 mL infusion, 15 mmol, Intravenous, PRN **OR** sodium phosphates 45 mmol in sodium chloride 0.9 % 250 mL IVPB, 45 mmol, Intravenous, PRN **OR** sodium phosphates 30 mmol in sodium chloride 0.9 % 100 mL IVPB, 30 mmol, Intravenous, PRN **OR** sodium phosphates 15 mmol in sodium chloride 0.9 % 100 mL IVPB, 15 mmol, Intravenous, PRN, Sravan Vazquez MD  •  sodium chloride 0.45 % bolus 500 mL, 500 mL, Intravenous, Once, Sravan Vazquez MD  •  sodium chloride 0.9 % bolus 1,836 mL, 30 mL/kg, Intravenous, PRN, Sravan Vazquez MD  •  sodium chloride 0.9 % flush 10 mL, 10 mL, Intravenous, PRNGeorge Bruce M, MD  •  sodium chloride 0.9 % flush 10 mL, 10 mL, Intravenous, PRN, Sravan Vazquez MD  •  sodium chloride 0.9 % flush 10 mL, 10 mL, Intravenous, Q12H, Sravan Vazquez MD, 10 mL at 03/25/20 0837  •  sodium chloride 0.9 % flush 10 mL, 10 mL, Intravenous, PRN, Sravan Vazquez MD  •  sucralfate (CARAFATE) tablet 1 g, 1 g, Oral, BID AC, Sravan Vazquez MD, 1 g at 03/25/20 1637    Antibiotics:  Anti-Infectives (From admission, onward)    Ordered     Dose/Rate Route Frequency Start Stop    03/23/20 2107  piperacillin-tazobactam (ZOSYN) 4.5 g in iso-osmotic dextrose 100 mL IVPB (premix)  Review   Ordering Provider:  Darshan Bejarano MD    4.5 g  over 4 Hours Intravenous Every 8 Hours 03/24/20 0400 04/03/20 0359    03/23/20 2107  piperacillin-tazobactam (ZOSYN) 4.5 g in iso-osmotic dextrose 100 mL IVPB (premix)     Ordering Provider:  Darshan Bejarano MD    4.5 g  over 30 Minutes Intravenous Once 03/23/20 2200 03/23/20 2206 03/20/20 2252  vancomycin 1250 mg/250 mL 0.9% NS IVPB ([facility])     Ordering Provider:  Alan Raygoza RPH    20 mg/kg × 61.2 kg  over 90 Minutes Intravenous Once 03/20/20 2345 03/21/20 0341    03/16/20 0811  ertapenem (INVanz) 1 g/100 mL 0.9% NS VTB (mbp)     Eveline Rosas RPH reviewed the order on 03/21/20 1145.    Ordering Provider:  Darshan Bejarano MD    1 g  over 30 Minutes Intravenous Every 24 Hours 20 0900 20 1023    03/15/20 1954  meropenem (MERREM) 1 g/100 mL 0.9% NS VTB (mbp)     Ordering Provider:  Darshan Steward MD    1 g  over 30 Minutes Intravenous Once 03/15/20 1955 03/15/20 2122                  Physical Exam:   Vital Signs  Temp (24hrs), Av.9 °F (36.6 °C), Min:97.9 °F (36.6 °C), Max:97.9 °F (36.6 °C)    Temp  Min: 97.9 °F (36.6 °C)  Max: 97.9 °F (36.6 °C)  BP  Min: 132/52  Max: 147/57  Pulse  Min: 86  Max: 86  Resp  Min: 18  Max: 18  No data recorded    General: She is alert and in no acute distress.  She appears less ill.  Skin: She has no diffuse rash.  HEENT: She has no labial ulcers.  Lungs: fairly clear   Cardiovascular: She has no murmur  Abdomen: She has mild abdominal distention.   Ostomy   Neurologic: She is alert and oriented x3.  Motor exam reveals 5/5 strength bilaterally  PICC right without redness     Laboratory Data    Results from last 7 days   Lab Units 20  0510 20  0420 20  0658   WBC 10*3/mm3 23.67* 16.93* 22.11*   HEMOGLOBIN g/dL 8.9* 8.0* 9.4*   HEMATOCRIT % 28.1* 25.3* 29.1*   PLATELETS 10*3/mm3 556* 475* 503*     Results from last 7 days   Lab Units 20  0510   SODIUM mmol/L 141   POTASSIUM mmol/L 4.2   CHLORIDE mmol/L 106   CO2 mmol/L 22.0   BUN mg/dL 17   CREATININE mg/dL 0.41*   GLUCOSE mg/dL 147*   CALCIUM mg/dL 7.6*     Results from last 7 days   Lab Units 20  0510   ALK PHOS U/L 70   BILIRUBIN mg/dL 0.2   ALT (SGPT) U/L 10   AST (SGOT) U/L 16         Results from last 7 days   Lab Units 20  1306   CRP mg/dL 13.32*     Results from last 7 days   Lab Units 20  1911   LACTATE mmol/L 1.2             Estimated Creatinine Clearance: 57.3 mL/min (A) (by C-G formula based on SCr of 0.41 mg/dL (L)).  Lactate 2.9     Microbiology:    3/16: BC no growth    3/23: wound culture-Pseudomonas and enterococcus        Radiology:  Imaging  Results (Last 72 Hours)     Procedure Component Value Units Date/Time    CT Abdomen Pelvis With & Without Contrast [[id number]] Collected:  03/24/20 1022     Updated:  03/24/20 1656    Narrative:       EXAMINATION: CT ABDOMEN AND PELVIS W WO CONTRAST-      INDICATION: Sepsis; Z74.09-Other reduced mobility; K65.9-Peritonitis,  unspecified; K57.92-Diverticulitis of intestine, part unspecified,  without perforation or abscess without bleeding; I10-Essential (primary)  hypertension; K57.20-Diverticulitis of large intestine with perforation  and abscess without bleeding.      TECHNIQUE: CT abdomen and pelvis with and without intravenous contrast.     The radiation dose reduction device was turned on for each scan per the  ALARA (As Low as Reasonably Achievable) protocol.     COMPARISON: CT dated 03/20/2020.     FINDINGS: Lung bases demonstrate increase in opacifications within the  right midlung fairly groundglass appearance concerning for acute  airspace disease progression from prior with small right and trace to  small left pleural effusions. Liver without focal lesion, however, mild  diffuse hepatic parenchymal attenuation of hepatic steatosis.  Gallbladder is surgically absent. No biliary dilatation. Pancreas and  spleen unremarkable. Adrenals without distinct nodule. Kidneys without  hydronephrosis or hydroureter. No bulky retroperitoneal adenopathy.  Atherosclerotic nonaneurysmal patent abdominal aorta. Portal veins and  IVC patent. GI tract evaluation demonstrates postsurgical change from  left lower quadrant in ostomy formation and compartment pouch appearance  with trace ascites noted throughout the abdomen and mesenteric edema  along with the soft tissue drain to the left lower quadrant extending to  the right pelvis without loculated fluid collection. Liver is of  moderate distention without focal thickening. Pessary in place. Fibroid  uterus with intramural fibroid creating mass effect on the  left  hemipelvis, however no bulky pelvic adenopathy. Diffuse body wall edema.  No aggressive osseous lesions.       Impression:       1. Increasing pulmonary opacifications in the partially visualized right  midlung from prior comparison concerning for worsening airspace disease  along with increasing small volume right and trace to small volume left  pleural effusion from prior.  2. Trace abdominopelvic ascites and postsurgical changes with left lower  quadrant and ostomy formation and sigmoid colectomy with soft tissue  drain in place. No loculated fluid collection to suggest abscess or  intraabdominal free air.  3. Diffuse body wall edema.     D:  03/24/2020  E:  03/24/2020     This report was finalized on 3/24/2020 4:53 PM by Dr. Arturo Joseph.       XR Chest 1 View [[id number]] Collected:  03/23/20 1407     Updated:  03/23/20 1444    Narrative:       EXAMINATION: XR CHEST 1 VW-      INDICATION: Concern for aspiration; Z74.09-Other reduced mobility;  K65.9-Peritonitis, unspecified; K57.92-Diverticulitis of intestine, part  unspecified, without perforation or abscess without bleeding;  I10-Essential (primary) hypertension; K57.20-Diverticulitis of large  intestine with perforation and abscess without bleeding.      COMPARISON: 03/20/2020.     FINDINGS: Portable chest reveals ill-defined opacification again seen  within the right lung base with small right pleural effusion. PICC line  catheter on the right with tip in the SVC. Mild increased markings are  also seen at the left lung base with small left pleural effusion. The  upper lung fields are clear.           Impression:       PICC line catheter in satisfactory position in the SVC with  ill-defined opacification lung bases bilaterally and small bilateral  pleural effusions. Lung volumes remain low.     D:  03/23/2020  E:  03/23/2020     This report was finalized on 3/23/2020 2:41 PM by Dr. Daria Malone MD.           CT scan from 3/20:  Impression:      Partially visualized lung bases demonstrate development of  groundglass opacifications in the right middle lobe inferiorly scattered  with concern for atypical etiology or aspiration, however COVID-19 not  excluded given interval development and overall appearance on partial  imaging of the lungs. Trace to small bilateral pleural effusions with  increase in size from recent 03/17/2020 comparison. Postsurgical changes  of the abdomen without intra-abdominal free air demonstrating early  postoperative appearance of trace ascites with soft tissue drain in  place and mesenteric edema however no loculated fluid collection with  left lower quadrant ostomy formation and ventral abdominal wall  postsurgical changes. Diffuse body wall edema.       CXR  Impression:     PICC line catheter in satisfactory position in the SVC with  ill-defined opacification lung bases bilaterally and small bilateral  pleural effusions. Lung volumes remain low.     D:  03/23/2020  E:  03/23/2020              Impression:   1.  Acute sigmoid diverticulitis- s/p resection 3/17.  I have switched her intravenous antibiotic coverage to Zosyn.  She has now grown Pseudomonas and enterococcus from her wound culture.  She does not have evidence of abdominal wall cellulitis.  I will leave her on the Zosyn therapy for the time being.  2.  Severe sepsis  3.  Leukocytosis/neutrophilia-  Her leukocytosis is partially improved  4.  Pelvic abscess  5.  Encephalopathy-this is most likely secondary to narcotics, improved   6.  Pulmonary infiltrate/atelectasis- she clinically appears stable.      PLAN/RECOMMENDATIONS:   1  Abdominal wound Gram stain and culture in the area of slight wound dehiscence- pending   2.  Continue intravenous Zosyn      Dr. Bejarano has obtained the history, performed the physical exam and formulated the above treatment plan.       Darshan Bejarano MD  3/25/2020  18:04

## 2020-03-25 NOTE — PLAN OF CARE
Patient has been alert and oriented this shift, although she has been forgetful at times.  She has been calm and cooperative with staff.  Patient has denied any pain or discomforts.  Vital signs have been stable. Patient is tolerating food and drink without difficulty noted.  Good urinary and ostomy output noted.  Will continue to monitor for changes.

## 2020-03-25 NOTE — PROGRESS NOTES
Clinical Nutrition   Reason For Visit: Follow-up protocol, PN/PO    Patient Name: Neema Richard  YOB: 1940  MRN: [id number]  Date of Encounter: 03/25/20 14:07  Admission date: 3/15/2020    Patient appetite and PO intake slowly improving. Dtr brought patient food from [employer] and patient eating at time of visit.  RD recommends the following regimen to decrease the amount of nutrition patient receiving from TPN now that PO intake improving:  D14.5%, AA5.0% @ 35 ml/hr. 100 ml SMOF lipids daily.  At goal rate this regimen provides 840 ml, 782 calories (50% est needs), 42 g protein (53% est needs), GIR = 1.3    RD will continue to monitor and make TPN adjustment recommendations as medically appropriate.    Decreased Boost Plus from 3x day to 1x daily since patient rarely drinking.      Nutrition Assessment     Admission Problem List:  N/V/Abdominal pain  Sigmoid diverticulitis with microperforation  Peritonitis  Pelvic abscesses  Sepsis  DASH  Lactic acidosis  Encephalopathy      Applicable PMH:  HTN  IBS-D  Dyspepsia  Diverticulitis  S/p CCY      Applicable medical tests/procedures since admission:  (3/17) s/p low anterior resection, mobilization splenic flexure, drainage of abdominal abscesses, appendectomy, left salpingo-oophorectomy, distal transverse end colostomy.  (3/21) TPN initiated via PICC 2/2 poor PO intake       Reported/Observed/Food/Nutrition Related History   Patient and daughter present during visit. Patient reports her appetite and PO intake are slowly improving, but it also helps I she likes the food. Patient currently eating some lunch from [employer] brought in by dtr. Patient eating some of a hamburger and macaroni and cheese. Patient states she understands that if she must eat more/better if she wants to get out of the hospital. States she isn't drinking much Boost anymore now that she is eating a little more food. RD informed patient that RD will decrease amount of nutrition provided by  TPN now that her PO intake is improving. States that she is okay with eating mashed potatoes, yogurt, cottage cheese with pineapples, and a grilled cheese sandwich from the hospital kitchen.    Colostomy output x 24 hours (per nsg doc) = 150 ml    Anthropometrics   Height: 67 in  Weight: 138 lbs (bed scale weight 3/25 per nsg doc)  BMI: 21.6  BMI classification: Normal: 18.5-24.9kg/m2   IBW: 135 lbs    Per RD note (3/20):  UBW: 135 lbs per pt  Weight change: RD suspects patient has lost weight and now weighs less than 135 lbs; bed scale inaccurate at this time.    Labs reviewed   Labs reviewed: Yes    Medications reviewed   Medications reviewed: Yes  Pertinent: antibiotic, carafate, insulin  PRN: zofran    Estimated Nutrition Needs   Height used: 67 in/170.2 cm  Weight used: 140 lbs/63.6 kg (actual wt)    Estimated calorie needs: 1550 calories  MSJ = 1149 x 1.3 = 1494  25 kcal/kg = 1590    Estimated protein needs: 80 g protein  1.2-1.5 g/kg = 76-95    Current Nutrition Prescription   PO: Regular / GI soft  Oral Nutrition Supplement: Boost Plus 3x daily    PN: D12%, AA5.0% @ 70 ml/hr. 200 ml SMOF lipids daily.  Route: PICC  Nutrition provided at goal rate: 1680 ml, 1421 calories (92% est needs), 84 g protein (105% est needs), GIR = 2.2    Evaluation of Received Nutrient/Fluid Intake:   PO -13% / 4 meals (per nsg doc)  TPN - 1592 ml (95% goal volume)      Nutrition Diagnosis     3/20 (updated 3/21, 3/23, 3/25)  Problem Inadequate oral intake   Etiology Clinical condition, decreased appetite, food preferences   Signs/Symptoms PO intake: 13% / 4 meals per nsg doc; patient eating some of a hamburger and mac n cheese at lunch today; supplemental TPN via PICC   Status: ongoing    Intervention   Intervention: Follow treatment progress, Care plan reviewed, Interview for preferences, Adjusted supplement, Encourage intake     RD recommends the following regimen to decrease the amount of nutrition patient receiving from TPN now  that PO intake improving:  D14.5%, AA5.0% @ 35 ml/hr. 100 ml SMOF lipids daily.  At goal rate this regimen provides 840 ml, 782 calories (50% est needs), 42 g protein (53% est needs), GIR = 1.3    RD will continue to monitor and make TPN adjustment recommendations as medically appropriate.    Decreased Boost Plus from 3x day to 1x daily since patient rarely drinking.    Communicated food preferences to kitchen.      Goal:   General: Nutrition support treatment   PO: Increase intake   PN: Adjust PN    Monitoring/Evaluation:     Monitoring/Evaluation: Per protocol, I&O, PO intake, Supplement intake, Pertinent labs, PN delivery/tolerance, Weight, GI status, Symptoms  Will Continue to follow per protocol  Karen Menjivar, FRANCIS  Time Spent: 45 min

## 2020-03-25 NOTE — PROGRESS NOTES
[facility] Medicine Services  PROGRESS NOTE    Patient Name: Neema Richard  : 1940  MRN: [id number]    Date of Admission: 3/15/2020  Primary Care Physician: David Mackey MD    Subjective   Subjective     CC:  Perforated diverticulitis     HPI:  Much improved mental status. Daughter at bedside. She is feeling well today.  Hopeful to go home in coming days. Not eating much but is improving.  Breathing stable.     Review of Systems  Gen- No fevers, chills  CV- No chest pain, palpitations  Resp- No cough, +dyspnea  GI- No N/V/D, -abd pain    Objective   Objective     Vital Signs:   Temp:  [97.9 °F (36.6 °C)] 97.9 °F (36.6 °C)  Heart Rate:  [75-96] 86  Resp:  [18] 18  BP: (132-147)/(52-57) 132/52        Physical Exam:  GEN- no acute distress noted, resting in bed, awake  HEENT- atraumatic, normocephlic, eomi, PICC present in RUE   NECK- supple, trachea midline, no masses  RESP: ctab, normal effort  CV: no murmurs, s1/s2, rrr  GI: soft, improved and minimal TTP, midline incision in place c/d/i +ostomy   MSK: no edema noted, spontaneous movement of all extremities  NEURO: awake but not oriented, no focal deficits noted   SKIN: no rashes  PSYCH: appropriate mood and affect , but confused       Results Reviewed:  Results from last 7 days   Lab Units 20  0510 20  0420 20  0658  20  0126   WBC 10*3/mm3 23.67* 16.93* 22.11*   < >  --    HEMOGLOBIN g/dL 8.9* 8.0* 9.4*   < >  --    HEMATOCRIT % 28.1* 25.3* 29.1*   < >  --    PLATELETS 10*3/mm3 556* 475* 503*   < >  --    PROCALCITONIN ng/mL 0.38*  --  0.70*  --  1.67*    < > = values in this interval not displayed.     Results from last 7 days   Lab Units 20  0510 20  0420 20  1306 20  0658  20  0705   SODIUM mmol/L 141 141 137 130*   < > 139   POTASSIUM mmol/L 4.2 3.7 4.0 3.9   < > 4.1   CHLORIDE mmol/L 106 107 104 98   < > 106   CO2 mmol/L 22.0 23.0 24.0 23.0   < > 20.0*   BUN mg/dL 17 19  17 17   < > 21   CREATININE mg/dL 0.41* 0.47* 0.37* 0.32*   < > 0.40*   GLUCOSE mg/dL 147* 163* 170* 173*   < > 92   CALCIUM mg/dL 7.6* 7.9* 8.2* 7.9*   < > 8.0*   ALT (SGPT) U/L 10  --  6 7   < >  --    AST (SGOT) U/L 16  --  13 13   < >  --    PROBNP pg/mL  --   --   --   --   --  2,558.0*    < > = values in this interval not displayed.     Estimated Creatinine Clearance: 57.3 mL/min (A) (by C-G formula based on SCr of 0.41 mg/dL (L)).    Microbiology Results Abnormal     Procedure Component Value - Date/Time    Wound Culture - Surgical Site, Abdominal Cavity [[id number]]  (Abnormal) Collected:  03/23/20 1430    Lab Status:  Preliminary result Specimen:  Surgical Site from Abdominal Cavity Updated:  03/25/20 0715     Wound Culture Scant growth (1+) Pseudomonas species      Scant growth (1+) Enterococcus species     Gram Stain No WBCs or organisms seen    Blood Culture - Blood, Hand, Left [[id number]] Collected:  03/21/20 0110    Lab Status:  Preliminary result Specimen:  Blood from Hand, Left Updated:  03/25/20 0315     Blood Culture No growth at 4 days    Blood Culture - Blood, Arm, Right [[id number]] Collected:  03/21/20 0127    Lab Status:  Preliminary result Specimen:  Blood from Arm, Right Updated:  03/25/20 0315     Blood Culture No growth at 4 days    Fungus Culture - Swab, Peritoneum [[id number]] Collected:  03/17/20 1559    Lab Status:  Preliminary result Specimen:  Swab from Peritoneum Updated:  03/24/20 1815     Fungus Culture No fungus isolated at 1 week    AFB Culture - Swab, Peritoneum [[id number]] Collected:  03/17/20 1559    Lab Status:  Preliminary result Specimen:  Swab from Peritoneum Updated:  03/24/20 1815     AFB Culture No AFB isolated at 1 week     AFB Stain No acid fast bacilli seen on concentrated smear    Clostridium Difficile Toxin - Stool, Per Rectum [[id number]] Collected:  03/24/20 0758    Lab Status:  Final result Specimen:  Stool from Per Rectum Updated:  03/24/20 0937    Narrative:        The following orders were created for panel order Clostridium Difficile Toxin - Stool, Per Rectum.  Procedure                               Abnormality         Status                     ---------                               -----------         ------                     Clostridium Difficile To...[[id number]]  Normal              Final result                 Please view results for these tests on the individual orders.    Clostridium Difficile Toxin, PCR - Stool, Per Rectum [[id number]]  (Normal) Collected:  03/24/20 0758    Lab Status:  Final result Specimen:  Stool from Per Rectum Updated:  03/24/20 0937     C. Difficile Toxins by PCR Not Detected    Narrative:       Performance characteristics of test not established for patients <2 years of age.  Negative for Toxigenic C. Difficile    CORONAVIRUS (COVID-19), REFERENCE LABORATORY - Swab, Nasopharynx [[id number]] Collected:  03/21/20 1316    Lab Status:  Final result Specimen:  Swab from Nasopharynx Updated:  03/22/20 1151     Reference Lab Report --     Comment: See scanned report          COVID19 Not Detected    Anaerobic Culture - Swab, Peritoneum [[id number]] Collected:  03/17/20 1559    Lab Status:  Final result Specimen:  Swab from Peritoneum Updated:  03/22/20 0726     Anaerobic Culture No anaerobes isolated at 5 days    S. Pneumo Ag Urine or CSF - Urine, Urine, Clean Catch [[id number]]  (Normal) Collected:  03/21/20 0618    Lab Status:  Final result Specimen:  Urine, Clean Catch Updated:  03/21/20 1338     Strep Pneumo Ag Negative    MRSA Screen, PCR - Swab, Nares [[id number]]  (Normal) Collected:  03/20/20 2359    Lab Status:  Final result Specimen:  Swab from Nares Updated:  03/21/20 0745     MRSA PCR Negative    Narrative:       MRSA Negative    Blood Culture - Blood, Arm, Right [[id number]] Collected:  03/16/20 0018    Lab Status:  Final result Specimen:  Blood from Arm, Right Updated:  03/21/20 0515     Blood Culture No growth at 5 days    Blood  Culture - Blood, Arm, Left [[id number]] Collected:  03/16/20 0018    Lab Status:  Final result Specimen:  Blood from Arm, Left Updated:  03/21/20 0515     Blood Culture No growth at 5 days    Respiratory Panel, PCR - Swab, Nasopharynx [[id number]]  (Normal) Collected:  03/20/20 1368    Lab Status:  Final result Specimen:  Swab from Nasopharynx Updated:  03/21/20 0208     ADENOVIRUS, PCR Not Detected     Coronavirus 229E Not Detected     Coronavirus HKU1 Not Detected     Coronavirus NL63 Not Detected     Coronavirus OC43 Not Detected     Human Metapneumovirus Not Detected     Human Rhinovirus/Enterovirus Not Detected     Influenza B PCR Not Detected     Parainfluenza Virus 1 Not Detected     Parainfluenza Virus 2 Not Detected     Parainfluenza Virus 3 Not Detected     Parainfluenza Virus 4 Not Detected     Bordetella pertussis pcr Not Detected     Influenza A H1 2009 PCR Not Detected     Chlamydophila pneumoniae PCR Not Detected     Mycoplasma pneumo by PCR Not Detected     Influenza A PCR Not Detected     Influenza A H3 Not Detected     Influenza A H1 Not Detected     RSV, PCR Not Detected     Bordetella parapertussis PCR Not Detected    Narrative:       The coronavirus on the RVP is NOT COVID-19 and is NOT indicative of infection with COVID-19.     Wound Culture - Swab, Peritoneum [[id number]] Collected:  03/17/20 1559    Lab Status:  Final result Specimen:  Swab from Peritoneum Updated:  03/20/20 0805     Wound Culture Light growth (2+) Normal Fecal Syeda     Gram Stain Many (4+) Red blood cells      Moderate (3+) WBCs seen      No organisms seen          Imaging Results (Last 24 Hours)     Procedure Component Value Units Date/Time    CT Abdomen Pelvis With & Without Contrast [[id number]] Collected:  03/24/20 1022     Updated:  03/24/20 1656    Narrative:       EXAMINATION: CT ABDOMEN AND PELVIS W WO CONTRAST-      INDICATION: Sepsis; Z74.09-Other reduced mobility; K65.9-Peritonitis,  unspecified;  K57.92-Diverticulitis of intestine, part unspecified,  without perforation or abscess without bleeding; I10-Essential (primary)  hypertension; K57.20-Diverticulitis of large intestine with perforation  and abscess without bleeding.      TECHNIQUE: CT abdomen and pelvis with and without intravenous contrast.     The radiation dose reduction device was turned on for each scan per the  ALARA (As Low as Reasonably Achievable) protocol.     COMPARISON: CT dated 03/20/2020.     FINDINGS: Lung bases demonstrate increase in opacifications within the  right midlung fairly groundglass appearance concerning for acute  airspace disease progression from prior with small right and trace to  small left pleural effusions. Liver without focal lesion, however, mild  diffuse hepatic parenchymal attenuation of hepatic steatosis.  Gallbladder is surgically absent. No biliary dilatation. Pancreas and  spleen unremarkable. Adrenals without distinct nodule. Kidneys without  hydronephrosis or hydroureter. No bulky retroperitoneal adenopathy.  Atherosclerotic nonaneurysmal patent abdominal aorta. Portal veins and  IVC patent. GI tract evaluation demonstrates postsurgical change from  left lower quadrant in ostomy formation and compartment pouch appearance  with trace ascites noted throughout the abdomen and mesenteric edema  along with the soft tissue drain to the left lower quadrant extending to  the right pelvis without loculated fluid collection. Liver is of  moderate distention without focal thickening. Pessary in place. Fibroid  uterus with intramural fibroid creating mass effect on the left  hemipelvis, however no bulky pelvic adenopathy. Diffuse body wall edema.  No aggressive osseous lesions.       Impression:       1. Increasing pulmonary opacifications in the partially visualized right  midlung from prior comparison concerning for worsening airspace disease  along with increasing small volume right and trace to small volume  left  pleural effusion from prior.  2. Trace abdominopelvic ascites and postsurgical changes with left lower  quadrant and ostomy formation and sigmoid colectomy with soft tissue  drain in place. No loculated fluid collection to suggest abscess or  intraabdominal free air.  3. Diffuse body wall edema.     D:  03/24/2020  E:  03/24/2020     This report was finalized on 3/24/2020 4:53 PM by Dr. Arturo Joseph.                  I have reviewed the medications:  Scheduled Meds:    acetaminophen 650 mg Oral Q8H   busPIRone 15 mg Oral Daily   calcitonin (salmon) 1 spray Alternating Nares Daily   Fat Emul Fish Oil/Plant Based 200 mL Intravenous Q24H (TPN)   gabapentin 300 mg Oral Nightly   heparin (porcine) 5,000 Units Subcutaneous Q8H   insulin regular 2-7 Units Subcutaneous Q6H   levothyroxine 88 mcg Oral Q AM   loratadine 10 mg Oral Daily   metoprolol succinate  mg Oral Daily   Mirabegron ER 50 mg Oral Daily   piperacillin-tazobactam 4.5 g Intravenous Q8H   sodium chloride 500 mL Intravenous Once   sodium chloride 10 mL Intravenous Q12H   sucralfate 1 g Oral BID AC     Continuous Infusions:    Adult Central 2-in-1 TPN  Last Rate: 70 mL/hr at 03/24/20 1826   Pharmacy to Dose TPN       PRN Meds:.diazePAM  •  HYDROcodone-acetaminophen  •  HYDROmorphone **AND** naloxone  •  LORazepam  •  magnesium sulfate **OR** magnesium sulfate **OR** magnesium sulfate  •  Morphine **OR** Morphine  •  ondansetron  •  Pharmacy to Dose TPN  •  potassium chloride  •  potassium phosphate infusion greater than 15 mMoles **OR** potassium phosphate infusion greater than 15 mMoles **OR** potassium phosphate infusion 15 mMol or less **OR** sodium phosphate IVPB **OR** sodium phosphate IVPB **OR** sodium phosphate IVPB  •  sodium chloride  •  sodium chloride  •  sodium chloride  •  sodium chloride    Assessment/Plan   Assessment & Plan     Active Hospital Problems    Diagnosis  POA   • **Perforation of sigmoid colon due to diverticulitis  [K57.20]  Unknown   • Acute diverticulitis [K57.92]  Yes   • Sepsis (CMS/HCC) [A41.9]  Yes   • Lactic acidosis [E87.2]  Unknown   • Neutrophilic leukocytosis [D72.9]  Unknown   • Acute kidney injury (CMS/HCC) [N17.9]  Unknown   • Acute hyperglycemia [R73.9]  Unknown   • Arthritis [M19.90]  Unknown   • Overactive bladder [N32.81]  Unknown   • Dyspepsia [R10.13]  Unknown   • Essential hypertension [I10]  Unknown   • Acquired hypothyroidism [E03.9]  Yes      Resolved Hospital Problems   No resolved problems to display.        Brief Hospital Course to date:  Neema Richard is a 79 y.o. female  admitted for diverticulitis S microperforation and sepsis.  She underwent repeat CT imaging concerning for free air.  She went to surgery 3/17 for transverse colostomy.  Patient had CT abd/pel 3/20 with noted ground glass opacities that could not rule out Covid -19. Patient was started on vanc and doxy in addition to invanz and transferred to  3/21. Covid - 19 testing sent to [facility] and negative. Patient now transitioned to regular telemetry bed, currently being treated with IV abx for PNA as well as diverticulitis. Post-op course being assisted in management by CRS. Some concerns of worsening distension/leukocytosis- CT A&P done 3/24 with notes of worsening airspace dz with no evidence of intraabdominal abscess. Gram stain from wound cx obtained 3/23 with scant growth psuedomonas/enterococcus  Patient remains on TPN for nutritional support.     This patient's problems and plans were partially entered by my partner and updated as appropriate by me 03/25/20.               Diverticulitis with free air on CT s/p surgical repair 3/17  Abdominal wound dehiscence- wound cx with pseudomonas/enteroccocus  - S/p drainage of lower abdominal abcess, creation of distal transverse colostomy with CRS   - Continue Zosyn, ID is assisting  - noted repeat CT A&P 3/24 with no evidence of abscess  - gram stain from wound cx obtained 3/23 with  scant growth psuedomonas/enterococcus  - Continue TPN and GI soft diet     Ground glass opacities  Hypoxia   Worsening airspace dz  Leukocytosis , improving   Sepsis resolved  - noted was initially COVID r/o with negative test this admission, also negative resp PCR panel  - Slightly worsening leukocytosis with increased dyspnea 3/23- CXR repeated with worsening airspace dz, CT A&P (3/24) also notes worsening airspace dz. Remains on Zosyn at this time with ID assisting. Is stable from a pulmonary standpoint at this time.     Encephalopathy  Confusion, resolved  - improved, family remains at bedside, continue to use conservative redirection     HTN  Controlled continue current medicines     Hypothyroid  Continue levothyroxine     Arthritis  Continue gabapentin     Overactive bladder  Dyspepsia      DVT Prophylaxis:  heparin     Disposition: I expect the patient to be discharged once improved from a respiratory standpoint, cleared by surgery, final ID plan in place and improved nutritional intake (remains on TPN). Plan is to return home with HH at discharge (patient lives with friend who is nurse).            CODE STATUS:   Code Status and Medical Interventions:   Ordered at: 03/17/20 1903     Level Of Support Discussed With:    Patient     Code Status:    CPR     Medical Interventions (Level of Support Prior to Arrest):    Full         Electronically signed by Vvi Siddiqi MD, 03/25/20, 10:03.

## 2020-03-25 NOTE — PROGRESS NOTES
Confusion without tachycardia  Restrained  Daughter at bedside, very helpful in her mother's care    CT scan looks like ileus    Reviewed with nursing staff earlier today with interval brisk stool per stoma.    Slight decrease in white count down to 16,000    TPN running    Remains on soft diet with protein supplements    Incision looks good  Soft abdomen without significant tenderness    I think we have turned the corner  Ambulation and diet as tolerated, ambulation encouraged in particular.  A.m. labs.

## 2020-03-25 NOTE — PLAN OF CARE
Problem: Patient Care Overview  Goal: Plan of Care Review  Outcome: Ongoing (interventions implemented as appropriate)  Flowsheets (Taken 3/25/2020 0930)  Progress: improving  Plan of Care Reviewed With: patient; daughter; other (see comments) (Komal BAEZA)  Note:   WOC nurse f/u for colostomy. Appliance intact with no leakage. Stoma red and moist. Small amount creamy brown stool in pouch. Pt sitting in chair; slightly confused at this time. Education reviewed with pt and daughter; diet and fluid recommendations, ordering of supplies, care of appliance reviewed. Pt eating small amount; TPN continues. Ambulation encouraged. WOC nurse will f/u and plan appliance change in 1-2 days. Please contact WOC nurse as needed for concerns.

## 2020-03-25 NOTE — PROGRESS NOTES
Regular bowel function, good tolerance of diet from outside food sources.    Data reviewed    Abdomen is soft  Incision appears satisfactory noting contaminated nature of procedure and carlos left to promote drainage from known contaminated incision.  Good stoma function    Plans to stop TPN.  Discharge instructions reviewed  Patient has a friend that lives in her house who is a 30-year  preop nurse at [employer] and will facilitate wound care and antibiotic therapy.  Okay for discharge from my standpoint tomorrow as long as tolerating diet and a plan is in place for home antibiotic therapy  Office follow-up in about 2 weeks for staple removal or her friend can do it at home depending on social distance goals at that time.

## 2020-03-25 NOTE — PROGRESS NOTES
Pharmacy Parenteral Nutrition Evaluation  Neema Richard is a  79 y.o. female receiving TPN.     Indication: ileus associated with diffuse peritonitis  Consulting Physician: George    Labs  Results from last 7 days   Lab Units 03/25/20  0510 03/24/20  0420 03/23/20  1306 03/23/20  0658   SODIUM mmol/L 141 141 137 130*   POTASSIUM mmol/L 4.2 3.7 4.0 3.9   CHLORIDE mmol/L 106 107 104 98   CO2 mmol/L 22.0 23.0 24.0 23.0   BUN mg/dL 17 19 17 17   CREATININE mg/dL 0.41* 0.47* 0.37* 0.32*   CALCIUM mg/dL 7.6* 7.9* 8.2* 7.9*   BILIRUBIN mg/dL 0.2  --  <0.2* <0.2*   ALK PHOS U/L 70  --  60 58   ALT (SGPT) U/L 10  --  6 7   AST (SGOT) U/L 16  --  13 13   GLUCOSE mg/dL 147* 163* 170* 173*   Ionized Calcium: 1.25 (3/23)    Results from last 7 days   Lab Units 03/25/20  0510 03/24/20  0420 03/23/20  1306 03/23/20  0658 03/22/20  0639 03/21/20  1525   MAGNESIUM mg/dL 2.4 1.8  --  1.9 2.1 1.6   PHOSPHORUS mg/dL  --  3.0  --  3.1 2.7 3.4   PREALBUMIN mg/dL  --   --  5.9*  --  4.1* 3.7*     Results from last 7 days   Lab Units 03/25/20  0510 03/24/20  0420 03/23/20  0658   WBC 10*3/mm3 23.67* 16.93* 22.11*   HEMOGLOBIN g/dL 8.9* 8.0* 9.4*   HEMATOCRIT % 28.1* 25.3* 29.1*   PLATELETS 10*3/mm3 556* 475* 503*     Triglycerides   Date Value Ref Range Status   03/21/2020 172 (H) 0 - 150 mg/dL Final     Comment:     Falsely depressed results may occur on samples drawn from patients receiving N-Acetylcysteine (NAC) or Metamizole.       estimated creatinine clearance is 57.3 mL/min (A) (by C-G formula based on SCr of 0.41 mg/dL (L)).    Intake & Output (last 3 days)         03/22 0701 - 03/23 0700 03/23 0701 - 03/24 0700 03/24 0701 - 03/25 0700 03/25 0701 - 03/26 0700    P.O.  600 120 200    I.V. (mL/kg)   100 (1.6)     IV Piggyback 100 200 200     TPN 2300 755 2584.9     Total Intake(mL/kg) 2400 (37.7) 1555 (24.7) 3004.9 (48) 200 (3.2)    Urine (mL/kg/hr) 300 (0.2) 0 (0) 600 (0.4)     Drains 16  50     Stool 100 100 150 100    Total Output  416 100 800 100    Net +1984 +1455 +2204.9 +100            Urine Unmeasured Occurrence 12 x 7 x 9 x     Stool Unmeasured Occurrence 1 x             Dietitian Recommendations  Diet Orders (active) (From admission, onward)       Start     Ordered    03/21/20 0001  NPO Diet  Diet Effective Midnight      03/20/20 1632    03/20/20 1414  Dietary Nutrition Supplements Boost Plus; vanilla  Daily With Breakfast, Lunch & Dinner     Comments:  Boost Plus (vanilla) 3x daily    03/20/20 1413    03/17/20 2200  Snack: Chewing gum x30 minutes three times daily to increase saliva flow and provide gas pain relief. Do not give to ileostomy patients.  3 Times Daily     Comments:  Chewing gum x30 minutes three times daily to increase saliva flow and provide gas pain relief.      Do not give to ileostomy patients.    03/17/20 1903                  Current TPN Regimen Recommendation:  Dextrose 14.5% / Amino Acid 5% at goal rate of 35 mL/hr.  20% Lipid Emulsion 100mL every 24 hours.    Assessment/Plan  Pharmacy to dose TPN for ileus associated with diffuse peritonitis.  Macronutrient recommendations per RD - 14.5%D, 5%AA at new decreased goal rate of 35 mL/hr. Have also decreased SMOFlipids to 100 mL daily. Standard electrolytes ordered.  Low dose SSI ordered per protocol. Patient is diabetic, A1C of 6.00%.  Will replace electrolytes exogenously if needed - magnesium, potassium, and phosphorus replacement protocols ordered. All are WNL today.  Pharmacy will continue to follow and adjust dose based on renal function, electrolyte levels, and clinical status.    Earl Phoenix [facility]  3/25/2020  13:36

## 2020-03-25 NOTE — PROGRESS NOTES
Continued Stay Note  [facility]     Patient Name: Neema Richard  MRN: [id number]  Today's Date: 3/25/2020    Admit Date: 3/15/2020    Discharge Plan     Row Name 03/25/20 1132       Plan    Plan  discharge plan    Plan Comments  Plan remains home with [facility]. Pt not medically ready for discharge as pt remains on TPN.  CM will cont to follow        Discharge Codes    No documentation.       Expected Discharge Date and Time     Expected Discharge Date Expected Discharge Time    Mar 26, 2020             Pippa Ovalle RN

## 2020-03-25 NOTE — THERAPY TREATMENT NOTE
Acute Care - Occupational Therapy Treatment Note   [place]     Patient Name: Neema Richard  : 1940  MRN: [id number]  Today's Date: 3/25/2020             Admit Date: 3/15/2020       ICD-10-CM ICD-9-CM   1. Impaired mobility and activities of daily living Z74.09 799.89   2. Peritonitis (CMS/HCC) K65.9 567.9   3. Diverticulitis K57.92 562.11   4. Essential hypertension I10 401.9   5. Perforation of sigmoid colon due to diverticulitis K57.20 562.11     Patient Active Problem List   Diagnosis   • Well woman exam with routine gynecological exam   • Osteoporosis - GYN   • Essential hypertension   • Acquired hypothyroidism   • IBS-d (takes Buspar)   • DCIS (ductal carcinoma in situ)   • Cystocele and rectocele with complete uterovaginal prolapse   • Mixed urge and stress incontinence   • Pessary maintenance   • High cholesterol   • Acute diverticulitis   • Sepsis (CMS/HCC)   • Lactic acidosis   • Neutrophilic leukocytosis   • Perforation of sigmoid colon due to diverticulitis   • Acute kidney injury (CMS/HCC)   • Acute hyperglycemia   • Arthritis   • Overactive bladder   • Dyspepsia     Past Medical History:   Diagnosis Date   • Acquired hypothyroidism    • Colon polyp - sees Dr. Vazquez 2016   • DCIS (ductal carcinoma in situ) 2017    Dx at the time of breast reduction. ER and IA (+)   • Essential hypertension    • High cholesterol    • Hx of herpes genitalis    • IBS-d (takes Buspar)      Past Surgical History:   Procedure Laterality Date   • BILATERAL BREAST REDUCTION Bilateral    • BREAST LUMPECTOMY Left    • CATARACT EXTRACTION, BILATERAL Bilateral    • CERVICAL CONIZATION     • COLON RESECTION N/A 3/17/2020    Procedure: LOW ANTERIOR COLON RESECTION, MOBILIZATION OF SPLENIC FLEXURE, DRAINAGE OF ABSCESS AND COLOSTOMY CREATION;  Surgeon: Sravan Vazquez MD;  Location: [facility];  Service: General;  Laterality: N/A;   • FACIAL COSMETIC SURGERY     • FACIAL COSMETIC  SURGERY  2012   • HEMORRHOIDECTOMY  1974   • LAPAROSCOPIC CHOLECYSTECTOMY  2010   • REDUCTION MAMMAPLASTY Bilateral 04/13/2017    DCIS   • SINUS SURGERY  1992   • THYROIDECTOMY, PARTIAL  1963   • TONSILLECTOMY  1953   • VARICOSE VEIN SURGERY Bilateral 1981       Therapy Treatment    Rehabilitation Treatment Summary     Row Name 03/25/20 0750             Treatment Time/Intention    Discipline  occupational therapist  -AC      Document Type  therapy note (daily note)  -AC      Patient Effort  good  -AC      Existing Precautions/Restrictions  (S) fall RADHA drain, colostomy, tests (-) for Covid 19, confusion)  -AC      Patient Response to Treatment  pt agreeable in bed activity only.  Pt has nights and days confused; pt thought it was in the middle of the night  -AC      Recorded by [AC] Sho Jimenez, OT 03/25/20 0946      Row Name 03/25/20 0750             Vital Signs    Pre Systolic BP Rehab  147  -AC      Pre Treatment Diastolic BP  57  -AC      Post Systolic BP Rehab  132  -AC      Post Treatment Diastolic BP  52  -AC      Pretreatment Heart Rate (beats/min)  83  -AC      Posttreatment Heart Rate (beats/min)  74  -AC      Pre SpO2 (%)  95  -AC      O2 Delivery Pre Treatment  supplemental O2  -AC      Intra SpO2 (%)  90  -AC      O2 Delivery Intra Treatment  supplemental O2  -AC      Post SpO2 (%)  90  -AC      O2 Delivery Post Treatment  supplemental O2  -AC      Pre Patient Position  Supine  -AC      Intra Patient Position  Supine  -AC      Recorded by [AC] Sho Jimenez, OT 03/25/20 0946      Row Name 03/25/20 0750             Cognitive Assessment/Intervention    Additional Documentation  Cognitive Assessment/Intervention (Group)  -AC      Recorded by [AC] Sho Jimenez, OT 03/25/20 0946      Row Name 03/25/20 0750             Cognitive Assessment/Intervention- PT/OT    Affect/Mental Status (Cognitive)  confused  -AC      Orientation Status (Cognition)  oriented x 3  -AC      Follows Commands (Cognition)   follows one step commands;over 90% accuracy;verbal cues/prompting required  -AC      Cognitive Assessment/Intervention Comment  pt thought it was night time  -AC      Recorded by [AC] Sho Jimenez, OT 03/25/20 0946      Row Name 03/25/20 0750             Safety Issues, Functional Mobility    Safety Issues Affecting Function (Mobility)  awareness of need for assistance;insight into deficits/self awareness;judgment;safety precaution awareness;safety precautions follow-through/compliance  -AC      Impairments Affecting Function (Mobility)  balance;cognition;endurance/activity tolerance;pain;strength  -AC      Recorded by [AC] Sho Jimenez, OT 03/25/20 0946      Row Name 03/25/20 0750             Bed Mobility Assessment/Treatment    Comment (Bed Mobility)  pt declined  -AC      Recorded by [AC] Sho Jimenez, OT 03/25/20 0946      Row Name 03/25/20 0750             Functional Mobility    Functional Mobility- Comment  pt declined  -AC      Recorded by [AC] Sho Jimenez, OT 03/25/20 0946      Row Name 03/25/20 0750             Transfer Assessment/Treatment    Comment (Transfers)  pt declined  -AC      Recorded by [AC] Sho iJmenez, OT 03/25/20 0946      Row Name 03/25/20 0750             ADL Assessment/Intervention    13840 - OT Self Care/Mgmt Minutes  3  -AC      BADL Assessment/Intervention  lower body dressing  -AC      Recorded by [AC] Sho Jimenez, OT 03/25/20 0946      Row Name 03/25/20 0750             Lower Body Dressing Assessment/Training    Lower Body Dressing [place] Level  doff;don;socks;independent  -AC      Lower Body Dressing Position  long sitting  -AC      Comment (Lower Body Dressing)  crossleg technique; required increased time and effort  -AC      Recorded by [AC] Sho Jimenez, OT 03/25/20 0946      Row Name 03/25/20 0750             Grooming Assessment/Training    Comment (Grooming)  pt declined to wassh face and brush teeth stating she already brushed teeth 3 times  -AC       Recorded by [AC] Sho Jimenez, OT 03/25/20 0946      Row Name 03/25/20 0750             BADL Safety/Performance    Impairments, BADL Safety/Performance  balance;endurance/activity tolerance;pain;strength  -AC      Skilled BADL Treatment/Intervention  BADL process/adaptation training  -AC      Progress in BADL Status  improvement noted  -AC      Recorded by [AC] Sho Jimenez, OT 03/25/20 0946      Row Name 03/25/20 0750             Therapeutic Exercise    32707 - OT Therapeutic Exercise Minutes  20  -AC      Recorded by [AC] Sho Jimenez, OT 03/25/20 0946      Row Name 03/25/20 0750             Therapeutic Exercise    Upper Extremity Range of Motion (Therapeutic Exercise)  shoulder flexion/extension, bilateral;shoulder horizontal abduction/adduction, bilateral;elbow flexion/extension, bilateral  -AC      Exercise Type (Therapeutic Exercise)  AROM (active range of motion)  -AC      Position (Therapeutic Exercise)  supine  -AC      Sets/Reps (Therapeutic Exercise)  2/20  -AC      Expected Outcome (Therapeutic Exercise)  improve functional tolerance, self-care activity  -AC      Comment (Therapeutic Exercise)  pt required 3 restbreaks  -AC      Recorded by [AC] Sho Jimenez, OT 03/25/20 0946      Row Name 03/25/20 0750             Positioning and Restraints    Pre-Treatment Position  in bed  -AC      Post Treatment Position  bed  -AC      In Bed  fowlers;call light within reach;encouraged to call for assist;exit alarm on;with family/caregiver  -AC      Recorded by [AC] Sho Jimenez, OT 03/25/20 0946      Row Name 03/25/20 0750             Pain Assessment    Additional Documentation  Pain Scale: Numbers Pre/Post-Treatment (Group)  -AC      Recorded by [AC] Sho Jimenez, OT 03/25/20 0946      Row Name 03/25/20 0750             Pain Scale: Numbers Pre/Post-Treatment    Pain Scale: Numbers, Pretreatment  2/10  -AC      Pain Scale: Numbers, Post-Treatment  2/10  -AC      Pain Location - Orientation  incisional   -AC      Pain Location  abdomen  -AC      Pain Intervention(s)  Repositioned  -AC      Recorded by [AC] Sho Jimenez, OT 03/25/20 0946      Row Name                Wound 03/17/20 1714 upper;midline abdomen Incision    Wound - Properties Group Date first assessed: 03/17/20 [EL] Time first assessed: 1714 [EL] Present on Hospital Admission: N [EL] Orientation: upper;midline [EL] Location: abdomen [EL] Primary Wound Type: Incision [EL] Recorded by:  [EL] Dominga Chavez RN 03/17/20 1714    Row Name 03/25/20 0750             Plan of Care Review    Plan of Care Reviewed With  patient;daughter  -AC      Outcome Summary  Pt with confusion and has days and nights mixed up.  Pt required encouragement to participate stating she was fatigued.  Pt completed 2 sets x 20 BUE AROM given 3 rest breaks.  Pt was independent to don/ doff socks in long sitting using crossleg technique. Pt is limited by weakness and confusion.  Continue with OT POC to advance pt toward increased independence with self care.   -AC      Recorded by [AC] Sho Jimenez, OT 03/25/20 0946      Row Name 03/25/20 0750             Outcome Summary/Treatment Plan (OT)    Daily Summary of Progress (OT)  progress toward functional goals is gradual  -AC      Anticipated Discharge Disposition (OT)  home with 24/7 care;home with home health  -AC      Recorded by [AC] Sho Jimenez, OT 03/25/20 0946        User Key  (r) = Recorded By, (t) = Taken By, (c) = Cosigned By    Initials Name Effective Dates Discipline    AC Sho Jimenez, OT 06/23/15 -  OT    Dominga Earl RN 04/10/19 -  Nurse        Wound 03/17/20 1714 upper;midline abdomen Incision (Active)   Dressing Appearance dry;intact 3/25/2020  8:37 AM   Closure FLOR 3/25/2020  8:37 AM   Base dressing in place, unable to visualize 3/25/2020  8:37 AM   Periwound intact 3/24/2020  8:00 PM   Periwound Temperature warm 3/24/2020  8:00 PM   Periwound Skin Turgor soft 3/24/2020  8:00 PM   Edges rolled/closed  3/24/2020  8:00 PM   Drainage Characteristics/Odor serosanguineous 3/24/2020  8:00 PM   Drainage Amount small;scant 3/24/2020  8:00 PM   Care, Wound cleansed with;sterile normal saline 3/24/2020  8:00 PM   Dressing Care, Wound dressing applied 3/24/2020  8:00 PM   Periwound Care, Wound absorptive dressing applied 3/24/2020  8:00 PM           OT Recommendation and Plan  Outcome Summary/Treatment Plan (OT)  Daily Summary of Progress (OT): progress toward functional goals is gradual  Anticipated Discharge Disposition (OT): home with 24/7 care, home with home health  Daily Summary of Progress (OT): progress toward functional goals is gradual  Plan of Care Review  Plan of Care Reviewed With: patient, daughter  Plan of Care Reviewed With: patient, daughter  Outcome Summary: Pt with confusion and has days and nights mixed up.  Pt required encouragement to participate stating she was fatigued.  Pt completed 2 sets x 20 BUE AROM given 3 rest breaks.  Pt was independent to don/ doff socks in long sitting using crossleg technique. Pt is limited by weakness and confusion.  Continue with OT POC to advance pt toward increased independence with self care.   Outcome Measures     Row Name 03/25/20 0750 03/23/20 1032          How much help from another is currently needed...    Putting on and taking off regular lower body clothing?  3  -AC  3  -AC     Bathing (including washing, rinsing, and drying)  3  -AC  2  -AC     Toileting (which includes using toilet bed pan or urinal)  3  -AC  3  -AC     Putting on and taking off regular upper body clothing  3  -AC  3  -AC     Taking care of personal grooming (such as brushing teeth)  3  -AC  3  -AC     Eating meals  4  -AC  4  -AC     AM-PAC 6 Clicks Score (OT)  19  -AC  18  -AC        Functional Assessment    Outcome Measure Options  AM-PAC 6 Clicks Daily Activity (OT)  -AC  AM-PAC 6 Clicks Daily Activity (OT)  -AC       User Key  (r) = Recorded By, (t) = Taken By, (c) = Cosigned By     Initials Name Provider Type    Sho North OT Occupational Therapist           Time Calculation:   Time Calculation- OT     Row Name 03/25/20 0750             Time Calculation- OT    OT Start Time  0750  -      OT Received On  03/25/20  -      OT Goal Re-Cert Due Date  03/28/20  -         Timed Charges    25809 - OT Therapeutic Exercise Minutes  20  -      28569 - OT Self Care/Mgmt Minutes  3  -AC        User Key  (r) = Recorded By, (t) = Taken By, (c) = Cosigned By    Initials Name Provider Type    AC Sho Jimenez, OT Occupational Therapist        Therapy Charges for Today     Code Description Service Date Service Provider Modifiers Qty    20220263408 HC OT THER PROC EA 15 MIN 3/25/2020 Sho Jimenez OT GO 2               Sho Jimenez OT  3/25/2020

## 2020-03-25 NOTE — PLAN OF CARE
Problem: Patient Care Overview  Goal: Plan of Care Review  Flowsheets (Taken 3/25/2020 0750)  Outcome Summary: Pt with confusion and has days and nights mixed up.  Pt required encouragement to participate stating she was fatigued.  Pt completed 2 sets x 20 BUE AROM given 3 rest breaks.  Pt was independent to don/ doff socks in long sitting using crossleg technique. Pt is limited by weakness and confusion.  Continue with OT POC to advance pt toward increased independence with self care.  Pt  may benefit from rehab, but if pt returns home instead she will require 24/7 assist  d/t confusion/ decreased safety awareness.

## 2020-03-25 NOTE — PLAN OF CARE
Problem: Patient Care Overview  Goal: Plan of Care Review  Outcome: Ongoing (interventions implemented as appropriate)  Flowsheets  Taken 3/25/2020 1659  Progress: no change  Taken 3/25/2020 1600  Plan of Care Reviewed With: patient  Note:   VSS. Pt on room air. Continues to be confused, however able to redirect with conversation. Daughter was at bedside this morning, went home to get some rest. Pt up in chair for several hours. Ambulating in hallways. Ostomy putting out. Good UOP. No complaints of pain or nausea. PICC dressing changed. New IV. Abdomen soft. Not eating much. TPN continued. No new concerns. Will continue to monitor.

## 2020-03-26 VITALS
SYSTOLIC BLOOD PRESSURE: 146 MMHG | HEIGHT: 67 IN | BODY MASS INDEX: 22.43 KG/M2 | DIASTOLIC BLOOD PRESSURE: 58 MMHG | HEART RATE: 89 BPM | TEMPERATURE: 98.3 F | OXYGEN SATURATION: 91 % | RESPIRATION RATE: 16 BRPM | WEIGHT: 142.9 LBS

## 2020-03-26 PROBLEM — N17.9 ACUTE KIDNEY INJURY: Status: RESOLVED | Noted: 2020-03-15 | Resolved: 2020-03-26

## 2020-03-26 PROBLEM — E87.20 LACTIC ACIDOSIS: Status: RESOLVED | Noted: 2020-03-15 | Resolved: 2020-03-26

## 2020-03-26 PROBLEM — K57.92 ACUTE DIVERTICULITIS: Status: RESOLVED | Noted: 2020-03-15 | Resolved: 2020-03-26

## 2020-03-26 PROBLEM — A41.9 SEPSIS (HCC): Status: RESOLVED | Noted: 2020-03-15 | Resolved: 2020-03-26

## 2020-03-26 PROBLEM — K57.20 PERFORATION OF SIGMOID COLON DUE TO DIVERTICULITIS: Status: RESOLVED | Noted: 2020-03-15 | Resolved: 2020-03-26

## 2020-03-26 PROBLEM — R73.9 ACUTE HYPERGLYCEMIA: Status: RESOLVED | Noted: 2020-03-15 | Resolved: 2020-03-26

## 2020-03-26 LAB
ANION GAP SERPL CALCULATED.3IONS-SCNC: 12 MMOL/L (ref 5–15)
BACTERIA SPEC AEROBE CULT: ABNORMAL
BACTERIA SPEC AEROBE CULT: ABNORMAL
BACTERIA SPEC AEROBE CULT: NORMAL
BACTERIA SPEC AEROBE CULT: NORMAL
BASOPHILS # BLD AUTO: 0.16 10*3/MM3 (ref 0–0.2)
BASOPHILS NFR BLD AUTO: 0.7 % (ref 0–1.5)
BUN BLD-MCNC: 17 MG/DL (ref 8–23)
BUN/CREAT SERPL: 34.7 (ref 7–25)
CALCIUM SPEC-SCNC: 8.2 MG/DL (ref 8.6–10.5)
CHLORIDE SERPL-SCNC: 106 MMOL/L (ref 98–107)
CO2 SERPL-SCNC: 22 MMOL/L (ref 22–29)
CREAT BLD-MCNC: 0.49 MG/DL (ref 0.57–1)
DEPRECATED RDW RBC AUTO: 48 FL (ref 37–54)
EOSINOPHIL # BLD AUTO: 1.16 10*3/MM3 (ref 0–0.4)
EOSINOPHIL NFR BLD AUTO: 5 % (ref 0.3–6.2)
ERYTHROCYTE [DISTWIDTH] IN BLOOD BY AUTOMATED COUNT: 15.1 % (ref 12.3–15.4)
GFR SERPL CREATININE-BSD FRML MDRD: 122 ML/MIN/1.73
GLUCOSE BLD-MCNC: 115 MG/DL (ref 65–99)
GRAM STN SPEC: ABNORMAL
HCT VFR BLD AUTO: 28.3 % (ref 34–46.6)
HGB BLD-MCNC: 9 G/DL (ref 12–15.9)
IMM GRANULOCYTES # BLD AUTO: 0.91 10*3/MM3 (ref 0–0.05)
IMM GRANULOCYTES NFR BLD AUTO: 3.9 % (ref 0–0.5)
LYMPHOCYTES # BLD AUTO: 2.8 10*3/MM3 (ref 0.7–3.1)
LYMPHOCYTES NFR BLD AUTO: 12 % (ref 19.6–45.3)
MAGNESIUM SERPL-MCNC: 1.9 MG/DL (ref 1.6–2.4)
MCH RBC QN AUTO: 28.2 PG (ref 26.6–33)
MCHC RBC AUTO-ENTMCNC: 31.8 G/DL (ref 31.5–35.7)
MCV RBC AUTO: 88.7 FL (ref 79–97)
MONOCYTES # BLD AUTO: 1.68 10*3/MM3 (ref 0.1–0.9)
MONOCYTES NFR BLD AUTO: 7.2 % (ref 5–12)
NEUTROPHILS # BLD AUTO: 16.6 10*3/MM3 (ref 1.7–7)
NEUTROPHILS NFR BLD AUTO: 71.2 % (ref 42.7–76)
NRBC BLD AUTO-RTO: 0 /100 WBC (ref 0–0.2)
PHOSPHATE SERPL-MCNC: 3.6 MG/DL (ref 2.5–4.5)
PLATELET # BLD AUTO: 613 10*3/MM3 (ref 140–450)
PMV BLD AUTO: 10.2 FL (ref 6–12)
POTASSIUM BLD-SCNC: 3.9 MMOL/L (ref 3.5–5.2)
RBC # BLD AUTO: 3.19 10*6/MM3 (ref 3.77–5.28)
SODIUM BLD-SCNC: 140 MMOL/L (ref 136–145)
WBC NRBC COR # BLD: 23.31 10*3/MM3 (ref 3.4–10.8)

## 2020-03-26 PROCEDURE — 85025 COMPLETE CBC W/AUTO DIFF WBC: CPT | Performed by: COLON & RECTAL SURGERY

## 2020-03-26 PROCEDURE — 84100 ASSAY OF PHOSPHORUS: CPT

## 2020-03-26 PROCEDURE — 83735 ASSAY OF MAGNESIUM: CPT

## 2020-03-26 PROCEDURE — 99239 HOSP IP/OBS DSCHRG MGMT >30: CPT | Performed by: FAMILY MEDICINE

## 2020-03-26 PROCEDURE — 25010000002 PIPERACILLIN SOD-TAZOBACTAM PER 1 G: Performed by: INTERNAL MEDICINE

## 2020-03-26 PROCEDURE — 25010000002 HEPARIN (PORCINE) PER 1000 UNITS: Performed by: COLON & RECTAL SURGERY

## 2020-03-26 PROCEDURE — 80048 BASIC METABOLIC PNL TOTAL CA: CPT | Performed by: COLON & RECTAL SURGERY

## 2020-03-26 RX ORDER — HYDROCODONE BITARTRATE AND ACETAMINOPHEN 5; 325 MG/1; MG/1
1 TABLET ORAL EVERY 6 HOURS PRN
Qty: 12 TABLET | Refills: 0 | Status: SHIPPED | OUTPATIENT
Start: 2020-03-26 | End: 2020-03-29

## 2020-03-26 RX ORDER — GABAPENTIN 300 MG/1
300 CAPSULE ORAL NIGHTLY
Qty: 30 CAPSULE | Refills: 0
Start: 2020-03-26 | End: 2022-09-22 | Stop reason: SDUPTHER

## 2020-03-26 RX ADMIN — LEVOTHYROXINE SODIUM 88 MCG: 88 TABLET ORAL at 05:10

## 2020-03-26 RX ADMIN — ACETAMINOPHEN 650 MG: 325 TABLET, FILM COATED ORAL at 05:10

## 2020-03-26 RX ADMIN — METOPROLOL SUCCINATE 100 MG: 100 TABLET, EXTENDED RELEASE ORAL at 08:42

## 2020-03-26 RX ADMIN — TAZOBACTAM SODIUM AND PIPERACILLIN SODIUM 4.5 G: 500; 4 INJECTION, SOLUTION INTRAVENOUS at 03:28

## 2020-03-26 RX ADMIN — HEPARIN SODIUM 5000 UNITS: 5000 INJECTION, SOLUTION INTRAVENOUS; SUBCUTANEOUS at 05:09

## 2020-03-26 RX ADMIN — CALCITONIN SALMON 1 SPRAY: 200 SPRAY, METERED NASAL at 08:41

## 2020-03-26 RX ADMIN — LORATADINE 10 MG: 10 TABLET ORAL at 08:41

## 2020-03-26 RX ADMIN — TAZOBACTAM SODIUM AND PIPERACILLIN SODIUM 4.5 G: 500; 4 INJECTION, SOLUTION INTRAVENOUS at 11:56

## 2020-03-26 RX ADMIN — SUCRALFATE 1 G: 1 TABLET ORAL at 08:42

## 2020-03-26 NOTE — PROGRESS NOTES
Continued Stay Note  [facility]     Patient Name: Neema Richard  MRN: [id number]  Today's Date: 3/26/2020    Admit Date: 3/15/2020    Discharge Plan     Row Name 03/26/20 1040       Plan    Plan  discharge plan    Plan Comments  Spoke with pt and pt's daughter in room regarding discharge plan.  Plan is home with [facility] and IV antibiotics, supplies and teaching provided by [facility].  Pt's out of pocket cost of IV antibiotics is $85 for 1 week and pt is agreeable to cost.  Pt's daughter plans to stay with pt a few days and pt has a nurse friend that lives in the pt's basement that can/will assist her.  CM will notify [facility] and [facility] at discharge. Daughter will transport pt home.     Final Discharge Disposition Code  06 - home with home health care        Discharge Codes    No documentation.       Expected Discharge Date and Time     Expected Discharge Date Expected Discharge Time    Mar 26, 2020             Pippa Ovalle RN

## 2020-03-26 NOTE — PLAN OF CARE
Patient is alert and oriented with episodes of confusion, forgetfulness.  She is easily redirected, daughter at bedside.  Patient has denied pain and discomforts this shift.  Improved appetite noted, has denied nausea.  Voiding without difficulty and good ostomy output noted.  Vital signs have been stable.  Patient does have some shortness of breath with ambulation but resolves with rest.  Oxygen saturation per pulse oximeter 92-94% on room air. Continue to monitor.

## 2020-03-26 NOTE — DISCHARGE SUMMARY
[facility] Medicine Services  DISCHARGE SUMMARY    Patient Name: Neema Richard  : 1940  MRN: [id number]    Date of Admission: 3/15/2020  5:28 PM  Date of Discharge:  3/26/2020  Primary Care Physician: David Mackey MD    Consults     Date and Time Order Name Status Description    3/16/2020 0724 Inpatient Infectious Diseases Consult Completed     3/16/2020 0030 Inpatient Colorectal Surgery Consult Completed           Hospital Course     Presenting Problem:   Acute diverticulitis [K57.92]    Active Hospital Problems    Diagnosis  POA   • Neutrophilic leukocytosis [D72.9]  Unknown   • Arthritis [M19.90]  Unknown   • Overactive bladder [N32.81]  Unknown   • Dyspepsia [R10.13]  Unknown   • Essential hypertension [I10]  Unknown   • Acquired hypothyroidism [E03.9]  Yes      Resolved Hospital Problems    Diagnosis Date Resolved POA   • **Perforation of sigmoid colon due to diverticulitis [K57.20] 2020 Unknown   • Acute diverticulitis [K57.92] 2020 Yes   • Sepsis (CMS/HCC) [A41.9] 2020 Yes   • Lactic acidosis [E87.2] 2020 Unknown   • Acute kidney injury (CMS/HCC) [N17.9] 2020 Unknown   • Acute hyperglycemia [R73.9] 2020 Unknown          Hospital Course:  Neema Richard is a 79 y.o. female admitted for diverticulitis with microperforation and sepsis.  She underwent repeat CT imaging concerning for free air.  She went to surgery 3/17 for transverse colostomy with Dr. Vazquez.  Patient had CT abd/pel 3/20 with noted ground glass opacities that could not rule out Covid -19. Patient was started on Vanc and Doxy in addition to Invanz and transferred to  on 3/21 for COVID rule out. Covid - 19 testing sent to [facility] and negative. Patient then transitioned to regular telemetry bed and treated with IV abx for PNA as well as diverticulitis. Post-op course being assisted in management by CRS. There were some concerns of worsening distension/leukocytosis so CT  A&P done 3/24 with notes of worsening airspace dz with no evidence of intraabdominal abscess. Gram stain from wound cx obtained 3/23 with scant growth psuedomonas/enterococcus  Patient was placed on TPN in addition to GI soft diet for nutritional support, this was stopped on 3/25. Pt states her diet is slowly improving. She has been taught by ostomy nurses about her ostomy care at home. Pt seen by ID and plan to go home on Zosyn 4.5g IV q8 hours. Pt has assistance at home with IV ABX. PICC in place. Will follow-up with Dr. Bejarano via telemedicine on 3/31. Pt will be discharged home in improved and stable condition.      Discharge Follow Up Recommendations for outpatient labs/diagnostics:  -Follow-up with PCP Dr. Mackey in 3-5 days  -Follow-up with Dr. Bejarano on 3/31 at 10:30 AM  -Follow-up with Dr. Vazquez in 2 weeks for staple removal     Day of Discharge     HPI:   Pt seen and examined. Nursing notes reviewed. No acute events overnight. Pt doing well this morning, has no acute complaints. She wants to go home, daughter at bedside.     Review of Systems  Gen- No fevers, chills  CV- No chest pain, palpitations  Resp- No cough, dyspnea  GI- No N/V/D, abd pain    Vital Signs:   Temp:  [98.3 °F (36.8 °C)-98.5 °F (36.9 °C)] 98.3 °F (36.8 °C)  Heart Rate:  [75-99] 89  Resp:  [16] 16  BP: (146-151)/(58-66) 146/58     Physical Exam:  Constitutional: No acute distress, awake, alert, non-toxic apperance  HENT: NCAT, mucous membranes moist  Respiratory: Clear to auscultation bilaterally, respiratory effort normal   Cardiovascular: RRR, no murmurs, rubs, or gallops, palpable pedal pulses bilaterally  Gastrointestinal: Positive bowel sounds, soft, nontender, non-distended, +ostomy with surrounding pink tissue   Musculoskeletal: No bilateral ankle edema  Psychiatric: Appropriate affect, cooperative  Neurologic: Oriented x 3, strength symmetric in all extremities, Cranial Nerves grossly intact to confrontation, speech  clear  Skin: No rashes    Pertinent  and/or Most Recent Results     Results from last 7 days   Lab Units 03/26/20  0416 03/25/20  0510 03/24/20  0420 03/23/20  1306 03/23/20  0658 03/22/20  0639 03/21/20  1525 03/21/20  0705  03/20/20  0604   WBC 10*3/mm3 23.31* 23.67* 16.93*  --  22.11* 18.96*  --  16.49*  --  17.68*   HEMOGLOBIN g/dL 9.0* 8.9* 8.0*  --  9.4* 10.1*  --  9.9*  --  9.6*   HEMATOCRIT % 28.3* 28.1* 25.3*  --  29.1* 31.5*  --  32.2*  --  29.8*   PLATELETS 10*3/mm3 613* 556* 475*  --  503* 500*  --  418  --  401   SODIUM mmol/L 140 141 141 137 130* 137 140 139   < >  --    POTASSIUM mmol/L 3.9 4.2 3.7 4.0 3.9 3.6 4.5 4.1   < >  --    CHLORIDE mmol/L 106 106 107 104 98 103 108* 106   < >  --    CO2 mmol/L 22.0 22.0 23.0 24.0 23.0 25.0 21.0* 20.0*   < >  --    BUN mg/dL 17 17 19 17 17 16 19 21   < >  --    CREATININE mg/dL 0.49* 0.41* 0.47* 0.37* 0.32* 0.44* 0.34* 0.40*   < >  --    GLUCOSE mg/dL 115* 147* 163* 170* 173* 152* 88 92   < >  --    CALCIUM mg/dL 8.2* 7.6* 7.9* 8.2* 7.9* 8.0* 7.5* 8.0*   < >  --     < > = values in this interval not displayed.     Results from last 7 days   Lab Units 03/25/20  0510 03/23/20  1306 03/23/20  0658 03/22/20  0639 03/21/20  1525   BILIRUBIN mg/dL 0.2 <0.2* <0.2* 0.2 0.2   ALK PHOS U/L 70 60 58 61 68   ALT (SGPT) U/L 10 6 7 6 7   AST (SGOT) U/L 16 13 13 12 13     Results from last 7 days   Lab Units 03/21/20  1525   CHOLESTEROL mg/dL 70   TRIGLYCERIDES mg/dL 172*     Results from last 7 days   Lab Units 03/25/20  0510 03/23/20  0658 03/21/20  0705 03/21/20  0126 03/20/20  1911   PROBNP pg/mL  --   --  2,558.0*  --   --    PROCALCITONIN ng/mL 0.38* 0.70*  --  1.67*  --    LACTATE mmol/L  --   --   --   --  1.2       Brief Urine Lab Results  (Last result in the past 365 days)      Color   Clarity   Blood   Leuk Est   Nitrite   Protein   CREAT   Urine HCG        03/17/20 1107 Yellow Clear Trace Negative Negative 30 mg/dL (1+)               Microbiology Results Abnormal      Procedure Component Value - Date/Time    Wound Culture - Surgical Site, Abdominal Cavity [[id number]]  (Abnormal)  (Susceptibility) Collected:  03/23/20 1430    Lab Status:  Final result Specimen:  Surgical Site from Abdominal Cavity Updated:  03/26/20 0909     Wound Culture Scant growth (1+) Pseudomonas aeruginosa      Scant growth (1+) Enterococcus faecalis     Gram Stain No WBCs or organisms seen    Susceptibility      Pseudomonas aeruginosa     LIZZY     Cefepime Susceptible     Ceftazidime Susceptible     Ciprofloxacin Susceptible     Gentamicin Susceptible     Levofloxacin Susceptible     Piperacillin + Tazobactam Susceptible                Susceptibility      Enterococcus faecalis     LIZZY     Ampicillin Susceptible     Vancomycin Susceptible                    Blood Culture - Blood, Hand, Left [[id number]] Collected:  03/21/20 0110    Lab Status:  Final result Specimen:  Blood from Hand, Left Updated:  03/26/20 0315     Blood Culture No growth at 5 days    Blood Culture - Blood, Arm, Right [[id number]] Collected:  03/21/20 0127    Lab Status:  Final result Specimen:  Blood from Arm, Right Updated:  03/26/20 0315     Blood Culture No growth at 5 days    Fungus Culture - Swab, Peritoneum [[id number]] Collected:  03/17/20 1559    Lab Status:  Preliminary result Specimen:  Swab from Peritoneum Updated:  03/24/20 1815     Fungus Culture No fungus isolated at 1 week    AFB Culture - Swab, Peritoneum [[id number]] Collected:  03/17/20 1559    Lab Status:  Preliminary result Specimen:  Swab from Peritoneum Updated:  03/24/20 1815     AFB Culture No AFB isolated at 1 week     AFB Stain No acid fast bacilli seen on concentrated smear    Clostridium Difficile Toxin - Stool, Per Rectum [[id number]] Collected:  03/24/20 0758    Lab Status:  Final result Specimen:  Stool from Per Rectum Updated:  03/24/20 0937    Narrative:       The following orders were created for panel order Clostridium Difficile Toxin - Stool, Per  Rectum.  Procedure                               Abnormality         Status                     ---------                               -----------         ------                     Clostridium Difficile To...[[id number]]  Normal              Final result                 Please view results for these tests on the individual orders.    Clostridium Difficile Toxin, PCR - Stool, Per Rectum [[id number]]  (Normal) Collected:  03/24/20 0758    Lab Status:  Final result Specimen:  Stool from Per Rectum Updated:  03/24/20 0937     C. Difficile Toxins by PCR Not Detected    Narrative:       Performance characteristics of test not established for patients <2 years of age.  Negative for Toxigenic C. Difficile    CORONAVIRUS (COVID-19), REFERENCE LABORATORY - Swab, Nasopharynx [[id number]] Collected:  03/21/20 1316    Lab Status:  Final result Specimen:  Swab from Nasopharynx Updated:  03/22/20 1151     Reference Lab Report --     Comment: See scanned report          COVID19 Not Detected    Anaerobic Culture - Swab, Peritoneum [[id number]] Collected:  03/17/20 1559    Lab Status:  Final result Specimen:  Swab from Peritoneum Updated:  03/22/20 0726     Anaerobic Culture No anaerobes isolated at 5 days    S. Pneumo Ag Urine or CSF - Urine, Urine, Clean Catch [[id number]]  (Normal) Collected:  03/21/20 0618    Lab Status:  Final result Specimen:  Urine, Clean Catch Updated:  03/21/20 1338     Strep Pneumo Ag Negative    MRSA Screen, PCR - Swab, Nares [[id number]]  (Normal) Collected:  03/20/20 2359    Lab Status:  Final result Specimen:  Swab from Nares Updated:  03/21/20 0745     MRSA PCR Negative    Narrative:       MRSA Negative    Blood Culture - Blood, Arm, Right [[id number]] Collected:  03/16/20 0018    Lab Status:  Final result Specimen:  Blood from Arm, Right Updated:  03/21/20 0515     Blood Culture No growth at 5 days    Blood Culture - Blood, Arm, Left [[id number]] Collected:  03/16/20 0018    Lab Status:  Final result  Specimen:  Blood from Arm, Left Updated:  03/21/20 0515     Blood Culture No growth at 5 days    Respiratory Panel, PCR - Swab, Nasopharynx [[id number]]  (Normal) Collected:  03/20/20 9014    Lab Status:  Final result Specimen:  Swab from Nasopharynx Updated:  03/21/20 0208     ADENOVIRUS, PCR Not Detected     Coronavirus 229E Not Detected     Coronavirus HKU1 Not Detected     Coronavirus NL63 Not Detected     Coronavirus OC43 Not Detected     Human Metapneumovirus Not Detected     Human Rhinovirus/Enterovirus Not Detected     Influenza B PCR Not Detected     Parainfluenza Virus 1 Not Detected     Parainfluenza Virus 2 Not Detected     Parainfluenza Virus 3 Not Detected     Parainfluenza Virus 4 Not Detected     Bordetella pertussis pcr Not Detected     Influenza A H1 2009 PCR Not Detected     Chlamydophila pneumoniae PCR Not Detected     Mycoplasma pneumo by PCR Not Detected     Influenza A PCR Not Detected     Influenza A H3 Not Detected     Influenza A H1 Not Detected     RSV, PCR Not Detected     Bordetella parapertussis PCR Not Detected    Narrative:       The coronavirus on the RVP is NOT COVID-19 and is NOT indicative of infection with COVID-19.     Wound Culture - Swab, Peritoneum [[id number]] Collected:  03/17/20 1559    Lab Status:  Final result Specimen:  Swab from Peritoneum Updated:  03/20/20 0805     Wound Culture Light growth (2+) Normal Fecal Syeda     Gram Stain Many (4+) Red blood cells      Moderate (3+) WBCs seen      No organisms seen          Imaging Results (All)     Procedure Component Value Units Date/Time    CT Abdomen Pelvis With & Without Contrast [[id number]] Collected:  03/24/20 1022     Updated:  03/24/20 1656    Narrative:       EXAMINATION: CT ABDOMEN AND PELVIS W WO CONTRAST-      INDICATION: Sepsis; Z74.09-Other reduced mobility; K65.9-Peritonitis,  unspecified; K57.92-Diverticulitis of intestine, part unspecified,  without perforation or abscess without bleeding; I10-Essential  (primary)  hypertension; K57.20-Diverticulitis of large intestine with perforation  and abscess without bleeding.      TECHNIQUE: CT abdomen and pelvis with and without intravenous contrast.     The radiation dose reduction device was turned on for each scan per the  ALARA (As Low as Reasonably Achievable) protocol.     COMPARISON: CT dated 03/20/2020.     FINDINGS: Lung bases demonstrate increase in opacifications within the  right midlung fairly groundglass appearance concerning for acute  airspace disease progression from prior with small right and trace to  small left pleural effusions. Liver without focal lesion, however, mild  diffuse hepatic parenchymal attenuation of hepatic steatosis.  Gallbladder is surgically absent. No biliary dilatation. Pancreas and  spleen unremarkable. Adrenals without distinct nodule. Kidneys without  hydronephrosis or hydroureter. No bulky retroperitoneal adenopathy.  Atherosclerotic nonaneurysmal patent abdominal aorta. Portal veins and  IVC patent. GI tract evaluation demonstrates postsurgical change from  left lower quadrant in ostomy formation and compartment pouch appearance  with trace ascites noted throughout the abdomen and mesenteric edema  along with the soft tissue drain to the left lower quadrant extending to  the right pelvis without loculated fluid collection. Liver is of  moderate distention without focal thickening. Pessary in place. Fibroid  uterus with intramural fibroid creating mass effect on the left  hemipelvis, however no bulky pelvic adenopathy. Diffuse body wall edema.  No aggressive osseous lesions.       Impression:       1. Increasing pulmonary opacifications in the partially visualized right  midlung from prior comparison concerning for worsening airspace disease  along with increasing small volume right and trace to small volume left  pleural effusion from prior.  2. Trace abdominopelvic ascites and postsurgical changes with left lower  quadrant and  ostomy formation and sigmoid colectomy with soft tissue  drain in place. No loculated fluid collection to suggest abscess or  intraabdominal free air.  3. Diffuse body wall edema.     D:  03/24/2020  E:  03/24/2020     This report was finalized on 3/24/2020 4:53 PM by Dr. Arturo Joseph.       XR Chest 1 View [[id number]] Collected:  03/23/20 1407     Updated:  03/23/20 1444    Narrative:       EXAMINATION: XR CHEST 1 VW-      INDICATION: Concern for aspiration; Z74.09-Other reduced mobility;  K65.9-Peritonitis, unspecified; K57.92-Diverticulitis of intestine, part  unspecified, without perforation or abscess without bleeding;  I10-Essential (primary) hypertension; K57.20-Diverticulitis of large  intestine with perforation and abscess without bleeding.      COMPARISON: 03/20/2020.     FINDINGS: Portable chest reveals ill-defined opacification again seen  within the right lung base with small right pleural effusion. PICC line  catheter on the right with tip in the SVC. Mild increased markings are  also seen at the left lung base with small left pleural effusion. The  upper lung fields are clear.           Impression:       PICC line catheter in satisfactory position in the SVC with  ill-defined opacification lung bases bilaterally and small bilateral  pleural effusions. Lung volumes remain low.     D:  03/23/2020  E:  03/23/2020     This report was finalized on 3/23/2020 2:41 PM by Dr. Daria Malone MD.       CT Abdomen Pelvis With & Without Contrast [[id number]] Collected:  03/20/20 1817     Updated:  03/21/20 1019    Narrative:       EXAMINATION: CT ABDOMEN/PELVIS WWO CONTRAST - 03/20/2020     INDICATION: Z74.09-Other reduced mobility; K65.9-Peritonitis,  unspecified; K57.92-Diverticulitis of intestine, part unspecified,  without perforation or abscess without bleeding; I10-Essential (primary)  hypertension; K57.20-Diverticulitis of large intestine with perforation  and abscess without bleeding.      TECHNIQUE:  CT abdomen and pelvis with and without intravenous contrast.     The radiation dose reduction device was turned on for each scan per the  ALARA (As Low as Reasonably Achievable) protocol.     COMPARISON: CT dated 03/17/2020.     FINDINGS: Lung bases demonstrate abnormal midlung opacifications  greatest in the right middle lobe new from prior recent comparison as  these are  developed in the interim along with increasing but trace to  small volume bilateral pleural effusions without adjacent atelectasis.  Liver without focal lesion. Gallbladder is surgically absent. No biliary  dilatation. Pancreas and spleen unremarkable with perisplenic and  perihepatic ascites as well as mesenteric edema. Adrenals without  distinct nodule. Kidneys without hydronephrosis or hydroureter.  Atherosclerotic nonaneurysmal abdominal aorta. Left lower quadrant  ostomy in place with soft tissue drain and pessary in place within the  pelvis without loculated fluid collection. Postsurgical changes from  ventral abdominal wall repair with diffuse body wall edema.       Impression:       Partially visualized lung bases demonstrate development of  groundglass opacifications in the right middle lobe inferiorly scattered  with concern for atypical etiology or aspiration, however COVID-19 not  excluded given interval development and overall appearance on partial  imaging of the lungs. Trace to small bilateral pleural effusions with  increase in size from recent 03/17/2020 comparison. Postsurgical changes  of the abdomen without intra-abdominal free air demonstrating early  postoperative appearance of trace ascites with soft tissue drain in  place and mesenteric edema however no loculated fluid collection with  left lower quadrant ostomy formation and ventral abdominal wall  postsurgical changes. Diffuse body wall edema.     DICTATED:   03/20/2020  EDITED/ls :   03/20/2020      This report was finalized on 3/21/2020 10:16 AM by Dr. Arturo Joseph.        XR Chest PA & Lateral [[id number]] Collected:  03/20/20 1805     Updated:  03/21/20 1019    Narrative:          EXAMINATION: XR CHEST PA AND LATERAL - 03/20/2020     INDICATION: Z74.09-Other reduced mobility; K65.9-Peritonitis,  unspecified; K57.92-Diverticulitis of intestine, part unspecified,  without perforation or abscess without bleeding; I10-Essential (primary)  hypertension; K57.20-Diverticulitis of large intestine with perforation  and abscess without bleeding.      COMPARISON: None.     FINDINGS: Cardiac size borderline enlarged. Bibasilar opacifications  adjacent to small volume bilateral pleural effusions with right arm PICC  in place and terminating at the cavoatrial junction. No pneumothorax.             Impression:       Bibasilar opacifications of atelectasis and/or airspace  disease greatest in the right adjacent to small volume bilateral pleural  effusions with right arm PICC in place and terminating at the cavoatrial  junction.     DICTATED:   03/20/2020  EDITED/ls :   03/20/2020      This report was finalized on 3/21/2020 10:16 AM by Dr. Arturo Joseph.       CT Abdomen Pelvis With & Without Contrast [[id number]] Collected:  03/17/20 1046     Updated:  03/20/20 1612    Narrative:       EXAMINATION: CT ABDOMEN AND PELVIS W WO CONTRAST-03/17/2020:      INDICATION: Ongoing pain.     TECHNIQUE: 5 mm unenhanced images through the abdomen and pelvis with  subsequent post-IV contrast 5 mm portal venous phase and delayed venous  phase images.     The radiation dose reduction device was turned on for each scan per the  ALARA (As Low as Reasonably Achievable) protocol.     COMPARISON: 03/15/2020 abdomen and pelvis CT scan.     FINDINGS: The patient history indicates diverticulitis, persistent  abdominal pain. Previous 03/15/2020 exam report indicated extensive  distal descending colon and sigmoid colon diverticulitis, small amount  of extraluminal air, and some free fluid. Today's exam, however,  shows  moderate intra-abdominal free air. There is new moderate right lower  lobe atelectasis and mild left lower lobe atelectasis. No significant  abnormalities are seen of the liver, spleen, pancreas, adrenal glands,  or kidneys. The bowel loops are normal in caliber.     Regarding the lower abdomen and pelvis, inflammatory fat stranding  around the descending colon is approximately stable. There appears to be  mild, new fat stranding of the retroperitoneum but this may actually  represent a trace amount of fluid extending along the paracolic gutters,  and along the peritoneal margin anteriorly, rather than true  retroperitoneal inflammation. Free fluid in the pelvis appears mildly  increased. Note is again made of the patient's enlarged uterus and the  patient's ring-type pessary. Fluid and air collection seen on axial  portal venous phase image 64, series 6 is difficult to distinguish as  intraluminal within bowel, or contained fluid and air, especially seen  on the axial images. The coronal images show this collection,  respectively coronal images 46-36 suggest that this may be extraluminal.  No other potential discrete, drainable abscess is identified. The  bladder is normally distended and normal in appearance. Delayed images  show normal caliber renal collecting systems and ureters.       Impression:       1.  Interval development of intra-abdominal free air consistent with  perforation from the patient's diverticulitis.  2.  Mildly increased intrapelvic fat stranding as described, mildly  increased intrapelvic free fluid.  3.  Questionable 5 cm fluid and air collection in the central pelvis as  described above. Please refer to initial axial image 64, series 3.  4.  New moderate right lower lobe atelectasis and mild left lower lobe  atelectasis.     NOTE: Preliminary report was called to Dr. Sravan Vazquez at approximately  10:50 AM 03/17/2020.     D:  03/17/2020  E:  03/17/2020           This report was  finalized on 3/20/2020 4:09 PM by Dr. Rasheed Vidales MD.       CT Abdomen Pelvis Without Contrast [[id number]] Collected:  03/15/20 1853     Updated:  03/16/20 0955    Narrative:       EXAMINATION: CT ABDOMEN/PELVIS WO CONTRAST - 03/15/2020      INDICATION: Lower abdominal pain and left flank pain, vomiting and  diarrhea.     TECHNIQUE: Multiple axial CT imaging is obtained of the abdomen and  pelvis without the administration of oral or intravenous contrast.     The radiation dose reduction device was turned on for each scan per the  ALARA (As Low as Reasonably Achievable) protocol.     COMPARISON: 02/25/2020     FINDINGS:      ABDOMEN: Atelectatic changes seen in the lung bases bilaterally. The  gallbladder has been surgically removed. The liver is homogeneous in  appearance. The spleen is unremarkable. Visualized pancreas is  homogeneous in appearance. The kidneys and adrenal glands are within  normal limits. No abdominal or retroperitoneal lymphadenopathy. Vascular  calcification seen of the abdominal aorta and iliac vessels. There is  moderate amount of stool seen diffusely throughout the colon. There are  diverticula identified throughout the entire colon. There is abnormal  wall thickening identified of the distal descending colon and entire  sigmoid colon suggesting an extensive diverticulitis however there is  surrounding stranding and some fluid with some mild extraluminal air  identified just above the uterus suggesting a perforation. There is some  scattered air is well seen within the retroperitoneum. Again no definite  abscess.     PELVIS: A pessary is in place. There is some fluid identified in the  pelvis with extraluminal air suggesting a perforation with extensive  diverticulitis involving the distal descending and sigmoid colon. No  evidence of abscess. No pelvic adenopathy. Bony structures reveal  degenerative changes seen within the spine.       Impression:       There is extensive diverticulitis  involving the distal  descending colon and sigmoid colon with stranding and wall thickening  with extraluminal air as well suggesting a microperforation. There is  free fluid in the pelvis with no evidence of loculation to suggest  abscess at this time.     DICTATED:   03/15/2020  EDITED/ls :   03/15/2020         This report was finalized on 3/16/2020 9:52 AM by Dr. Daria Malone MD.                            Plan for Follow-up of Pending Labs/Results: ID   Order Current Status    AFB Culture - Swab, Peritoneum Preliminary result    Fungus Culture - Swab, Peritoneum Preliminary result        Discharge Details        Discharge Medications      New Medications      Instructions Start Date   HYDROcodone-acetaminophen 5-325 MG per tablet  Commonly known as:  NORCO   1 tablet, Oral, Every 6 Hours PRN      piperacillin-tazobactam 4-0.5 GM/100ML solution IVPB  Commonly known as:  ZOSYN   4.5 g, Intravenous, Every 8 Hours         Changes to Medications      Instructions Start Date   gabapentin 300 MG capsule  Commonly known as:  NEURONTIN  What changed:  when to take this   300 mg, Oral, Nightly         Continue These Medications      Instructions Start Date   Align 4 MG capsule   1 capsule, Oral, Daily      busPIRone 15 MG tablet  Commonly known as:  BUSPAR   15 mg, Oral, Daily      calcitonin (salmon) 200 UNIT/ACT nasal spray  Commonly known as:  MIACALCIN   1 spray, Alternating Nares, Daily      calcium citrate-vitamin d 315-250 MG-UNIT tablet tablet  Commonly known as:  CALCITRATE   1 tablet, Oral, Daily      Claritin 10 MG tablet  Generic drug:  loratadine   10 mg, Oral, Daily      glucosamine-chondroitin 500-400 MG capsule capsule   1 capsule, Oral, 3 Times Daily With Meals      Jardiance 10 MG tablet  Generic drug:  Empagliflozin   10 mg, Oral, Daily      levothyroxine 88 MCG tablet  Commonly known as:  SYNTHROID, LEVOTHROID   88 mcg, Oral, Daily      LORazepam 0.5 MG tablet  Commonly known as:  ATIVAN   0.5  mg, Oral, Every 8 Hours PRN      metoprolol succinate  MG 24 hr tablet  Commonly known as:  TOPROL-XL   100 mg, Oral, Daily      Myrbetriq 50 MG tablet sustained-release 24 hour 24 hr tablet  Generic drug:  Mirabegron ER   TAKE 1 TABLET BY MOUTH DAILY      pravastatin 40 MG tablet  Commonly known as:  PRAVACHOL   40 mg, Oral, Daily      sucralfate 1 g tablet  Commonly known as:  CARAFATE   1 g, Oral, 2 Times Daily      Vitamin D3 50 MCG (2000 UT) capsule   2,000 Units, Oral, Daily         Stop These Medications    amLODIPine 2.5 MG tablet  Commonly known as:  NORVASC     metroNIDAZOLE 0.75 % cream  Commonly known as:  METROCREAM     ramipril 5 MG capsule  Commonly known as:  ALTACE     vancomycin 50 MG/ML reconstituted solution oral solution reconstituted            Allergies   Allergen Reactions   • Evista [Raloxifene] Rash   • Cephalexin Diarrhea   • Hydrochlorothiazide Diarrhea   • Meloxicam Other (See Comments)   • Metronidazole Diarrhea   • Oxybutynin Cough   • Propylthiouracil Other (See Comments)     Severally reduce WBC's    • Sulfa Antibiotics Hives   • Sulfacetamide Sodium-Sulfur Hives   • Sulfur Dioxide Hives         [place]:  [place] [facility] [place]:  Hospital:  Diet Order   Procedures   • Diet Regular       Activity:      Restrictions or Other Recommendations:         CODE STATUS:    Code Status and Medical Interventions:   Ordered at: 03/17/20 1903     Level Of Support Discussed With:    Patient     Code Status:    CPR     Medical Interventions (Level of Support Prior to Arrest):    Full       Future Appointments   Date Time Provider Department Center   5/13/2020  1:10 PM Walter Caro MD [facility] None       Additional Instructions for the Follow-ups that You Need to Schedule     Ambulatory Referral to Home Health   As directed      Face to Face Visit Date:  3/20/2020    Follow-up provider for Plan of Care?:  I treated the patient in an acute care facility and will  not continue treatment after discharge.    Follow-up provider:  DAVID MACKEY [[id number]]    Reason/Clinical Findings:  perforation of sigmoid colon related to diverticulitis with creation of distal transverse colostomy, hypertension, impaired mobility and adls    Describe mobility limitations that make leaving home difficult:  perforation of sigmoid colon related to diverticulitis with creation of distal transverse colostomy, hypertension, impaired mobility and adls    Nursing/Therapeutic Services Requested:  Skilled Nursing Physical Therapy    Skilled nursing orders:  Medication education Ostomy instruction    PT orders:  Strengthening Home safety assessment    Frequency:  Other         Discharge Follow-up with PCP   As directed       Currently Documented PCP:    David Mackey MD    PCP Phone Number:    [phone]     Follow Up Details:  3-5 days         Discharge Follow-up with Specified Provider: Dr. Vazquez; 2 Weeks   As directed      To:  Dr. Vazquez    Follow Up:  2 Weeks    Follow Up Details:  Staple removal         Discharge Follow-up with Specified Provider: Dr. Darshan Bejarano as scheduled on 3/31 (telemedicine visit) at 10:30   As directed      To:  Dr. Darshan Bejarano as scheduled on 3/31 (telemedicine visit) at 10:30               Time Spent on Discharge:  50 minutes, 35 minutes spent with patient and counseling, remaining time spent coordinating care.     Electronically signed by Ivania Alcantara DO, 03/26/20, 11:48 AM.

## 2020-03-26 NOTE — PROGRESS NOTES
Case Management Discharge Note      Final Note: Plan is home with daughter.  HH arranged with [facility] and outpatient IV antibiotics arranged through [facility].  Spoke with Brenden with [facility] and they will be in contact with pt to arrange HH visits. Brenden reports they will try to see pt today prior to next dose of IV Zosyn at 8pm.  Spoke with Jonathon at [facility] and they delivery IV antibiotics, supplies and provide teaching today for IV antibiotic administration prior to discharge.  Pt's daughter will transport pt home,  Pt denies further discharge needs.  Pt agreeable to discharge plan and cost of antibiotics.     Provided Post Acute Provider List?: N/A  N/A Provider List Comment: as needed    Destination      No service has been selected for the patient.      Durable Medical Equipment      No service has been selected for the patient.      Dialysis/Infusion - Selection Complete      Service Provider Request Status Selected Services Address Phone Number Fax Number    [facility] Selected Infusion and IV Therapy [address] [facility] [address] [phone]      Home Medical Care - Selection Complete      Service Provider Request Status Selected Services Address Phone Number Fax Number    [facility] Selected Home Health Services [address] [facility] [address] [phone]      Therapy      No service has been selected for the patient.      Community Resources      No service has been selected for the patient.             Final Discharge Disposition Code: 06 - home with home health care

## 2020-03-26 NOTE — PROGRESS NOTES
"INFECTIOUS DISEASE PROGRESS NOTE       Neema Richard  1940  [id number]      Admission Date: 3/15/2020      Requesting Provider: Filomena Stuart DO  Evaluating Physician: Darshan Bejarano MD    Reason for Consultation: Acute sigmoid diverticulitis, with microperforation     History of present illness:  3/16/20:    3/16/2020: Patient is a 80 y.o. female, seen today for acute sigmoid diverticulitis with microperforation.  She has had several episodes of diverticulitis in the past, the last being in December 2019, treated with an oral antibiotic by her PCP.   Her symptoms minimally improved. She began experiencing diarrhea, with low grade fever, and yesterday, developed worsening abdominal pain, prompting her presentation to the ED.  An abdominal Ct with extensive diverticulitis involving the descending colon and sigmoid colon with stranding and wall thickening, with extraluminal air suggesting a microperforation.   Admitting labs with a leukocytosis of 15, 000, lactic acidosis, and she has been afebrile. She is currently on Merrem and we were consulted for evaluation and treatment.   3/17/20:  She feels better but still with abdominal tenderness.  Her daughter is coming to from [place] and she wants her tested for Covid-19.  She remains afebrile.  I saw her earlier this morning but she has now deteriorated.  She now has free air on her CT scan and is currently in the OR.  3/18/20:  She is feeling \"some better this am\".  She denies any nausea. She complains of hiccups. She remains afebrile.    3/19/20: She accidentally pulled NGT out yesterday. She is having some nausea, no vomiting.   She complains of not being able to get any rest.    3/20/2020: She had increased abdominal discomfort earlier today but it is decreased today.  She and her daughter indicate that her confusion has improved and her narcotic doses have been reduced.  She remains afebrile.  3/21/2020: She was moved to airborne precautions/contact " precautions due to her chest CT findings.  She has been afebrile over the day.  She has now been started on TPN.  The RN staff indicates that she has been stable over the day.  She has denied dyspnea. A COVID19 PCR is currently pending at  and should be back tomorrow.  3/22/2020: Her COVID19 PCR was negative.  She has decreased dyspnea.  She denies nausea and vomiting.  She has some abdominal distention but denies severe abdominal pain.  3/23/20:  Her confusion is improving per her daughter. She still complains of abdominal soreness.  No shortness of breath or increased cough.  She does have some increased drainage on her wound dressing.  She denies severe abdominal pain.  3/24/20:  She became more confused and combative last pm.  She is now restrained.  Just given Ativan per nursing. CT scheduled for today.   3/25/20:  Much less confused.  She is regaining her appetite.  Daughter at bedside.  tmax 99.5 degrees.   3/26/20:  Still with intermittent confusion per nursing.  She says she is eating much better.  She remains afebrile. She is anxious to go home.        Past Medical History:   Diagnosis Date   • Acquired hypothyroidism 1980   • Colon polyp - sees Dr. 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 ER and [place] (+)   • Essential hypertension 2012   • High cholesterol 2019   • Hx of herpes genitalis 2013   • IBS-d (takes Buspar)        Past Surgical History:   Procedure Laterality Date   • BILATERAL BREAST REDUCTION Bilateral 1995   • BREAST LUMPECTOMY Left 2009   • CATARACT EXTRACTION, BILATERAL Bilateral 2018   • CERVICAL CONIZATION  1983   • COLON RESECTION N/A 3/17/2020    Procedure: LOW ANTERIOR COLON RESECTION, MOBILIZATION OF SPLENIC FLEXURE, DRAINAGE OF ABSCESS AND COLOSTOMY CREATION;  Surgeon: Sravan Vazquez MD;  Location: [facility] OR;  Service: General;  Laterality: N/A;   • FACIAL COSMETIC SURGERY  2001   • FACIAL COSMETIC SURGERY  2012   •  HEMORRHOIDECTOMY  1974   • LAPAROSCOPIC CHOLECYSTECTOMY  2010   • REDUCTION MAMMAPLASTY Bilateral 04/13/2017    DCIS   • SINUS SURGERY  1992   • THYROIDECTOMY, PARTIAL  1963   • TONSILLECTOMY  1953   • VARICOSE VEIN SURGERY Bilateral 1981       Family History   Problem Relation Age of Onset   • Breast cancer Neg Hx    • Ovarian cancer Neg Hx        Social History     Socioeconomic History   • Marital status:      Spouse name: Not on file   • Number of children: Not on file   • Years of education: Not on file   • Highest education level: Not on file   Tobacco Use   • Smoking status: Never Smoker   • Smokeless tobacco: Never Used   Substance and Sexual Activity   • Alcohol use: No   • Drug use: No       Allergies   Allergen Reactions   • Evista [Raloxifene] Rash   • Cephalexin Diarrhea   • Hydrochlorothiazide Diarrhea   • Meloxicam Other (See Comments)   • Metronidazole Diarrhea   • Oxybutynin Cough   • Propylthiouracil Other (See Comments)     Severally reduce WBC's    • Sulfa Antibiotics Hives   • Sulfacetamide Sodium-Sulfur Hives   • Sulfur Dioxide Hives         Medication:  No current facility-administered medications for this encounter.     Current Outpatient Medications:   •  busPIRone (BUSPAR) 15 MG tablet, Take 15 mg by mouth Daily., Disp: , Rfl: 3  •  Empagliflozin (JARDIANCE) 10 MG tablet, Take 10 mg by mouth Daily., Disp: , Rfl:   •  metoprolol succinate XL (TOPROL-XL) 100 MG 24 hr tablet, Take 100 mg by mouth Daily., Disp: , Rfl:   •  pravastatin (PRAVACHOL) 40 MG tablet, Take 40 mg by mouth Daily., Disp: , Rfl:   •  calcium citrate-vitamin d (CALCITRATE) 315-250 MG-UNIT tablet tablet, Take 1 tablet by mouth Daily., Disp: , Rfl:   •  Cholecalciferol (VITAMIN D3) 2000 UNITS capsule, Take 2,000 Units by mouth Daily., Disp: , Rfl: 3  •  gabapentin (NEURONTIN) 300 MG capsule, Take 1 capsule by mouth Every Night., Disp: 30 capsule, Rfl: 0  •  levothyroxine (SYNTHROID, LEVOTHROID) 88 MCG tablet, Take 88  mcg by mouth daily., Disp: , Rfl:   •  loratadine (Claritin) 10 MG tablet, Take 10 mg by mouth Daily., Disp: , Rfl:   •  LORazepam (ATIVAN) 0.5 MG tablet, Take 0.5 mg by mouth every 8 (eight) hours as needed for anxiety., Disp: , Rfl:   •  MYRBETRIQ 50 MG tablet sustained-release 24 hour 24 hr tablet, TAKE 1 TABLET BY MOUTH DAILY, Disp: 30 tablet, Rfl: 7  •  Probiotic Product (ALIGN) 4 MG capsule, Take 1 capsule by mouth Daily., Disp: , Rfl:   •  sucralfate (CARAFATE) 1 G tablet, Take 1 g by mouth 2 (Two) Times a Day., Disp: , Rfl: 3    Antibiotics:  Anti-Infectives (From admission, onward)    Ordered     Dose/Rate Route Frequency Start Stop    03/26/20 1148  piperacillin-tazobactam (ZOSYN) 4-0.5 GM/100ML solution IVPB     Ordering Provider:  Ivania Alcantara DO    4.5 g  over 4 Hours Intravenous Every 8 Hours 03/26/20 0000 04/03/20 2359    03/23/20 2107  piperacillin-tazobactam (ZOSYN) 4.5 g in iso-osmotic dextrose 100 mL IVPB (premix)     Ordering Provider:  Darshan Bejarano MD    4.5 g  over 30 Minutes Intravenous Once 03/23/20 2200 03/23/20 2206    03/20/20 2252  vancomycin 1250 mg/250 mL 0.9% NS IVPB ([facility])     Ordering Provider:  Alan Raygoza, [facility]    20 mg/kg × 61.2 kg  over 90 Minutes Intravenous Once 03/20/20 2345 03/21/20 0341    03/16/20 0811  ertapenem (INVanz) 1 g/100 mL 0.9% NS VTB (mbp)     Eveline Rosas [facility] reviewed the order on 03/21/20 1145.   Ordering Provider:  Darshan Bejarano MD    1 g  over 30 Minutes Intravenous Every 24 Hours 03/16/20 0900 03/23/20 1023    03/15/20 1954  meropenem (MERREM) 1 g/100 mL 0.9% NS VTB (mbp)     Ordering Provider:  Darshan Steward MD    1 g  over 30 Minutes Intravenous Once 03/15/20 1955 03/15/20 2122                  Physical Exam:   Vital Signs  No data recorded.    No data recorded  No data recorded  No data recorded  No data recorded  No data recorded    General: She is alert and in no acute distress.  She appears less ill.  Skin: She has no diffuse  rash.  HEENT: She has no labial ulcers.  Lungs: fairly clear   Cardiovascular: She has no murmur  Abdomen: She has mild abdominal distention.   Ostomy  Midline incision with dressing in place   Neurologic: She is alert and oriented x3.  Motor exam reveals 5/5 strength bilaterally  PICC right without redness     Laboratory Data                                    CrCl cannot be calculated (Patient's most recent lab result is older than the maximum 30 days allowed.).  Lactate 2.9     Microbiology:  3/23:  Cdiff negative   3/16: BC no growth    3/23: wound culture-Pseudomonas and enterococcus, sensitivities are pending         Radiology:  Imaging Results (Last 72 Hours)     Procedure Component Value Units Date/Time    CT Abdomen Pelvis With & Without Contrast [[id number]] Collected:  03/24/20 1022     Updated:  03/24/20 1656    Narrative:       EXAMINATION: CT ABDOMEN AND PELVIS W WO CONTRAST-      INDICATION: Sepsis; Z74.09-Other reduced mobility; K65.9-Peritonitis,  unspecified; K57.92-Diverticulitis of intestine, part unspecified,  without perforation or abscess without bleeding; I10-Essential (primary)  hypertension; K57.20-Diverticulitis of large intestine with perforation  and abscess without bleeding.      TECHNIQUE: CT abdomen and pelvis with and without intravenous contrast.     The radiation dose reduction device was turned on for each scan per the  ALARA (As Low as Reasonably Achievable) protocol.     COMPARISON: CT dated 03/20/2020.     FINDINGS: Lung bases demonstrate increase in opacifications within the  right midlung fairly groundglass appearance concerning for acute  airspace disease progression from prior with small right and trace to  small left pleural effusions. Liver without focal lesion, however, mild  diffuse hepatic parenchymal attenuation of hepatic steatosis.  Gallbladder is surgically absent. No biliary dilatation. Pancreas and  spleen unremarkable. Adrenals without distinct nodule. Kidneys  without  hydronephrosis or hydroureter. No bulky retroperitoneal adenopathy.  Atherosclerotic nonaneurysmal patent abdominal aorta. Portal veins and  IVC patent. GI tract evaluation demonstrates postsurgical change from  left lower quadrant in ostomy formation and compartment pouch appearance  with trace ascites noted throughout the abdomen and mesenteric edema  along with the soft tissue drain to the left lower quadrant extending to  the right pelvis without loculated fluid collection. Liver is of  moderate distention without focal thickening. Pessary in place. Fibroid  uterus with intramural fibroid creating mass effect on the left  hemipelvis, however no bulky pelvic adenopathy. Diffuse body wall edema.  No aggressive osseous lesions.       Impression:       1. Increasing pulmonary opacifications in the partially visualized right  midlung from prior comparison concerning for worsening airspace disease  along with increasing small volume right and trace to small volume left  pleural effusion from prior.  2. Trace abdominopelvic ascites and postsurgical changes with left lower  quadrant and ostomy formation and sigmoid colectomy with soft tissue  drain in place. No loculated fluid collection to suggest abscess or  intraabdominal free air.  3. Diffuse body wall edema.     D:  03/24/2020  E:  03/24/2020     This report was finalized on 3/24/2020 4:53 PM by Dr. Arturo Joseph.           CT scan from 3/20:  Impression:     Partially visualized lung bases demonstrate development of  groundglass opacifications in the right middle lobe inferiorly scattered  with concern for atypical etiology or aspiration, however COVID-19 not  excluded given interval development and overall appearance on partial  imaging of the lungs. Trace to small bilateral pleural effusions with  increase in size from recent 03/17/2020 comparison. Postsurgical changes  of the abdomen without intra-abdominal free air demonstrating early  postoperative  appearance of trace ascites with soft tissue drain in  place and mesenteric edema however no loculated fluid collection with  left lower quadrant ostomy formation and ventral abdominal wall  postsurgical changes. Diffuse body wall edema.       CXR  Impression:     PICC line catheter in satisfactory position in the SVC with  ill-defined opacification lung bases bilaterally and small bilateral  pleural effusions. Lung volumes remain low.     D:  03/23/2020  E:  03/23/2020              Impression:   1.  Acute sigmoid diverticulitis- s/p resection 3/17.  I have switched her intravenous antibiotic coverage to Zosyn.  She has now grown Pseudomonas and enterococcus from her wound culture.  She does not have evidence of abdominal wall cellulitis.  I will leave her on the Zosyn therapy for the time being.  2.  Severe sepsis  3.  Leukocytosis/neutrophilia-  Ongoing   4.  Pelvic abscess  5.  Encephalopathy-this is most likely secondary to narcotics, improved   6.  Pulmonary infiltrate/atelectasis- she clinically appears stable.      PLAN/RECOMMENDATIONS:   1  Abdominal wound Gram stain and culture in the area of slight wound dehiscence- pending sensitivities   2.  Continue intravenous Zosyn  3.  Possible discharge today     Dr. Bejarano has obtained the history, performed the physical exam and formulated the above treatment plan.       Darshan Bejarano MD  8/7/2020  08:47

## 2020-03-26 NOTE — PROGRESS NOTES
"INFECTIOUS DISEASE PROGRESS NOTE       Neema Richard  1940  [id number]      Admission Date: 3/15/2020      Requesting Provider: Filomena Stuart DO  Evaluating Physician: Darshan Bejarano MD    Reason for Consultation: Acute sigmoid diverticulitis, with microperforation     History of present illness:  3/16/20:    3/16/2020: Patient is a 79 y.o. female, seen today for acute sigmoid diverticulitis with microperforation.  She has had several episodes of diverticulitis in the past, the last being in December 2019, treated with an oral antibiotic by her PCP.   Her symptoms minimally improved. She began experiencing diarrhea, with low grade fever, and yesterday, developed worsening abdominal pain, prompting her presentation to the ED.  An abdominal Ct with extensive diverticulitis involving the descending colon and sigmoid colon with stranding and wall thickening, with extraluminal air suggesting a microperforation.   Admitting labs with a leukocytosis of 15, 000, lactic acidosis, and she has been afebrile. She is currently on Merrem and we were consulted for evaluation and treatment.   3/17/20:  She feels better but still with abdominal tenderness.  Her daughter is coming to from [place] and she wants her tested for Covid-19.  She remains afebrile.  I saw her earlier this morning but she has now deteriorated.  She now has free air on her CT scan and is currently in the OR.  3/18/20:  She is feeling \"some better this am\".  She denies any nausea. She complains of hiccups. She remains afebrile.    3/19/20: She accidentally pulled NGT out yesterday. She is having some nausea, no vomiting.   She complains of not being able to get any rest.    3/20/2020: She had increased abdominal discomfort earlier today but it is decreased today.  She and her daughter indicate that her confusion has improved and her narcotic doses have been reduced.  She remains afebrile.  3/21/2020: She was moved to airborne precautions/contact " precautions due to her chest CT findings.  She has been afebrile over the day.  She has now been started on TPN.  The RN staff indicates that she has been stable over the day.  She has denied dyspnea. A COVID19 PCR is currently pending at  and should be back tomorrow.  3/22/2020: Her COVID19 PCR was negative.  She has decreased dyspnea.  She denies nausea and vomiting.  She has some abdominal distention but denies severe abdominal pain.  3/23/20:  Her confusion is improving per her daughter. She still complains of abdominal soreness.  No shortness of breath or increased cough.  She does have some increased drainage on her wound dressing.  She denies severe abdominal pain.  3/24/20:  She became more confused and combative last pm.  She is now restrained.  Just given Ativan per nursing. CT scheduled for today.   3/25/20:  Much less confused.  She is regaining her appetite.  Daughter at bedside.  tmax 99.5 degrees.   3/26/20:  Still with intermittent confusion per nursing.  She says she is eating much better.  She remains afebrile. She is anxious to go home.  Her daughter is very anxious for her to go home due to the COVID 19 outbreak that increases her risk with staying in the hospital.      Past Medical History:   Diagnosis Date   • Acquired hypothyroidism 1980   • Colon polyp - sees Dr. 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 ER and [facility] (+)   • Essential hypertension 2012   • High cholesterol 2019   • Hx of herpes genitalis 2013   • IBS-d (takes Buspar)        Past Surgical History:   Procedure Laterality Date   • BILATERAL BREAST REDUCTION Bilateral 1995   • BREAST LUMPECTOMY Left 2009   • CATARACT EXTRACTION, BILATERAL Bilateral 2018   • CERVICAL CONIZATION  1983   • COLON RESECTION N/A 3/17/2020    Procedure: LOW ANTERIOR COLON RESECTION, MOBILIZATION OF SPLENIC FLEXURE, DRAINAGE OF ABSCESS AND COLOSTOMY CREATION;  Surgeon: Sravan Vazquez MD;  Location:   [facility];  Service: General;  Laterality: N/A;   • FACIAL COSMETIC SURGERY  2001   • FACIAL COSMETIC SURGERY  2012   • HEMORRHOIDECTOMY  1974   • LAPAROSCOPIC CHOLECYSTECTOMY  2010   • REDUCTION MAMMAPLASTY Bilateral 04/13/2017    DCIS   • SINUS SURGERY  1992   • THYROIDECTOMY, PARTIAL  1963   • TONSILLECTOMY  1953   • VARICOSE VEIN SURGERY Bilateral 1981       Family History   Problem Relation Age of Onset   • Breast cancer Neg Hx    • Ovarian cancer Neg Hx        Social History     Socioeconomic History   • Marital status:      Spouse name: Not on file   • Number of children: Not on file   • Years of education: Not on file   • Highest education level: Not on file   Tobacco Use   • Smoking status: Never Smoker   Substance and Sexual Activity   • Alcohol use: No   • Drug use: No       Allergies   Allergen Reactions   • Evista [Raloxifene] Rash   • Cephalexin Diarrhea   • Hydrochlorothiazide Diarrhea   • Meloxicam Other (See Comments)   • Metronidazole Diarrhea   • Oxybutynin Cough   • Propylthiouracil Other (See Comments)     Severally reduce WBC's    • Sulfa Antibiotics Hives   • Sulfacetamide Sodium-Sulfur Hives   • Sulfur Dioxide Hives         Medication:  No current facility-administered medications for this encounter.     Current Outpatient Medications:   •  busPIRone (BUSPAR) 15 MG tablet, Take 15 mg by mouth Daily., Disp: , Rfl: 3  •  Empagliflozin (JARDIANCE) 10 MG tablet, Take 10 mg by mouth Daily., Disp: , Rfl:   •  metoprolol succinate XL (TOPROL-XL) 100 MG 24 hr tablet, Take 100 mg by mouth Daily., Disp: , Rfl:   •  pravastatin (PRAVACHOL) 40 MG tablet, Take 40 mg by mouth Daily., Disp: , Rfl:   •  calcitonin, salmon, (MIACALCIN) 200 UNIT/ACT nasal spray, 1 spray by Alternating Nares route Daily., Disp: 11.1 mL, Rfl: 12  •  calcium citrate-vitamin d (CALCITRATE) 315-250 MG-UNIT tablet tablet, Take 1 tablet by mouth Daily., Disp: , Rfl:   •  Cholecalciferol (VITAMIN D3) 2000 UNITS capsule, Take  2,000 Units by mouth Daily., Disp: , Rfl: 3  •  gabapentin (NEURONTIN) 300 MG capsule, Take 1 capsule by mouth Every Night., Disp: 30 capsule, Rfl: 0  •  glucosamine-chondroitin 500-400 MG capsule capsule, Take 1 capsule by mouth 3 (Three) Times a Day With Meals., Disp: , Rfl:   •  HYDROcodone-acetaminophen (NORCO) 5-325 MG per tablet, Take 1 tablet by mouth Every 6 (Six) Hours As Needed for Moderate Pain  for up to 3 days., Disp: 12 tablet, Rfl: 0  •  levothyroxine (SYNTHROID, LEVOTHROID) 88 MCG tablet, Take 88 mcg by mouth daily., Disp: , Rfl:   •  loratadine (Claritin) 10 MG tablet, Take 10 mg by mouth Daily., Disp: , Rfl:   •  LORazepam (ATIVAN) 0.5 MG tablet, Take 0.5 mg by mouth every 8 (eight) hours as needed for anxiety., Disp: , Rfl:   •  MYRBETRIQ 50 MG tablet sustained-release 24 hour 24 hr tablet, TAKE 1 TABLET BY MOUTH DAILY, Disp: 30 tablet, Rfl: 7  •  piperacillin-tazobactam (ZOSYN) 4-0.5 GM/100ML solution IVPB, Infuse 100 mL into a venous catheter Every 8 (Eight) Hours for 8 days. Indications: Infection Within the Abdomen, Disp: 2300 mL, Rfl: 0  •  Probiotic Product (ALIGN) 4 MG capsule, Take 1 capsule by mouth Daily., Disp: , Rfl:   •  sucralfate (CARAFATE) 1 G tablet, Take 1 g by mouth 2 (Two) Times a Day., Disp: , Rfl: 3    Antibiotics:  Anti-Infectives (From admission, onward)    Ordered     Dose/Rate Route Frequency Start Stop    03/26/20 1148  piperacillin-tazobactam (ZOSYN) 4-0.5 GM/100ML solution IVPB     Ordering Provider:  Ivania Alcantara DO    4.5 g  over 4 Hours Intravenous Every 8 Hours 03/26/20 0000 04/03/20 2359    03/23/20 2107  piperacillin-tazobactam (ZOSYN) 4.5 g in iso-osmotic dextrose 100 mL IVPB (premix)     Ordering Provider:  Darshan Bejarano MD    4.5 g  over 30 Minutes Intravenous Once 03/23/20 2200 03/23/20 2206 03/20/20 2252  vancomycin 1250 mg/250 mL 0.9% NS IVPB ([facility])     Ordering Provider:  Alan Raygoza RPH    20 mg/kg × 61.2 kg  over 90 Minutes  Intravenous Once 20 2345 20 0341    20 0811  ertapenem (INVanz) 1 g/100 mL 0.9% NS VTB (mbp)     Eveline Rosas formerly [facility] reviewed the order on 20 1145.   Ordering Provider:  Darshan Bejarano MD    1 g  over 30 Minutes Intravenous Every 24 Hours 20 0900 20 1023    03/15/20 195  meropenem (MERREM) 1 g/100 mL 0.9% NS VTB (mbp)     Ordering Provider:  Darshan Steward MD    1 g  over 30 Minutes Intravenous Once 03/15/20 1955 03/15/20 2122                  Physical Exam:   Vital Signs  Temp (24hrs), Av.4 °F (36.9 °C), Min:98.3 °F (36.8 °C), Max:98.5 °F (36.9 °C)    Temp  Min: 98.3 °F (36.8 °C)  Max: 98.5 °F (36.9 °C)  BP  Min: 146/58  Max: 151/66  Pulse  Min: 75  Max: 99  Resp  Min: 16  Max: 16  SpO2  Min: 89 %  Max: 93 %    General: She is alert and in no acute distress.  She appears less ill.  Skin: She has no diffuse rash.  HEENT: She has no labial ulcers.  Lungs: fairly clear   Cardiovascular: She has no murmur  Abdomen: She has mild abdominal distention.   Ostomy  Midline incision with dressing in place with some slimy bandage on the dressing but no surrounding erythema.  Neurologic: She is alert and oriented x3.  Motor exam reveals 5/5 strength bilaterally  PICC right without redness     Laboratory Data    Results from last 7 days   Lab Units 20  0416 20  0510 20  0420   WBC 10*3/mm3 23.31* 23.67* 16.93*   HEMOGLOBIN g/dL 9.0* 8.9* 8.0*   HEMATOCRIT % 28.3* 28.1* 25.3*   PLATELETS 10*3/mm3 613* 556* 475*     Results from last 7 days   Lab Units 20  0416   SODIUM mmol/L 140   POTASSIUM mmol/L 3.9   CHLORIDE mmol/L 106   CO2 mmol/L 22.0   BUN mg/dL 17   CREATININE mg/dL 0.49*   GLUCOSE mg/dL 115*   CALCIUM mg/dL 8.2*     Results from last 7 days   Lab Units 20  0510   ALK PHOS U/L 70   BILIRUBIN mg/dL 0.2   ALT (SGPT) U/L 10   AST (SGOT) U/L 16         Results from last 7 days   Lab Units 20  1306   CRP mg/dL 13.32*     Results from last 7  days   Lab Units 03/20/20  1911   LACTATE mmol/L 1.2             Estimated Creatinine Clearance: 59.3 mL/min (A) (by C-G formula based on SCr of 0.49 mg/dL (L)).  Lactate 2.9     Microbiology:  3/23:  Cdiff negative   3/16: BC no growth    3/23: wound culture-Pseudomonas and enterococcus, sensitivities are pending         Radiology:  Imaging Results (Last 72 Hours)     Procedure Component Value Units Date/Time    CT Abdomen Pelvis With & Without Contrast [[id number]] Collected:  03/24/20 1022     Updated:  03/24/20 1656    Narrative:       EXAMINATION: CT ABDOMEN AND PELVIS W WO CONTRAST-      INDICATION: Sepsis; Z74.09-Other reduced mobility; K65.9-Peritonitis,  unspecified; K57.92-Diverticulitis of intestine, part unspecified,  without perforation or abscess without bleeding; I10-Essential (primary)  hypertension; K57.20-Diverticulitis of large intestine with perforation  and abscess without bleeding.      TECHNIQUE: CT abdomen and pelvis with and without intravenous contrast.     The radiation dose reduction device was turned on for each scan per the  ALARA (As Low as Reasonably Achievable) protocol.     COMPARISON: CT dated 03/20/2020.     FINDINGS: Lung bases demonstrate increase in opacifications within the  right midlung fairly groundglass appearance concerning for acute  airspace disease progression from prior with small right and trace to  small left pleural effusions. Liver without focal lesion, however, mild  diffuse hepatic parenchymal attenuation of hepatic steatosis.  Gallbladder is surgically absent. No biliary dilatation. Pancreas and  spleen unremarkable. Adrenals without distinct nodule. Kidneys without  hydronephrosis or hydroureter. No bulky retroperitoneal adenopathy.  Atherosclerotic nonaneurysmal patent abdominal aorta. Portal veins and  IVC patent. GI tract evaluation demonstrates postsurgical change from  left lower quadrant in ostomy formation and compartment pouch appearance  with trace  ascites noted throughout the abdomen and mesenteric edema  along with the soft tissue drain to the left lower quadrant extending to  the right pelvis without loculated fluid collection. Liver is of  moderate distention without focal thickening. Pessary in place. Fibroid  uterus with intramural fibroid creating mass effect on the left  hemipelvis, however no bulky pelvic adenopathy. Diffuse body wall edema.  No aggressive osseous lesions.       Impression:       1. Increasing pulmonary opacifications in the partially visualized right  midlung from prior comparison concerning for worsening airspace disease  along with increasing small volume right and trace to small volume left  pleural effusion from prior.  2. Trace abdominopelvic ascites and postsurgical changes with left lower  quadrant and ostomy formation and sigmoid colectomy with soft tissue  drain in place. No loculated fluid collection to suggest abscess or  intraabdominal free air.  3. Diffuse body wall edema.     D:  03/24/2020  E:  03/24/2020     This report was finalized on 3/24/2020 4:53 PM by Dr. Arturo Joseph.           CT scan from 3/20:  Impression:     Partially visualized lung bases demonstrate development of  groundglass opacifications in the right middle lobe inferiorly scattered  with concern for atypical etiology or aspiration, however COVID-19 not  excluded given interval development and overall appearance on partial  imaging of the lungs. Trace to small bilateral pleural effusions with  increase in size from recent 03/17/2020 comparison. Postsurgical changes  of the abdomen without intra-abdominal free air demonstrating early  postoperative appearance of trace ascites with soft tissue drain in  place and mesenteric edema however no loculated fluid collection with  left lower quadrant ostomy formation and ventral abdominal wall  postsurgical changes. Diffuse body wall edema.       CXR  Impression:     PICC line catheter in satisfactory position  in the SVC with  ill-defined opacification lung bases bilaterally and small bilateral  pleural effusions. Lung volumes remain low.     D:  03/23/2020  E:  03/23/2020              Impression:   1.  Acute sigmoid diverticulitis- s/p resection 3/17.  I have switched her intravenous antibiotic coverage to Zosyn.  She has now grown Pseudomonas and enterococcus from her wound culture.  She does not have evidence of abdominal wall cellulitis.  She has Pseudomonas and enterococcus sensitive to Zosyn on her abdominal wound dehiscence culture.  I will plan to discharge her on outpatient intravenous Zosyn.  In light of the COVID19 outbreak, I do not think it would be in her best interest to come to our office.  We will switch her follow-up to a telemedicine visit.  I discussed this in detail with her and her daughter today.  2.  Severe sepsis  3.  Leukocytosis/neutrophilia-persistent and secondary to intra-abdominal infection  4.  Pelvic abscess  5.  Encephalopathy-this is most likely secondary to narcotics, improved   6.  Pulmonary infiltrate/atelectasis- she clinically appears stable.      PLAN/RECOMMENDATIONS:   1.  Continue wound care  2.  Continue intravenous Zosyn  3.  Discharge to home today    Dr. Bejarano has obtained the history, performed the physical exam and formulated the above treatment plan.      I discussed her disposition in detail with the patient and her daughter.  I also discussed her complex situation with case management and the nursing staff.  I coordinated her care today.  I spent over 35 minutes on her care today.  We have arranged a telemedicine in our office next week.  I spent over 50% of the time in coordination of care.    OUTPATIENT IV ANTIBIOTICS:   1. Zosyn 4.5 g IV q 8hrs-- [facility] to provide  2. PICC dressing change with Tegaderm CHG gel, bipatch weekly   3. CBC CMP ESR CRP every Monday and faxed to [phone]  4. Follow up with Dr. Bejarano 3/31  (telemedicine visit) 2216     Darshan Bejarano,  MD  3/26/2020  18:59

## 2020-03-26 NOTE — NURSING NOTE
Inpatient Ostomy Therapy Note    Date of Surgery: 3/17/20      Days Post Procedure:  9 Days Post-Op    Surgeon:  Sravan Vazquez MD    Ostomy:  Colostomy- LUQ    Appliance Type:  Coloplast, 2 Piece and Flat    Appliance Size:  Size: 2-3/4    Stoma Description: Viable, Red and Moist    Stoma Size:  51 mm    Peristomal Skin:  Mucocutaneous separation    Last Appliance Change:  3/26/20    Accessories:  Stoma Powder and Barrier Spray    Education:  Diet, Showering, Emptying, Changing Appliance, Ordering Supplies, Outpatient Followup, Peristomal Skin Issues and Crusting    Dressing Change:  No    Supplies Provided:  Yes    Education Folder Provided:  Yes    Plan of Care:  Family To Do    Teaching provided to:  Patient and Children    Comments:  [facility] nurse f/u for colostomy. Stoma red, moist, protruding nicely above skin level. Narrow rim of mc separation 9:00 - 12:00 - crusted with stoma powder and barrier spray. Pt had developed skin tears from tape of Chelita appliance. Using Coloplast without tape. Coloplast 2 piece flat with standard wear wafer applied. Pt remains slightly confused; not able to perform appliance change independently. Pt has nurse who lives with her, who will be able to provide ostomy support.     Summary:  Education reviewed with pt and daughter. Discharge supplies given to pt. They understand how to contact [facility] nurse for questions or out-patient f/u as needed.     Lizette Collier, LAUREEN - 03/26/20, 10:32 AM

## 2020-03-30 ENCOUNTER — LAB REQUISITION (OUTPATIENT)
Dept: LAB | Facility: HOSPITAL | Age: 80
End: 2020-03-30

## 2020-03-30 DIAGNOSIS — A41.9 SEPSIS, UNSPECIFIED ORGANISM (HCC): ICD-10-CM

## 2020-03-30 LAB
ALBUMIN SERPL-MCNC: 2.5 G/DL (ref 3.5–5.2)
ALBUMIN/GLOB SERPL: 0.8 G/DL
ALP SERPL-CCNC: 67 U/L (ref 39–117)
ALT SERPL W P-5'-P-CCNC: 10 U/L (ref 1–33)
ANION GAP SERPL CALCULATED.3IONS-SCNC: 16 MMOL/L (ref 5–15)
AST SERPL-CCNC: 24 U/L (ref 1–32)
BASOPHILS # BLD AUTO: 0.14 10*3/MM3 (ref 0–0.2)
BASOPHILS NFR BLD AUTO: 0.9 % (ref 0–1.5)
BILIRUB SERPL-MCNC: 0.3 MG/DL (ref 0.2–1.2)
BUN BLD-MCNC: 9 MG/DL (ref 8–23)
BUN/CREAT SERPL: 13.4 (ref 7–25)
CALCIUM SPEC-SCNC: 8.2 MG/DL (ref 8.6–10.5)
CHLORIDE SERPL-SCNC: 106 MMOL/L (ref 98–107)
CO2 SERPL-SCNC: 20 MMOL/L (ref 22–29)
CREAT BLD-MCNC: 0.67 MG/DL (ref 0.57–1)
CRP SERPL-MCNC: 16.01 MG/DL (ref 0–0.5)
DEPRECATED RDW RBC AUTO: 50.2 FL (ref 37–54)
EOSINOPHIL # BLD AUTO: 0.44 10*3/MM3 (ref 0–0.4)
EOSINOPHIL NFR BLD AUTO: 2.9 % (ref 0.3–6.2)
ERYTHROCYTE [DISTWIDTH] IN BLOOD BY AUTOMATED COUNT: 15.3 % (ref 12.3–15.4)
ERYTHROCYTE [SEDIMENTATION RATE] IN BLOOD: 35 MM/HR (ref 0–30)
GFR SERPL CREATININE-BSD FRML MDRD: 85 ML/MIN/1.73
GLOBULIN UR ELPH-MCNC: 3.3 GM/DL
GLUCOSE BLD-MCNC: 97 MG/DL (ref 65–99)
HCT VFR BLD AUTO: 26.9 % (ref 34–46.6)
HGB BLD-MCNC: 8.3 G/DL (ref 12–15.9)
IMM GRANULOCYTES # BLD AUTO: 0.14 10*3/MM3 (ref 0–0.05)
IMM GRANULOCYTES NFR BLD AUTO: 0.9 % (ref 0–0.5)
LYMPHOCYTES # BLD AUTO: 1.82 10*3/MM3 (ref 0.7–3.1)
LYMPHOCYTES NFR BLD AUTO: 11.9 % (ref 19.6–45.3)
MCH RBC QN AUTO: 28.1 PG (ref 26.6–33)
MCHC RBC AUTO-ENTMCNC: 30.9 G/DL (ref 31.5–35.7)
MCV RBC AUTO: 91.2 FL (ref 79–97)
MONOCYTES # BLD AUTO: 1.58 10*3/MM3 (ref 0.1–0.9)
MONOCYTES NFR BLD AUTO: 10.4 % (ref 5–12)
NEUTROPHILS # BLD AUTO: 11.13 10*3/MM3 (ref 1.7–7)
NEUTROPHILS NFR BLD AUTO: 73 % (ref 42.7–76)
NRBC BLD AUTO-RTO: 0 /100 WBC (ref 0–0.2)
PLATELET # BLD AUTO: 874 10*3/MM3 (ref 140–450)
PMV BLD AUTO: 10.1 FL (ref 6–12)
POTASSIUM BLD-SCNC: 3.9 MMOL/L (ref 3.5–5.2)
PROT SERPL-MCNC: 5.8 G/DL (ref 6–8.5)
RBC # BLD AUTO: 2.95 10*6/MM3 (ref 3.77–5.28)
SODIUM BLD-SCNC: 142 MMOL/L (ref 136–145)
WBC NRBC COR # BLD: 15.25 10*3/MM3 (ref 3.4–10.8)

## 2020-03-30 PROCEDURE — 86140 C-REACTIVE PROTEIN: CPT | Performed by: INTERNAL MEDICINE

## 2020-03-30 PROCEDURE — 85025 COMPLETE CBC W/AUTO DIFF WBC: CPT | Performed by: INTERNAL MEDICINE

## 2020-03-30 PROCEDURE — 85652 RBC SED RATE AUTOMATED: CPT | Performed by: INTERNAL MEDICINE

## 2020-03-30 PROCEDURE — 80053 COMPREHEN METABOLIC PANEL: CPT | Performed by: INTERNAL MEDICINE

## 2020-04-06 ENCOUNTER — TRANSCRIBE ORDERS (OUTPATIENT)
Dept: LAB | Facility: HOSPITAL | Age: 80
End: 2020-04-06

## 2020-04-06 ENCOUNTER — LAB (OUTPATIENT)
Dept: LAB | Facility: HOSPITAL | Age: 80
End: 2020-04-06

## 2020-04-06 DIAGNOSIS — R65.21 SEPTIC SHOCK DUE TO TRANSFUSION, SEQUELA (HCC): Primary | ICD-10-CM

## 2020-04-06 DIAGNOSIS — R65.21 SEPTIC SHOCK DUE TO TRANSFUSION, SEQUELA (HCC): ICD-10-CM

## 2020-04-06 DIAGNOSIS — T80.29XS SEPTIC SHOCK DUE TO TRANSFUSION, SEQUELA (HCC): Primary | ICD-10-CM

## 2020-04-06 DIAGNOSIS — T80.29XS SEPTIC SHOCK DUE TO TRANSFUSION, SEQUELA (HCC): ICD-10-CM

## 2020-04-06 DIAGNOSIS — A41.9 SEPTIC SHOCK DUE TO TRANSFUSION, SEQUELA (HCC): ICD-10-CM

## 2020-04-06 DIAGNOSIS — A41.9 SEPTIC SHOCK DUE TO TRANSFUSION, SEQUELA (HCC): Primary | ICD-10-CM

## 2020-04-06 LAB
ALBUMIN SERPL-MCNC: 2.7 G/DL (ref 3.5–5.2)
ALBUMIN/GLOB SERPL: 0.8 G/DL
ALP SERPL-CCNC: 60 U/L (ref 39–117)
ALT SERPL W P-5'-P-CCNC: 6 U/L (ref 1–33)
ANION GAP SERPL CALCULATED.3IONS-SCNC: 13 MMOL/L (ref 5–15)
AST SERPL-CCNC: 10 U/L (ref 1–32)
BASOPHILS # BLD AUTO: 0.11 10*3/MM3 (ref 0–0.2)
BASOPHILS NFR BLD AUTO: 1.6 % (ref 0–1.5)
BILIRUB SERPL-MCNC: 0.2 MG/DL (ref 0.2–1.2)
BUN BLD-MCNC: 9 MG/DL (ref 8–23)
BUN/CREAT SERPL: 15 (ref 7–25)
CALCIUM SPEC-SCNC: 8.5 MG/DL (ref 8.6–10.5)
CHLORIDE SERPL-SCNC: 105 MMOL/L (ref 98–107)
CO2 SERPL-SCNC: 23 MMOL/L (ref 22–29)
CREAT BLD-MCNC: 0.6 MG/DL (ref 0.57–1)
CRP SERPL-MCNC: 5.15 MG/DL (ref 0–0.5)
DEPRECATED RDW RBC AUTO: 47.2 FL (ref 37–54)
EOSINOPHIL # BLD AUTO: 0.34 10*3/MM3 (ref 0–0.4)
EOSINOPHIL NFR BLD AUTO: 4.9 % (ref 0.3–6.2)
ERYTHROCYTE [DISTWIDTH] IN BLOOD BY AUTOMATED COUNT: 14.7 % (ref 12.3–15.4)
ERYTHROCYTE [SEDIMENTATION RATE] IN BLOOD: 28 MM/HR (ref 0–30)
GFR SERPL CREATININE-BSD FRML MDRD: 97 ML/MIN/1.73
GLOBULIN UR ELPH-MCNC: 3.3 GM/DL
GLUCOSE BLD-MCNC: 131 MG/DL (ref 65–99)
HCT VFR BLD AUTO: 28.6 % (ref 34–46.6)
HGB BLD-MCNC: 8.8 G/DL (ref 12–15.9)
IMM GRANULOCYTES # BLD AUTO: 0.04 10*3/MM3 (ref 0–0.05)
IMM GRANULOCYTES NFR BLD AUTO: 0.6 % (ref 0–0.5)
LYMPHOCYTES # BLD AUTO: 1.4 10*3/MM3 (ref 0.7–3.1)
LYMPHOCYTES NFR BLD AUTO: 20.2 % (ref 19.6–45.3)
MCH RBC QN AUTO: 27 PG (ref 26.6–33)
MCHC RBC AUTO-ENTMCNC: 30.8 G/DL (ref 31.5–35.7)
MCV RBC AUTO: 87.7 FL (ref 79–97)
MONOCYTES # BLD AUTO: 0.84 10*3/MM3 (ref 0.1–0.9)
MONOCYTES NFR BLD AUTO: 12.1 % (ref 5–12)
NEUTROPHILS # BLD AUTO: 4.21 10*3/MM3 (ref 1.7–7)
NEUTROPHILS NFR BLD AUTO: 60.6 % (ref 42.7–76)
NRBC BLD AUTO-RTO: 0 /100 WBC (ref 0–0.2)
PLATELET # BLD AUTO: 660 10*3/MM3 (ref 140–450)
PMV BLD AUTO: 9.6 FL (ref 6–12)
POTASSIUM BLD-SCNC: 3.1 MMOL/L (ref 3.5–5.2)
PROT SERPL-MCNC: 6 G/DL (ref 6–8.5)
RBC # BLD AUTO: 3.26 10*6/MM3 (ref 3.77–5.28)
SODIUM BLD-SCNC: 141 MMOL/L (ref 136–145)
WBC NRBC COR # BLD: 6.94 10*3/MM3 (ref 3.4–10.8)

## 2020-04-06 PROCEDURE — 85652 RBC SED RATE AUTOMATED: CPT

## 2020-04-06 PROCEDURE — 86140 C-REACTIVE PROTEIN: CPT

## 2020-04-06 PROCEDURE — 85025 COMPLETE CBC W/AUTO DIFF WBC: CPT

## 2020-04-06 PROCEDURE — 80053 COMPREHEN METABOLIC PANEL: CPT

## 2020-04-16 ENCOUNTER — HOSPITAL ENCOUNTER (INPATIENT)
Facility: HOSPITAL | Age: 80
LOS: 4 days | Discharge: HOME HEALTH | End: 2020-04-20
Attending: EMERGENCY MEDICINE | Admitting: INTERNAL MEDICINE

## 2020-04-16 ENCOUNTER — APPOINTMENT (OUTPATIENT)
Dept: CT IMAGING | Facility: HOSPITAL | Age: 80
End: 2020-04-16

## 2020-04-16 DIAGNOSIS — R50.9 ACUTE FEBRILE ILLNESS: ICD-10-CM

## 2020-04-16 DIAGNOSIS — J90 BILATERAL PLEURAL EFFUSION: ICD-10-CM

## 2020-04-16 DIAGNOSIS — J18.9 PNEUMONIA OF BOTH LUNGS DUE TO INFECTIOUS ORGANISM, UNSPECIFIED PART OF LUNG: Primary | ICD-10-CM

## 2020-04-16 DIAGNOSIS — I10 ELEVATED BLOOD PRESSURE READING WITH DIAGNOSIS OF HYPERTENSION: ICD-10-CM

## 2020-04-16 PROBLEM — J12.82 PNEUMONIA DUE TO COVID-19 VIRUS: Status: ACTIVE | Noted: 2020-04-16

## 2020-04-16 PROBLEM — U07.1 PNEUMONIA DUE TO COVID-19 VIRUS: Status: ACTIVE | Noted: 2020-04-16

## 2020-04-16 PROBLEM — Z87.19 HISTORY OF DIVERTICULAR ABSCESS: Status: ACTIVE | Noted: 2020-04-16

## 2020-04-16 PROBLEM — E11.9 DM2 (DIABETES MELLITUS, TYPE 2) (HCC): Status: ACTIVE | Noted: 2020-04-16

## 2020-04-16 LAB
ALBUMIN SERPL-MCNC: 3.8 G/DL (ref 3.5–5.2)
ALBUMIN/GLOB SERPL: 1 G/DL
ALP SERPL-CCNC: 77 U/L (ref 39–117)
ALT SERPL W P-5'-P-CCNC: 7 U/L (ref 1–33)
ANION GAP SERPL CALCULATED.3IONS-SCNC: 14 MMOL/L (ref 5–15)
AST SERPL-CCNC: 15 U/L (ref 1–32)
B PARAPERT DNA SPEC QL NAA+PROBE: NOT DETECTED
B PERT DNA SPEC QL NAA+PROBE: NOT DETECTED
BACTERIA UR QL AUTO: ABNORMAL /HPF
BASOPHILS # BLD MANUAL: 0 10*3/MM3 (ref 0–0.2)
BASOPHILS NFR BLD AUTO: 0 % (ref 0–1.5)
BILIRUB SERPL-MCNC: 0.3 MG/DL (ref 0.2–1.2)
BILIRUB UR QL STRIP: NEGATIVE
BUN BLD-MCNC: 13 MG/DL (ref 8–23)
BUN/CREAT SERPL: 21.7 (ref 7–25)
C PNEUM DNA NPH QL NAA+NON-PROBE: NOT DETECTED
CALCIUM SPEC-SCNC: 9.1 MG/DL (ref 8.6–10.5)
CHLORIDE SERPL-SCNC: 103 MMOL/L (ref 98–107)
CLARITY UR: ABNORMAL
CO2 SERPL-SCNC: 23 MMOL/L (ref 22–29)
COLOR UR: YELLOW
CREAT BLD-MCNC: 0.6 MG/DL (ref 0.57–1)
D-LACTATE SERPL-SCNC: 1.3 MMOL/L (ref 0.5–2)
DEPRECATED RDW RBC AUTO: 46.7 FL (ref 37–54)
EOSINOPHIL # BLD MANUAL: 0.19 10*3/MM3 (ref 0–0.4)
EOSINOPHIL NFR BLD MANUAL: 5 % (ref 0.3–6.2)
ERYTHROCYTE [DISTWIDTH] IN BLOOD BY AUTOMATED COUNT: 14.8 % (ref 12.3–15.4)
FLUAV H1 2009 PAND RNA NPH QL NAA+PROBE: NOT DETECTED
FLUAV H1 HA GENE NPH QL NAA+PROBE: NOT DETECTED
FLUAV H3 RNA NPH QL NAA+PROBE: NOT DETECTED
FLUAV SUBTYP SPEC NAA+PROBE: NOT DETECTED
FLUBV RNA ISLT QL NAA+PROBE: NOT DETECTED
GFR SERPL CREATININE-BSD FRML MDRD: 97 ML/MIN/1.73
GLOBULIN UR ELPH-MCNC: 3.7 GM/DL
GLUCOSE BLD-MCNC: 113 MG/DL (ref 65–99)
GLUCOSE UR STRIP-MCNC: ABNORMAL MG/DL
HADV DNA SPEC NAA+PROBE: NOT DETECTED
HCOV 229E RNA SPEC QL NAA+PROBE: NOT DETECTED
HCOV HKU1 RNA SPEC QL NAA+PROBE: NOT DETECTED
HCOV NL63 RNA SPEC QL NAA+PROBE: NOT DETECTED
HCOV OC43 RNA SPEC QL NAA+PROBE: NOT DETECTED
HCT VFR BLD AUTO: 33.5 % (ref 34–46.6)
HGB BLD-MCNC: 10.2 G/DL (ref 12–15.9)
HGB UR QL STRIP.AUTO: NEGATIVE
HMPV RNA NPH QL NAA+NON-PROBE: NOT DETECTED
HOLD SPECIMEN: NORMAL
HOLD SPECIMEN: NORMAL
HPIV1 RNA SPEC QL NAA+PROBE: NOT DETECTED
HPIV2 RNA SPEC QL NAA+PROBE: NOT DETECTED
HPIV3 RNA NPH QL NAA+PROBE: NOT DETECTED
HPIV4 P GENE NPH QL NAA+PROBE: NOT DETECTED
HYALINE CASTS UR QL AUTO: ABNORMAL /LPF
KETONES UR QL STRIP: NEGATIVE
LEUKOCYTE ESTERASE UR QL STRIP.AUTO: NEGATIVE
LIPASE SERPL-CCNC: 46 U/L (ref 13–60)
LYMPHOCYTES # BLD MANUAL: 1.95 10*3/MM3 (ref 0.7–3.1)
LYMPHOCYTES NFR BLD MANUAL: 39 % (ref 5–12)
LYMPHOCYTES NFR BLD MANUAL: 50 % (ref 19.6–45.3)
M PNEUMO IGG SER IA-ACNC: NOT DETECTED
MAGNESIUM SERPL-MCNC: 2 MG/DL (ref 1.6–2.4)
MCH RBC QN AUTO: 26.4 PG (ref 26.6–33)
MCHC RBC AUTO-ENTMCNC: 30.4 G/DL (ref 31.5–35.7)
MCV RBC AUTO: 86.6 FL (ref 79–97)
MONOCYTES # BLD AUTO: 1.52 10*3/MM3 (ref 0.1–0.9)
NEUTROPHILS # BLD AUTO: 0.23 10*3/MM3 (ref 1.7–7)
NEUTROPHILS NFR BLD MANUAL: 6 % (ref 42.7–76)
NITRITE UR QL STRIP: NEGATIVE
NT-PROBNP SERPL-MCNC: 537.9 PG/ML (ref 5–1800)
PH UR STRIP.AUTO: 8.5 [PH] (ref 5–8)
PLAT MORPH BLD: NORMAL
PLATELET # BLD AUTO: 525 10*3/MM3 (ref 140–450)
PMV BLD AUTO: 10 FL (ref 6–12)
POTASSIUM BLD-SCNC: 3.6 MMOL/L (ref 3.5–5.2)
PROCALCITONIN SERPL-MCNC: 0.1 NG/ML (ref 0.1–0.25)
PROT SERPL-MCNC: 7.5 G/DL (ref 6–8.5)
PROT UR QL STRIP: ABNORMAL
RBC # BLD AUTO: 3.87 10*6/MM3 (ref 3.77–5.28)
RBC # UR: ABNORMAL /HPF
RBC MORPH BLD: NORMAL
REF LAB TEST METHOD: ABNORMAL
RHINOVIRUS RNA SPEC NAA+PROBE: NOT DETECTED
RSV RNA NPH QL NAA+NON-PROBE: NOT DETECTED
SODIUM BLD-SCNC: 140 MMOL/L (ref 136–145)
SP GR UR STRIP: 1.02 (ref 1–1.03)
SQUAMOUS #/AREA URNS HPF: ABNORMAL /HPF
UROBILINOGEN UR QL STRIP: ABNORMAL
WBC MORPH BLD: NORMAL
WBC NRBC COR # BLD: 3.89 10*3/MM3 (ref 3.4–10.8)
WBC UR QL AUTO: ABNORMAL /HPF
WHOLE BLOOD HOLD SPECIMEN: NORMAL
WHOLE BLOOD HOLD SPECIMEN: NORMAL

## 2020-04-16 PROCEDURE — 25010000002 ONDANSETRON PER 1 MG: Performed by: EMERGENCY MEDICINE

## 2020-04-16 PROCEDURE — 84145 PROCALCITONIN (PCT): CPT | Performed by: EMERGENCY MEDICINE

## 2020-04-16 PROCEDURE — 87635 SARS-COV-2 COVID-19 AMP PRB: CPT | Performed by: INTERNAL MEDICINE

## 2020-04-16 PROCEDURE — 99284 EMERGENCY DEPT VISIT MOD MDM: CPT

## 2020-04-16 PROCEDURE — 87076 CULTURE ANAEROBE IDENT EACH: CPT | Performed by: EMERGENCY MEDICINE

## 2020-04-16 PROCEDURE — 74176 CT ABD & PELVIS W/O CONTRAST: CPT

## 2020-04-16 PROCEDURE — 83690 ASSAY OF LIPASE: CPT | Performed by: EMERGENCY MEDICINE

## 2020-04-16 PROCEDURE — 25010000002 ONDANSETRON PER 1 MG: Performed by: INTERNAL MEDICINE

## 2020-04-16 PROCEDURE — 25010000002 PIPERACILLIN SOD-TAZOBACTAM PER 1 G: Performed by: EMERGENCY MEDICINE

## 2020-04-16 PROCEDURE — 81001 URINALYSIS AUTO W/SCOPE: CPT | Performed by: EMERGENCY MEDICINE

## 2020-04-16 PROCEDURE — 83605 ASSAY OF LACTIC ACID: CPT | Performed by: EMERGENCY MEDICINE

## 2020-04-16 PROCEDURE — 85007 BL SMEAR W/DIFF WBC COUNT: CPT | Performed by: EMERGENCY MEDICINE

## 2020-04-16 PROCEDURE — 83880 ASSAY OF NATRIURETIC PEPTIDE: CPT | Performed by: INTERNAL MEDICINE

## 2020-04-16 PROCEDURE — 85025 COMPLETE CBC W/AUTO DIFF WBC: CPT | Performed by: EMERGENCY MEDICINE

## 2020-04-16 PROCEDURE — 83735 ASSAY OF MAGNESIUM: CPT | Performed by: INTERNAL MEDICINE

## 2020-04-16 PROCEDURE — 87040 BLOOD CULTURE FOR BACTERIA: CPT | Performed by: EMERGENCY MEDICINE

## 2020-04-16 PROCEDURE — 0100U HC BIOFIRE FILMARRAY RESP PANEL 2: CPT | Performed by: INTERNAL MEDICINE

## 2020-04-16 PROCEDURE — 87899 AGENT NOS ASSAY W/OPTIC: CPT | Performed by: INTERNAL MEDICINE

## 2020-04-16 PROCEDURE — 71250 CT THORAX DX C-: CPT

## 2020-04-16 PROCEDURE — 80053 COMPREHEN METABOLIC PANEL: CPT | Performed by: EMERGENCY MEDICINE

## 2020-04-16 PROCEDURE — 99223 1ST HOSP IP/OBS HIGH 75: CPT | Performed by: INTERNAL MEDICINE

## 2020-04-16 RX ORDER — LEVOTHYROXINE SODIUM 88 UG/1
88 TABLET ORAL DAILY
Status: DISCONTINUED | OUTPATIENT
Start: 2020-04-17 | End: 2020-04-20 | Stop reason: HOSPADM

## 2020-04-16 RX ORDER — SODIUM CHLORIDE 0.9 % (FLUSH) 0.9 %
10 SYRINGE (ML) INJECTION AS NEEDED
Status: DISCONTINUED | OUTPATIENT
Start: 2020-04-16 | End: 2020-04-20 | Stop reason: HOSPADM

## 2020-04-16 RX ORDER — DEXTROSE MONOHYDRATE 25 G/50ML
25 INJECTION, SOLUTION INTRAVENOUS
Status: DISCONTINUED | OUTPATIENT
Start: 2020-04-16 | End: 2020-04-20 | Stop reason: HOSPADM

## 2020-04-16 RX ORDER — MAGNESIUM SULFATE 1 G/100ML
1 INJECTION INTRAVENOUS AS NEEDED
Status: DISCONTINUED | OUTPATIENT
Start: 2020-04-16 | End: 2020-04-20 | Stop reason: HOSPADM

## 2020-04-16 RX ORDER — L.ACID,PARA/B.BIFIDUM/S.THERM 8B CELL
1 CAPSULE ORAL DAILY
Status: DISCONTINUED | OUTPATIENT
Start: 2020-04-17 | End: 2020-04-20 | Stop reason: HOSPADM

## 2020-04-16 RX ORDER — ONDANSETRON 2 MG/ML
4 INJECTION INTRAMUSCULAR; INTRAVENOUS EVERY 6 HOURS PRN
Status: DISCONTINUED | OUTPATIENT
Start: 2020-04-16 | End: 2020-04-20 | Stop reason: HOSPADM

## 2020-04-16 RX ORDER — ACETAMINOPHEN 325 MG/1
650 TABLET ORAL EVERY 6 HOURS PRN
Status: DISCONTINUED | OUTPATIENT
Start: 2020-04-16 | End: 2020-04-20 | Stop reason: HOSPADM

## 2020-04-16 RX ORDER — SODIUM CHLORIDE 0.9 % (FLUSH) 0.9 %
10 SYRINGE (ML) INJECTION EVERY 12 HOURS SCHEDULED
Status: DISCONTINUED | OUTPATIENT
Start: 2020-04-17 | End: 2020-04-20 | Stop reason: HOSPADM

## 2020-04-16 RX ORDER — GABAPENTIN 300 MG/1
300 CAPSULE ORAL NIGHTLY
Status: DISCONTINUED | OUTPATIENT
Start: 2020-04-17 | End: 2020-04-20 | Stop reason: HOSPADM

## 2020-04-16 RX ORDER — ONDANSETRON 2 MG/ML
4 INJECTION INTRAMUSCULAR; INTRAVENOUS ONCE
Status: COMPLETED | OUTPATIENT
Start: 2020-04-16 | End: 2020-04-16

## 2020-04-16 RX ORDER — MAGNESIUM SULFATE HEPTAHYDRATE 40 MG/ML
4 INJECTION, SOLUTION INTRAVENOUS AS NEEDED
Status: DISCONTINUED | OUTPATIENT
Start: 2020-04-16 | End: 2020-04-20 | Stop reason: HOSPADM

## 2020-04-16 RX ORDER — LORAZEPAM 0.5 MG/1
0.5 TABLET ORAL EVERY 8 HOURS PRN
Status: DISCONTINUED | OUTPATIENT
Start: 2020-04-16 | End: 2020-04-20 | Stop reason: HOSPADM

## 2020-04-16 RX ORDER — NICOTINE POLACRILEX 4 MG
15 LOZENGE BUCCAL
Status: DISCONTINUED | OUTPATIENT
Start: 2020-04-16 | End: 2020-04-20 | Stop reason: HOSPADM

## 2020-04-16 RX ORDER — PRAVASTATIN SODIUM 40 MG
40 TABLET ORAL NIGHTLY
Status: DISCONTINUED | OUTPATIENT
Start: 2020-04-16 | End: 2020-04-20 | Stop reason: HOSPADM

## 2020-04-16 RX ORDER — MAGNESIUM SULFATE HEPTAHYDRATE 40 MG/ML
2 INJECTION, SOLUTION INTRAVENOUS AS NEEDED
Status: DISCONTINUED | OUTPATIENT
Start: 2020-04-16 | End: 2020-04-20 | Stop reason: HOSPADM

## 2020-04-16 RX ORDER — METOPROLOL SUCCINATE 50 MG/1
100 TABLET, EXTENDED RELEASE ORAL DAILY
Status: DISCONTINUED | OUTPATIENT
Start: 2020-04-17 | End: 2020-04-20 | Stop reason: HOSPADM

## 2020-04-16 RX ORDER — BUSPIRONE HYDROCHLORIDE 10 MG/1
10 TABLET ORAL DAILY
Status: DISCONTINUED | OUTPATIENT
Start: 2020-04-17 | End: 2020-04-20 | Stop reason: HOSPADM

## 2020-04-16 RX ADMIN — PRAVASTATIN SODIUM 40 MG: 40 TABLET ORAL at 23:43

## 2020-04-16 RX ADMIN — SODIUM CHLORIDE, PRESERVATIVE FREE 10 ML: 5 INJECTION INTRAVENOUS at 23:44

## 2020-04-16 RX ADMIN — ONDANSETRON 4 MG: 2 INJECTION INTRAMUSCULAR; INTRAVENOUS at 21:02

## 2020-04-16 RX ADMIN — SODIUM CHLORIDE, POTASSIUM CHLORIDE, SODIUM LACTATE AND CALCIUM CHLORIDE 1000 ML: 600; 310; 30; 20 INJECTION, SOLUTION INTRAVENOUS at 17:24

## 2020-04-16 RX ADMIN — GABAPENTIN 300 MG: 300 CAPSULE ORAL at 23:43

## 2020-04-16 RX ADMIN — ACETAMINOPHEN 650 MG: 325 TABLET, FILM COATED ORAL at 23:43

## 2020-04-16 RX ADMIN — CEFTRIAXONE SODIUM 1 G: 1 INJECTION, POWDER, FOR SOLUTION INTRAMUSCULAR; INTRAVENOUS at 23:43

## 2020-04-16 RX ADMIN — TAZOBACTAM SODIUM AND PIPERACILLIN SODIUM 3.38 G: 375; 3 INJECTION, SOLUTION INTRAVENOUS at 17:24

## 2020-04-16 RX ADMIN — ONDANSETRON 4 MG: 2 INJECTION INTRAMUSCULAR; INTRAVENOUS at 23:43

## 2020-04-16 NOTE — ED PROVIDER NOTES
Subjective   Ms. Neema Richard is a 79 y.o. female who presents to the ED with c/o fever with a Tmax of 102 that onset today. Patient reports 1 month ago she had bowel surgery due to perforation and has been recovering well. Patient notes she has been home for approximately 1 week and her last antibiotic infusion was 3 days ago. Patient called her PCP, Dr. Mackey, today who advised she come to the ED for further evaluation. Patient is also complaining of nausea but denies vomiting, diarrhea, cough, abdominal pain, dysuria, hematuria or any other symptom.       History provided by:  Patient  Fever   Max temp prior to arrival:  102  Severity:  Moderate  Duration: Today.  Timing:  Constant  Chronicity:  New  Associated symptoms: nausea    Associated symptoms: no cough, no diarrhea, no dysuria and no vomiting    Risk factors comment:  Recent surgery      Review of Systems   Constitutional: Positive for fever.   Respiratory: Negative for cough.    Gastrointestinal: Positive for nausea. Negative for diarrhea and vomiting.   Genitourinary: Negative for dysuria.   All other systems reviewed and are negative.      Past Medical History:   Diagnosis Date   • Acquired hypothyroidism 1980   • Colon polyp - sees Dr. 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 ER and [place] (+)   • Essential hypertension 2012   • High cholesterol 2019   • Hx of herpes genitalis 2013   • IBS-d (takes Buspar)        Allergies   Allergen Reactions   • Evista [Raloxifene] Rash   • Cephalexin Diarrhea   • Hydrochlorothiazide Diarrhea   • Meloxicam Other (See Comments)   • Metronidazole Diarrhea   • Oxybutynin Cough   • Propylthiouracil Other (See Comments)     Severally reduce WBC's    • Sulfa Antibiotics Hives   • Sulfacetamide Sodium-Sulfur Hives   • Sulfur Dioxide Hives       Past Surgical History:   Procedure Laterality Date   • BILATERAL BREAST REDUCTION Bilateral 1995   • BREAST LUMPECTOMY Left 2009    • CATARACT EXTRACTION, BILATERAL Bilateral 2018   • CERVICAL CONIZATION  1983   • COLON RESECTION N/A 3/17/2020    Procedure: LOW ANTERIOR COLON RESECTION, MOBILIZATION OF SPLENIC FLEXURE, DRAINAGE OF ABSCESS AND COLOSTOMY CREATION;  Surgeon: Sravan Vazquez MD;  Location: [facility];  Service: General;  Laterality: N/A;   • FACIAL COSMETIC SURGERY  2001   • FACIAL COSMETIC SURGERY  2012   • HEMORRHOIDECTOMY  1974   • LAPAROSCOPIC CHOLECYSTECTOMY  2010   • REDUCTION MAMMAPLASTY Bilateral 04/13/2017    DCIS   • SINUS SURGERY  1992   • THYROIDECTOMY, PARTIAL  1963   • TONSILLECTOMY  1953   • VARICOSE VEIN SURGERY Bilateral 1981       Family History   Problem Relation Age of Onset   • Breast cancer Neg Hx    • Ovarian cancer Neg Hx        Social History     Socioeconomic History   • Marital status:      Spouse name: Not on file   • Number of children: Not on file   • Years of education: Not on file   • Highest education level: Not on file   Tobacco Use   • Smoking status: Never Smoker   • Smokeless tobacco: Never Used   Substance and Sexual Activity   • Alcohol use: No   • Drug use: No         Objective   Physical Exam   Constitutional: She is oriented to person, place, and time. She appears well-developed and well-nourished. No distress.   HENT:   Head: Normocephalic and atraumatic.   Nose: Nose normal.   Eyes: Conjunctivae are normal. No scleral icterus.   Neck: Normal range of motion. Neck supple.   Cardiovascular: Normal rate, regular rhythm, normal heart sounds and intact distal pulses.   No murmur heard.  Pulmonary/Chest: Effort normal and breath sounds normal. No respiratory distress.   Abdominal: Soft. She exhibits no distension. There is tenderness.   Mild diffuse abdominal tenderness. No surgical signs. Incision healing well- clean, dry and intact. Ostomy is in place. No observable issues.    Musculoskeletal: Normal range of motion. She exhibits no edema.   Neurological: She is alert and oriented to  person, place, and time.   Skin: Skin is warm and dry.   Psychiatric: She has a normal mood and affect. Her behavior is normal.   Nursing note and vitals reviewed.      Procedures         ED Course  ED Course as of Apr 17 0000   Thu Apr 16, 2020   1632 Temp: 100.5 °F (38.1 °C) [RS]   1707 Lactate: 1.3 [RS]   1938 Patient refused to have a contrasted CT scan.  We will obtain a noncontrasted CT scan.  This could potentially limit the accuracy of the evaluation.  However, the patient states that she does not want any contrast.    [RS]   2048 I talked with family and updated them on the findings and plan thus far.  There is some patchy infiltrates on CT scan of the chest.  There is no abnormal findings on CT scan of the abdomen pelvis.  We are awaiting the urine sample.    [RS]   2139 Patient has evidence of a pneumonia with groundglass opacities in the right upper lobe and bilateral lower lobes.  Patient has mild hypoxemia.  We will plan to admit the patient for further evaluation and management.  Hospitalist paged for admission.    [RS]   2150 Case discussed with the hospitalist who will admit.    [RS]      ED Course User Index  [RS] Orville Martin MD          Recent Results (from the past 24 hour(s))   Comprehensive Metabolic Panel    Collection Time: 04/16/20  4:12 PM   Result Value Ref Range    Glucose 113 (H) 65 - 99 mg/dL    BUN 13 8 - 23 mg/dL    Creatinine 0.60 0.57 - 1.00 mg/dL    Sodium 140 136 - 145 mmol/L    Potassium 3.6 3.5 - 5.2 mmol/L    Chloride 103 98 - 107 mmol/L    CO2 23.0 22.0 - 29.0 mmol/L    Calcium 9.1 8.6 - 10.5 mg/dL    Total Protein 7.5 6.0 - 8.5 g/dL    Albumin 3.80 3.50 - 5.20 g/dL    ALT (SGPT) 7 1 - 33 U/L    AST (SGOT) 15 1 - 32 U/L    Alkaline Phosphatase 77 39 - 117 U/L    Total Bilirubin 0.3 0.2 - 1.2 mg/dL    eGFR Non African Amer 97 >60 mL/min/1.73    Globulin 3.7 gm/dL    A/G Ratio 1.0 g/dL    BUN/Creatinine Ratio 21.7 7.0 - 25.0    Anion Gap 14.0 5.0 - 15.0 mmol/L   Lipase     Collection Time: 04/16/20  4:12 PM   Result Value Ref Range    Lipase 46 13 - 60 U/L   Lactic Acid, Plasma    Collection Time: 04/16/20  4:12 PM   Result Value Ref Range    Lactate 1.3 0.5 - 2.0 mmol/L   Light Blue Top    Collection Time: 04/16/20  4:12 PM   Result Value Ref Range    Extra Tube hold for add-on    Green Top (Gel)    Collection Time: 04/16/20  4:12 PM   Result Value Ref Range    Extra Tube Hold for add-ons.    Gold Top - SST    Collection Time: 04/16/20  4:12 PM   Result Value Ref Range    Extra Tube Hold for add-ons.    Procalcitonin    Collection Time: 04/16/20  4:12 PM   Result Value Ref Range    Procalcitonin 0.10 0.10 - 0.25 ng/mL   BNP    Collection Time: 04/16/20  4:12 PM   Result Value Ref Range    proBNP 537.9 5.0-1,800.0 pg/mL   Magnesium    Collection Time: 04/16/20  4:12 PM   Result Value Ref Range    Magnesium 2.0 1.6 - 2.4 mg/dL   Lavender Top    Collection Time: 04/16/20  4:53 PM   Result Value Ref Range    Extra Tube hold for add-on    CBC Auto Differential    Collection Time: 04/16/20  4:53 PM   Result Value Ref Range    WBC 3.89 3.40 - 10.80 10*3/mm3    RBC 3.87 3.77 - 5.28 10*6/mm3    Hemoglobin 10.2 (L) 12.0 - 15.9 g/dL    Hematocrit 33.5 (L) 34.0 - 46.6 %    MCV 86.6 79.0 - 97.0 fL    MCH 26.4 (L) 26.6 - 33.0 pg    MCHC 30.4 (L) 31.5 - 35.7 g/dL    RDW 14.8 12.3 - 15.4 %    RDW-SD 46.7 37.0 - 54.0 fl    MPV 10.0 6.0 - 12.0 fL    Platelets 525 (H) 140 - 450 10*3/mm3   Manual Differential    Collection Time: 04/16/20  4:53 PM   Result Value Ref Range    Neutrophil % 6.0 (L) 42.7 - 76.0 %    Lymphocyte % 50.0 (H) 19.6 - 45.3 %    Monocyte % 39.0 (H) 5.0 - 12.0 %    Eosinophil % 5.0 0.3 - 6.2 %    Basophil % 0.0 0.0 - 1.5 %    Neutrophils Absolute 0.23 (L) 1.70 - 7.00 10*3/mm3    Lymphocytes Absolute 1.95 0.70 - 3.10 10*3/mm3    Monocytes Absolute 1.52 (H) 0.10 - 0.90 10*3/mm3    Eosinophils Absolute 0.19 0.00 - 0.40 10*3/mm3    Basophils Absolute 0.00 0.00 - 0.20 10*3/mm3    RBC  Morphology Normal Normal    WBC Morphology Normal Normal    Platelet Morphology Normal Normal   Urinalysis With Microscopic If Indicated (No Culture) - Urine, Clean Catch    Collection Time: 04/16/20  9:02 PM   Result Value Ref Range    Color, UA Yellow Yellow, Straw    Appearance, UA Cloudy (A) Clear    pH, UA 8.5 (H) 5.0 - 8.0    Specific Gravity, UA 1.023 1.001 - 1.030    Glucose, UA >=1000 mg/dL (3+) (A) Negative    Ketones, UA Negative Negative    Bilirubin, UA Negative Negative    Blood, UA Negative Negative    Protein, UA Trace (A) Negative    Leuk Esterase, UA Negative Negative    Nitrite, UA Negative Negative    Urobilinogen, UA 0.2 E.U./dL 0.2 - 1.0 E.U./dL   Urinalysis, Microscopic Only - Urine, Clean Catch    Collection Time: 04/16/20  9:02 PM   Result Value Ref Range    RBC, UA 0-2 None Seen, 0-2 /HPF    WBC, UA 0-2 None Seen, 0-2 /HPF    Bacteria, UA 1+ (A) None Seen, Trace /HPF    Squamous Epithelial Cells, UA 0-2 None Seen, 0-2 /HPF    Hyaline Casts, UA 0-6 0 - 6 /LPF    Methodology Automated Microscopy    Respiratory Panel, PCR - Swab, Nasopharynx    Collection Time: 04/16/20 10:27 PM   Result Value Ref Range    ADENOVIRUS, PCR Not Detected Not Detected    Coronavirus 229E Not Detected Not Detected    Coronavirus HKU1 Not Detected Not Detected    Coronavirus NL63 Not Detected Not Detected    Coronavirus OC43 Not Detected Not Detected    Human Metapneumovirus Not Detected Not Detected    Human Rhinovirus/Enterovirus Not Detected Not Detected    Influenza B PCR Not Detected Not Detected    Parainfluenza Virus 1 Not Detected Not Detected    Parainfluenza Virus 2 Not Detected Not Detected    Parainfluenza Virus 3 Not Detected Not Detected    Parainfluenza Virus 4 Not Detected Not Detected    Bordetella pertussis pcr Not Detected Not Detected    Influenza A H1 2009 PCR Not Detected Not Detected    Chlamydophila pneumoniae PCR Not Detected Not Detected    Mycoplasma pneumo by PCR Not Detected Not Detected  "   Influenza A PCR Not Detected Not Detected    Influenza A H3 Not Detected Not Detected    Influenza A H1 Not Detected Not Detected    RSV, PCR Not Detected Not Detected    Bordetella parapertussis PCR Not Detected Not Detected     Note: In addition to lab results from this visit, the labs listed above may include labs taken at another facility or during a different encounter within the last 24 hours. Please correlate lab times with ED admission and discharge times for further clarification of the services performed during this visit.    CT Chest Without Contrast   Final Result   1.  Mild patchy airspace infiltrates in the right upper lobe with mild infiltrate and atelectasis in the dependent posterior lung bases.   2.  Low lung volumes with elevated right hemidiaphragm and tiny bilateral pleural effusions, greater on the right.      Signer Name: Prakash Ortega MD    Signed: 4/16/2020 8:42 PM    Workstation Name: [facility]     [facility]      CT Abdomen Pelvis Without Contrast   Final Result   No evidence of abscess. No evidence of bowel obstruction or perforation. Bilateral effusions at the lung bases are smaller. There is a small amount of ascites around the liver that has decreased slightly.               Signer Name: Jordan Montes De Oca MD    Signed: 4/16/2020 8:17 PM    Workstation Name: [id number]     [facility]        Vitals:    04/16/20 2229 04/16/20 2301 04/16/20 2307 04/16/20 2310   BP:  131/57 127/59    BP Location:  Right arm Left arm    Patient Position:  Sitting Sitting    Pulse: 89 90 91 93   Resp:  22     Temp:  (!) 101.7 °F (38.7 °C)     TempSrc:  Oral     SpO2: 91% 92% 93%    Weight: 59 kg (130 lb) 58.9 kg (129 lb 12.8 oz)     Height:  170.2 cm (67\")       Medications   sodium chloride 0.9 % flush 10 mL (has no administration in time range)   Pharmacy Consult - Pharmacy to dose (has no administration in time range)   doxycycline (VIBRAMYCIN) 100 " mg/100 mL 0.9% NS MBP (has no administration in time range)   cefTRIAXone (ROCEPHIN) 1 g/50 mL 0.9% NS VTB (mbp) (1 g Intravenous Given 4/16/20 2343)   magnesium sulfate 4 gram infusion - Mg less than or equal to 1mg/dL (has no administration in time range)     Or   magnesium sulfate 3 gram infusion (1gm x 3) - Mg 1.1 - 1.5 mg/dL (has no administration in time range)     Or   Magnesium Sulfate 2 gram infusion- Mg 1.6 - 1.9 mg/dL (has no administration in time range)   vancomycin 1250 mg/250 mL 0.9% NS IVPB ([facility]) (has no administration in time range)   busPIRone (BUSPAR) tablet 10 mg (has no administration in time range)   gabapentin (NEURONTIN) capsule 300 mg (300 mg Oral Given 4/16/20 2343)   levothyroxine (SYNTHROID, LEVOTHROID) tablet 88 mcg (has no administration in time range)   LORazepam (ATIVAN) tablet 0.5 mg (has no administration in time range)   metoprolol succinate XL (TOPROL-XL) 24 hr tablet 100 mg (has no administration in time range)   pravastatin (PRAVACHOL) tablet 40 mg (40 mg Oral Given 4/16/20 2343)   lactobacillus acidophilus (RISAQUAD) capsule 1 capsule (has no administration in time range)   dextrose (GLUTOSE) oral gel 15 g (has no administration in time range)   dextrose (D50W) 25 g/ 50mL Intravenous Solution 25 g (has no administration in time range)   glucagon (human recombinant) (GLUCAGEN DIAGNOSTIC) injection 1 mg (has no administration in time range)   insulin lispro (humaLOG) injection 0-7 Units (has no administration in time range)   sodium chloride 0.9 % flush 10 mL (10 mL Intravenous Given 4/16/20 2344)   sodium chloride 0.9 % flush 10 mL (has no administration in time range)   enoxaparin (LOVENOX) syringe 40 mg (has no administration in time range)   ondansetron (ZOFRAN) injection 4 mg (4 mg Intravenous Given 4/16/20 2343)   acetaminophen (TYLENOL) tablet 650 mg (650 mg Oral Given 4/16/20 2343)   piperacillin-tazobactam (ZOSYN) 3.375 g in iso-osmotic dextrose 50 ml (premix) (0 g  Intravenous Stopped 4/16/20 1800)   lactated ringers bolus 1,000 mL (0 mL Intravenous Stopped 4/16/20 1830)   ondansetron (ZOFRAN) injection 4 mg (4 mg Intravenous Given 4/16/20 2102)     ECG/EMG Results (last 24 hours)     ** No results found for the last 24 hours. **        No orders to display                                      MDM  Number of Diagnoses or Management Options  Acute febrile illness:   Elevated blood pressure reading with diagnosis of hypertension:   Pneumonia of both lungs due to infectious organism, unspecified part of lung:      Amount and/or Complexity of Data Reviewed  Clinical lab tests: reviewed  Tests in the radiology section of CPT®: reviewed  Review and summarize past medical records: yes  Discuss the patient with other providers: yes  Independent visualization of images, tracings, or specimens: yes        Final diagnoses:   Pneumonia of both lungs due to infectious organism, unspecified part of lung   Acute febrile illness   Elevated blood pressure reading with diagnosis of hypertension       Documentation assistance provided by cosme Roy.  Information recorded by the scribe was done at my direction and has been verified and validated by me.     Brian Ryo  04/16/20 1641       Orville Martin MD  04/17/20 0000

## 2020-04-17 LAB
ANION GAP SERPL CALCULATED.3IONS-SCNC: 13 MMOL/L (ref 5–15)
BASOPHILS # BLD MANUAL: 0.09 10*3/MM3 (ref 0–0.2)
BASOPHILS NFR BLD AUTO: 3 % (ref 0–1.5)
BUN BLD-MCNC: 16 MG/DL (ref 8–23)
BUN/CREAT SERPL: 21.3 (ref 7–25)
CALCIUM SPEC-SCNC: 8.1 MG/DL (ref 8.6–10.5)
CHLORIDE SERPL-SCNC: 106 MMOL/L (ref 98–107)
CO2 SERPL-SCNC: 20 MMOL/L (ref 22–29)
CREAT BLD-MCNC: 0.75 MG/DL (ref 0.57–1)
CRP SERPL-MCNC: 12.3 MG/DL (ref 0–0.5)
DEPRECATED RDW RBC AUTO: 49.1 FL (ref 37–54)
DEPRECATED RDW RBC AUTO: 49.3 FL (ref 37–54)
EOSINOPHIL # BLD MANUAL: 0.35 10*3/MM3 (ref 0–0.4)
EOSINOPHIL NFR BLD MANUAL: 11 % (ref 0.3–6.2)
ERYTHROCYTE [DISTWIDTH] IN BLOOD BY AUTOMATED COUNT: 15 % (ref 12.3–15.4)
ERYTHROCYTE [DISTWIDTH] IN BLOOD BY AUTOMATED COUNT: 15.1 % (ref 12.3–15.4)
ERYTHROCYTE [SEDIMENTATION RATE] IN BLOOD: 18 MM/HR (ref 0–30)
GFR SERPL CREATININE-BSD FRML MDRD: 75 ML/MIN/1.73
GLUCOSE BLD-MCNC: 108 MG/DL (ref 65–99)
GLUCOSE BLDC GLUCOMTR-MCNC: 123 MG/DL (ref 70–130)
HBA1C MFR BLD: 5.5 % (ref 4.8–5.6)
HCT VFR BLD AUTO: 31.5 % (ref 34–46.6)
HCT VFR BLD AUTO: 31.5 % (ref 34–46.6)
HGB BLD-MCNC: 9.4 G/DL (ref 12–15.9)
HGB BLD-MCNC: 9.5 G/DL (ref 12–15.9)
L PNEUMO1 AG UR QL IA: NEGATIVE
LARGE PLATELETS: ABNORMAL
LYMPHOCYTES # BLD MANUAL: 0.82 10*3/MM3 (ref 0.7–3.1)
LYMPHOCYTES NFR BLD MANUAL: 26 % (ref 19.6–45.3)
LYMPHOCYTES NFR BLD MANUAL: 35 % (ref 5–12)
MAGNESIUM SERPL-MCNC: 2 MG/DL (ref 1.6–2.4)
MCH RBC QN AUTO: 27 PG (ref 26.6–33)
MCH RBC QN AUTO: 27 PG (ref 26.6–33)
MCHC RBC AUTO-ENTMCNC: 29.8 G/DL (ref 31.5–35.7)
MCHC RBC AUTO-ENTMCNC: 30.2 G/DL (ref 31.5–35.7)
MCV RBC AUTO: 89.5 FL (ref 79–97)
MCV RBC AUTO: 90.5 FL (ref 79–97)
METAMYELOCYTES NFR BLD MANUAL: 1 % (ref 0–0)
MONOCYTES # BLD AUTO: 1.1 10*3/MM3 (ref 0.1–0.9)
MRSA DNA SPEC QL NAA+PROBE: NEGATIVE
NEUTROPHILS # BLD AUTO: 0.76 10*3/MM3 (ref 1.7–7)
NEUTROPHILS NFR BLD MANUAL: 5 % (ref 42.7–76)
NEUTS BAND NFR BLD MANUAL: 19 % (ref 0–5)
PLATELET # BLD AUTO: 414 10*3/MM3 (ref 140–450)
PLATELET # BLD AUTO: 445 10*3/MM3 (ref 140–450)
PMV BLD AUTO: 9.5 FL (ref 6–12)
PMV BLD AUTO: 9.8 FL (ref 6–12)
POTASSIUM BLD-SCNC: 3.2 MMOL/L (ref 3.5–5.2)
PROCALCITONIN SERPL-MCNC: 26.6 NG/ML (ref 0.1–0.25)
RBC # BLD AUTO: 3.48 10*6/MM3 (ref 3.77–5.28)
RBC # BLD AUTO: 3.52 10*6/MM3 (ref 3.77–5.28)
RBC MORPH BLD: NORMAL
S PNEUM AG SPEC QL LA: NEGATIVE
SARS-COV-2 RNA RESP QL NAA+PROBE: NOT DETECTED
SMUDGE CELLS BLD QL SMEAR: ABNORMAL
SODIUM BLD-SCNC: 139 MMOL/L (ref 136–145)
TSH SERPL DL<=0.05 MIU/L-ACNC: 2.61 UIU/ML (ref 0.27–4.2)
WBC NRBC COR # BLD: 3.11 10*3/MM3 (ref 3.4–10.8)
WBC NRBC COR # BLD: 3.15 10*3/MM3 (ref 3.4–10.8)

## 2020-04-17 PROCEDURE — 80048 BASIC METABOLIC PNL TOTAL CA: CPT | Performed by: INTERNAL MEDICINE

## 2020-04-17 PROCEDURE — 83735 ASSAY OF MAGNESIUM: CPT | Performed by: INTERNAL MEDICINE

## 2020-04-17 PROCEDURE — 84145 PROCALCITONIN (PCT): CPT

## 2020-04-17 PROCEDURE — 25010000002 ENOXAPARIN PER 10 MG: Performed by: INTERNAL MEDICINE

## 2020-04-17 PROCEDURE — 25010000002 ONDANSETRON PER 1 MG: Performed by: INTERNAL MEDICINE

## 2020-04-17 PROCEDURE — 25010000002 VANCOMYCIN 10 G RECONSTITUTED SOLUTION

## 2020-04-17 PROCEDURE — 83036 HEMOGLOBIN GLYCOSYLATED A1C: CPT | Performed by: INTERNAL MEDICINE

## 2020-04-17 PROCEDURE — 85007 BL SMEAR W/DIFF WBC COUNT: CPT | Performed by: INTERNAL MEDICINE

## 2020-04-17 PROCEDURE — 87641 MR-STAPH DNA AMP PROBE: CPT | Performed by: INTERNAL MEDICINE

## 2020-04-17 PROCEDURE — 84443 ASSAY THYROID STIM HORMONE: CPT | Performed by: INTERNAL MEDICINE

## 2020-04-17 PROCEDURE — 82962 GLUCOSE BLOOD TEST: CPT

## 2020-04-17 PROCEDURE — 85025 COMPLETE CBC W/AUTO DIFF WBC: CPT | Performed by: INTERNAL MEDICINE

## 2020-04-17 PROCEDURE — 86140 C-REACTIVE PROTEIN: CPT | Performed by: INTERNAL MEDICINE

## 2020-04-17 PROCEDURE — 25010000002 CEFTRIAXONE PER 250 MG: Performed by: INTERNAL MEDICINE

## 2020-04-17 PROCEDURE — 99233 SBSQ HOSP IP/OBS HIGH 50: CPT | Performed by: INTERNAL MEDICINE

## 2020-04-17 PROCEDURE — 87899 AGENT NOS ASSAY W/OPTIC: CPT | Performed by: INTERNAL MEDICINE

## 2020-04-17 PROCEDURE — 85652 RBC SED RATE AUTOMATED: CPT | Performed by: INTERNAL MEDICINE

## 2020-04-17 RX ORDER — CHOLECALCIFEROL (VITAMIN D3) 125 MCG
5 CAPSULE ORAL NIGHTLY PRN
Status: DISCONTINUED | OUTPATIENT
Start: 2020-04-17 | End: 2020-04-20 | Stop reason: HOSPADM

## 2020-04-17 RX ORDER — POTASSIUM CHLORIDE 1.5 G/1.77G
40 POWDER, FOR SOLUTION ORAL AS NEEDED
Status: DISCONTINUED | OUTPATIENT
Start: 2020-04-17 | End: 2020-04-20 | Stop reason: HOSPADM

## 2020-04-17 RX ORDER — DOXYCYCLINE 100 MG/1
100 CAPSULE ORAL EVERY 12 HOURS SCHEDULED
Status: DISCONTINUED | OUTPATIENT
Start: 2020-04-17 | End: 2020-04-20 | Stop reason: HOSPADM

## 2020-04-17 RX ORDER — POTASSIUM CHLORIDE 750 MG/1
40 CAPSULE, EXTENDED RELEASE ORAL AS NEEDED
Status: DISCONTINUED | OUTPATIENT
Start: 2020-04-17 | End: 2020-04-20 | Stop reason: HOSPADM

## 2020-04-17 RX ORDER — POTASSIUM CHLORIDE 7.45 MG/ML
10 INJECTION INTRAVENOUS
Status: DISCONTINUED | OUTPATIENT
Start: 2020-04-17 | End: 2020-04-20 | Stop reason: HOSPADM

## 2020-04-17 RX ADMIN — MELATONIN TAB 5 MG 5 MG: 5 TAB at 20:07

## 2020-04-17 RX ADMIN — GABAPENTIN 300 MG: 300 CAPSULE ORAL at 20:01

## 2020-04-17 RX ADMIN — SODIUM CHLORIDE, PRESERVATIVE FREE 10 ML: 5 INJECTION INTRAVENOUS at 09:03

## 2020-04-17 RX ADMIN — ACETAMINOPHEN 650 MG: 325 TABLET, FILM COATED ORAL at 20:01

## 2020-04-17 RX ADMIN — ONDANSETRON 4 MG: 2 INJECTION INTRAMUSCULAR; INTRAVENOUS at 09:00

## 2020-04-17 RX ADMIN — ACETAMINOPHEN 650 MG: 325 TABLET, FILM COATED ORAL at 09:01

## 2020-04-17 RX ADMIN — ENOXAPARIN SODIUM 40 MG: 40 INJECTION SUBCUTANEOUS at 06:11

## 2020-04-17 RX ADMIN — LEVOTHYROXINE SODIUM 88 MCG: 88 TABLET ORAL at 06:11

## 2020-04-17 RX ADMIN — POTASSIUM CHLORIDE 40 MEQ: 10 CAPSULE, COATED, EXTENDED RELEASE ORAL at 09:00

## 2020-04-17 RX ADMIN — DOXYCYCLINE 100 MG: 100 INJECTION, POWDER, LYOPHILIZED, FOR SOLUTION INTRAVENOUS at 00:57

## 2020-04-17 RX ADMIN — Medication 1 CAPSULE: at 09:00

## 2020-04-17 RX ADMIN — DOXYCYCLINE 100 MG: 100 CAPSULE ORAL at 13:46

## 2020-04-17 RX ADMIN — ONDANSETRON 4 MG: 2 INJECTION INTRAMUSCULAR; INTRAVENOUS at 20:01

## 2020-04-17 RX ADMIN — PRAVASTATIN SODIUM 40 MG: 40 TABLET ORAL at 20:01

## 2020-04-17 RX ADMIN — SODIUM CHLORIDE, PRESERVATIVE FREE 10 ML: 5 INJECTION INTRAVENOUS at 20:17

## 2020-04-17 RX ADMIN — SODIUM CHLORIDE 1250 MG: 9 INJECTION, SOLUTION INTRAVENOUS at 00:57

## 2020-04-17 RX ADMIN — DOXYCYCLINE 100 MG: 100 CAPSULE ORAL at 20:01

## 2020-04-17 RX ADMIN — BUSPIRONE HYDROCHLORIDE 10 MG: 10 TABLET ORAL at 09:00

## 2020-04-17 RX ADMIN — METOPROLOL SUCCINATE 100 MG: 100 TABLET, EXTENDED RELEASE ORAL at 09:00

## 2020-04-17 NOTE — CONSULTS
INFECTIOUS DISEASE CONSULT/INITIAL HOSPITAL VISIT    Neema Richard  1940  [id number]    Date of Consult: 4/17/2020    Admission Date: 4/16/2020      Requesting Provider: Steve Quintero MD  Evaluating Physician: Darshan Bejarano MD    Reason for Consultation: fever, pneumonia (recent admission with diverticular abscess s/p prolonged iv antibiotics followed by Dr. Darshan Bejarano)    We examined the patient from outside the room with direct visualization and with FaceTime video and a Bluetooth stethoscope due to the critical shortage of personal protective equipment in the setting of the COVID19 pandemic       History of present illness:    Patient is a 79 y.o. female with h/o hypothyroidism, HTN, DCIS, and recent diverticulitis with perforation and LAR colon resection 3/17/20 (discharged on Zosyn on 3/26/20) who we were asked to see for fever and pneumonia.  She finished IV Zosyn about 6 days ago and PICC line was discontinued.  She had a Telehealth visit with Dr. Darshan Bejarano on 4/14/20 and was stable off antibiotics.   She presented to [facility] ED on 4/16/20 with fever of 102 at home, shaking chills, and nausea .  She has had a slight decrease in ostomy output.  She denies vomiting, dysuria, incisional wound drainage or redness, sore throat, or sudden loss of taste or smell.  She has had no COVID-19 exposures.  Work up showed Tmax 101.7, lactic acid 1.3, PCT 0.1, lipase 46, WBC 3900 with 6% neutrophils/50% lymphocytes/39% monocytes, and UA WBC 0-2.  Today her PCT is 26.6, CRP 12.3, and ESR 18.  Respiratory panel PCR is negative. A CT scan of a/p shows no evidence of abscess, bowel obstruction, or perforation.  A CT scan of chest shows mild patchy airspace infiltrates in the RUL with mild infiltrate and atelectasis in the dependent posterior lung bases.  A COVID-19 PCR is negative after interview done.  Strep and Legionella urinary antigens are pending. A MRSA PCR is negative. She is currently on Rocephin and  Doxycycline after getting doses of Zosyn and Vancomycin. ID was asked to evaluate and manage her antibiotic therapy.     Past Medical History:   Diagnosis Date   • Acquired hypothyroidism 1980   • Colon polyp - sees Dr. 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 ER and MS (+)   • Essential hypertension 2012   • High cholesterol 2019   • Hx of herpes genitalis 2013   • IBS-d (takes Buspar)        Past Surgical History:   Procedure Laterality Date   • BILATERAL BREAST REDUCTION Bilateral 1995   • BREAST LUMPECTOMY Left 2009   • CATARACT EXTRACTION, BILATERAL Bilateral 2018   • CERVICAL CONIZATION  1983   • COLON RESECTION N/A 3/17/2020    Procedure: LOW ANTERIOR COLON RESECTION, MOBILIZATION OF SPLENIC FLEXURE, DRAINAGE OF ABSCESS AND COLOSTOMY CREATION;  Surgeon: Sravan Vazquez MD;  Location: [facility];  Service: General;  Laterality: N/A;   • FACIAL COSMETIC SURGERY  2001   • FACIAL COSMETIC SURGERY  2012   • HEMORRHOIDECTOMY  1974   • LAPAROSCOPIC CHOLECYSTECTOMY  2010   • REDUCTION MAMMAPLASTY Bilateral 04/13/2017    DCIS   • SINUS SURGERY  1992   • THYROIDECTOMY, PARTIAL  1963   • TONSILLECTOMY  1953   • VARICOSE VEIN SURGERY Bilateral 1981       Family History   Problem Relation Age of Onset   • Breast cancer Neg Hx    • Ovarian cancer Neg Hx        Social History     Socioeconomic History   • Marital status:      Spouse name: Not on file   • Number of children: Not on file   • Years of education: Not on file   • Highest education level: Not on file   Tobacco Use   • Smoking status: Never Smoker   • Smokeless tobacco: Never Used   Substance and Sexual Activity   • Alcohol use: No   • Drug use: No       Allergies   Allergen Reactions   • Evista [Raloxifene] Rash   • Cephalexin Diarrhea   • Hydrochlorothiazide Diarrhea   • Meloxicam Other (See Comments)   • Metronidazole Diarrhea   • Oxybutynin Cough   • Propylthiouracil Other (See Comments)      Severally reduce WBC's    • Sulfa Antibiotics Hives   • Sulfacetamide Sodium-Sulfur Hives   • Sulfur Dioxide Hives         Medication:    Current Facility-Administered Medications:   •  acetaminophen (TYLENOL) tablet 650 mg, 650 mg, Oral, Q6H PRN, Candida Radford PA-C, 650 mg at 04/17/20 0901  •  busPIRone (BUSPAR) tablet 10 mg, 10 mg, Oral, Daily, Steve Quintero MD, 10 mg at 04/17/20 0900  •  cefTRIAXone (ROCEPHIN) 1 g/50 mL 0.9% NS VTB (mbp), 1 g, Intravenous, Q24H, Steve Quintero MD, 1 g at 04/16/20 2343  •  dextrose (D50W) 25 g/ 50mL Intravenous Solution 25 g, 25 g, Intravenous, Q15 Min PRN, Steve Quintero MD  •  dextrose (GLUTOSE) oral gel 15 g, 15 g, Oral, Q15 Min PRN, Steve Quintero MD  •  doxycycline (MONODOX) capsule 100 mg, 100 mg, Oral, Q12H, Andrea Ferguson PA, 100 mg at 04/17/20 1346  •  enoxaparin (LOVENOX) syringe 40 mg, 40 mg, Subcutaneous, Q24H, Steve Quintero MD, 40 mg at 04/17/20 0611  •  gabapentin (NEURONTIN) capsule 300 mg, 300 mg, Oral, Nightly, Steve Quintero MD, 300 mg at 04/16/20 2343  •  glucagon (human recombinant) (GLUCAGEN DIAGNOSTIC) injection 1 mg, 1 mg, Subcutaneous, Q15 Min PRN, Steve Quintero MD  •  insulin lispro (humaLOG) injection 0-7 Units, 0-7 Units, Subcutaneous, TID With Meals, Steve Quintero MD  •  lactobacillus acidophilus (RISAQUAD) capsule 1 capsule, 1 capsule, Oral, Daily, Steve Quintero MD, 1 capsule at 04/17/20 0900  •  levothyroxine (SYNTHROID, LEVOTHROID) tablet 88 mcg, 88 mcg, Oral, Daily, Steve Quintero MD, 88 mcg at 04/17/20 0611  •  LORazepam (ATIVAN) tablet 0.5 mg, 0.5 mg, Oral, Q8H PRN, Steve Quintero MD  •  magnesium sulfate 4 gram infusion - Mg less than or equal to 1mg/dL, 4 g, Intravenous, PRN **OR** magnesium sulfate 3 gram infusion (1gm x 3) - Mg 1.1 - 1.5 mg/dL, 1 g, Intravenous, PRN **OR** Magnesium Sulfate 2 gram infusion- Mg 1.6 - 1.9 mg/dL, 2 g, Intravenous, PRN, West, Christopher R,  MD  •  metoprolol succinate XL (TOPROL-XL) 24 hr tablet 100 mg, 100 mg, Oral, Daily, Steve Quintero MD, 100 mg at 04/17/20 0900  •  ondansetron (ZOFRAN) injection 4 mg, 4 mg, Intravenous, Q6H PRN, Steve Quintero MD, 4 mg at 04/17/20 0900  •  potassium chloride (MICRO-K) CR capsule 40 mEq, 40 mEq, Oral, PRN, 40 mEq at 04/17/20 0900 **OR** potassium chloride (KLOR-CON) packet 40 mEq, 40 mEq, Oral, PRN **OR** potassium chloride 10 mEq in 100 mL IVPB, 10 mEq, Intravenous, Q1H PRN, Katherine Seymour DO  •  pravastatin (PRAVACHOL) tablet 40 mg, 40 mg, Oral, Nightly, Steve Quintero MD, 40 mg at 04/16/20 2343  •  sodium chloride 0.9 % flush 10 mL, 10 mL, Intravenous, PRN, Steve Quintero MD  •  sodium chloride 0.9 % flush 10 mL, 10 mL, Intravenous, Q12H, Steve Quintero MD, 10 mL at 04/17/20 0903  •  sodium chloride 0.9 % flush 10 mL, 10 mL, Intravenous, PRN, Steve Quintero MD    Antibiotics:  Anti-Infectives (From admission, onward)    Ordered     Dose/Rate Route Frequency Start Stop    04/17/20 0953  doxycycline (MONODOX) capsule 100 mg  Review   Ordering Provider:  Andrea Ferguson PA    100 mg Oral Every 12 Hours Scheduled 04/17/20 1100 04/24/20 0859    04/16/20 2238  cefTRIAXone (ROCEPHIN) 1 g/50 mL 0.9% NS VTB (mbp)  Review   Ordering Provider:  Steve Quintero MD    1 g  over 30 Minutes Intravenous Every 24 Hours 04/17/20 0000 04/21/20 2359    04/16/20 2241  vancomycin 1250 mg/250 mL 0.9% NS IVPB ([facility])     Ordering Provider:  Zeke Romero IV, RPH    20 mg/kg × 59 kg (Order-Specific)  over 90 Minutes Intravenous Once 04/17/20 0000 04/17/20 0227    04/16/20 1635  piperacillin-tazobactam (ZOSYN) 3.375 g in iso-osmotic dextrose 50 ml (premix)     Ordering Provider:  Orville Martin MD    3.375 g Intravenous Once 04/16/20 1637 04/16/20 1800            Review of Systems:  Constitutional-- + Fever, chills no sweats.  Appetite okay, and no malaise. No fatigue.  HEENT-- No new  vision, hearing or throat complaints.  No epistaxis or oral sores.  Denies odynophagia or dysphagia. No headache, photophobia or neck stiffness. No sudden loss of taste or smell  CV-- No chest pain, palpitation or syncope  Resp-- No SOB/cough/Hemoptysis  GI- + nausea, no vomiting, or diarrhea.  No hematochezia, melena, or hematemesis. Denies jaundice or chronic liver disease.  -- No dysuria, hematuria, or flank pain.  Denies hesitancy, urgency or burning with urination  Lymph- no swollen lymph nodes in neck/axilla or groin.   Heme- No active bruising or bleeding; no Hx of DVT or PE.  MS-- no swelling or pain in the bones or joints of arms/legs.  No new back pain.  Neuro-- No acute focal weakness or numbness in the arms or legs.  No seizures.  Skin--No rashes or lesions. Abdominal wound healing well with no redness or drainage or dehiscence.      Physical Exam:   Vital Signs  Temp (24hrs), Av °F (37.8 °C), Min:97.9 °F (36.6 °C), Max:101.7 °F (38.7 °C)    Temp  Min: 97.9 °F (36.6 °C)  Max: 101.7 °F (38.7 °C)  BP  Min: 105/52  Max: 165/73  Pulse  Min: 70  Max: 105  Resp  Min: 14  Max: 22  SpO2  Min: 88 %  Max: 97 %     We examined the patient from outside the room with direct visualization and with FaceTime video and a Bluetooth stethoscope due to the critical shortage of personal protective equipment in the setting of the COVID19 pandemic     GENERAL: Awake and alert, in no acute distress.   HEENT: Normocephalic, atraumatic.  PERRL. EOMI. No conjunctival injection. No icterus. Oropharynx clear without evidence of thrush or exudate. No evidence of peridontal disease.    NECK: Supple without nuchal rigidity.  HEART: RRR; No murmur, rubs, gallops.   LUNGS: Relatively clear to auscultation with some diminishing in lung bases bilaterally without wheezing, rales, rhonchi. Normal respiratory effort. Nonlabored  ABDOMEN: Soft, mildly tender around surgical site, nondistended. Positive bowel sounds. No rebound or  guarding. .  EXT:  No cyanosis, clubbing or edema. No cord.  :  Without Gaytan catheter.  MSK:  FROM, no edema, no joint effusions  SKIN: Warm and dry without cutaneous eruptions on Inspection/palpation.  Abdominal wound well healed with no erythema or drainage, no dehiscence.  NEURO: Oriented to PPT.CN 2-12 grossly intact  PSYCHIATRIC: Normal insight and judgement. Cooperative with PE    Laboratory Data    Results from last 7 days   Lab Units 04/17/20  0545 04/16/20  1653   WBC 10*3/mm3 3.15*  3.11* 3.89   HEMOGLOBIN g/dL 9.5*  9.4* 10.2*   HEMATOCRIT % 31.5*  31.5* 33.5*   PLATELETS 10*3/mm3 445  414 525*     Results from last 7 days   Lab Units 04/17/20  0545   SODIUM mmol/L 139   POTASSIUM mmol/L 3.2*   CHLORIDE mmol/L 106   CO2 mmol/L 20.0*   BUN mg/dL 16   CREATININE mg/dL 0.75   GLUCOSE mg/dL 108*   CALCIUM mg/dL 8.1*     Results from last 7 days   Lab Units 04/16/20  1612   ALK PHOS U/L 77   BILIRUBIN mg/dL 0.3   ALT (SGPT) U/L 7   AST (SGOT) U/L 15     Results from last 7 days   Lab Units 04/17/20  0545   SED RATE mm/hr 18     Results from last 7 days   Lab Units 04/17/20  0545   CRP mg/dL 12.30*     Results from last 7 days   Lab Units 04/16/20  1612   LACTATE mmol/L 1.3             Estimated Creatinine Clearance: 53.9 mL/min (by C-G formula based on SCr of 0.75 mg/dL).      Microbiology:  Microbiology Results (last 10 days)     Procedure Component Value - Date/Time    MRSA Screen, PCR (Inpatient) - Swab, Nares [[id number]]  (Normal) Collected:  04/17/20 0312    Lab Status:  Final result Specimen:  Swab from Nares Updated:  04/17/20 0613     MRSA PCR Negative    Narrative:       MRSA Negative    Legionella Antigen, Urine - Urine, Urine, Clean Catch [[id number]]  (Normal) Collected:  04/17/20 0109    Lab Status:  Final result Specimen:  Urine, Clean Catch Updated:  04/17/20 0937     LEGIONELLA ANTIGEN, URINE Negative    SARS-CoV-2, PCR (IN-HOUSE), NP Swab in Transport Media - Swab, Nasopharynx  [[id number]]  (Normal) Collected:  04/16/20 2227    Lab Status:  Final result Specimen:  Swab from Nasopharynx Updated:  04/17/20 0955     COVID19 Not Detected    Respiratory Panel, PCR - Swab, Nasopharynx [[id number]]  (Normal) Collected:  04/16/20 2227    Lab Status:  Final result Specimen:  Swab from Nasopharynx Updated:  04/16/20 2346     ADENOVIRUS, PCR Not Detected     Coronavirus 229E Not Detected     Coronavirus HKU1 Not Detected     Coronavirus NL63 Not Detected     Coronavirus OC43 Not Detected     Human Metapneumovirus Not Detected     Human Rhinovirus/Enterovirus Not Detected     Influenza B PCR Not Detected     Parainfluenza Virus 1 Not Detected     Parainfluenza Virus 2 Not Detected     Parainfluenza Virus 3 Not Detected     Parainfluenza Virus 4 Not Detected     Bordetella pertussis pcr Not Detected     Influenza A H1 2009 PCR Not Detected     Chlamydophila pneumoniae PCR Not Detected     Mycoplasma pneumo by PCR Not Detected     Influenza A PCR Not Detected     Influenza A H3 Not Detected     Influenza A H1 Not Detected     RSV, PCR Not Detected     Bordetella parapertussis PCR Not Detected    Narrative:       The coronavirus on the RVP is NOT COVID-19 and is NOT indicative of infection with COVID-19.     S. Pneumo Ag Urine or CSF - Urine, Urine, Clean Catch [[id number]]  (Normal) Collected:  04/16/20 2102    Lab Status:  Final result Specimen:  Urine, Clean Catch Updated:  04/17/20 0936     Strep Pneumo Ag Negative                Radiology:  Imaging Results (Last 72 Hours)     Procedure Component Value Units Date/Time    CT Chest Without Contrast [[id number]] Collected:  04/16/20 2042     Updated:  04/16/20 2044    Narrative:       CT CHEST, NONCONTRAST, 4/16/2020    HISTORY:  79-year-old female postop low anterior colon resection with colostomy and drainage of abdominal abscess 3/17/2020. She presents to the ED with new onset fever of 102 today.    TECHNIQUE:  CT imaging of the chest without IV  contrast. Radiation dose reduction techniques included automated exposure control. Radiation audit for CT and nuclear cardiology exams in the last 12 months: 5.    FINDINGS:  Low lung volumes with elevation of the right hemidiaphragm. Tiny pleural effusions with dependent posterior lung base atelectasis, greater on the right.    Mild patchy airspace infiltrates in the right upper lobe and in the dependent posterior lung bases. Trachea and central bronchi are normal in caliber and widely patent.    Heart size is normal, there is no pericardial effusion. Normal caliber thoracic aorta.    Limited upper abdominal images show fluid beneath the right hemidiaphragm and mesenteric soft tissue edema. There is no hiatal hernia or thoracic esophageal dilatation.      Impression:       1.  Mild patchy airspace infiltrates in the right upper lobe with mild infiltrate and atelectasis in the dependent posterior lung bases.  2.  Low lung volumes with elevated right hemidiaphragm and tiny bilateral pleural effusions, greater on the right.    Signer Name: Prakash Ortega MD   Signed: 4/16/2020 8:42 PM   Workstation Name: [id number]    [facility]    CT Abdomen Pelvis Without Contrast [[id number]] Collected:  04/16/20 2017     Updated:  04/16/20 2019    Narrative:       CT Abdomen Pelvis WO    INDICATION:   Abdominal surgery one month ago. Fever beginning earlier today with nausea. Concern for abscess.    TECHNIQUE:   CT of the abdomen and pelvis without IV contrast. Coronal and sagittal reconstructions were obtained.  Radiation dose reduction techniques included automated exposure control or exposure modulation based on body size. Count of known CT and cardiac nuc  med studies performed in previous 12 months: 6.     COMPARISON:   3/24/2020    FINDINGS:  Abdomen: Since previous examination bilateral pleural effusions are smaller. The liver, spleen and pancreas are normal in size. The kidneys and adrenals  are unchanged. No evidence of kidney stone disease or hydronephrosis. The ureters are nondilated. No  evidence of retroperitoneal adenopathy. No distended bowel loops are seen to suggest a mechanical obstruction.    Pelvis: No evidence of adenopathy, mass or fluid collection. No evidence of free air. The uterus is enlarged for a patient this age but this is unchanged and likely represents a fibroid. A left-sided pelvic drain seen in the previous scan is no longer  seen but there is no evidence of pelvic abscess.      Impression:       No evidence of abscess. No evidence of bowel obstruction or perforation. Bilateral effusions at the lung bases are smaller. There is a small amount of ascites around the liver that has decreased slightly.          Signer Name: Jordan Montes De Oca MD   Signed: 2020 8:17 PM   Workstation Name: [id number]    [facility]        I read her radiographic studies.    Impression:   1. Sepsis with elevated procalcitonin, lymphocytosis, fever, leukopenia, acute hypoxic respiratory insufficiency, and  pneumonia.  She does not have visual evidence of intra-abdominal or wound infection.  2. RUL pulmonary infiltrates, CAP.  COVID-19 PCR is negative (came in after Face Time interview and examination)  3. Acute hypoxic respiratory insufficiency  4. Hypokalemia  5. Fever associated with above  6. Leukopenia with lymphocytosis/monocytosis  7. Elevated procalcitonin  8. Recent diverticulitis with perforation/abscess s/p LAR 3/17/20  9. HTN  10. Hypothyroidism  11. Keflex (diarrhea), Flagyl (diarrhea) intolerances/sulfa (hives) allergy    PLAN/RECOMMENDATIONS:   Thank you for asking us to see Neema Richard, I recommend the followin. Monitor blood cultures. COVID-19 PCR is negative after interview.  Check sputum culture.  2. Continue Rocephin 1 GM IV daily for now  3. Change doxycycline to 100 mg PO BID for now  4. Repeat COVID-19 PCR study prior to discontinuing airborne/contact  precautions.    Darshan Bejarano MD saw and examined patient, verified hx and PE, read all radiographic studies, reviewed labs and micro data, and formulated dx, plan for treatment and all medical decision making.      Andrea Ferguson PA-C for MD Darshan Block MD  4/17/2020  15:29

## 2020-04-17 NOTE — H&P
[facility] Medicine Services  HISTORY AND PHYSICAL    Patient Name: Neema Richard  : 1940  MRN: [id number]  Primary Care Physician: David Mackey MD  Date of admission: 2020      Subjective   Subjective     Chief Complaint:  Pneumonia, fever    HPI:  Neema Richard is a 79 y.o. female w/ hx dm2, htn and recent admission from 3/15-3/26/20 with perforated diverticulitis w/ abscess formation. During that admission patient underwent low anterior resection, abscess drainage, appy, left oophorectomy w/ distal transverse colostomy by Dr. Vazquez. Antibiotics were manged by infectious disease and patient was discharged on zosyn via picc line. Patient tells me she completed antibiotics approximately 6 days ago and picc line has been discontinued.    Patient presents today to [facility] ED with fever 102 at home, dry cough, mild dyspnea, nausea (but no emesis) w/ slight decrease in ostomy output. No incision drainage. No dysuria. No sore throat. No confusion. No chest pain. No known covid-19 exposures. In ED temp was 100.5, sats 88% room air. Ct abd/pelvis showed no abscess or acute intraabominal or acute intrapelvic process. Ct chest did show patchy right upper lobe infiltrate w/ mild patchy infiltrate bilateral bases w/ atelectasis & small right > left pleural effusions. U/a dip was negative for LE or nitrite, micro showed 1+ bacteria but no wbc or rbc. Lipase normal. Normal wbc count. Lactate normal. procalcitonin normal. Pro-bnp normal. Was given zosy in ED.    Review of Systems   No headache, no confusion, no focal weakness, no vision changes. + nausea but no emesis  All other systems reviewed and are negative.     Personal History     Past Medical History:   Diagnosis Date   • Acquired hypothyroidism    • Colon polyp - sees Dr. Vazquez 2016   • DCIS (ductal carcinoma in situ) 2017    Dx at the time of breast reduction. ER and ID (+)   • Essential hypertension    •  High cholesterol 2019   • Hx of herpes genitalis 2013   • IBS-d (takes Buspar)        Past Surgical History:   Procedure Laterality Date   • BILATERAL BREAST REDUCTION Bilateral 1995   • BREAST LUMPECTOMY Left 2009   • CATARACT EXTRACTION, BILATERAL Bilateral 2018   • CERVICAL CONIZATION  1983   • COLON RESECTION N/A 3/17/2020    Procedure: LOW ANTERIOR COLON RESECTION, MOBILIZATION OF SPLENIC FLEXURE, DRAINAGE OF ABSCESS AND COLOSTOMY CREATION;  Surgeon: Sravan Vazquez MD;  Location: [facility];  Service: General;  Laterality: N/A;   • FACIAL COSMETIC SURGERY  2001   • FACIAL COSMETIC SURGERY  2012   • HEMORRHOIDECTOMY  1974   • LAPAROSCOPIC CHOLECYSTECTOMY  2010   • REDUCTION MAMMAPLASTY Bilateral 04/13/2017    DCIS   • SINUS SURGERY  1992   • THYROIDECTOMY, PARTIAL  1963   • TONSILLECTOMY  1953   • VARICOSE VEIN SURGERY Bilateral 1981       Family History: family history is not on file. Otherwise pertinent FHx was reviewed and unremarkable.     Social History:  reports that she has never smoked. She has never used smokeless tobacco. She reports that she does not drink alcohol or use drugs.  Social History     Social History Narrative   • Not on file       Medications:  Available home medication information reviewed.  Medications Prior to Admission   Medication Sig Dispense Refill Last Dose   • busPIRone (BUSPAR) 15 MG tablet Take 15 mg by mouth Daily.  3 Taking   • calcium citrate-vitamin d (CALCITRATE) 315-250 MG-UNIT tablet tablet Take 1 tablet by mouth Daily.   Taking   • Cholecalciferol (VITAMIN D3) 2000 UNITS capsule Take 2,000 Units by mouth Daily.  3 Taking   • Empagliflozin (JARDIANCE) 10 MG tablet Take 10 mg by mouth Daily.      • gabapentin (NEURONTIN) 300 MG capsule Take 1 capsule by mouth Every Night. 30 capsule 0    • glucosamine-chondroitin 500-400 MG capsule capsule Take 1 capsule by mouth 3 (Three) Times a Day With Meals.   Not Taking   • levothyroxine (SYNTHROID, LEVOTHROID) 88 MCG tablet Take 88  mcg by mouth daily.   Taking   • loratadine (Claritin) 10 MG tablet Take 10 mg by mouth Daily.      • LORazepam (ATIVAN) 0.5 MG tablet Take 0.5 mg by mouth every 8 (eight) hours as needed for anxiety.   Taking   • metoprolol succinate XL (TOPROL-XL) 100 MG 24 hr tablet Take 100 mg by mouth Daily.      • MYRBETRIQ 50 MG tablet sustained-release 24 hour 24 hr tablet TAKE 1 TABLET BY MOUTH DAILY 30 tablet 7    • pravastatin (PRAVACHOL) 40 MG tablet Take 40 mg by mouth Daily.      • Probiotic Product (ALIGN) 4 MG capsule Take 1 capsule by mouth Daily.   Taking   • sucralfate (CARAFATE) 1 G tablet Take 1 g by mouth 2 (Two) Times a Day.  3 Taking       Allergies   Allergen Reactions   • Evista [Raloxifene] Rash   • Cephalexin Diarrhea   • Hydrochlorothiazide Diarrhea   • Meloxicam Other (See Comments)   • Metronidazole Diarrhea   • Oxybutynin Cough   • Propylthiouracil Other (See Comments)     Severally reduce WBC's    • Sulfa Antibiotics Hives   • Sulfacetamide Sodium-Sulfur Hives   • Sulfur Dioxide Hives       Objective   Objective     Vital Signs:   Temp:  [100.5 °F (38.1 °C)-101.7 °F (38.7 °C)] 101.7 °F (38.7 °C)  Heart Rate:  [] 89  Resp:  [14-18] 14  BP: (124-165)/(61-87) 124/61        Physical Exam   Constitutional:Alert, interactive, nontoxic appearing, no distress, normal respiratory effort at rest  Psych:Normal/appropriate affect  HEENT:Ncat, oroph clear  Neck: neck supple, full range of motion  Neuro: Face symmetric, speech clear, equal , moves all extremities  Cardiac: Rrr; No pretibial pitting edema  Resp: minimal right basilar end inspiratory crackles, left lung clear  GI: abd soft, nontender, +bs  Skin: No extremity rash; abdominal midline incision without drainage, minimal erythema appearing consistent w/ appropriate wound healing; ostomy bag intact w/ air and scant liquid output (opaque bag precludes noting the color of ostomy output)  Musculoskeletal/extremities: no cyanosis extremities; no  significant ankle edema      Results Reviewed:  I have personally reviewed current lab and radiology data.    Results from last 7 days   Lab Units 04/16/20  1653   WBC 10*3/mm3 3.89   HEMOGLOBIN g/dL 10.2*   HEMATOCRIT % 33.5*   PLATELETS 10*3/mm3 525*     Results from last 7 days   Lab Units 04/16/20  1612   SODIUM mmol/L 140   POTASSIUM mmol/L 3.6   CHLORIDE mmol/L 103   CO2 mmol/L 23.0   BUN mg/dL 13   CREATININE mg/dL 0.60   GLUCOSE mg/dL 113*   CALCIUM mg/dL 9.1   ALT (SGPT) U/L 7   AST (SGOT) U/L 15   PROBNP pg/mL 537.9   LACTATE mmol/L 1.3   PROCALCITONIN ng/mL 0.10     Estimated Creatinine Clearance: 54 mL/min (by C-G formula based on SCr of 0.6 mg/dL).  Brief Urine Lab Results  (Last result in the past 365 days)      Color   Clarity   Blood   Leuk Est   Nitrite   Protein   CREAT   Urine HCG        04/16/20 2102 Yellow Cloudy Negative Negative Negative Trace             Imaging Results (Last 24 Hours)     Procedure Component Value Units Date/Time    CT Chest Without Contrast [[id number]] Collected:  04/16/20 2042     Updated:  04/16/20 2044    Narrative:       CT CHEST, NONCONTRAST, 4/16/2020    HISTORY:  79-year-old female postop low anterior colon resection with colostomy and drainage of abdominal abscess 3/17/2020. She presents to the ED with new onset fever of 102 today.    TECHNIQUE:  CT imaging of the chest without IV contrast. Radiation dose reduction techniques included automated exposure control. Radiation audit for CT and nuclear cardiology exams in the last 12 months: 5.    FINDINGS:  Low lung volumes with elevation of the right hemidiaphragm. Tiny pleural effusions with dependent posterior lung base atelectasis, greater on the right.    Mild patchy airspace infiltrates in the right upper lobe and in the dependent posterior lung bases. Trachea and central bronchi are normal in caliber and widely patent.    Heart size is normal, there is no pericardial effusion. Normal caliber thoracic  aorta.    Limited upper abdominal images show fluid beneath the right hemidiaphragm and mesenteric soft tissue edema. There is no hiatal hernia or thoracic esophageal dilatation.      Impression:       1.  Mild patchy airspace infiltrates in the right upper lobe with mild infiltrate and atelectasis in the dependent posterior lung bases.  2.  Low lung volumes with elevated right hemidiaphragm and tiny bilateral pleural effusions, greater on the right.    Signer Name: Prakash Ortega MD   Signed: 4/16/2020 8:42 PM   Workstation Name: [id number]    [facility]    CT Abdomen Pelvis Without Contrast [[id number]] Collected:  04/16/20 2017     Updated:  04/16/20 2019    Narrative:       CT Abdomen Pelvis WO    INDICATION:   Abdominal surgery one month ago. Fever beginning earlier today with nausea. Concern for abscess.    TECHNIQUE:   CT of the abdomen and pelvis without IV contrast. Coronal and sagittal reconstructions were obtained.  Radiation dose reduction techniques included automated exposure control or exposure modulation based on body size. Count of known CT and cardiac nuc  med studies performed in previous 12 months: 6.     COMPARISON:   3/24/2020    FINDINGS:  Abdomen: Since previous examination bilateral pleural effusions are smaller. The liver, spleen and pancreas are normal in size. The kidneys and adrenals are unchanged. No evidence of kidney stone disease or hydronephrosis. The ureters are nondilated. No  evidence of retroperitoneal adenopathy. No distended bowel loops are seen to suggest a mechanical obstruction.    Pelvis: No evidence of adenopathy, mass or fluid collection. No evidence of free air. The uterus is enlarged for a patient this age but this is unchanged and likely represents a fibroid. A left-sided pelvic drain seen in the previous scan is no longer  seen but there is no evidence of pelvic abscess.      Impression:       No evidence of abscess. No evidence of  bowel obstruction or perforation. Bilateral effusions at the lung bases are smaller. There is a small amount of ascites around the liver that has decreased slightly.          Signer Name: Jordan Montes De Oca MD   Signed: 4/16/2020 8:17 PM   Workstation Name: [id number]    [facility]             Assessment/Plan   Assessment & Plan     Active Hospital Problems    Diagnosis POA   • Pneumonia of both lungs due to infectious organism [J18.9] Yes     Priority: High   • Fever [R50.9] Unknown     Priority: High   • Rule out Covid-19 pneumonia [U07.1, J12.89] Unknown     Priority: High   • History of diverticular abscess [Z87.19] Not Applicable     Priority: Medium     S/p LAR, abscess drainage, appy, left maldonado, distal transverse end colostomy 3/17/20 by Dr. Vazquez     • Bilateral pleural effusion [J90] Unknown     Bilateral small right > left pleural effusion on 4/16/20 ct chest     • DM2 (diabetes mellitus, type 2) (CMS/HCC) [E11.9] Unknown   • Acquired hypothyroidism [E03.9] Yes   • Essential hypertension [I10] Yes     Plan:  -empiric rocephin/doxy/vanc x 1  -nasal mrsa pcr (d/c vanc if negative), viral respiratory pcr panel, urine legionella/strep antigens & covid-19 in-house pending   -would discuss w/ infectious disease prior to d/cing airborne & droplet precautions  -ID consultation (Dr. Darshan Bejarano familiar with patient) tomorrow given her recent completion of iv antibiotics, now w/ fever & mild cough & mild dyspnea  -clears for now, antiemetics  -sliding scale humalog  -wound/ostomy evaluation as patient has concern having less output today (ct a/p showed no intraabdominal or pelvic acute process)  -I called & updated daughter at time of admission      DVT prophylaxis:  Sq lovenox    CODE STATUS:    Code Status and Medical Interventions:   Ordered at: 04/16/20 2247     Code Status:    CPR     Medical Interventions (Level of Support Prior to Arrest):    Full       Admission Status:  I believe  this patient meets inpatient status due to need for covid-19 rule out, antibiotics, expected stay > 2 midnights    Electronically signed by Steve Quintero MD, 04/16/20, 10:49 PM.

## 2020-04-17 NOTE — PLAN OF CARE
Problem: Patient Care Overview  Goal: Plan of Care Review  Outcome: Ongoing (interventions implemented as appropriate)  Flowsheets (Taken 4/17/2020 9382)  Progress: no change  Outcome Summary: Patient up from the ED. VSS. Normal sinus on the monitor. 2L nasal cannula worn while sleeping. No complaints of pain. Tylenol given x1 for fever. COVID-19 In-House swab sent. No new concerns at this time, will continue to monitor.

## 2020-04-17 NOTE — PROGRESS NOTES
Discharge Planning Assessment  [facility]     Patient Name: Neema Richard  MRN: [id number]  Today's Date: 4/17/2020    Admit Date: 4/16/2020    Discharge Needs Assessment     Row Name 04/17/20 1123       Living Environment    Lives With  friend(s)    Name(s) of Who Lives With Patient  Friend lives in her basement. Her friend is an RN.     Current Living Arrangements  home/apartment/condo Lives in [place]    Primary Care Provided by  self    Provides Primary Care For  no one    Family Caregiver if Needed  friend(s);child(sary), adult    Family Caregiver Names  Friend can help some. Dtr Theodora can help some and transport home.     Quality of Family Relationships  unable to assess    Able to Return to Prior Arrangements  yes       Resource/Environmental Concerns    Resource/Environmental Concerns  none    Transportation Concerns  car, none       Transition Planning    Patient/Family Anticipates Transition to  home    Patient/Family Anticipated Services at Transition  none    Transportation Anticipated  family or friend will provide       Discharge Needs Assessment    Concerns to be Addressed  denies needs/concerns at this time    Equipment Currently Used at Home  none    Anticipated Changes Related to Illness  none    Equipment Needed After Discharge  wound care supplies    Outpatient/Agency/Support Group Needs  homecare agency [facility] for SN    Provided Post Acute Provider List?  Refused    Refused Provider List Comment  Will continue to use [facility]        Discharge Plan     Row Name 04/17/20 1125       Plan    Plan  Home with [facility]    Patient/Family in Agreement with Plan  yes    Plan Comments  Spoke to pt. She lives in [place] and her friend lives in her basement and can assist if needed. She is independent and denies DME other than wound/colostomy supplies. Spoke to Brenden at [facility] and pt is seen for SN for diagnosis of surgical aftercare, colostomy care and hypertension. PT has d/c'd her from  HH. She would like to resume HH at d/c. Resumption orders placed and CM will kristi at d/c. Pt's covid test is neg but pt will be retested. Pt's daughter can transport home. CM will follow.     Final Discharge Disposition Code  06 - home with home health care        Destination      Coordination has not been started for this encounter.      Durable Medical Equipment      Coordination has not been started for this encounter.      Dialysis/Infusion      Coordination has not been started for this encounter.      Home Medical Care      Coordination has not been started for this encounter.      Therapy      Coordination has not been started for this encounter.      Community Resources      Coordination has not been started for this encounter.        Expected Discharge Date and Time     Expected Discharge Date Expected Discharge Time    Apr 20, 2020         Demographic Summary     Row Name 04/17/20 1120       General Information    Referral Source  admission list    Preferred Language  English     Used During This Interaction  no    General Information Comments  PCP is David Mackey       Contact Information    Permission Granted to Share Info With  ;family/designee Antionetter        Functional Status     Row Name 04/17/20 1122       Functional Status    Usual Activity Tolerance  good    Current Activity Tolerance  good       Functional Status, IADL    Medications  independent    Meal Preparation  independent    Housekeeping  independent    Laundry  independent    Shopping  independent       Employment/    Employment/ Comments  Has Medicare [employer] and [employer] secondary. No concerns affording meds.         Psychosocial    No documentation.       Abuse/Neglect    No documentation.       Legal    No documentation.       Substance Abuse    No documentation.       Patient Forms    No documentation.           Vivian Gannon RN

## 2020-04-17 NOTE — NURSING NOTE
Consulted to assess patient's midline incision and colostomy.   She recently underwent a low anterior resection per Dr. Vazquez on 3-.     Midline incision looks good. No open areas. No drainage.     Stoma is viable.   Brown soft stool noted.   Changed appliance.   Place her in a [employer] [facility] 8331.   Peristomal skin intact and dry.     See ostomy orders for care needs.   Nothing is needed from [facility] nurse at this time.   Will follow PRN to assist in stomal support as needed.     Thanks

## 2020-04-17 NOTE — PROGRESS NOTES
[facility] Medicine Services  PROGRESS NOTE    Patient Name: Neema Richard  : 1940  MRN: [id number]    Date of Admission: 2020  Primary Care Physician: David Mackey MD    Subjective   Subjective     CC:  F/u pneumona, shortness of breath    HPI:  Patient seen through the window due to covid precautions. She is resting in bed. She reports no SOA, but endorses cough and chills this morning. Denies fever, nausea, vomiting or chest pain. Denies abdominal pain.    Review of Systems  Gen- No fevers, chills  CV- No chest pain, palpitations  Resp- as above  GI- No N/V/D, abd pain    Objective   Objective     Vital Signs:   Temp:  [97.9 °F (36.6 °C)-101.7 °F (38.7 °C)] 99.8 °F (37.7 °C)  Heart Rate:  [] 94  Resp:  [14-22] 20  BP: (105-165)/(52-87) 138/52        Physical Exam:  With patient's consent, physical exam was conducted via visual telemedicine encounter with bedside portion accommodated by nursing staff due to patient's current isolation requirements in the interest of PPE conservation.    Constitutional: No acute distress, awake, alert, nontoxic, normal body habitus  HENT: NCAT  Respiratory: breathing appears non-labored on NC, 94%  Cardiovascular: tele with NSR  Psychiatric: Appropriate affect  Neurologic: Oriented x 3, speech is clear, appears to move all extremities  Skin: No visible rashes seen on exposed skin      Results Reviewed:  Results from last 7 days   Lab Units 20  0545 20  1653 20  1612   WBC 10*3/mm3 3.15*  3.11* 3.89  --    HEMOGLOBIN g/dL 9.5*  9.4* 10.2*  --    HEMATOCRIT % 31.5*  31.5* 33.5*  --    PLATELETS 10*3/mm3 445  414 525*  --    PROCALCITONIN ng/mL 26.60*  --  0.10     Results from last 7 days   Lab Units 20  0545 20  1612   SODIUM mmol/L 139 140   POTASSIUM mmol/L 3.2* 3.6   CHLORIDE mmol/L 106 103   CO2 mmol/L 20.0* 23.0   BUN mg/dL 16 13   CREATININE mg/dL 0.75 0.60   GLUCOSE mg/dL 108* 113*   CALCIUM  mg/dL 8.1* 9.1   ALT (SGPT) U/L  --  7   AST (SGOT) U/L  --  15   PROBNP pg/mL  --  537.9     Estimated Creatinine Clearance: 53.9 mL/min (by C-G formula based on SCr of 0.75 mg/dL).    Microbiology Results Abnormal     Procedure Component Value - Date/Time    MRSA Screen, PCR (Inpatient) - Swab, Nares [[id number]]  (Normal) Collected:  04/17/20 0312    Lab Status:  Final result Specimen:  Swab from Nares Updated:  04/17/20 0613     MRSA PCR Negative    Narrative:       MRSA Negative    Respiratory Panel, PCR - Swab, Nasopharynx [[id number]]  (Normal) Collected:  04/16/20 2227    Lab Status:  Final result Specimen:  Swab from Nasopharynx Updated:  04/16/20 2346     ADENOVIRUS, PCR Not Detected     Coronavirus 229E Not Detected     Coronavirus HKU1 Not Detected     Coronavirus NL63 Not Detected     Coronavirus OC43 Not Detected     Human Metapneumovirus Not Detected     Human Rhinovirus/Enterovirus Not Detected     Influenza B PCR Not Detected     Parainfluenza Virus 1 Not Detected     Parainfluenza Virus 2 Not Detected     Parainfluenza Virus 3 Not Detected     Parainfluenza Virus 4 Not Detected     Bordetella pertussis pcr Not Detected     Influenza A H1 2009 PCR Not Detected     Chlamydophila pneumoniae PCR Not Detected     Mycoplasma pneumo by PCR Not Detected     Influenza A PCR Not Detected     Influenza A H3 Not Detected     Influenza A H1 Not Detected     RSV, PCR Not Detected     Bordetella parapertussis PCR Not Detected    Narrative:       The coronavirus on the RVP is NOT COVID-19 and is NOT indicative of infection with COVID-19.           Imaging Results (Last 24 Hours)     Procedure Component Value Units Date/Time    CT Chest Without Contrast [[id number]] Collected:  04/16/20 2042     Updated:  04/16/20 2044    Narrative:       CT CHEST, NONCONTRAST, 4/16/2020    HISTORY:  79-year-old female postop low anterior colon resection with colostomy and drainage of abdominal abscess 3/17/2020. She presents to the  ED with new onset fever of 102 today.    TECHNIQUE:  CT imaging of the chest without IV contrast. Radiation dose reduction techniques included automated exposure control. Radiation audit for CT and nuclear cardiology exams in the last 12 months: 5.    FINDINGS:  Low lung volumes with elevation of the right hemidiaphragm. Tiny pleural effusions with dependent posterior lung base atelectasis, greater on the right.    Mild patchy airspace infiltrates in the right upper lobe and in the dependent posterior lung bases. Trachea and central bronchi are normal in caliber and widely patent.    Heart size is normal, there is no pericardial effusion. Normal caliber thoracic aorta.    Limited upper abdominal images show fluid beneath the right hemidiaphragm and mesenteric soft tissue edema. There is no hiatal hernia or thoracic esophageal dilatation.      Impression:       1.  Mild patchy airspace infiltrates in the right upper lobe with mild infiltrate and atelectasis in the dependent posterior lung bases.  2.  Low lung volumes with elevated right hemidiaphragm and tiny bilateral pleural effusions, greater on the right.    Signer Name: Prakash Ortega MD   Signed: 4/16/2020 8:42 PM   Workstation Name: [id number]    [facility]    CT Abdomen Pelvis Without Contrast [[id number]] Collected:  04/16/20 2017     Updated:  04/16/20 2019    Narrative:       CT Abdomen Pelvis WO    INDICATION:   Abdominal surgery one month ago. Fever beginning earlier today with nausea. Concern for abscess.    TECHNIQUE:   CT of the abdomen and pelvis without IV contrast. Coronal and sagittal reconstructions were obtained.  Radiation dose reduction techniques included automated exposure control or exposure modulation based on body size. Count of known CT and cardiac nuc  med studies performed in previous 12 months: 6.     COMPARISON:   3/24/2020    FINDINGS:  Abdomen: Since previous examination bilateral pleural effusions  are smaller. The liver, spleen and pancreas are normal in size. The kidneys and adrenals are unchanged. No evidence of kidney stone disease or hydronephrosis. The ureters are nondilated. No  evidence of retroperitoneal adenopathy. No distended bowel loops are seen to suggest a mechanical obstruction.    Pelvis: No evidence of adenopathy, mass or fluid collection. No evidence of free air. The uterus is enlarged for a patient this age but this is unchanged and likely represents a fibroid. A left-sided pelvic drain seen in the previous scan is no longer  seen but there is no evidence of pelvic abscess.      Impression:       No evidence of abscess. No evidence of bowel obstruction or perforation. Bilateral effusions at the lung bases are smaller. There is a small amount of ascites around the liver that has decreased slightly.          Signer Name: Jordan Montes De Oca MD   Signed: 4/16/2020 8:17 PM   Workstation Name: [id number]    [facility]               I have reviewed the medications:  Scheduled Meds:  busPIRone 10 mg Oral Daily   cefTRIAXone 1 g Intravenous Q24H   doxycycline 100 mg Intravenous Q12H   enoxaparin 40 mg Subcutaneous Q24H   gabapentin 300 mg Oral Nightly   insulin lispro 0-7 Units Subcutaneous TID With Meals   lactobacillus acidophilus 1 capsule Oral Daily   levothyroxine 88 mcg Oral Daily   metoprolol succinate  mg Oral Daily   pravastatin 40 mg Oral Nightly   sodium chloride 10 mL Intravenous Q12H     Continuous Infusions:   PRN Meds:.•  acetaminophen  •  dextrose  •  dextrose  •  glucagon (human recombinant)  •  LORazepam  •  magnesium sulfate **OR** magnesium sulfate in D5W 1g/100mL (PREMIX) **OR** magnesium sulfate  •  ondansetron  •  potassium chloride **OR** potassium chloride **OR** potassium chloride  •  sodium chloride  •  sodium chloride    Assessment/Plan   Assessment & Plan     Active Hospital Problems    Diagnosis  POA   • Pneumonia of both lungs due to  infectious organism [J18.9]  Yes   • Fever [R50.9]  Unknown   • Bilateral pleural effusion [J90]  Unknown   • History of diverticular abscess [Z87.19]  Not Applicable   • DM2 (diabetes mellitus, type 2) (CMS/HCC) [E11.9]  Unknown   • Rule out Covid-19 pneumonia [U07.1, J12.89]  Unknown   • Acquired hypothyroidism [E03.9]  Yes   • Essential hypertension [I10]  Yes      Resolved Hospital Problems   No resolved problems to display.        Brief Hospital Course to date:  Neema Richard is a 79 y.o. female with PMHx significant for DM2, HTN and recent admission from 3/15-3/26/20 with perforated diverticulitis w/ abscess formation. During that admission patient underwent LAR, abscess drainage, appy, left oophorectomy w/ distal transverse colostomy by Dr. Vazquez. Antibiotics were manged by infectious disease and patient was discharged on zosyn via picc line. Patient completed antibiotics approximately 6 days ago. She comes in with fever, dry cough and nausea.    Sepsis   Bilateral Pneumonia  Acute Respiratory Failure with Hypoxia  - continue CTX/Doxy  - COVID testing is negative, discussed with ID at rounds, will plan to retest in AM. Leave in contact precautions for now.  - ID consult pending, well known to Dr. Bejaraon  - respiratory viral panel negative  - urinary antigens negative, MRSA PCR negative    DMII  - SSI for now    Recent Diverticular Abscess s/p Colostomy   - wound/ostomy evaluation   - CT a/p showed no intraabdominal or pelvic acute process    HTN:  - continue home regimen    DVT Prophylaxis:  Lovenox    Disposition: I expect the patient to be discharged TBD    Called and updated Patient's Daughter Theodora and answered all questions.    CODE STATUS:   Code Status and Medical Interventions:   Ordered at: 04/16/20 2247     Code Status:    CPR     Medical Interventions (Level of Support Prior to Arrest):    Full     >35 minutes spent on patient care, this includes chart review, discussion with specialists, Case  Management and nursing staff, as well as patient and family. more than 50% of time spent face to face counseling patient on current illness and plan of care.    Electronically signed by Katherine Seymour DO, 04/17/20, 09:35.

## 2020-04-17 NOTE — CONSULTS
Not formally consulted, however, discussed with Dr. Mackey prior to admission.    CT scans and clinical course reviewed    Without history or findings thus far to suggest abdominal pathology.    Findings most consistent with non-COVID pneumonia.

## 2020-04-17 NOTE — PROGRESS NOTES
Clinical Nutrition     Reason for Visit: Identified at risk by screening criteria    Patient Name: Neema Richard  YOB: 1940  MRN: [id number]  Date of Encounter: 04/17/20 15:38  Admission date: 4/16/2020    Nutrition Assessment   Admission Problem List:    Sepsis  PNA  B. Pleural Effusions  r/o Covid 19- test negative    s/p recent admission for perforated appendicitis 3-13 through 3-26    (3/17) s/p low anterior resection, mobilization splenic flexure, drainage of abdominal abscesses, appendectomy, left salpingo-oophorectomy, distal transverse end colostomy    PMH:   She  has a past medical history of Acquired hypothyroidism (1980), Colon polyp - sees Dr. 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  PSxH:  She  has a past surgical history that includes Tonsillectomy (1953); Cervical Corpectomy (1983); Sinus surgery (1992); Breast Reduction (Bilateral, 1995); Laparoscopic cholecystectomy (2010); Reduction mammaplasty (Bilateral, 04/13/2017); Thyroidectomy, partial (1963); Hemorrhoid surgery (1974); Varicose vein surgery (Bilateral, 1981); Facial cosmetic surgery (2001); Facial cosmetic surgery (2012); Breast lumpectomy (Left, 2009); Cataract extraction, bilateral (Bilateral, 2018); and Colectomy (N/A, 3/17/2020).      Reported/Observed/Food/Nutrition Related History:     Pt reports her UBW was 135lb's prior to last admission, c/o nausea, poor appetite,  is able to keep liquids down, states she has noticed decreased ostomy output at home, perhaps because she has not been eating or drinking as much    Per RN: pt c/o nausea this am, this seems improved, tolerating clear liquids, surgical incision + ostomy site look good    Anthropometrics   Height: 67in  Weight: 129lb  BMI: 20  BMI classification: Normal: 18.5-24.9kg/m2       UBW: 135lb  Weight change: ? 6lb loss over  the past month  Per RD notes last admission- the bedscale weights did not appear accurate    Last 15 Recorded Weights   View Complete Flowsheet   Weight Weight (kg) Weight (lbs) Weight Method   4/16/2020 58.877 kg 129 lb 12.8 oz Standing scale   4/16/2020 58.968 kg 130 lb (No Data)    4/16/2020 4.536 kg 10 lb Stated   3/26/2020 64.819 kg 142 lb 14.4 oz Bed scale   3/25/2020 62.596 kg 138 lb Bed scale   3/24/2020 62.869 kg 138 lb 9.6 oz Bed scale   3/23/2020 63.702 kg 140 lb 7 oz Bed scale   3/22/2020 63.322 kg 139 lb 9.6 oz -   3/21/2020 (No Data)  (No Data)  -   3/15/2020 61.236 kg 135 lb Stated       Labs reviewed     Results from last 7 days   Lab Units 04/17/20  0545 04/16/20  1612   SODIUM mmol/L 139 140   POTASSIUM mmol/L 3.2* 3.6   CHLORIDE mmol/L 106 103   CO2 mmol/L 20.0* 23.0   BUN mg/dL 16 13   CREATININE mg/dL 0.75 0.60   CALCIUM mg/dL 8.1* 9.1   BILIRUBIN mg/dL  --  0.3   ALK PHOS U/L  --  77   ALT (SGPT) U/L  --  7   AST (SGOT) U/L  --  15   GLUCOSE mg/dL 108* 113*           Lab Results   Lab Value Date/Time    HGBA1C 5.50 04/17/2020 0545    HGBA1C 6.00 (H) 03/16/2020 0018       Medications reviewed   Pertinent:abx, insulin, probiotic, zofran    Intake/Ouptut 24 hrs (7:00AM - 6:59 AM)     Intake & Output (last day)       04/16 0701 - 04/17 0700 04/17 0701 - 04/18 0700    IV Piggyback 1000     Total Intake(mL/kg) 1000 (17)     Urine (mL/kg/hr) 300     Stool 0     Total Output 300     Net +700           Stool Unmeasured Occurrence 1 x                GI: nausea    SKIN: surgical site    Current Nutrition Prescription     PO: Diet Clear Liquid     Intake: no data    Nutrition Diagnosis   Date: 4-17-20  Problem Unintended weight loss   Etiology Perforated diverticulitis   Signs/Symptoms Suspected 6lb weight loss          Nutrition Intervention   1.  Follow treatment progress, Interview for preferences, Menu adjusted, Supplement provided    RFB 3x/day    Will order Kefir once diet advanced, as pt states that  ID would like her to have this    Goal:   General: Nutrition support treatment  PO: Establish PO      Monitoring/Evaluation:   Per protocol, I&O, PO intake, Pertinent labs, Weight, Skin status, GI status, Symptoms    Kami Johnson RD  Time Spent: 30min

## 2020-04-18 LAB
ANION GAP SERPL CALCULATED.3IONS-SCNC: 13 MMOL/L (ref 5–15)
BASOPHILS # BLD MANUAL: 0.09 10*3/MM3 (ref 0–0.2)
BASOPHILS NFR BLD AUTO: 1 % (ref 0–1.5)
BUN BLD-MCNC: 23 MG/DL (ref 8–23)
BUN/CREAT SERPL: 37.1 (ref 7–25)
CALCIUM SPEC-SCNC: 8.7 MG/DL (ref 8.6–10.5)
CHLORIDE SERPL-SCNC: 105 MMOL/L (ref 98–107)
CO2 SERPL-SCNC: 22 MMOL/L (ref 22–29)
CREAT BLD-MCNC: 0.62 MG/DL (ref 0.57–1)
DEPRECATED RDW RBC AUTO: 46.7 FL (ref 37–54)
EOSINOPHIL # BLD MANUAL: 0.75 10*3/MM3 (ref 0–0.4)
EOSINOPHIL NFR BLD MANUAL: 8 % (ref 0.3–6.2)
ERYTHROCYTE [DISTWIDTH] IN BLOOD BY AUTOMATED COUNT: 14.6 % (ref 12.3–15.4)
GFR SERPL CREATININE-BSD FRML MDRD: 93 ML/MIN/1.73
GLUCOSE BLD-MCNC: 114 MG/DL (ref 65–99)
GLUCOSE BLDC GLUCOMTR-MCNC: 142 MG/DL (ref 70–130)
HCT VFR BLD AUTO: 33.5 % (ref 34–46.6)
HGB BLD-MCNC: 10.1 G/DL (ref 12–15.9)
HYPOCHROMIA BLD QL: ABNORMAL
LYMPHOCYTES # BLD MANUAL: 1.59 10*3/MM3 (ref 0.7–3.1)
LYMPHOCYTES NFR BLD MANUAL: 17 % (ref 19.6–45.3)
LYMPHOCYTES NFR BLD MANUAL: 19 % (ref 5–12)
MCH RBC QN AUTO: 26.6 PG (ref 26.6–33)
MCHC RBC AUTO-ENTMCNC: 30.1 G/DL (ref 31.5–35.7)
MCV RBC AUTO: 88.2 FL (ref 79–97)
MONOCYTES # BLD AUTO: 1.77 10*3/MM3 (ref 0.1–0.9)
NEUTROPHILS # BLD AUTO: 4.85 10*3/MM3 (ref 1.7–7)
NEUTROPHILS NFR BLD MANUAL: 39 % (ref 42.7–76)
NEUTS BAND NFR BLD MANUAL: 13 % (ref 0–5)
PLAT MORPH BLD: NORMAL
PLATELET # BLD AUTO: 440 10*3/MM3 (ref 140–450)
PMV BLD AUTO: 9.5 FL (ref 6–12)
POLYCHROMASIA BLD QL SMEAR: ABNORMAL
POTASSIUM BLD-SCNC: 3.4 MMOL/L (ref 3.5–5.2)
RBC # BLD AUTO: 3.8 10*6/MM3 (ref 3.77–5.28)
REF LAB TEST METHOD: NORMAL
SARS-COV-2 RNA RESP QL NAA+PROBE: NOT DETECTED
SMUDGE CELLS BLD QL SMEAR: ABNORMAL
SODIUM BLD-SCNC: 140 MMOL/L (ref 136–145)
VARIANT LYMPHS NFR BLD MANUAL: 3 % (ref 0–5)
WBC NRBC COR # BLD: 9.33 10*3/MM3 (ref 3.4–10.8)

## 2020-04-18 PROCEDURE — U0003 INFECTIOUS AGENT DETECTION BY NUCLEIC ACID (DNA OR RNA); SEVERE ACUTE RESPIRATORY SYNDROME CORONAVIRUS 2 (SARS-COV-2) (CORONAVIRUS DISEASE [COVID-19]), AMPLIFIED PROBE TECHNIQUE, MAKING USE OF HIGH THROUGHPUT TECHNOLOGIES AS DESCRIBED BY CMS-2020-01-R: HCPCS | Performed by: INTERNAL MEDICINE

## 2020-04-18 PROCEDURE — 99233 SBSQ HOSP IP/OBS HIGH 50: CPT | Performed by: INTERNAL MEDICINE

## 2020-04-18 PROCEDURE — 25010000002 CEFTRIAXONE PER 250 MG: Performed by: INTERNAL MEDICINE

## 2020-04-18 PROCEDURE — 85007 BL SMEAR W/DIFF WBC COUNT: CPT | Performed by: INTERNAL MEDICINE

## 2020-04-18 PROCEDURE — 25010000002 ENOXAPARIN PER 10 MG: Performed by: INTERNAL MEDICINE

## 2020-04-18 PROCEDURE — 82962 GLUCOSE BLOOD TEST: CPT

## 2020-04-18 PROCEDURE — 80048 BASIC METABOLIC PNL TOTAL CA: CPT | Performed by: INTERNAL MEDICINE

## 2020-04-18 PROCEDURE — U0002 COVID-19 LAB TEST NON-CDC: HCPCS | Performed by: INTERNAL MEDICINE

## 2020-04-18 PROCEDURE — 85025 COMPLETE CBC W/AUTO DIFF WBC: CPT | Performed by: INTERNAL MEDICINE

## 2020-04-18 RX ORDER — SUCRALFATE 1 G/1
1 TABLET ORAL
Status: DISCONTINUED | OUTPATIENT
Start: 2020-04-18 | End: 2020-04-20

## 2020-04-18 RX ADMIN — SODIUM CHLORIDE, PRESERVATIVE FREE 10 ML: 5 INJECTION INTRAVENOUS at 20:27

## 2020-04-18 RX ADMIN — GABAPENTIN 300 MG: 300 CAPSULE ORAL at 20:26

## 2020-04-18 RX ADMIN — SODIUM CHLORIDE, PRESERVATIVE FREE 10 ML: 5 INJECTION INTRAVENOUS at 09:24

## 2020-04-18 RX ADMIN — ENOXAPARIN SODIUM 40 MG: 40 INJECTION SUBCUTANEOUS at 04:25

## 2020-04-18 RX ADMIN — PRAVASTATIN SODIUM 40 MG: 40 TABLET ORAL at 20:26

## 2020-04-18 RX ADMIN — LEVOTHYROXINE SODIUM 88 MCG: 88 TABLET ORAL at 04:25

## 2020-04-18 RX ADMIN — DOXYCYCLINE 100 MG: 100 CAPSULE ORAL at 20:27

## 2020-04-18 RX ADMIN — DOXYCYCLINE 100 MG: 100 CAPSULE ORAL at 09:23

## 2020-04-18 RX ADMIN — SUCRALFATE 1 G: 1 TABLET ORAL at 17:59

## 2020-04-18 RX ADMIN — METOPROLOL SUCCINATE 100 MG: 100 TABLET, EXTENDED RELEASE ORAL at 09:23

## 2020-04-18 RX ADMIN — ACETAMINOPHEN 650 MG: 325 TABLET, FILM COATED ORAL at 20:26

## 2020-04-18 RX ADMIN — SUCRALFATE 1 G: 1 TABLET ORAL at 12:17

## 2020-04-18 RX ADMIN — ACETAMINOPHEN 650 MG: 325 TABLET, FILM COATED ORAL at 09:23

## 2020-04-18 RX ADMIN — CEFTRIAXONE SODIUM 1 G: 1 INJECTION, POWDER, FOR SOLUTION INTRAMUSCULAR; INTRAVENOUS at 00:26

## 2020-04-18 RX ADMIN — Medication 1 CAPSULE: at 09:22

## 2020-04-18 NOTE — PROGRESS NOTES
INFECTIOUS DISEASE Progress Note    Neema Richard  1940  [id number]    Date of Consult: 4/18/2020    Admission Date: 4/16/2020      Requesting Provider: Steve Quintero MD  Evaluating Physician: Darshan Bejarano MD    Reason for Consultation: fever, pneumonia (recent admission with diverticular abscess s/p prolonged iv antibiotics followed by Dr. Darshan Bejarano)    We examined the patient from outside the room with direct visualization and with FaceTime video  due to the critical shortage of personal protective equipment in the setting of the COVID19 pandemic       History of present illness:    4/17/2020: Patient is a 79 y.o. female with h/o hypothyroidism, HTN, DCIS, and recent diverticulitis with perforation and LAR colon resection 3/17/20 (discharged on Zosyn on 3/26/20) who we were asked to see for fever and pneumonia.  She finished IV Zosyn about 6 days ago and PICC line was discontinued.  She had a Telehealth visit with Dr. Darshan Bejarano on 4/14/20 and was stable off antibiotics.   She presented to [facility] ED on 4/16/20 with fever of 102 at home, shaking chills, and nausea .  She has had a slight decrease in ostomy output.  She denies vomiting, dysuria, incisional wound drainage or redness, sore throat, or sudden loss of taste or smell.  She has had no COVID-19 exposures.  Work up showed Tmax 101.7, lactic acid 1.3, PCT 0.1, lipase 46, WBC 3900 with 6% neutrophils/50% lymphocytes/39% monocytes, and UA WBC 0-2.  Today her PCT is 26.6, CRP 12.3, and ESR 18.  Respiratory panel PCR is negative. A CT scan of a/p shows no evidence of abscess, bowel obstruction, or perforation.  A CT scan of chest shows mild patchy airspace infiltrates in the RUL with mild infiltrate and atelectasis in the dependent posterior lung bases.  A COVID-19 PCR is negative after interview done.  Strep and Legionella urinary antigens are pending. A MRSA PCR is negative. She is currently on Rocephin and Doxycycline after getting doses of  Zosyn and Vancomycin. ID was asked to evaluate and manage her antibiotic therapy.   4/18/2020: She is currently on a clear liquid diet but would like to have her diet advanced.  She has decreased nausea today.  She denies cough and sputum production.  She continues to require supplemental oxygen.  She denies vomiting and abdominal pain.    Past Medical History:   Diagnosis Date   • Acquired hypothyroidism 1980   • Colon polyp - sees Dr. 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 ER and TX (+)   • Essential hypertension 2012   • High cholesterol 2019   • Hx of herpes genitalis 2013   • IBS-d (takes Buspar)        Past Surgical History:   Procedure Laterality Date   • BILATERAL BREAST REDUCTION Bilateral 1995   • BREAST LUMPECTOMY Left 2009   • CATARACT EXTRACTION, BILATERAL Bilateral 2018   • CERVICAL CONIZATION  1983   • COLON RESECTION N/A 3/17/2020    Procedure: LOW ANTERIOR COLON RESECTION, MOBILIZATION OF SPLENIC FLEXURE, DRAINAGE OF ABSCESS AND COLOSTOMY CREATION;  Surgeon: Sravan Vazquez MD;  Location: [facility];  Service: General;  Laterality: N/A;   • FACIAL COSMETIC SURGERY  2001   • FACIAL COSMETIC SURGERY  2012   • HEMORRHOIDECTOMY  1974   • LAPAROSCOPIC CHOLECYSTECTOMY  2010   • REDUCTION MAMMAPLASTY Bilateral 04/13/2017    DCIS   • SINUS SURGERY  1992   • THYROIDECTOMY, PARTIAL  1963   • TONSILLECTOMY  1953   • VARICOSE VEIN SURGERY Bilateral 1981       Family History   Problem Relation Age of Onset   • Breast cancer Neg Hx    • Ovarian cancer Neg Hx        Social History     Socioeconomic History   • Marital status:      Spouse name: Not on file   • Number of children: Not on file   • Years of education: Not on file   • Highest education level: Not on file   Tobacco Use   • Smoking status: Never Smoker   • Smokeless tobacco: Never Used   Substance and Sexual Activity   • Alcohol use: No   • Drug use: No       Allergies   Allergen  Reactions   • Evista [Raloxifene] Rash   • Cephalexin Diarrhea   • Hydrochlorothiazide Diarrhea   • Meloxicam Other (See Comments)   • Metronidazole Diarrhea   • Oxybutynin Cough   • Propylthiouracil Other (See Comments)     Severally reduce WBC's    • Sulfa Antibiotics Hives   • Sulfacetamide Sodium-Sulfur Hives   • Sulfur Dioxide Hives         Medication:    Current Facility-Administered Medications:   •  acetaminophen (TYLENOL) tablet 650 mg, 650 mg, Oral, Q6H PRN, Candida Radford PA-C, 650 mg at 04/18/20 0923  •  busPIRone (BUSPAR) tablet 10 mg, 10 mg, Oral, Daily, Steve Quintero MD, 10 mg at 04/17/20 0900  •  cefTRIAXone (ROCEPHIN) 1 g/50 mL 0.9% NS VTB (mbp), 1 g, Intravenous, Q24H, Steve Quintero MD, 1 g at 04/18/20 0026  •  dextrose (D50W) 25 g/ 50mL Intravenous Solution 25 g, 25 g, Intravenous, Q15 Min PRN, Steve Quintero MD  •  dextrose (GLUTOSE) oral gel 15 g, 15 g, Oral, Q15 Min PRN, Steve Quintero MD  •  doxycycline (MONODOX) capsule 100 mg, 100 mg, Oral, Q12H, Andrea Ferguson PA, 100 mg at 04/18/20 0923  •  enoxaparin (LOVENOX) syringe 40 mg, 40 mg, Subcutaneous, Q24H, Steve Quintero MD, 40 mg at 04/18/20 0425  •  gabapentin (NEURONTIN) capsule 300 mg, 300 mg, Oral, Nightly, Steve Quintero MD, 300 mg at 04/17/20 2001  •  glucagon (human recombinant) (GLUCAGEN DIAGNOSTIC) injection 1 mg, 1 mg, Subcutaneous, Q15 Min PRN, Steve Quintero MD  •  insulin lispro (humaLOG) injection 0-7 Units, 0-7 Units, Subcutaneous, TID With Meals, Steve Quintero MD  •  lactobacillus acidophilus (RISAQUAD) capsule 1 capsule, 1 capsule, Oral, Daily, Steve Quintero MD, 1 capsule at 04/18/20 0922  •  levothyroxine (SYNTHROID, LEVOTHROID) tablet 88 mcg, 88 mcg, Oral, Daily, Steve Quintero MD, 88 mcg at 04/18/20 0425  •  LORazepam (ATIVAN) tablet 0.5 mg, 0.5 mg, Oral, Q8H PRN, Steve Quintero MD  •  magnesium sulfate 4 gram infusion - Mg less than or equal to  1mg/dL, 4 g, Intravenous, PRN **OR** magnesium sulfate 3 gram infusion (1gm x 3) - Mg 1.1 - 1.5 mg/dL, 1 g, Intravenous, PRN **OR** Magnesium Sulfate 2 gram infusion- Mg 1.6 - 1.9 mg/dL, 2 g, Intravenous, PRN, Steve Quintero MD  •  melatonin tablet 5 mg, 5 mg, Oral, Nightly PRN, Sandy Randall APRN, 5 mg at 04/17/20 2007  •  metoprolol succinate XL (TOPROL-XL) 24 hr tablet 100 mg, 100 mg, Oral, Daily, Steve Quintero MD, 100 mg at 04/18/20 0923  •  ondansetron (ZOFRAN) injection 4 mg, 4 mg, Intravenous, Q6H PRN, Steve Quintero MD, 4 mg at 04/17/20 2001  •  potassium chloride (MICRO-K) CR capsule 40 mEq, 40 mEq, Oral, PRN, 40 mEq at 04/17/20 0900 **OR** potassium chloride (KLOR-CON) packet 40 mEq, 40 mEq, Oral, PRN **OR** potassium chloride 10 mEq in 100 mL IVPB, 10 mEq, Intravenous, Q1H PRN, Katherine Seymour DO  •  pravastatin (PRAVACHOL) tablet 40 mg, 40 mg, Oral, Nightly, Steve Quintero MD, 40 mg at 04/17/20 2001  •  sodium chloride 0.9 % flush 10 mL, 10 mL, Intravenous, PRN, Steve Quintero MD  •  sodium chloride 0.9 % flush 10 mL, 10 mL, Intravenous, Q12H, Steve Quintero MD, 10 mL at 04/18/20 0924  •  sodium chloride 0.9 % flush 10 mL, 10 mL, Intravenous, PRN, Steve Quintero MD  •  sucralfate (CARAFATE) tablet 1 g, 1 g, Oral, BID AC, Katherine Seymour DO, 1 g at 04/18/20 1217    Antibiotics:  Anti-Infectives (From admission, onward)    Ordered     Dose/Rate Route Frequency Start Stop    04/17/20 0953  doxycycline (MONODOX) capsule 100 mg  Review   Ordering Provider:  Andrea Ferguson PA    100 mg Oral Every 12 Hours Scheduled 04/17/20 1100 04/24/20 0859    04/16/20 2238  cefTRIAXone (ROCEPHIN) 1 g/50 mL 0.9% NS VTB (mbp)  Review   Ordering Provider:  Steve Quintero MD    1 g  over 30 Minutes Intravenous Every 24 Hours 04/17/20 0000 04/21/20 2359    04/16/20 2241  vancomycin 1250 mg/250 mL 0.9% NS IVPB ([facility])     Ordering Provider:  Zeke Romero IV, [facility]    20  mg/kg × 59 kg (Order-Specific)  over 90 Minutes Intravenous Once 20 0000 20 0227    20 1635  piperacillin-tazobactam (ZOSYN) 3.375 g in iso-osmotic dextrose 50 ml (premix)     Ordering Provider:  Orville Martin MD    3.375 g Intravenous Once 20 1637 20 1800              Physical Exam:   Vital Signs  Temp (24hrs), Av.8 °F (37.1 °C), Min:96.7 °F (35.9 °C), Max:101.4 °F (38.6 °C)    Temp  Min: 96.7 °F (35.9 °C)  Max: 101.4 °F (38.6 °C)  BP  Min: 125/50  Max: 133/63  Pulse  Min: 64  Max: 80  Resp  Min: 16  Max: 20  SpO2  Min: 96 %  Max: 97 %     We examined the patient from outside the room with direct visualization and with FaceTime video and due to the critical shortage of personal protective equipment in the setting of the COVID19 pandemic     GENERAL: Awake and alert, in no acute distress.   HEENT: No labial ulcers  NECK: Without nuchal rigidity.  HEART:  LUNGS:. Nonlabored breathing  ABDOMEN: Soft, mildly tender around surgical site, nondistended.  EXT:  No edema  :  Without Gaytan catheter.  MSK:  FROM, no edema, no joint effusions  SKIN: Warm and dry without cutaneous eruptions on Inspection/palpation.  Abdominal wound well healed with no erythema or drainage, no dehiscence.  NEURO: Oriented to PPT.CN 2-12 grossly intact  PSYCHIATRIC: Normal insight and judgement. Cooperative with PE    Laboratory Data    Results from last 7 days   Lab Units 20  0904 20  0545 20  1653   WBC 10*3/mm3 9.33 3.15*  3.11* 3.89   HEMOGLOBIN g/dL 10.1* 9.5*  9.4* 10.2*   HEMATOCRIT % 33.5* 31.5*  31.5* 33.5*   PLATELETS 10*3/mm3 440 445  414 525*     Results from last 7 days   Lab Units 20  0904   SODIUM mmol/L 140   POTASSIUM mmol/L 3.4*   CHLORIDE mmol/L 105   CO2 mmol/L 22.0   BUN mg/dL 23   CREATININE mg/dL 0.62   GLUCOSE mg/dL 114*   CALCIUM mg/dL 8.7     Results from last 7 days   Lab Units 20  1612   ALK PHOS U/L 77   BILIRUBIN mg/dL 0.3   ALT (SGPT) U/L  7   AST (SGOT) U/L 15     Results from last 7 days   Lab Units 04/17/20  0545   SED RATE mm/hr 18     Results from last 7 days   Lab Units 04/17/20  0545   CRP mg/dL 12.30*     Results from last 7 days   Lab Units 04/16/20  1612   LACTATE mmol/L 1.3             Estimated Creatinine Clearance: 53.9 mL/min (by C-G formula based on SCr of 0.62 mg/dL).      Microbiology:  Microbiology Results (last 10 days)     Procedure Component Value - Date/Time    MRSA Screen, PCR (Inpatient) - Swab, Nares [[id number]]  (Normal) Collected:  04/17/20 0312    Lab Status:  Final result Specimen:  Swab from Nares Updated:  04/17/20 0613     MRSA PCR Negative    Narrative:       MRSA Negative    Legionella Antigen, Urine - Urine, Urine, Clean Catch [[id number]]  (Normal) Collected:  04/17/20 0109    Lab Status:  Final result Specimen:  Urine, Clean Catch Updated:  04/17/20 0937     LEGIONELLA ANTIGEN, URINE Negative    SARS-CoV-2, PCR (IN-HOUSE), NP Swab in Transport Media - Swab, Nasopharynx [[id number]]  (Normal) Collected:  04/16/20 2227    Lab Status:  Final result Specimen:  Swab from Nasopharynx Updated:  04/17/20 0955     COVID19 Not Detected    Respiratory Panel, PCR - Swab, Nasopharynx [[id number]]  (Normal) Collected:  04/16/20 2227    Lab Status:  Final result Specimen:  Swab from Nasopharynx Updated:  04/16/20 2346     ADENOVIRUS, PCR Not Detected     Coronavirus 229E Not Detected     Coronavirus HKU1 Not Detected     Coronavirus NL63 Not Detected     Coronavirus OC43 Not Detected     Human Metapneumovirus Not Detected     Human Rhinovirus/Enterovirus Not Detected     Influenza B PCR Not Detected     Parainfluenza Virus 1 Not Detected     Parainfluenza Virus 2 Not Detected     Parainfluenza Virus 3 Not Detected     Parainfluenza Virus 4 Not Detected     Bordetella pertussis pcr Not Detected     Influenza A H1 2009 PCR Not Detected     Chlamydophila pneumoniae PCR Not Detected     Mycoplasma pneumo by PCR Not Detected      Influenza A PCR Not Detected     Influenza A H3 Not Detected     Influenza A H1 Not Detected     RSV, PCR Not Detected     Bordetella parapertussis PCR Not Detected    Narrative:       The coronavirus on the RVP is NOT COVID-19 and is NOT indicative of infection with COVID-19.     S. Pneumo Ag Urine or CSF - Urine, Urine, Clean Catch [[id number]]  (Normal) Collected:  04/16/20 2102    Lab Status:  Final result Specimen:  Urine, Clean Catch Updated:  04/17/20 0936     Strep Pneumo Ag Negative    Blood Culture - Blood, Arm, Left [[id number]] Collected:  04/16/20 1653    Lab Status:  Preliminary result Specimen:  Blood from Arm, Left Updated:  04/17/20 2045     Blood Culture No growth at 24 hours    Blood Culture - Blood, Arm, Right [[id number]] Collected:  04/16/20 1643    Lab Status:  Preliminary result Specimen:  Blood from Arm, Right Updated:  04/17/20 2045     Blood Culture No growth at 24 hours                Radiology:  Imaging Results (Last 72 Hours)     Procedure Component Value Units Date/Time    CT Chest Without Contrast [[id number]] Collected:  04/16/20 2042     Updated:  04/16/20 2044    Narrative:       CT CHEST, NONCONTRAST, 4/16/2020    HISTORY:  79-year-old female postop low anterior colon resection with colostomy and drainage of abdominal abscess 3/17/2020. She presents to the ED with new onset fever of 102 today.    TECHNIQUE:  CT imaging of the chest without IV contrast. Radiation dose reduction techniques included automated exposure control. Radiation audit for CT and nuclear cardiology exams in the last 12 months: 5.    FINDINGS:  Low lung volumes with elevation of the right hemidiaphragm. Tiny pleural effusions with dependent posterior lung base atelectasis, greater on the right.    Mild patchy airspace infiltrates in the right upper lobe and in the dependent posterior lung bases. Trachea and central bronchi are normal in caliber and widely patent.    Heart size is normal, there is no pericardial  effusion. Normal caliber thoracic aorta.    Limited upper abdominal images show fluid beneath the right hemidiaphragm and mesenteric soft tissue edema. There is no hiatal hernia or thoracic esophageal dilatation.      Impression:       1.  Mild patchy airspace infiltrates in the right upper lobe with mild infiltrate and atelectasis in the dependent posterior lung bases.  2.  Low lung volumes with elevated right hemidiaphragm and tiny bilateral pleural effusions, greater on the right.    Signer Name: Prakash Ortega MD   Signed: 4/16/2020 8:42 PM   Workstation Name: [id number]    [facility]    CT Abdomen Pelvis Without Contrast [[id number]] Collected:  04/16/20 2017     Updated:  04/16/20 2019    Narrative:       CT Abdomen Pelvis WO    INDICATION:   Abdominal surgery one month ago. Fever beginning earlier today with nausea. Concern for abscess.    TECHNIQUE:   CT of the abdomen and pelvis without IV contrast. Coronal and sagittal reconstructions were obtained.  Radiation dose reduction techniques included automated exposure control or exposure modulation based on body size. Count of known CT and cardiac nuc  med studies performed in previous 12 months: 6.     COMPARISON:   3/24/2020    FINDINGS:  Abdomen: Since previous examination bilateral pleural effusions are smaller. The liver, spleen and pancreas are normal in size. The kidneys and adrenals are unchanged. No evidence of kidney stone disease or hydronephrosis. The ureters are nondilated. No  evidence of retroperitoneal adenopathy. No distended bowel loops are seen to suggest a mechanical obstruction.    Pelvis: No evidence of adenopathy, mass or fluid collection. No evidence of free air. The uterus is enlarged for a patient this age but this is unchanged and likely represents a fibroid. A left-sided pelvic drain seen in the previous scan is no longer  seen but there is no evidence of pelvic abscess.      Impression:       No  evidence of abscess. No evidence of bowel obstruction or perforation. Bilateral effusions at the lung bases are smaller. There is a small amount of ascites around the liver that has decreased slightly.          Signer Name: Jordan Montes De Oca MD   Signed: 4/16/2020 8:17 PM   Workstation Name: [id number]    [facility] [place]        I read her radiographic studies.    Impression:   1. Sepsis with elevated procalcitonin, lymphocytosis, fever, leukopenia, acute hypoxic respiratory insufficiency, and  pneumonia.  She does not have visual evidence of intra-abdominal or wound infection.  2. RUL pulmonary infiltrates, CAP.  Her first COVID-19 test was negative but we will repeat her test today.  3. Leukopenia with lymphocytosis/monocytosis  4. Acute hypoxic respiratory failure  5. Recent diverticulitis with perforation/abscess s/p LAR 3/17/20  6. HTN  7. Hypothyroidism  8. Keflex (diarrhea), Flagyl (diarrhea) intolerances/sulfa (hives) allergy    PLAN/RECOMMENDATIONS:   1.  Continue ceftriaxone IV  2.  Continue oral doxycycline  3.  Advance diet  4.  Repeat COVID-19 PCR      Darshan Bejarano MD  4/18/2020  12:34

## 2020-04-18 NOTE — PROGRESS NOTES
"    [facility] Medicine Services  PROGRESS NOTE    Patient Name: Neema Richard  : 1940  MRN: [id number]    Date of Admission: 2020  Primary Care Physician: David Mackey MD    Subjective   Subjective     CC:  F/u pneumona, shortness of breath    HPI:  Patient seen through the window due to covid precautions.she is resting in bed. She complains of some nausea this morning. Still having fevers. Denies cough. States her breathing is improved \" I can't event tell I have pneumonia\".     Review of Systems  Gen- No fevers, chills  CV- No chest pain, palpitations  Resp- as above  GI- No V/D, abd pain, + nausea    Objective   Objective     Vital Signs:   Temp:  [96.7 °F (35.9 °C)-101.4 °F (38.6 °C)] 99.2 °F (37.3 °C)  Heart Rate:  [64-80] 80  Resp:  [16-20] 16  BP: (125-133)/(50-63) 133/63        Physical Exam:  With patient's consent, physical exam was conducted via visual telemedicine encounter with bedside portion accommodated by nursing staff due to patient's current isolation requirements in the interest of PPE conservation.    Constitutional: No acute distress, awake, alert, nontoxic, normal body habitus  HENT: NCAT  Respiratory: breathing appears non-labored on NC  Cardiovascular: tele with NSR  Psychiatric: Appropriate affect  Neurologic: Oriented x 3, speech is clear, appears to move all extremities  Skin: No visible rashes seen on exposed skin      Results Reviewed:  Results from last 7 days   Lab Units 20  0904 20  0545 20  1653 20  1612   WBC 10*3/mm3 9.33 3.15*  3.11* 3.89  --    HEMOGLOBIN g/dL 10.1* 9.5*  9.4* 10.2*  --    HEMATOCRIT % 33.5* 31.5*  31.5* 33.5*  --    PLATELETS 10*3/mm3 440 445  414 525*  --    PROCALCITONIN ng/mL  --  26.60*  --  0.10     Results from last 7 days   Lab Units 20  0904 20  0545 20  1612   SODIUM mmol/L 140 139 140   POTASSIUM mmol/L 3.4* 3.2* 3.6   CHLORIDE mmol/L 105 106 103   CO2 mmol/L 22.0 " 20.0* 23.0   BUN mg/dL 23 16 13   CREATININE mg/dL 0.62 0.75 0.60   GLUCOSE mg/dL 114* 108* 113*   CALCIUM mg/dL 8.7 8.1* 9.1   ALT (SGPT) U/L  --   --  7   AST (SGOT) U/L  --   --  15   PROBNP pg/mL  --   --  537.9     Estimated Creatinine Clearance: 53.9 mL/min (by C-G formula based on SCr of 0.62 mg/dL).    Microbiology Results Abnormal     Procedure Component Value - Date/Time    Blood Culture - Blood, Arm, Left [[id number]] Collected:  04/16/20 1653    Lab Status:  Preliminary result Specimen:  Blood from Arm, Left Updated:  04/17/20 2045     Blood Culture No growth at 24 hours    Blood Culture - Blood, Arm, Right [[id number]] Collected:  04/16/20 1643    Lab Status:  Preliminary result Specimen:  Blood from Arm, Right Updated:  04/17/20 2045     Blood Culture No growth at 24 hours    SARS-CoV-2, PCR (IN-HOUSE), NP Swab in Transport Media - Swab, Nasopharynx [[id number]]  (Normal) Collected:  04/16/20 2227    Lab Status:  Final result Specimen:  Swab from Nasopharynx Updated:  04/17/20 0955     COVID19 Not Detected    Legionella Antigen, Urine - Urine, Urine, Clean Catch [[id number]]  (Normal) Collected:  04/17/20 0109    Lab Status:  Final result Specimen:  Urine, Clean Catch Updated:  04/17/20 0937     LEGIONELLA ANTIGEN, URINE Negative    S. Pneumo Ag Urine or CSF - Urine, Urine, Clean Catch [[id number]]  (Normal) Collected:  04/16/20 2102    Lab Status:  Final result Specimen:  Urine, Clean Catch Updated:  04/17/20 0936     Strep Pneumo Ag Negative    MRSA Screen, PCR (Inpatient) - Swab, Nares [[id number]]  (Normal) Collected:  04/17/20 0312    Lab Status:  Final result Specimen:  Swab from Nares Updated:  04/17/20 0613     MRSA PCR Negative    Narrative:       MRSA Negative    Respiratory Panel, PCR - Swab, Nasopharynx [[id number]]  (Normal) Collected:  04/16/20 2227    Lab Status:  Final result Specimen:  Swab from Nasopharynx Updated:  04/16/20 4165     ADENOVIRUS, PCR Not Detected     Coronavirus 229E Not  Detected     Coronavirus HKU1 Not Detected     Coronavirus NL63 Not Detected     Coronavirus OC43 Not Detected     Human Metapneumovirus Not Detected     Human Rhinovirus/Enterovirus Not Detected     Influenza B PCR Not Detected     Parainfluenza Virus 1 Not Detected     Parainfluenza Virus 2 Not Detected     Parainfluenza Virus 3 Not Detected     Parainfluenza Virus 4 Not Detected     Bordetella pertussis pcr Not Detected     Influenza A H1 2009 PCR Not Detected     Chlamydophila pneumoniae PCR Not Detected     Mycoplasma pneumo by PCR Not Detected     Influenza A PCR Not Detected     Influenza A H3 Not Detected     Influenza A H1 Not Detected     RSV, PCR Not Detected     Bordetella parapertussis PCR Not Detected    Narrative:       The coronavirus on the RVP is NOT COVID-19 and is NOT indicative of infection with COVID-19.           Imaging Results (Last 24 Hours)     ** No results found for the last 24 hours. **               I have reviewed the medications:  Scheduled Meds:    busPIRone 10 mg Oral Daily   cefTRIAXone 1 g Intravenous Q24H   doxycycline 100 mg Oral Q12H   enoxaparin 40 mg Subcutaneous Q24H   gabapentin 300 mg Oral Nightly   insulin lispro 0-7 Units Subcutaneous TID With Meals   lactobacillus acidophilus 1 capsule Oral Daily   levothyroxine 88 mcg Oral Daily   metoprolol succinate  mg Oral Daily   pravastatin 40 mg Oral Nightly   sodium chloride 10 mL Intravenous Q12H     Continuous Infusions:   PRN Meds:.•  acetaminophen  •  dextrose  •  dextrose  •  glucagon (human recombinant)  •  LORazepam  •  magnesium sulfate **OR** magnesium sulfate in D5W 1g/100mL (PREMIX) **OR** magnesium sulfate  •  melatonin  •  ondansetron  •  potassium chloride **OR** potassium chloride **OR** potassium chloride  •  sodium chloride  •  sodium chloride    Assessment/Plan   Assessment & Plan     Active Hospital Problems    Diagnosis  POA   • Pneumonia of both lungs due to infectious organism [J18.9]  Yes   • Fever  [R50.9]  Unknown   • Bilateral pleural effusion [J90]  Unknown   • History of diverticular abscess [Z87.19]  Not Applicable   • DM2 (diabetes mellitus, type 2) (CMS/HCC) [E11.9]  Unknown   • Rule out Covid-19 pneumonia [U07.1, J12.89]  Unknown   • Acquired hypothyroidism [E03.9]  Yes   • Essential hypertension [I10]  Yes      Resolved Hospital Problems   No resolved problems to display.        Brief Hospital Course to date:  Nemea Richard is a 79 y.o. female with PMHx significant for DM2, HTN and recent admission from 3/15-3/26/20 with perforated diverticulitis w/ abscess formation. During that admission patient underwent LAR, abscess drainage, appy, left oophorectomy w/ distal transverse colostomy by Dr. Vazquez. Antibiotics were manged by infectious disease and patient was discharged on zosyn via picc line. Patient completed antibiotics approximately 6 days ago. She comes in with fever, dry cough and nausea.    Sepsis   Bilateral Pneumonia  Acute Respiratory Failure with Hypoxia  - continue CTX/Doxy  - COVID testing is negative x1, re-testing sent this morning and is pending  - ID following, well known to Dr. Bejarano  - respiratory viral panel negative  - urinary antigens negative, MRSA PCR negative    DMII  - SSI for now    Recent Diverticular Abscess s/p Colostomy   - wound/ostomy evaluation   - CT a/p showed no intraabdominal or pelvic acute process    HTN:  - continue home regimen    DVT Prophylaxis:  Lovenox    Disposition: I expect the patient to be discharged TBD      Daily Care Communication  Due to current limited visitation policies, attempts will be made daily to update patient's identified best point-of-contact(s)   Contact: Theodora   Relation: Daughter   Type of communication (phone or televideo): phone   Time of communication: 10:45   Notes (if applicable):       CODE STATUS:   Code Status and Medical Interventions:   Ordered at: 04/16/20 2765     Code Status:    CPR     Medical Interventions (Level of  Support Prior to Arrest):    Full     >35 minutes spent on patient care, this includes chart review, discussion with specialists, Case Management and nursing staff, as well as patient and family. more than 50% of time spent face to face counseling patient on current illness and plan of care.    Electronically signed by aKtherine Seymour DO, 04/18/20, 09:56.

## 2020-04-18 NOTE — PLAN OF CARE
Problem: Patient Care Overview  Goal: Plan of Care Review  Flowsheets  Taken 4/17/2020 0442 by Ann Hopson, RN  Progress: no change  Taken 4/17/2020 2001 by Ale Echavarria, RN  Plan of Care Reviewed With: patient  Taken 4/18/2020 0500 by Ale Echavarria, RN  Outcome Summary: VSS. Patient on 2L NC with O2 sats 96-98%. Patient states that she was on a regular diet post GI surgery and would like to advance her diet at this time. Patient to be re-tested for covid this am.

## 2020-04-19 LAB
ANION GAP SERPL CALCULATED.3IONS-SCNC: 10 MMOL/L (ref 5–15)
BASOPHILS # BLD AUTO: 0.16 10*3/MM3 (ref 0–0.2)
BASOPHILS NFR BLD AUTO: 1.7 % (ref 0–1.5)
BUN BLD-MCNC: 19 MG/DL (ref 8–23)
BUN/CREAT SERPL: 40.4 (ref 7–25)
CALCIUM SPEC-SCNC: 8.4 MG/DL (ref 8.6–10.5)
CHLORIDE SERPL-SCNC: 106 MMOL/L (ref 98–107)
CO2 SERPL-SCNC: 25 MMOL/L (ref 22–29)
CREAT BLD-MCNC: 0.47 MG/DL (ref 0.57–1)
DEPRECATED RDW RBC AUTO: 46.4 FL (ref 37–54)
EOSINOPHIL # BLD AUTO: 0.98 10*3/MM3 (ref 0–0.4)
EOSINOPHIL NFR BLD AUTO: 10.2 % (ref 0.3–6.2)
ERYTHROCYTE [DISTWIDTH] IN BLOOD BY AUTOMATED COUNT: 14.5 % (ref 12.3–15.4)
GFR SERPL CREATININE-BSD FRML MDRD: 128 ML/MIN/1.73
GLUCOSE BLD-MCNC: 106 MG/DL (ref 65–99)
GLUCOSE BLDC GLUCOMTR-MCNC: 116 MG/DL (ref 70–130)
GLUCOSE BLDC GLUCOMTR-MCNC: 118 MG/DL (ref 70–130)
GLUCOSE BLDC GLUCOMTR-MCNC: 125 MG/DL (ref 70–130)
GLUCOSE BLDC GLUCOMTR-MCNC: 158 MG/DL (ref 70–130)
GLUCOSE BLDC GLUCOMTR-MCNC: 195 MG/DL (ref 70–130)
HCT VFR BLD AUTO: 30.3 % (ref 34–46.6)
HGB BLD-MCNC: 9.1 G/DL (ref 12–15.9)
IMM GRANULOCYTES # BLD AUTO: 0.16 10*3/MM3 (ref 0–0.05)
IMM GRANULOCYTES NFR BLD AUTO: 1.7 % (ref 0–0.5)
LYMPHOCYTES # BLD AUTO: 2.14 10*3/MM3 (ref 0.7–3.1)
LYMPHOCYTES NFR BLD AUTO: 22.2 % (ref 19.6–45.3)
MCH RBC QN AUTO: 26.5 PG (ref 26.6–33)
MCHC RBC AUTO-ENTMCNC: 30 G/DL (ref 31.5–35.7)
MCV RBC AUTO: 88.3 FL (ref 79–97)
MONOCYTES # BLD AUTO: 1.46 10*3/MM3 (ref 0.1–0.9)
MONOCYTES NFR BLD AUTO: 15.1 % (ref 5–12)
NEUTROPHILS # BLD AUTO: 4.75 10*3/MM3 (ref 1.7–7)
NEUTROPHILS NFR BLD AUTO: 49.1 % (ref 42.7–76)
NRBC BLD AUTO-RTO: 0 /100 WBC (ref 0–0.2)
PLATELET # BLD AUTO: 443 10*3/MM3 (ref 140–450)
PMV BLD AUTO: 9.4 FL (ref 6–12)
POTASSIUM BLD-SCNC: 3.2 MMOL/L (ref 3.5–5.2)
POTASSIUM BLD-SCNC: 4.3 MMOL/L (ref 3.5–5.2)
PROCALCITONIN SERPL-MCNC: 10.38 NG/ML (ref 0.1–0.25)
RBC # BLD AUTO: 3.43 10*6/MM3 (ref 3.77–5.28)
SODIUM BLD-SCNC: 141 MMOL/L (ref 136–145)
WBC NRBC COR # BLD: 9.65 10*3/MM3 (ref 3.4–10.8)

## 2020-04-19 PROCEDURE — 84145 PROCALCITONIN (PCT): CPT

## 2020-04-19 PROCEDURE — 80048 BASIC METABOLIC PNL TOTAL CA: CPT | Performed by: INTERNAL MEDICINE

## 2020-04-19 PROCEDURE — 85025 COMPLETE CBC W/AUTO DIFF WBC: CPT | Performed by: INTERNAL MEDICINE

## 2020-04-19 PROCEDURE — 82962 GLUCOSE BLOOD TEST: CPT

## 2020-04-19 PROCEDURE — 25010000002 CEFTRIAXONE PER 250 MG: Performed by: INTERNAL MEDICINE

## 2020-04-19 PROCEDURE — 25010000002 ENOXAPARIN PER 10 MG: Performed by: INTERNAL MEDICINE

## 2020-04-19 PROCEDURE — 84132 ASSAY OF SERUM POTASSIUM: CPT | Performed by: INTERNAL MEDICINE

## 2020-04-19 PROCEDURE — 99232 SBSQ HOSP IP/OBS MODERATE 35: CPT | Performed by: INTERNAL MEDICINE

## 2020-04-19 RX ADMIN — SUCRALFATE 1 G: 1 TABLET ORAL at 05:53

## 2020-04-19 RX ADMIN — ACETAMINOPHEN 650 MG: 325 TABLET, FILM COATED ORAL at 09:36

## 2020-04-19 RX ADMIN — ENOXAPARIN SODIUM 40 MG: 40 INJECTION SUBCUTANEOUS at 05:53

## 2020-04-19 RX ADMIN — SODIUM CHLORIDE, PRESERVATIVE FREE 10 ML: 5 INJECTION INTRAVENOUS at 20:59

## 2020-04-19 RX ADMIN — POTASSIUM CHLORIDE 40 MEQ: 10 CAPSULE, COATED, EXTENDED RELEASE ORAL at 14:54

## 2020-04-19 RX ADMIN — LEVOTHYROXINE SODIUM 88 MCG: 88 TABLET ORAL at 05:53

## 2020-04-19 RX ADMIN — PRAVASTATIN SODIUM 40 MG: 40 TABLET ORAL at 20:59

## 2020-04-19 RX ADMIN — DOXYCYCLINE 100 MG: 100 CAPSULE ORAL at 09:36

## 2020-04-19 RX ADMIN — GABAPENTIN 300 MG: 300 CAPSULE ORAL at 20:58

## 2020-04-19 RX ADMIN — LORAZEPAM 0.5 MG: 0.5 TABLET ORAL at 03:46

## 2020-04-19 RX ADMIN — DOXYCYCLINE 100 MG: 100 CAPSULE ORAL at 20:58

## 2020-04-19 RX ADMIN — SUCRALFATE 1 G: 1 TABLET ORAL at 17:53

## 2020-04-19 RX ADMIN — POTASSIUM CHLORIDE 40 MEQ: 10 CAPSULE, COATED, EXTENDED RELEASE ORAL at 09:36

## 2020-04-19 RX ADMIN — METOPROLOL SUCCINATE 100 MG: 100 TABLET, EXTENDED RELEASE ORAL at 09:36

## 2020-04-19 RX ADMIN — CEFTRIAXONE SODIUM 1 G: 1 INJECTION, POWDER, FOR SOLUTION INTRAMUSCULAR; INTRAVENOUS at 00:54

## 2020-04-19 RX ADMIN — SODIUM CHLORIDE, PRESERVATIVE FREE 10 ML: 5 INJECTION INTRAVENOUS at 09:38

## 2020-04-19 RX ADMIN — Medication 1 CAPSULE: at 09:36

## 2020-04-19 NOTE — PLAN OF CARE
Problem: Patient Care Overview  Goal: Plan of Care Review  Outcome: Ongoing (interventions implemented as appropriate)  Flowsheets  Taken 4/17/2020 0442 by Ann Hopson, RN  Progress: no change  Taken 4/19/2020 0519 by Ale Echavarria, RN  Plan of Care Reviewed With: patient  Outcome Summary: VSS. Patient on 2L NC while sleeping. O2 sats 96%. Afebrile. Patient eager to return home.

## 2020-04-19 NOTE — PROGRESS NOTES
INFECTIOUS DISEASE Progress Note    Neema Richard  1940  [id number]    Date: 4/19/2020    Admission Date: 4/16/2020      Requesting Provider: Steve Quintero MD  Evaluating Physician: Darshan Bejarano MD    Reason for Consultation: fever, pneumonia (recent admission with diverticular abscess s/p prolonged iv antibiotics followed by Dr. Darshan Bejarano)      History of present illness:    4/17/2020: Patient is a 79 y.o. female with h/o hypothyroidism, HTN, DCIS, and recent diverticulitis with perforation and LAR colon resection 3/17/20 (discharged on Zosyn on 3/26/20) who we were asked to see for fever and pneumonia.  She finished IV Zosyn about 6 days ago and PICC line was discontinued.  She had a Telehealth visit with Dr. Darshan Bejarano on 4/14/20 and was stable off antibiotics.   She presented to [facility] ED on 4/16/20 with fever of 102 at home, shaking chills, and nausea .  She has had a slight decrease in ostomy output.  She denies vomiting, dysuria, incisional wound drainage or redness, sore throat, or sudden loss of taste or smell.  She has had no COVID-19 exposures.  Work up showed Tmax 101.7, lactic acid 1.3, PCT 0.1, lipase 46, WBC 3900 with 6% neutrophils/50% lymphocytes/39% monocytes, and UA WBC 0-2.  Today her PCT is 26.6, CRP 12.3, and ESR 18.  Respiratory panel PCR is negative. A CT scan of a/p shows no evidence of abscess, bowel obstruction, or perforation.  A CT scan of chest shows mild patchy airspace infiltrates in the RUL with mild infiltrate and atelectasis in the dependent posterior lung bases.  A COVID-19 PCR is negative after interview done.  Strep and Legionella urinary antigens are pending. A MRSA PCR is negative. She is currently on Rocephin and Doxycycline after getting doses of Zosyn and Vancomycin. ID was asked to evaluate and manage her antibiotic therapy.   4/18/2020: She is currently on a clear liquid diet but would like to have her diet advanced.  She has decreased nausea today.  She  denies cough and sputum production.  She continues to require supplemental oxygen.  She denies vomiting and abdominal pain.    4/19/20: Tmax 99.2.  On 2 L O2NC.  2nd COVID-19 PCR is negative.  Patient now in Droplet precautions with 2 negative COVID-19 tests.  She denies f/c, sob, n/v/d, rashes.  She still has tenderness around surgical site.     Past Medical History:   Diagnosis Date   • Acquired hypothyroidism 1980   • Colon polyp - sees Dr. 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 ER and IA (+)   • Essential hypertension 2012   • High cholesterol 2019   • Hx of herpes genitalis 2013   • IBS-d (takes Buspar)        Past Surgical History:   Procedure Laterality Date   • BILATERAL BREAST REDUCTION Bilateral 1995   • BREAST LUMPECTOMY Left 2009   • CATARACT EXTRACTION, BILATERAL Bilateral 2018   • CERVICAL CONIZATION  1983   • COLON RESECTION N/A 3/17/2020    Procedure: LOW ANTERIOR COLON RESECTION, MOBILIZATION OF SPLENIC FLEXURE, DRAINAGE OF ABSCESS AND COLOSTOMY CREATION;  Surgeon: Sravan Vazquez MD;  Location: [facility];  Service: General;  Laterality: N/A;   • FACIAL COSMETIC SURGERY  2001   • FACIAL COSMETIC SURGERY  2012   • HEMORRHOIDECTOMY  1974   • LAPAROSCOPIC CHOLECYSTECTOMY  2010   • REDUCTION MAMMAPLASTY Bilateral 04/13/2017    DCIS   • SINUS SURGERY  1992   • THYROIDECTOMY, PARTIAL  1963   • TONSILLECTOMY  1953   • VARICOSE VEIN SURGERY Bilateral 1981       Family History   Problem Relation Age of Onset   • Breast cancer Neg Hx    • Ovarian cancer Neg Hx        Social History     Socioeconomic History   • Marital status:      Spouse name: Not on file   • Number of children: Not on file   • Years of education: Not on file   • Highest education level: Not on file   Tobacco Use   • Smoking status: Never Smoker   • Smokeless tobacco: Never Used   Substance and Sexual Activity   • Alcohol use: No   • Drug use: No       Allergies   Allergen  Reactions   • Evista [Raloxifene] Rash   • Cephalexin Diarrhea   • Hydrochlorothiazide Diarrhea   • Meloxicam Other (See Comments)   • Metronidazole Diarrhea   • Oxybutynin Cough   • Propylthiouracil Other (See Comments)     Severally reduce WBC's    • Sulfa Antibiotics Hives   • Sulfacetamide Sodium-Sulfur Hives   • Sulfur Dioxide Hives         Medication:    Current Facility-Administered Medications:   •  acetaminophen (TYLENOL) tablet 650 mg, 650 mg, Oral, Q6H PRN, Candida Radford PA-C, 650 mg at 04/19/20 0936  •  busPIRone (BUSPAR) tablet 10 mg, 10 mg, Oral, Daily, Steve Quintero MD, 10 mg at 04/17/20 0900  •  cefTRIAXone (ROCEPHIN) 1 g/50 mL 0.9% NS VTB (mbp), 1 g, Intravenous, Q24H, Steve Quintero MD, 1 g at 04/19/20 0054  •  dextrose (D50W) 25 g/ 50mL Intravenous Solution 25 g, 25 g, Intravenous, Q15 Min PRN, Steve Quintero MD  •  dextrose (GLUTOSE) oral gel 15 g, 15 g, Oral, Q15 Min PRN, Steve Quintero MD  •  doxycycline (MONODOX) capsule 100 mg, 100 mg, Oral, Q12H, Andrea Ferguson PA, 100 mg at 04/19/20 0936  •  enoxaparin (LOVENOX) syringe 40 mg, 40 mg, Subcutaneous, Q24H, Steve Quintero MD, 40 mg at 04/19/20 0553  •  gabapentin (NEURONTIN) capsule 300 mg, 300 mg, Oral, Nightly, Steve Quintero MD, 300 mg at 04/18/20 2026  •  glucagon (human recombinant) (GLUCAGEN DIAGNOSTIC) injection 1 mg, 1 mg, Subcutaneous, Q15 Min PRN, Steve Quintero MD  •  insulin lispro (humaLOG) injection 0-7 Units, 0-7 Units, Subcutaneous, TID With Meals, Steve Quintero MD  •  lactobacillus acidophilus (RISAQUAD) capsule 1 capsule, 1 capsule, Oral, Daily, Steve Quintero MD, 1 capsule at 04/19/20 0936  •  levothyroxine (SYNTHROID, LEVOTHROID) tablet 88 mcg, 88 mcg, Oral, Daily, Steve Quintero MD, 88 mcg at 04/19/20 0574  •  LORazepam (ATIVAN) tablet 0.5 mg, 0.5 mg, Oral, Q8H PRN, Steve Quintero MD, 0.5 mg at 04/19/20 0346  •  magnesium sulfate 4 gram infusion - Mg  less than or equal to 1mg/dL, 4 g, Intravenous, PRN **OR** magnesium sulfate 3 gram infusion (1gm x 3) - Mg 1.1 - 1.5 mg/dL, 1 g, Intravenous, PRN **OR** Magnesium Sulfate 2 gram infusion- Mg 1.6 - 1.9 mg/dL, 2 g, Intravenous, PRN, Steve Quintero MD  •  melatonin tablet 5 mg, 5 mg, Oral, Nightly PRN, Sandy Randall APRN, 5 mg at 04/17/20 2007  •  metoprolol succinate XL (TOPROL-XL) 24 hr tablet 100 mg, 100 mg, Oral, Daily, Steve Quintero MD, 100 mg at 04/19/20 0936  •  ondansetron (ZOFRAN) injection 4 mg, 4 mg, Intravenous, Q6H PRN, Steve Quintero MD, 4 mg at 04/17/20 2001  •  potassium chloride (MICRO-K) CR capsule 40 mEq, 40 mEq, Oral, PRN, 40 mEq at 04/19/20 0936 **OR** potassium chloride (KLOR-CON) packet 40 mEq, 40 mEq, Oral, PRN **OR** potassium chloride 10 mEq in 100 mL IVPB, 10 mEq, Intravenous, Q1H PRN, Katherine Seymour DO  •  pravastatin (PRAVACHOL) tablet 40 mg, 40 mg, Oral, Nightly, Steve Quintero MD, 40 mg at 04/18/20 2026  •  sodium chloride 0.9 % flush 10 mL, 10 mL, Intravenous, PRN, Steve Quintero MD  •  sodium chloride 0.9 % flush 10 mL, 10 mL, Intravenous, Q12H, Steve Quintero MD, 10 mL at 04/19/20 0938  •  sodium chloride 0.9 % flush 10 mL, 10 mL, Intravenous, PRN, Steve Quintero MD  •  sucralfate (CARAFATE) tablet 1 g, 1 g, Oral, BID AC, Katherine Seymour DO, 1 g at 04/19/20 0553    Antibiotics:  Anti-Infectives (From admission, onward)    Ordered     Dose/Rate Route Frequency Start Stop    04/17/20 0953  doxycycline (MONODOX) capsule 100 mg     Katherine Seymour DO reviewed the order on 04/19/20 0734.   Ordering Provider:  Andrea Ferguson PA    100 mg Oral Every 12 Hours Scheduled 04/17/20 1100 04/24/20 0859    04/16/20 2238  cefTRIAXone (ROCEPHIN) 1 g/50 mL 0.9% NS VTB (mbp)     Katherine Seymour DO reviewed the order on 04/19/20 0734.   Ordering Provider:  Steve Quintero MD    1 g  over 30 Minutes Intravenous Every 24 Hours 04/17/20 0000  20 2359    20 2241  vancomycin 1250 mg/250 mL 0.9% NS IVPB ([facility])     Ordering Provider:  Zeke Romero IV, RPH    20 mg/kg × 59 kg (Order-Specific)  over 90 Minutes Intravenous Once 20 0000 20 0227    20 1635  piperacillin-tazobactam (ZOSYN) 3.375 g in iso-osmotic dextrose 50 ml (premix)     Ordering Provider:  Orville Martin MD    3.375 g Intravenous Once 20 1637 20 1800              Physical Exam:   Vital Signs  Temp (24hrs), Av.4 °F (36.3 °C), Min:96.3 °F (35.7 °C), Max:98.6 °F (37 °C)    Temp  Min: 96.3 °F (35.7 °C)  Max: 98.6 °F (37 °C)  BP  Min: 114/53  Max: 149/97  Pulse  Min: 61  Max: 75  Resp  Min: 16  Max: 18  SpO2  Min: 91 %  Max: 99 %     GENERAL: Awake and alert, in no acute distress.   HEENT: No labial ulcers, no thrush, no sclera icterus  HEART:  RRR, no murmurs. No edema  LUNGS:. CTA bilaterally Nonlabored breathing on room air  ABDOMEN: Soft, mildly tender around surgical site, nondistended.  EXT:  No edema  :  Without Gaytan catheter.  MSK:  FROM, no joint effusions  SKIN: Warm and dry without cutaneous eruptions on Inspection/palpation.  Abdominal wound well healed with no erythema or drainage, no dehiscence.  NEURO: Oriented to PPT.CN 2-12 grossly intact  PSYCHIATRIC: Normal insight and judgement. Cooperative with PE    Laboratory Data    Results from last 7 days   Lab Units 20  0542 20  0904 20  0545   WBC 10*3/mm3 9.65 9.33 3.15*  3.11*   HEMOGLOBIN g/dL 9.1* 10.1* 9.5*  9.4*   HEMATOCRIT % 30.3* 33.5* 31.5*  31.5*   PLATELETS 10*3/mm3 443 440 445  414     Results from last 7 days   Lab Units 20  0542   SODIUM mmol/L 141   POTASSIUM mmol/L 3.2*   CHLORIDE mmol/L 106   CO2 mmol/L 25.0   BUN mg/dL 19   CREATININE mg/dL 0.47*   GLUCOSE mg/dL 106*   CALCIUM mg/dL 8.4*     Results from last 7 days   Lab Units 20  1612   ALK PHOS U/L 77   BILIRUBIN mg/dL 0.3   ALT (SGPT) U/L 7   AST (SGOT) U/L 15     Results  from last 7 days   Lab Units 04/17/20  0545   SED RATE mm/hr 18     Results from last 7 days   Lab Units 04/17/20  0545   CRP mg/dL 12.30*     Results from last 7 days   Lab Units 04/16/20  1612   LACTATE mmol/L 1.3             Estimated Creatinine Clearance: 53.9 mL/min (A) (by C-G formula based on SCr of 0.47 mg/dL (L)).      Microbiology:  Microbiology Results (last 10 days)     Procedure Component Value - Date/Time    CORONAVIRUS (COVID-19),RT-PCR, [facility], NP SWAB IN TRANSPORT MEDIA - Swab, Nasopharynx [[id number]] Collected:  04/18/20 0925    Lab Status:  Final result Specimen:  Swab from Nasopharynx Updated:  04/18/20 1838     Reference Lab Report --     Comment: See scanned report          COVID19 Not Detected    MRSA Screen, PCR (Inpatient) - Swab, Nares [[id number]]  (Normal) Collected:  04/17/20 0312    Lab Status:  Final result Specimen:  Swab from Nares Updated:  04/17/20 0613     MRSA PCR Negative    Narrative:       MRSA Negative    Legionella Antigen, Urine - Urine, Urine, Clean Catch [[id number]]  (Normal) Collected:  04/17/20 0109    Lab Status:  Final result Specimen:  Urine, Clean Catch Updated:  04/17/20 0937     LEGIONELLA ANTIGEN, URINE Negative    SARS-CoV-2, PCR (IN-HOUSE), NP Swab in Transport Media - Swab, Nasopharynx [[id number]]  (Normal) Collected:  04/16/20 2227    Lab Status:  Final result Specimen:  Swab from Nasopharynx Updated:  04/17/20 0955     COVID19 Not Detected    Respiratory Panel, PCR - Swab, Nasopharynx [[id number]]  (Normal) Collected:  04/16/20 2227    Lab Status:  Final result Specimen:  Swab from Nasopharynx Updated:  04/16/20 2346     ADENOVIRUS, PCR Not Detected     Coronavirus 229E Not Detected     Coronavirus HKU1 Not Detected     Coronavirus NL63 Not Detected     Coronavirus OC43 Not Detected     Human Metapneumovirus Not Detected     Human Rhinovirus/Enterovirus Not Detected     Influenza B PCR Not Detected     Parainfluenza Virus 1 Not Detected     Parainfluenza  Virus 2 Not Detected     Parainfluenza Virus 3 Not Detected     Parainfluenza Virus 4 Not Detected     Bordetella pertussis pcr Not Detected     Influenza A H1 2009 PCR Not Detected     Chlamydophila pneumoniae PCR Not Detected     Mycoplasma pneumo by PCR Not Detected     Influenza A PCR Not Detected     Influenza A H3 Not Detected     Influenza A H1 Not Detected     RSV, PCR Not Detected     Bordetella parapertussis PCR Not Detected    Narrative:       The coronavirus on the RVP is NOT COVID-19 and is NOT indicative of infection with COVID-19.     S. Pneumo Ag Urine or CSF - Urine, Urine, Clean Catch [[id number]]  (Normal) Collected:  04/16/20 2102    Lab Status:  Final result Specimen:  Urine, Clean Catch Updated:  04/17/20 0936     Strep Pneumo Ag Negative    Blood Culture - Blood, Arm, Left [[id number]] Collected:  04/16/20 1653    Lab Status:  Preliminary result Specimen:  Blood from Arm, Left Updated:  04/18/20 2045     Blood Culture No growth at 2 days    Blood Culture - Blood, Arm, Right [[id number]] Collected:  04/16/20 1643    Lab Status:  Preliminary result Specimen:  Blood from Arm, Right Updated:  04/18/20 2045     Blood Culture No growth at 2 days                Radiology:  Imaging Results (Last 72 Hours)     Procedure Component Value Units Date/Time    CT Chest Without Contrast [[id number]] Collected:  04/16/20 2042     Updated:  04/16/20 2044    Narrative:       CT CHEST, NONCONTRAST, 4/16/2020    HISTORY:  79-year-old female postop low anterior colon resection with colostomy and drainage of abdominal abscess 3/17/2020. She presents to the ED with new onset fever of 102 today.    TECHNIQUE:  CT imaging of the chest without IV contrast. Radiation dose reduction techniques included automated exposure control. Radiation audit for CT and nuclear cardiology exams in the last 12 months: 5.    FINDINGS:  Low lung volumes with elevation of the right hemidiaphragm. Tiny pleural effusions with dependent posterior  lung base atelectasis, greater on the right.    Mild patchy airspace infiltrates in the right upper lobe and in the dependent posterior lung bases. Trachea and central bronchi are normal in caliber and widely patent.    Heart size is normal, there is no pericardial effusion. Normal caliber thoracic aorta.    Limited upper abdominal images show fluid beneath the right hemidiaphragm and mesenteric soft tissue edema. There is no hiatal hernia or thoracic esophageal dilatation.      Impression:       1.  Mild patchy airspace infiltrates in the right upper lobe with mild infiltrate and atelectasis in the dependent posterior lung bases.  2.  Low lung volumes with elevated right hemidiaphragm and tiny bilateral pleural effusions, greater on the right.    Signer Name: Prakash Ortega MD   Signed: 4/16/2020 8:42 PM   Workstation Name: [id number]    [facility]    CT Abdomen Pelvis Without Contrast [[id number]] Collected:  04/16/20 2017     Updated:  04/16/20 2019    Narrative:       CT Abdomen Pelvis WO    INDICATION:   Abdominal surgery one month ago. Fever beginning earlier today with nausea. Concern for abscess.    TECHNIQUE:   CT of the abdomen and pelvis without IV contrast. Coronal and sagittal reconstructions were obtained.  Radiation dose reduction techniques included automated exposure control or exposure modulation based on body size. Count of known CT and cardiac nuc  med studies performed in previous 12 months: 6.     COMPARISON:   3/24/2020    FINDINGS:  Abdomen: Since previous examination bilateral pleural effusions are smaller. The liver, spleen and pancreas are normal in size. The kidneys and adrenals are unchanged. No evidence of kidney stone disease or hydronephrosis. The ureters are nondilated. No  evidence of retroperitoneal adenopathy. No distended bowel loops are seen to suggest a mechanical obstruction.    Pelvis: No evidence of adenopathy, mass or fluid collection. No  evidence of free air. The uterus is enlarged for a patient this age but this is unchanged and likely represents a fibroid. A left-sided pelvic drain seen in the previous scan is no longer  seen but there is no evidence of pelvic abscess.      Impression:       No evidence of abscess. No evidence of bowel obstruction or perforation. Bilateral effusions at the lung bases are smaller. There is a small amount of ascites around the liver that has decreased slightly.          Signer Name: Jordan Montes De Oca MD   Signed: 4/16/2020 8:17 PM   Workstation Name: [id number]    [facility]        I read her radiographic studies.    Impression:   1. Sepsis with elevated procalcitonin, lymphocytosis, fever, leukopenia, acute hypoxic respiratory insufficiency, and  pneumonia.  She does not have visual evidence of intra-abdominal or wound infection.  Her PCT is improving  2. RUL pulmonary infiltrates, CAP.  Both COVID-19 tests are negative.  3. Leukopenia with lymphocytosis/monocytosis, improved  4. Eosinophilia--NEW  5. Acute hypoxic respiratory failure  6. Recent diverticulitis with perforation/abscess s/p LAR 3/17/20  7. HTN  8. Hypothyroidism  9. Keflex (diarrhea), Flagyl (diarrhea) intolerances/sulfa (hives) allergy    PLAN/RECOMMENDATIONS:   1.  Continue ceftriaxone IV  2.  Continue oral doxycycline  3.  Advance diet  4.  Repeat COVID-19 PCR--negative  5.  Change to droplet precautions    Darshan Bejarano MD saw and examined patient, verified hx and PE, read all radiographic studies, reviewed labs and micro data, and formulated dx, plan for treatment and all medical decision making.      Andrea Ferguson PA-C for Darshan Bejarano MD    She should be able to discharge to home tomorrow.      Darshan Bejarano MD  4/19/2020  12:18

## 2020-04-19 NOTE — PLAN OF CARE
Problem: Patient Care Overview  Goal: Plan of Care Review  Outcome: Ongoing (interventions implemented as appropriate)  Flowsheets (Taken 4/19/2020 1615)  Progress: improving  Plan of Care Reviewed With: patient  Note:   Pt transferred to floor from , ruled out COVID, A&O, RA, SR, possible d/c tomorrow

## 2020-04-19 NOTE — PROGRESS NOTES
[facility] Medicine Services  PROGRESS NOTE    Patient Name: Neema Richard  : 1940  MRN: [id number]    Date of Admission: 2020  Primary Care Physician: David Mackey MD    Subjective   Subjective     CC:  F/u pneumona, shortness of breath    HPI:  Patient seen and examined today. She reports she feels well. Relieved to hear that her second COVID test is negative. Denies SOA or cough. No chest pain.     Review of Systems  Gen- No fevers, chills  CV- No chest pain, palpitations  Resp- as above  GI- No V/D, abd pain    Objective   Objective     Vital Signs:   Temp:  [96.3 °F (35.7 °C)-98.6 °F (37 °C)] 97.6 °F (36.4 °C)  Heart Rate:  [61-75] 68  Resp:  [16-18] 16  BP: (114-149)/(52-97) 149/97        Physical Exam:  Constitutional: No acute distress, awake, alert  HENT: NCAT, mucous membranes moist  Respiratory: Clear to auscultation bilaterally, respiratory effort normal   Cardiovascular: RRR, no murmurs, rubs, or gallops, palpable pedal pulses bilaterally  Gastrointestinal: Positive bowel sounds, soft, nontender, nondistended, ostomy bag in place with light brown stool   Musculoskeletal: No bilateral ankle edema  Psychiatric: Appropriate affect, cooperative  Neurologic: Oriented x 3, strength symmetric in all extremities, Cranial Nerves grossly intact to confrontation, speech clear  Skin: No rashes      Results Reviewed:  Results from last 7 days   Lab Units 20  0542 20  0904 20  0545  20  1612   WBC 10*3/mm3 9.65 9.33 3.15*  3.11*   < >  --    HEMOGLOBIN g/dL 9.1* 10.1* 9.5*  9.4*   < >  --    HEMATOCRIT % 30.3* 33.5* 31.5*  31.5*   < >  --    PLATELETS 10*3/mm3 443 440 445  414   < >  --    PROCALCITONIN ng/mL 10.38*  --  26.60*  --  0.10    < > = values in this interval not displayed.     Results from last 7 days   Lab Units 20  0542 20  0904 20  0545 20  1612   SODIUM mmol/L 141 140 139 140   POTASSIUM mmol/L 3.2* 3.4* 3.2*  3.6   CHLORIDE mmol/L 106 105 106 103   CO2 mmol/L 25.0 22.0 20.0* 23.0   BUN mg/dL 19 23 16 13   CREATININE mg/dL 0.47* 0.62 0.75 0.60   GLUCOSE mg/dL 106* 114* 108* 113*   CALCIUM mg/dL 8.4* 8.7 8.1* 9.1   ALT (SGPT) U/L  --   --   --  7   AST (SGOT) U/L  --   --   --  15   PROBNP pg/mL  --   --   --  537.9     Estimated Creatinine Clearance: 53.9 mL/min (A) (by C-G formula based on SCr of 0.47 mg/dL (L)).    Microbiology Results Abnormal     Procedure Component Value - Date/Time    Blood Culture - Blood, Arm, Left [[id number]] Collected:  04/16/20 1653    Lab Status:  Preliminary result Specimen:  Blood from Arm, Left Updated:  04/18/20 2045     Blood Culture No growth at 2 days    Blood Culture - Blood, Arm, Right [[id number]] Collected:  04/16/20 1643    Lab Status:  Preliminary result Specimen:  Blood from Arm, Right Updated:  04/18/20 2045     Blood Culture No growth at 2 days    CORONAVIRUS (COVID-19),RT-PCR, [facility]UST, NP SWAB IN TRANSPORT MEDIA - Swab, Nasopharynx [[id number]] Collected:  04/18/20 0925    Lab Status:  Final result Specimen:  Swab from Nasopharynx Updated:  04/18/20 1838     Reference Lab Report --     Comment: See scanned report          COVID19 Not Detected    SARS-CoV-2, PCR (IN-HOUSE), NP Swab in Transport Media - Swab, Nasopharynx [[id number]]  (Normal) Collected:  04/16/20 2227    Lab Status:  Final result Specimen:  Swab from Nasopharynx Updated:  04/17/20 0955     COVID19 Not Detected    Legionella Antigen, Urine - Urine, Urine, Clean Catch [[id number]]  (Normal) Collected:  04/17/20 0109    Lab Status:  Final result Specimen:  Urine, Clean Catch Updated:  04/17/20 0937     LEGIONELLA ANTIGEN, URINE Negative    S. Pneumo Ag Urine or CSF - Urine, Urine, Clean Catch [[id number]]  (Normal) Collected:  04/16/20 2102    Lab Status:  Final result Specimen:  Urine, Clean Catch Updated:  04/17/20 0936     Strep Pneumo Ag Negative    MRSA Screen, PCR (Inpatient) - Swab, Nares [[id number]]   (Normal) Collected:  04/17/20 0312    Lab Status:  Final result Specimen:  Swab from Nares Updated:  04/17/20 0613     MRSA PCR Negative    Narrative:       MRSA Negative    Respiratory Panel, PCR - Swab, Nasopharynx [[id number]]  (Normal) Collected:  04/16/20 2227    Lab Status:  Final result Specimen:  Swab from Nasopharynx Updated:  04/16/20 6946     ADENOVIRUS, PCR Not Detected     Coronavirus 229E Not Detected     Coronavirus HKU1 Not Detected     Coronavirus NL63 Not Detected     Coronavirus OC43 Not Detected     Human Metapneumovirus Not Detected     Human Rhinovirus/Enterovirus Not Detected     Influenza B PCR Not Detected     Parainfluenza Virus 1 Not Detected     Parainfluenza Virus 2 Not Detected     Parainfluenza Virus 3 Not Detected     Parainfluenza Virus 4 Not Detected     Bordetella pertussis pcr Not Detected     Influenza A H1 2009 PCR Not Detected     Chlamydophila pneumoniae PCR Not Detected     Mycoplasma pneumo by PCR Not Detected     Influenza A PCR Not Detected     Influenza A H3 Not Detected     Influenza A H1 Not Detected     RSV, PCR Not Detected     Bordetella parapertussis PCR Not Detected    Narrative:       The coronavirus on the RVP is NOT COVID-19 and is NOT indicative of infection with COVID-19.           Imaging Results (Last 24 Hours)     ** No results found for the last 24 hours. **               I have reviewed the medications:  Scheduled Meds:    busPIRone 10 mg Oral Daily   cefTRIAXone 1 g Intravenous Q24H   doxycycline 100 mg Oral Q12H   enoxaparin 40 mg Subcutaneous Q24H   gabapentin 300 mg Oral Nightly   insulin lispro 0-7 Units Subcutaneous TID With Meals   lactobacillus acidophilus 1 capsule Oral Daily   levothyroxine 88 mcg Oral Daily   metoprolol succinate  mg Oral Daily   pravastatin 40 mg Oral Nightly   sodium chloride 10 mL Intravenous Q12H   sucralfate 1 g Oral BID AC     Continuous Infusions:   PRN Meds:.•  acetaminophen  •  dextrose  •  dextrose  •  glucagon  (human recombinant)  •  LORazepam  •  magnesium sulfate **OR** magnesium sulfate in D5W 1g/100mL (PREMIX) **OR** magnesium sulfate  •  melatonin  •  ondansetron  •  potassium chloride **OR** potassium chloride **OR** potassium chloride  •  sodium chloride  •  sodium chloride    Assessment/Plan   Assessment & Plan     Active Hospital Problems    Diagnosis  POA   • Pneumonia of both lungs due to infectious organism [J18.9]  Yes   • Fever [R50.9]  Unknown   • Bilateral pleural effusion [J90]  Unknown   • History of diverticular abscess [Z87.19]  Not Applicable   • DM2 (diabetes mellitus, type 2) (CMS/HCC) [E11.9]  Unknown   • Rule out Covid-19 pneumonia [U07.1, J12.89]  Unknown   • Acquired hypothyroidism [E03.9]  Yes   • Essential hypertension [I10]  Yes      Resolved Hospital Problems   No resolved problems to display.        Brief Hospital Course to date:  Neema Richard is a 79 y.o. female with PMHx significant for DM2, HTN and recent admission from 3/15-3/26/20 with perforated diverticulitis w/ abscess formation. During that admission patient underwent LAR, abscess drainage, appy, left oophorectomy w/ distal transverse colostomy by Dr. Vazquez. Antibiotics were manged by infectious disease and patient was discharged on zosyn via picc line. Patient completed antibiotics approximately 6 days ago. She comes in with fever, dry cough and nausea.    Sepsis - POA  Bilateral Pneumonia  Acute Respiratory Failure with Hypoxia  - continue CTX/Doxy  - COVID testing is negative x2, d/c airborne precautions and transfer off 6A  - ID following, well known to Dr. Bejarano  - respiratory viral panel negative  - urinary antigens negative, MRSA PCR negative  - wean O2 as able, still requiring about 2L this morning and hopeful to wean off prior to discharge  - procal trending down    DMII  - SSI coverage    Recent Diverticular Abscess s/p Colostomy   - wound/ostomy following  - CT a/p showed no intraabdominal or pelvic acute  process    HTN:  - continue home regimen    DVT Prophylaxis:  Lovenox    Disposition: I expect the patient to be discharged 1-2 days when O2 requirements are improved.      Daily Care Communication  Due to current limited visitation policies, attempts will be made daily to update patient's identified best point-of-contact(s)   Contact: Theodora Mullen   Relation: Daughter   Type of communication (phone or televideo): phone   Time of communication: 9:45   Notes (if applicable):       CODE STATUS:   Code Status and Medical Interventions:   Ordered at: 04/16/20 2247     Code Status:    CPR     Medical Interventions (Level of Support Prior to Arrest):    Full     Electronically signed by Katherine Seymour DO, 04/19/20, 09:41.

## 2020-04-20 ENCOUNTER — READMISSION MANAGEMENT (OUTPATIENT)
Dept: CALL CENTER | Facility: HOSPITAL | Age: 80
End: 2020-04-20

## 2020-04-20 VITALS
BODY MASS INDEX: 20.37 KG/M2 | WEIGHT: 129.8 LBS | DIASTOLIC BLOOD PRESSURE: 81 MMHG | HEIGHT: 67 IN | RESPIRATION RATE: 18 BRPM | OXYGEN SATURATION: 97 % | SYSTOLIC BLOOD PRESSURE: 155 MMHG | HEART RATE: 68 BPM | TEMPERATURE: 98.2 F

## 2020-04-20 PROBLEM — U07.1 PNEUMONIA DUE TO COVID-19 VIRUS: Status: RESOLVED | Noted: 2020-04-16 | Resolved: 2020-04-20

## 2020-04-20 PROBLEM — J12.82 PNEUMONIA DUE TO COVID-19 VIRUS: Status: RESOLVED | Noted: 2020-04-16 | Resolved: 2020-04-20

## 2020-04-20 PROBLEM — R50.9 FEVER: Status: RESOLVED | Noted: 2020-04-16 | Resolved: 2020-04-20

## 2020-04-20 PROBLEM — J90 BILATERAL PLEURAL EFFUSION: Status: RESOLVED | Noted: 2020-04-16 | Resolved: 2020-04-20

## 2020-04-20 LAB
ANION GAP SERPL CALCULATED.3IONS-SCNC: 13 MMOL/L (ref 5–15)
BASOPHILS # BLD AUTO: 0.21 10*3/MM3 (ref 0–0.2)
BASOPHILS NFR BLD AUTO: 2 % (ref 0–1.5)
BUN BLD-MCNC: 16 MG/DL (ref 8–23)
BUN/CREAT SERPL: 34.8 (ref 7–25)
CALCIUM SPEC-SCNC: 8.6 MG/DL (ref 8.6–10.5)
CHLORIDE SERPL-SCNC: 106 MMOL/L (ref 98–107)
CO2 SERPL-SCNC: 22 MMOL/L (ref 22–29)
CREAT BLD-MCNC: 0.46 MG/DL (ref 0.57–1)
DEPRECATED RDW RBC AUTO: 46.8 FL (ref 37–54)
EOSINOPHIL # BLD AUTO: 1.02 10*3/MM3 (ref 0–0.4)
EOSINOPHIL NFR BLD AUTO: 9.8 % (ref 0.3–6.2)
ERYTHROCYTE [DISTWIDTH] IN BLOOD BY AUTOMATED COUNT: 14.4 % (ref 12.3–15.4)
GFR SERPL CREATININE-BSD FRML MDRD: 131 ML/MIN/1.73
GLUCOSE BLD-MCNC: 90 MG/DL (ref 65–99)
GLUCOSE BLDC GLUCOMTR-MCNC: 102 MG/DL (ref 70–130)
GLUCOSE BLDC GLUCOMTR-MCNC: 195 MG/DL (ref 70–130)
HCT VFR BLD AUTO: 33 % (ref 34–46.6)
HGB BLD-MCNC: 9.9 G/DL (ref 12–15.9)
IMM GRANULOCYTES # BLD AUTO: 0.3 10*3/MM3 (ref 0–0.05)
IMM GRANULOCYTES NFR BLD AUTO: 2.9 % (ref 0–0.5)
LYMPHOCYTES # BLD AUTO: 2.73 10*3/MM3 (ref 0.7–3.1)
LYMPHOCYTES NFR BLD AUTO: 26.2 % (ref 19.6–45.3)
MCH RBC QN AUTO: 26.8 PG (ref 26.6–33)
MCHC RBC AUTO-ENTMCNC: 30 G/DL (ref 31.5–35.7)
MCV RBC AUTO: 89.2 FL (ref 79–97)
MONOCYTES # BLD AUTO: 1.44 10*3/MM3 (ref 0.1–0.9)
MONOCYTES NFR BLD AUTO: 13.8 % (ref 5–12)
NEUTROPHILS # BLD AUTO: 4.73 10*3/MM3 (ref 1.7–7)
NEUTROPHILS NFR BLD AUTO: 45.3 % (ref 42.7–76)
NRBC BLD AUTO-RTO: 0 /100 WBC (ref 0–0.2)
PLATELET # BLD AUTO: 477 10*3/MM3 (ref 140–450)
PMV BLD AUTO: 9.8 FL (ref 6–12)
POTASSIUM BLD-SCNC: 4.1 MMOL/L (ref 3.5–5.2)
PROCALCITONIN SERPL-MCNC: 4.69 NG/ML (ref 0.1–0.25)
RBC # BLD AUTO: 3.7 10*6/MM3 (ref 3.77–5.28)
SODIUM BLD-SCNC: 141 MMOL/L (ref 136–145)
WBC NRBC COR # BLD: 10.43 10*3/MM3 (ref 3.4–10.8)

## 2020-04-20 PROCEDURE — 97530 THERAPEUTIC ACTIVITIES: CPT

## 2020-04-20 PROCEDURE — 97161 PT EVAL LOW COMPLEX 20 MIN: CPT

## 2020-04-20 PROCEDURE — 80048 BASIC METABOLIC PNL TOTAL CA: CPT | Performed by: INTERNAL MEDICINE

## 2020-04-20 PROCEDURE — 25010000002 ENOXAPARIN PER 10 MG: Performed by: INTERNAL MEDICINE

## 2020-04-20 PROCEDURE — 85025 COMPLETE CBC W/AUTO DIFF WBC: CPT | Performed by: INTERNAL MEDICINE

## 2020-04-20 PROCEDURE — 99239 HOSP IP/OBS DSCHRG MGMT >30: CPT | Performed by: INTERNAL MEDICINE

## 2020-04-20 PROCEDURE — 97116 GAIT TRAINING THERAPY: CPT

## 2020-04-20 PROCEDURE — 84145 PROCALCITONIN (PCT): CPT | Performed by: INTERNAL MEDICINE

## 2020-04-20 PROCEDURE — 97165 OT EVAL LOW COMPLEX 30 MIN: CPT

## 2020-04-20 PROCEDURE — 25010000002 CEFTRIAXONE PER 250 MG: Performed by: INTERNAL MEDICINE

## 2020-04-20 PROCEDURE — 82962 GLUCOSE BLOOD TEST: CPT

## 2020-04-20 PROCEDURE — 97110 THERAPEUTIC EXERCISES: CPT

## 2020-04-20 RX ORDER — DOXYCYCLINE 100 MG/1
100 CAPSULE ORAL EVERY 12 HOURS SCHEDULED
Qty: 5 CAPSULE | Refills: 0
Start: 2020-04-20 | End: 2020-04-23

## 2020-04-20 RX ORDER — SUCRALFATE 1 G/1
1 TABLET ORAL
Status: DISCONTINUED | OUTPATIENT
Start: 2020-04-20 | End: 2020-04-20 | Stop reason: HOSPADM

## 2020-04-20 RX ADMIN — LEVOTHYROXINE SODIUM 88 MCG: 88 TABLET ORAL at 06:18

## 2020-04-20 RX ADMIN — DOXYCYCLINE 100 MG: 100 CAPSULE ORAL at 09:35

## 2020-04-20 RX ADMIN — Medication 1 CAPSULE: at 09:34

## 2020-04-20 RX ADMIN — LORAZEPAM 0.5 MG: 0.5 TABLET ORAL at 00:19

## 2020-04-20 RX ADMIN — ACETAMINOPHEN 650 MG: 325 TABLET, FILM COATED ORAL at 09:34

## 2020-04-20 RX ADMIN — SODIUM CHLORIDE, PRESERVATIVE FREE 10 ML: 5 INJECTION INTRAVENOUS at 09:36

## 2020-04-20 RX ADMIN — ENOXAPARIN SODIUM 40 MG: 40 INJECTION SUBCUTANEOUS at 06:18

## 2020-04-20 RX ADMIN — CEFTRIAXONE SODIUM 1 G: 1 INJECTION, POWDER, FOR SOLUTION INTRAMUSCULAR; INTRAVENOUS at 00:19

## 2020-04-20 RX ADMIN — SUCRALFATE 1 G: 1 TABLET ORAL at 09:37

## 2020-04-20 RX ADMIN — METOPROLOL SUCCINATE 100 MG: 100 TABLET, EXTENDED RELEASE ORAL at 09:35

## 2020-04-20 NOTE — THERAPY EVALUATION
Patient Name: Neema Richard  : 1940    MRN: [id number]                              Today's Date: 2020       Admit Date: 2020    Visit Dx:     ICD-10-CM ICD-9-CM   1. Pneumonia of both lungs due to infectious organism, unspecified part of lung J18.9 483.8   2. Acute febrile illness R50.9 780.60   3. Elevated blood pressure reading with diagnosis of hypertension I10 401.9   4. Bilateral pleural effusion J90 511.9     Patient Active Problem List   Diagnosis   • Well woman exam with routine gynecological exam   • Osteoporosis - GYN   • Essential hypertension   • Acquired hypothyroidism   • IBS-d (takes Buspar)   • DCIS (ductal carcinoma in situ)   • Cystocele and rectocele with complete uterovaginal prolapse   • Mixed urge and stress incontinence   • Pessary maintenance   • High cholesterol   • Neutrophilic leukocytosis   • Arthritis   • Overactive bladder   • Dyspepsia   • Pneumonia of both lungs due to infectious organism   • Fever   • Bilateral pleural effusion   • History of diverticular abscess   • DM2 (diabetes mellitus, type 2) (CMS/HCC)   • Rule out Covid-19 pneumonia     Past Medical History:   Diagnosis Date   • Acquired hypothyroidism    • Colon polyp - sees Dr. Vazquez 2016   • DCIS (ductal carcinoma in situ) 2017    Dx at the time of breast reduction. ER and NH (+)   • Essential hypertension    • High cholesterol    • Hx of herpes genitalis    • IBS-d (takes Buspar)      Past Surgical History:   Procedure Laterality Date   • BILATERAL BREAST REDUCTION Bilateral    • BREAST LUMPECTOMY Left    • CATARACT EXTRACTION, BILATERAL Bilateral    • CERVICAL CONIZATION     • COLON RESECTION N/A 3/17/2020    Procedure: LOW ANTERIOR COLON RESECTION, MOBILIZATION OF SPLENIC FLEXURE, DRAINAGE OF ABSCESS AND COLOSTOMY CREATION;  Surgeon: Sravan Vazquez MD;  [facility];  Service: General;  Laterality: N/A;   • FACIAL COSMETIC SURGERY     • FACIAL COSMETIC  SURGERY  2012   • HEMORRHOIDECTOMY  1974   • LAPAROSCOPIC CHOLECYSTECTOMY  2010   • REDUCTION MAMMAPLASTY Bilateral 04/13/2017    DCIS   • SINUS SURGERY  1992   • THYROIDECTOMY, PARTIAL  1963   • TONSILLECTOMY  1953   • VARICOSE VEIN SURGERY Bilateral 1981     General Information     Row Name 04/20/20 0840          PT Evaluation Time/Intention    Document Type  evaluation  -DM     Mode of Treatment  physical therapy  -DM     Row Name 04/20/20 0840          General Information    Patient Profile Reviewed?  yes  -DM     Prior Level of Function  independent:;all household mobility;community mobility;gait;bed mobility;ADL's;cooking;home management;using stairs indep gt w/o AD (3 miles/day @ [email or web address] till 3/'20 colon sx, & worked w/ a )  -DM     Existing Precautions/Restrictions  other (see comments) ostomy s/p 3-17-20 colon resect,drng of abscess;per nsg,infect Dz (Dr Burnham) req.4-20 to keep in droplet precaut., despite 2 neg.covid-19 tests & flu test)    -DM     Barriers to Rehab  -- states LE'S stiff, w/ LBP, @morning, & causes 'Stiff leg gt'  -DM     Row Name 04/20/20 0840          Relationship/Environment    Lives With  friend(s) friend (RN) lives in downstairs level; home health aid (pd. by [facility].)does cook,clean;dtr visiting for 1 mo. from [place] to help out  -DM     Row Name 04/20/20 0840          Resource/Environmental Concerns    Current Living Arrangements  home/apartment/condo 1-st.w/ basement laundry & living qrts that she rents out to friend but could live in herself if needed; tub/shower  -DM     Row Name 04/20/20 0840          Home Main Entrance    Number of Stairs, Main Entrance  four  -DM     Row Name 04/20/20 0840          Stairs Within Home, Primary    Stairs, Within Home, Primary  13 from basement to access laundry  -DM     Number of Stairs, Within Home, Primary  other (see comments)  -DM     Stair Railings, Within Home, Primary  railings on both sides of stairs  -DM     Row  "Name 04/20/20 0840          Cognitive Assessment/Intervention- PT/OT    Orientation Status (Cognition)  oriented x 4  -DM     Cognitive Assessment/Intervention Comment  states \"little sleep\" (took Ativan,which is causing excessive drowsiness) & needs nap  -DM     Row Name 04/20/20 0840          Safety Issues, Functional Mobility    Safety Issues Affecting Function (Mobility)  insight into deficits/self awareness;awareness of need for assistance;safety precaution awareness;safety precautions follow-through/compliance  -DM     Impairments Affecting Function (Mobility)  balance;endurance/activity tolerance;pain;postural/trunk control;strength chr LBP, & OSTOMY/abdom incis.discomf  -DM       User Key  (r) = Recorded By, (t) = Taken By, (c) = Cosigned By    Initials Name Provider Type    DM Leona Ricardo, PT Physical Therapist        Mobility     Row Name 04/20/20 0840          Bed Mobility Assessment/Treatment    Bed Mobility Assessment/Treatment  rolling right;rolling left;sidelying-sit;sit-supine  -DM     Rolling Left [place] (Bed Mobility)  independent  -DM     Rolling Right [place] (Bed Mobility)  independent  -DM     Scooting/Bridging [place] (Bed Mobility)  independent  -DM     Sit-Supine [place] (Bed Mobility)  contact guard PT assist w/ last phase of swinging legs into bed  -DM     Sidelying-Sit [place] (Bed Mobility)  independent  -DM     Row Name 04/20/20 0840          Transfer Assessment/Treatment    Comment (Transfers)  cues for HP on R bedrail, W/ SPC held in R Hand  -DM     Row Name 04/20/20 0840          Sit-Stand Transfer    Sit-Stand [place] (Transfers)  stand by assist  -DM     Assistive Device (Sit-Stand Transfers)  cane, straight  -DM     Row Name 04/20/20 0840          Gait/Stairs Assessment/Training    Gait/Stairs Assessment/Training  gait/ambulation independence;gait/ambulation assistive device  -DM     [place] Level (Gait)  verbal cues;contact guard  -DM     " "Assistive Device (Gait)  cane, straight  -DM     Distance in Feet (Gait)  150  -DM     Pattern (Gait)  step-through  -DM     Deviations/Abnormal Patterns (Gait)  base of support, narrow;altagracia decreased;stride length decreased instructed in SPC use (w/height adjusted) R hand;LE's 'Wobbly/stiff' (\"chr. morning problem\"); decr step length;drifting sl. to R init   -DM     Bilateral Gait Deviations  weight shift ability decreased incr. \"rounded shldr\" posture as pt fatigued  -DM     Comment (Gait/Stairs)  cues for incr step length, trunk ext/focusing ahead, stabilizing knees,maintaining midline,PLB  -DM       User Key  (r) = Recorded By, (t) = Taken By, (c) = Cosigned By    Initials Name Provider Type    DM Leona Ricardo, PT Physical Therapist        Obj/Interventions     Row Name 04/20/20 0840          General ROM    GENERAL ROM COMMENTS  no LE limit.  -DM     Row Name 04/20/20 0840          MMT (Manual Muscle Testing)    General MMT Comments  B hip flex & quads 4 to 4+/5 (incr. LBP)  -DM     Row Name 04/20/20 0840          Therapeutic Exercise    Lower Extremity (Therapeutic Exercise)  gluteal sets;hamstring sets, bilateral;heel slides, bilateral;LAQ (long arc quad), bilateral;marching while seated;marching while standing;quad sets, bilateral;SAQ (short arc quad), bilateral B BKFO, abdom sets; stand.HS curls  -DM     Lower Extremity Range of Motion (Therapeutic Exercise)  hip internal/external rotation, bilateral;hip abduction/adduction, bilateral;ankle dorsiflexion/plantar flexion, bilateral B BKFO  -DM     Exercise Type (Therapeutic Exercise)  AROM (active range of motion);isometric contraction, static  -DM     Position (Therapeutic Exercise)  seated;standing;supine  -DM     Sets/Reps (Therapeutic Exercise)  1/10 (1/20 AP/AC,LAQ,marches  -DM     Expected Outcome (Therapeutic Exercise)  improve functional stability;improve functional tolerance, single extremity activity  -DM     Row Name 04/20/20 0840          " Static Sitting Balance    Level of [place] (Unsupported Sitting, Static Balance)  independent  -DM     Sitting Position (Unsupported Sitting, Static Balance)  sitting on edge of bed  -DM     Time Able to Maintain Position (Unsupported Sitting, Static Balance)  4 to 5 minutes  -DM     Comment (Unsupported Sitting, Static Balance)  LE exer; PLB;BP taken  -DM     Row Name 04/20/20 0840          Dynamic Sitting Balance    Level of [place], Reaches Outside Midline (Sitting, Dynamic Balance)  conditional independence  -DM     Sitting Position, Reaches Outside Midline (Sitting, Dynamic Balance)  sitting on edge of bed  -DM     Comment, Reaches Outside Midline (Sitting, Dynamic Balance)  recip scooting(held R bedrail)  -DM     Row Name 04/20/20 0840          Static Standing Balance    Level of [place] (Supported Standing, Static Balance)  supervision  -DM     Time Able to Maintain Position (Supported Standing, Static Balance)  1 to 2 minutes  -DM     Assistive Device Utilized (Supported Standing, Static Balance)  cane, straight  -DM     Comment (Supported Standing, Static Balance)  trunk ext, & HS curls at sink  -DM     Row Name 04/20/20 0840          Dynamic Standing Balance    Level of [place], Reaches Outside Midline (Standing, Dynamic Balance)  standby assist  -DM     Time Able to Maintain Position, Reaches Outside Midline (Standing, Dynamic Balance)  30 to 45 seconds  -DM     Assistive Device Utilized (Supported Standing, Dynamic Balance)  cane, straight  -DM     Comment, Reaches Outside Midline (Standing, Dynamic Balance)  MIP at sink  -DM       User Key  (r) = Recorded By, (t) = Taken By, (c) = Cosigned By    Initials Name Provider Type    DM Leona Ricardo, PT Physical Therapist        Goals/Plan     Row Name 04/20/20 0840          Bed Mobility Goal 1 (PT)    Activity/Assistive Device (Bed Mobility Goal 1, PT)  bed mobility activities, all  -DM     [place] Level/Cues Needed (Bed Mobility  Goal 1, PT)  independent  -DM     Time Frame (Bed Mobility Goal 1, PT)  short term goal (STG);3 days  -DM     Progress/Outcomes (Bed Mobility Goal 1, PT)  goal partially met  -DM     Row Name 04/20/20 0840          Transfer Goal 1 (PT)    Activity/Assistive Device (Transfer Goal 1, PT)  sit-to-stand/stand-to-sit;bed-to-chair/chair-to-bed;cane, straight  -DM     [place] Level/Cues Needed (Transfer Goal 1, PT)  independent  -DM     Time Frame (Transfer Goal 1, PT)  short term goal (STG);3 days  -DM     Row Name 04/20/20 0840          Gait Training Goal 1 (PT)    Activity/Assistive Device (Gait Training Goal 1, PT)  gait (walking locomotion);assistive device use;cane, straight stable VS  -DM     [place] Level (Gait Training Goal 1, PT)  independent  -DM     Distance (Gait Goal 1, PT)  350  -DM     Time Frame (Gait Training Goal 1, PT)  short term goal (STG);3 days  -DM     Row Name 04/20/20 0840          Stairs Goal 1 (PT)    Activity/Assistive Device (Stairs Goal 1, PT)  ascending stairs;descending stairs;using handrail, right;cane, straight SPC L hand, & R rail  -DM     [place] Level/Cues Needed (Stairs Goal 1, PT)  independent  -DM     Number of Stairs (Stairs Goal 1, PT)  13  -DM     Time Frame (Stairs Goal 1, PT)  short term goal (STG);3 days  -DM     Row Name 04/20/20 0840          Patient Education Goal (PT)    Activity (Patient Education Goal, PT)  HEP exer  -DM     [place]/Cues/Accuracy (Memory Goal 2, PT)  demonstrates adequately;verbalizes understanding  -DM     Time Frame (Patient Education Goal, PT)  short term goal (STG);3 days  -DM       User Key  (r) = Recorded By, (t) = Taken By, (c) = Cosigned By    Initials Name Provider Type    Leona Treviño, PT Physical Therapist        Clinical Impression     Row Name 04/20/20 0840          Pain Assessment    Additional Documentation  Pain Scale: Numbers Pre/Post-Treatment (Group)  -DM     Row Name 04/20/20 0840          Pain Scale:  Numbers Pre/Post-Treatment    Pain Scale: Numbers, Pretreatment  3/10  -DM     Pain Scale: Numbers, Post-Treatment  3/10  -DM     Pain Location  back & abdom incis/ostomy 2/10  -DM     Pain Intervention(s)  Repositioned;Elevated;Rest  -DM     Row Name 04/20/20 0840          Physical Therapy Clinical Impression    Patient/Family Goals Statement (PT Clinical Impression)  ret. home to PLOF  -DM     Criteria for Skilled Interventions Met (PT Clinical Impression)  yes;treatment indicated  -DM     Rehab Potential (PT Clinical Summary)  good, to achieve stated therapy goals  -DM     Row Name 04/20/20 0840          Vital Signs    Pre Systolic BP Rehab  150  -DM     Pre Treatment Diastolic BP  76  -DM     Post Systolic BP Rehab  155  -DM     Post Treatment Diastolic BP  81  -DM     Pretreatment Heart Rate (beats/min)  79  -DM     Intratreatment Heart Rate (beats/min)  91  -DM     Posttreatment Heart Rate (beats/min)  84  -DM     Pre SpO2 (%)  95  -DM     O2 Delivery Pre Treatment  room air  -DM     Intra SpO2 (%)  92  -DM     O2 Delivery Intra Treatment  room air  -DM     Post SpO2 (%)  96  -DM     O2 Delivery Post Treatment  room air  -DM     Pre Patient Position  Supine  -DM     Intra Patient Position  Standing  -DM     Post Patient Position  Supine  -DM     Rest Breaks   1 1 stand.rest  -DM     Row Name 04/20/20 0840          Positioning and Restraints    Pre-Treatment Position  in bed  -DM     Post Treatment Position  bed  -DM     In Bed  notified nsg;supine;call light within reach;encouraged to call for assist;heels elevated HOB elev approx 40 deg;lights off;req. nap  -DM       User Key  (r) = Recorded By, (t) = Taken By, (c) = Cosigned By    Initials Name Provider Type    Leona Treviño, PT Physical Therapist        Outcome Measures     Row Name 04/20/20 0840          How much help from another person do you currently need...    Turning from your back to your side while in flat bed without using bedrails?  4  -DM   "   Moving from lying on back to sitting on the side of a flat bed without bedrails?  4  -DM     Moving to and from a bed to a chair (including a wheelchair)?  3  -DM     Standing up from a chair using your arms (e.g., wheelchair, bedside chair)?  3  -DM     Climbing 3-5 steps with a railing?  3  -DM     To walk in hospital room?  3  -DM     AM-PAC 6 Clicks Score (PT)  20  -DM     Row Name 04/20/20 0840          Functional Assessment    Outcome Measure Options  AM-PAC 6 Clicks Basic Mobility (PT)  -DM       User Key  (r) = Recorded By, (t) = Taken By, (c) = Cosigned By    Initials Name Provider Type    Leona Treviño, PT Physical Therapist          PT Recommendation and Plan  Planned Therapy Interventions (PT Eval): balance training, bed mobility training, gait training, home exercise program, patient/family education, stair training, strengthening, transfer training  Outcome Summary/Treatment Plan (PT)  Anticipated Equipment Needs at Discharge (PT): standard cane(Issued SPC)  Anticipated Discharge Disposition (PT): home with assist, home with home health(Home w/ dtr's asst for 1 mo., & RN friend who lives in basement level; resume HH Aide, & recommend HHPT d/t temporary presence of dtr )  Plan of Care Reviewed With: patient  Progress: improving  Outcome Summary: Presents w/ stable clinical status (PNA,w/ 2 neg.COVID-19 tests & neg. flu,but infect dz req. keep pt in droplet precaut.,w/mask for gt in [place]), low HGB (9.9), & recent colect/ostomy; performs STS w/ R SPC & SBA, MIP X 10 w/ CGA,amb. 150 ft w/ CGA & 1 stand.rest, performed LE exer & bal activ w/ mult rests d/t drowsy from Ativan last PM & minimal sleep; noted desat to 92 % on RA, wobbly/stiff gt pattern (\"chr. pattern d/t morning stiffness& LBP\"), & fatigue, therefore deferred trial of 13 steps; plans home w/ dtr's asst (visiting x 1 mo.), & resuming HH aide;recommend HHPT,as dtr will be leaving soon, to address bal. deficits & weakness     Time " Calculation:   PT Charges     Row Name 04/20/20 1027             Time Calculation    Start Time  0840  -DM      PT Received On  04/20/20  -DM      PT Goal Re-Cert Due Date  04/30/20  -DM         Time Calculation- PT    Total Timed Code Minutes- PT  44 minute(s)  -DM         Timed Charges    06082 - PT Therapeutic Exercise Minutes  18  -DM      50791 - Gait Training Minutes   16  -DM      64034 - PT Therapeutic Activity Minutes  10  -DM        User Key  (r) = Recorded By, (t) = Taken By, (c) = Cosigned By    Initials Name Provider Type    Leona Treviño, PT Physical Therapist        Therapy Charges for Today     Code Description Service Date Service Provider Modifiers Qty    42130910274 HC PT THER PROC EA 15 MIN 4/20/2020 Leona Ricardo, PT GP 1    03214123597 HC GAIT TRAINING EA 15 MIN 4/20/2020 Leona Ricardo, PT GP 1    37756938507 HC PT THERAPEUTIC ACT EA 15 MIN 4/20/2020 Leona Ricardo, PT GP 1    11592931994 HC PT EVAL LOW COMPLEXITY 4 4/20/2020 Leona Ricardo, PT GP 1          PT G-Codes  Outcome Measure Options: AM-PAC 6 Clicks Basic Mobility (PT)  AM-PAC 6 Clicks Score (PT): 20  AM-PAC 6 Clicks Score (OT): 24    Leona Ricardo, EKATERINA  4/20/2020

## 2020-04-20 NOTE — PROGRESS NOTES
INFECTIOUS DISEASE Progress Note    Neema Richard  1940  [id number]    Date: 4/20/2020    Admission Date: 4/16/2020    Requesting Provider: Steve Quintero MD  Evaluating Physician: Darshan Bejarano MD    Antibiotics: Rocephin    Reason for Consultation: fever, pneumonia (recent admission with diverticular abscess s/p prolonged iv antibiotics followed by Dr. Darshan Bejarano)      History of present illness:    4/17/2020: Patient is a 79 y.o. female with h/o hypothyroidism, HTN, DCIS, and recent diverticulitis with perforation and LAR colon resection 3/17/20 (discharged on Zosyn on 3/26/20) who we were asked to see for fever and pneumonia.  She finished IV Zosyn about 6 days ago and PICC line was discontinued.  She had a Telehealth visit with Dr. Dasrhan Bejarano on 4/14/20 and was stable off antibiotics.   She presented to [facility] ED on 4/16/20 with fever of 102 at home, shaking chills, and nausea .  She has had a slight decrease in ostomy output.  She denies vomiting, dysuria, incisional wound drainage or redness, sore throat, or sudden loss of taste or smell.  She has had no COVID-19 exposures.  Work up showed Tmax 101.7, lactic acid 1.3, PCT 0.1, lipase 46, WBC 3900 with 6% neutrophils/50% lymphocytes/39% monocytes, and UA WBC 0-2.  Today her PCT is 26.6, CRP 12.3, and ESR 18.  Respiratory panel PCR is negative. A CT scan of a/p shows no evidence of abscess, bowel obstruction, or perforation.  A CT scan of chest shows mild patchy airspace infiltrates in the RUL with mild infiltrate and atelectasis in the dependent posterior lung bases.  A COVID-19 PCR is negative after interview done.  Strep and Legionella urinary antigens are pending. A MRSA PCR is negative. She is currently on Rocephin and Doxycycline after getting doses of Zosyn and Vancomycin. ID was asked to evaluate and manage her antibiotic therapy.   4/18/2020: She is currently on a clear liquid diet but would like to have her diet advanced.  She has  decreased nausea today.  She denies cough and sputum production.  She continues to require supplemental oxygen.  She denies vomiting and abdominal pain.    4/19/20: Tmax 99.2.  On 2 L O2NC.  2nd COVID-19 PCR is negative.  Patient now in Droplet precautions with 2 negative COVID-19 tests.  She denies f/c, sob, n/v/d, rashes.  She still has tenderness around surgical site.     4/20/20: Afebrile.  On room air.  Denies f/c, sob, n/v/d, rashes.  Some tenderness around surgical site.  Wound healing well. Ostomy with stool.    Past Medical History:   Diagnosis Date   • Acquired hypothyroidism 1980   • Colon polyp - sees Dr. 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 ER and [place] (+)   • Essential hypertension 2012   • High cholesterol 2019   • Hx of herpes genitalis 2013   • IBS-d (takes Buspar)        Past Surgical History:   Procedure Laterality Date   • BILATERAL BREAST REDUCTION Bilateral 1995   • BREAST LUMPECTOMY Left 2009   • CATARACT EXTRACTION, BILATERAL Bilateral 2018   • CERVICAL CONIZATION  1983   • COLON RESECTION N/A 3/17/2020    Procedure: LOW ANTERIOR COLON RESECTION, MOBILIZATION OF SPLENIC FLEXURE, DRAINAGE OF ABSCESS AND COLOSTOMY CREATION;  Surgeon: Sravan Vazquez MD;  Location: [facility];  Service: General;  Laterality: N/A;   • FACIAL COSMETIC SURGERY  2001   • FACIAL COSMETIC SURGERY  2012   • HEMORRHOIDECTOMY  1974   • LAPAROSCOPIC CHOLECYSTECTOMY  2010   • REDUCTION MAMMAPLASTY Bilateral 04/13/2017    DCIS   • SINUS SURGERY  1992   • THYROIDECTOMY, PARTIAL  1963   • TONSILLECTOMY  1953   • VARICOSE VEIN SURGERY Bilateral 1981       Family History   Problem Relation Age of Onset   • Breast cancer Neg Hx    • Ovarian cancer Neg Hx        Social History     Socioeconomic History   • Marital status:      Spouse name: Not on file   • Number of children: Not on file   • Years of education: Not on file   • Highest education level: Not on file    Tobacco Use   • Smoking status: Never Smoker   • Smokeless tobacco: Never Used   Substance and Sexual Activity   • Alcohol use: No   • Drug use: No       Allergies   Allergen Reactions   • Evista [Raloxifene] Rash   • Cephalexin Diarrhea   • Hydrochlorothiazide Diarrhea   • Meloxicam Other (See Comments)   • Metronidazole Diarrhea   • Oxybutynin Cough   • Propylthiouracil Other (See Comments)     Severally reduce WBC's    • Sulfa Antibiotics Hives   • Sulfacetamide Sodium-Sulfur Hives   • Sulfur Dioxide Hives         Medication:    Current Facility-Administered Medications:   •  acetaminophen (TYLENOL) tablet 650 mg, 650 mg, Oral, Q6H PRN, Candida Radford PA-C, 650 mg at 04/20/20 0934  •  busPIRone (BUSPAR) tablet 10 mg, 10 mg, Oral, Daily, Steve Quintero MD, 10 mg at 04/17/20 0900  •  cefTRIAXone (ROCEPHIN) 1 g/50 mL 0.9% NS VTB (mbp), 1 g, Intravenous, Q24H, Steve Quintero MD, 1 g at 04/20/20 0019  •  dextrose (D50W) 25 g/ 50mL Intravenous Solution 25 g, 25 g, Intravenous, Q15 Min PRN, Steve Quintero MD  •  dextrose (GLUTOSE) oral gel 15 g, 15 g, Oral, Q15 Min PRN, Steve Quintero MD  •  doxycycline (MONODOX) capsule 100 mg, 100 mg, Oral, Q12H, Andrea Ferguson PA, 100 mg at 04/20/20 0935  •  enoxaparin (LOVENOX) syringe 40 mg, 40 mg, Subcutaneous, Q24H, Steve Quintero MD, 40 mg at 04/20/20 0618  •  gabapentin (NEURONTIN) capsule 300 mg, 300 mg, Oral, Nightly, Steve Quintero MD, 300 mg at 04/19/20 2058  •  glucagon (human recombinant) (GLUCAGEN DIAGNOSTIC) injection 1 mg, 1 mg, Subcutaneous, Q15 Min PRN, Steve Quintero MD  •  insulin lispro (humaLOG) injection 0-7 Units, 0-7 Units, Subcutaneous, TID With Meals, Steve Quintero MD  •  lactobacillus acidophilus (RISAQUAD) capsule 1 capsule, 1 capsule, Oral, Daily, Steve Quintero MD, 1 capsule at 04/20/20 0934  •  levothyroxine (SYNTHROID, LEVOTHROID) tablet 88 mcg, 88 mcg, Oral, Daily, Steve Quintero MD, 88  mcg at 04/20/20 0618  •  LORazepam (ATIVAN) tablet 0.5 mg, 0.5 mg, Oral, Q8H PRN, Steve Quintero MD, 0.5 mg at 04/20/20 0019  •  magnesium sulfate 4 gram infusion - Mg less than or equal to 1mg/dL, 4 g, Intravenous, PRN **OR** magnesium sulfate 3 gram infusion (1gm x 3) - Mg 1.1 - 1.5 mg/dL, 1 g, Intravenous, PRN **OR** Magnesium Sulfate 2 gram infusion- Mg 1.6 - 1.9 mg/dL, 2 g, Intravenous, PRN, Steve Quintero MD  •  melatonin tablet 5 mg, 5 mg, Oral, Nightly PRN, Sandy Randall APRN, 5 mg at 04/17/20 2007  •  metoprolol succinate XL (TOPROL-XL) 24 hr tablet 100 mg, 100 mg, Oral, Daily, Steve Quintero MD, 100 mg at 04/20/20 0935  •  ondansetron (ZOFRAN) injection 4 mg, 4 mg, Intravenous, Q6H PRN, Steve Quintero MD, 4 mg at 04/17/20 2001  •  potassium chloride (MICRO-K) CR capsule 40 mEq, 40 mEq, Oral, PRN, 40 mEq at 04/19/20 1454 **OR** potassium chloride (KLOR-CON) packet 40 mEq, 40 mEq, Oral, PRN **OR** potassium chloride 10 mEq in 100 mL IVPB, 10 mEq, Intravenous, Q1H PRN, Katherine Seymour, DO  •  pravastatin (PRAVACHOL) tablet 40 mg, 40 mg, Oral, Nightly, Steve Quintero MD, 40 mg at 04/19/20 2059  •  sodium chloride 0.9 % flush 10 mL, 10 mL, Intravenous, PRN, Steve Quintero MD  •  sodium chloride 0.9 % flush 10 mL, 10 mL, Intravenous, Q12H, Steve Quintero MD, 10 mL at 04/20/20 0936  •  sodium chloride 0.9 % flush 10 mL, 10 mL, Intravenous, PRN, Steve Quintero MD  •  sucralfate (CARAFATE) tablet 1 g, 1 g, Oral, BID Heather HARRINGTON William H IV, RPH, 1 g at 04/20/20 0937    Antibiotics:  Anti-Infectives (From admission, onward)    Ordered     Dose/Rate Route Frequency Start Stop    04/17/20 0953  doxycycline (MONODOX) capsule 100 mg     Katherine Seymour DO reviewed the order on 04/19/20 0734.   Ordering Provider:  Andrea Ferguson PA    100 mg Oral Every 12 Hours Scheduled 04/17/20 1100 04/24/20 0859    04/16/20 2238  cefTRIAXone (ROCEPHIN) 1 g/50 mL 0.9% NS VTB  (mbp)     Katherine Seymour DO reviewed the order on 20 0734.   Ordering Provider:  Steve Quintero MD    1 g  over 30 Minutes Intravenous Every 24 Hours 20 0000 20 2359    20 2241  vancomycin 1250 mg/250 mL 0.9% NS IVPB ([facility])     Ordering Provider:  Zeke Romero IV, RPH    20 mg/kg × 59 kg (Order-Specific)  over 90 Minutes Intravenous Once 20 0000 20 0227    20 1635  piperacillin-tazobactam (ZOSYN) 3.375 g in iso-osmotic dextrose 50 ml (premix)     Ordering Provider:  Orville Martin MD    3.375 g Intravenous Once 20 1637 20 1800              Physical Exam:   Vital Signs  Temp (24hrs), Av.2 °F (36.8 °C), Min:97.3 °F (36.3 °C), Max:98.7 °F (37.1 °C)    Temp  Min: 97.3 °F (36.3 °C)  Max: 98.7 °F (37.1 °C)  BP  Min: 131/66  Max: 160/83  Pulse  Min: 59  Max: 79  Resp  Min: 17  Max: 18  SpO2  Min: 89 %  Max: 96 %     GENERAL: Awake and alert, in no acute distress.   HEENT: No labial ulcers, no thrush, no sclera icterus  HEART:  RRR, no murmurs. No edema  LUNGS:. CTA bilaterally Nonlabored breathing on room air  ABDOMEN: Soft, mildly tender around surgical site, nondistended.  EXT:  No edema  :  Without Gaytan catheter.  MSK:  FROM, no joint effusions  SKIN: Warm and dry without cutaneous eruptions on Inspection/palpation.  Abdominal wound well healed with no erythema or drainage, no dehiscence.  NEURO: Oriented to PPT.CN 2-12 grossly intact  PSYCHIATRIC: Normal insight and judgement. Cooperative with PE    Laboratory Data    Results from last 7 days   Lab Units 20  0332 20  0542 20  0904   WBC 10*3/mm3 10.43 9.65 9.33   HEMOGLOBIN g/dL 9.9* 9.1* 10.1*   HEMATOCRIT % 33.0* 30.3* 33.5*   PLATELETS 10*3/mm3 477* 443 440     Results from last 7 days   Lab Units 20  0332   SODIUM mmol/L 141   POTASSIUM mmol/L 4.1   CHLORIDE mmol/L 106   CO2 mmol/L 22.0   BUN mg/dL 16   CREATININE mg/dL 0.46*   GLUCOSE mg/dL 90   CALCIUM mg/dL  8.6     Results from last 7 days   Lab Units 04/16/20  1612   ALK PHOS U/L 77   BILIRUBIN mg/dL 0.3   ALT (SGPT) U/L 7   AST (SGOT) U/L 15     Results from last 7 days   Lab Units 04/17/20  0545   SED RATE mm/hr 18     Results from last 7 days   Lab Units 04/17/20  0545   CRP mg/dL 12.30*     Results from last 7 days   Lab Units 04/16/20  1612   LACTATE mmol/L 1.3             Estimated Creatinine Clearance: 53.9 mL/min (A) (by C-G formula based on SCr of 0.46 mg/dL (L)).      Microbiology:  Microbiology Results (last 10 days)     Procedure Component Value - Date/Time    CORONAVIRUS (COVID-19),RT-PCR, [facility], NP SWAB IN TRANSPORT MEDIA - Swab, Nasopharynx [[id number]] Collected:  04/18/20 0925    Lab Status:  Final result Specimen:  Swab from Nasopharynx Updated:  04/18/20 1838     Reference Lab Report --     Comment: See scanned report          COVID19 Not Detected    MRSA Screen, PCR (Inpatient) - Swab, Nares [[id number]]  (Normal) Collected:  04/17/20 0312    Lab Status:  Final result Specimen:  Swab from Nares Updated:  04/17/20 0613     MRSA PCR Negative    Narrative:       MRSA Negative    Legionella Antigen, Urine - Urine, Urine, Clean Catch [[id number]]  (Normal) Collected:  04/17/20 0109    Lab Status:  Final result Specimen:  Urine, Clean Catch Updated:  04/17/20 0937     LEGIONELLA ANTIGEN, URINE Negative    SARS-CoV-2, PCR (IN-HOUSE), NP Swab in Transport Media - Swab, Nasopharynx [[id number]]  (Normal) Collected:  04/16/20 2227    Lab Status:  Final result Specimen:  Swab from Nasopharynx Updated:  04/17/20 0955     COVID19 Not Detected    Respiratory Panel, PCR - Swab, Nasopharynx [[id number]]  (Normal) Collected:  04/16/20 2227    Lab Status:  Final result Specimen:  Swab from Nasopharynx Updated:  04/16/20 2346     ADENOVIRUS, PCR Not Detected     Coronavirus 229E Not Detected     Coronavirus HKU1 Not Detected     Coronavirus NL63 Not Detected     Coronavirus OC43 Not Detected     Human  Metapneumovirus Not Detected     Human Rhinovirus/Enterovirus Not Detected     Influenza B PCR Not Detected     Parainfluenza Virus 1 Not Detected     Parainfluenza Virus 2 Not Detected     Parainfluenza Virus 3 Not Detected     Parainfluenza Virus 4 Not Detected     Bordetella pertussis pcr Not Detected     Influenza A H1 2009 PCR Not Detected     Chlamydophila pneumoniae PCR Not Detected     Mycoplasma pneumo by PCR Not Detected     Influenza A PCR Not Detected     Influenza A H3 Not Detected     Influenza A H1 Not Detected     RSV, PCR Not Detected     Bordetella parapertussis PCR Not Detected    Narrative:       The coronavirus on the RVP is NOT COVID-19 and is NOT indicative of infection with COVID-19.     S. Pneumo Ag Urine or CSF - Urine, Urine, Clean Catch [[id number]]  (Normal) Collected:  04/16/20 2102    Lab Status:  Final result Specimen:  Urine, Clean Catch Updated:  04/17/20 0936     Strep Pneumo Ag Negative    Blood Culture - Blood, Arm, Left [[id number]] Collected:  04/16/20 1653    Lab Status:  Preliminary result Specimen:  Blood from Arm, Left Updated:  04/19/20 2045     Blood Culture No growth at 3 days    Blood Culture - Blood, Arm, Right [[id number]] Collected:  04/16/20 1643    Lab Status:  Preliminary result Specimen:  Blood from Arm, Right Updated:  04/19/20 2045     Blood Culture No growth at 3 days                Radiology:  Imaging Results (Last 72 Hours)     ** No results found for the last 72 hours. **        I read her radiographic studies.    Impression:   1. Sepsis with elevated procalcitonin, lymphocytosis, fever, leukopenia, acute hypoxic respiratory insufficiency, and  pneumonia.  She does not have visual evidence of intra-abdominal or wound infection.  Her PCT is improving  2. RUL pulmonary infiltrates, CAP.  Both COVID-19 tests are negative.  3. Leukopenia with lymphocytosis/monocytosis, improved  4. Eosinophilia--NEW  5. Acute hypoxic respiratory failure  6. Recent diverticulitis  with perforation/abscess s/p LAR 3/17/20  7. HTN  8. Hypothyroidism  9. Keflex (diarrhea), Flagyl (diarrhea) intolerances/sulfa (hives) allergy    PLAN/RECOMMENDATIONS:   1.  Continue ceftriaxone IV  2.  Continue oral doxycycline  3.  Advance diet  4.  Repeat COVID-19 PCR--negative    [facility]:   Outpatient orders:--sent to   1.  Doxycycline 100 mg PO BID x 5 days (prescription on chart)  2.  Telehealth follow up visit with Dr. Darshan Bejarano on Wednesday 4/22/20 at 1:00 p.m.    Darshan Bejarano MD saw and examined patient, verified hx and PE, read all radiographic studies, reviewed labs and micro data, and formulated dx, plan for treatment and all medical decision making.      Andrea Ferguson PA-C for Darshan Bejarano MD    I discussed her disposition with Dr. Dickson.  I coordinated her care.        CASSY Bermudez  4/20/2020  11:55

## 2020-04-20 NOTE — PLAN OF CARE
Problem: Patient Care Overview  Goal: Plan of Care Review  Flowsheets (Taken 4/20/2020 3473)  Outcome Summary: OT eval completed Pt I sink side ADLs in standing, SUP functional mob no LOB in room, I bed mob, I doff/don socks, no further recom IPOT at this time recom home with assist at d/c

## 2020-04-20 NOTE — PLAN OF CARE
"  Problem: Patient Care Overview  Goal: Plan of Care Review  Flowsheets (Taken 4/20/2020 1017)  Progress: improving  Plan of Care Reviewed With: patient  Outcome Summary: Presents w/ stable clinical status (PNA,w/ 2 neg.COVID-19 tests & neg. flu,but infect dz req. keep pt in droplet precaut.,w/mask for gt in [place]), low HGB (9.9), & recent colect/ostomy; performs STS w/ R SPC & SBA, MIP X 10 w/ CGA,amb. 150 ft w/ CGA & 1 stand.rest, performed LE exer & bal activ w/ mult rests d/t drowsy from Ativan last PM & minimal sleep; noted desat to 92 % on RA, wobbly/stiff gt pattern (\"chr. pattern d/t morning stiffness& LBP\"), & fatigue, therefore deferred trial of 13 steps; plans home w/ dtr's asst (visiting x 1 mo.), & resuming HH aide;recommend HHPT,as dtr will be leaving soon, to address bal. deficits & weakness     "

## 2020-04-20 NOTE — PLAN OF CARE
Problem: Patient Care Overview  Goal: Plan of Care Review  Outcome: Ongoing (interventions implemented as appropriate)  Flowsheets (Taken 4/20/2020 0633)  Progress: improving  Plan of Care Reviewed With: patient  Outcome Summary: VSS on RA. Possible discharge today. No complaints through the night.

## 2020-04-20 NOTE — DISCHARGE SUMMARY
[facility]  DISCHARGE SUMMARY    Patient Name: Neema Richard  : 1940  MRN: [id number]    Date of Admission: 2020  4:21 PM  Date of Discharge: 2020  Primary Care Physician: David Mackey MD    Consults     Date and Time Order Name Status Description    2020 0030 Inpatient Infectious Diseases Consult Completed     3/16/2020 0724 Inpatient Infectious Diseases Consult Completed     3/16/2020 0030 Inpatient Colorectal Surgery Consult Completed           Hospital Course     Presenting Problem:   Pneumonia of both lungs due to infectious organism, unspecified part of lung [J18.9]  Pneumonia of both lungs due to infectious organism, unspecified part of lung [J18.9]    Active Hospital Problems    Diagnosis  POA   • Pneumonia of both lungs due to infectious organism [J18.9]  Yes   • History of diverticular abscess [Z87.19]  Not Applicable   • DM2 (diabetes mellitus, type 2) (CMS/[facility]) [E11.9]  Yes   • Acquired hypothyroidism [E03.9]  Yes   • Essential hypertension [I10]  Yes      Resolved Hospital Problems    Diagnosis Date Resolved POA   • Fever [R50.9] 2020 Unknown   • Bilateral pleural effusion [J90] 2020 Unknown   • Rule out Covid-19 pneumonia [U07.1, J12.89] 2020 Unknown        Hospital Course:  Neema Richard is a 79 y.o. female with past medical history of type 2 diabetes, hypothyroidism, hypertension, recent admission to [facility] with perforated diverticulitis with abscess formation, s/p LAR, abscess drainage, appy, left oophorectomy w/ distal transverse colostomy by Dr. Vazquez.  Patient was discharged on IV antibiotics managed by ID.  She presented to [facility] with fever, dry cough and nausea.  Admitted with bilateral pneumonia, COVID-19 has since been ruled out, currently on antibiotics per ID.    Plan  Sepsis  -Secondary to bilateral pneumonia, COVID-19 testing is negative x2  -Patient well-known to infectious disease, Dr. Bejarano following,  she was placed on Rocephin and doxycycline, we will discharge her with doxycycline for 5 days.    Acute respiratory failure with hypoxia, resolved  -This was secondary to pneumonia, she is currently off O2 supplementation and on room air     Well-controlled type 2 diabetes, with A1c 5.5%  -FSBG has been reviewed and are currently appropriate, continue home antihyperglycemic's     Recent diverticular abscess s/p colostomy  Stable, continue wound/ostomy care      Hypertension-BP currently elevated, continue rx     Discharge Follow Up Recommendations for outpatient labs/diagnostics:  Follow-up with PCP in 1 week  Follow-up with infectious disease Dr. Bejarano via telemedicine 4/22/2020 at 1 PM  Day of Discharge     HPI: No acute events overnight, patient that she slept well, she feels much better, denies any shortness of breath currently on room air    Review of Systems  Gen- No fevers, chills  CV- No chest pain, palpitations  Resp- No cough, dyspnea  GI- No N/V/D, abd pain    All systems reviewed and negative except admission HPI above  Vital Signs:   Temp:  [97.3 °F (36.3 °C)-98.7 °F (37.1 °C)] 98.2 °F (36.8 °C)  Heart Rate:  [59-84] 70  Resp:  [17-18] 18  BP: (131-160)/(66-83) 155/81     Physical Exam:  Constitutional: No acute distress, awake, alert  HENT: NCAT, mucous membranes moist  Respiratory: Clear to auscultation bilaterally, respiratory effort normal   Cardiovascular: RRR, no murmurs, rubs, or gallops, palpable pedal pulses bilaterally  Gastrointestinal: Positive bowel sounds, soft, nontender, nondistended, colostomy in place  Musculoskeletal: No bilateral ankle edema  Psychiatric: Appropriate affect, cooperative  Neurologic: Oriented x 3, no focal deficits  Skin: No rashes    Pertinent  and/or Most Recent Results     Results from last 7 days   Lab Units 04/20/20  0332 04/19/20  1941/20  0542 04/18/20  0904 04/17/20  0545 04/16/20  1653 04/16/20  1612   WBC 10*3/mm3 10.43  --  9.65 9.33 3.15*  3.11*  3.89  --    HEMOGLOBIN g/dL 9.9*  --  9.1* 10.1* 9.5*  9.4* 10.2*  --    HEMATOCRIT % 33.0*  --  30.3* 33.5* 31.5*  31.5* 33.5*  --    PLATELETS 10*3/mm3 477*  --  443 440 445  414 525*  --    SODIUM mmol/L 141  --  141 140 139  --  140   POTASSIUM mmol/L 4.1 4.3 3.2* 3.4* 3.2*  --  3.6   CHLORIDE mmol/L 106  --  106 105 106  --  103   CO2 mmol/L 22.0  --  25.0 22.0 20.0*  --  23.0   BUN mg/dL 16  --  19 23 16  --  13   CREATININE mg/dL 0.46*  --  0.47* 0.62 0.75  --  0.60   GLUCOSE mg/dL 90  --  106* 114* 108*  --  113*   CALCIUM mg/dL 8.6  --  8.4* 8.7 8.1*  --  9.1     Results from last 7 days   Lab Units 04/16/20  1612   BILIRUBIN mg/dL 0.3   ALK PHOS U/L 77   ALT (SGPT) U/L 7   AST (SGOT) U/L 15           Invalid input(s): TG, LDLCALC, LDLREALC  Results from last 7 days   Lab Units 04/20/20  0332 04/19/20  0542 04/17/20  0545 04/16/20  1612   TSH uIU/mL  --   --  2.610  --    HEMOGLOBIN A1C %  --   --  5.50  --    PROBNP pg/mL  --   --   --  537.9   PROCALCITONIN ng/mL 4.69* 10.38* 26.60* 0.10   LACTATE mmol/L  --   --   --  1.3       Brief Urine Lab Results  (Last result in the past 365 days)      Color   Clarity   Blood   Leuk Est   Nitrite   Protein   CREAT   Urine HCG        04/16/20 2102 Yellow Cloudy Negative Negative Negative Trace               Microbiology Results Abnormal     Procedure Component Value - Date/Time    Blood Culture - Blood, Arm, Left [[id number]] Collected:  04/16/20 1653    Lab Status:  Preliminary result Specimen:  Blood from Arm, Left Updated:  04/19/20 2045     Blood Culture No growth at 3 days    Blood Culture - Blood, Arm, Right [[id number]] Collected:  04/16/20 1643    Lab Status:  Preliminary result Specimen:  Blood from Arm, Right Updated:  04/19/20 2045     Blood Culture No growth at 3 days    CORONAVIRUS (COVID-19),RT-PCR, [facility], NP SWAB IN TRANSPORT MEDIA - Swab, Nasopharynx [[id number]] Collected:  04/18/20 0925    Lab Status:  Final result Specimen:  Swab from  Nasopharynx Updated:  04/18/20 1838     Reference Lab Report --     Comment: See scanned report          COVID19 Not Detected    SARS-CoV-2, PCR (IN-HOUSE), NP Swab in Transport Media - Swab, Nasopharynx [[id number]]  (Normal) Collected:  04/16/20 2227    Lab Status:  Final result Specimen:  Swab from Nasopharynx Updated:  04/17/20 0955     COVID19 Not Detected    Legionella Antigen, Urine - Urine, Urine, Clean Catch [[id number]]  (Normal) Collected:  04/17/20 0109    Lab Status:  Final result Specimen:  Urine, Clean Catch Updated:  04/17/20 0937     LEGIONELLA ANTIGEN, URINE Negative    S. Pneumo Ag Urine or CSF - Urine, Urine, Clean Catch [[id number]]  (Normal) Collected:  04/16/20 2102    Lab Status:  Final result Specimen:  Urine, Clean Catch Updated:  04/17/20 0936     Strep Pneumo Ag Negative    MRSA Screen, PCR (Inpatient) - Swab, Nares [[id number]]  (Normal) Collected:  04/17/20 0312    Lab Status:  Final result Specimen:  Swab from Nares Updated:  04/17/20 0613     MRSA PCR Negative    Narrative:       MRSA Negative    Respiratory Panel, PCR - Swab, Nasopharynx [[id number]]  (Normal) Collected:  04/16/20 2227    Lab Status:  Final result Specimen:  Swab from Nasopharynx Updated:  04/16/20 2346     ADENOVIRUS, PCR Not Detected     Coronavirus 229E Not Detected     Coronavirus HKU1 Not Detected     Coronavirus NL63 Not Detected     Coronavirus OC43 Not Detected     Human Metapneumovirus Not Detected     Human Rhinovirus/Enterovirus Not Detected     Influenza B PCR Not Detected     Parainfluenza Virus 1 Not Detected     Parainfluenza Virus 2 Not Detected     Parainfluenza Virus 3 Not Detected     Parainfluenza Virus 4 Not Detected     Bordetella pertussis pcr Not Detected     Influenza A H1 2009 PCR Not Detected     Chlamydophila pneumoniae PCR Not Detected     Mycoplasma pneumo by PCR Not Detected     Influenza A PCR Not Detected     Influenza A H3 Not Detected     Influenza A H1 Not Detected     RSV, PCR Not  Detected     Bordetella parapertussis PCR Not Detected    Narrative:       The coronavirus on the RVP is NOT COVID-19 and is NOT indicative of infection with COVID-19.           Imaging Results (All)     Procedure Component Value Units Date/Time    CT Chest Without Contrast [[id number]] Collected:  04/16/20 2042     Updated:  04/16/20 2044    Narrative:       CT CHEST, NONCONTRAST, 4/16/2020    HISTORY:  79-year-old female postop low anterior colon resection with colostomy and drainage of abdominal abscess 3/17/2020. She presents to the ED with new onset fever of 102 today.    TECHNIQUE:  CT imaging of the chest without IV contrast. Radiation dose reduction techniques included automated exposure control. Radiation audit for CT and nuclear cardiology exams in the last 12 months: 5.    FINDINGS:  Low lung volumes with elevation of the right hemidiaphragm. Tiny pleural effusions with dependent posterior lung base atelectasis, greater on the right.    Mild patchy airspace infiltrates in the right upper lobe and in the dependent posterior lung bases. Trachea and central bronchi are normal in caliber and widely patent.    Heart size is normal, there is no pericardial effusion. Normal caliber thoracic aorta.    Limited upper abdominal images show fluid beneath the right hemidiaphragm and mesenteric soft tissue edema. There is no hiatal hernia or thoracic esophageal dilatation.      Impression:       1.  Mild patchy airspace infiltrates in the right upper lobe with mild infiltrate and atelectasis in the dependent posterior lung bases.  2.  Low lung volumes with elevated right hemidiaphragm and tiny bilateral pleural effusions, greater on the right.    Signer Name: Prakash Ortega MD   Signed: 4/16/2020 8:42 PM   Workstation Name: [email or web address]    [facility]    CT Abdomen Pelvis Without Contrast [[id number]] Collected:  04/16/20 2017     Updated:  04/16/20 2019    Narrative:       CT Abdomen Pelvis  WO    INDICATION:   Abdominal surgery one month ago. Fever beginning earlier today with nausea. Concern for abscess.    TECHNIQUE:   CT of the abdomen and pelvis without IV contrast. Coronal and sagittal reconstructions were obtained.  Radiation dose reduction techniques included automated exposure control or exposure modulation based on body size. Count of known CT and cardiac nuc  med studies performed in previous 12 months: 6.     COMPARISON:   3/24/2020    FINDINGS:  Abdomen: Since previous examination bilateral pleural effusions are smaller. The liver, spleen and pancreas are normal in size. The kidneys and adrenals are unchanged. No evidence of kidney stone disease or hydronephrosis. The ureters are nondilated. No  evidence of retroperitoneal adenopathy. No distended bowel loops are seen to suggest a mechanical obstruction.    Pelvis: No evidence of adenopathy, mass or fluid collection. No evidence of free air. The uterus is enlarged for a patient this age but this is unchanged and likely represents a fibroid. A left-sided pelvic drain seen in the previous scan is no longer  seen but there is no evidence of pelvic abscess.      Impression:       No evidence of abscess. No evidence of bowel obstruction or perforation. Bilateral effusions at the lung bases are smaller. There is a small amount of ascites around the liver that has decreased slightly.          Signer Name: Jordan Montes De Oca MD   Signed: 4/16/2020 8:17 PM   Workstation Name: [id number]    [facility]          Plan for Follow-up of Pending Labs/Results: Infectious disease   Order Current Status    Blood Culture - Blood, Arm, Left Preliminary result    Blood Culture - Blood, Arm, Right Preliminary result        Discharge Details        Discharge Medications      New Medications      Instructions Start Date   doxycycline 100 MG capsule  Commonly known as:  MONODOX   100 mg, Oral, Every 12 Hours Scheduled         Continue These  Medications      Instructions Start Date   Align 4 MG capsule   1 capsule, Oral, Daily      busPIRone 15 MG tablet  Commonly known as:  BUSPAR   15 mg, Oral, Daily      calcium citrate-vitamin d 315-250 MG-UNIT tablet tablet  Commonly known as:  CALCITRATE   1 tablet, Oral, Daily      Claritin 10 MG tablet  Generic drug:  loratadine   10 mg, Oral, Daily      gabapentin 300 MG capsule  Commonly known as:  NEURONTIN   300 mg, Oral, Nightly      Jardiance 10 MG tablet  Generic drug:  Empagliflozin   10 mg, Oral, Daily      levothyroxine 88 MCG tablet  Commonly known as:  SYNTHROID, LEVOTHROID   88 mcg, Oral, Daily      LORazepam 0.5 MG tablet  Commonly known as:  ATIVAN   0.5 mg, Oral, Every 8 Hours PRN      metoprolol succinate  MG 24 hr tablet  Commonly known as:  TOPROL-XL   100 mg, Oral, Daily      Myrbetriq 50 MG tablet sustained-release 24 hour 24 hr tablet  Generic drug:  Mirabegron ER   TAKE 1 TABLET BY MOUTH DAILY      pravastatin 40 MG tablet  Commonly known as:  PRAVACHOL   40 mg, Oral, Daily      sucralfate 1 g tablet  Commonly known as:  CARAFATE   1 g, Oral, 2 Times Daily      Vitamin D3 50 MCG (2000 UT) capsule   2,000 Units, Oral, Daily             Allergies   Allergen Reactions   • Evista [Raloxifene] Rash   • Cephalexin Diarrhea   • Hydrochlorothiazide Diarrhea   • Meloxicam Other (See Comments)   • Metronidazole Diarrhea   • Oxybutynin Cough   • Propylthiouracil Other (See Comments)     Severally reduce WBC's    • Sulfa Antibiotics Hives   • Sulfacetamide Sodium-Sulfur Hives   • Sulfur Dioxide Hives         Discharge Disposition: Home  Home or Self Care  Diet:  Hospital:  Diet Order   Procedures   • Diet Regular; Consistent Carbohydrate       Activity: As tolerated      Restrictions or Other Recommendations:  None       CODE STATUS:    Code Status and Medical Interventions:   Ordered at: 04/16/20 6072     Code Status:    CPR     Medical Interventions (Level of Support Prior to Arrest):    Full         Future Appointments   Date Time Provider Department Center   9/17/2020  2:10 PM Walter Caro MD [facility] None       Additional Instructions for the Follow-ups that You Need to Schedule     Ambulatory Referral to Home Health   As directed      Face to Face Visit Date:  4/17/2020    Follow-up provider for Plan of Care?:  I treated the patient in an acute care facility and will not continue treatment after discharge.    Follow-up provider:  JUANA JULIO [[id number]]    Reason/Clinical Findings:  Colostomy care, Pneumonia of both lungs, Fever, Bilateral Pleural Effusions.    Describe mobility limitations that make leaving home difficult:  Impaired mobility and weakness due to fever and pneumonia.    Nursing/Therapeutic Services Requested:  Skilled Nursing    Skilled nursing orders:  Other (Resume skilled nursing. )               Time Spent on Discharge:  35minutes    Electronically signed by Wayne Dickson MD, 04/20/20, 12:51 PM.

## 2020-04-20 NOTE — PROGRESS NOTES
Case Management Discharge Note      Final Note: Notified Brenden at [facility] of pt's DC today.     Provided Post Acute Provider List?: Refused  Refused Provider List Comment: Will continue to use [facility]    Destination      No service has been selected for the patient.      Durable Medical Equipment      No service has been selected for the patient.      Dialysis/Infusion      No service has been selected for the patient.      Home Medical Care - Selection Complete      Service Provider Request Status Selected Services Address Phone Number Fax Number    [facility] Selected Home Health Services [address] [id number] [phone]      Therapy      No service has been selected for the patient.      Community Resources      No service has been selected for the patient.             Final Discharge Disposition Code: 06 - home with home health care

## 2020-04-20 NOTE — THERAPY DISCHARGE NOTE
Acute Care - Occupational Therapy Initial Eval/Discharge  [facility]     Patient Name: Neema Richard  : 1940  MRN: [id number]  Today's Date: 2020     Date of Referral to OT: 20         Admit Date: 2020       ICD-10-CM ICD-9-CM   1. Pneumonia of both lungs due to infectious organism, unspecified part of lung J18.9 483.8   2. Acute febrile illness R50.9 780.60   3. Elevated blood pressure reading with diagnosis of hypertension I10 401.9   4. Bilateral pleural effusion J90 511.9     Patient Active Problem List   Diagnosis   • Well woman exam with routine gynecological exam   • Osteoporosis - GYN   • Essential hypertension   • Acquired hypothyroidism   • IBS-d (takes Buspar)   • DCIS (ductal carcinoma in situ)   • Cystocele and rectocele with complete uterovaginal prolapse   • Mixed urge and stress incontinence   • Pessary maintenance   • High cholesterol   • Neutrophilic leukocytosis   • Arthritis   • Overactive bladder   • Dyspepsia   • Pneumonia of both lungs due to infectious organism   • Fever   • Bilateral pleural effusion   • History of diverticular abscess   • DM2 (diabetes mellitus, type 2) (CMS/HCC)   • Rule out Covid-19 pneumonia     Past Medical History:   Diagnosis Date   • Acquired hypothyroidism    • Colon polyp - sees Dr. Vazquez 2016   • DCIS (ductal carcinoma in situ) 2017    Dx at the time of breast reduction. ER and [place] (+)   • Essential hypertension    • High cholesterol    • Hx of herpes genitalis    • IBS-d (takes Buspar)      Past Surgical History:   Procedure Laterality Date   • BILATERAL BREAST REDUCTION Bilateral    • BREAST LUMPECTOMY Left    • CATARACT EXTRACTION, BILATERAL Bilateral    • CERVICAL CONIZATION     • COLON RESECTION N/A 3/17/2020    Procedure: LOW ANTERIOR COLON RESECTION, MOBILIZATION OF SPLENIC FLEXURE, DRAINAGE OF ABSCESS AND COLOSTOMY CREATION;  Surgeon: Sravan Vazquez MD;  Location: [facility];  Service: General;   Laterality: N/A;   • FACIAL COSMETIC SURGERY  2001   • FACIAL COSMETIC SURGERY  2012   • HEMORRHOIDECTOMY  1974   • LAPAROSCOPIC CHOLECYSTECTOMY  2010   • REDUCTION MAMMAPLASTY Bilateral 04/13/2017    DCIS   • SINUS SURGERY  1992   • THYROIDECTOMY, PARTIAL  1963   • TONSILLECTOMY  1953   • VARICOSE VEIN SURGERY Bilateral 1981          OT ASSESSMENT FLOWSHEET (last 12 hours)      Occupational Therapy Evaluation     Row Name 04/20/20 0750                   OT Evaluation Time/Intention    Subjective Information  complains of;weakness;fatigue;pain  -KF        Document Type  discharge evaluation/summary  -KF        Mode of Treatment  occupational therapy  -KF        Patient Effort  excellent  -KF        Symptoms Noted During/After Treatment  none  -KF           General Information    Patient Profile Reviewed?  yes  -KF        Patient Observations  alert;cooperative;agree to therapy  -KF        Prior Level of Function  independent:;all household mobility;community mobility;transfer;ADL's;bed mobility;home management  -KF        Equipment Currently Used at Home  bath bench  -KF        Existing Precautions/Restrictions  other (see comments) ostomy  -KF        Risks Reviewed  patient:;LOB;nausea/vomiting;dizziness;increased discomfort;change in vital signs  -KF        Benefits Reviewed  patient:;improve function;increase independence;increase strength;increase balance;increase knowledge  -KF        Barriers to Rehab  none identified  -KF           Relationship/Environment    Primary Source of Support/Comfort  friend;child(sary)  -KF        Lives With  friend(s)  -KF        Family Caregiver if Needed  friend(s)  -KF           Resource/Environmental Concerns    Current Living Arrangements  home/apartment/condo  -KF           Home Main Entrance    Number of Stairs, Main Entrance  four  -KF           Stairs Within Home, Primary    Stairs, Within Home, Primary  13 stairs from basement to access laundry  -KF        Stair Railings,  Within Home, Primary  railings on both sides of stairs  -KF           Cognitive Assessment/Interventions    Additional Documentation  Cognitive Assessment/Intervention (Group)  -KF           Cognitive Assessment/Intervention- PT/OT    Affect/Mental Status (Cognitive)  WNL  -KF        Orientation Status (Cognition)  oriented x 4  -KF        Follows Commands (Cognition)  follows one step commands;over 90% accuracy;WNL  -KF        Cognitive Function (Cognitive)  WNL  -KF        Safety Deficit (Cognitive)  mild deficit;insight into deficits/self awareness;awareness of need for assistance  -KF        Cognitive Interventions (Cognitive)  occupation/activity based interventions;process/task specific training  -KF        Cognitive Assessment/Intervention Comment  good safety awareness, takes time with activities  -KF           Safety Issues, Functional Mobility    Safety Issues Affecting Function (Mobility)  insight into deficits/self awareness;awareness of need for assistance  -KF        Impairments Affecting Function (Mobility)  balance;endurance/activity tolerance  -KF           Bed Mobility Assessment/Treatment    Bed Mobility Assessment/Treatment  rolling right;scooting/bridging;supine-sit;sit-supine  -KF        Rolling Right [place] (Bed Mobility)  independent  -KF        Scooting/Bridging [place] (Bed Mobility)  independent  -KF        Supine-Sit [place] (Bed Mobility)  independent  -KF        Sit-Supine [place] (Bed Mobility)  independent  -KF        Bed Mobility, Safety Issues  decreased use of arms for pushing/pulling;decreased use of legs for bridging/pushing  -KF        Comment (Bed Mobility)  no use of bed rail or HOB elevation  -KF           Functional Mobility    Functional Mobility- Ind. Level  supervision required;standby assist  -KF        Functional Mobility-Distance (Feet)  20  -KF        Functional Mobility- Safety Issues  step length decreased;weight-shifting ability decreased  -KF         Functional Mobility- Comment  no LOB   -KF           Transfer Assessment/Treatment    Transfer Assessment/Treatment  sit-stand transfer;stand-sit transfer;bed-chair transfer;chair-bed transfer  -KF        Comment (Transfers)  good safety   -KF           Bed-Chair Transfer    Bed-Chair [place] (Transfers)  stand by assist  -KF           Chair-Bed Transfer    Chair-Bed [place] (Transfers)  stand by assist  -KF           Sit-Stand Transfer    Sit-Stand [place] (Transfers)  independent  -KF           Stand-Sit Transfer    Stand-Sit [place] (Transfers)  independent  -KF           ADL Assessment/Intervention    BADL Assessment/Intervention  upper body dressing;lower body dressing;grooming  -KF           Upper Body Dressing Assessment/Training    Upper Body Dressing [place] Level  don;doff;front opening garment;set up  -KF        Upper Body Dressing Position  edge of bed sitting  -KF           Lower Body Dressing Assessment/Training    Lower Body Dressing [place] Level  don;doff;socks;independent  -KF        Lower Body Dressing Position  edge of bed sitting  -KF           Grooming Assessment/Training    [place] Level (Grooming)  oral care regimen;wash face, hands;independent  -KF        Grooming Position  sink side;unsupported standing  -KF           BADL Safety/Performance    Impairments, BADL Safety/Performance  balance;endurance/activity tolerance  -KF        Skilled BADL Treatment/Intervention  BADL process/adaptation training;cognitive/safety deficit modifications  -KF        Progress in BADL Status  improvement noted  -KF           General ROM    GENERAL ROM COMMENTS  BUE WNL   -KF           MMT (Manual Muscle Testing)    General MMT Comments  BUE grossly 5/5   -KF           Motor Assessment/Interventions    Additional Documentation  Balance (Group);Balance Interventions (Group);Gross Motor Coordination (Group);Fine Motor Testing & Training (Group)  -KF           Gross Motor  Coordination    Gross Motor Impairments  AROM (active range of motion);coordination  -KF        Gross Motor Skill, Impairments Detail  WNL BUE   -KF           Balance    Balance  static sitting balance;static standing balance;dynamic sitting balance;dynamic standing balance  -KF           Static Sitting Balance    Level of [place] (Unsupported Sitting, Static Balance)  independent  -KF        Sitting Position (Unsupported Sitting, Static Balance)  sitting in chair;sitting on edge of bed  -KF           Dynamic Sitting Balance    Level of [place], Reaches Outside Midline (Sitting, Dynamic Balance)  independent  -KF        Sitting Position, Reaches Outside Midline (Sitting, Dynamic Balance)  sitting in chair;sitting on edge of bed  -KF           Static Standing Balance    Level of [place] (Supported Standing, Static Balance)  independent  -KF        Time Able to Maintain Position (Supported Standing, Static Balance)  1 to 2 minutes  -KF        Level of [place] (Unsupported Standing, Static Balance)  supervision  -KF        Time Able to Maintain Position (Unsupported Standing, Static Balance)  1 to 2 minutes  -KF           Dynamic Standing Balance    Level of [place], Reaches Outside Midline (Standing, Dynamic Balance)  standby assist  -KF        Time Able to Maintain Position, Reaches Outside Midline (Standing, Dynamic Balance)  2 to 3 minutes  -KF        Assistive Device Utilized (Supported Standing, Dynamic Balance)  walker, rolling  -KF           Fine Motor Testing & Training    Training Activity, Fine Motor Coordination  manipulation of grooming tools  -KF        Comment, Fine Motor Coordination  WNL   -KF           Sensory Assessment/Intervention    Sensory General Assessment  no sensation deficits identified  -KF        Additional Documentation  Vision Assessment/Intervention (Group)  -KF           Vision Assessment/Intervention    Visual Impairment/Limitations  corrective lenses for  reading;corrective lenses for distance  -KF           Positioning and Restraints    Pre-Treatment Position  in bed  -KF        Post Treatment Position  bed  -KF        In Bed  notified nsg;supine;fowlers;call light within reach;encouraged to call for assist  -KF           Pain Assessment    Additional Documentation  Pain Scale: Numbers Pre/Post-Treatment (Group)  -KF           Pain Scale: Numbers Pre/Post-Treatment    Pain Scale: Numbers, Pretreatment  0/10 - no pain  -KF        Pain Scale: Numbers, Post-Treatment  0/10 - no pain  -KF        Pain Intervention(s)  Repositioned;Ambulation/increased activity  -KF           Wound 03/17/20 1714 upper;midline abdomen Incision    Wound - Properties Group Date first assessed: 03/17/20  -EL Time first assessed: 1714  -EL Present on Hospital Admission: N  -EL Orientation: upper;midline  -EL Location: abdomen  -EL Primary Wound Type: Incision  -EL       Plan of Care Review    Plan of Care Reviewed With  patient  -KF        Progress  improving  -KF           Clinical Impression (OT)    Date of Referral to OT  04/19/20  -KF        OT Diagnosis  ADL decline   -KF        Patient/Family Goals Statement (OT Eval)  PLOF   -KF        Criteria for Skilled Therapeutic Interventions Met (OT Eval)  treatment indicated;no  -KF        Therapy Frequency (OT Eval)  evaluation only  -KF        Care Plan Review (OT)  evaluation/treatment results reviewed;care plan/treatment goals reviewed;risks/benefits reviewed;current/potential barriers reviewed;patient/other agree to care plan  -KF        Anticipated Equipment Needs at Discharge (OT)  -- none  -KF        Anticipated Discharge Disposition (OT)  home with assist  -KF           Vital Signs    Pre Systolic BP Rehab  160  -KF        Pre Treatment Diastolic BP  83 RN cleared VSS   -KF        Pretreatment Heart Rate (beats/min)  78  -KF        Pre SpO2 (%)  94  -KF        O2 Delivery Pre Treatment  room air  -KF        Intra SpO2 (%)  94  -KF         O2 Delivery Intra Treatment  room air  -KF        Pre Patient Position  Supine  -KF        Intra Patient Position  Standing  -KF        Post Patient Position  Supine  -KF           OT Goals    Bed Mobility Goal Selection (OT)  bed mobility, OT goal 1  -KF        Transfer Goal Selection (OT)  transfer, OT goal 1  -KF        Dressing Goal Selection (OT)  dressing, OT goal 1  -KF           Bed Mobility Goal 1 (OT)    Activity/Assistive Device (Bed Mobility Goal 1, OT)  bed mobility activities, all  -KF        [place] Level/Cues Needed (Bed Mobility Goal 1, OT)  independent  -KF        Time Frame (Bed Mobility Goal 1, OT)  long term goal (LTG);by discharge  -KF        Progress/Outcomes (Bed Mobility Goal 1, OT)  goal met  -KF           Transfer Goal 1 (OT)    Activity/Assistive Device (Transfer Goal 1, OT)  sit-to-stand/stand-to-sit;bed-to-chair/chair-to-bed  -KF        [place] Level/Cues Needed (Transfer Goal 1, OT)  supervision required  -KF        Time Frame (Transfer Goal 1, OT)  long term goal (LTG);by discharge  -KF        Progress/Outcome (Transfer Goal 1, OT)  goal met  -KF           Dressing Goal 1 (OT)    Activity/Assistive Device (Dressing Goal 1, OT)  lower body dressing socks  -KF        [place]/Cues Needed (Dressing Goal 1, OT)  set-up required  -KF        Time Frame (Dressing Goal 1, OT)  long term goal (LTG);by discharge  -KF        Progress/Outcome (Dressing Goal 1, OT)  goal met  -KF           Discharge Summary (Occupational Therapy)    Additional Documentation  Discharge Summary, OT Eval (Group)  -KF           Discharge Summary, OT Eval    Reason for Discharge (OT Discharge Summary)  no further needs identified;no further expectation of functional progress  -KF        Outcomes Achieved Upon Discharge (OT Discharge Summary)  able to achieve all goals within established timeline  -KF           Living Environment    Home Accessibility  stairs to enter home;stairs within home;tub/shower  is not walk in  -KF          User Key  (r) = Recorded By, (t) = Taken By, (c) = Cosigned By    Initials Name Effective Dates    Kacie Brown OT 04/03/18 -     Dominga Earl RN 04/10/19 -               OT Recommendation and Plan  Outcome Summary/Treatment Plan (OT)  Anticipated Equipment Needs at Discharge (OT): (none)  Anticipated Discharge Disposition (OT): home with assist  Reason for Discharge (OT Discharge Summary): no further needs identified, no further expectation of functional progress  Therapy Frequency (OT Eval): evaluation only  Plan of Care Review  Plan of Care Reviewed With: patient  Plan of Care Reviewed With: patient  Outcome Summary: OT eval completed Pt I sink side ADLs in standing, SUP functional mob no LOB in room, I bed mob, I doff/don socks, no further recom IPOT at this time recom home with assist at d/c     Rehab Goal Summary     Row Name 04/20/20 0750             Occupational Therapy Goals    Bed Mobility Goal Selection (OT)  bed mobility, OT goal 1  -KF      Transfer Goal Selection (OT)  transfer, OT goal 1  -KF      Dressing Goal Selection (OT)  dressing, OT goal 1  -KF         Bed Mobility Goal 1 (OT)    Activity/Assistive Device (Bed Mobility Goal 1, OT)  bed mobility activities, all  -KF      [place] Level/Cues Needed (Bed Mobility Goal 1, OT)  independent  -KF      Time Frame (Bed Mobility Goal 1, OT)  long term goal (LTG);by discharge  -KF      Progress/Outcomes (Bed Mobility Goal 1, OT)  goal met  -KF         Transfer Goal 1 (OT)    Activity/Assistive Device (Transfer Goal 1, OT)  sit-to-stand/stand-to-sit;bed-to-chair/chair-to-bed  -KF      [place] Level/Cues Needed (Transfer Goal 1, OT)  supervision required  -KF      Time Frame (Transfer Goal 1, OT)  long term goal (LTG);by discharge  -KF      Progress/Outcome (Transfer Goal 1, OT)  goal met  -KF         Dressing Goal 1 (OT)    Activity/Assistive Device (Dressing Goal 1, OT)  lower body dressing socks   -KF      [place]/Cues Needed (Dressing Goal 1, OT)  set-up required  -KF      Time Frame (Dressing Goal 1, OT)  long term goal (LTG);by discharge  -KF      Progress/Outcome (Dressing Goal 1, OT)  goal met  -KF        User Key  (r) = Recorded By, (t) = Taken By, (c) = Cosigned By    Initials Name Provider Type Discipline    Kacie Brown, OT Occupational Therapist OT          Outcome Measures     Row Name 04/20/20 0750             How much help from another is currently needed...    Putting on and taking off regular lower body clothing?  4  -KF      Bathing (including washing, rinsing, and drying)  4  -KF      Toileting (which includes using toilet bed pan or urinal)  4  -KF      Putting on and taking off regular upper body clothing  4  -KF      Taking care of personal grooming (such as brushing teeth)  4  -KF      Eating meals  4  -KF      AM-PAC 6 Clicks Score (OT)  24  -KF         Functional Assessment    Outcome Measure Options  AM-PAC 6 Clicks Daily Activity (OT)  -KF        User Key  (r) = Recorded By, (t) = Taken By, (c) = Cosigned By    Initials Name Provider Type    Kacie Brown OT Occupational Therapist          Time Calculation:   Time Calculation- OT     Row Name 04/20/20 0750             Time Calculation- OT    OT Start Time  0750  -KF      Total Timed Code Minutes- OT  0 minute(s)  -KF      OT Received On  04/20/20  -KF        User Key  (r) = Recorded By, (t) = Taken By, (c) = Cosigned By    Initials Name Provider Type    Kacie Brown OT Occupational Therapist        Therapy Suggested Charges     Code   Minutes Charges    None           Therapy Charges for Today     Code Description Service Date Service Provider Modifiers Qty    [id number]  OT EVAL LOW COMPLEXITY 4 4/20/2020 Kacie Fitch OT GO 1               OT Discharge Summary  Anticipated Discharge Disposition (OT): home with assist    Kacie Fitch OT  4/20/2020

## 2020-04-21 ENCOUNTER — READMISSION MANAGEMENT (OUTPATIENT)
Dept: CALL CENTER | Facility: HOSPITAL | Age: 80
End: 2020-04-21

## 2020-04-21 NOTE — OUTREACH NOTE
Prep Survey      Responses   [facility] discharged from?  [place]   Is LACE score < 7 ?  No   Eligibility  Readm Mgmt   Discharge diagnosis  sepsis d/t PNA, low anterior resection/ colostomy 3/20   COVID-19 Test Status  Negative   Does the patient have one of the following disease processes/diagnoses(primary or secondary)?  Sepsis   Does the patient have Home health ordered?  Yes   What is the Home health agency?   [facility]   Is there a DME ordered?  No   Prep survey completed?  Yes          Nu Kumar RN

## 2020-04-21 NOTE — OUTREACH NOTE
Sepsis Week 1 Survey      Responses   [facility] patient discharged from?  [place]   COVID-19 Test Status  Negative   Does the patient have one of the following disease processes/diagnoses(primary or secondary)?  Sepsis   Is there a successful TCM telephone encounter documented?  No   Week 1 attempt successful?  Yes   Call start time  1321   Call end time  1326   Discharge diagnosis  sepsis d/t PNA, low anterior resection/ colostomy 3/20   Is patient permission given to speak with other caregiver?  Yes   List who call center can speak with  dtr   Meds reviewed with patient/caregiver?  Yes   Is the patient having any side effects they believe may be caused by any medication additions or changes?  No   Does the patient have all medications related to this admission filled (includes all antibiotics, inhalers, nebulizers,steroids,etc.)  Yes   Is the patient taking all medications as directed (includes completed medication regime)?  Yes   Does the patient have a primary care provider?   Yes   Does the patient have an appointment with their PCP within 7 days of discharge?  Yes   Has the patient kept scheduled appointments due by today?  N/A   Comments  Telehealth  appt 4-22   What is the Home health agency?   [facility]   Has home health visited the patient within 72 hours of discharge?  Yes   DME comments  Pt is not on home O2 and she does not have pulse ox that she is aware of.    Psychosocial issues?  No   Comments  Pt has not been checking temp but advised to monitor. Cough and nausea resolved. Appetite is returning. Pt has colostomy but no change in stool. Pt is not using IS, encouraged to use IS for deep breathing.    Did the patient receive a copy of their discharge instructions?  Yes   Nursing interventions  Reviewed instructions with patient   What is the patient's perception of their health status since discharge?  Improving   Is the patient/caregiver able to teach back Sepsis?  S - Shivering,fever or very  cold, S - Sleepy, difficult to arouse,confused, I -   I feel like I might die-a feeling of hopelessness, S - Short of breath   Is patient/caregiver able to teach back steps to recovery at home?  Rest and regain strength, Record milestones and struggles in a journal, Eat a balanced diet   Is the patient/caregiver able to teach back signs and symptoms of worsening condition:  Rapid heart rate (>90), Fever, Edema   Is the patient/caregiver able to teach back the hierarchy of who to call/visit for symptoms/problems? PCP, Specialist, Home health nurse, Urgent Care, ED, 911  Yes   Week 1 call completed?  Yes          Leslee Everett RN

## 2020-04-22 ENCOUNTER — READMISSION MANAGEMENT (OUTPATIENT)
Dept: CALL CENTER | Facility: HOSPITAL | Age: 80
End: 2020-04-22

## 2020-04-22 NOTE — OUTREACH NOTE
Sepsis Week 1 Survey      Responses   [facility] patient discharged from?  [place]   COVID-19 Test Status  Negative   Does the patient have one of the following disease processes/diagnoses(primary or secondary)?  Sepsis   Is there a successful TCM telephone encounter documented?  No   Week 1 attempt successful?  Yes   Call start time  1128   Call end time  1137   Discharge diagnosis  sepsis d/t PNA, low anterior resection/ colostomy 3/20   Is patient permission given to speak with other caregiver?  Yes   List who call center can speak with  Theodora lindsay reviewed with patient/caregiver?  Yes   Is the patient having any side effects they believe may be caused by any medication additions or changes?  No   Does the patient have all medications related to this admission filled (includes all antibiotics, inhalers, nebulizers,steroids,etc.)  Yes   Is the patient taking all medications as directed (includes completed medication regime)?  Yes   Does the patient have a primary care provider?   Yes   Comments regarding PCP  PCP Dr David Mackey. Video call 4/23/20 230pm   Does the patient have an appointment with their PCP within 7 days of discharge?  Yes   Has the patient kept scheduled appointments due by today?  N/A   Comments  Patient has telehealth appt with Dr Bejarano today 4/22/20 1pm   What is the Home health agency?   [facility]   Has home health visited the patient within 72 hours of discharge?  Yes   Home health comments  Patient reports HH came this morning.    Psychosocial issues?  No   Comments  Patient does not have home pulse oximeter but states that HH checked this morning.    Did the patient receive a copy of their discharge instructions?  Yes   Nursing interventions  Reviewed instructions with patient   What is the patient's perception of their health status since discharge?  Improving   Nursing interventions  Nurse provided reassurance to patient, Nurse provided patient education   Is  patient/caregiver able to teach back steps to recovery at home?  Set small, achievable goals for return to baseline health, Rest and regain strength   Is the patient/caregiver able to teach back signs and symptoms of worsening condition:  Shortness of breath/rapid respiratory rate   Is the patient/caregiver able to teach back the hierarchy of who to call/visit for symptoms/problems? PCP, Specialist, Home health nurse, Urgent Care, ED, 911  Yes   Week 1 call completed?  Yes          Tessa Colvin RN

## 2020-04-23 ENCOUNTER — TELEPHONE (OUTPATIENT)
Dept: EMERGENCY DEPT | Facility: HOSPITAL | Age: 80
End: 2020-04-23

## 2020-04-23 ENCOUNTER — READMISSION MANAGEMENT (OUTPATIENT)
Dept: CALL CENTER | Facility: HOSPITAL | Age: 80
End: 2020-04-23

## 2020-04-23 NOTE — PROGRESS NOTES
Called by lab after discharge this AM that 2 of 2 blood cultures returned with gram positive rods. I informed Dr. Dasrhan Bejarano who saw the patient while here.  He will follow up identification and adjust antibiotics as indicated.    Electronically signed by Jerry Marin MD, 04/23/20, 10:42 AM.

## 2020-04-23 NOTE — OUTREACH NOTE
Sepsis Week 1 Survey      Responses   [facility] patient discharged from?  [place]   COVID-19 Test Status  Negative   Does the patient have one of the following disease processes/diagnoses(primary or secondary)?  Sepsis   Is there a successful TCM telephone encounter documented?  No   Week 1 attempt successful?  Yes   Call start time  1304   Call end time  1307   Meds reviewed with patient/caregiver?  Yes   Is the patient having any side effects they believe may be caused by any medication additions or changes?  No   Does the patient have all medications related to this admission filled (includes all antibiotics, inhalers, nebulizers,steroids,etc.)  Yes   Is the patient taking all medications as directed (includes completed medication regime)?  Yes   Does the patient have a primary care provider?   Yes   Comments regarding PCP  PATIENT STATES SHE HAS A VIDEO APPOINTMENT WITH HER PCP TODAY AT 1430   Does the patient have an appointment with their PCP within 7 days of discharge?  Yes   Has the patient kept scheduled appointments due by today?  N/A   What is the Home health agency?   [facility]   Has home health visited the patient within 72 hours of discharge?  Yes   Did the patient receive a copy of their discharge instructions?  Yes   Nursing interventions  Reviewed instructions with patient   What is the patient's perception of their health status since discharge?  Improving   Is the patient/caregiver able to teach back Sepsis?  S - Shivering,fever or very cold, E - Extreme pain or generalized discomfort (worst ever,especially abdomen), P - Pale or discolored skin, S - Sleepy, difficult to arouse,confused, I -   I feel like I might die-a feeling of hopelessness, S - Short of breath   Is patient/caregiver able to teach back steps to recovery at home?  Set small, achievable goals for return to baseline health, Rest and regain strength, Make a list of questions for PCP appoinment   Is the patient/caregiver able to  teach back signs and symptoms of worsening condition:  Fever, Hyperthermia, Rapid heart rate (>90), Shortness of breath/rapid respiratory rate, Altered mental status(confusion/coma), Edema, High blood glucose without diabetes   Is the patient/caregiver able to teach back the hierarchy of who to call/visit for symptoms/problems? PCP, Specialist, Home health nurse, Urgent Care, ED, 911  Yes   Week 1 call completed?  Yes          Loan Plasencia LPN

## 2020-04-24 LAB
BACTERIA SPEC AEROBE CULT: ABNORMAL
BACTERIA SPEC AEROBE CULT: ABNORMAL
GRAM STN SPEC: ABNORMAL
GRAM STN SPEC: ABNORMAL
ISOLATED FROM: ABNORMAL
ISOLATED FROM: ABNORMAL

## 2020-04-26 ENCOUNTER — READMISSION MANAGEMENT (OUTPATIENT)
Dept: CALL CENTER | Facility: HOSPITAL | Age: 80
End: 2020-04-26

## 2020-04-26 NOTE — OUTREACH NOTE
Sepsis Week 1 Survey      Responses   [facility] [place] patient discharged from?  [place]   COVID-19 Test Status  Negative   Does the patient have one of the following disease processes/diagnoses(primary or secondary)?  Sepsis   Is there a successful TCM telephone encounter documented?  No   Week 1 attempt successful?  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  Yes   List who call center can speak with  Theodora lindsay reviewed with patient/caregiver?  Yes   Is the patient having any side effects they believe may be caused by any medication additions or changes?  No   Does the patient have all medications related to this admission filled (includes all antibiotics, inhalers, nebulizers,steroids,etc.)  Yes   Is the patient taking all medications as directed (includes completed medication regime)?  Yes   Does the patient have a primary care provider?   Yes   Comments regarding PCP  Patient has had telehealth f/u with PCP since discharge.    Has the patient kept scheduled appointments due by today?  Yes   Comments  Patient has telehealth appt with Dr Bejarano.   What is the Home health agency?   [facility]   Psychosocial issues?  No   Comments  Patient reports that she is not having any fever.    Did the patient receive a copy of their discharge instructions?  Yes   Nursing interventions  Reviewed instructions with patient   What is the patient's perception of their health status since discharge?  Improving   Nursing interventions  Nurse provided reassurance to patient, Nurse provided patient education   Is patient/caregiver able to teach back steps to recovery at home?  Eat a balanced diet, Set small, achievable goals for return to baseline health, Rest and regain strength   Is the patient/caregiver able to teach back signs and symptoms of worsening condition:  Fever,  Hyperthermia, Rapid heart rate (>90), Shortness of breath/rapid respiratory rate, Altered mental status(confusion/coma), Edema, High blood glucose without diabetes   Is the patient/caregiver able to teach back the hierarchy of who to call/visit for symptoms/problems? PCP, Specialist, Home health nurse, Urgent Care, ED, 911  Yes   Additional teach back comments  Patient reports that she is trying to eat more, drink more fluids and increase activity as tolerated.    Week 1 call completed?  Yes          Tessa Colvin RN

## 2020-04-28 LAB
FUNGUS WND CULT: NORMAL
MYCOBACTERIUM SPEC CULT: NORMAL
NIGHT BLUE STAIN TISS: NORMAL

## 2020-04-29 ENCOUNTER — READMISSION MANAGEMENT (OUTPATIENT)
Dept: CALL CENTER | Facility: HOSPITAL | Age: 80
End: 2020-04-29

## 2020-04-29 NOTE — OUTREACH NOTE
Sepsis Week 2 Survey      Responses   [facility] patient discharged from?  [place]   COVID-19 Test Status  Negative   Does the patient have one of the following disease processes/diagnoses(primary or secondary)?  Sepsis   Week 2 attempt successful?  Yes   Call start time  1609   Call end time  1612   Meds reviewed with patient/caregiver?  Yes   Is the patient having any side effects they believe may be caused by any medication additions or changes?  No   Does the patient have all medications related to this admission filled (includes all antibiotics, inhalers, nebulizers,steroids,etc.)  Yes   Is the patient taking all medications as directed (includes completed medication regime)?  Yes   Does the patient have a primary care provider?   Yes   Comments regarding PCP  PATIENT HAS HAD APPOINTMENTS WITH DR. JULIO AND DR. LORENZANA   Has the patient kept scheduled appointments due by today?  Yes   What is the Home health agency?   [facility]   Has home health visited the patient within 72 hours of discharge?  Yes   Did the patient receive a copy of their discharge instructions?  Yes   Nursing interventions  Reviewed instructions with patient   What is the patient's perception of their health status since discharge?  Improving   Is the patient/caregiver able to teach back Sepsis?  S - Shivering,fever or very cold, E - Extreme pain or generalized discomfort (worst ever,especially abdomen), P - Pale or discolored skin, S - Sleepy, difficult to arouse,confused, I -   I feel like I might die-a feeling of hopelessness, S - Short of breath   Is patient/caregiver able to teach back steps to recovery at home?  Set small, achievable goals for return to baseline health, Rest and regain strength, Make a list of questions for PCP appoinment   Is the patient/caregiver able to teach back signs and symptoms of worsening condition:  Fever, Hyperthermia, Rapid heart rate (>90), Shortness of breath/rapid respiratory rate, Altered mental  status(confusion/coma), Edema, High blood glucose without diabetes   Is the patient/caregiver able to teach back the hierarchy of who to call/visit for symptoms/problems? PCP, Specialist, Home health nurse, Urgent Care, ED, 911  Yes   Additional teach back comments  [facility] DOING MUCH BETTER   Week 2 call completed?  Yes          Loan Plasencia LPN

## 2020-05-02 ENCOUNTER — READMISSION MANAGEMENT (OUTPATIENT)
Dept: CALL CENTER | Facility: HOSPITAL | Age: 80
End: 2020-05-02

## 2020-05-02 NOTE — OUTREACH NOTE
Sepsis Week 2 Survey      Responses   [facility] patient discharged from?  [place]   COVID-19 Test Status  Negative   Does the patient have one of the following disease processes/diagnoses(primary or secondary)?  Sepsis   Week 2 attempt successful?  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  Yes   Is the patient having any side effects they believe may be caused by any medication additions or changes?  No   Does the patient have all medications related to this admission filled (includes all antibiotics, inhalers, nebulizers,steroids,etc.)  Yes   Is the patient taking all medications as directed (includes completed medication regime)?  Yes   Does the patient have a primary care provider?   Yes   Does the patient have an appointment with their PCP within 7 days of discharge?  Yes   Has the patient kept scheduled appointments due by today?  Yes   What is the Home health agency?   [facility]   Has home health visited the patient within 72 hours of discharge?  Yes   DME comments  Pt is not on home O2 and she does not have pulse ox that she is aware of.    Pulse Ox monitoring  None   Psychosocial issues?  No   Comments  Patient reports that she is not having any fever. No SOB.    Did the patient receive a copy of their discharge instructions?  Yes   Nursing interventions  Reviewed instructions with patient   What is the patient's perception of their health status since discharge?  Improving   Nursing interventions  Nurse provided patient education   Is the patient/caregiver able to teach back Sepsis?  S - Shivering,fever or very cold, E - Extreme pain or generalized discomfort (worst ever,especially abdomen), P - Pale or discolored skin, S - Sleepy, difficult to arouse,confused, I -   I feel like I might die-a feeling of hopelessness, S - Short of breath   Nursing interventions  Nurse provided  reassurance to patient, Nurse provided patient education   Is patient/caregiver able to teach back steps to recovery at home?  Set small, achievable goals for return to baseline health, Rest and regain strength, Make a list of questions for PCP appoinment   Is the patient/caregiver able to teach back signs and symptoms of worsening condition:  Fever, Hyperthermia, Rapid heart rate (>90), Shortness of breath/rapid respiratory rate, Altered mental status(confusion/coma), Edema, High blood glucose without diabetes   Is the patient/caregiver able to teach back the hierarchy of who to call/visit for symptoms/problems? PCP, Specialist, Home health nurse, Urgent Care, ED, 911  Yes   Additional teach back comments  Patient is doing much better. No SOB. NO fever. Getting strength back.    Week 2 call completed?  Yes          Cameron Wilcox RN

## 2020-05-11 ENCOUNTER — READMISSION MANAGEMENT (OUTPATIENT)
Dept: CALL CENTER | Facility: HOSPITAL | Age: 80
End: 2020-05-11

## 2020-05-11 NOTE — OUTREACH NOTE
Sepsis Week 3 Survey      Responses   [facility] patient discharged from?  [place]   COVID-19 Test Status  Negative   Does the patient have one of the following disease processes/diagnoses(primary or secondary)?  Sepsis   Week 3 attempt successful?  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  Yes   Meds reviewed with patient/caregiver?  Yes   Is the patient having any side effects they believe may be caused by any medication additions or changes?  No   Does the patient have all medications related to this admission filled (includes all antibiotics, inhalers, nebulizers,steroids,etc.)  Yes   Is the patient taking all medications as directed (includes completed medication regime)?  Yes   Does the patient have a primary care provider?   Yes   Does the patient have an appointment with their PCP within 7 days of discharge?  Yes   Has the patient kept scheduled appointments due by today?  Yes   What is the Home health agency?   [facility]   Has home health visited the patient within 72 hours of discharge?  Yes   Pulse Ox monitoring  None   Psychosocial issues?  No   Comments  Afebrile, SOA at times, energy level is better daily.  No cough present.   Did the patient receive a copy of their discharge instructions?  Yes   Nursing interventions  Reviewed instructions with patient   What is the patient's perception of their health status since discharge?  Improving   Nursing interventions  Nurse provided patient education   Is the patient/caregiver able to teach back Sepsis?  S - Shivering,fever or very cold, E - Extreme pain or generalized discomfort (worst ever,especially abdomen), S - Short of breath, P - Pale or discolored skin   Nursing interventions  Nurse provided reassurance to patient   Is patient/caregiver able to teach back steps to recovery at home?  Set small,  achievable goals for return to baseline health, Rest and regain strength, Talk about feelings with family/friends, Make a list of questions for PCP appoinment, Eat a balanced diet   Is the patient/caregiver able to teach back signs and symptoms of worsening condition:  Fever, Edema, Shortness of breath/rapid respiratory rate   Is the patient/caregiver able to teach back the hierarchy of who to call/visit for symptoms/problems? PCP, Specialist, Home health nurse, Urgent Care, ED, 911  Yes   Additional teach back comments  She is doing better. Says we took good care of her.  Appetite has improved after ABX.   Week 3 call completed?  Yes   Wrap up additional comments  She is better overall.          Erendira Portillo RN

## 2020-05-18 ENCOUNTER — READMISSION MANAGEMENT (OUTPATIENT)
Dept: CALL CENTER | Facility: HOSPITAL | Age: 80
End: 2020-05-18

## 2020-05-18 NOTE — OUTREACH NOTE
Sepsis Week 4 Survey      Responses   [facility] patient discharged from?  [place]   COVID-19 Test Status  Negative   Does the patient have one of the following disease processes/diagnoses(primary or secondary)?  Sepsis   Week 4 attempt successful?  No          Loan Plasencia LPN

## 2020-09-15 ENCOUNTER — HOSPITAL ENCOUNTER (OUTPATIENT)
Dept: MRI IMAGING | Facility: HOSPITAL | Age: 80
Discharge: HOME | End: 2020-09-15
Admitting: INTERNAL MEDICINE

## 2020-09-15 ENCOUNTER — HOSPITAL ENCOUNTER (OUTPATIENT)
Dept: MAMMOGRAPHY | Facility: HOSPITAL | Age: 80
Discharge: HOME | End: 2020-09-15
Admitting: INTERNAL MEDICINE

## 2020-09-15 DIAGNOSIS — Z12.31 SCREENING MAMMOGRAM FOR HIGH-RISK PATIENT: ICD-10-CM

## 2020-09-15 DIAGNOSIS — Z85.3 PERSONAL HISTORY OF MALIGNANT NEOPLASM OF BREAST: ICD-10-CM

## 2020-09-15 LAB — CREAT BLDA-MCNC: 0.6 MG/DL (ref 0.6–1.3)

## 2020-09-15 PROCEDURE — C8908 MRI W/O FOL W/CONT, BREAST,: HCPCS

## 2020-09-15 PROCEDURE — 77049 MRI BREAST C-+ W/CAD BI: CPT | Performed by: RADIOLOGY

## 2020-09-15 PROCEDURE — 77063 BREAST TOMOSYNTHESIS BI: CPT | Performed by: RADIOLOGY

## 2020-09-15 PROCEDURE — 77067 SCR MAMMO BI INCL CAD: CPT | Performed by: RADIOLOGY

## 2020-09-15 PROCEDURE — 77067 SCR MAMMO BI INCL CAD: CPT

## 2020-09-15 PROCEDURE — 82565 ASSAY OF CREATININE: CPT

## 2020-09-15 PROCEDURE — C8937 CAD BREAST MRI: HCPCS

## 2020-09-15 PROCEDURE — 77063 BREAST TOMOSYNTHESIS BI: CPT

## 2020-09-15 PROCEDURE — A9577 INJ MULTIHANCE: HCPCS | Performed by: INTERNAL MEDICINE

## 2020-09-15 PROCEDURE — 0 GADOBENATE DIMEGLUMINE 529 MG/ML SOLUTION: Performed by: INTERNAL MEDICINE

## 2020-09-15 RX ADMIN — GADOBENATE DIMEGLUMINE 10 ML: 529 INJECTION, SOLUTION INTRAVENOUS at 08:49

## 2020-09-16 ENCOUNTER — TELEPHONE (OUTPATIENT)
Dept: MRI IMAGING | Facility: HOSPITAL | Age: 80
End: 2020-09-16

## 2020-09-17 ENCOUNTER — OFFICE VISIT (OUTPATIENT)
Dept: OBSTETRICS AND GYNECOLOGY | Facility: CLINIC | Age: 80
End: 2020-09-17

## 2020-09-17 VITALS
WEIGHT: 130 LBS | SYSTOLIC BLOOD PRESSURE: 136 MMHG | DIASTOLIC BLOOD PRESSURE: 74 MMHG | BODY MASS INDEX: 20.36 KG/M2 | RESPIRATION RATE: 14 BRPM

## 2020-09-17 DIAGNOSIS — Z46.89 PESSARY MAINTENANCE: ICD-10-CM

## 2020-09-17 DIAGNOSIS — M81.0 AGE-RELATED OSTEOPOROSIS WITHOUT CURRENT PATHOLOGICAL FRACTURE: ICD-10-CM

## 2020-09-17 DIAGNOSIS — Z01.419 WELL WOMAN EXAM WITH ROUTINE GYNECOLOGICAL EXAM: Primary | ICD-10-CM

## 2020-09-17 DIAGNOSIS — N39.46 MIXED STRESS AND URGE URINARY INCONTINENCE: ICD-10-CM

## 2020-09-17 DIAGNOSIS — N81.3 CYSTOCELE AND RECTOCELE WITH COMPLETE UTEROVAGINAL PROLAPSE: ICD-10-CM

## 2020-09-17 DIAGNOSIS — N39.46 MIXED URGE AND STRESS INCONTINENCE: ICD-10-CM

## 2020-09-17 PROBLEM — D72.828 NEUTROPHILIC LEUKOCYTOSIS: Status: RESOLVED | Noted: 2020-03-15 | Resolved: 2020-09-17

## 2020-09-17 PROBLEM — J18.9 PNEUMONIA OF BOTH LUNGS DUE TO INFECTIOUS ORGANISM: Status: RESOLVED | Noted: 2020-04-16 | Resolved: 2020-09-17

## 2020-09-17 PROBLEM — D72.9 NEUTROPHILIC LEUKOCYTOSIS: Status: RESOLVED | Noted: 2020-03-15 | Resolved: 2020-09-17

## 2020-09-17 PROCEDURE — G0101 CA SCREEN;PELVIC/BREAST EXAM: HCPCS | Performed by: OBSTETRICS & GYNECOLOGY

## 2020-09-17 RX ORDER — RAMIPRIL 5 MG/1
5 CAPSULE ORAL DAILY
Start: 2020-09-17 | End: 2022-09-22 | Stop reason: SDUPTHER

## 2020-09-17 NOTE — PROGRESS NOTES
Subjective   Chief Complaint   Patient presents with   • Gynecologic Exam     Neema Richard is a 80 y.o. year old  menopausal female presenting to be seen for her annual exam.  Also she is f/u on her OAB, osteopenia medication and prolapse/pessary.   This past year she has not been on hormone replacement therapy.  She has not had any vaginal bleeding in the last 12 months.  Menopausal symptoms are not present.    She still very happy with her Myrbetriq and like to continue continue that.  Continues to use the pessary and is not aware that it is present.  She is using no vaginal creams or douches.    She has a history of bone loss and had been on Miacalcin.  After speaking to her primary care he was concerned about side effects that could potentially occur from that medication.  She was having no problem since that time she has stopped the medicine and is now exercising more.    She has had a rough year this past year.  Ended up having a ruptured diverticulum and had a colon resection.  She ended up with pneumonia and sepsis following that.  Nearly  but is doing significantly better now.    SEXUAL Hx:  She is not currently sexually active.  In the past year there she has not been sexually active.    Condoms are not needed because she is not sexually active.  She would not like to be screened for STD's at today's exam.  [facility] is painful: n/a  HEALTH Hx:  She exercises regularly: yes.  She wears her seat belt: yes.  She has concerns about domestic violence: no.  She has noticed changes in height: no.  OTHER THINGS SHE WANTS TO DISCUSS TODAY:  Nothing else    The following portions of the patient's history were reviewed and updated as appropriate:problem list, current medications, allergies, past family history, past medical history, past social history and past surgical history.    Social History    Tobacco Use      Smoking status: Never Smoker      Smokeless tobacco: Never Used      Review of  Systems  Constitutional POS: nothing reported    NEG: anorexia or night sweats   Genitourinary POS: see HPI    NEG: dysuria or hematuria      Gastointestinal POS: see HPI    NEG: bloating, change in bowel habits, melena or reflux symptoms   Integument POS: nothing reported    NEG: moles that are changing in size, shape, color or rashes   Breast POS: nothing reported    NEG: persistent breast lump, skin dimpling or nipple discharge        Objective   /74   Resp 14   Wt 59 kg (130 lb)   LMP  (LMP Unknown)   Breastfeeding No   BMI 20.36 kg/m²     General:  well developed; well nourished  no acute distress   Skin:  No suspicious lesions seen   Thyroid: normal to inspection and palpation   Breasts:  Examined in supine position  Symmetric without masses or skin dimpling  Nipples normal without inversion, lesions or discharge  There are no palpable axillary nodes   Abdomen: soft, non-tender; no masses  no umbilical or inguinal hernias are present  no hepato-splenomegaly   Pelvis: Clinical staff was present for exam  External genitalia:  normal appearance of the external genitalia including Bartholin's and Severy's glands.  :  urethral meatus normal;  Vaginal:  atrophic mucosal changes are present; Pessary in situ well-positioned with scant green discharge  Cervix:  normal appearance.  Uterus:  normal size, shape and consistency.  Adnexa:  non palpable bilaterally.  Rectal:  digital rectal exam not performed; anus visually normal appearing.        Assessment   1. Normal GYN exam in menopause with pelvic prolapse well supported with current pessary  2. Vaginitis most likely foreign body related  3. Menopausal female currently not on HRT - without significant symptoms affecting activities of daily living   4. Osteoporosis - therapy 5/2018 - 2019.  Next bone density not due until 2021   5. 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  6. H/o benign colon polyp 2016  7. OAB - doing well on medication  8. She is up to date on all  relevant gynecologic and colorectal screenings     Plan   1. Pap was not done today.  I explained to Neema that the Pap smears are no longer recommended in patient's after 65 years of age.   I stressed to Neema that she still should be seen to be seen yearly for a full physical including breast and pelvic exam.  2. She was encouraged to get yearly mammograms.  She should report any palpable breast lump(s) or skin changes regardless of mammographic findings.  I explained to Neema that notification regarding her mammogram results will come from the center performing the study.  Our office will not be routinely calling with mammogram results.  It is her responsibility to make sure that the results from the mammogram are communicated to her by the breast center.  If she has any questions about the results, she is welcome to call our office anytime.  3. I discussed with Neema that she may be behind on needed vaccinations for Influenza.  She may be able to obtain these vaccinations at her local pharmacy OR speak about obtaining them with her primary care.  If she does obtain her vaccines, I have asked Neema to let us know the date each vaccine was obtained so that her medical record could be updated in our system.  4. So using [place] douche twice per week  5. Pessary was removed, cleaned and replaced  6. The importance of keeping all planned follow-up and taking all medications as prescribed was emphasized.  7. Follow up for annual exam 1 year    New Medications Ordered This Visit   Medications   • Mirabegron ER (Myrbetriq) 50 MG tablet sustained-release 24 hour 24 hr tablet     Sig: Take 50 mg by mouth Daily.     Dispense:  90 tablet     Refill:  3          This note was electronically signed.    Walter Caro M.D.  September 17, 2020    Note: Speech recognition transcription software may have been used to create portions of this document.  An attempt at proofreading has been made but errors in transcription could still be  present.

## 2021-03-19 ENCOUNTER — HOSPITAL ENCOUNTER (OUTPATIENT)
Dept: GENERAL RADIOLOGY | Facility: HOSPITAL | Age: 81
Discharge: HOME | End: 2021-03-19
Admitting: INTERNAL MEDICINE

## 2021-03-19 ENCOUNTER — TRANSCRIBE ORDERS (OUTPATIENT)
Dept: ADMINISTRATIVE | Facility: HOSPITAL | Age: 81
End: 2021-03-19

## 2021-03-19 DIAGNOSIS — G89.29 CHRONIC SI JOINT PAIN: ICD-10-CM

## 2021-03-19 DIAGNOSIS — M53.3 CHRONIC SI JOINT PAIN: ICD-10-CM

## 2021-03-19 DIAGNOSIS — M54.16 LUMBAR RADICULOPATHY: Primary | ICD-10-CM

## 2021-03-19 PROCEDURE — 72200 X-RAY EXAM SI JOINTS: CPT

## 2021-03-19 PROCEDURE — 72110 X-RAY EXAM L-2 SPINE 4/>VWS: CPT

## 2021-03-26 ENCOUNTER — INFUSION (OUTPATIENT)
Dept: ONCOLOGY | Facility: HOSPITAL | Age: 81
End: 2021-03-26

## 2021-03-26 VITALS
SYSTOLIC BLOOD PRESSURE: 126 MMHG | HEART RATE: 94 BPM | DIASTOLIC BLOOD PRESSURE: 96 MMHG | RESPIRATION RATE: 16 BRPM | TEMPERATURE: 97.1 F

## 2021-03-26 DIAGNOSIS — M81.0 AGE-RELATED OSTEOPOROSIS WITHOUT CURRENT PATHOLOGICAL FRACTURE: Primary | ICD-10-CM

## 2021-03-26 PROCEDURE — 25010000002 DENOSUMAB 60 MG/ML SOLUTION PREFILLED SYRINGE: Performed by: INTERNAL MEDICINE

## 2021-03-26 PROCEDURE — 96372 THER/PROPH/DIAG INJ SC/IM: CPT

## 2021-03-26 RX ADMIN — DENOSUMAB 60 MG: 60 INJECTION SUBCUTANEOUS at 10:56

## 2021-07-29 ENCOUNTER — TRANSCRIBE ORDERS (OUTPATIENT)
Dept: ADMINISTRATIVE | Facility: HOSPITAL | Age: 81
End: 2021-07-29

## 2021-07-29 DIAGNOSIS — Z12.31 VISIT FOR SCREENING MAMMOGRAM: Primary | ICD-10-CM

## 2021-09-20 ENCOUNTER — OFFICE VISIT (OUTPATIENT)
Dept: OBSTETRICS AND GYNECOLOGY | Facility: CLINIC | Age: 81
End: 2021-09-20

## 2021-09-20 VITALS
DIASTOLIC BLOOD PRESSURE: 86 MMHG | BODY MASS INDEX: 21.3 KG/M2 | RESPIRATION RATE: 14 BRPM | WEIGHT: 136 LBS | SYSTOLIC BLOOD PRESSURE: 142 MMHG

## 2021-09-20 DIAGNOSIS — M81.0 AGE-RELATED OSTEOPOROSIS WITHOUT CURRENT PATHOLOGICAL FRACTURE: ICD-10-CM

## 2021-09-20 DIAGNOSIS — N39.46 MIXED URGE AND STRESS INCONTINENCE: ICD-10-CM

## 2021-09-20 DIAGNOSIS — Z46.89 PESSARY MAINTENANCE: ICD-10-CM

## 2021-09-20 DIAGNOSIS — N81.3 CYSTOCELE AND RECTOCELE WITH COMPLETE UTEROVAGINAL PROLAPSE: Primary | ICD-10-CM

## 2021-09-20 PROCEDURE — 99214 OFFICE O/P EST MOD 30 MIN: CPT | Performed by: OBSTETRICS & GYNECOLOGY

## 2021-09-20 RX ORDER — MIRABEGRON 50 MG/1
TABLET, FILM COATED, EXTENDED RELEASE ORAL
Qty: 90 TABLET | Refills: 3 | OUTPATIENT
Start: 2021-09-20

## 2021-09-20 NOTE — PROGRESS NOTES
Subjective   Chief Complaint   Patient presents with   • OAB, bone loss and pessary     Neema Richard is a 81 y.o. year old  Medicare patient presenting to be seen in follow up regarding her osteoporosis, DCIS, pessary use and OAB.  She is very happy with the pessary.  She remains on the Myrbetriq and is helping a lot.  She also finds that if she decreases fluid intake of everything other than water her incontinence is better.  Her primary care has started her on Prolia.  She has had a couple shots and she is having no major issues with that.    This past year she has not been on hormone replacement therapy.  She has not had any vaginal bleeding in the last 12 months.   Menopausal symptoms are not present.    HEALTH Hx:  She exercises regularly: yes.  She wears her seat belt: yes.  She has concerns about domestic violence: no.  She has noticed changes in height: no  OTHER THINGS SHE WANTS TO DISCUSS TODAY:  Nothing else    The following portions of the patient's history were reviewed and updated as appropriate:problem list, current medications, allergies, past family history, past medical history, past social history and past surgical history.    Social History    Tobacco Use      Smoking status: Never Smoker      Smokeless tobacco: Never Used    Review of Systems  Constitutional POS: nothing reported    NEG: anorexia or night sweats   Genitourinary POS: nothing reported    NEG: dysuria or hematuria   Gastointestinal POS: nothing reported    NEG: bloating, change in bowel habits, melena or reflux symptoms   Integument POS: nothing reported    NEG: moles that are changing in size, shape, color or rashes   Breast POS: nothing reported    NEG: persistent breast lump, skin dimpling or nipple discharge        Objective   /86   Resp 14   Wt 61.7 kg (136 lb)   LMP  (LMP Unknown)   Breastfeeding No   BMI 21.30 kg/m²     General:  well developed; well nourished  no acute distress   Skin:  No suspicious  lesions seen   Thyroid: normal to inspection and palpation   Breasts:  Examined in supine position  Symmetric without masses or skin dimpling  Nipples normal without inversion, lesions or discharge  There are no palpable axillary nodes   Abdomen: soft, non-tender; no masses  no umbilical or inguinal hernias are present  no hepato-splenomegaly   Pelvis: Clinical staff was present for exam  External genitalia:  normal appearance of the external genitalia including Bartholin's and [place]'s glands.  :  urethral meatus normal;  Vaginal:  Good support with pessary in; slight area of induration on the posterior wall with pessary out  Cervix:  normal appearance.  Uterus:  normal size, shape and consistency.  Adnexa:  non palpable bilaterally.  Rectal:  digital rectal exam not performed; anus visually normal appearing.        Assessment   1. Osteoporosis - on therapy 5/2018.  Now on Prolia managed by primary care.  DEXA's ordered by PCP  2. 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 OAB - overall stable on medicine  4. Normal GYN exam in menopause S/P left S&O with pelvic organ prolapse - not significantly different from prior exams.  It is minimally symptomatic with pessary use and not affecting her quality of life.  5. Menopausal female currently not on HRT - without significant symptoms affecting activities of daily living  6. She is up to date on all relevant gynecologic and colorectal screenings     Plan   1. Pap was not done today.  I explained to Neema that the Pap smears are no longer recommended in patient's after 65 years of age.   I stressed to Neema that she still should be seen to be seen yearly for a full physical including breast and pelvic exam.  2. She was encouraged to get yearly mammograms.  She should report any palpable breast lump(s) or skin changes regardless of mammographic findings.  I explained to Neema that notification regarding her mammogram results will come from the center performing the study.  Our office  will not be routinely calling with mammogram results.  It is her responsibility to make sure that the results from the mammogram are communicated to her by the breast center.  If she has any questions about the results, she is welcome to call our office anytime.  3. The following data needs to be obtained to update her medical records: last colonoscopy.  4. Defer decisions regarding bone density to her primary care  5. Pessary was removed, cleaned and replaced  6. I discussed with Neema that she may be behind on needed vaccinations for Influenza and COVID.  She may be able to obtain these vaccinations at her local pharmacy OR speak about obtaining them with her primary care.  If she does obtain her vaccines, I have asked Neema to let us know the date each vaccine was obtained so that her medical record could be updated in our system.  7. The importance of keeping all planned follow-up and taking all medications as prescribed was emphasized.  8. Follow up for annual exam 1 year    New Medications Ordered This Visit   Medications   • Mirabegron ER (Myrbetriq) 50 MG tablet sustained-release 24 hour 24 hr tablet     Sig: Take 50 mg by mouth Daily.     Dispense:  90 tablet     Refill:  4          This note was electronically signed.    Walter Caro M.D.  September 20, 2021    Note: Speech recognition transcription software may have been used to create portions of this document.  An attempt at proofreading has been made but errors in transcription could still be present.

## 2021-09-20 NOTE — TELEPHONE ENCOUNTER
Rx Refill Note  Requested Prescriptions     Pending Prescriptions Disp Refills   • Myrbetriq 50 MG tablet sustained-release 24 hour 24 hr tablet [Pharmacy Med Name: MYRBETRIQ  50MG  TAB] 90 tablet 3     Sig: TAKE 1 TABLET BY MOUTH  DAILY      Last office visit with prescribing clinician: 9/17/2020      Next office visit with prescribing clinician: 9/20/2021            Nitza Farah MA  09/20/21, 08:52 EDT

## 2021-09-24 ENCOUNTER — INFUSION (OUTPATIENT)
Dept: ONCOLOGY | Facility: HOSPITAL | Age: 81
End: 2021-09-24

## 2021-09-24 ENCOUNTER — HOSPITAL ENCOUNTER (OUTPATIENT)
Dept: MAMMOGRAPHY | Facility: HOSPITAL | Age: 81
Discharge: HOME | End: 2021-09-24
Admitting: INTERNAL MEDICINE

## 2021-09-24 DIAGNOSIS — Z12.31 VISIT FOR SCREENING MAMMOGRAM: ICD-10-CM

## 2021-09-24 DIAGNOSIS — M81.0 AGE-RELATED OSTEOPOROSIS WITHOUT CURRENT PATHOLOGICAL FRACTURE: Primary | ICD-10-CM

## 2021-09-24 PROCEDURE — 25010000002 DENOSUMAB 60 MG/ML SOLUTION PREFILLED SYRINGE: Performed by: INTERNAL MEDICINE

## 2021-09-24 PROCEDURE — 77067 SCR MAMMO BI INCL CAD: CPT

## 2021-09-24 PROCEDURE — 77063 BREAST TOMOSYNTHESIS BI: CPT

## 2021-09-24 PROCEDURE — 96372 THER/PROPH/DIAG INJ SC/IM: CPT

## 2021-09-24 PROCEDURE — 77067 SCR MAMMO BI INCL CAD: CPT | Performed by: RADIOLOGY

## 2021-09-24 PROCEDURE — 77063 BREAST TOMOSYNTHESIS BI: CPT | Performed by: RADIOLOGY

## 2021-09-24 RX ORDER — METOPROLOL TARTRATE 50 MG/1
50 TABLET, FILM COATED ORAL DAILY
COMMUNITY
End: 2022-09-22 | Stop reason: SDUPTHER

## 2021-09-24 RX ORDER — PIOGLITAZONEHYDROCHLORIDE 30 MG/1
TABLET ORAL
COMMUNITY
End: 2022-09-22 | Stop reason: SDUPTHER

## 2021-09-24 RX ORDER — NIACINAMIDE 500 MG
500 TABLET ORAL DAILY
COMMUNITY
End: 2022-03-25

## 2021-09-24 RX ADMIN — DENOSUMAB 60 MG: 60 INJECTION SUBCUTANEOUS at 12:48

## 2021-10-29 ENCOUNTER — TELEPHONE (OUTPATIENT)
Dept: WOUND CARE | Facility: HOSPITAL | Age: 81
End: 2021-10-29

## 2021-11-03 ENCOUNTER — HOSPITAL ENCOUNTER (OUTPATIENT)
Dept: WOUND CARE | Facility: HOSPITAL | Age: 81
Discharge: HOME | End: 2021-11-03
Admitting: COLON & RECTAL SURGERY

## 2021-11-03 PROCEDURE — G0463 HOSPITAL OUTPT CLINIC VISIT: HCPCS

## 2021-11-03 NOTE — NURSING NOTE
"Patient seen for peristomal irritation around established colostomy from 2020.    Upon removal of previous wafer, I found some slight denuded areas at the 2:00 and 5:00 positions.    Upon measuring the stoma, patient has been cutting her wafer is about 5 mm to big.  This is caused stool to leak under and get into the peristomal region.  Patient states she has a change every 2 to 3 days because it gets \"itchy\".    Patient likes two piece Chelita flat with closed-end.  I cut her appliance to 30 mm which included the slight bulging area of stoma to the 9:00.    Patient also states that she has been using Vaseline around her stoma.  She says she uses this to help flatten out the paste that she applies.    After cleaning the peristomal area, I taught the patient how to crust with powder and barrier spray.  We did this twice.  I then showed the patient how to apply paste using a syringe, she was using the paste tube and applying it in about a 2 cm ring.  I showed her how to apply it just around the stoma itself in a very thin ring and then how to flatten out with water not Vaseline.    Patient was also interested in stomal irrigation, upon further research irrigation is not indicated with transverse colostomy's.  I told the patient we could still try it, but unfortunately we were out of stoma irrigation kits.  I also rescheduled the patient for follow-up in about a week and we can go over this more than.  "

## 2021-11-11 ENCOUNTER — HOSPITAL ENCOUNTER (OUTPATIENT)
Dept: WOUND CARE | Facility: HOSPITAL | Age: 81
Discharge: HOME | End: 2021-11-11
Admitting: COLON & RECTAL SURGERY

## 2021-11-11 PROCEDURE — G0463 HOSPITAL OUTPT CLINIC VISIT: HCPCS

## 2021-11-11 NOTE — NURSING NOTE
Patient seen in outpatient setting for follow up to peristomal irritation.     Patient still has one denuded area to 2 o clock but this appears less raw and much improved.     She states she has been using crusting technique and still had questions about when to use paste. I reminded her it was only to be used sparingly around stoma before placing new wafer. (she was using it both before and after placing wafer).    I reminded her to not use vaseline in peristoma (she was using it to smooth out the paste), and to use water instead.    She is cutting appliances much closer to the stoma than last time and this appears to be helping leaking.     Overall we agreed peristoma looks improved.

## 2022-03-16 ENCOUNTER — TRANSCRIBE ORDERS (OUTPATIENT)
Dept: ADMINISTRATIVE | Facility: HOSPITAL | Age: 82
End: 2022-03-16

## 2022-03-16 DIAGNOSIS — K11.20 SIALOADENITIS: Primary | ICD-10-CM

## 2022-03-25 ENCOUNTER — INFUSION (OUTPATIENT)
Dept: ONCOLOGY | Facility: HOSPITAL | Age: 82
End: 2022-03-25

## 2022-03-25 VITALS
HEART RATE: 71 BPM | SYSTOLIC BLOOD PRESSURE: 141 MMHG | DIASTOLIC BLOOD PRESSURE: 63 MMHG | TEMPERATURE: 96.8 F | RESPIRATION RATE: 16 BRPM

## 2022-03-25 DIAGNOSIS — M81.0 AGE-RELATED OSTEOPOROSIS WITHOUT CURRENT PATHOLOGICAL FRACTURE: Primary | ICD-10-CM

## 2022-03-25 PROCEDURE — 25010000002 DENOSUMAB 60 MG/ML SOLUTION PREFILLED SYRINGE: Performed by: INTERNAL MEDICINE

## 2022-03-25 PROCEDURE — 96372 THER/PROPH/DIAG INJ SC/IM: CPT

## 2022-03-25 RX ORDER — FLUTICASONE PROPIONATE 50 MCG
1 SPRAY, SUSPENSION (ML) NASAL DAILY
COMMUNITY

## 2022-03-25 RX ADMIN — DENOSUMAB 60 MG: 60 INJECTION SUBCUTANEOUS at 13:04

## 2022-04-07 ENCOUNTER — HOSPITAL ENCOUNTER (OUTPATIENT)
Dept: ULTRASOUND IMAGING | Facility: HOSPITAL | Age: 82
Discharge: HOME | End: 2022-04-07
Admitting: OTOLARYNGOLOGY

## 2022-04-07 DIAGNOSIS — K11.20 SIALOADENITIS: ICD-10-CM

## 2022-04-07 PROCEDURE — 76536 US EXAM OF HEAD AND NECK: CPT

## 2022-08-16 ENCOUNTER — TRANSCRIBE ORDERS (OUTPATIENT)
Dept: ADMINISTRATIVE | Facility: HOSPITAL | Age: 82
End: 2022-08-16

## 2022-08-16 DIAGNOSIS — Z12.31 VISIT FOR SCREENING MAMMOGRAM: Primary | ICD-10-CM

## 2022-09-21 PROBLEM — Z87.19 HISTORY OF DIVERTICULAR ABSCESS: Status: RESOLVED | Noted: 2020-04-16 | Resolved: 2022-09-21

## 2022-09-22 ENCOUNTER — OFFICE VISIT (OUTPATIENT)
Dept: OBSTETRICS AND GYNECOLOGY | Facility: CLINIC | Age: 82
End: 2022-09-22

## 2022-09-22 VITALS
WEIGHT: 136 LBS | RESPIRATION RATE: 14 BRPM | DIASTOLIC BLOOD PRESSURE: 82 MMHG | SYSTOLIC BLOOD PRESSURE: 136 MMHG | BODY MASS INDEX: 21.3 KG/M2

## 2022-09-22 DIAGNOSIS — N39.46 MIXED URGE AND STRESS INCONTINENCE: ICD-10-CM

## 2022-09-22 DIAGNOSIS — N81.3 CYSTOCELE AND RECTOCELE WITH COMPLETE UTEROVAGINAL PROLAPSE: ICD-10-CM

## 2022-09-22 DIAGNOSIS — Z01.419 WELL WOMAN EXAM WITH ROUTINE GYNECOLOGICAL EXAM: Primary | ICD-10-CM

## 2022-09-22 DIAGNOSIS — M81.0 AGE-RELATED OSTEOPOROSIS WITHOUT CURRENT PATHOLOGICAL FRACTURE: ICD-10-CM

## 2022-09-22 DIAGNOSIS — Z46.89 PESSARY MAINTENANCE: ICD-10-CM

## 2022-09-22 DIAGNOSIS — Z71.85 VACCINE COUNSELING: ICD-10-CM

## 2022-09-22 PROCEDURE — G0101 CA SCREEN;PELVIC/BREAST EXAM: HCPCS | Performed by: OBSTETRICS & GYNECOLOGY

## 2022-09-22 RX ORDER — DENOSUMAB 60 MG/ML
INJECTION SUBCUTANEOUS
COMMUNITY
Start: 2022-11-17

## 2022-09-22 RX ORDER — METOPROLOL SUCCINATE 50 MG/1
50 TABLET, EXTENDED RELEASE ORAL DAILY
COMMUNITY
Start: 2022-07-15

## 2022-09-22 RX ORDER — RAMIPRIL 1.25 MG/1
1.25 CAPSULE ORAL DAILY
COMMUNITY
Start: 2022-08-02

## 2022-09-22 RX ORDER — MULTIPLE VITAMINS W/ MINERALS TAB 9MG-400MCG
TAB ORAL
COMMUNITY

## 2022-09-22 RX ORDER — PRAVASTATIN SODIUM 80 MG/1
80 TABLET ORAL DAILY
COMMUNITY
Start: 2022-08-16 | End: 2022-09-22 | Stop reason: SDUPTHER

## 2022-09-22 RX ORDER — GABAPENTIN 600 MG/1
600 TABLET ORAL
COMMUNITY
Start: 2022-09-18

## 2022-09-22 RX ORDER — PIOGLITAZONEHYDROCHLORIDE 15 MG/1
15 TABLET ORAL DAILY
COMMUNITY
Start: 2022-09-18

## 2022-09-22 NOTE — PROGRESS NOTES
Subjective   Chief Complaint   Patient presents with   • Gynecologic Exam     Neema Richard is a 82 y.o. year old  menopausal female presenting to be seen for her annual exam and f/u on her OAB, osteoporosis and pessary.  She continues to get the Prolia and is doing fine.  She is actually had several falls over the last year and has not broken any bones.  Last bone density may have been in 2020 or 21 through her primary care.  Remains on the Myrbetriq and is using no caffeine.  Overall her overactive bladder symptoms are doing really well.  Her pessary is doing fine.  She is doing well with it.  She was told by her colorectal surgeon the pessary does need to be removed before her upcoming planned procedure.  She is scheduled to have an APR with Dr. Vazquez 11/10/2022.  This mainly relates to ongoing rectal discharge as well as a hernia she has around her stoma.    This past year she has not been on hormone replacement therapy.  She has not had any vaginal bleeding in the last 12 months.  Menopausal symptoms are not present.    SEXUAL Hx:  She is not currently sexually active.  In the past year there she has not been sexually active.    Condoms are not needed because she is not sexually active.  She would not like to be screened for STD's at today's exam.  [place] is painful: n/a  HEALTH Hx:  She exercises regularly: yes.  She wears her seat belt: yes.  She has concerns about domestic violence: no.  She has noticed changes in height: no.  OTHER THINGS SHE WANTS TO DISCUSS TODAY:  Nothing else    The following portions of the patient's history were reviewed and updated as appropriate:problem list, current medications, allergies, past family history, past medical history, past social history and past surgical history.    Social History    Tobacco Use      Smoking status: Never Smoker      Smokeless tobacco: Never Used      Review of Systems  Constitutional POS: nothing reported    NEG: anorexia or night  sweats   Genitourinary POS: see HPI    NEG: dysuria or hematuria      Gastointestinal POS: see HPI    NEG: bloating, change in bowel habits, melena or reflux symptoms   Integument POS: nothing reported    NEG: moles that are changing in size, shape, color or rashes   Breast POS: nothing reported    NEG: persistent breast lump, skin dimpling or nipple discharge        Objective   /82   Resp 14   Wt 61.7 kg (136 lb)   LMP  (LMP Unknown)   Breastfeeding No   BMI 21.30 kg/m²     General:  well developed; well nourished  no acute distress   Skin:  No suspicious lesions seen   Thyroid: normal to inspection and palpation   Breasts:  Examined in supine position  Symmetric without masses or skin dimpling  Nipples normal without inversion, lesions or discharge  There are no palpable axillary nodes   Abdomen: soft, non-tender; no masses  no umbilical or inguinal hernias are present  no hepato-splenomegaly   Pelvis: Clinical staff was present for exam  External genitalia:  normal appearance of the external genitalia including Bartholin's and [place]'s glands.  :  urethral meatus normal;  Vaginal:  normal pink mucosa without prolapse or lesions. Pessary is well-positioned with scant discharge seen  Cervix:  normal appearance.  Cystocele GRADE 0 with pessary  Rectocele GRADE 0 with pessary  Uterine GRADE 0 with pessary        Assessment   1. Normal GYN exam in menopause S/P left S&O with pelvic organ prolapse - not significantly different from prior exams.  It is minimally symptomatic with pessary use and not affecting her quality of life  2. Osteoporosis - on therapy 5/2018.  Now on Prolia managed by primary care.  DEXA's ordered by PCP  3. H/o DCIS - TESSA ~ 5 1/2 years  4. OAB - overall stable on medicine  5. Menopausal female currently not on HRT - without significant symptoms affecting activities of daily living  6. She is up to date on all relevant gynecologic and colorectal screenings     Plan   1. Pap was not  done today.  I explained to Neema that the Pap smears are no longer recommended in patient's after 65 years of age.   I stressed to Neema that she still should be seen to be seen yearly for a full physical including breast and pelvic exam.  2. Pessary was not removed at today's visit  3. Her vaccine record was reviewed and updated.  4. The following data needs to be obtained to update her medical records: last DEXA.  5. The importance of keeping all planned follow-up and taking all medications as prescribed was emphasized.  6. Follow up 1-2 weeks before procedure to remove pessary      New Medications Ordered This Visit   Medications   • Mirabegron ER (Myrbetriq) 50 MG tablet sustained-release 24 hour 24 hr tablet     Sig: Take 50 mg by mouth Daily.     Dispense:  90 tablet     Refill:  4          This note was electronically signed.    Walter Caro M.D.  September 22, 2022    Part of this note may be an electronic transcription/translation of spoken language to printed text using the Dragon Dictation System.

## 2022-09-26 ENCOUNTER — INFUSION (OUTPATIENT)
Dept: ONCOLOGY | Facility: HOSPITAL | Age: 82
End: 2022-09-26

## 2022-09-26 VITALS
DIASTOLIC BLOOD PRESSURE: 91 MMHG | HEART RATE: 103 BPM | TEMPERATURE: 98.5 F | RESPIRATION RATE: 16 BRPM | SYSTOLIC BLOOD PRESSURE: 139 MMHG

## 2022-09-26 DIAGNOSIS — M81.0 AGE-RELATED OSTEOPOROSIS WITHOUT CURRENT PATHOLOGICAL FRACTURE: Primary | ICD-10-CM

## 2022-09-26 PROCEDURE — 25010000002 DENOSUMAB 60 MG/ML SOLUTION PREFILLED SYRINGE: Performed by: INTERNAL MEDICINE

## 2022-09-26 PROCEDURE — 96372 THER/PROPH/DIAG INJ SC/IM: CPT

## 2022-09-26 RX ADMIN — DENOSUMAB 60 MG: 60 INJECTION SUBCUTANEOUS at 13:01

## 2022-10-19 ENCOUNTER — HOSPITAL ENCOUNTER (OUTPATIENT)
Dept: MAMMOGRAPHY | Facility: HOSPITAL | Age: 82
Discharge: HOME | End: 2022-10-19
Admitting: INTERNAL MEDICINE

## 2022-10-19 DIAGNOSIS — Z12.31 VISIT FOR SCREENING MAMMOGRAM: ICD-10-CM

## 2022-10-19 PROCEDURE — 77067 SCR MAMMO BI INCL CAD: CPT | Performed by: RADIOLOGY

## 2022-10-19 PROCEDURE — 77063 BREAST TOMOSYNTHESIS BI: CPT | Performed by: RADIOLOGY

## 2022-10-19 PROCEDURE — 77063 BREAST TOMOSYNTHESIS BI: CPT

## 2022-10-19 PROCEDURE — 77067 SCR MAMMO BI INCL CAD: CPT

## 2022-11-03 ENCOUNTER — OFFICE VISIT (OUTPATIENT)
Dept: OBSTETRICS AND GYNECOLOGY | Facility: CLINIC | Age: 82
End: 2022-11-03

## 2022-11-03 VITALS — BODY MASS INDEX: 21.3 KG/M2 | RESPIRATION RATE: 14 BRPM | WEIGHT: 136 LBS

## 2022-11-03 DIAGNOSIS — N81.3 CYSTOCELE AND RECTOCELE WITH COMPLETE UTEROVAGINAL PROLAPSE: Primary | ICD-10-CM

## 2022-11-03 PROCEDURE — 99213 OFFICE O/P EST LOW 20 MIN: CPT | Performed by: OBSTETRICS & GYNECOLOGY

## 2022-11-03 RX ORDER — VALACYCLOVIR HYDROCHLORIDE 1 G/1
1000 TABLET, FILM COATED ORAL DAILY
Qty: 5 TABLET | Refills: 0 | Status: SHIPPED | OUTPATIENT
Start: 2022-11-03 | End: 2022-12-02 | Stop reason: HOSPADM

## 2022-11-03 NOTE — PROGRESS NOTES
Subjective   Chief Complaint   Patient presents with   • Follow-up     Neema Richard is a 82 y.o. year old .  No LMP recorded (lmp unknown). Patient is postmenopausal.  She comes in today to have her pessary removed.  They are planning on doing a proctectomy along with sigmoid colon resection to help with seepage from the anus that happened following her colon resection back in .  Her colorectal surgeon wants the pessary removed.  Also she wants to know if she can have a refill on her herpetic medicine because she sometimes does have flareups of her herpes after pessary has been removed.    OTHER THINGS SHE WANTS TO DISCUSS TODAY:  Nothing else    The following portions of the patient's history were reviewed and updated as appropriate:no additional history reviewed    Social History    Tobacco Use      Smoking status: Never      Smokeless tobacco: Never    Review of Systems  Constitutional POS: nothing reported    NEG: anorexia or night sweats   Genitourinary POS: see HPI    NEG: dysuria or hematuria   Gastointestinal POS: see HPI    NEG: bloating, change in bowel habits, melena or reflux symptoms   Integument POS: nothing reported    NEG: moles that are changing in size, shape, color or rashes   Breast POS: nothing reported    NEG: persistent breast lump, skin dimpling or nipple discharge         Objective   Resp 14   Wt 61.7 kg (136 lb)   LMP  (LMP Unknown)   BMI 21.30 kg/m²     General:  well developed; well nourished  no acute distress   Pelvis: Clinical staff was present for exam  External genitalia:  normal appearance of the external genitalia including Bartholin's and [place]'s glands.  :  urethral meatus normal;  Vaginal:  normal pink mucosa without prolapse or lesions.     Lab Review   No data reviewed    Imaging   No data reviewed        Assessment   1. Symptomatic pelvic organ prolapse well controlled with foldable ring pessary  2. Anticipated proctectomy pending next week     Plan    1. Pessary removed and not replaced  2. At the point that her colorectal surgery has okayed her for replacement of pessary she will let me know and we can replace with current pessary.  May need to have 1 size bigger pessary if there is more space in the vault after her planned colorectal surgery  3. The importance of keeping all planned follow-up and taking all medications as prescribed was emphasized.  4. Follow up PRN     New Medications Ordered This Visit   Medications   • valACYclovir (Valtrex) 1000 MG tablet     Sig: Take 1 tablet by mouth Daily.     Dispense:  5 tablet     Refill:  0          This note was electronically signed.    Walter Caro M.D.  November 3, 2022    Part of this note may be an electronic transcription/translation of spoken language to printed text using the Dragon Dictation System.

## 2022-11-04 ENCOUNTER — PRE-ADMISSION TESTING (OUTPATIENT)
Dept: PREADMISSION TESTING | Facility: HOSPITAL | Age: 82
End: 2022-11-04

## 2022-11-04 VITALS — WEIGHT: 135.58 LBS | HEIGHT: 66 IN | BODY MASS INDEX: 21.79 KG/M2

## 2022-11-04 LAB
ALBUMIN SERPL-MCNC: 4.2 G/DL (ref 3.5–5.2)
ALBUMIN/GLOB SERPL: 1.6 G/DL
ALP SERPL-CCNC: 53 U/L (ref 39–117)
ALT SERPL W P-5'-P-CCNC: 10 U/L (ref 1–33)
ANION GAP SERPL CALCULATED.3IONS-SCNC: 10 MMOL/L (ref 5–15)
AST SERPL-CCNC: 14 U/L (ref 1–32)
BILIRUB SERPL-MCNC: 0.3 MG/DL (ref 0–1.2)
BUN SERPL-MCNC: 20 MG/DL (ref 8–23)
BUN/CREAT SERPL: 28.6 (ref 7–25)
CALCIUM SPEC-SCNC: 9 MG/DL (ref 8.6–10.5)
CHLORIDE SERPL-SCNC: 108 MMOL/L (ref 98–107)
CO2 SERPL-SCNC: 25 MMOL/L (ref 22–29)
CREAT SERPL-MCNC: 0.7 MG/DL (ref 0.57–1)
DEPRECATED RDW RBC AUTO: 45.9 FL (ref 37–54)
EGFRCR SERPLBLD CKD-EPI 2021: 87 ML/MIN/1.73
ERYTHROCYTE [DISTWIDTH] IN BLOOD BY AUTOMATED COUNT: 13.4 % (ref 12.3–15.4)
GLOBULIN UR ELPH-MCNC: 2.7 GM/DL
GLUCOSE SERPL-MCNC: 130 MG/DL (ref 65–99)
HBA1C MFR BLD: 5.5 % (ref 4.8–5.6)
HCT VFR BLD AUTO: 38.2 % (ref 34–46.6)
HGB BLD-MCNC: 12.4 G/DL (ref 12–15.9)
MCH RBC QN AUTO: 30.2 PG (ref 26.6–33)
MCHC RBC AUTO-ENTMCNC: 32.5 G/DL (ref 31.5–35.7)
MCV RBC AUTO: 93.2 FL (ref 79–97)
PLATELET # BLD AUTO: 265 10*3/MM3 (ref 140–450)
PMV BLD AUTO: 9.9 FL (ref 6–12)
POTASSIUM SERPL-SCNC: 3.8 MMOL/L (ref 3.5–5.2)
PROT SERPL-MCNC: 6.9 G/DL (ref 6–8.5)
QT INTERVAL: 428 MS
QTC INTERVAL: 434 MS
RBC # BLD AUTO: 4.1 10*6/MM3 (ref 3.77–5.28)
SODIUM SERPL-SCNC: 143 MMOL/L (ref 136–145)
WBC NRBC COR # BLD: 7.04 10*3/MM3 (ref 3.4–10.8)

## 2022-11-04 PROCEDURE — 36415 COLL VENOUS BLD VENIPUNCTURE: CPT

## 2022-11-04 PROCEDURE — 83036 HEMOGLOBIN GLYCOSYLATED A1C: CPT

## 2022-11-04 PROCEDURE — G0463 HOSPITAL OUTPT CLINIC VISIT: HCPCS

## 2022-11-04 PROCEDURE — 85027 COMPLETE CBC AUTOMATED: CPT

## 2022-11-04 PROCEDURE — 93010 ELECTROCARDIOGRAM REPORT: CPT | Performed by: INTERNAL MEDICINE

## 2022-11-04 PROCEDURE — 80053 COMPREHEN METABOLIC PANEL: CPT

## 2022-11-04 PROCEDURE — 93005 ELECTROCARDIOGRAM TRACING: CPT

## 2022-11-04 RX ORDER — PRAVASTATIN SODIUM 80 MG/1
80 TABLET ORAL DAILY
COMMUNITY

## 2022-11-04 RX ORDER — SENNOSIDES 8.6 MG
1300 CAPSULE ORAL EVERY 8 HOURS PRN
COMMUNITY

## 2022-11-04 RX ORDER — MULTIPLE VITAMINS W/ MINERALS TAB 9MG-400MCG
1 TAB ORAL DAILY
COMMUNITY

## 2022-11-04 RX ORDER — MULTIVITAMIN WITH IRON
1 TABLET ORAL DAILY
COMMUNITY

## 2022-11-04 NOTE — NURSING NOTE
Patient in to [facility] for preadmission testing prior to colorectal surgery next week, which patient hopes will help heal her peristomal hernia.  Removed pouch, cleansed with water only.  Recommended to patient that she cleanse with water only.  Identified slight peristomal irritation around the perimeter of the stoma and crusted that area and recommend the patient do so as well.  Patient wanted WOC RN to change pouch and assess a small wound at 3 o'clock in the peristomal skin as well as ask about nodule, which appears as fascia, patient has proximal to her colostomy at 9 o'clock.  Nodule is not painful and is not bleeding.  Small wound at 3 o'clock is approximately 0.5 x 0.25 x 0 cm with yellow wound bed, no drainage noted recommended crusting technique.  And applied crusting technique x 2 this wound.  Recommended the patient that she is cutting her pouch too small that it needs to be 1/8-1/4 of an inch past the red tissue of her stoma to approximately 1.5 inches.  Provided patient with stoma powder, Cavilon skin protectant spray, steam free adhesive remover spray and a [employer] catalog as well as brought her a Coloplast flip pouch which is designed to work with hernias if she feels like she needs this type of pouch after surgery.  Did show her in the catalog that the flip model was available in a 2 piece with a closed-end pouch.

## 2022-11-04 NOTE — PAT
Too early to draw type and screen in PAT.  Please obtain blood bank specimen in pre-op on the day of surgery.     Gi nurse notified of surgery at this time.     Stoma nurse notified of surgery at this time. Seeing pt now after pat appointment.     Patient viewed general PAT education video as instructed in their preoperative information received from their surgeon.  Patient stated the general PAT education video was viewed in its entirety and survey completed.  Copies of PAT general education handouts (Incentive Spirometry, Meds to Beds Program, Patient Belongings, Pre-op skin preparation instructions, Blood Glucose testing, Visitor policy, Surgery FAQ, Code H) distributed to patient if not printed. Education related to the PAT pass and skin preparation for surgery (if applicable) completed in PAT as a reinforcement to PAT education video. Patient instructed to return PAT pass provided today as well as completed skin preparation sheet (if applicable) on the day of procedure.     Additionally if patient had not viewed video yet but intended to view it at home or in our waiting area, then referred them to the handout with QR code/link provided during PAT visit.  Instructed patient to complete survey after viewing the video in its entirety.  Encouraged patient/family to read PAT general education handouts thoroughly and notify PAT staff with any questions or concerns. Patient verbalized understanding of all information and priority content.    Patient to apply Chlorhexadine wipes  to surgical area (as instructed) the night before procedure and the AM of procedure. Wipes provided.    Patient instructed to drink 20 ounces of Gatorade and it needs to be completed 1 hour (for Main OR patients) or 2 hours (scheduled  section & [facility] patients) before given arrival time for procedure (NO RED Gatorade)    Patient verbalized understanding.

## 2022-11-09 ENCOUNTER — ANESTHESIA EVENT (OUTPATIENT)
Dept: PERIOP | Facility: HOSPITAL | Age: 82
End: 2022-11-09

## 2022-11-09 RX ORDER — SODIUM CHLORIDE 0.9 % (FLUSH) 0.9 %
10 SYRINGE (ML) INJECTION AS NEEDED
Status: CANCELLED | OUTPATIENT
Start: 2022-11-09

## 2022-11-09 RX ORDER — FAMOTIDINE 10 MG/ML
20 INJECTION, SOLUTION INTRAVENOUS ONCE
Status: CANCELLED | OUTPATIENT
Start: 2022-11-09 | End: 2022-11-09

## 2022-11-09 RX ORDER — SODIUM CHLORIDE 0.9 % (FLUSH) 0.9 %
10 SYRINGE (ML) INJECTION EVERY 12 HOURS SCHEDULED
Status: CANCELLED | OUTPATIENT
Start: 2022-11-09

## 2022-11-10 ENCOUNTER — ANESTHESIA EVENT CONVERTED (OUTPATIENT)
Dept: ANESTHESIOLOGY | Facility: HOSPITAL | Age: 82
End: 2022-11-10

## 2022-11-10 ENCOUNTER — ANESTHESIA (OUTPATIENT)
Dept: PERIOP | Facility: HOSPITAL | Age: 82
End: 2022-11-10

## 2022-11-10 ENCOUNTER — HOSPITAL ENCOUNTER (INPATIENT)
Facility: HOSPITAL | Age: 82
LOS: 6 days | Discharge: HOME HEALTH | End: 2022-11-16
Attending: COLON & RECTAL SURGERY | Admitting: COLON & RECTAL SURGERY

## 2022-11-10 DIAGNOSIS — M81.0 AGE-RELATED OSTEOPOROSIS WITHOUT CURRENT PATHOLOGICAL FRACTURE: ICD-10-CM

## 2022-11-10 DIAGNOSIS — R15.9 FECAL INCONTINENCE: Primary | ICD-10-CM

## 2022-11-10 DIAGNOSIS — J81.1 CHRONIC PULMONARY EDEMA: ICD-10-CM

## 2022-11-10 DIAGNOSIS — I95.9 SEVERE HYPOTENSION: ICD-10-CM

## 2022-11-10 DIAGNOSIS — Z90.49 STATUS POST SMALL BOWEL RESECTION: ICD-10-CM

## 2022-11-10 LAB
ABO GROUP BLD: NORMAL
BLD GP AB SCN SERPL QL: NEGATIVE
GLUCOSE BLDC GLUCOMTR-MCNC: 137 MG/DL (ref 70–130)
GLUCOSE BLDC GLUCOMTR-MCNC: 146 MG/DL (ref 70–130)
GLUCOSE BLDC GLUCOMTR-MCNC: 93 MG/DL (ref 70–130)
RH BLD: NEGATIVE
T&S EXPIRATION DATE: NORMAL

## 2022-11-10 PROCEDURE — 0BUT0JZ SUPPLEMENT DIAPHRAGM WITH SYNTHETIC SUBSTITUTE, OPEN APPROACH: ICD-10-PCS | Performed by: COLON & RECTAL SURGERY

## 2022-11-10 PROCEDURE — 25010000002 FUROSEMIDE PER 20 MG: Performed by: NURSE ANESTHETIST, CERTIFIED REGISTERED

## 2022-11-10 PROCEDURE — 0WUF07Z SUPPLEMENT ABDOMINAL WALL WITH AUTOLOGOUS TISSUE SUBSTITUTE, OPEN APPROACH: ICD-10-PCS | Performed by: COLON & RECTAL SURGERY

## 2022-11-10 PROCEDURE — 25010000002 HYDROMORPHONE PER 4 MG: Performed by: COLON & RECTAL SURGERY

## 2022-11-10 PROCEDURE — 86850 RBC ANTIBODY SCREEN: CPT | Performed by: COLON & RECTAL SURGERY

## 2022-11-10 PROCEDURE — 25010000002 FENTANYL CITRATE (PF) 100 MCG/2ML SOLUTION: Performed by: NURSE ANESTHETIST, CERTIFIED REGISTERED

## 2022-11-10 PROCEDURE — 25010000002 FENTANYL CITRATE (PF) 50 MCG/ML SOLUTION

## 2022-11-10 PROCEDURE — 0DBP0ZZ EXCISION OF RECTUM, OPEN APPROACH: ICD-10-PCS | Performed by: COLON & RECTAL SURGERY

## 2022-11-10 PROCEDURE — 25010000002 HEPARIN (PORCINE) PER 1000 UNITS: Performed by: COLON & RECTAL SURGERY

## 2022-11-10 PROCEDURE — 25010000002 DEXAMETHASONE PER 1 MG: Performed by: NURSE ANESTHETIST, CERTIFIED REGISTERED

## 2022-11-10 PROCEDURE — C1781 MESH (IMPLANTABLE): HCPCS | Performed by: COLON & RECTAL SURGERY

## 2022-11-10 PROCEDURE — C1769 GUIDE WIRE: HCPCS | Performed by: COLON & RECTAL SURGERY

## 2022-11-10 PROCEDURE — 0DN80ZZ RELEASE SMALL INTESTINE, OPEN APPROACH: ICD-10-PCS | Performed by: COLON & RECTAL SURGERY

## 2022-11-10 PROCEDURE — C1765 ADHESION BARRIER: HCPCS | Performed by: COLON & RECTAL SURGERY

## 2022-11-10 PROCEDURE — 88307 TISSUE EXAM BY PATHOLOGIST: CPT | Performed by: COLON & RECTAL SURGERY

## 2022-11-10 PROCEDURE — 82962 GLUCOSE BLOOD TEST: CPT

## 2022-11-10 PROCEDURE — 0WUF0JZ SUPPLEMENT ABDOMINAL WALL WITH SYNTHETIC SUBSTITUTE, OPEN APPROACH: ICD-10-PCS | Performed by: COLON & RECTAL SURGERY

## 2022-11-10 PROCEDURE — 25010000002 ONDANSETRON PER 1 MG: Performed by: NURSE ANESTHETIST, CERTIFIED REGISTERED

## 2022-11-10 PROCEDURE — 86923 COMPATIBILITY TEST ELECTRIC: CPT

## 2022-11-10 PROCEDURE — 25010000002 SODIUM CHLORIDE 0.9 % WITH KCL 20 MEQ 20-0.9 MEQ/L-% SOLUTION: Performed by: COLON & RECTAL SURGERY

## 2022-11-10 PROCEDURE — 25010000002 ERTAPENEM PER 500 MG: Performed by: COLON & RECTAL SURGERY

## 2022-11-10 PROCEDURE — 25010000002 DEXAMETHASONE SODIUM PHOSPHATE 10 MG/ML SOLUTION: Performed by: NURSE ANESTHETIST, CERTIFIED REGISTERED

## 2022-11-10 PROCEDURE — 25010000002 METHYLENE BLUE 1 % SOLUTION 1 ML VIAL: Performed by: NURSE ANESTHETIST, CERTIFIED REGISTERED

## 2022-11-10 PROCEDURE — 86900 BLOOD TYPING SEROLOGIC ABO: CPT | Performed by: COLON & RECTAL SURGERY

## 2022-11-10 PROCEDURE — 0DBN0ZZ EXCISION OF SIGMOID COLON, OPEN APPROACH: ICD-10-PCS | Performed by: COLON & RECTAL SURGERY

## 2022-11-10 PROCEDURE — 25010000002 PROPOFOL 10 MG/ML EMULSION: Performed by: NURSE ANESTHETIST, CERTIFIED REGISTERED

## 2022-11-10 PROCEDURE — 25010000002 KETOROLAC TROMETHAMINE PER 15 MG: Performed by: COLON & RECTAL SURGERY

## 2022-11-10 PROCEDURE — 86901 BLOOD TYPING SEROLOGIC RH(D): CPT | Performed by: COLON & RECTAL SURGERY

## 2022-11-10 PROCEDURE — 0DB80ZZ EXCISION OF SMALL INTESTINE, OPEN APPROACH: ICD-10-PCS | Performed by: COLON & RECTAL SURGERY

## 2022-11-10 DEVICE — PROXIMATE RELOADABLE LINEAR STAPLER, 60MM
Type: IMPLANTABLE DEVICE | Site: ABDOMEN | Status: FUNCTIONAL
Brand: PROXIMATE

## 2022-11-10 DEVICE — PHASIX ST MESH, RECTANGLE
Type: IMPLANTABLE DEVICE | Site: ABDOMEN | Status: FUNCTIONAL
Brand: PHASIX ST MESH

## 2022-11-10 DEVICE — PROXIMATE RELOADABLE LINEAR CUTTER WITH SAFETY LOCK-OUT, 75MM
Type: IMPLANTABLE DEVICE | Site: ABDOMEN | Status: FUNCTIONAL
Brand: PROXIMATE

## 2022-11-10 RX ORDER — NALOXONE HCL 0.4 MG/ML
0.4 VIAL (ML) INJECTION AS NEEDED
Status: DISCONTINUED | OUTPATIENT
Start: 2022-11-10 | End: 2022-11-10 | Stop reason: HOSPADM

## 2022-11-10 RX ORDER — ONDANSETRON 2 MG/ML
4 INJECTION INTRAMUSCULAR; INTRAVENOUS ONCE AS NEEDED
Status: DISCONTINUED | OUTPATIENT
Start: 2022-11-10 | End: 2022-11-10 | Stop reason: HOSPADM

## 2022-11-10 RX ORDER — INSULIN LISPRO 100 [IU]/ML
0-7 INJECTION, SOLUTION INTRAVENOUS; SUBCUTANEOUS
Status: DISCONTINUED | OUTPATIENT
Start: 2022-11-11 | End: 2022-11-11

## 2022-11-10 RX ORDER — PROMETHAZINE HYDROCHLORIDE 25 MG/1
25 SUPPOSITORY RECTAL ONCE AS NEEDED
Status: DISCONTINUED | OUTPATIENT
Start: 2022-11-10 | End: 2022-11-10 | Stop reason: HOSPADM

## 2022-11-10 RX ORDER — HYDROMORPHONE HCL 110MG/55ML
0.5 PATIENT CONTROLLED ANALGESIA SYRINGE INTRAVENOUS
Status: DISCONTINUED | OUTPATIENT
Start: 2022-11-10 | End: 2022-11-16 | Stop reason: HOSPADM

## 2022-11-10 RX ORDER — PREGABALIN 75 MG/1
75 CAPSULE ORAL ONCE
Status: COMPLETED | OUTPATIENT
Start: 2022-11-10 | End: 2022-11-10

## 2022-11-10 RX ORDER — SODIUM CHLORIDE, SODIUM LACTATE, POTASSIUM CHLORIDE, CALCIUM CHLORIDE 600; 310; 30; 20 MG/100ML; MG/100ML; MG/100ML; MG/100ML
9 INJECTION, SOLUTION INTRAVENOUS CONTINUOUS
Status: DISCONTINUED | OUTPATIENT
Start: 2022-11-10 | End: 2022-11-11

## 2022-11-10 RX ORDER — METHYLENE BLUE 10 MG/ML
INJECTION INTRAVENOUS AS NEEDED
Status: DISCONTINUED | OUTPATIENT
Start: 2022-11-10 | End: 2022-11-10 | Stop reason: SURG

## 2022-11-10 RX ORDER — NICOTINE POLACRILEX 4 MG
15 LOZENGE BUCCAL
Status: DISCONTINUED | OUTPATIENT
Start: 2022-11-10 | End: 2022-11-16 | Stop reason: HOSPADM

## 2022-11-10 RX ORDER — SODIUM CHLORIDE AND POTASSIUM CHLORIDE 150; 900 MG/100ML; MG/100ML
100 INJECTION, SOLUTION INTRAVENOUS CONTINUOUS
Status: DISCONTINUED | OUTPATIENT
Start: 2022-11-10 | End: 2022-11-11

## 2022-11-10 RX ORDER — FENTANYL CITRATE 50 UG/ML
INJECTION, SOLUTION INTRAMUSCULAR; INTRAVENOUS
Status: COMPLETED
Start: 2022-11-10 | End: 2022-11-10

## 2022-11-10 RX ORDER — ONDANSETRON 4 MG/1
4 TABLET, FILM COATED ORAL EVERY 6 HOURS PRN
Status: DISCONTINUED | OUTPATIENT
Start: 2022-11-10 | End: 2022-11-16 | Stop reason: HOSPADM

## 2022-11-10 RX ORDER — HYDROMORPHONE HYDROCHLORIDE 1 MG/ML
0.5 INJECTION, SOLUTION INTRAMUSCULAR; INTRAVENOUS; SUBCUTANEOUS
Status: DISCONTINUED | OUTPATIENT
Start: 2022-11-10 | End: 2022-11-10 | Stop reason: HOSPADM

## 2022-11-10 RX ORDER — LISINOPRIL 5 MG/1
5 TABLET ORAL
Status: DISCONTINUED | OUTPATIENT
Start: 2022-11-11 | End: 2022-11-11

## 2022-11-10 RX ORDER — FAMOTIDINE 20 MG/1
20 TABLET, FILM COATED ORAL ONCE
Status: COMPLETED | OUTPATIENT
Start: 2022-11-10 | End: 2022-11-10

## 2022-11-10 RX ORDER — ALVIMOPAN 12 MG/1
12 CAPSULE ORAL 2 TIMES DAILY
Status: DISCONTINUED | OUTPATIENT
Start: 2022-11-11 | End: 2022-11-16

## 2022-11-10 RX ORDER — PROMETHAZINE HYDROCHLORIDE 25 MG/1
25 TABLET ORAL ONCE AS NEEDED
Status: DISCONTINUED | OUTPATIENT
Start: 2022-11-10 | End: 2022-11-10 | Stop reason: HOSPADM

## 2022-11-10 RX ORDER — FENTANYL CITRATE 50 UG/ML
INJECTION, SOLUTION INTRAMUSCULAR; INTRAVENOUS AS NEEDED
Status: DISCONTINUED | OUTPATIENT
Start: 2022-11-10 | End: 2022-11-10 | Stop reason: SURG

## 2022-11-10 RX ORDER — HYDROCODONE BITARTRATE AND ACETAMINOPHEN 5; 325 MG/1; MG/1
1 TABLET ORAL ONCE AS NEEDED
Status: DISCONTINUED | OUTPATIENT
Start: 2022-11-10 | End: 2022-11-10 | Stop reason: HOSPADM

## 2022-11-10 RX ORDER — LORAZEPAM 0.5 MG/1
0.5 TABLET ORAL 3 TIMES DAILY PRN
Status: DISCONTINUED | OUTPATIENT
Start: 2022-11-10 | End: 2022-11-16 | Stop reason: HOSPADM

## 2022-11-10 RX ORDER — HEPARIN SODIUM 5000 [USP'U]/ML
5000 INJECTION, SOLUTION INTRAVENOUS; SUBCUTANEOUS ONCE
Status: COMPLETED | OUTPATIENT
Start: 2022-11-10 | End: 2022-11-10

## 2022-11-10 RX ORDER — SODIUM CHLORIDE 0.9 % (FLUSH) 0.9 %
3-10 SYRINGE (ML) INJECTION AS NEEDED
Status: DISCONTINUED | OUTPATIENT
Start: 2022-11-10 | End: 2022-11-10 | Stop reason: HOSPADM

## 2022-11-10 RX ORDER — DEXAMETHASONE SODIUM PHOSPHATE 4 MG/ML
INJECTION, SOLUTION INTRA-ARTICULAR; INTRALESIONAL; INTRAMUSCULAR; INTRAVENOUS; SOFT TISSUE AS NEEDED
Status: DISCONTINUED | OUTPATIENT
Start: 2022-11-10 | End: 2022-11-10 | Stop reason: SURG

## 2022-11-10 RX ORDER — DEXAMETHASONE SODIUM PHOSPHATE 10 MG/ML
INJECTION, SOLUTION INTRAMUSCULAR; INTRAVENOUS
Status: COMPLETED | OUTPATIENT
Start: 2022-11-10 | End: 2022-11-10

## 2022-11-10 RX ORDER — BUPIVACAINE HYDROCHLORIDE 2.5 MG/ML
INJECTION, SOLUTION EPIDURAL; INFILTRATION; INTRACAUDAL
Status: COMPLETED | OUTPATIENT
Start: 2022-11-10 | End: 2022-11-10

## 2022-11-10 RX ORDER — HEPARIN SODIUM 5000 [USP'U]/ML
5000 INJECTION, SOLUTION INTRAVENOUS; SUBCUTANEOUS EVERY 8 HOURS SCHEDULED
Status: DISCONTINUED | OUTPATIENT
Start: 2022-11-11 | End: 2022-11-11

## 2022-11-10 RX ORDER — ACETAMINOPHEN 325 MG/1
650 TABLET ORAL EVERY 8 HOURS SCHEDULED
Status: DISCONTINUED | OUTPATIENT
Start: 2022-11-10 | End: 2022-11-16 | Stop reason: HOSPADM

## 2022-11-10 RX ORDER — MAGNESIUM HYDROXIDE 1200 MG/15ML
LIQUID ORAL AS NEEDED
Status: DISCONTINUED | OUTPATIENT
Start: 2022-11-10 | End: 2022-11-10 | Stop reason: HOSPADM

## 2022-11-10 RX ORDER — LABETALOL HYDROCHLORIDE 5 MG/ML
10 INJECTION, SOLUTION INTRAVENOUS EVERY 4 HOURS PRN
Status: DISCONTINUED | OUTPATIENT
Start: 2022-11-10 | End: 2022-11-11

## 2022-11-10 RX ORDER — GLYCOPYRROLATE 0.2 MG/ML
INJECTION INTRAMUSCULAR; INTRAVENOUS AS NEEDED
Status: DISCONTINUED | OUTPATIENT
Start: 2022-11-10 | End: 2022-11-10 | Stop reason: SURG

## 2022-11-10 RX ORDER — LIDOCAINE HYDROCHLORIDE 10 MG/ML
INJECTION, SOLUTION EPIDURAL; INFILTRATION; INTRACAUDAL; PERINEURAL AS NEEDED
Status: DISCONTINUED | OUTPATIENT
Start: 2022-11-10 | End: 2022-11-10 | Stop reason: SURG

## 2022-11-10 RX ORDER — FLUTICASONE PROPIONATE 50 MCG
1 SPRAY, SUSPENSION (ML) NASAL DAILY
Status: DISCONTINUED | OUTPATIENT
Start: 2022-11-10 | End: 2022-11-16 | Stop reason: HOSPADM

## 2022-11-10 RX ORDER — CETIRIZINE HYDROCHLORIDE 10 MG/1
10 TABLET ORAL DAILY
Status: DISCONTINUED | OUTPATIENT
Start: 2022-11-10 | End: 2022-11-16 | Stop reason: HOSPADM

## 2022-11-10 RX ORDER — ONDANSETRON 2 MG/ML
4 INJECTION INTRAMUSCULAR; INTRAVENOUS EVERY 6 HOURS PRN
Status: DISCONTINUED | OUTPATIENT
Start: 2022-11-10 | End: 2022-11-10 | Stop reason: SDUPTHER

## 2022-11-10 RX ORDER — FUROSEMIDE 10 MG/ML
INJECTION INTRAMUSCULAR; INTRAVENOUS AS NEEDED
Status: DISCONTINUED | OUTPATIENT
Start: 2022-11-10 | End: 2022-11-10 | Stop reason: SURG

## 2022-11-10 RX ORDER — MIDAZOLAM HYDROCHLORIDE 1 MG/ML
0.5 INJECTION INTRAMUSCULAR; INTRAVENOUS
Status: DISCONTINUED | OUTPATIENT
Start: 2022-11-10 | End: 2022-11-10 | Stop reason: HOSPADM

## 2022-11-10 RX ORDER — DROPERIDOL 2.5 MG/ML
0.62 INJECTION, SOLUTION INTRAMUSCULAR; INTRAVENOUS
Status: DISCONTINUED | OUTPATIENT
Start: 2022-11-10 | End: 2022-11-10 | Stop reason: HOSPADM

## 2022-11-10 RX ORDER — FENTANYL CITRATE 50 UG/ML
50 INJECTION, SOLUTION INTRAMUSCULAR; INTRAVENOUS
Status: DISCONTINUED | OUTPATIENT
Start: 2022-11-10 | End: 2022-11-10 | Stop reason: HOSPADM

## 2022-11-10 RX ORDER — EPHEDRINE SULFATE 50 MG/ML
INJECTION INTRAVENOUS AS NEEDED
Status: DISCONTINUED | OUTPATIENT
Start: 2022-11-10 | End: 2022-11-10 | Stop reason: SURG

## 2022-11-10 RX ORDER — HYDROCODONE BITARTRATE AND ACETAMINOPHEN 7.5; 325 MG/1; MG/1
1 TABLET ORAL EVERY 4 HOURS PRN
Status: DISCONTINUED | OUTPATIENT
Start: 2022-11-10 | End: 2022-11-16 | Stop reason: HOSPADM

## 2022-11-10 RX ORDER — KETOROLAC TROMETHAMINE 30 MG/ML
15 INJECTION, SOLUTION INTRAMUSCULAR; INTRAVENOUS EVERY 6 HOURS
Status: DISCONTINUED | OUTPATIENT
Start: 2022-11-10 | End: 2022-11-11

## 2022-11-10 RX ORDER — ONDANSETRON 2 MG/ML
4 INJECTION INTRAMUSCULAR; INTRAVENOUS EVERY 6 HOURS PRN
Status: DISCONTINUED | OUTPATIENT
Start: 2022-11-10 | End: 2022-11-16 | Stop reason: HOSPADM

## 2022-11-10 RX ORDER — ONDANSETRON 2 MG/ML
INJECTION INTRAMUSCULAR; INTRAVENOUS AS NEEDED
Status: DISCONTINUED | OUTPATIENT
Start: 2022-11-10 | End: 2022-11-10 | Stop reason: SURG

## 2022-11-10 RX ORDER — SUCRALFATE 1 G/1
1 TABLET ORAL 2 TIMES DAILY
Status: DISCONTINUED | OUTPATIENT
Start: 2022-11-10 | End: 2022-11-16 | Stop reason: HOSPADM

## 2022-11-10 RX ORDER — SODIUM CHLORIDE 0.9 % (FLUSH) 0.9 %
3 SYRINGE (ML) INJECTION EVERY 12 HOURS SCHEDULED
Status: DISCONTINUED | OUTPATIENT
Start: 2022-11-10 | End: 2022-11-10 | Stop reason: HOSPADM

## 2022-11-10 RX ORDER — IBUPROFEN 400 MG/1
400 TABLET ORAL EVERY 6 HOURS PRN
Status: DISCONTINUED | OUTPATIENT
Start: 2022-11-10 | End: 2022-11-11

## 2022-11-10 RX ORDER — HYDRALAZINE HYDROCHLORIDE 20 MG/ML
5 INJECTION INTRAMUSCULAR; INTRAVENOUS
Status: DISCONTINUED | OUTPATIENT
Start: 2022-11-10 | End: 2022-11-10 | Stop reason: HOSPADM

## 2022-11-10 RX ORDER — NALOXONE HCL 0.4 MG/ML
0.4 VIAL (ML) INJECTION
Status: DISCONTINUED | OUTPATIENT
Start: 2022-11-10 | End: 2022-11-16 | Stop reason: HOSPADM

## 2022-11-10 RX ORDER — DIAZEPAM 5 MG/1
5 TABLET ORAL EVERY 6 HOURS PRN
Status: DISCONTINUED | OUTPATIENT
Start: 2022-11-10 | End: 2022-11-16 | Stop reason: HOSPADM

## 2022-11-10 RX ORDER — DROPERIDOL 2.5 MG/ML
0.62 INJECTION, SOLUTION INTRAMUSCULAR; INTRAVENOUS ONCE AS NEEDED
Status: DISCONTINUED | OUTPATIENT
Start: 2022-11-10 | End: 2022-11-10 | Stop reason: HOSPADM

## 2022-11-10 RX ORDER — PRAVASTATIN SODIUM 40 MG
80 TABLET ORAL NIGHTLY
Status: DISCONTINUED | OUTPATIENT
Start: 2022-11-10 | End: 2022-11-16 | Stop reason: HOSPADM

## 2022-11-10 RX ORDER — GABAPENTIN 300 MG/1
600 CAPSULE ORAL NIGHTLY
Status: DISCONTINUED | OUTPATIENT
Start: 2022-11-10 | End: 2022-11-16 | Stop reason: HOSPADM

## 2022-11-10 RX ORDER — ROCURONIUM BROMIDE 10 MG/ML
INJECTION, SOLUTION INTRAVENOUS AS NEEDED
Status: DISCONTINUED | OUTPATIENT
Start: 2022-11-10 | End: 2022-11-10 | Stop reason: SURG

## 2022-11-10 RX ORDER — LEVOTHYROXINE SODIUM 88 UG/1
88 TABLET ORAL
Status: DISCONTINUED | OUTPATIENT
Start: 2022-11-11 | End: 2022-11-16 | Stop reason: HOSPADM

## 2022-11-10 RX ORDER — DEXTROSE MONOHYDRATE 25 G/50ML
25 INJECTION, SOLUTION INTRAVENOUS
Status: DISCONTINUED | OUTPATIENT
Start: 2022-11-10 | End: 2022-11-16 | Stop reason: HOSPADM

## 2022-11-10 RX ORDER — ALVIMOPAN 12 MG/1
12 CAPSULE ORAL ONCE
Status: COMPLETED | OUTPATIENT
Start: 2022-11-10 | End: 2022-11-10

## 2022-11-10 RX ORDER — ACETAMINOPHEN 500 MG
1000 TABLET ORAL ONCE
Status: DISCONTINUED | OUTPATIENT
Start: 2022-11-10 | End: 2022-11-10 | Stop reason: HOSPADM

## 2022-11-10 RX ORDER — MEPERIDINE HYDROCHLORIDE 25 MG/ML
12.5 INJECTION INTRAMUSCULAR; INTRAVENOUS; SUBCUTANEOUS
Status: DISCONTINUED | OUTPATIENT
Start: 2022-11-10 | End: 2022-11-10 | Stop reason: HOSPADM

## 2022-11-10 RX ORDER — LIDOCAINE HYDROCHLORIDE 10 MG/ML
0.5 INJECTION, SOLUTION EPIDURAL; INFILTRATION; INTRACAUDAL; PERINEURAL ONCE AS NEEDED
Status: COMPLETED | OUTPATIENT
Start: 2022-11-10 | End: 2022-11-10

## 2022-11-10 RX ORDER — SODIUM CHLORIDE, SODIUM LACTATE, POTASSIUM CHLORIDE, CALCIUM CHLORIDE 600; 310; 30; 20 MG/100ML; MG/100ML; MG/100ML; MG/100ML
INJECTION, SOLUTION INTRAVENOUS CONTINUOUS PRN
Status: DISCONTINUED | OUTPATIENT
Start: 2022-11-10 | End: 2022-11-10 | Stop reason: SURG

## 2022-11-10 RX ORDER — METOPROLOL SUCCINATE 50 MG/1
50 TABLET, EXTENDED RELEASE ORAL DAILY
Status: DISCONTINUED | OUTPATIENT
Start: 2022-11-11 | End: 2022-11-11

## 2022-11-10 RX ORDER — IPRATROPIUM BROMIDE AND ALBUTEROL SULFATE 2.5; .5 MG/3ML; MG/3ML
3 SOLUTION RESPIRATORY (INHALATION) ONCE AS NEEDED
Status: DISCONTINUED | OUTPATIENT
Start: 2022-11-10 | End: 2022-11-10 | Stop reason: HOSPADM

## 2022-11-10 RX ORDER — GABAPENTIN 300 MG/1
300 CAPSULE ORAL 2 TIMES DAILY
Status: DISCONTINUED | OUTPATIENT
Start: 2022-11-10 | End: 2022-11-10 | Stop reason: SDUPTHER

## 2022-11-10 RX ORDER — LABETALOL HYDROCHLORIDE 5 MG/ML
5 INJECTION, SOLUTION INTRAVENOUS
Status: DISCONTINUED | OUTPATIENT
Start: 2022-11-10 | End: 2022-11-10 | Stop reason: HOSPADM

## 2022-11-10 RX ORDER — PROPOFOL 10 MG/ML
VIAL (ML) INTRAVENOUS AS NEEDED
Status: DISCONTINUED | OUTPATIENT
Start: 2022-11-10 | End: 2022-11-10 | Stop reason: SURG

## 2022-11-10 RX ADMIN — METHYLENE BLUE 25 MG: 10 INJECTION INTRAVENOUS at 15:07

## 2022-11-10 RX ADMIN — FENTANYL CITRATE 100 MCG: 50 INJECTION, SOLUTION INTRAMUSCULAR; INTRAVENOUS at 13:20

## 2022-11-10 RX ADMIN — PRAVASTATIN SODIUM 80 MG: 40 TABLET ORAL at 20:40

## 2022-11-10 RX ADMIN — METHYLENE BLUE 25 MG: 10 INJECTION INTRAVENOUS at 14:50

## 2022-11-10 RX ADMIN — FLUTICASONE PROPIONATE 1 SPRAY: 50 SPRAY, METERED NASAL at 20:45

## 2022-11-10 RX ADMIN — DEXAMETHASONE SODIUM PHOSPHATE 4 MG: 10 INJECTION, SOLUTION INTRAMUSCULAR; INTRAVENOUS at 13:24

## 2022-11-10 RX ADMIN — POTASSIUM CHLORIDE AND SODIUM CHLORIDE 100 ML/HR: 900; 150 INJECTION, SOLUTION INTRAVENOUS at 18:26

## 2022-11-10 RX ADMIN — PROPOFOL 25 MCG/KG/MIN: 10 INJECTION, EMULSION INTRAVENOUS at 13:45

## 2022-11-10 RX ADMIN — PROPOFOL 140 MG: 10 INJECTION, EMULSION INTRAVENOUS at 13:20

## 2022-11-10 RX ADMIN — GABAPENTIN 600 MG: 300 CAPSULE ORAL at 20:41

## 2022-11-10 RX ADMIN — ACETAMINOPHEN 650 MG: 325 TABLET, FILM COATED ORAL at 20:40

## 2022-11-10 RX ADMIN — HEPARIN SODIUM 5000 UNITS: 5000 INJECTION, SOLUTION INTRAVENOUS; SUBCUTANEOUS at 10:24

## 2022-11-10 RX ADMIN — SUGAMMADEX 200 MG: 100 INJECTION, SOLUTION INTRAVENOUS at 16:25

## 2022-11-10 RX ADMIN — FUROSEMIDE 5 MG: 10 INJECTION, SOLUTION INTRAMUSCULAR; INTRAVENOUS at 15:19

## 2022-11-10 RX ADMIN — DEXAMETHASONE SODIUM PHOSPHATE 4 MG: 4 INJECTION, SOLUTION INTRAMUSCULAR; INTRAVENOUS at 13:25

## 2022-11-10 RX ADMIN — ROCURONIUM BROMIDE 50 MG: 10 INJECTION INTRAVENOUS at 13:20

## 2022-11-10 RX ADMIN — SUCRALFATE 1 G: 1 TABLET ORAL at 20:48

## 2022-11-10 RX ADMIN — EPHEDRINE SULFATE 10 MG: 50 INJECTION INTRAVENOUS at 14:12

## 2022-11-10 RX ADMIN — PREGABALIN 75 MG: 75 CAPSULE ORAL at 10:24

## 2022-11-10 RX ADMIN — ALVIMOPAN 12 MG: 12 CAPSULE ORAL at 10:26

## 2022-11-10 RX ADMIN — KETOROLAC TROMETHAMINE 15 MG: 30 INJECTION, SOLUTION INTRAMUSCULAR; INTRAVENOUS at 20:41

## 2022-11-10 RX ADMIN — FAMOTIDINE 20 MG: 20 TABLET ORAL at 10:24

## 2022-11-10 RX ADMIN — CETIRIZINE HYDROCHLORIDE 10 MG: 10 TABLET, FILM COATED ORAL at 20:41

## 2022-11-10 RX ADMIN — EPHEDRINE SULFATE 10 MG: 50 INJECTION INTRAVENOUS at 13:30

## 2022-11-10 RX ADMIN — SODIUM CHLORIDE, POTASSIUM CHLORIDE, SODIUM LACTATE AND CALCIUM CHLORIDE: 600; 310; 30; 20 INJECTION, SOLUTION INTRAVENOUS at 13:16

## 2022-11-10 RX ADMIN — BUSPIRONE HYDROCHLORIDE 15 MG: 10 TABLET ORAL at 20:45

## 2022-11-10 RX ADMIN — Medication 1 G: at 13:19

## 2022-11-10 RX ADMIN — FENTANYL CITRATE 50 MCG: 50 INJECTION, SOLUTION INTRAMUSCULAR; INTRAVENOUS at 17:00

## 2022-11-10 RX ADMIN — ROCURONIUM BROMIDE 20 MG: 10 INJECTION INTRAVENOUS at 14:19

## 2022-11-10 RX ADMIN — LIDOCAINE HYDROCHLORIDE 50 MG: 10 INJECTION, SOLUTION EPIDURAL; INFILTRATION; INTRACAUDAL; PERINEURAL at 13:20

## 2022-11-10 RX ADMIN — GLYCOPYRROLATE 0.2 MG: 0.2 INJECTION INTRAMUSCULAR; INTRAVENOUS at 14:11

## 2022-11-10 RX ADMIN — SODIUM CHLORIDE, POTASSIUM CHLORIDE, SODIUM LACTATE AND CALCIUM CHLORIDE: 600; 310; 30; 20 INJECTION, SOLUTION INTRAVENOUS at 15:10

## 2022-11-10 RX ADMIN — BUPIVACAINE HYDROCHLORIDE 60 ML: 2.5 INJECTION, SOLUTION EPIDURAL; INFILTRATION; INTRACAUDAL; PERINEURAL at 13:24

## 2022-11-10 RX ADMIN — SODIUM CHLORIDE, POTASSIUM CHLORIDE, SODIUM LACTATE AND CALCIUM CHLORIDE 9 ML/HR: 600; 310; 30; 20 INJECTION, SOLUTION INTRAVENOUS at 10:25

## 2022-11-10 RX ADMIN — ONDANSETRON 4 MG: 2 INJECTION INTRAMUSCULAR; INTRAVENOUS at 16:22

## 2022-11-10 RX ADMIN — LIDOCAINE HYDROCHLORIDE 0.5 ML: 10 INJECTION, SOLUTION EPIDURAL; INFILTRATION; INTRACAUDAL; PERINEURAL at 10:24

## 2022-11-10 RX ADMIN — HYDROMORPHONE HYDROCHLORIDE 0.5 MG: 2 INJECTION, SOLUTION INTRAMUSCULAR; INTRAVENOUS; SUBCUTANEOUS at 18:26

## 2022-11-10 NOTE — PLAN OF CARE
Goal Outcome Evaluation:           Progress: no change  Outcome Evaluation: Patient admitted from PACU

## 2022-11-10 NOTE — ANESTHESIA PROCEDURE NOTES
Peripheral Block    Pre-sedation assessment completed: 11/10/2022 1:20 PM    Patient reassessed immediately prior to procedure    Patient location during procedure: OR  Start time: 11/10/2022 1:20 PM  Stop time: 11/10/2022 1:26 PM  Reason for block: at surgeon's request and post-op pain management  Performed by  CRNA/DANIEL: Jovan Agarwal CRNASRNA: Maureen Shepherd SRNA  Preanesthetic Checklist  Completed: patient identified, IV checked, site marked, risks and benefits discussed, surgical consent, monitors and equipment checked, pre-op evaluation and timeout performed  Prep:  Pt Position: supine  Sterile barriers:cap, gloves, mask and washed/disinfected hands  Prep: ChloraPrep  Patient monitoring: blood pressure monitoring, continuous pulse oximetry and EKG  Procedure    Sedation: yes  Performed under: general  Guidance:ultrasound guided  Images:still images obtained, printed/placed on chart    Laterality:Bilateral  Block Type:TAP  Injection Technique:single-shot  Needle Type:short-bevel and echogenic  Needle Gauge:20 G  Resistance on Injection: none    Medications Used: dexamethasone sodium phosphate injection - Injection   4 mg - 11/10/2022 1:24:00 PM  bupivacaine PF (MARCAINE) 0.25 % injection - Injection   60 mL - 11/10/2022 1:24:00 PM      Medications  Comment:Block Injection:  LA dose divided between Right and Left block        Post Assessment  Injection Assessment: negative aspiration for heme, incremental injection and no paresthesia on injection  Patient Tolerance:comfortable throughout block  Complications:no  Additional Notes    Subcostal TAPs    A high-frequency linear transducer, with sterile cover, was placed sub-xiphoid to identify [place], right and left Rectus Abdominus Muscles (YAEL). The transducer was moved either right or left subcostally to identify the YAEL and the Transverse Abdominus Muscle (BANDA). The insertion site was prepped in sterile fashion and then localized with 2-5 ml of 1%  "Lidocaine. Using ultrasound-guidance, a 20-gauge B-Joy 4\" Ultraplex 360 non-stimulating echogenic needle was advanced in plane, from medial to lateral, until the tip of the needle was in the fascial plane between the YAEL and BANDA. 1-3ml of preservative free normal saline was used to hydro-dissect the fascial planes. After the fascial plane was verified, the local anesthetic (LA) was injected. The procedure was repeated on the opposite side for bilateral coverage. Aspiration every 5 ml to prevent intravascular injection. Injection was completed with negative aspiration of blood and negative intravascular injection. Injection pressures were normal with minimal resistance. The subcostal approach to the TAP nerve block ideally anesthetizes the intercostal nerves T6-T9.            "

## 2022-11-10 NOTE — ANESTHESIA POSTPROCEDURE EVALUATION
Patient: Neema Richard    Procedure Summary     Date: 11/10/22 Room / Location:  [facility] 03 / [facility]    Anesthesia Start: 1317 Anesthesia Stop: 1650    Procedure: ABDOMINAL PERINEAL RESECTION, REPAIR OF PARASTOMAL HERNIA WITH MESH (Abdomen) Diagnosis:     Surgeons: Sravan Vazquez MD Provider: Huy Fraga MD    Anesthesia Type: general ASA Status: 3          Anesthesia Type: general    Vitals  No vitals data found for the desired time range.          Post Anesthesia Care and Evaluation    Patient location during evaluation: PACU  Patient participation: complete - patient participated  Level of consciousness: awake  Pain score: 0  Pain management: adequate    Airway patency: patent  Anesthetic complications: No anesthetic complications  PONV Status: none  Cardiovascular status: acceptable and stable  Respiratory status: nasal cannula, unassisted, acceptable and spontaneous ventilation  Hydration status: acceptable

## 2022-11-10 NOTE — H&P
Admission HP     Patient Name: Neema Richard  MRN: [id number]  : 1940  DOS: 11/10/2022    Attending: Sravan Vazquez MD    Primary Care Provider: David Mackey MD      Patient Care Team:  David Mackey MD as PCP - General  Walter Caro MD as Obstetrician (Obstetrics and Gynecology)  Sravan Vazquez MD as Consulting Physician (Colon and Rectal Surgery)    Chief complaint:  Fecal incontinence     Subjective   Patient is a pleasant 82 y.o. female presented for scheduled surgery by .  She anticipates abdominal perineal resection, repair of parastomal hernia, possible bile mesh today. Approx. two years ago she had a perforated colon for which she underwent low anterior resection, appendectomy, left salpingo-oophorectomy, distal transverse and colostomy by Dr. Vazquez 3/17/2020.  Since her surgery, she has been experiencing symptoms of rectal fecal incontinence, leakage and urgency that have worsened over the last year and for which she presents for surgery today.     When seen in preop, patient is resting comfortably.  She denies abdominal pain.  She denies nausea, shortness of breath or chest pain.       Allergies   Allergen Reactions   • Evista [Raloxifene] Rash   • Cephalexin Diarrhea   • Hydrochlorothiazide Diarrhea   • Meloxicam Other (See Comments)   • Metronidazole Diarrhea   • Oxybutynin Cough   • Propylthiouracil Other (See Comments)     Severally reduce WBC's    • Sulfa Antibiotics Hives   • Other Anxiety     Steroid injection for pinched nerve-  Got panic attack after        Meds:  Medications Prior to Admission   Medication Sig Dispense Refill Last Dose   • busPIRone (BUSPAR) 15 MG tablet Take 15 mg by mouth Daily.  3 2022   • calcium citrate-vitamin d (CALCITRATE) 315-250 MG-UNIT tablet tablet Take 1 tablet by mouth Daily.   2022   • Cholecalciferol (VITAMIN D3) 2000 UNITS capsule Take 2,000 Units by mouth Daily.  3 2022   • denosumab (Prolia) 60 MG/ML solution  prefilled syringe syringe Month ago - ld   Past Month   • fluticasone (FLONASE) 50 MCG/ACT nasal spray 1 spray into the nostril(s) as directed by provider Daily. Spring only   11/9/2022   • gabapentin (NEURONTIN) 600 MG tablet Take 600 mg by mouth every night at bedtime.   11/9/2022   • levothyroxine (SYNTHROID, LEVOTHROID) 88 MCG tablet Take 88 mcg by mouth daily.   11/10/2022   • loratadine (CLARITIN) 10 MG tablet Take 10 mg by mouth Daily.   11/9/2022   • LORazepam (ATIVAN) 0.5 MG tablet Take 0.5 mg by mouth every 8 (eight) hours as needed for anxiety.   11/9/2022   • Magnesium 250 MG tablet Take 1 tablet by mouth Daily.   11/9/2022   • metoprolol succinate XL (TOPROL-XL) 50 MG 24 hr tablet Take 50 mg by mouth Daily.   11/10/2022 at 0630   • Mirabegron ER (Myrbetriq) 50 MG tablet sustained-release 24 hour 24 hr tablet Take 50 mg by mouth Daily. 90 tablet 4 11/10/2022   • multivitamin with minerals tablet tablet 2 gels daily   11/9/2022   • multivitamin with minerals tablet tablet Take 1 tablet by mouth Daily. Flinstone with iron   11/9/2022   • pioglitazone (ACTOS) 15 MG tablet Take 15 mg by mouth Daily.   11/10/2022   • Probiotic Product (ALIGN) 4 MG capsule Take 1 capsule by mouth Daily.   Past Week   • ramipril (ALTACE) 1.25 MG capsule Take 1.25 mg by mouth Daily.   11/10/2022   • sucralfate (CARAFATE) 1 G tablet Take 1 g by mouth 2 (Two) Times a Day.  3 11/10/2022   • valACYclovir (Valtrex) 1000 MG tablet Take 1 tablet by mouth Daily. (Patient taking differently: Take 1 tablet by mouth As Needed.) 5 tablet 0 Past Week   • acetaminophen (TYLENOL) 650 MG 8 hr tablet Take 2 tablets by mouth Every 8 (Eight) Hours As Needed for Mild Pain.      • pravastatin (PRAVACHOL) 80 MG tablet Take 1 tablet by mouth Daily.   11/8/2022         History:   Past Medical History:   Diagnosis Date   • Acquired hypothyroidism 1980   • Anesthesia complication     after gb surgery - anesthesia paralyzed her lungs too early   •  Arthritis    • Breast cancer (HCC)     precancerous cell   • Breast injury 2019    previous fall caused bruising to right breast   • Colon polyp - sees Dr. 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 ER and [place] (+)   • Diabetes mellitus (HCC)     prediabetic- doesnt check sugar   • Diverticular disease 03/2020   • Essential hypertension 2012   • High cholesterol 2019   • History of diverticular abscess 04/16/2020    S/p LAR, abscess drainage, appy, left maldonado, distal transverse end colostomy 3/17/20 by Dr. Vazquez   • Hx of herpes genitalis 2013   • IBS-d (takes Buspar)    • Pneumonia 04/2020   • Sepsis (HCC) 04/2020   • SOBOE (shortness of breath on exertion)    • Wears glasses      Past Surgical History:   Procedure Laterality Date   • APPENDECTOMY     • BILATERAL BREAST REDUCTION Bilateral 1995   • BREAST BIOPSY Left 2009   • BREAST EXCISIONAL BIOPSY Left 2009    ADH   • CATARACT EXTRACTION, BILATERAL Bilateral 2018   • CERVICAL CONIZATION  1983   • CHOLECYSTECTOMY     • COLON RESECTION N/A 03/17/2020    Procedure: LOW ANTERIOR COLON RESECTION, MOBILIZATION OF SPLENIC FLEXURE, DRAINAGE OF ABSCESS AND COLOSTOMY CREATION;  Surgeon: Sravan Vazquez MD;  Location: [facility];  Service: General;  Laterality: N/A;   • COLONOSCOPY     • FACIAL COSMETIC SURGERY  2001   • FACIAL COSMETIC SURGERY  2012   • HEMORRHOIDECTOMY  1974   • LAPAROSCOPIC CHOLECYSTECTOMY  2010   • REDUCTION MAMMAPLASTY Bilateral 04/13/2017    DCIS (3mm found incidentally right breast)   • REDUCTION MAMMAPLASTY Bilateral 1995   • SINUS SURGERY  1992    poylp   • THYROIDECTOMY, PARTIAL  1963   • TONSILLECTOMY  1953   • VARICOSE VEIN SURGERY Bilateral 1981   • VARICOSE VEIN SURGERY  2021     Family History   Problem Relation Age of Onset   • Breast cancer Neg Hx    • Ovarian cancer Neg Hx      Social History     Tobacco Use   • Smoking status: Never   • Smokeless tobacco: Never   Vaping Use   • Vaping  "Use: Never used   Substance Use Topics   • Alcohol use: No   • Drug use: No       Review of Systems  Pertinent items are noted in HPI, all other systems reviewed and negative    Vital Signs  /78 (BP Location: Right arm, Patient Position: Lying)   Pulse 64   Temp 98.1 °F (36.7 °C) (Temporal)   Resp 16   Ht 167.6 cm (66\")   Wt 61.2 kg (135 lb)   LMP  (LMP Unknown)   SpO2 99%   BMI 21.79 kg/m²     Physical Exam:    General Appearance:    Alert, cooperative, in no acute distress   Head:    Normocephalic, without obvious abnormality, atraumatic   Eyes:            Lids and lashes normal, conjunctivae and sclerae normal, no   icterus, no pallor, corneas clear   Ears:    Ears appear intact with no abnormalities noted   Throat:   No oral lesions, no thrush, oral mucosa moist   Neck:   No adenopathy, supple, trachea midline, no thyromegaly         Lungs:     Clear to auscultation,respirations regular, even and       unlabored. No wheezes or rales.    Heart:    Regular rhythm and normal rate, normal S1 and S2, no murmur    Abdomen:      Soft, nontender.  Beefy red stoma, ostomy bag with loose green stool in bag   Genitalia:    Deferred   Extremities:   Moves all extremities well, no edema, no cyanosis, no              redness   Pulses:   Pulses palpable and equal bilaterally   Skin:   No bleeding, bruising or rash   Neurologic:   Cranial nerves 2 - 12 grossly intact     Results from last 7 days   Lab Units 11/04/22  0924   WBC 10*3/mm3 7.04   HEMOGLOBIN g/dL 12.4   HEMATOCRIT % 38.2   PLATELETS 10*3/mm3 265     Results from last 7 days   Lab Units 11/04/22  0924   SODIUM mmol/L 143   POTASSIUM mmol/L 3.8   CHLORIDE mmol/L 108*   CO2 mmol/L 25.0   BUN mg/dL 20   CREATININE mg/dL 0.70   CALCIUM mg/dL 9.0   BILIRUBIN mg/dL 0.3   ALK PHOS U/L 53   ALT (SGPT) U/L 10   AST (SGOT) U/L 14   GLUCOSE mg/dL 130*     Lab Results   Component Value Date    HGBA1C 5.50 11/04/2022       Assessment and Plan:       Mixed urge and " stress incontinence    Essential hypertension    Acquired hypothyroidism    High cholesterol    DM2 (diabetes mellitus, type 2) ([facility])      Plan  1. Ambulation  2. Pain control-prns   3. IS-encourage  4. DVT proph- Mechanical, subcutaneous heparin  5. Bowel regimen  6. Resume home medications as appropriate  7. Monitor post-op labs  8. DC planning   9. Diet, Clears, advance diet as tolerated.  IVF initially, monitor volume status.    HTN, Hyperlipidemia  - Continue home metoprolol, ACE I, statin  - Monitor BP   - Holding parameters for BP meds  - Labetalol PRN for SBP>170    DM2  - hgb A1c on 11/4/2022 5.5  - Accu-Chek AC and HS with low dose SSI    Hypothyroidism  -Continue Synthroid      Dragon disclaimer:  Part of this encounter note is an electronic transcription/translation of spoken language to printed text. The electronic translation of spoken language may permit erroneous, or at times, nonsensical words or phrases to be inadvertently transcribed; Although I have reviewed the note for such errors, some may still exist.    Reba Martel, APRN  11/10/22  14:05 EST  Late entry:  Above is noted, agree.  I saw patient preoperatively, doing fairly well, ready to proceed with surgery.  She subsequently underwent the following procedures  ABDOMINAL PERINEAL RESECTION  Extensive lysis of adhesions/history of peritonitis  Small bowel resection, short segment  Parastomal hernia repair and ventral hernia repair with mesh.  Omental flap into the deep pelvis.  Surgery was done under general anesthesia and a block, patient was admitted subsequently for further management.

## 2022-11-10 NOTE — ANESTHESIA PREPROCEDURE EVALUATION
Anesthesia Evaluation                  Airway   Mallampati: I  TM distance: >3 FB  Neck ROM: full  No difficulty expected  Dental      Pulmonary    (+) pneumonia , shortness of breath,   Cardiovascular     ECG reviewed    (+) hypertension,       Neuro/Psych  GI/Hepatic/Renal/Endo    (+)   diabetes mellitus, thyroid problem hypothyroidism    Musculoskeletal     Abdominal    Substance History      OB/GYN          Other   arthritis,    history of cancer                    Anesthesia Plan    ASA 3     general     (taps)  intravenous induction     Anesthetic plan, risks, benefits, and alternatives have been provided, discussed and informed consent has been obtained with: patient.    Plan discussed with CRNA.        CODE STATUS:

## 2022-11-10 NOTE — OP NOTE
[facility] ([facility]    ABDOMINAL PERINEAL RESECTION  Extensive lysis of adhesions/history of peritonitis  Small bowel resection, short segment  Parastomal hernia repair and ventral hernia repair with mesh.  Omental flap into the deep pelvis.        Procedure Note    Neema Richard  11/10/2022    Pre-op Diagnosis:    Incontinence of mucoid drainage and pain associated with proctitis refractory to medical management.  History of perforated diverticulitis with incontinence prior.      Post-op Diagnosis:     Significant inflammation around the anal canal and rectum.  Significant adhesions in the lower abdomen and deep pelvis    Anesthesia: General with Block    Staff:   Circulator: Therese Lunsford, FELISHA; Leigha Farris, RN; Rachel Long, RN; Suzi Hurley, RN  Scrub Person: Paulette Armijo; Jennifer Gil  Assistant: Flora De Santiago RNFA    Assistant: Flora De Santiago RNFA was responsible for performing the following activities: Exposure, suction, irrigationand their skilled assistance was necessary for the success of this case.         Estimated Blood Loss: 75 mL    Specimens: Rectosigmoid and anal canal        Drains: None    Findings: As above    Complications: None evident    The procedure was discussed by phone after the office visit when we scheduled, reiterating the goals of therapy, risks and benefits as well as at the bedside today.    We Stock to the operating room where general anesthesia was achieved, position was modified lithotomy, the perineum and abdominal cavities were prepped and draped.  There was antibiotic and DVT prophylaxis prior to anesthesia.    Pursestring sutures were used at the stoma site in the anal canal.    Timeout protocol was utilized.    Lower abdominal midline exploration demonstrated dense adhesions in the lower abdomen involving the small bowel and in the lower pelvis the rectosigmoid where the small bowel become densely adherent  in the setting of previous Prolene sutures marking the apex of the Danielle pouch.    The uterus was significantly enlarged and asymmetric.    The bilateral ureters were identified and during the course of the procedure followed all the way down to the bladder amongst dense adhesions.    Gradual lysis of adhesions was achieved ultimately liberating the small bowel and the small bowel was run from the ligament of Treitz to the distal ileum  In the hard fibrotic area at the apex of the Danielle pouch there is inflammatory change and a short segment small bowel resection was performed, the segment was passed off, JARON was used to create a common lumen in the small bowel and the anastomosis was complete with TX 60.    The confluence of anastomosis was reinforced with 3-0 Vicryl and the end TX 60 staple line was reinforced distal to the staple line with 3-0 Vicryl figure-of-eight's.    Irrigation aspiration was performed.    There were dense adhesions around the colostomy site, omentum was incarcerated in the colostomy and this was reduced as the dissection freeing up the fascial edge was complete.  Ultimately the omentum was placed in the deep pelvis/omental flap.    The rectosigmoid was hard and fibrotic and dissected away from the iliac veins, the presacral dissection proceeded down to the pelvic floor first posteriorly laterally anterior lateral and finally anteriorly  The ureters were both carefully identified close to the bladder prior to this anterior lateral dissection which proceeded satisfactorily amongst dense fibrosis.    At the.  Perineum,  Ellipse incision was made around the anal canal, the puborectalis was incised.  The inflamed rectosigmoid was delivered through the perineum.    Irrigation with Betadine solution followed by saline was performed, the puborectalis was reapproximated with figure-of-eight 0 Vicryl.    The skin was reapproximated vertical mattress 0 Vicryl.    Having completed the APR from the  abdominal and perineum approaches.  Attention was turned to the parastomal hernia.  This was large.  Suture reapproximation in the medial fascia including the anterior rectus sheath and posterior rectus sheath was performed decreasing the aperture of the colostomy site.  Phasic mesh/biologic mesh was used on the peritoneum to reinforce the fascial approximation.  There have been ventral fascial defects in the ventral hernia was repaired with underlay mesh as well.    The initial and final counts were announced as correct, the midline fascia was reapproximated with internal retention of oh Vicryl No. 1 loop PDS the wound was irrigated and the skin was reapproximated skin clips.    The suture/pursestring was removed from the colostomy with digital exam demonstrating patency at the fascial level.      Sravan Vazquez MD     Date: 11/10/2022  Time: 16:41 EST

## 2022-11-10 NOTE — ANESTHESIA PROCEDURE NOTES
Airway  Urgency: elective    Date/Time: 11/10/2022 1:22 PM  Airway not difficult    General Information and Staff    Patient location during procedure: OR  CRNA/CAA: Mai Cheng CRNA    Indications and Patient Condition  Indications for airway management: airway protection    Preoxygenated: yes  MILS not maintained throughout  Mask difficulty assessment: 1 - vent by mask    Final Airway Details  Final airway type: endotracheal airway      Successful airway: ETT  Cuffed: yes   Successful intubation technique: direct laryngoscopy  Endotracheal tube insertion site: oral  Blade: Daniella  Blade size: 3  ETT size (mm): 7.0  Cormack-Lehane Classification: grade I - full view of glottis  Placement verified by: chest auscultation and capnometry   Measured from: lips  ETT/EBT  to lips (cm): 21  Number of attempts at approach: 1  Assessment: lips, teeth, and gum same as pre-op and atraumatic intubation    Additional Comments  Negative epigastric sounds, Breath sound equal bilaterally with symmetric chest rise and fall

## 2022-11-10 NOTE — H&P
Pre-Op H&P  Neema Richard  [id number]  1940      Chief complaint: Fecal incontinence       Subjective:  Patient is a 82 y.o.female presents for scheduled surgery by Dr. Vazquez. She anticipates a ABDOMINAL PERINEAL RESECTION, REPAIR OF PARASTOMAL HERNIA, POSSIBLE BIO MESH today. She has a long history of fecal incontinence and leakage.       Review of Systems:  Constitutional-- No fever, chills or sweats. No fatigue.  CV-- No chest pain, palpitation or syncope  Resp-- No SOB, cough, hemoptysis  Skin--No rashes or lesions      Allergies:   Allergies   Allergen Reactions   • Evista [Raloxifene] Rash   • Cephalexin Diarrhea   • Hydrochlorothiazide Diarrhea   • Meloxicam Other (See Comments)   • Metronidazole Diarrhea   • Oxybutynin Cough   • Propylthiouracil Other (See Comments)     Severally reduce WBC's    • Sulfa Antibiotics Hives   • Other Anxiety     Steroid injection for pinched nerve-  Got panic attack after          Home Meds:  Medications Prior to Admission   Medication Sig Dispense Refill Last Dose   • busPIRone (BUSPAR) 15 MG tablet Take 15 mg by mouth Daily.  3 11/9/2022   • calcium citrate-vitamin d (CALCITRATE) 315-250 MG-UNIT tablet tablet Take 1 tablet by mouth Daily.   11/9/2022   • Cholecalciferol (VITAMIN D3) 2000 UNITS capsule Take 2,000 Units by mouth Daily.  3 11/9/2022   • denosumab (Prolia) 60 MG/ML solution prefilled syringe syringe Month ago - ld   Past Month   • fluticasone (FLONASE) 50 MCG/ACT nasal spray 1 spray into the nostril(s) as directed by provider Daily. Spring only   11/9/2022   • gabapentin (NEURONTIN) 600 MG tablet Take 600 mg by mouth every night at bedtime.   11/9/2022   • levothyroxine (SYNTHROID, LEVOTHROID) 88 MCG tablet Take 88 mcg by mouth daily.   11/10/2022   • loratadine (CLARITIN) 10 MG tablet Take 10 mg by mouth Daily.   11/9/2022   • LORazepam (ATIVAN) 0.5 MG tablet Take 0.5 mg by mouth every 8 (eight) hours as needed for anxiety.   11/9/2022   • Magnesium  250 MG tablet Take 1 tablet by mouth Daily.   11/9/2022   • metoprolol succinate XL (TOPROL-XL) 50 MG 24 hr tablet Take 50 mg by mouth Daily.   11/10/2022 at 0630   • Mirabegron ER (Myrbetriq) 50 MG tablet sustained-release 24 hour 24 hr tablet Take 50 mg by mouth Daily. 90 tablet 4 11/10/2022   • multivitamin with minerals tablet tablet 2 gels daily   11/9/2022   • multivitamin with minerals tablet tablet Take 1 tablet by mouth Daily. Flinstone with iron   11/9/2022   • pioglitazone (ACTOS) 15 MG tablet Take 15 mg by mouth Daily.   11/10/2022   • Probiotic Product (ALIGN) 4 MG capsule Take 1 capsule by mouth Daily.   Past Week   • ramipril (ALTACE) 1.25 MG capsule Take 1.25 mg by mouth Daily.   11/10/2022   • sucralfate (CARAFATE) 1 G tablet Take 1 g by mouth 2 (Two) Times a Day.  3 11/10/2022   • valACYclovir (Valtrex) 1000 MG tablet Take 1 tablet by mouth Daily. (Patient taking differently: Take 1 tablet by mouth As Needed.) 5 tablet 0 Past Week   • acetaminophen (TYLENOL) 650 MG 8 hr tablet Take 2 tablets by mouth Every 8 (Eight) Hours As Needed for Mild Pain.      • pravastatin (PRAVACHOL) 80 MG tablet Take 1 tablet by mouth Daily.   11/8/2022         PMH:   Past Medical History:   Diagnosis Date   • Acquired hypothyroidism 1980   • Anesthesia complication     after gb surgery - anesthesia paralyzed her lungs too early   • Arthritis    • Breast cancer (HCC)     precancerous cell   • Breast injury 2019    previous fall caused bruising to right breast   • Colon polyp - sees Dr. 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 ER and [place] (+)   • Diabetes mellitus (HCC)     prediabetic- doesnt check sugar   • Diverticular disease 03/2020   • Essential hypertension 2012   • High cholesterol 2019   • History of diverticular abscess 04/16/2020    S/p LAR, abscess drainage, appy, left maldonado, distal transverse end colostomy 3/17/20 by Dr. Vazquez   • Hx of herpes genitalis  "2013   • IBS-d (takes Buspar)    • Pneumonia 04/2020   • Sepsis (HCC) 04/2020   • SOBOE (shortness of breath on exertion)    • Wears glasses      PSH:    Past Surgical History:   Procedure Laterality Date   • APPENDECTOMY     • BILATERAL BREAST REDUCTION Bilateral 1995   • BREAST BIOPSY Left 2009   • BREAST EXCISIONAL BIOPSY Left 2009    ADH   • CATARACT EXTRACTION, BILATERAL Bilateral 2018   • CERVICAL CONIZATION  1983   • CHOLECYSTECTOMY     • COLON RESECTION N/A 03/17/2020    Procedure: LOW ANTERIOR COLON RESECTION, MOBILIZATION OF SPLENIC FLEXURE, DRAINAGE OF ABSCESS AND COLOSTOMY CREATION;  Surgeon: Sravan Vazquez MD;  Location: [facility];  Service: General;  Laterality: N/A;   • COLONOSCOPY     • FACIAL COSMETIC SURGERY  2001   • FACIAL COSMETIC SURGERY  2012   • HEMORRHOIDECTOMY  1974   • LAPAROSCOPIC CHOLECYSTECTOMY  2010   • REDUCTION MAMMAPLASTY Bilateral 04/13/2017    DCIS (3mm found incidentally right breast)   • REDUCTION MAMMAPLASTY Bilateral 1995   • SINUS SURGERY  1992    poylp   • THYROIDECTOMY, PARTIAL  1963   • TONSILLECTOMY  1953   • VARICOSE VEIN SURGERY Bilateral 1981   • VARICOSE VEIN SURGERY  2021       Immunization History:  Influenza: 2022  Pneumococcal: UTD  Tetanus: UTD  Covid x3: 2021    Social History:   Tobacco:   Social History     Tobacco Use   Smoking Status Never   Smokeless Tobacco Never      Alcohol:     Social History     Substance and Sexual Activity   Alcohol Use No         Physical Exam:/78 (BP Location: Right arm, Patient Position: Lying)   Pulse 64   Temp 98.1 °F (36.7 °C) (Temporal)   Resp 16   Ht 167.6 cm (66\")   Wt 61.2 kg (135 lb)   LMP  (LMP Unknown)   SpO2 99%   BMI 21.79 kg/m²       General Appearance:    Alert, cooperative, no distress, appears stated age   Head:    Normocephalic, without obvious abnormality, atraumatic   Lungs:     Clear to auscultation bilaterally, respirations unlabored    Heart:   Regular rate and rhythm, S1 and S2 normal    " Abdomen:    Soft without tenderness   Extremities:   Extremities normal, atraumatic, no cyanosis or edema   Skin:   Skin color, texture, turgor normal, no rashes or lesions   Neurologic:   Grossly intact     Results Review:     LABS:  Lab Results   Component Value Date    WBC 7.04 11/04/2022    HGB 12.4 11/04/2022    HCT 38.2 11/04/2022    MCV 93.2 11/04/2022     11/04/2022    NEUTROABS 4.73 04/20/2020    GLUCOSE 130 (H) 11/04/2022    BUN 20 11/04/2022    CREATININE 0.70 11/04/2022    EGFRIFNONA 131 04/20/2020     11/04/2022    K 3.8 11/04/2022     (H) 11/04/2022    CO2 25.0 11/04/2022    MG 2.0 04/17/2020    PHOS 3.6 03/26/2020    CALCIUM 9.0 11/04/2022    ALBUMIN 4.20 11/04/2022    AST 14 11/04/2022    ALT 10 11/04/2022    BILITOT 0.3 11/04/2022       RADIOLOGY:  Imaging Results (Last 72 Hours)     ** No results found for the last 72 hours. **          I reviewed the patient's new clinical results.    Cancer Staging (if applicable)  Cancer Patient: __ yes __no __unknown; If yes, clinical stage T:__ N:__M:__, stage group or __N/A      Impression: Proctitis       Plan: ABDOMINAL PERINEAL RESECTION, REPAIR OF PARASTOMAL HERNIA, POSSIBLE BIO MESH      LAUREEN Samaniego   11/10/2022   10:18 EST

## 2022-11-11 ENCOUNTER — APPOINTMENT (OUTPATIENT)
Dept: CT IMAGING | Facility: HOSPITAL | Age: 82
End: 2022-11-11

## 2022-11-11 PROBLEM — Z90.49 STATUS POST SMALL BOWEL RESECTION: Status: ACTIVE | Noted: 2022-11-11

## 2022-11-11 PROBLEM — D62 ACUTE BLOOD LOSS ANEMIA: Status: ACTIVE | Noted: 2022-11-11

## 2022-11-11 PROBLEM — I95.9 SEVERE HYPOTENSION: Status: ACTIVE | Noted: 2022-11-11

## 2022-11-11 LAB
ALBUMIN SERPL-MCNC: 2.9 G/DL (ref 3.5–5.2)
ALBUMIN/GLOB SERPL: 1.3 G/DL
ALP SERPL-CCNC: 39 U/L (ref 39–117)
ALT SERPL W P-5'-P-CCNC: 9 U/L (ref 1–33)
ANION GAP SERPL CALCULATED.3IONS-SCNC: 10 MMOL/L (ref 5–15)
ANION GAP SERPL CALCULATED.3IONS-SCNC: 10 MMOL/L (ref 5–15)
ANION GAP SERPL CALCULATED.3IONS-SCNC: 7 MMOL/L (ref 5–15)
AST SERPL-CCNC: 19 U/L (ref 1–32)
BASOPHILS # BLD AUTO: 0.03 10*3/MM3 (ref 0–0.2)
BASOPHILS # BLD AUTO: 0.08 10*3/MM3 (ref 0–0.2)
BASOPHILS NFR BLD AUTO: 0.3 % (ref 0–1.5)
BASOPHILS NFR BLD AUTO: 0.7 % (ref 0–1.5)
BILIRUB SERPL-MCNC: 0.6 MG/DL (ref 0–1.2)
BUN SERPL-MCNC: 22 MG/DL (ref 8–23)
BUN SERPL-MCNC: 25 MG/DL (ref 8–23)
BUN SERPL-MCNC: 27 MG/DL (ref 8–23)
BUN/CREAT SERPL: 21.7 (ref 7–25)
BUN/CREAT SERPL: 28.7 (ref 7–25)
BUN/CREAT SERPL: 30.1 (ref 7–25)
CALCIUM SPEC-SCNC: 6.6 MG/DL (ref 8.6–10.5)
CALCIUM SPEC-SCNC: 7 MG/DL (ref 8.6–10.5)
CALCIUM SPEC-SCNC: 7.8 MG/DL (ref 8.6–10.5)
CHLORIDE SERPL-SCNC: 107 MMOL/L (ref 98–107)
CHLORIDE SERPL-SCNC: 113 MMOL/L (ref 98–107)
CHLORIDE SERPL-SCNC: 116 MMOL/L (ref 98–107)
CO2 SERPL-SCNC: 18 MMOL/L (ref 22–29)
CO2 SERPL-SCNC: 19 MMOL/L (ref 22–29)
CO2 SERPL-SCNC: 21 MMOL/L (ref 22–29)
CORTIS SERPL-MCNC: 34.84 MCG/DL
CREAT SERPL-MCNC: 0.73 MG/DL (ref 0.57–1)
CREAT SERPL-MCNC: 0.94 MG/DL (ref 0.57–1)
CREAT SERPL-MCNC: 1.15 MG/DL (ref 0.57–1)
D-LACTATE SERPL-SCNC: 1.1 MMOL/L (ref 0.5–2)
DEPRECATED RDW RBC AUTO: 46.4 FL (ref 37–54)
DEPRECATED RDW RBC AUTO: 49.1 FL (ref 37–54)
DEPRECATED RDW RBC AUTO: 51.4 FL (ref 37–54)
EGFRCR SERPLBLD CKD-EPI 2021: 48 ML/MIN/1.73
EGFRCR SERPLBLD CKD-EPI 2021: 61.1 ML/MIN/1.73
EGFRCR SERPLBLD CKD-EPI 2021: 82.7 ML/MIN/1.73
EOSINOPHIL # BLD AUTO: 0.06 10*3/MM3 (ref 0–0.4)
EOSINOPHIL # BLD AUTO: 0.09 10*3/MM3 (ref 0–0.4)
EOSINOPHIL NFR BLD AUTO: 0.5 % (ref 0.3–6.2)
EOSINOPHIL NFR BLD AUTO: 0.8 % (ref 0.3–6.2)
ERYTHROCYTE [DISTWIDTH] IN BLOOD BY AUTOMATED COUNT: 13.5 % (ref 12.3–15.4)
ERYTHROCYTE [DISTWIDTH] IN BLOOD BY AUTOMATED COUNT: 13.9 % (ref 12.3–15.4)
ERYTHROCYTE [DISTWIDTH] IN BLOOD BY AUTOMATED COUNT: 14.9 % (ref 12.3–15.4)
GLOBULIN UR ELPH-MCNC: 2.3 GM/DL
GLUCOSE BLDC GLUCOMTR-MCNC: 125 MG/DL (ref 70–130)
GLUCOSE BLDC GLUCOMTR-MCNC: 125 MG/DL (ref 70–130)
GLUCOSE BLDC GLUCOMTR-MCNC: 128 MG/DL (ref 70–130)
GLUCOSE BLDC GLUCOMTR-MCNC: 155 MG/DL (ref 70–130)
GLUCOSE SERPL-MCNC: 122 MG/DL (ref 65–99)
GLUCOSE SERPL-MCNC: 133 MG/DL (ref 65–99)
GLUCOSE SERPL-MCNC: 165 MG/DL (ref 65–99)
HCT VFR BLD AUTO: 27.7 % (ref 34–46.6)
HCT VFR BLD AUTO: 34.4 % (ref 34–46.6)
HCT VFR BLD AUTO: 35 % (ref 34–46.6)
HCT VFR BLD AUTO: 36.2 % (ref 34–46.6)
HGB BLD-MCNC: 10.9 G/DL (ref 12–15.9)
HGB BLD-MCNC: 11.2 G/DL (ref 12–15.9)
HGB BLD-MCNC: 11.5 G/DL (ref 12–15.9)
HGB BLD-MCNC: 8.8 G/DL (ref 12–15.9)
IMM GRANULOCYTES # BLD AUTO: 0.03 10*3/MM3 (ref 0–0.05)
IMM GRANULOCYTES # BLD AUTO: 0.05 10*3/MM3 (ref 0–0.05)
IMM GRANULOCYTES NFR BLD AUTO: 0.3 % (ref 0–0.5)
IMM GRANULOCYTES NFR BLD AUTO: 0.4 % (ref 0–0.5)
LYMPHOCYTES # BLD AUTO: 0.72 10*3/MM3 (ref 0.7–3.1)
LYMPHOCYTES # BLD AUTO: 1.27 10*3/MM3 (ref 0.7–3.1)
LYMPHOCYTES NFR BLD AUTO: 10.7 % (ref 19.6–45.3)
LYMPHOCYTES NFR BLD AUTO: 6.3 % (ref 19.6–45.3)
MCH RBC QN AUTO: 29.9 PG (ref 26.6–33)
MCH RBC QN AUTO: 30.2 PG (ref 26.6–33)
MCH RBC QN AUTO: 30.7 PG (ref 26.6–33)
MCHC RBC AUTO-ENTMCNC: 31.8 G/DL (ref 31.5–35.7)
MCHC RBC AUTO-ENTMCNC: 31.8 G/DL (ref 31.5–35.7)
MCHC RBC AUTO-ENTMCNC: 32 G/DL (ref 31.5–35.7)
MCV RBC AUTO: 94.3 FL (ref 79–97)
MCV RBC AUTO: 94.3 FL (ref 79–97)
MCV RBC AUTO: 96.5 FL (ref 79–97)
MONOCYTES # BLD AUTO: 1 10*3/MM3 (ref 0.1–0.9)
MONOCYTES # BLD AUTO: 1.17 10*3/MM3 (ref 0.1–0.9)
MONOCYTES NFR BLD AUTO: 8.8 % (ref 5–12)
MONOCYTES NFR BLD AUTO: 9.8 % (ref 5–12)
NEUTROPHILS NFR BLD AUTO: 77.9 % (ref 42.7–76)
NEUTROPHILS NFR BLD AUTO: 83.5 % (ref 42.7–76)
NEUTROPHILS NFR BLD AUTO: 9.26 10*3/MM3 (ref 1.7–7)
NEUTROPHILS NFR BLD AUTO: 9.54 10*3/MM3 (ref 1.7–7)
NRBC BLD AUTO-RTO: 0 /100 WBC (ref 0–0.2)
NRBC BLD AUTO-RTO: 0 /100 WBC (ref 0–0.2)
PLATELET # BLD AUTO: 221 10*3/MM3 (ref 140–450)
PLATELET # BLD AUTO: 236 10*3/MM3 (ref 140–450)
PLATELET # BLD AUTO: 265 10*3/MM3 (ref 140–450)
PMV BLD AUTO: 10.6 FL (ref 6–12)
PMV BLD AUTO: 10.8 FL (ref 6–12)
PMV BLD AUTO: 11 FL (ref 6–12)
POTASSIUM SERPL-SCNC: 4.7 MMOL/L (ref 3.5–5.2)
POTASSIUM SERPL-SCNC: 4.8 MMOL/L (ref 3.5–5.2)
POTASSIUM SERPL-SCNC: 5.4 MMOL/L (ref 3.5–5.2)
PROCALCITONIN SERPL-MCNC: 0.39 NG/ML (ref 0–0.25)
PROT SERPL-MCNC: 5.2 G/DL (ref 6–8.5)
RBC # BLD AUTO: 2.87 10*6/MM3 (ref 3.77–5.28)
RBC # BLD AUTO: 3.71 10*6/MM3 (ref 3.77–5.28)
RBC # BLD AUTO: 3.84 10*6/MM3 (ref 3.77–5.28)
SODIUM SERPL-SCNC: 138 MMOL/L (ref 136–145)
SODIUM SERPL-SCNC: 139 MMOL/L (ref 136–145)
SODIUM SERPL-SCNC: 144 MMOL/L (ref 136–145)
WBC NRBC COR # BLD: 11.41 10*3/MM3 (ref 3.4–10.8)
WBC NRBC COR # BLD: 11.79 10*3/MM3 (ref 3.4–10.8)
WBC NRBC COR # BLD: 11.89 10*3/MM3 (ref 3.4–10.8)

## 2022-11-11 PROCEDURE — 25010000002 SODIUM CHLORIDE 0.9 % WITH KCL 20 MEQ 20-0.9 MEQ/L-% SOLUTION: Performed by: COLON & RECTAL SURGERY

## 2022-11-11 PROCEDURE — 36430 TRANSFUSION BLD/BLD COMPNT: CPT

## 2022-11-11 PROCEDURE — 86900 BLOOD TYPING SEROLOGIC ABO: CPT

## 2022-11-11 PROCEDURE — 84145 PROCALCITONIN (PCT): CPT

## 2022-11-11 PROCEDURE — 74176 CT ABD & PELVIS W/O CONTRAST: CPT

## 2022-11-11 PROCEDURE — 85018 HEMOGLOBIN: CPT

## 2022-11-11 PROCEDURE — 85027 COMPLETE CBC AUTOMATED: CPT | Performed by: INTERNAL MEDICINE

## 2022-11-11 PROCEDURE — 83605 ASSAY OF LACTIC ACID: CPT | Performed by: INTERNAL MEDICINE

## 2022-11-11 PROCEDURE — 25010000002 HEPARIN (PORCINE) PER 1000 UNITS: Performed by: COLON & RECTAL SURGERY

## 2022-11-11 PROCEDURE — 80053 COMPREHEN METABOLIC PANEL: CPT

## 2022-11-11 PROCEDURE — 99233 SBSQ HOSP IP/OBS HIGH 50: CPT | Performed by: INTERNAL MEDICINE

## 2022-11-11 PROCEDURE — 25010000002 KETOROLAC TROMETHAMINE PER 15 MG: Performed by: COLON & RECTAL SURGERY

## 2022-11-11 PROCEDURE — 85025 COMPLETE CBC W/AUTO DIFF WBC: CPT | Performed by: COLON & RECTAL SURGERY

## 2022-11-11 PROCEDURE — P9016 RBC LEUKOCYTES REDUCED: HCPCS

## 2022-11-11 PROCEDURE — 82533 TOTAL CORTISOL: CPT

## 2022-11-11 PROCEDURE — 85014 HEMATOCRIT: CPT

## 2022-11-11 PROCEDURE — 82962 GLUCOSE BLOOD TEST: CPT

## 2022-11-11 RX ORDER — SODIUM CHLORIDE 9 MG/ML
INJECTION, SOLUTION INTRAVENOUS
Status: COMPLETED | OUTPATIENT
Start: 2022-11-11 | End: 2022-11-11

## 2022-11-11 RX ORDER — NOREPINEPHRINE BIT/0.9 % NACL 8 MG/250ML
.02-.3 INFUSION BOTTLE (ML) INTRAVENOUS
Status: DISCONTINUED | OUTPATIENT
Start: 2022-11-11 | End: 2022-11-14

## 2022-11-11 RX ORDER — SODIUM CHLORIDE 9 MG/ML
50 INJECTION, SOLUTION INTRAVENOUS CONTINUOUS
Status: DISCONTINUED | OUTPATIENT
Start: 2022-11-11 | End: 2022-11-16

## 2022-11-11 RX ORDER — PANTOPRAZOLE SODIUM 40 MG/10ML
40 INJECTION, POWDER, LYOPHILIZED, FOR SOLUTION INTRAVENOUS EVERY 12 HOURS SCHEDULED
Status: DISCONTINUED | OUTPATIENT
Start: 2022-11-11 | End: 2022-11-16 | Stop reason: HOSPADM

## 2022-11-11 RX ORDER — INSULIN LISPRO 100 [IU]/ML
0-7 INJECTION, SOLUTION INTRAVENOUS; SUBCUTANEOUS EVERY 6 HOURS
Status: DISCONTINUED | OUTPATIENT
Start: 2022-11-11 | End: 2022-11-11

## 2022-11-11 RX ADMIN — Medication 0.02 MCG/KG/MIN: at 20:05

## 2022-11-11 RX ADMIN — HEPARIN SODIUM 5000 UNITS: 5000 INJECTION INTRAVENOUS; SUBCUTANEOUS at 06:01

## 2022-11-11 RX ADMIN — LEVOTHYROXINE SODIUM 88 MCG: 88 TABLET ORAL at 06:01

## 2022-11-11 RX ADMIN — POTASSIUM CHLORIDE AND SODIUM CHLORIDE 100 ML/HR: 900; 150 INJECTION, SOLUTION INTRAVENOUS at 02:35

## 2022-11-11 RX ADMIN — ACETAMINOPHEN 650 MG: 325 TABLET, FILM COATED ORAL at 06:01

## 2022-11-11 RX ADMIN — KETOROLAC TROMETHAMINE 15 MG: 30 INJECTION, SOLUTION INTRAMUSCULAR; INTRAVENOUS at 02:34

## 2022-11-11 RX ADMIN — PANTOPRAZOLE SODIUM 40 MG: 40 INJECTION, POWDER, FOR SOLUTION INTRAVENOUS at 14:34

## 2022-11-11 RX ADMIN — ACETAMINOPHEN 650 MG: 325 TABLET, FILM COATED ORAL at 16:40

## 2022-11-11 RX ADMIN — SODIUM CHLORIDE 500 ML: 9 INJECTION, SOLUTION INTRAVENOUS at 15:10

## 2022-11-11 RX ADMIN — SODIUM CHLORIDE 999 ML/HR: 9 INJECTION, SOLUTION INTRAVENOUS at 09:57

## 2022-11-11 RX ADMIN — SODIUM CHLORIDE 100 ML/HR: 9 INJECTION, SOLUTION INTRAVENOUS at 13:19

## 2022-11-11 RX ADMIN — PANTOPRAZOLE SODIUM 40 MG: 40 INJECTION, POWDER, FOR SOLUTION INTRAVENOUS at 22:09

## 2022-11-11 NOTE — PROGRESS NOTES
"[facility] ([facility])    Rapid response for hypotension  Bolus, 2 units transfused for anemia.  CT with pelvic hematoma behind the large uterus and amongst the omental flap.  Without lactic acidosis.  Clinical course responsive to interventions  In the intensive care unit, 201.      Vital Signs:  Blood pressure 108/44, pulse 69, temperature 97.6 °F (36.4 °C), temperature source Axillary, resp. rate 18, height 167.6 cm (66\"), weight 61.2 kg (135 lb), SpO2 99 %, not currently breastfeeding.    Labs past 24 hours:  Lab Results (last 24 hours)     Procedure Component Value Units Date/Time    Procalcitonin [[id number]]  (Abnormal) Collected: 11/11/22 1003    Specimen: Blood Updated: 11/11/22 1534     Procalcitonin 0.39 ng/mL     Narrative:      As a Marker for Sepsis (Non-Neonates):    1. <0.5 ng/mL represents a low risk of severe sepsis and/or septic shock.  2. >2 ng/mL represents a high risk of severe sepsis and/or septic shock.    As a Marker for Lower Respiratory Tract Infections that require antibiotic therapy:    PCT on Admission    Antibiotic Therapy       6-12 Hrs later    >0.5                Strongly Recommended  >0.25 - <0.5        Recommended   0.1 - 0.25          Discouraged              Remeasure/reassess PCT  <0.1                Strongly Discouraged     Remeasure/reassess PCT    As 28 day mortality risk marker: \"Change in Procalcitonin Result\" (>80% or <=80%) if Day 0 (or Day 1) and Day 4 values are available. Refer to http://[email or web address]-pct-calculator.com    Change in PCT <=80%  A decrease of PCT levels below or equal to 80% defines a positive change in PCT test result representing a higher risk for 28-day all-cause mortality of patients diagnosed with severe sepsis for septic shock.    Change in PCT >80%  A decrease of PCT levels of more than 80% defines a negative change in PCT result representing a lower risk for 28-day all-cause mortality of patients diagnosed with " severe sepsis or septic shock.       Cortisol [[id number]] Collected: 11/11/22 1003    Specimen: Blood Updated: 11/11/22 1534     Cortisol 34.84 mcg/dL     Narrative:      Cortisol Reference Ranges:    Cortisol 6AM - 10AM Range: 6.02-18.40 mcg/dl  Cortisol 4PM - 8PM Range: 2.68-10.50 mcg/dl      Results may be falsely increased if patient taking Biotin.      POC Glucose Once [[id number]]  (Normal) Collected: 11/11/22 1336    Specimen: Blood Updated: 11/11/22 1337     Glucose 128 mg/dL      Comment: Meter: [id number] : [id number] MichaelVinicius Keiry       Basic Metabolic Panel [[id number]]  (Abnormal) Collected: 11/11/22 1003    Specimen: Blood Updated: 11/11/22 1047     Glucose 165 mg/dL      BUN 25 mg/dL      Creatinine 1.15 mg/dL      Sodium 139 mmol/L      Potassium 5.4 mmol/L      Comment: Slight hemolysis detected by analyzer. Results may be affected.        Chloride 113 mmol/L      CO2 19.0 mmol/L      Calcium 7.0 mg/dL      BUN/Creatinine Ratio 21.7     Anion Gap 7.0 mmol/L      eGFR 48.0 mL/min/1.73      Comment: National Kidney Foundation and American Society of Nephrology (ASN) Task Force recommended calculation based on the Chronic Kidney Disease Epidemiology Collaboration (CKD-EPI) equation refit without adjustment for race.       Narrative:      GFR Normal >60  Chronic Kidney Disease <60  Kidney Failure <15    The GFR formula is only valid for adults with stable renal function between ages 18 and 70.    Lactic Acid, Plasma [[id number]]  (Normal) Collected: 11/11/22 1003    Specimen: Blood Updated: 11/11/22 1043     Lactate 1.1 mmol/L      Comment: Falsely depressed results may occur on samples drawn from patients receiving N-Acetylcysteine (NAC) or Metamizole.       CBC (No Diff) [[id number]]  (Abnormal) Collected: 11/11/22 1003    Specimen: Blood Updated: 11/11/22 1028     WBC 11.79 10*3/mm3      RBC 2.87 10*6/mm3      Hemoglobin 8.8 g/dL      Hematocrit 27.7 %      MCV 96.5 fL      MCH 30.7 pg       MCHC 31.8 g/dL      RDW 13.9 %      RDW-SD 49.1 fl      MPV 10.6 fL      Platelets 265 10*3/mm3     POC Glucose Once [[id number]]  (Abnormal) Collected: 11/11/22 1000    Specimen: Blood Updated: 11/11/22 1001     Glucose 155 mg/dL      Comment: Meter: [id number] : [id number] Ben Ferguson       POC Glucose Once [[id number]]  (Normal) Collected: 11/11/22 0808    Specimen: Blood Updated: 11/11/22 0810     Glucose 125 mg/dL      Comment: Meter: [id number] : [id number] Roger Dickson       Tissue Pathology Exam [[id number]] Collected: 11/10/22 1520    Specimen: Tissue from Small Intestine; Tissue from Large Intestine, Rectum Updated: 11/11/22 0751    Basic Metabolic Panel [[id number]]  (Abnormal) Collected: 11/11/22 0408    Specimen: Blood Updated: 11/11/22 0604     Glucose 133 mg/dL      BUN 22 mg/dL      Creatinine 0.73 mg/dL      Sodium 138 mmol/L      Potassium 4.7 mmol/L      Chloride 107 mmol/L      CO2 21.0 mmol/L      Calcium 7.8 mg/dL      BUN/Creatinine Ratio 30.1     Anion Gap 10.0 mmol/L      eGFR 82.7 mL/min/1.73      Comment: National Kidney Foundation and American Society of Nephrology (ASN) Task Force recommended calculation based on the Chronic Kidney Disease Epidemiology Collaboration (CKD-EPI) equation refit without adjustment for race.       Narrative:      GFR Normal >60  Chronic Kidney Disease <60  Kidney Failure <15    The GFR formula is only valid for adults with stable renal function between ages 18 and 70.    CBC & Differential [[id number]]  (Abnormal) Collected: 11/11/22 0408    Specimen: Blood Updated: 11/11/22 0528    Narrative:      The following orders were created for panel order CBC & Differential.  Procedure                               Abnormality         Status                     ---------                               -----------         ------                     CBC Auto Differential[[id number]]        Abnormal            Final result                 Please view results for  these tests on the individual orders.    CBC Auto Differential [[id number]]  (Abnormal) Collected: 11/11/22 0408    Specimen: Blood Updated: 11/11/22 0528     WBC 11.41 10*3/mm3      RBC 3.71 10*6/mm3      Hemoglobin 11.2 g/dL      Hematocrit 35.0 %      MCV 94.3 fL      MCH 30.2 pg      MCHC 32.0 g/dL      RDW 13.5 %      RDW-SD 46.4 fl      MPV 11.0 fL      Platelets 236 10*3/mm3      Neutrophil % 83.5 %      Lymphocyte % 6.3 %      Monocyte % 8.8 %      Eosinophil % 0.8 %      Basophil % 0.3 %      Immature Grans % 0.3 %      Neutrophils, Absolute 9.54 10*3/mm3      Lymphocytes, Absolute 0.72 10*3/mm3      Monocytes, Absolute 1.00 10*3/mm3      Eosinophils, Absolute 0.09 10*3/mm3      Basophils, Absolute 0.03 10*3/mm3      Immature Grans, Absolute 0.03 10*3/mm3      nRBC 0.0 /100 WBC     POC Glucose Once [[id number]]  (Abnormal) Collected: 11/10/22 2106    Specimen: Blood Updated: 11/10/22 2107     Glucose 146 mg/dL      Comment: Meter: [id number] : [id number] Linda Newsome       POC Glucose Once [[id number]]  (Abnormal) Collected: 11/10/22 1815    Specimen: Blood Updated: 11/10/22 1816     Glucose 137 mg/dL      Comment: Meter: [id number] : [id number] Ariel Johnson             I/O last shift:  I/O this shift:  In: 2800 [P.O.:100; I.V.:2000; Blood:700]  Out: 145 [Urine:95; Stool:50]     PHYSICAL EXAM-  Comfortable  cv- rsr, without tachycardia  Abd- soft, mild distention, without ecchymosis, pink stoma with 50 cc stool output      Pathology:  Order Name Source Comment Collection Info Order Time   POTASSIUM  For all patients with renal disease, within 3 days if taking digoxin, potassium-depleting anti-hypertensives, or diuretics.  11/9/2022  5:14 PM   TYPE AND SCREEN   Collected By: Ann Leos RN 11/10/2022  9:54 AM     Release to patient   Routine Release        TISSUE PATHOLOGY EXAM Small Intestine  Collected By: Sravan Vazquez MD 11/10/2022  3:28 PM     Release to patient   Routine Release         .    Assessment and Plan:  Pelvic hematoma.  Fluid and blood has been given  Interval blood work pending  Vital signs stable at this point, without ongoing hypotension  Sequential blood work anticipated.      Sravan Vazquez MD  11/11/22  16:00 EST

## 2022-11-11 NOTE — PROGRESS NOTES
"IM progress note      Neema Richard  [id number]  1940     LOS: 1 day     Attending: Sravan Vazquez MD    Primary Care Provider: David Mackey MD      Chief Complaint/Reason for visit:  No chief complaint on file.      Subjective   Patient is confused when seen earlier in the day, awakens briefly, reports pain and being hungry.  Rapid response was called afterwards due to hypotension.  Patient is more alert during this episode, IV fluid boluses were initiated, good response initially.  Patient is conversational during the episode.  No new complaints, no chest pain, no shortness of breath.    Objective      Visit Vitals  /43   Pulse 68   Temp 98.5 °F (36.9 °C)   Resp 16   Ht 167.6 cm (66\")   Wt 61.2 kg (135 lb)   LMP  (LMP Unknown)   SpO2 99%   BMI 21.79 kg/m²     Temp (24hrs), Av.9 °F (36.6 °C), Min:96 °F (35.6 °C), Max:98.7 °F (37.1 °C)      Intake/Output:    Intake/Output Summary (Last 24 hours) at 2022 1314  Last data filed at 2022 1158  Gross per 24 hour   Intake 1700 ml   Output 555 ml   Net 1145 ml          Physical Exam:     General Appearance:    Alert, cooperative, in no acute distress   Head:    Normocephalic, without obvious abnormality, atraumatic    Lungs:     Normal effort, symmetric chest rise,  clear to  auscultation bilaterally                 Heart:    Regular rhythm and normal rate, normal S1 and S2   Abdomen:    Soft with shadow drainage on midline dressing.  Stoma is pink with minimal serosanguineous output.  : Gaytan with some urine with blue dye discoloration.   Extremities:   No clubbing, cyanosis or edema.  No deformities.    Pulses:   Pulses palpable and equal bilaterally   Skin:   No bleeding, bruising or rash          Results Review:     I reviewed the patient's new clinical results.   Results from last 7 days   Lab Units 22  1003 22  0408   WBC 10*3/mm3 11.79* 11.41*   HEMOGLOBIN g/dL 8.8* 11.2*   HEMATOCRIT % 27.7* 35.0   PLATELETS 10*3/mm3 " 265 236     Results from last 7 days   Lab Units 11/11/22  1003 11/11/22  0408   SODIUM mmol/L 139 138   POTASSIUM mmol/L 5.4* 4.7   CHLORIDE mmol/L 113* 107   CO2 mmol/L 19.0* 21.0*   BUN mg/dL 25* 22   CREATININE mg/dL 1.15* 0.73   CALCIUM mg/dL 7.0* 7.8*   GLUCOSE mg/dL 165* 133*     I reviewed the patient's new imaging including images and reports.    All medications reviewed.   acetaminophen, 650 mg, Oral, Q8H  alvimopan, 12 mg, Oral, BID  busPIRone, 15 mg, Oral, Daily  cetirizine, 10 mg, Oral, Daily  fluticasone, 1 spray, Nasal, Daily  gabapentin, 600 mg, Oral, Nightly  insulin regular, 0-7 Units, Subcutaneous, Q6H  levothyroxine, 88 mcg, Oral, Q AM  pantoprazole, 40 mg, Intravenous, Q12H  pravastatin, 80 mg, Oral, Nightly  sucralfate, 1 g, Oral, BID          Assessment & Plan   Postoperative hypotension    Acute blood loss anemia    Essential hypertension    Acquired hypothyroidism    Mixed urge and stress incontinence    High cholesterol    DM2 (diabetes mellitus, type 2) (HCC)    Fecal incontinence    Perineal resection, parastomal and ventral hernia repair with mesh, small bowel resection, extensive lysis of adhesions, 11/10/22      Plan  In light of sudden development of hypotension this morning and downtrending of patient's H&H, in addition to aggressive volume resuscitation, patient is receiving her second liter of normal saline, packed red blood cell transfusion, stat CT scan of the abdomen to rule out bleed or hematoma, holding antihypertensive medications, it is probably best that she be monitored in an ICU setting, I have discussed with both Dr. Vazquez and Dr. Echevarria who is on for admitted to the ICU, both are in agreement.    Discussed with patient's bedside nurse, monitoring blood pressure closely and reporting to me.        Dragon disclaimer:  Part of this encounter note is an electronic transcription/translation of spoken language to printed text. The electronic translation of spoken language  may permit erroneous, or at times, nonsensical words or phrases to be inadvertently transcribed; Although I have reviewed the note for such errors, some may still exist.    Ting Buckner MD  11/11/22  13:14 EST

## 2022-11-11 NOTE — CASE MANAGEMENT/SOCIAL WORK
Discharge Planning Assessment  [facility]     Patient Name: Neema Richard  MRN: [id number]  Today's Date: 11/11/2022    Admit Date: 11/10/2022    Plan: CM note   Discharge Needs Assessment     Row Name 11/11/22 1414       Living Environment    People in Home alone    Current Living Arrangements home    Primary Care Provided by self    Provides Primary Care For no one    Family Caregiver if Needed friend(s)    Quality of Family Relationships involved;helpful       Transition Planning    Patient/Family Anticipates Transition to home    Patient/Family Anticipated Services at Transition     Transportation Anticipated family or friend will provide       Discharge Needs Assessment    Readmission Within the Last 30 Days no previous admission in last 30 days    Equipment Currently Used at Home none    Concerns to be Addressed discharge planning    Anticipated Changes Related to Illness none    Equipment Needed After Discharge none               Discharge Plan     Row Name 11/11/22 1412       Plan    Plan CM note    Plan Comments Cm spoke with patient briefly before she was transferred to ICU. Patient lives alone in [facility]. At baseline she is independent of ADLs and denies the use of DME. Anticipate she will need PT and OT eval. Was transferred to ICU for sudden hypotension this morning and downtrending of patient's H&H. DC planning is ongoing - CM will continue to follow -[phone]    Final Discharge Disposition Code 01 - home or self-care              Continued Care and Services - Admitted Since 11/10/2022    Coordination has not been started for this encounter.          Demographic Summary     Row Name 11/11/22 1414       General Information    Admission Type inpatient    Arrived From home    Referral Source admission list    Reason for Consult discharge planning    Preferred Language English       Contact Information    Permission Granted to Share Info With                 Functional Status     Row Name 11/11/22 1414       Functional Status    Usual Activity Tolerance moderate    Current Activity Tolerance moderate       Functional Status, IADL    Medications independent    Meal Preparation independent    Housekeeping independent    Laundry independent    Shopping independent;assistive person               Psychosocial    No documentation.                Abuse/Neglect    No documentation.                Legal    No documentation.                Substance Abuse    No documentation.                Patient Forms    No documentation.                   Minerva Green RN

## 2022-11-11 NOTE — PROGRESS NOTES
Intensive Care Follow-up     Hospital:  LOS: 1 day   Ms. Neema Richard, 82 y.o. female is followed for:   Severe hypotension        Subjective   Interval History:    Neema Richard is an 82 year-old female that presented to [facility] on 11/10 for a scheduled abdominal perineal resection, parastomal and ventral hernia repair with mesh, short segment small bowel resection, extensive lysis of adhesions, and omental flap into the deep pelvis.    Ms. Richard has a PMH of bowel perforation diverticular disease, IBS-d, OAB, uterovaginal prolapse, pessary use, HTN, hypothyroidism, fecal incontinence, T2DM, high cholesterol, & ductal carcinoma in situ. In March of 2020, she had a perforated colon in which she underwent a low anterior resection, appendectomy, left salpingo-oophorectomy, distal transverse and colostomy by Dr. Vazquez. Since then, she has been experiencing fecal incontinence, leakage, and urgency that has worsened over the last year. She presented on 11/10 for surgical intervention for the above problems.    She was admitted to the floor after her procedure. The morning of 11/11, she became acutely hypotensive with systolic BPs in the 50s. She received 2 L crystalloid with minimum response. H&H check 8.8 & 27.7; prior 11.2 & 35. Creatinine has increased slightly to 1.15 from 0.73. 2 units of PRBCs and CT A/P have been ordered. She will be transferred to the ICU 2* to the problems above.    Time spent: 15 minutes  Electronically signed by LAUREEN Cruz, 11/11/22, 11:43 AM EST.    The above information has been reviewed and I have reviewed all available data.  I discussed the case with Dr. GIORDANO.  The patient arrives to the intensive care unit and is currently normotensive.  She is receiving her first unit of packed red blood cells and does remain on IV fluid.  She states that her pain is under control.  She denies fevers.  She states that she is feeling very thirsty but is otherwise feeling a bit better.    The  "patient's past medical, surgical and social history were reviewed and updated in Epic as appropriate.        Objective     Infusions:  sodium chloride, 100 mL/hr, Last Rate: 100 mL/hr (11/11/22 1319)      Medications:  acetaminophen, 650 mg, Oral, Q8H  alvimopan, 12 mg, Oral, BID  busPIRone, 15 mg, Oral, Daily  cetirizine, 10 mg, Oral, Daily  fluticasone, 1 spray, Nasal, Daily  gabapentin, 600 mg, Oral, Nightly  insulin regular, 0-7 Units, Subcutaneous, Q6H  levothyroxine, 88 mcg, Oral, Q AM  pantoprazole, 40 mg, Intravenous, Q12H  pravastatin, 80 mg, Oral, Nightly  sucralfate, 1 g, Oral, BID        Vital Sign Min/Max for last 24 hours  Temp  Min: 96 °F (35.6 °C)  Max: 98.7 °F (37.1 °C)   BP  Min: 80/23  Max: 119/49   Pulse  Min: 52  Max: 74   Resp  Min: 16  Max: 20   SpO2  Min: 70 %  Max: 100 %   Flow (L/min)  Min: 2  Max: 2       Input/Output for last 24 hour shift  11/10 0701 - 11/11 0700  In: 1600 [I.V.:1600]  Out: 480 [Urine:405]   S RR:  [8-12] 12  Objective:  General Appearance:  Uncomfortable and in no acute distress.    Vital signs: (most recent): Blood pressure (!) 89/38, pulse 57, temperature 98.1 °F (36.7 °C), temperature source Axillary, resp. rate 18, height 167.6 cm (66\"), weight 61.2 kg (135 lb), SpO2 100 %, not currently breastfeeding.    HEENT: (Dry mucous membranes)    Lungs:  Normal effort and normal respiratory rate.  Breath sounds clear to auscultation.    Heart: Bradycardia.  Regular rhythm.  S1 normal and S2 normal.  No murmur or friction rub.   Abdomen: Abdomen is soft and non-distended.  There is generalized tenderness.  There is no rebound tenderness.  There is no guarding.  (Midline incision is dressed and dry.  Left lower quadrant ostomy which is pink.).     Extremities: Normal range of motion.  There is no dependent edema.    Neurological: Patient is alert and oriented to person, place and time.  Normal strength.    Pupils:  Pupils are equal, round, and reactive to light.  Pupils are " equal.   Skin:  Warm.  No cyanosis.             Results from last 7 days   Lab Units 11/11/22  1003 11/11/22  0408   WBC 10*3/mm3 11.79* 11.41*   HEMOGLOBIN g/dL 8.8* 11.2*   PLATELETS 10*3/mm3 265 236     Results from last 7 days   Lab Units 11/11/22  1003 11/11/22  0408   SODIUM mmol/L 139 138   POTASSIUM mmol/L 5.4* 4.7   CO2 mmol/L 19.0* 21.0*   BUN mg/dL 25* 22   CREATININE mg/dL 1.15* 0.73   GLUCOSE mg/dL 165* 133*     Estimated Creatinine Clearance: 37.1 mL/min (A) (by C-G formula based on SCr of 1.15 mg/dL (H)).            I reviewed the patient's results and images.     Assessment & Plan   Impression        Severe hypotension    Acute blood loss anemia, probable    Essential hypertension    Acquired hypothyroidism    Mixed urge and stress incontinence    High cholesterol    DM2 (diabetes mellitus, type 2) (HCC)    Fecal incontinence    Perineal resection, parastomal and ventral hernia repair with mesh, small bowel resection, extensive lysis of adhesions, 11/10/22       Plan        We are awaiting CT scan of the abdomen pelvis.  Preliminarily it does look as though there is a fluid collection consistent with intra-abdominal hemorrhage.  Continuing with transfusion and we will recheck soon.  Continue IV fluids.  We will await further assessment by surgery regarding possible plans for further imaging if necessary.  Mechanical DVT prophylaxis.  Pain control as necessary.  No current sign of infection.  The patient remains at high risk of worsening from the standpoint of hypotension and probable acute blood loss.  She will be watched very closely in the intensive care unit.     I discussed the patient's findings and my recommendations with patient, nursing staff, primary care team and consulting provider     High level of risk due to:  severe exacerbation of chronic illness and illness with threat to life or bodily function.      Gee Echevarria MD, [facility]  Pulmonology and Critical Care Medicine

## 2022-11-11 NOTE — NURSING NOTE
Woc to follow for ostomy follow-up.  Just for in case patient needs anything.  [employer] catalog given to family friend as patient has just been transferred to ICU today.  We will follow-up.

## 2022-11-11 NOTE — PLAN OF CARE
Goal Outcome Evaluation:  Plan of Care Reviewed With: patient, friend, daughter        Progress: improving     Pt hypotensive 60/30's without symptoms this am in floor room. 2 L Bolus given. H&H dropped to 8.8/27.7 from 11.2/35. transferred to ICU. 2 U PRBC given and additional 500ml bolus. BP 90//50's. Post transfusion H&H 11.5/36.2. CT scan showed hematoma posterior uterus. 65 ml Urine output and 50ml dark liquid brown output from stoma. Update daughter via telephone. Friend at bedside. Continue to monitor BP and H&H closely.

## 2022-11-12 LAB
ANION GAP SERPL CALCULATED.3IONS-SCNC: 9 MMOL/L (ref 5–15)
BASOPHILS # BLD AUTO: 0.13 10*3/MM3 (ref 0–0.2)
BASOPHILS NFR BLD AUTO: 1.1 % (ref 0–1.5)
BH BB BLOOD EXPIRATION DATE: NORMAL
BH BB BLOOD EXPIRATION DATE: NORMAL
BH BB BLOOD TYPE BARCODE: 600
BH BB BLOOD TYPE BARCODE: 600
BH BB DISPENSE STATUS: NORMAL
BH BB DISPENSE STATUS: NORMAL
BH BB PRODUCT CODE: NORMAL
BH BB PRODUCT CODE: NORMAL
BH BB UNIT NUMBER: NORMAL
BH BB UNIT NUMBER: NORMAL
BUN SERPL-MCNC: 25 MG/DL (ref 8–23)
BUN/CREAT SERPL: 36.8 (ref 7–25)
CALCIUM SPEC-SCNC: 6.6 MG/DL (ref 8.6–10.5)
CHLORIDE SERPL-SCNC: 115 MMOL/L (ref 98–107)
CO2 SERPL-SCNC: 18 MMOL/L (ref 22–29)
CREAT SERPL-MCNC: 0.68 MG/DL (ref 0.57–1)
CROSSMATCH INTERPRETATION: NORMAL
CROSSMATCH INTERPRETATION: NORMAL
DEPRECATED RDW RBC AUTO: 54.3 FL (ref 37–54)
EGFRCR SERPLBLD CKD-EPI 2021: 87.6 ML/MIN/1.73
EOSINOPHIL # BLD AUTO: 0.04 10*3/MM3 (ref 0–0.4)
EOSINOPHIL NFR BLD AUTO: 0.4 % (ref 0.3–6.2)
ERYTHROCYTE [DISTWIDTH] IN BLOOD BY AUTOMATED COUNT: 15.9 % (ref 12.3–15.4)
GLUCOSE BLDC GLUCOMTR-MCNC: 117 MG/DL (ref 70–130)
GLUCOSE BLDC GLUCOMTR-MCNC: 122 MG/DL (ref 70–130)
GLUCOSE BLDC GLUCOMTR-MCNC: 128 MG/DL (ref 70–130)
GLUCOSE BLDC GLUCOMTR-MCNC: 131 MG/DL (ref 70–130)
GLUCOSE BLDC GLUCOMTR-MCNC: 133 MG/DL (ref 70–130)
GLUCOSE SERPL-MCNC: 134 MG/DL (ref 65–99)
HCT VFR BLD AUTO: 30.6 % (ref 34–46.6)
HCT VFR BLD AUTO: 32.9 % (ref 34–46.6)
HCT VFR BLD AUTO: 32.9 % (ref 34–46.6)
HCT VFR BLD AUTO: 33.1 % (ref 34–46.6)
HGB BLD-MCNC: 10.6 G/DL (ref 12–15.9)
HGB BLD-MCNC: 10.6 G/DL (ref 12–15.9)
HGB BLD-MCNC: 10.9 G/DL (ref 12–15.9)
HGB BLD-MCNC: 9.9 G/DL (ref 12–15.9)
IMM GRANULOCYTES # BLD AUTO: 0.09 10*3/MM3 (ref 0–0.05)
IMM GRANULOCYTES NFR BLD AUTO: 0.8 % (ref 0–0.5)
LYMPHOCYTES # BLD AUTO: 1.47 10*3/MM3 (ref 0.7–3.1)
LYMPHOCYTES NFR BLD AUTO: 13 % (ref 19.6–45.3)
MAGNESIUM SERPL-MCNC: 1.7 MG/DL (ref 1.6–2.4)
MCH RBC QN AUTO: 30 PG (ref 26.6–33)
MCHC RBC AUTO-ENTMCNC: 32.2 G/DL (ref 31.5–35.7)
MCV RBC AUTO: 93.2 FL (ref 79–97)
MONOCYTES # BLD AUTO: 1.48 10*3/MM3 (ref 0.1–0.9)
MONOCYTES NFR BLD AUTO: 13.1 % (ref 5–12)
NEUTROPHILS NFR BLD AUTO: 71.6 % (ref 42.7–76)
NEUTROPHILS NFR BLD AUTO: 8.13 10*3/MM3 (ref 1.7–7)
NRBC BLD AUTO-RTO: 0 /100 WBC (ref 0–0.2)
PHOSPHATE SERPL-MCNC: 1.6 MG/DL (ref 2.5–4.5)
PLATELET # BLD AUTO: 232 10*3/MM3 (ref 140–450)
PMV BLD AUTO: 10.8 FL (ref 6–12)
POTASSIUM SERPL-SCNC: 4 MMOL/L (ref 3.5–5.2)
RBC # BLD AUTO: 3.53 10*6/MM3 (ref 3.77–5.28)
SODIUM SERPL-SCNC: 142 MMOL/L (ref 136–145)
UNIT  ABO: NORMAL
UNIT  ABO: NORMAL
UNIT  RH: NORMAL
UNIT  RH: NORMAL
WBC NRBC COR # BLD: 11.34 10*3/MM3 (ref 3.4–10.8)

## 2022-11-12 PROCEDURE — 25010000002 ONDANSETRON PER 1 MG: Performed by: COLON & RECTAL SURGERY

## 2022-11-12 PROCEDURE — 85018 HEMOGLOBIN: CPT

## 2022-11-12 PROCEDURE — 84100 ASSAY OF PHOSPHORUS: CPT

## 2022-11-12 PROCEDURE — 85018 HEMOGLOBIN: CPT | Performed by: COLON & RECTAL SURGERY

## 2022-11-12 PROCEDURE — 85025 COMPLETE CBC W/AUTO DIFF WBC: CPT | Performed by: COLON & RECTAL SURGERY

## 2022-11-12 PROCEDURE — 83735 ASSAY OF MAGNESIUM: CPT

## 2022-11-12 PROCEDURE — 82962 GLUCOSE BLOOD TEST: CPT

## 2022-11-12 PROCEDURE — 85014 HEMATOCRIT: CPT

## 2022-11-12 PROCEDURE — 25010000002 HYDROMORPHONE PER 4 MG: Performed by: COLON & RECTAL SURGERY

## 2022-11-12 PROCEDURE — 99232 SBSQ HOSP IP/OBS MODERATE 35: CPT | Performed by: INTERNAL MEDICINE

## 2022-11-12 PROCEDURE — 0 MAGNESIUM SULFATE 4 GM/100ML SOLUTION: Performed by: INTERNAL MEDICINE

## 2022-11-12 PROCEDURE — 80048 BASIC METABOLIC PNL TOTAL CA: CPT

## 2022-11-12 PROCEDURE — 85014 HEMATOCRIT: CPT | Performed by: COLON & RECTAL SURGERY

## 2022-11-12 RX ORDER — FENTANYL/ROPIVACAINE/NS/PF 2-625MCG/1
15 PLASTIC BAG, INJECTION (ML) EPIDURAL AS NEEDED
Status: DISCONTINUED | OUTPATIENT
Start: 2022-11-12 | End: 2022-11-16 | Stop reason: HOSPADM

## 2022-11-12 RX ORDER — MAGNESIUM SULFATE HEPTAHYDRATE 40 MG/ML
2 INJECTION, SOLUTION INTRAVENOUS AS NEEDED
Status: DISCONTINUED | OUTPATIENT
Start: 2022-11-12 | End: 2022-11-16 | Stop reason: HOSPADM

## 2022-11-12 RX ORDER — MAGNESIUM SULFATE HEPTAHYDRATE 40 MG/ML
4 INJECTION, SOLUTION INTRAVENOUS AS NEEDED
Status: DISCONTINUED | OUTPATIENT
Start: 2022-11-12 | End: 2022-11-16 | Stop reason: HOSPADM

## 2022-11-12 RX ADMIN — GABAPENTIN 600 MG: 300 CAPSULE ORAL at 21:30

## 2022-11-12 RX ADMIN — LEVOTHYROXINE SODIUM 88 MCG: 88 TABLET ORAL at 09:26

## 2022-11-12 RX ADMIN — CETIRIZINE HYDROCHLORIDE 10 MG: 10 TABLET, FILM COATED ORAL at 09:26

## 2022-11-12 RX ADMIN — ACETAMINOPHEN 650 MG: 325 TABLET, FILM COATED ORAL at 14:35

## 2022-11-12 RX ADMIN — SUCRALFATE 1 G: 1 TABLET ORAL at 21:30

## 2022-11-12 RX ADMIN — PANTOPRAZOLE SODIUM 40 MG: 40 INJECTION, POWDER, FOR SOLUTION INTRAVENOUS at 09:25

## 2022-11-12 RX ADMIN — PRAVASTATIN SODIUM 80 MG: 40 TABLET ORAL at 21:30

## 2022-11-12 RX ADMIN — ONDANSETRON 4 MG: 2 INJECTION INTRAMUSCULAR; INTRAVENOUS at 14:03

## 2022-11-12 RX ADMIN — MAGNESIUM SULFATE HEPTAHYDRATE 4 G: 40 INJECTION, SOLUTION INTRAVENOUS at 09:47

## 2022-11-12 RX ADMIN — PANTOPRAZOLE SODIUM 40 MG: 40 INJECTION, POWDER, FOR SOLUTION INTRAVENOUS at 21:30

## 2022-11-12 RX ADMIN — ALVIMOPAN 12 MG: 12 CAPSULE ORAL at 09:25

## 2022-11-12 RX ADMIN — SODIUM CHLORIDE 100 ML/HR: 9 INJECTION, SOLUTION INTRAVENOUS at 01:59

## 2022-11-12 RX ADMIN — ACETAMINOPHEN 650 MG: 325 TABLET, FILM COATED ORAL at 21:30

## 2022-11-12 RX ADMIN — HYDROMORPHONE HYDROCHLORIDE 0.5 MG: 2 INJECTION, SOLUTION INTRAMUSCULAR; INTRAVENOUS; SUBCUTANEOUS at 09:45

## 2022-11-12 RX ADMIN — HYDROMORPHONE HYDROCHLORIDE 0.5 MG: 2 INJECTION, SOLUTION INTRAMUSCULAR; INTRAVENOUS; SUBCUTANEOUS at 05:55

## 2022-11-12 RX ADMIN — HYDROMORPHONE HYDROCHLORIDE 0.5 MG: 2 INJECTION, SOLUTION INTRAMUSCULAR; INTRAVENOUS; SUBCUTANEOUS at 22:03

## 2022-11-12 RX ADMIN — ALVIMOPAN 12 MG: 12 CAPSULE ORAL at 22:03

## 2022-11-12 RX ADMIN — SODIUM CHLORIDE 50 ML/HR: 9 INJECTION, SOLUTION INTRAVENOUS at 20:27

## 2022-11-12 RX ADMIN — HYDROMORPHONE HYDROCHLORIDE 0.5 MG: 2 INJECTION, SOLUTION INTRAMUSCULAR; INTRAVENOUS; SUBCUTANEOUS at 18:32

## 2022-11-12 RX ADMIN — SODIUM PHOSPHATE, MONOBASIC, MONOHYDRATE 30 MMOL: 276; 142 INJECTION, SOLUTION INTRAVENOUS at 10:46

## 2022-11-12 RX ADMIN — BUSPIRONE HYDROCHLORIDE 15 MG: 10 TABLET ORAL at 09:27

## 2022-11-12 RX ADMIN — SUCRALFATE 1 G: 1 TABLET ORAL at 09:26

## 2022-11-12 NOTE — PROGRESS NOTES
"Intensive Care Follow-up     Hospital:  LOS: 2 days   Ms. Neema Richard, 82 y.o. female is followed for:   Severe hypotension        Subjective   Interval History:  The chart has been reviewed.  The patient has done well overnight.  She states that she is a bit more comfortable.  She did require some norepinephrine through the night although this was turned off early this morning.  Pain is under good control currently.    The patient's past medical, surgical and social history were reviewed and updated in Epic as appropriate.        Objective     Infusions:  norepinephrine, 0.02-0.3 mcg/kg/min, Last Rate: Stopped (11/12/22 0902)  sodium chloride, 100 mL/hr, Last Rate: 100 mL/hr (11/12/22 0159)      Medications:  acetaminophen, 650 mg, Oral, Q8H  alvimopan, 12 mg, Oral, BID  busPIRone, 15 mg, Oral, Daily  cetirizine, 10 mg, Oral, Daily  fluticasone, 1 spray, Nasal, Daily  gabapentin, 600 mg, Oral, Nightly  insulin regular, 0-7 Units, Subcutaneous, Q6H  levothyroxine, 88 mcg, Oral, Q AM  pantoprazole, 40 mg, Intravenous, Q12H  pravastatin, 80 mg, Oral, Nightly  sucralfate, 1 g, Oral, BID        Vital Sign Min/Max for last 24 hours  Temp  Min: 96.6 °F (35.9 °C)  Max: 99 °F (37.2 °C)   BP  Min: 54/29  Max: 146/49   Pulse  Min: 52  Max: 94   Resp  Min: 16  Max: 20   SpO2  Min: 70 %  Max: 100 %   Flow (L/min)  Min: 1  Max: 2       Input/Output for last 24 hour shift  11/11 0701 - 11/12 0700  In: 4738.1 [P.O.:100; I.V.:3438.1]  Out: 865 [Urine:790]      Objective:  General Appearance:  Uncomfortable, in no acute distress and well-appearing.    Vital signs: (most recent): Blood pressure 132/59, pulse 94, temperature 98.6 °F (37 °C), temperature source Oral, resp. rate 20, height 167.6 cm (66\"), weight 61.2 kg (135 lb), SpO2 95 %, not currently breastfeeding.    HEENT: Normal HEENT exam.    Lungs:  Normal effort and normal respiratory rate.  Breath sounds clear to auscultation.    Heart: Normal rate.  Regular rhythm.  S1 " normal and S2 normal.  No murmur or friction rub.   Abdomen: Abdomen is soft and non-distended.  There is generalized tenderness.  There is no rebound tenderness.  There is no guarding.  (Midline incision is dressed and dry.  Left lower quadrant ostomy which is pink.  Stool present).     Extremities: Normal range of motion.  There is no dependent edema.    Neurological: Patient is alert and oriented to person, place and time.  Normal strength.    Pupils:  Pupils are equal, round, and reactive to light.  Pupils are equal.   Skin:  Warm.  No cyanosis.             Results from last 7 days   Lab Units 11/12/22  0659 11/12/22  0146 11/11/22 2056 11/11/22  1611 11/11/22  1003   WBC 10*3/mm3 11.34*  --   --  11.89* 11.79*   HEMOGLOBIN g/dL 10.6*  10.6* 10.9* 10.9* 11.5* 8.8*   PLATELETS 10*3/mm3 232  --   --  221 265     Results from last 7 days   Lab Units 11/12/22  0659 11/11/22 2056 11/11/22  1003   SODIUM mmol/L 142 144 139   POTASSIUM mmol/L 4.0 4.8 5.4*   CO2 mmol/L 18.0* 18.0* 19.0*   BUN mg/dL 25* 27* 25*   CREATININE mg/dL 0.68 0.94 1.15*   MAGNESIUM mg/dL 1.7  --   --    PHOSPHORUS mg/dL 1.6*  --   --    GLUCOSE mg/dL 134* 122* 165*     Estimated Creatinine Clearance: 62.7 mL/min (by C-G formula based on SCr of 0.68 mg/dL).            I reviewed the patient's results and images.     Assessment & Plan   Impression        Severe hypotension    Acute blood loss anemia, probable    Essential hypertension    Acquired hypothyroidism    Mixed urge and stress incontinence    High cholesterol    DM2 (diabetes mellitus, type 2) (HCC)    Fecal incontinence    Perineal resection, parastomal and ventral hernia repair with mesh, small bowel resection, extensive lysis of adhesions, 11/10/22       Plan        Wean norepinephrine as able.  Maintain IV fluid at 100 mL/h up until about 2 PM and then we will drop this down to 50 mL/h.  Begin oral feeding when okay with colorectal surgery.  Continued current glucose control.  Pain  control as needed.  Mobilize as tolerated.    Plan of care and goals reviewed with mulitdisciplinary/antibiotic stewardship team during rounds.   I discussed the patient's findings and my recommendations with patient and nursing staff         Gee Echevarria MD, [facility]  Pulmonology and Critical Care Medicine

## 2022-11-12 NOTE — PLAN OF CARE
Goal Outcome Evaluation:  Plan of Care Reviewed With: patient        Progress: improving  Outcome Evaluation: Spo2 >92% on 1-2L NC. Levophed titrated to maintain MAP >65. H&H remained stable throughout the night. NS gtt continues at 100 ml/hr. Small - mod serosanguineous drainage from midline abdominal incision. Scant serous drainage from perineal incision. 600 mL blue/green clear UOP. 25 ml brown loose-liquid ostomy output. PRN dilaudid given x1. Pt rested well. Awaiting AM labs.

## 2022-11-12 NOTE — PROGRESS NOTES
Seen and examined.  Chart data reviewed.  Discussed with Dr. Vazquez and Dr. Echevarria.  Pressors were weaned to off.  Vital signs are stable.  Abdominal exam appropriate.  Colostomy is pink and healthy-appearing with stool in the appliance.  She is making good urine now with plans for decreasing IV fluids later today due to aggressive fluid resuscitation and net positive fluid balance so far this admission.  She is in good spirits.  I am  optimistic about her recovery.  She can start clear liquids today.  Decrease hemoglobin checks to every 12 hours.  Discussed with her nurses at the bedside.

## 2022-11-13 ENCOUNTER — APPOINTMENT (OUTPATIENT)
Dept: GENERAL RADIOLOGY | Facility: HOSPITAL | Age: 82
End: 2022-11-13

## 2022-11-13 LAB
ANION GAP SERPL CALCULATED.3IONS-SCNC: 7 MMOL/L (ref 5–15)
BASOPHILS # BLD AUTO: 0.09 10*3/MM3 (ref 0–0.2)
BASOPHILS NFR BLD AUTO: 0.9 % (ref 0–1.5)
BUN SERPL-MCNC: 19 MG/DL (ref 8–23)
BUN/CREAT SERPL: 38 (ref 7–25)
CALCIUM SPEC-SCNC: 6.8 MG/DL (ref 8.6–10.5)
CHLORIDE SERPL-SCNC: 114 MMOL/L (ref 98–107)
CO2 SERPL-SCNC: 21 MMOL/L (ref 22–29)
CREAT SERPL-MCNC: 0.5 MG/DL (ref 0.57–1)
DEPRECATED RDW RBC AUTO: 54 FL (ref 37–54)
EGFRCR SERPLBLD CKD-EPI 2021: 94.4 ML/MIN/1.73
EOSINOPHIL # BLD AUTO: 0.25 10*3/MM3 (ref 0–0.4)
EOSINOPHIL NFR BLD AUTO: 2.5 % (ref 0.3–6.2)
ERYTHROCYTE [DISTWIDTH] IN BLOOD BY AUTOMATED COUNT: 15.8 % (ref 12.3–15.4)
GLUCOSE BLDC GLUCOMTR-MCNC: 105 MG/DL (ref 70–130)
GLUCOSE BLDC GLUCOMTR-MCNC: 110 MG/DL (ref 70–130)
GLUCOSE BLDC GLUCOMTR-MCNC: 134 MG/DL (ref 70–130)
GLUCOSE BLDC GLUCOMTR-MCNC: 161 MG/DL (ref 70–130)
GLUCOSE SERPL-MCNC: 105 MG/DL (ref 65–99)
HCT VFR BLD AUTO: 28.3 % (ref 34–46.6)
HCT VFR BLD AUTO: 28.8 % (ref 34–46.6)
HCT VFR BLD AUTO: 29.5 % (ref 34–46.6)
HGB BLD-MCNC: 9.2 G/DL (ref 12–15.9)
HGB BLD-MCNC: 9.5 G/DL (ref 12–15.9)
HGB BLD-MCNC: 9.7 G/DL (ref 12–15.9)
IMM GRANULOCYTES # BLD AUTO: 0.06 10*3/MM3 (ref 0–0.05)
IMM GRANULOCYTES NFR BLD AUTO: 0.6 % (ref 0–0.5)
LYMPHOCYTES # BLD AUTO: 1.27 10*3/MM3 (ref 0.7–3.1)
LYMPHOCYTES NFR BLD AUTO: 12.8 % (ref 19.6–45.3)
MAGNESIUM SERPL-MCNC: 3 MG/DL (ref 1.6–2.4)
MCH RBC QN AUTO: 30.8 PG (ref 26.6–33)
MCHC RBC AUTO-ENTMCNC: 32.9 G/DL (ref 31.5–35.7)
MCV RBC AUTO: 93.7 FL (ref 79–97)
MONOCYTES # BLD AUTO: 1.14 10*3/MM3 (ref 0.1–0.9)
MONOCYTES NFR BLD AUTO: 11.5 % (ref 5–12)
NEUTROPHILS NFR BLD AUTO: 7.12 10*3/MM3 (ref 1.7–7)
NEUTROPHILS NFR BLD AUTO: 71.7 % (ref 42.7–76)
NRBC BLD AUTO-RTO: 0 /100 WBC (ref 0–0.2)
PHOSPHATE SERPL-MCNC: 1.6 MG/DL (ref 2.5–4.5)
PLATELET # BLD AUTO: 207 10*3/MM3 (ref 140–450)
PMV BLD AUTO: 10.8 FL (ref 6–12)
POTASSIUM SERPL-SCNC: 3.8 MMOL/L (ref 3.5–5.2)
RBC # BLD AUTO: 3.15 10*6/MM3 (ref 3.77–5.28)
SODIUM SERPL-SCNC: 142 MMOL/L (ref 136–145)
WBC NRBC COR # BLD: 9.93 10*3/MM3 (ref 3.4–10.8)

## 2022-11-13 PROCEDURE — 63710000001 INSULIN REGULAR HUMAN PER 5 UNITS

## 2022-11-13 PROCEDURE — 99232 SBSQ HOSP IP/OBS MODERATE 35: CPT | Performed by: INTERNAL MEDICINE

## 2022-11-13 PROCEDURE — 85018 HEMOGLOBIN: CPT | Performed by: COLON & RECTAL SURGERY

## 2022-11-13 PROCEDURE — 97165 OT EVAL LOW COMPLEX 30 MIN: CPT

## 2022-11-13 PROCEDURE — 80048 BASIC METABOLIC PNL TOTAL CA: CPT | Performed by: INTERNAL MEDICINE

## 2022-11-13 PROCEDURE — 71045 X-RAY EXAM CHEST 1 VIEW: CPT

## 2022-11-13 PROCEDURE — 85014 HEMATOCRIT: CPT | Performed by: COLON & RECTAL SURGERY

## 2022-11-13 PROCEDURE — 83735 ASSAY OF MAGNESIUM: CPT | Performed by: INTERNAL MEDICINE

## 2022-11-13 PROCEDURE — 82962 GLUCOSE BLOOD TEST: CPT

## 2022-11-13 PROCEDURE — 84100 ASSAY OF PHOSPHORUS: CPT | Performed by: INTERNAL MEDICINE

## 2022-11-13 PROCEDURE — 25010000002 HYDROMORPHONE PER 4 MG: Performed by: COLON & RECTAL SURGERY

## 2022-11-13 PROCEDURE — 85025 COMPLETE CBC W/AUTO DIFF WBC: CPT | Performed by: COLON & RECTAL SURGERY

## 2022-11-13 RX ADMIN — ACETAMINOPHEN 650 MG: 325 TABLET, FILM COATED ORAL at 22:16

## 2022-11-13 RX ADMIN — CETIRIZINE HYDROCHLORIDE 10 MG: 10 TABLET, FILM COATED ORAL at 08:50

## 2022-11-13 RX ADMIN — SUCRALFATE 1 G: 1 TABLET ORAL at 08:51

## 2022-11-13 RX ADMIN — HYDROMORPHONE HYDROCHLORIDE 0.5 MG: 2 INJECTION, SOLUTION INTRAMUSCULAR; INTRAVENOUS; SUBCUTANEOUS at 20:21

## 2022-11-13 RX ADMIN — PANTOPRAZOLE SODIUM 40 MG: 40 INJECTION, POWDER, FOR SOLUTION INTRAVENOUS at 22:17

## 2022-11-13 RX ADMIN — ACETAMINOPHEN 650 MG: 325 TABLET, FILM COATED ORAL at 05:27

## 2022-11-13 RX ADMIN — ALVIMOPAN 12 MG: 12 CAPSULE ORAL at 22:16

## 2022-11-13 RX ADMIN — POTASSIUM PHOSPHATE, MONOBASIC AND POTASSIUM PHOSPHATE, DIBASIC 30 MMOL: 224; 236 INJECTION, SOLUTION, CONCENTRATE INTRAVENOUS at 08:56

## 2022-11-13 RX ADMIN — ACETAMINOPHEN 650 MG: 325 TABLET, FILM COATED ORAL at 14:20

## 2022-11-13 RX ADMIN — ALVIMOPAN 12 MG: 12 CAPSULE ORAL at 08:50

## 2022-11-13 RX ADMIN — INSULIN HUMAN 2 UNITS: 100 INJECTION, SOLUTION PARENTERAL at 11:47

## 2022-11-13 RX ADMIN — PRAVASTATIN SODIUM 80 MG: 40 TABLET ORAL at 22:16

## 2022-11-13 RX ADMIN — BUSPIRONE HYDROCHLORIDE 15 MG: 10 TABLET ORAL at 08:51

## 2022-11-13 RX ADMIN — LEVOTHYROXINE SODIUM 88 MCG: 88 TABLET ORAL at 05:27

## 2022-11-13 RX ADMIN — GABAPENTIN 600 MG: 300 CAPSULE ORAL at 22:16

## 2022-11-13 RX ADMIN — PANTOPRAZOLE SODIUM 40 MG: 40 INJECTION, POWDER, FOR SOLUTION INTRAVENOUS at 08:50

## 2022-11-13 RX ADMIN — SUCRALFATE 1 G: 1 TABLET ORAL at 22:16

## 2022-11-13 NOTE — NURSING NOTE
WOC consulted for: left quardrant stoma. skin irritated under appliance.    Called and spoke with bedside RN who stated that the pouch has already been changed and is intact not leaking.  Patient known to WOC RN from PAT visit prior to this admission, patient is in the ICU and her friend is at bedside.    Stoma is round, budded, red, moist, no stool in pouch, scant serosanguineous drainage.  Patient has on Chelita 2 piece pouch.  Patient states that pouch is well fitting and she does not feel like she needs to have it removed to assess the peristomal skin at this time.  Left stoma powder and spray at bedside and the patient knows how to complete the crusting technique for peristomal skin irritation.  Patient responded that she still has WO RNs business card if she needs any ostomy supplies or has any concerns regarding her colostomy encouraged to call.    Sensory Perception: 3-->slightly limited  Moisture: 2-->very moist  Activity: 3-->walks occasionally  Mobility: 3-->slightly limited  Nutrition: 2-->probably inadequate  Friction and Shear: 2-->potential problem  Codey Score: 15 (11/13/22 0800)     Patient is on an iso-Librium bed.    Please implement pressure injury prevention interventions for a patient with a Codey score of 18 or less per protocol    Thank you for consulting the WOC team.  WOC team will sign off.  If alteration to skin integrity or change in wound bed presentation please contact the WOC team

## 2022-11-13 NOTE — PLAN OF CARE
Goal Outcome Evaluation:  Plan of Care Reviewed With: patient        Progress: improving     Levophed off this am. BP Stable. Replaced Mag and Phos. Up in chair x 3 hours . Ambulated twice 25 and 30 feet. Encouraged IS and pulmonary toileting. Dilaudid given x 2 for pain control. 675 ml Urine out today. Remains around 4 L positive. Crackles left lower lobe this evening. Monitor closely for pulmonary edema. Stoma appliance changed. Peristomal skin red and excoriated. Friend at bedside majority of day.

## 2022-11-13 NOTE — PLAN OF CARE
Goal Outcome Evaluation:  Plan of Care Reviewed With: patient        Progress: improving  Outcome Evaluation: OT eval complete. Pt is CGA for functional mobility w/ RW and Dep for LBD tasks though reports having AE at home. Pt limited d/t c/o abd discomfort though demo excellent effort. Recommend cont skilled IPOT POC. Recommend pt DC home w/ assist, a RW, and HH OT/PT services.

## 2022-11-13 NOTE — THERAPY EVALUATION
Patient Name: Neema Richard  : 1940    MRN: [id number]                              Today's Date: 2022       Admit Date: 11/10/2022    Visit Dx:     ICD-10-CM ICD-9-CM   1. Fecal incontinence  R15.9 787.60     Patient Active Problem List   Diagnosis   • Annual GYN exam in  S/P left S&O   • Osteoporosis   • Essential hypertension   • Acquired hypothyroidism   • IBS-d (takes Buspar)   • DCIS (ductal carcinoma in situ)   • Cystocele and rectocele with complete uterovaginal prolapse   • Mixed urge and stress incontinence   • Pessary maintenance   • High cholesterol   • Arthritis   • Dyspepsia   • DM2 (diabetes mellitus, type 2) (HCC)   • Fecal incontinence   • Perineal resection, parastomal and ventral hernia repair with mesh, small bowel resection, extensive lysis of adhesions, 11/10/22   • Acute blood loss anemia, probable   • Severe hypotension     Past Medical History:   Diagnosis Date   • Acquired hypothyroidism    • Anesthesia complication     after gb surgery - anesthesia paralyzed her lungs too early   • Arthritis    • Breast cancer (HCC)     precancerous cell   • Breast injury     previous fall caused bruising to right breast   • Colon polyp - sees Dr. Vazquez 2016   • DCIS (ductal carcinoma in situ) 2017    Dx at the time of breast reduction. ER and [place] (+)   • Diabetes mellitus (HCC)     prediabetic- doesnt check sugar   • Diverticular disease 2020   • Essential hypertension    • High cholesterol    • History of diverticular abscess 2020    S/p LAR, abscess drainage, appy, left maldonado, distal transverse end colostomy 3/17/20 by Dr. Vazquez   • Hx of herpes genitalis    • IBS-d (takes Buspar)    • Pneumonia 2020   • Sepsis (HCC) 2020   • SOBOE (shortness of breath on exertion)    • Wears glasses      Past Surgical History:   Procedure Laterality Date   • ABDOMINAL PERINEAL RESECTION N/A 11/10/2022    Procedure: ABDOMINAL PERINEAL RESECTION, REPAIR OF  PARASTOMAL HERNIA WITH MESH;  Surgeon: Sravan Vazquez MD;  Location:  [facility];  Service: General;  Laterality: N/A;   • APPENDECTOMY     • BILATERAL BREAST REDUCTION Bilateral 1995   • BREAST BIOPSY Left 2009   • BREAST EXCISIONAL BIOPSY Left 2009    ADH   • CATARACT EXTRACTION, BILATERAL Bilateral 2018   • CERVICAL CONIZATION  1983   • CHOLECYSTECTOMY     • COLON RESECTION N/A 03/17/2020    Procedure: LOW ANTERIOR COLON RESECTION, MOBILIZATION OF SPLENIC FLEXURE, DRAINAGE OF ABSCESS AND COLOSTOMY CREATION;  Surgeon: Sravan Vazquez MD;  Location:  [facility];  Service: General;  Laterality: N/A;   • COLONOSCOPY     • FACIAL COSMETIC SURGERY  2001   • FACIAL COSMETIC SURGERY  2012   • HEMORRHOIDECTOMY  1974   • LAPAROSCOPIC CHOLECYSTECTOMY  2010   • REDUCTION MAMMAPLASTY Bilateral 04/13/2017    DCIS (3mm found incidentally right breast)   • REDUCTION MAMMAPLASTY Bilateral 1995   • SINUS SURGERY  1992    poylp   • THYROIDECTOMY, PARTIAL  1963   • TONSILLECTOMY  1953   • VARICOSE VEIN SURGERY Bilateral 1981   • VARICOSE VEIN SURGERY  2021      General Information     Row Name 11/13/22 1346          OT Time and Intention    Document Type evaluation  -CS     Mode of Treatment occupational therapy  -CS     Row Name 11/13/22 1346          General Information    Patient Profile Reviewed yes  -CS     Prior Level of Function independent:;all household mobility;ADL's  -CS     Existing Precautions/Restrictions fall;oxygen therapy device and L/min;other (see comments)  abd incision, ostomy  -CS     Barriers to Rehab medically complex  -CS     Row Name 11/13/22 1346          Living Environment    People in Home alone  friends available assist as needed  -CS     Row Name 11/13/22 1346          Home Main Entrance    Number of Stairs, Main Entrance none  -CS     Stair Railings, Main Entrance none  -CS     Row Name 11/13/22 1346          Cognition    Orientation Status (Cognition) oriented x 3  -CS     Row Name 11/13/22 1343           Safety Issues, Functional Mobility    Impairments Affecting Function (Mobility) balance;endurance/activity tolerance;strength;pain  -CS           User Key  (r) = Recorded By, (t) = Taken By, (c) = Cosigned By    Initials Name Provider Type    Sugey Pérez OT Occupational Therapist                 Mobility/ADL's     Row Name 11/13/22 1347          Transfers    Transfers sit-stand transfer;stand-sit transfer  -     Row Name 11/13/22 1347          Sit-Stand Transfer    Sit-Stand [place] (Transfers) contact guard;verbal cues  -CS     Assistive Device (Sit-Stand Transfers) walker, front-wheeled  -CS     Row Name 11/13/22 1347          Stand-Sit Transfer    Stand-Sit [place] (Transfers) contact guard;verbal cues  -CS     Assistive Device (Stand-Sit Transfers) walker, front-wheeled  -CS     Row Name 11/13/22 1347          Functional Mobility    Functional Mobility- Ind. Level contact guard assist;verbal cues required;1 person + 1 person to manage equipment  -     Functional Mobility- Device walker, front-wheeled  -CS     Functional Mobility-Distance (Feet) --  >household distance  -     Row Name 11/13/22 1347          Activities of Daily Living    BADL Assessment/Intervention lower body dressing  -CS     Row Name 11/13/22 1347          Lower Body Dressing Assessment/Training    [place] Level (Lower Body Dressing) don;socks;dependent (less than 25% patient effort)  -CS     Position (Lower Body Dressing) supported sitting  -CS     Comment, (Lower Body Dressing) Pt reports having AE at home from previous sx  -CS           User Key  (r) = Recorded By, (t) = Taken By, (c) = Cosigned By    Initials Name Provider Type    Sugey Pérez OT Occupational Therapist               Obj/Interventions     Row Name 11/13/22 1348          Sensory Assessment (Somatosensory)    Sensory Assessment (Somatosensory) UE sensation intact  -     Row Name 11/13/22 1348          Range of Motion Comprehensive     General Range of Motion bilateral upper extremity ROM WFL  -CS     Row Name 11/13/22 1348          Strength Comprehensive (MMT)    Comment, General Manual Muscle Testing (MMT) Assessment BUE observed grossly 3+/5  -CS     Row Name 11/13/22 1348          Balance    Balance Assessment sitting static balance;sitting dynamic balance;standing static balance;standing dynamic balance  -CS     Static Sitting Balance supervision  -CS     Dynamic Sitting Balance supervision  -CS     Position, Sitting Balance sitting in chair  -CS     Static Standing Balance standby assist  -CS     Dynamic Standing Balance contact guard  -CS     Position/Device Used, Standing Balance walker, rolling  -CS     Balance Interventions sitting;standing;static;dynamic;dynamic reaching;occupation based/functional task;weight shifting activity  -CS           User Key  (r) = Recorded By, (t) = Taken By, (c) = Cosigned By    Initials Name Provider Type    CS Sugey Kumar, OT Occupational Therapist               Goals/Plan     Row Name 11/13/22 3615          Transfer Goal 1 (OT)    Activity/Assistive Device (Transfer Goal 1, OT) sit-to-stand/stand-to-sit;toilet  -CS     [place] Level/Cues Needed (Transfer Goal 1, OT) supervision required  -CS     Time Frame (Transfer Goal 1, OT) long term goal (LTG);10 days  -CS     Progress/Outcome (Transfer Goal 1, OT) goal ongoing  -CS     Row Name 11/13/22 1355          Dressing Goal 1 (OT)    Activity/Device (Dressing Goal 1, OT) lower body dressing  -CS     [place]/Cues Needed (Dressing Goal 1, OT) supervision required  -CS     Time Frame (Dressing Goal 1, OT) long term goal (LTG);10 days  -CS     Strategies/Barriers (Dressing Goal 1, OT) don/doff socks w/ AAD  -CS     Progress/Outcome (Dressing Goal 1, OT) goal ongoing  -CS     Row Name 11/13/22 1351          Toileting Goal 1 (OT)    Activity/Device (Toileting Goal 1, OT) adjust/manage clothing;perform perineal hygiene  -CS     [place] Level/Cues  Needed (Toileting Goal 1, OT) supervision required  -CS     Time Frame (Toileting Goal 1, OT) long term goal (LTG);10 days  -CS     Progress/Outcome (Toileting Goal 1, OT) goal ongoing  -CS     Row Name 11/13/22 6186          Therapy Assessment/Plan (OT)    Planned Therapy Interventions (OT) activity tolerance training;adaptive equipment training;BADL retraining;functional balance retraining;occupation/activity based interventions;ROM/therapeutic exercise;strengthening exercise;transfer/mobility retraining  -CS           User Key  (r) = Recorded By, (t) = Taken By, (c) = Cosigned By    Initials Name Provider Type    CS Sugey Kumar, T[facility] Occupational Therapist               Clinical Impression     Row Name 11/13/22 2814          Pain Assessment    Pain Intervention(s) Repositioned;Ambulation/increased activity  -CS     Additional Documentation Pain Scale: FACES Pre/Post-Treatment (Group)  -CS     Row Name 11/13/22 9752          Pain Scale: FACES Pre/Post-Treatment    Pain: FACES Scale, Pretreatment 2-->hurts little bit  -CS     Posttreatment Pain Rating 2-->hurts little bit  -CS     Pain Location incisional  -CS     Pain Location - abdomen  -CS     Pre/Posttreatment Pain Comment tolerated  -CS     Row Name 11/13/22 6903          Plan of Care Review    Plan of Care Reviewed With patient  -CS     Progress improving  -CS     Outcome Evaluation OT eval complete. Pt is CGA for functional mobility w/ RW and Dep for LBD tasks though reports having AE at home. Pt limited d/t c/o abd discomfort though demo excellent effort. Recommend cont skilled IPOT POC. Recommend pt DC home w/ assist, a RW, and HH OT/PT services.  -CS     Row Name 11/13/22 8808          Therapy Assessment/Plan (OT)    Patient/Family Therapy Goal Statement (OT) Return to PLOF  -CS     Rehab Potential (OT) good, to achieve stated therapy goals  -CS     Criteria for Skilled Therapeutic Interventions Met (OT) yes;skilled treatment is necessary  -CS      Therapy Frequency (OT) daily  -CS     Row Name 11/13/22 1357          Therapy Plan Review/Discharge Plan (OT)    Anticipated Discharge Disposition (OT) home with assist;home with home health  -CS     Row Name 11/13/22 1357          Vital Signs    Pre Systolic BP Rehab --  VSS  -CS     O2 Delivery Pre Treatment nasal cannula  -CS     O2 Delivery Intra Treatment nasal cannula  -CS     O2 Delivery Post Treatment nasal cannula  -CS     Pre Patient Position Sitting  -CS     Intra Patient Position Standing  -CS     Post Patient Position Sitting  -CS     Row Name 11/13/22 1357          Positioning and Restraints    Pre-Treatment Position sitting in chair/recliner  -CS     Post Treatment Position chair  -CS     In Chair notified nsg;reclined;call light within reach;encouraged to call for assist;waffle cushion;legs elevated;heels elevated  RN deferred exit alarm  -CS           User Key  (r) = Recorded By, (t) = Taken By, (c) = Cosigned By    Initials Name Provider Type    Sugey Pérez OT Occupational Therapist               Outcome Measures     Row Name 11/13/22 1401          How much help from another is currently needed...    Putting on and taking off regular lower body clothing? 2  -CS     Bathing (including washing, rinsing, and drying) 2  -CS     Toileting (which includes using toilet bed pan or urinal) 2  -CS     Putting on and taking off regular upper body clothing 3  -CS     Taking care of personal grooming (such as brushing teeth) 4  -CS     Eating meals 4  -CS     AM-PAC 6 Clicks Score (OT) 17  -CS     Row Name 11/13/22 1401          Functional Assessment    Outcome Measure Options AM-PAC 6 Clicks Daily Activity (OT)  -CS           User Key  (r) = Recorded By, (t) = Taken By, (c) = Cosigned By    Initials Name Provider Type    Sugey Pérez OT Occupational Therapist                Occupational Therapy Education     Title: PT OT SLP Therapies (In Progress)     Topic: Occupational Therapy (In Progress)      Point: ADL training (In Progress)     Description:   Instruct learner(s) on proper safety adaptation and remediation techniques during self care or transfers.   Instruct in proper use of assistive devices.              Learning Progress Summary           Patient Acceptance, E, NR by  at 11/13/2022 1401    Acceptance, E, VU by CHERRY at 11/12/2022 1934                   Point: Home exercise program (In Progress)     Description:   Instruct learner(s) on appropriate technique for monitoring, assisting and/or progressing therapeutic exercises/activities.              Learning Progress Summary           Patient Acceptance, E, NR by CS at 11/13/2022 1401    Acceptance, E, VU by DA at 11/12/2022 1934                   Point: Precautions (In Progress)     Description:   Instruct learner(s) on prescribed precautions during self-care and functional transfers.              Learning Progress Summary           Patient Acceptance, E, NR by  at 11/13/2022 1401    Acceptance, E, VU by DA at 11/12/2022 1934                   Point: Body mechanics (In Progress)     Description:   Instruct learner(s) on proper positioning and spine alignment during self-care, functional mobility activities and/or exercises.              Learning Progress Summary           Patient Acceptance, E, NR by  at 11/13/2022 1401    Acceptance, E, VU by DA at 11/12/2022 1934                               User Key     Initials Effective Dates Name Provider Type Discipline     06/16/21 -  Ming Jacques RN Registered Nurse Nurse     09/02/21 -  Sugey Kumar OT Occupational Therapist OT              OT Recommendation and Plan  Planned Therapy Interventions (OT): activity tolerance training, adaptive equipment training, BADL retraining, functional balance retraining, occupation/activity based interventions, ROM/therapeutic exercise, strengthening exercise, transfer/mobility retraining  Therapy Frequency (OT): daily  Plan of Care Review  Plan of Care  Reviewed With: patient  Progress: improving  Outcome Evaluation: OT eval complete. Pt is CGA for functional mobility w/ RW and Dep for LBD tasks though reports having AE at home. Pt limited d/t c/o abd discomfort though demo excellent effort. Recommend cont skilled IPOT POC. Recommend pt DC home w/ assist, a RW, and HH OT/PT services.     Time Calculation:    Time Calculation- OT     Row Name 11/13/22 1402             Time Calculation- OT    OT Start Time 1115  -CS      OT Received On 11/13/22  -CS      OT Goal Re-Cert Due Date 11/23/22  -CS         Untimed Charges    OT Eval/Re-eval Minutes 46  -CS         Total Minutes    Untimed Charges Total Minutes 46  -CS       Total Minutes 46  -CS            User Key  (r) = Recorded By, (t) = Taken By, (c) = Cosigned By    Initials Name Provider Type    CS Sugey Kumar OT Occupational Therapist              Therapy Charges for Today     Code Description Service Date Service Provider Modifiers Qty    [id number]  OT EVAL LOW COMPLEXITY 4 11/13/2022 Sugey Kumar OT GO 1    [id number]  OT THER SUPP EA 15 MIN 11/13/2022 Sugey Kumar OT GO 2               Sugey Kumar OT  11/13/2022

## 2022-11-13 NOTE — PROGRESS NOTES
"Neema Richard  [id number]  [id number]  1940     LOS: 3 days   Patient Care Team:  David Mackey MD as PCP - General  Walter Caro MD as Obstetrician (Obstetrics and Gynecology)  Sravan Vazquez MD as Consulting Physician (Colon and Rectal Surgery)      Interval History:   Overall doing well.  Sitting up in the chair.  Some incisional soreness.  No other complaints.  Tolerating clears.    Objective     Vital Signs  Blood pressure 126/42, pulse 74, temperature 98.1 °F (36.7 °C), temperature source Axillary, resp. rate 18, height 167.6 cm (66\"), weight 61.2 kg (135 lb), SpO2 99 %, not currently breastfeeding..  I/O last 3 completed shifts:  In: 2389.5 [P.O.:330; I.V.:1824.5; Other:10; IV Piggyback:225]  Out: 1880 [Urine:1855; Stool:25]    Physical Exam:  NAD  A&Ox3  Breathing easily  Abd soft, mildly distended, colostomy pink and healthy.  Some liquid stool in the appliance.  Ext warm, no edema     Results Review:     I reviewed the patient's new clinical results.  LABS  Results from last 7 days   Lab Units 11/13/22  0814 11/13/22 0521 11/12/22  1745 11/12/22 0659 11/11/22 2056 11/11/22  1611   WBC 10*3/mm3  --  9.93  --  11.34*  --  11.89*   HEMOGLOBIN g/dL 9.2* 9.7* 9.9* 10.6*  10.6*   < > 11.5*   HEMATOCRIT % 28.3* 29.5* 30.6* 32.9*  32.9*   < > 36.2   PLATELETS 10*3/mm3  --  207  --  232  --  221    < > = values in this interval not displayed.     Results from last 7 days   Lab Units 11/13/22 0521 11/12/22 0659 11/11/22 2056   SODIUM mmol/L 142 142 144   POTASSIUM mmol/L 3.8 4.0 4.8   CHLORIDE mmol/L 114* 115* 116*   CO2 mmol/L 21.0* 18.0* 18.0*   BUN mg/dL 19 25* 27*   CREATININE mg/dL 0.50* 0.68 0.94   GLUCOSE mg/dL 105* 134* 122*   CALCIUM mg/dL 6.8* 6.6* 6.6*     Imaging Results (Last 72 Hours)     Procedure Component Value Units Date/Time    XR Chest 1 View [[id number]] Collected: 11/13/22 0844     Updated: 11/13/22 0848    Narrative:      XR CHEST 1 VW-     Date of Exam: 11/13/2022 " 2:10 AM     Indication: post op follow up; R15.9-Full incontinence of feces.     Comparison Exams: 03/23/2020     Technique: Single AP chest radiograph     FINDINGS:  There are bibasilar opacities with small bilateral pleural effusions.  The heart and mediastinal contours appear normal. There is pulmonary  vascular congestion. The osseous structures appear intact.       Impression:      1.  Bibasilar opacities, which could reflect atelectasis or pneumonia.  2.  Pulmonary vascular congestion with small bilateral pleural  effusions.     This report was finalized on 11/13/2022 8:45 AM by Huy Richards MD.       CT Abdomen Pelvis Without Contrast [[id number]] Collected: 11/11/22 1251     Updated: 11/11/22 1315    Narrative:      CT ABDOMEN PELVIS WO CONTRAST-     Date of Exam: 11/11/2022 12:24 PM     Indication: Retroperitoneal bleed suspected; R15.9-Full incontinence of  feces.     Comparison: 04/16/2020 and prior     Technique: Contiguous axial CT images were obtained from the lung bases  to the to the pubic symphysis without contrast.  Sagittal and coronal  reconstructions were performed.  Automated exposure control and  iterative reconstruction methods were used.          FINDINGS:     Lower Chest: Limited imaging of the lung bases demonstrates dependent  opacities and small bilateral pleural effusions. Coronary calcification  is noted.     Organs: Patient is status post cholecystectomy. Limited noncontrast  imaging of the liver, spleen, kidneys, pancreas and adrenal glands  demonstrate no acute abnormality      GI/Bowel: Evaluation of the GI tract is limited by motion as well as  lack of IV and oral contrast     Limited imaging of the stomach demonstrates no acute abnormality. There  is a small amount of simple free fluid and air within the abdomen. Some  increased density compatible with hemorrhage is noted along the right  pericolic gutter as well as within the mesentery.     Patient is status post distal  colectomy with ostomy site in the left  lower quadrant again noted. There is diverticulosis without evidence of  definite acute diverticulitis.        Post operative changes are noted of the small bowel in the anterior  midabdomen is below the level of the umbilicus. There is no evidence of  obstruction.     Evaluation of the rectosigmoid remnant is indeterminate. There is a  large collection of heterogeneous increased density posterior to the  uterus in the presacral region extending approximately 13 cm from the  level of the upper sacrum and extending transversely at least 7 x 6 cm.  The surrounding air is noted within the soft tissues inferiorly within  the lower pelvis and perineum. Small amounts of air are also noted  within the musculature of the left hip.     Findings concerning for postoperative hematoma.     Pelvis: Urinary bladder is decompressed with a Gaytan catheter. Uterus is  somewhat enlarged and heterogeneous in appearance. Ovaries are not well  seen.     Postoperative changes with suspected large hematoma as mentioned above     Peritoneum/Retroperitoneum: Aorta is normal in caliber. Underlying  atherosclerotic changes are noted.     Bones/Soft Tissues: Small amount of air noted along the ostomy site as  well as within the left hip and peritoneum as mentioned above. Incision  site noted midline from recent surgery.     No acute osseous abnormality       Impression:      Postoperative changes are noted within the pelvis with a large  collection of increased density in the region of the postoperative site  posterior to the uterus in the presacral region. The previously noted  prominence of the rectosigmoid portion of the colon is not able to be  clearly identified. Findings suggest a large hematoma. Evaluation active  extravasation is indeterminate without IV contrast.     Additional areas of increased density with more superiorly within the  mesentery suggests a small amount of hemorrhage is well.      No evidence of obstruction noted.     The postcontrast exam may be useful for further evaluation if clinically  indicated     Findings were discussed with the PA in the ICU team for the patient at  the time of interpretation, CASSY Díaz.        This report was finalized on 11/11/2022 1:12 PM by Kehinde Alves.               Assessment & Plan       Severe hypotension    Essential hypertension    Acquired hypothyroidism    Mixed urge and stress incontinence    High cholesterol    DM2 (diabetes mellitus, type 2) (HCC)    Fecal incontinence    Perineal resection, parastomal and ventral hernia repair with mesh, small bowel resection, extensive lysis of adhesions, 11/10/22    Acute blood loss anemia, probable      Overall stable.  If her hemoglobin looks like it is holding steady tomorrow go ahead and restart Lovenox.  I have advance her to full liquids today.  Likely out of the ICU tomorrow if she still continues to do well.      Ayleen Perez MD  11/13/22  12:48 EST

## 2022-11-13 NOTE — PROGRESS NOTES
"Intensive Care Follow-up     Hospital:  LOS: 3 days   Ms. Neema Richard, 82 y.o. female is followed for:   Severe hypotension        Subjective   Interval History:  The chart has been reviewed.  The patient has been hemodynamically stable overnight.  She has not required norepinephrine since yesterday.  No further sign of bleeding.  Hemoglobin has been stable.  She does complain of some pain at her surgical sites but otherwise is doing well this morning.    The patient's past medical, surgical and social history were reviewed and updated in Epic as appropriate.        Objective     Infusions:  norepinephrine, 0.02-0.3 mcg/kg/min, Last Rate: Stopped (11/12/22 0902)  sodium chloride, 50 mL/hr, Last Rate: 50 mL/hr (11/12/22 2027)      Medications:  acetaminophen, 650 mg, Oral, Q8H  alvimopan, 12 mg, Oral, BID  busPIRone, 15 mg, Oral, Daily  cetirizine, 10 mg, Oral, Daily  fluticasone, 1 spray, Nasal, Daily  gabapentin, 600 mg, Oral, Nightly  insulin regular, 0-7 Units, Subcutaneous, Q6H  levothyroxine, 88 mcg, Oral, Q AM  pantoprazole, 40 mg, Intravenous, Q12H  pravastatin, 80 mg, Oral, Nightly  sucralfate, 1 g, Oral, BID        Vital Sign Min/Max for last 24 hours  Temp  Min: 97.6 °F (36.4 °C)  Max: 99.7 °F (37.6 °C)   BP  Min: 98/74  Max: 146/60   Pulse  Min: 65  Max: 91   Resp  Min: 20  Max: 28   SpO2  Min: 87 %  Max: 99 %   Flow (L/min)  Min: 1  Max: 2       Input/Output for last 24 hour shift  11/12 0701 - 11/13 0700  In: 1451.4 [P.O.:330; I.V.:886.4]  Out: 1205 [Urine:1205]      Objective:  General Appearance:  In no acute distress and well-appearing.    Vital signs: (most recent): Blood pressure 124/43, pulse 90, temperature 97.9 °F (36.6 °C), temperature source Oral, resp. rate 20, height 167.6 cm (66\"), weight 61.2 kg (135 lb), SpO2 94 %, not currently breastfeeding.    HEENT: Normal HEENT exam.    Lungs:  Normal effort and normal respiratory rate.  Breath sounds clear to auscultation.    Heart: Normal " rate.  Regular rhythm.  S1 normal and S2 normal.  No murmur or friction rub.   Abdomen: Abdomen is soft and non-distended.  There is generalized tenderness.  There is no rebound tenderness.  There is no guarding.  (Midline incision is dressed and dry.  Left lower quadrant ostomy which is pink.  Stool present).     Extremities: Normal range of motion.  There is no dependent edema.    Neurological: Patient is alert and oriented to person, place and time.  Normal strength.    Pupils:  Pupils are equal, round, and reactive to light.  Pupils are equal.   Skin:  Warm.  No cyanosis.             Results from last 7 days   Lab Units 11/13/22  0814 11/13/22  0521 11/12/22  1745 11/12/22  0659 11/11/22 2056 11/11/22  1611   WBC 10*3/mm3  --  9.93  --  11.34*  --  11.89*   HEMOGLOBIN g/dL 9.2* 9.7* 9.9* 10.6*  10.6*   < > 11.5*   PLATELETS 10*3/mm3  --  207  --  232  --  221    < > = values in this interval not displayed.     Results from last 7 days   Lab Units 11/13/22  0521 11/12/22  0659 11/11/22 2056   SODIUM mmol/L 142 142 144   POTASSIUM mmol/L 3.8 4.0 4.8   CO2 mmol/L 21.0* 18.0* 18.0*   BUN mg/dL 19 25* 27*   CREATININE mg/dL 0.50* 0.68 0.94   MAGNESIUM mg/dL 3.0* 1.7  --    PHOSPHORUS mg/dL 1.6* 1.6*  --    GLUCOSE mg/dL 105* 134* 122*     Estimated Creatinine Clearance: 85.3 mL/min (A) (by C-G formula based on SCr of 0.5 mg/dL (L)).            I reviewed the patient's results and images.     Assessment & Plan   Impression        Severe hypotension    Acute blood loss anemia, probable    Essential hypertension    Acquired hypothyroidism    Mixed urge and stress incontinence    High cholesterol    DM2 (diabetes mellitus, type 2) (HCC)    Fecal incontinence    Perineal resection, parastomal and ventral hernia repair with mesh, small bowel resection, extensive lysis of adhesions, 11/10/22       Plan        Advance diet when okay with colorectal surgery.  Mobilize as tolerated.  Pain control as needed.  Replace all  electrolytes.    Plan of care and goals reviewed with mulitdisciplinary/antibiotic stewardship team during rounds.   I discussed the patient's findings and my recommendations with patient and nursing staff         Gee Echevarria MD, [facility]  Pulmonology and Critical Care Medicine

## 2022-11-13 NOTE — PLAN OF CARE
Goal Outcome Evaluation:  Plan of Care Reviewed With: patient        Progress: no change  Outcome Evaluation: VSS on 1-2L NC. Levophed remains off. NS continues at 50 ml/hr. Small serosanguinous drainage from midline abdominal incision site. Scant serosanguinous drainage from perineal incision. 500 ml clear green UOP. No output from stoma. PRN dilaudid given x1. Pt ambulated x1 in room. Awaiting AM labs.

## 2022-11-14 LAB
BASOPHILS # BLD AUTO: 0.08 10*3/MM3 (ref 0–0.2)
BASOPHILS NFR BLD AUTO: 0.8 % (ref 0–1.5)
CYTO UR: NORMAL
DEPRECATED RDW RBC AUTO: 51.9 FL (ref 37–54)
EOSINOPHIL # BLD AUTO: 0.79 10*3/MM3 (ref 0–0.4)
EOSINOPHIL NFR BLD AUTO: 8.4 % (ref 0.3–6.2)
ERYTHROCYTE [DISTWIDTH] IN BLOOD BY AUTOMATED COUNT: 15.2 % (ref 12.3–15.4)
GLUCOSE BLDC GLUCOMTR-MCNC: 100 MG/DL (ref 70–130)
GLUCOSE BLDC GLUCOMTR-MCNC: 110 MG/DL (ref 70–130)
GLUCOSE BLDC GLUCOMTR-MCNC: 116 MG/DL (ref 70–130)
GLUCOSE BLDC GLUCOMTR-MCNC: 142 MG/DL (ref 70–130)
HCT VFR BLD AUTO: 29.6 % (ref 34–46.6)
HGB BLD-MCNC: 9.6 G/DL (ref 12–15.9)
IMM GRANULOCYTES # BLD AUTO: 0.06 10*3/MM3 (ref 0–0.05)
IMM GRANULOCYTES NFR BLD AUTO: 0.6 % (ref 0–0.5)
LAB AP CASE REPORT: NORMAL
LAB AP CLINICAL INFORMATION: NORMAL
LYMPHOCYTES # BLD AUTO: 1.68 10*3/MM3 (ref 0.7–3.1)
LYMPHOCYTES NFR BLD AUTO: 17.8 % (ref 19.6–45.3)
MCH RBC QN AUTO: 30.1 PG (ref 26.6–33)
MCHC RBC AUTO-ENTMCNC: 32.4 G/DL (ref 31.5–35.7)
MCV RBC AUTO: 92.8 FL (ref 79–97)
MONOCYTES # BLD AUTO: 1 10*3/MM3 (ref 0.1–0.9)
MONOCYTES NFR BLD AUTO: 10.6 % (ref 5–12)
NEUTROPHILS NFR BLD AUTO: 5.82 10*3/MM3 (ref 1.7–7)
NEUTROPHILS NFR BLD AUTO: 61.8 % (ref 42.7–76)
NRBC BLD AUTO-RTO: 0 /100 WBC (ref 0–0.2)
PATH REPORT.FINAL DX SPEC: NORMAL
PATH REPORT.GROSS SPEC: NORMAL
PHOSPHATE SERPL-MCNC: 1.7 MG/DL (ref 2.5–4.5)
PLATELET # BLD AUTO: 238 10*3/MM3 (ref 140–450)
PMV BLD AUTO: 10.7 FL (ref 6–12)
POTASSIUM SERPL-SCNC: 3.8 MMOL/L (ref 3.5–5.2)
RBC # BLD AUTO: 3.19 10*6/MM3 (ref 3.77–5.28)
WBC NRBC COR # BLD: 9.43 10*3/MM3 (ref 3.4–10.8)

## 2022-11-14 PROCEDURE — 84132 ASSAY OF SERUM POTASSIUM: CPT

## 2022-11-14 PROCEDURE — 97162 PT EVAL MOD COMPLEX 30 MIN: CPT

## 2022-11-14 PROCEDURE — 97530 THERAPEUTIC ACTIVITIES: CPT

## 2022-11-14 PROCEDURE — 63710000001 ONDANSETRON PER 8 MG: Performed by: COLON & RECTAL SURGERY

## 2022-11-14 PROCEDURE — 82962 GLUCOSE BLOOD TEST: CPT

## 2022-11-14 PROCEDURE — 97110 THERAPEUTIC EXERCISES: CPT

## 2022-11-14 PROCEDURE — 84100 ASSAY OF PHOSPHORUS: CPT | Performed by: INTERNAL MEDICINE

## 2022-11-14 PROCEDURE — 85025 COMPLETE CBC W/AUTO DIFF WBC: CPT | Performed by: COLON & RECTAL SURGERY

## 2022-11-14 PROCEDURE — 99232 SBSQ HOSP IP/OBS MODERATE 35: CPT | Performed by: INTERNAL MEDICINE

## 2022-11-14 RX ORDER — POLYETHYLENE GLYCOL 3350 17 G/17G
17 POWDER, FOR SOLUTION ORAL DAILY PRN
Status: DISCONTINUED | OUTPATIENT
Start: 2022-11-14 | End: 2022-11-16 | Stop reason: HOSPADM

## 2022-11-14 RX ORDER — BISACODYL 10 MG
10 SUPPOSITORY, RECTAL RECTAL DAILY PRN
Status: DISCONTINUED | OUTPATIENT
Start: 2022-11-14 | End: 2022-11-16 | Stop reason: HOSPADM

## 2022-11-14 RX ORDER — BISACODYL 5 MG/1
5 TABLET, DELAYED RELEASE ORAL DAILY PRN
Status: DISCONTINUED | OUTPATIENT
Start: 2022-11-14 | End: 2022-11-16 | Stop reason: HOSPADM

## 2022-11-14 RX ORDER — AMOXICILLIN 250 MG
2 CAPSULE ORAL 2 TIMES DAILY
Status: DISCONTINUED | OUTPATIENT
Start: 2022-11-14 | End: 2022-11-16 | Stop reason: HOSPADM

## 2022-11-14 RX ADMIN — BUSPIRONE HYDROCHLORIDE 15 MG: 10 TABLET ORAL at 08:11

## 2022-11-14 RX ADMIN — PRAVASTATIN SODIUM 80 MG: 40 TABLET ORAL at 22:58

## 2022-11-14 RX ADMIN — ALVIMOPAN 12 MG: 12 CAPSULE ORAL at 22:57

## 2022-11-14 RX ADMIN — HYDROCODONE BITARTRATE AND ACETAMINOPHEN 1 TABLET: 7.5; 325 TABLET ORAL at 19:26

## 2022-11-14 RX ADMIN — GABAPENTIN 600 MG: 300 CAPSULE ORAL at 22:56

## 2022-11-14 RX ADMIN — ACETAMINOPHEN 650 MG: 325 TABLET, FILM COATED ORAL at 14:28

## 2022-11-14 RX ADMIN — SODIUM CHLORIDE 50 ML/HR: 9 INJECTION, SOLUTION INTRAVENOUS at 03:07

## 2022-11-14 RX ADMIN — HYDROCODONE BITARTRATE AND ACETAMINOPHEN 1 TABLET: 7.5; 325 TABLET ORAL at 11:58

## 2022-11-14 RX ADMIN — ACETAMINOPHEN 650 MG: 325 TABLET, FILM COATED ORAL at 05:19

## 2022-11-14 RX ADMIN — SENNOSIDES AND DOCUSATE SODIUM 2 TABLET: 50; 8.6 TABLET ORAL at 14:28

## 2022-11-14 RX ADMIN — LEVOTHYROXINE SODIUM 88 MCG: 88 TABLET ORAL at 05:19

## 2022-11-14 RX ADMIN — PANTOPRAZOLE SODIUM 40 MG: 40 INJECTION, POWDER, FOR SOLUTION INTRAVENOUS at 08:11

## 2022-11-14 RX ADMIN — POTASSIUM PHOSPHATE, MONOBASIC AND POTASSIUM PHOSPHATE, DIBASIC 30 MMOL: 224; 236 INJECTION, SOLUTION, CONCENTRATE INTRAVENOUS at 11:22

## 2022-11-14 RX ADMIN — ONDANSETRON HYDROCHLORIDE 4 MG: 4 TABLET, FILM COATED ORAL at 21:56

## 2022-11-14 RX ADMIN — ALVIMOPAN 12 MG: 12 CAPSULE ORAL at 08:11

## 2022-11-14 RX ADMIN — SUCRALFATE 1 G: 1 TABLET ORAL at 08:11

## 2022-11-14 RX ADMIN — PANTOPRAZOLE SODIUM 40 MG: 40 INJECTION, POWDER, FOR SOLUTION INTRAVENOUS at 22:56

## 2022-11-14 RX ADMIN — SUCRALFATE 1 G: 1 TABLET ORAL at 22:57

## 2022-11-14 NOTE — PLAN OF CARE
Goal Outcome Evaluation:  Plan of Care Reviewed With: patient      VS stable on 1L NC. Levophed off all shift. Good urine output. Patient ambulated x 2 today with assist x 1 with walker. Full liquid diet started today and tolerating well.

## 2022-11-14 NOTE — PATIENT CARE CONFERENCE
Middle Ear Infection (Adult)  You have an infection of the middle ear, the space behind the eardrum. This is also called acute otitis media (AOM). Sometimes it is caused by the common cold. This is because congestion can block the internal passage (eustachian tube) that drains fluid from the middle ear. When the middle ear fills with fluid, bacteria can grow there and cause an infection. Oral antibiotics are used to treat this illness, not ear drops. Symptoms usually start to improve within 1 to 2 days of treatment.    Home care  The following are general care guidelines:  · Finish all of the antibiotic medicine given, even though you may feel better after the first few days.  · You may use over-the-counter medicine, such as acetaminophen or ibuprofen, to control pain and fever, unless something else was prescribed. If you have chronic liver or kidney disease or have ever had a stomach ulcer or gastrointestinal bleeding, talk with your healthcare provider before using these medicines. Do not give aspirin to anyone under 18 years of age who has a fever. It may cause severe illness or death.  Follow-up care  Follow up with your healthcare provider, or as advised, in 2 weeks if all symptoms have not gotten better, or if hearing doesn't go back to normal within 1 month.  When to seek medical advice  Call your healthcare provider right away if any of these occur:  · Ear pain gets worse or does not improve after 3 days of treatment  · Unusual drowsiness or confusion  · Neck pain, stiff neck, or headache  · Fluid or blood draining from the ear canal  · Fever of 100.4°F (38°C) or as advised   · Seizure  Date Last Reviewed: 6/1/2016  © 5015-7328 [employer]. [address] [place] [address], [place] [address]. All rights reserved. This information is not intended as a substitute for professional medical care. Always follow your healthcare professional's instructions.         ICU Rounds: Continue PT/OT POC.

## 2022-11-14 NOTE — CASE MANAGEMENT/SOCIAL WORK
Continued Stay Note  [facility]     Patient Name: Neema Richard  MRN: [id number]  Today's Date: 11/14/2022    Admit Date: 11/10/2022    Plan: Home with home health   Discharge Plan     Row Name 11/14/22 1404       Plan    Plan Home with home health    Patient/Family in Agreement with Plan yes    Plan Comments Spoke with patient at bedside.  Patient plans to return home upon discharge and is interested in home health.  Patient has had a colostomy in the past and is comfortable managing one.  Patient would like to use [facility] as she has used them in the past.  Patient states she will have family and friends to assist at home and denies any further needs.  Referral called to Nidia with [facility] home health, order is in epic.  CM will continue to follow.    1455  Received call back from Nidia with [facility] and they have accepted patient.               Discharge Codes    No documentation.               Expected Discharge Date and Time     Expected Discharge Date Expected Discharge Time    Nov 20, 2022             Leslee Lozano RN

## 2022-11-14 NOTE — PROGRESS NOTES
INTENSIVIST / PULMONARY FOLLOW UP NOTE     Hospital:  LOS: 4 days   Ms. Neema Richard, 82 y.o. female is followed for:     Severe hypotension    Essential hypertension    Acquired hypothyroidism    Mixed urge and stress incontinence    High cholesterol    DM2 (diabetes mellitus, type 2) (HCC)    Fecal incontinence    Perineal resection, parastomal and ventral hernia repair with mesh, small bowel resection, extensive lysis of adhesions, 11/10/22    Acute blood loss anemia, probable          SUBJECTIVE   Hemodynamically stable, on 2 lpm NC    The patient's relevant past medical, surgical, family, and social history were reviewed    Allergies and medications were reviewed    ROS:    Per subjective, all other systems were reviewed and were negative        OBJECTIVE     Vital Sign Min/Max for last 24 hours:  Temp  Min: 98 °F (36.7 °C)  Max: 98.6 °F (37 °C)   BP  Min: 119/42  Max: 167/69   Pulse  Min: 63  Max: 95   Resp  Min: 20  Max: 24   SpO2  Min: 91 %  Max: 100 %   No data recorded     Physical Exam:  General Appearance:  No acute distress  Eyes:  No scleral icterus or pallor, pupils normal  Ears, Nose, Mouth, Throat:  Atraumatic, oropharynx clear  Neck:  Trachea midline, thyroid normal  Respiratory:  Clear to auscultation bilaterally, normal effort  Cardiovascular:  Regular rate and rhythm, no murmurs, no peripheral edema  Gastrointestinal:  Ostomy, dressings c/d/i  Skin:  Normal temperature, no rash  Psychiatric:  No agitation  Neuro:  No new focal neurologic deficits observed    Telemetry:              Hemodynamics:   CVP:     PAP:     PAOP:     CO:     CI:     SVI:     SVR:       SpO2: 100 % SpO2  Min: 91 %  Max: 100 %   Device:      Flow Rate:   No data recorded     Mechanical Ventilator Settings:                                         Intake/Ouptut 24 hrs (7:00AM - 6:59 AM)  Intake & Output (last 3 days)       11/11 0701 11/12 0700 11/12 0701 11/13 0700 11/13 0701 11/14 0700 11/14 0701  11/15 0700     P.O. 100 330 600     I.V. (mL/kg) 3438.1 (56.2) 886.4 (14.5) 1503.4 (24.6)     Blood 700       Other  10      IV Piggyback 500 225 250     Total Intake(mL/kg) 4738.1 (77.4) 1451.4 (23.7) 2353.4 (38.5)     Urine (mL/kg/hr) 790 (0.5) 1205 (0.8) 1630 (1.1) 225 (0.7)    Stool 75 0      Blood        Total Output 865 1205 1630 225    Net +3873.1 +246.4 +723.4 -225                  Lines, Drains & Airways     Active LDAs     Name Placement date Placement time Site Days    Peripheral IV 11/13/22 1107 Posterior;Left Wrist 11/13/22  1107  Wrist  1    Colostomy LLQ 03/17/20  1705  LLQ  971    Colostomy LUQ --  --  LUQ  --    Urethral Catheter Double-lumen;Silicone 16 Fr. 11/10/22  1330  -- 3                Hematology:  Results from last 7 days   Lab Units 11/14/22  0543 11/13/22  2035 11/13/22  0814 11/13/22  0521 11/12/22  1745 11/12/22  0659 11/12/22  0146 11/11/22  2056 11/11/22  1611 11/11/22  1003 11/11/22  0408   WBC 10*3/mm3 9.43  --   --  9.93  --  11.34*  --   --  11.89* 11.79* 11.41*   HEMOGLOBIN g/dL 9.6* 9.5* 9.2* 9.7* 9.9* 10.6*  10.6* 10.9*   < > 11.5* 8.8* 11.2*   HEMATOCRIT % 29.6* 28.8* 28.3* 29.5* 30.6* 32.9*  32.9* 33.1*   < > 36.2 27.7* 35.0   PLATELETS 10*3/mm3 238  --   --  207  --  232  --   --  221 265 236    < > = values in this interval not displayed.     Electrolytes, Magnesium and Phosphorus:  Results from last 7 days   Lab Units 11/14/22  0543 11/13/22  0521 11/12/22 0659 11/11/22 2056 11/11/22 1003 11/11/22  0408   SODIUM mmol/L  --  142 142 144 139 138   CHLORIDE mmol/L  --  114* 115* 116* 113* 107   POTASSIUM mmol/L 3.8 3.8 4.0 4.8 5.4* 4.7   CO2 mmol/L  --  21.0* 18.0* 18.0* 19.0* 21.0*   MAGNESIUM mg/dL  --  3.0* 1.7  --   --   --    PHOSPHORUS mg/dL 1.7* 1.6* 1.6*  --   --   --      Renal:  Results from last 7 days   Lab Units 11/13/22 0521 11/12/22 0659 11/11/22 2056 11/11/22  1003 11/11/22  0408   CREATININE mg/dL 0.50* 0.68 0.94 1.15* 0.73   BUN mg/dL 19 25* 27* 25* 22      Estimated Creatinine Clearance: 85.3 mL/min (A) (by C-G formula based on SCr of 0.5 mg/dL (L)).  Hepatic:  Results from last 7 days   Lab Units 11/11/22 2056   ALK PHOS U/L 39   BILIRUBIN mg/dL 0.6   ALT (SGPT) U/L 9   AST (SGOT) U/L 19     Arterial Blood Gases:              Lab Results   Component Value Date    LACTATE 1.1 11/11/2022       Relevant imaging studies and labs from 11/14/22 were reviewed    Medications (drips):  sodium chloride, Last Rate: 50 mL/hr (11/14/22 0307)        acetaminophen, 650 mg, Oral, Q8H  alvimopan, 12 mg, Oral, BID  busPIRone, 15 mg, Oral, Daily  cetirizine, 10 mg, Oral, Daily  fluticasone, 1 spray, Nasal, Daily  gabapentin, 600 mg, Oral, Nightly  insulin regular, 0-7 Units, Subcutaneous, Q6H  levothyroxine, 88 mcg, Oral, Q AM  pantoprazole, 40 mg, Intravenous, Q12H  pravastatin, 80 mg, Oral, Nightly  sucralfate, 1 g, Oral, BID        •  dextrose  •  dextrose  •  diazePAM  •  glucagon (human recombinant)  •  HYDROcodone-acetaminophen  •  HYDROmorphone **AND** naloxone  •  LORazepam  •  magnesium sulfate **OR** magnesium sulfate **OR** magnesium sulfate  •  ondansetron **OR** ondansetron  •  potassium phosphate infusion greater than 15 mMoles **OR** potassium phosphate infusion greater than 15 mMoles **OR** potassium phosphate **OR** sodium phosphate IVPB **OR** sodium phosphate IVPB **OR** sodium phosphate IVPB    Assessment & Plan   IMPRESSION / PLAN     Inpatient Problem List:  82 y.o.female:  Active Hospital Problems    Diagnosis    • **Severe hypotension    • Perineal resection, parastomal and ventral hernia repair with mesh, small bowel resection, extensive lysis of adhesions, 11/10/22    • Acute blood loss anemia, probable    • Fecal incontinence    • DM2 (diabetes mellitus, type 2) (HCC)    • High cholesterol    • Mixed urge and stress incontinence    • Essential hypertension    • Acquired hypothyroidism         Hospital Course:  82 y.o.female with relevant PMH of bowel  perforation, diverticular disease, IBS-d, OAB, uterovaginal prolapse, pessary use, HTN, hypothyroidism, fecal incontinence, T2DM, high cholesterol, & ductal carcinoma in situ. In March of 2020, she had a perforated colon in which she underwent a low anterior resection, appendectomy, left salpingo-oophorectomy, distal transverse and colostomy by Dr. Vazquez. Since then, she has been experiencing fecal incontinence, leakage, and urgency that has worsened over the last year. She presented on 11/10 for surgical intervention.    She had abdominal perineal resection, extensive lysis of adhesions, parastomal hernia repair, ventral hernia repair with mesh, and omental flap in deep pelvis with Dr. Vazquez.    On 11/11 patient became hypotensive with SBP in the 50's that did not respond to 2 liters of crystalloid and was noted to have an acute drop in Hgb from 11.2 to 8.8.  CT / Abdomen / Pelvis showed a large fluid collection posterior to the uterus in the presacral region c/w large hematoma.  Initially required pressors and 2 units PRBC.    Impression:  Remains off pressors, serial Hgb stable, hemodynamically stable, on 2 lpm w/ some mild bibasilar atelectasis on cxr    Plan:    Post Op Care  Intra-Abdominal Hematoma  Acute Blood Loss Anemia  Hemorrhagic Shock  Shock has resolved.  Responded to blood and IVF.  Clinically does not appear to have active bleeding.  Defer further management to CRS, Diet, Abx, etc and including timing of pharmacologic DVT prophylaxis.    Hypoxemia  Bi-Basilar Atelectasis  IS q2h WA.  Mobilize, PT/OT.    HTN  If BP / Cr / Hgb stable tomorrow can resume home ACEI    T2DM  Correction scale insulin    Hypophosphatemia  Replace    DVT / PUD Prophylaxis  SCDs, PPI    Nutrition  Dietary Orders (From admission, onward)     Start     Ordered    11/13/22 1249  Diet Full Liquid  Diet Effective Now        Question:  Diet Texture / Consistency  Answer:  Full Liquid    11/13/22 1248    11/12/22 1800  DIET MESSAGE  Please send breeze resource drink with meals  Daily With Breakfast, Lunch & Dinner      Comments: Please send breeze resource drink with meals    11/12/22 1734    11/10/22 2200  Snack: Chewing gum x30 minutes three times daily to increase saliva flow and provide gas pain relief. Do not give to ileostomy patients.  3 Times Daily      Comments: Chewing gum x30 minutes three times daily to increase saliva flow and provide gas pain relief.      Do not give to ileostomy patients.    11/10/22 1740                Plan of care and goals reviewed with multidisciplinary team at daily rounds    To tele       Erasmo Ferguson MD  Intensive Care Medicine  11/14/22 12:00 EST

## 2022-11-14 NOTE — THERAPY EVALUATION
Patient Name: Neema Richard  : 1940    MRN: [id number]                              Today's Date: 2022       Admit Date: 11/10/2022    Visit Dx:     ICD-10-CM ICD-9-CM   1. Fecal incontinence  R15.9 787.60     Patient Active Problem List   Diagnosis   • Annual GYN exam in  S/P left S&O   • Osteoporosis   • Essential hypertension   • Acquired hypothyroidism   • IBS-d (takes Buspar)   • DCIS (ductal carcinoma in situ)   • Cystocele and rectocele with complete uterovaginal prolapse   • Mixed urge and stress incontinence   • Pessary maintenance   • High cholesterol   • Arthritis   • Dyspepsia   • DM2 (diabetes mellitus, type 2) (HCC)   • Fecal incontinence   • Perineal resection, parastomal and ventral hernia repair with mesh, small bowel resection, extensive lysis of adhesions, 11/10/22   • Acute blood loss anemia, probable   • Severe hypotension     Past Medical History:   Diagnosis Date   • Acquired hypothyroidism    • Anesthesia complication     after gb surgery - anesthesia paralyzed her lungs too early   • Arthritis    • Breast cancer (HCC)     precancerous cell   • Breast injury     previous fall caused bruising to right breast   • Colon polyp - sees Dr. Vazquez 2016   • DCIS (ductal carcinoma in situ) 2017    Dx at the time of breast reduction. ER and [place] (+)   • Diabetes mellitus (HCC)     prediabetic- doesnt check sugar   • Diverticular disease 2020   • Essential hypertension    • High cholesterol    • History of diverticular abscess 2020    S/p LAR, abscess drainage, appy, left maldonado, distal transverse end colostomy 3/17/20 by Dr. Vazquez   • Hx of herpes genitalis    • IBS-d (takes Buspar)    • Pneumonia 2020   • Sepsis (HCC) 2020   • SOBOE (shortness of breath on exertion)    • Wears glasses      Past Surgical History:   Procedure Laterality Date   • ABDOMINAL PERINEAL RESECTION N/A 11/10/2022    Procedure: ABDOMINAL PERINEAL RESECTION, REPAIR OF  PARASTOMAL HERNIA WITH MESH;  Surgeon: Sravan Vazquez MD;  Location:  [facility];  Service: General;  Laterality: N/A;   • APPENDECTOMY     • BILATERAL BREAST REDUCTION Bilateral 1995   • BREAST BIOPSY Left 2009   • BREAST EXCISIONAL BIOPSY Left 2009    ADH   • CATARACT EXTRACTION, BILATERAL Bilateral 2018   • CERVICAL CONIZATION  1983   • CHOLECYSTECTOMY     • COLON RESECTION N/A 03/17/2020    Procedure: LOW ANTERIOR COLON RESECTION, MOBILIZATION OF SPLENIC FLEXURE, DRAINAGE OF ABSCESS AND COLOSTOMY CREATION;  Surgeon: Sravan Vazquez MD;  Location:  [facility];  Service: General;  Laterality: N/A;   • COLONOSCOPY     • FACIAL COSMETIC SURGERY  2001   • FACIAL COSMETIC SURGERY  2012   • HEMORRHOIDECTOMY  1974   • LAPAROSCOPIC CHOLECYSTECTOMY  2010   • REDUCTION MAMMAPLASTY Bilateral 04/13/2017    DCIS (3mm found incidentally right breast)   • REDUCTION MAMMAPLASTY Bilateral 1995   • SINUS SURGERY  1992    poylp   • THYROIDECTOMY, PARTIAL  1963   • TONSILLECTOMY  1953   • VARICOSE VEIN SURGERY Bilateral 1981   • VARICOSE VEIN SURGERY  2021      General Information     Row Name 11/14/22 0852          Physical Therapy Time and Intention    Document Type evaluation  -AY     Mode of Treatment physical therapy  -AY     Row Name 11/14/22 0852          General Information    Patient Profile Reviewed yes  -AY     Prior Level of Function independent:;all household mobility;community mobility;gait;transfer;bed mobility;ADL's;using stairs  -AY     Existing Precautions/Restrictions fall;oxygen therapy device and L/min;other (see comments)  abd incision, ostomy  -AY     Barriers to Rehab medically complex  -AY     Row Name 11/14/22 0852          Living Environment    People in Home alone;other (see comments)  friends available assist as needed  -AY     Row Name 11/14/22 0852          Home Main Entrance    Number of Stairs, Main Entrance two  -AY     Stair Railings, Main Entrance railings on both sides of stairs  -AY     Row Name  11/14/22 0852          Stairs Within Home, Primary    Number of Stairs, Within Home, Primary none  -AY     Row Name 11/14/22 0852          Cognition    Orientation Status (Cognition) oriented x 3  -AY     Row Name 11/14/22 0852          Safety Issues, Functional Mobility    Impairments Affecting Function (Mobility) balance;endurance/activity tolerance;strength;pain  -AY           User Key  (r) = Recorded By, (t) = Taken By, (c) = Cosigned By    Initials Name Provider Type    Kathie Hoff PT Physical Therapist               Mobility     Row Name 11/14/22 0852          Bed Mobility    Bed Mobility supine-sit  -AY     Supine-Sit Chemult (Bed Mobility) minimum assist (75% patient effort);1 person assist  -AY     Assistive Device (Bed Mobility) bed rails;head of bed elevated  -AY     Row Name 11/14/22 0852          Sit-Stand Transfer    Sit-Stand Chemult (Transfers) contact guard;verbal cues  -AY     Row Name 11/14/22 0852          Gait/Stairs (Locomotion)    Chemult Level (Gait) contact guard  -AY     Assistive Device (Gait) other (see comments)  BUE support on tele monitor  -AY     Distance in Feet (Gait) 300  -AY     Deviations/Abnormal Patterns (Gait) altagracia decreased;gait speed decreased;stride length decreased;weight shifting decreased;base of support, narrow  -AY     Bilateral Gait Deviations heel strike decreased;forward flexed posture  -AY     Comment, (Gait/Stairs) pt demonstrated step through gait pattern with decreased altagracia and flexed posture. cueing for posture, increased stride length, and PLB. Attempted to ambulate on RA per pt request and RN clearance, but desat to82% noted. Required return to 2LNC, where pt remained >92% throughout. Gait distance limited by SOA.  -AY           User Key  (r) = Recorded By, (t) = Taken By, (c) = Cosigned By    Initials Name Provider Type    Kathie Hoff PT Physical Therapist               Obj/Interventions     Row Name 11/14/22 0876           Range of Motion Comprehensive    General Range of Motion bilateral lower extremity ROM WFL  -AY     Row Name 11/14/22 0854          Strength Comprehensive (MMT)    General Manual Muscle Testing (MMT) Assessment lower extremity strength deficits identified  -AY     Comment, General Manual Muscle Testing (MMT) Assessment BLE grossly 4+/5  -AY     Row Name 11/14/22 0854          Motor Skills    Therapeutic Exercise hip;ankle;knee  -AY     Row Name 11/14/22 0854          Hip (Therapeutic Exercise)    Hip (Therapeutic Exercise) strengthening exercise  -AY     Hip Strengthening (Therapeutic Exercise) bilateral;marching while seated;10 repetitions;sitting  -AY     Row Name 11/14/22 0854          Knee (Therapeutic Exercise)    Knee (Therapeutic Exercise) strengthening exercise  -AY     Knee Strengthening (Therapeutic Exercise) bilateral;LAQ (long arc quad);10 repetitions;sitting  -AY     Row Name 11/14/22 0854          Ankle (Therapeutic Exercise)    Ankle (Therapeutic Exercise) AROM (active range of motion)  -AY     Ankle AROM (Therapeutic Exercise) bilateral;dorsiflexion;plantarflexion;10 repetitions;sitting  -AY     Row Name 11/14/22 0854          Balance    Balance Assessment sitting static balance;sitting dynamic balance;sit to stand dynamic balance;standing dynamic balance;standing static balance  -AY     Static Sitting Balance standby assist  -AY     Dynamic Sitting Balance standby assist  -AY     Position, Sitting Balance unsupported;sitting edge of bed  -AY     Sit to Stand Dynamic Balance contact guard  -AY     Static Standing Balance contact guard  -AY     Dynamic Standing Balance contact guard  -AY     Position/Device Used, Standing Balance supported  -AY     Row Name 11/14/22 0854          Sensory Assessment (Somatosensory)    Sensory Assessment (Somatosensory) LE sensation intact  -AY           User Key  (r) = Recorded By, (t) = Taken By, (c) = Cosigned By    Initials Name Provider Type    Kathie Hoff, EKATERINA  Physical Therapist               Goals/Plan     Row Name 11/14/22 0858          Bed Mobility Goal 1 (PT)    Activity/Assistive Device (Bed Mobility Goal 1, PT) sit to supine/supine to sit  -AY     Mora Level/Cues Needed (Bed Mobility Goal 1, PT) independent  -AY     Time Frame (Bed Mobility Goal 1, PT) long term goal (LTG);10 days  -AY     Progress/Outcomes (Bed Mobility Goal 1, PT) goal ongoing  -AY     Row Name 11/14/22 0858          Transfer Goal 1 (PT)    Activity/Assistive Device (Transfer Goal 1, PT) sit-to-stand/stand-to-sit;walker, rolling  -AY     Mora Level/Cues Needed (Transfer Goal 1, PT) modified independence  -AY     Time Frame (Transfer Goal 1, PT) long term goal (LTG);10 days  -AY     Progress/Outcome (Transfer Goal 1, PT) goal ongoing  -     Row Name 11/14/22 0858          Gait Training Goal 1 (PT)    Activity/Assistive Device (Gait Training Goal 1, PT) gait (walking locomotion);assistive device use;walker, rolling  -AY     Mora Level (Gait Training Goal 1, PT) modified independence  -AY     Distance (Gait Training Goal 1, PT) 500  -AY     Time Frame (Gait Training Goal 1, PT) long term goal (LTG);10 days  -AY     Progress/Outcome (Gait Training Goal 1, PT) goal ongoing  -     Row Name 11/14/22 0858          Stairs Goal 1 (PT)    Activity/Assistive Device (Stairs Goal 1, PT) ascending stairs;descending stairs;using handrail, right;using handrail, left  -AY     Mora Level/Cues Needed (Stairs Goal 1, PT) supervision required  -AY     Number of Stairs (Stairs Goal 1, PT) 2  -AY     Time Frame (Stairs Goal 1, PT) long term goal (LTG);10 days  -AY     Progress/Outcome (Stairs Goal 1, PT) goal ongoing  -     Row Name 11/14/22 0858          Therapy Assessment/Plan (PT)    Planned Therapy Interventions (PT) balance training;bed mobility training;gait training;home exercise program;postural re-education;transfer training;strengthening;stair training;patient/family  education  -AY           User Key  (r) = Recorded By, (t) = Taken By, (c) = Cosigned By    Initials Name Provider Type    AY Kathie Mccarty, PT Physical Therapist               Clinical Impression     Row Name 11/14/22 0854          Pain    Pretreatment Pain Rating 2/10  -AY     Posttreatment Pain Rating 2/10  -AY     Pain Location generalized  -AY     Pain Location - abdomen  -AY     Pain Intervention(s) Ambulation/increased activity;Repositioned  -AY     Additional Documentation Pain Scale: Numbers Pre/Post-Treatment (Group)  -AY     Row Name 11/14/22 0854          Plan of Care Review    Plan of Care Reviewed With patient  -AY     Progress no change  -AY     Outcome Evaluation PT initial eval completed. Pt limited by decreased activity tolerance, balance deficit, and generalized weakness. Pt ambulated 300ft with CGA and BUE support. Attempted ambulation on RA, but desat noted to 82%; requiring return to 2L NC, where pt remained >92%. Rec continued skilled PT. d/c rec for home with assist, HH PT/OT, and RWx.  -AY     Row Name 11/14/22 7423          Therapy Assessment/Plan (PT)    Rehab Potential (PT) good, to achieve stated therapy goals  -AY     Criteria for Skilled Interventions Met (PT) yes;meets criteria;skilled treatment is necessary  -AY     Therapy Frequency (PT) daily  -AY     Row Name 11/14/22 0432          Vital Signs    Pre Systolic BP Rehab 133  -AY     Pre Treatment Diastolic BP 51  -AY     Post Systolic BP Rehab 134  -AY     Post Treatment Diastolic BP 61  -AY     Pretreatment Heart Rate (beats/min) 81  -AY     Posttreatment Heart Rate (beats/min) 85  -AY     Pre SpO2 (%) 96  -AY     O2 Delivery Pre Treatment nasal cannula  -AY     Intra SpO2 (%) 82  -AY     O2 Delivery Intra Treatment room air  -AY     Post SpO2 (%) 92  -AY     O2 Delivery Post Treatment nasal cannula  -AY     Pre Patient Position Supine  -AY     Intra Patient Position Standing  -AY     Post Patient Position Sitting  -AY     Row Name  11/14/22 0854          Positioning and Restraints    Pre-Treatment Position in bed  -AY     Post Treatment Position chair  -AY     In Chair notified nsg;reclined;sitting;call light within reach;encouraged to call for assist;exit alarm on;legs elevated;waffle cushion  -AY           User Key  (r) = Recorded By, (t) = Taken By, (c) = Cosigned By    Initials Name Provider Type    Kathie Hoff PT Physical Therapist               Outcome Measures     Row Name 11/14/22 0900          How much help from another person do you currently need...    Turning from your back to your side while in flat bed without using bedrails? 3  -AY     Moving from lying on back to sitting on the side of a flat bed without bedrails? 3  -AY     Moving to and from a bed to a chair (including a wheelchair)? 3  -AY     Standing up from a chair using your arms (e.g., wheelchair, bedside chair)? 3  -AY     Climbing 3-5 steps with a railing? 3  -AY     To walk in hospital room? 3  -AY     AM-PAC 6 Clicks Score (PT) 18  -AY     Highest level of mobility 6 --> Walked 10 steps or more  -AY     Row Name 11/14/22 0900          Functional Assessment    Outcome Measure Options AM-PAC 6 Clicks Basic Mobility (PT)  -AY           User Key  (r) = Recorded By, (t) = Taken By, (c) = Cosigned By    Initials Name Provider Type    Kathie Hoff PT Physical Therapist                             Physical Therapy Education     Title: PT OT SLP Therapies (In Progress)     Topic: Physical Therapy (In Progress)     Point: Mobility training (Done)     Learning Progress Summary           Patient Acceptance, E,TB, VU,NR by AY at 11/14/2022 0900                   Point: Home exercise program (Not Started)     Learner Progress:  Not documented in this visit.          Point: Body mechanics (Done)     Learning Progress Summary           Patient Acceptance, E,TB, VU,NR by AY at 11/14/2022 0900                   Point: Precautions (Done)     Learning Progress Summary            Patient Acceptance, E,TB, VU,NR by AY at 11/14/2022 0900                               User Key     Initials Effective Dates Name Provider Type Discipline    AY 11/10/20 -  Kathie Mccarty PT Physical Therapist PT              PT Recommendation and Plan  Planned Therapy Interventions (PT): balance training, bed mobility training, gait training, home exercise program, postural re-education, transfer training, strengthening, stair training, patient/family education  Plan of Care Reviewed With: patient  Progress: no change  Outcome Evaluation: PT initial eval completed. Pt limited by decreased activity tolerance, balance deficit, and generalized weakness. Pt ambulated 300ft with CGA and BUE support. Attempted ambulation on RA, but desat noted to 82%; requiring return to 2L NC, where pt remained >92%. Rec continued skilled PT. d/c rec for home with assist, HH PT/OT, and RWx.     Time Calculation:    PT Charges     Row Name 11/14/22 0901             Time Calculation    Start Time 0750  -AY      PT Received On 11/14/22  -AY      PT Goal Re-Cert Due Date 11/24/22  -AY         Timed Charges    83490 - PT Therapeutic Exercise Minutes 10  -AY      10785 - PT Therapeutic Activity Minutes 13  -AY         Untimed Charges    PT Eval/Re-eval Minutes 46  -AY         Total Minutes    Timed Charges Total Minutes 23  -AY      Untimed Charges Total Minutes 46  -AY       Total Minutes 69  -AY            User Key  (r) = Recorded By, (t) = Taken By, (c) = Cosigned By    Initials Name Provider Type    AY Kathie Mccarty PT Physical Therapist              Therapy Charges for Today     Code Description Service Date Service Provider Modifiers Qty    14879677385 HC PT THER PROC EA 15 MIN 11/14/2022 Kathie Mccarty, PT GP 1    92928592140 HC PT THERAPEUTIC ACT EA 15 MIN 11/14/2022 Kathie Mccarty, PT GP 1    32013971348 HC PT EVAL MOD COMPLEXITY 4 11/14/2022 Kathie Mccarty, PT GP 1          PT G-Codes  Outcome Measure Options: AM-PAC 6 Clicks Basic  Mobility (PT)  AM-PAC 6 Clicks Score (PT): 18  AM-PAC 6 Clicks Score (OT): 17    Kathie Mccarty, PT  11/14/2022

## 2022-11-14 NOTE — PLAN OF CARE
Goal Outcome Evaluation:  Plan of Care Reviewed With: patient      VS stable on 1-2L NC. Patient ambulated in thompson once with PT and ambulated in room multiple times with assist x 1 and walker. Good urine output this shift. Gaytan D/C per order and tele orders received waiting for bed. No distress noted no needs voiced.

## 2022-11-14 NOTE — PLAN OF CARE
Goal Outcome Evaluation:  Plan of Care Reviewed With: patient        Progress: no change  Outcome Evaluation: VSS on 1-2L NC. Afebrile. Pt up to chair 3 hrs. Ambulated in the thompson x1 with walker. PRN dilaudid given x1. 1 L UOP. Minimal serous drainage from stoma. Pt rested well. Replacing electrolytes.

## 2022-11-14 NOTE — PROGRESS NOTES
"[facility]    4 Days Post-Op   Length of stay: 4 days    Subjective: Doing well. Pain controlled. Having ostomy output. Tolerable pain. No drop in HH    Physical Exam:  Blood pressure 156/53, pulse 85, temperature 98.6 °F (37 °C), temperature source Oral, resp. rate 18, height 167.6 cm (66\"), weight 61.2 kg (135 lb), SpO2 95 %, not currently breastfeeding.    Abd: soft, non distneded. Appro tenderness. Ostomy is PPP    Labs past 24 hours:  Lab Results (last 24 hours)     Procedure Component Value Units Date/Time    POC Glucose Once [[id number]]  (Normal) Collected: 11/14/22 1131    Specimen: Blood Updated: 11/14/22 1133     Glucose 116 mg/dL      Comment: Meter: [id number] : [id number] Luz Elena Ricci       Tissue Pathology Exam [[id number]] Collected: 11/10/22 1520    Specimen: Tissue from Small Intestine; Tissue from Large Intestine, Rectum Updated: 11/14/22 1111     Case Report --     Surgical Pathology Report                         Case: [id number]                                  Authorizing Provider:  Sravan Vazquez MD         Collected:           11/10/2022 03:20 PM          Ordering Location:     [facility]   Received:            11/11/2022 07:49 AM                                 OR                                                                           Pathologist:           William Bang MD                                                             Specimens:   1) - Small Intestine, Portion of small intestine for permanent                                      2) - Large Intestine, Rectum, Rectum for permanent                                          Clinical Information --     Fecal incontinence       Final Diagnosis --     1. SMALL INTESTINE, SEGMENTAL RESECTION:  Benign segment of small intestine with focal nonspecific ulceration and reactive atypia  Negative for specific microorganisms  Negative for dysplasia or " malignancy  Benign resection margins  2.   RECTUM, LOW ANTERIOR RESECTION:  Mild chronic active colitis  Negative for specific microorganisms  Negative for dysplasia or malignancy  Mild active colitis at viable proximal resection margin, all margins benign         Gross Description --     1. Small Intestine.  Received in formalin labeled portion of small intestine is a 3 cm long by 2.5 cm in diameter unoriented portion of small bowel with attached fat.  The serosa is dusky and has scattered adhesions.  Both margins are received open.  The mucosa is tan, glistening and has a normal folding pattern.  No masses or polyps are grossly identified.  Representative sections are submitted as follows: 8H-3J-mokrmxq, en face; 1C-normal mucosa.    2. Large Intestine, Rectum.  Received in formalin labeled rectum is an 18 cm long by 4.5 cm in average diameter unoriented portion of rectum with attached fat.  The mesorectum is bulky and complete.  Near the dentate line, there is a 2 x 1 cm full-thickness defect that appears surgical in nature.  No serosa is identified.  Focal blue suture material is present at the proximal margin.  The lumen contains a moderate amount of tan mucus.  The mucosa is tan-pink, glistening and has a normal folding pattern.  No masses or polyps are grossly identified.  The bowel wall thickness averages 0.3 cm.  Representative sections are submitted as follows: 2A-proximal margin, en face; 2B-distal margin, en face; 2C-defect at dentate line; 2D-2E-normal mucosa.  LDP         Microscopic Description --     The slides are reviewed and demonstrate histopathologic features supporting the above rendered diagnosis.        Potassium [[id number]]  (Normal) Collected: 11/14/22 0543    Specimen: Blood from Arm, Right Updated: 11/14/22 0802     Potassium 3.8 mmol/L      Comment: Slight hemolysis detected by analyzer. Results may be affected.       Phosphorus [[id number]]  (Abnormal) Collected: 11/14/22 0543     Specimen: Blood from Arm, Right Updated: 11/14/22 0649     Phosphorus 1.7 mg/dL     CBC & Differential [[id number]]  (Abnormal) Collected: 11/14/22 0543    Specimen: Blood from Arm, Right Updated: 11/14/22 0630    Narrative:      The following orders were created for panel order CBC & Differential.  Procedure                               Abnormality         Status                     ---------                               -----------         ------                     CBC Auto Differential[[id number]]        Abnormal            Final result                 Please view results for these tests on the individual orders.    CBC Auto Differential [[id number]]  (Abnormal) Collected: 11/14/22 0543    Specimen: Blood from Arm, Right Updated: 11/14/22 0630     WBC 9.43 10*3/mm3      RBC 3.19 10*6/mm3      Hemoglobin 9.6 g/dL      Hematocrit 29.6 %      MCV 92.8 fL      MCH 30.1 pg      MCHC 32.4 g/dL      RDW 15.2 %      RDW-SD 51.9 fl      MPV 10.7 fL      Platelets 238 10*3/mm3      Neutrophil % 61.8 %      Lymphocyte % 17.8 %      Monocyte % 10.6 %      Eosinophil % 8.4 %      Basophil % 0.8 %      Immature Grans % 0.6 %      Neutrophils, Absolute 5.82 10*3/mm3      Lymphocytes, Absolute 1.68 10*3/mm3      Monocytes, Absolute 1.00 10*3/mm3      Eosinophils, Absolute 0.79 10*3/mm3      Basophils, Absolute 0.08 10*3/mm3      Immature Grans, Absolute 0.06 10*3/mm3      nRBC 0.0 /100 WBC     POC Glucose Once [[id number]]  (Normal) Collected: 11/14/22 0536    Specimen: Blood Updated: 11/14/22 0537     Glucose 100 mg/dL      Comment: Meter: [id number] : [id number] Reba De La O       POC Glucose Once [[id number]]  (Normal) Collected: 11/13/22 2339    Specimen: Blood Updated: 11/13/22 2340     Glucose 105 mg/dL      Comment: Meter: [id number] : [id number] Thierno Hubbard       Hemoglobin & Hematocrit, Blood [[id number]]  (Abnormal) Collected: 11/13/22 2035    Specimen: Blood from Arm, Right Updated: 11/13/22 2044      Hemoglobin 9.5 g/dL      Hematocrit 28.8 %     POC Glucose Once [[id number]]  (Abnormal) Collected: 11/13/22 1728    Specimen: Blood Updated: 11/13/22 1729     Glucose 134 mg/dL      Comment: Meter: [id number] : [id number] Galileo Granados             I/O last shift:  I/O this shift:  In: -   Out: 675 [Urine:675]       Pathology:  Order Name Source Comment Collection Info Order Time   POTASSIUM  For all patients with renal disease, within 3 days if taking digoxin, potassium-depleting anti-hypertensives, or diuretics.  11/9/2022  5:14 PM   TYPE AND SCREEN   Collected By: Ann Leos RN 11/10/2022  9:54 AM     Release to patient   Routine Release        TISSUE PATHOLOGY EXAM Small Intestine  Collected By: Sravan Vazquez MD 11/10/2022  3:28 PM     Release to patient   Routine Release        .    Assessment and Plan:  POD4 with some post op anemia. Now stable with some ostomy output    Plan  Clear to TTF  Reg diet  Dc Gaytan  Ambulate 5 times daily  Dr Vazquez back tomorrow    Chucky Arauz MD  [facility]  11/14/22  14:01 EST

## 2022-11-14 NOTE — PLAN OF CARE
Goal Outcome Evaluation:  Plan of Care Reviewed With: patient        Progress: no change  Outcome Evaluation: PT initial eval completed. Pt limited by decreased activity tolerance, balance deficit, and generalized weakness. Pt ambulated 300ft with CGA and BUE support. Attempted ambulation on RA, but desat noted to 82%; requiring return to 2L NC, where pt remained >92%. Rec continued skilled PT. d/c rec for home with assist, HH PT/OT, and RWx.

## 2022-11-15 LAB
ALBUMIN SERPL-MCNC: 3.1 G/DL (ref 3.5–5.2)
ALBUMIN/GLOB SERPL: 1.2 G/DL
ALP SERPL-CCNC: 58 U/L (ref 39–117)
ALT SERPL W P-5'-P-CCNC: 12 U/L (ref 1–33)
ANION GAP SERPL CALCULATED.3IONS-SCNC: 8 MMOL/L (ref 5–15)
AST SERPL-CCNC: 14 U/L (ref 1–32)
BASOPHILS # BLD AUTO: 0.07 10*3/MM3 (ref 0–0.2)
BASOPHILS NFR BLD AUTO: 0.8 % (ref 0–1.5)
BILIRUB SERPL-MCNC: 0.7 MG/DL (ref 0–1.2)
BUN SERPL-MCNC: 12 MG/DL (ref 8–23)
BUN/CREAT SERPL: 25.5 (ref 7–25)
CALCIUM SPEC-SCNC: 8 MG/DL (ref 8.6–10.5)
CHLORIDE SERPL-SCNC: 108 MMOL/L (ref 98–107)
CO2 SERPL-SCNC: 25 MMOL/L (ref 22–29)
CREAT SERPL-MCNC: 0.47 MG/DL (ref 0.57–1)
DEPRECATED RDW RBC AUTO: 50.1 FL (ref 37–54)
EGFRCR SERPLBLD CKD-EPI 2021: 95.8 ML/MIN/1.73
EOSINOPHIL # BLD AUTO: 0.67 10*3/MM3 (ref 0–0.4)
EOSINOPHIL NFR BLD AUTO: 7.6 % (ref 0.3–6.2)
ERYTHROCYTE [DISTWIDTH] IN BLOOD BY AUTOMATED COUNT: 14.9 % (ref 12.3–15.4)
GLOBULIN UR ELPH-MCNC: 2.5 GM/DL
GLUCOSE BLDC GLUCOMTR-MCNC: 120 MG/DL (ref 70–130)
GLUCOSE BLDC GLUCOMTR-MCNC: 125 MG/DL (ref 70–130)
GLUCOSE BLDC GLUCOMTR-MCNC: 138 MG/DL (ref 70–130)
GLUCOSE BLDC GLUCOMTR-MCNC: 90 MG/DL (ref 70–130)
GLUCOSE SERPL-MCNC: 206 MG/DL (ref 65–99)
HCT VFR BLD AUTO: 30.8 % (ref 34–46.6)
HGB BLD-MCNC: 10.3 G/DL (ref 12–15.9)
IMM GRANULOCYTES # BLD AUTO: 0.03 10*3/MM3 (ref 0–0.05)
IMM GRANULOCYTES NFR BLD AUTO: 0.3 % (ref 0–0.5)
LYMPHOCYTES # BLD AUTO: 1.37 10*3/MM3 (ref 0.7–3.1)
LYMPHOCYTES NFR BLD AUTO: 15.6 % (ref 19.6–45.3)
MAGNESIUM SERPL-MCNC: 2.2 MG/DL (ref 1.6–2.4)
MCH RBC QN AUTO: 30.8 PG (ref 26.6–33)
MCHC RBC AUTO-ENTMCNC: 33.4 G/DL (ref 31.5–35.7)
MCV RBC AUTO: 92.2 FL (ref 79–97)
MONOCYTES # BLD AUTO: 0.99 10*3/MM3 (ref 0.1–0.9)
MONOCYTES NFR BLD AUTO: 11.2 % (ref 5–12)
NEUTROPHILS NFR BLD AUTO: 5.68 10*3/MM3 (ref 1.7–7)
NEUTROPHILS NFR BLD AUTO: 64.5 % (ref 42.7–76)
NRBC BLD AUTO-RTO: 0 /100 WBC (ref 0–0.2)
PHOSPHATE SERPL-MCNC: 2.3 MG/DL (ref 2.5–4.5)
PLATELET # BLD AUTO: 260 10*3/MM3 (ref 140–450)
PMV BLD AUTO: 10 FL (ref 6–12)
POTASSIUM SERPL-SCNC: 3.8 MMOL/L (ref 3.5–5.2)
PROT SERPL-MCNC: 5.6 G/DL (ref 6–8.5)
RBC # BLD AUTO: 3.34 10*6/MM3 (ref 3.77–5.28)
SODIUM SERPL-SCNC: 141 MMOL/L (ref 136–145)
WBC NRBC COR # BLD: 8.81 10*3/MM3 (ref 3.4–10.8)

## 2022-11-15 PROCEDURE — 84100 ASSAY OF PHOSPHORUS: CPT | Performed by: INTERNAL MEDICINE

## 2022-11-15 PROCEDURE — 83735 ASSAY OF MAGNESIUM: CPT | Performed by: INTERNAL MEDICINE

## 2022-11-15 PROCEDURE — 82962 GLUCOSE BLOOD TEST: CPT

## 2022-11-15 PROCEDURE — 97110 THERAPEUTIC EXERCISES: CPT

## 2022-11-15 PROCEDURE — 85025 COMPLETE CBC W/AUTO DIFF WBC: CPT | Performed by: COLON & RECTAL SURGERY

## 2022-11-15 PROCEDURE — 99232 SBSQ HOSP IP/OBS MODERATE 35: CPT | Performed by: NURSE PRACTITIONER

## 2022-11-15 PROCEDURE — 97530 THERAPEUTIC ACTIVITIES: CPT

## 2022-11-15 PROCEDURE — 25010000002 FUROSEMIDE PER 20 MG: Performed by: NURSE PRACTITIONER

## 2022-11-15 PROCEDURE — 80053 COMPREHEN METABOLIC PANEL: CPT | Performed by: INTERNAL MEDICINE

## 2022-11-15 RX ORDER — FUROSEMIDE 10 MG/ML
40 INJECTION INTRAMUSCULAR; INTRAVENOUS ONCE
Status: COMPLETED | OUTPATIENT
Start: 2022-11-15 | End: 2022-11-15

## 2022-11-15 RX ORDER — LISINOPRIL 5 MG/1
5 TABLET ORAL
Status: DISCONTINUED | OUTPATIENT
Start: 2022-11-15 | End: 2022-11-16 | Stop reason: HOSPADM

## 2022-11-15 RX ADMIN — SODIUM CHLORIDE 50 ML/HR: 9 INJECTION, SOLUTION INTRAVENOUS at 04:20

## 2022-11-15 RX ADMIN — LISINOPRIL 5 MG: 5 TABLET ORAL at 13:18

## 2022-11-15 RX ADMIN — SENNOSIDES AND DOCUSATE SODIUM 2 TABLET: 50; 8.6 TABLET ORAL at 20:59

## 2022-11-15 RX ADMIN — PANTOPRAZOLE SODIUM 40 MG: 40 INJECTION, POWDER, FOR SOLUTION INTRAVENOUS at 10:28

## 2022-11-15 RX ADMIN — ACETAMINOPHEN 650 MG: 325 TABLET, FILM COATED ORAL at 13:18

## 2022-11-15 RX ADMIN — HYDROCODONE BITARTRATE AND ACETAMINOPHEN 1 TABLET: 7.5; 325 TABLET ORAL at 13:18

## 2022-11-15 RX ADMIN — SUCRALFATE 1 G: 1 TABLET ORAL at 20:59

## 2022-11-15 RX ADMIN — ACETAMINOPHEN 650 MG: 325 TABLET, FILM COATED ORAL at 06:12

## 2022-11-15 RX ADMIN — POTASSIUM & SODIUM PHOSPHATES POWDER PACK 280-160-250 MG 2 PACKET: 280-160-250 PACK at 13:18

## 2022-11-15 RX ADMIN — ACETAMINOPHEN 650 MG: 325 TABLET, FILM COATED ORAL at 21:00

## 2022-11-15 RX ADMIN — PRAVASTATIN SODIUM 80 MG: 40 TABLET ORAL at 20:59

## 2022-11-15 RX ADMIN — SENNOSIDES AND DOCUSATE SODIUM 2 TABLET: 50; 8.6 TABLET ORAL at 10:28

## 2022-11-15 RX ADMIN — LEVOTHYROXINE SODIUM 88 MCG: 88 TABLET ORAL at 06:12

## 2022-11-15 RX ADMIN — HYDROCODONE BITARTRATE AND ACETAMINOPHEN 1 TABLET: 7.5; 325 TABLET ORAL at 21:00

## 2022-11-15 RX ADMIN — FUROSEMIDE 40 MG: 10 INJECTION, SOLUTION INTRAMUSCULAR; INTRAVENOUS at 13:22

## 2022-11-15 RX ADMIN — PANTOPRAZOLE SODIUM 40 MG: 40 INJECTION, POWDER, FOR SOLUTION INTRAVENOUS at 20:59

## 2022-11-15 RX ADMIN — ALVIMOPAN 12 MG: 12 CAPSULE ORAL at 21:00

## 2022-11-15 RX ADMIN — BUSPIRONE HYDROCHLORIDE 15 MG: 10 TABLET ORAL at 10:28

## 2022-11-15 RX ADMIN — ALVIMOPAN 12 MG: 12 CAPSULE ORAL at 10:26

## 2022-11-15 RX ADMIN — GABAPENTIN 600 MG: 300 CAPSULE ORAL at 20:59

## 2022-11-15 RX ADMIN — SUCRALFATE 1 G: 1 TABLET ORAL at 10:27

## 2022-11-15 NOTE — PROGRESS NOTES
Stable H&H  Voiding  Still in the ICU, but doing well  Tolerating regular diet  Abdomen soft  A little stool per colostomy    Plan for transfer out of the ICU today to a regular bed /  Dr GIORDANO - Medical Management.    Frequent ambulation  Diet as tolerated    Likely home tomorrow as long as transition out of ICU goes well

## 2022-11-15 NOTE — PLAN OF CARE
Goal Outcome Evaluation:           Progress: no change  Outcome Evaluation: Patient A&Ox4, VSS on 2L NC. Patient nauseous , PRN Zofran given x1. Desiree given x1 for pain. NS remains running at 50ml/hr. UOP adequate with 1400ml out. Daughter updated via phone call. Awaiting tele bed.

## 2022-11-15 NOTE — PROGRESS NOTES
Spoke with patient she is agreeable to [facility]. Current with Dr Mackey. Nidia BAEZA, [facility]-Liaison

## 2022-11-15 NOTE — PLAN OF CARE
Goal Outcome Evaluation:  Plan of Care Reviewed With: patient        Progress: improving  Outcome Evaluation: Pt ambulated 300 feet with front wheeled walker and ascended/descended 2 stairs with one handrail with CGA. Pt required verbal cueing for ambulating in close proximity to walker and PLB. Desat noted to 84% on RA with ambulation and RN notified. Pt continues to be limited by SOA and decreased activity tolerance. d/c rec home with assist, HH PT/OT, and FWx.

## 2022-11-15 NOTE — PROGRESS NOTES
Pulmonary/Critical Care History and Physical Exam     LOS: 5 days   Patient Care Team:  David Mackey MD as PCP - General  Walter Caro MD as Obstetrician (Obstetrics and Gynecology)  Sravan Vazquez MD as Consulting Physician (Colon and Rectal Surgery)    Chief Complaint:  No chief complaint on file.      Subjective     Interval History:  Chart reviewed. No further signs of bleeding. VSS have remained stable throughout the night on 3L NC. She has no complaints other than having a large amount of urine output when her tobin catheter was removed. This has since slowed down. No other complaints.     History taken from: patient    Past Medical History:   Diagnosis Date   • Acquired hypothyroidism 1980   • Anesthesia complication     after gb surgery - anesthesia paralyzed her lungs too early   • Arthritis    • Breast cancer (HCC)     precancerous cell   • Breast injury 2019    previous fall caused bruising to right breast   • Colon polyp - sees Dr. 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 ER and ND (+)   • Diabetes mellitus (HCC)     prediabetic- doesnt check sugar   • Diverticular disease 03/2020   • Essential hypertension 2012   • High cholesterol 2019   • History of diverticular abscess 04/16/2020    S/p LAR, abscess drainage, appy, left maldonado, distal transverse end colostomy 3/17/20 by Dr. Vazquez   • Hx of herpes genitalis 2013   • IBS-d (takes Buspar)    • Pneumonia 04/2020   • Sepsis (HCC) 04/2020   • SOBOE (shortness of breath on exertion)    • Wears glasses        Past Surgical History:   Procedure Laterality Date   • ABDOMINAL PERINEAL RESECTION N/A 11/10/2022    Procedure: ABDOMINAL PERINEAL RESECTION, REPAIR OF PARASTOMAL HERNIA WITH MESH;  Surgeon: Sravan Vazquez MD;  Location: [facility];  Service: General;  Laterality: N/A;   • APPENDECTOMY     • BILATERAL BREAST REDUCTION Bilateral 1995   • BREAST BIOPSY Left 2009   • BREAST EXCISIONAL  BIOPSY Left 2009    ADH   • CATARACT EXTRACTION, BILATERAL Bilateral 2018   • CERVICAL CONIZATION  1983   • CHOLECYSTECTOMY     • COLON RESECTION N/A 03/17/2020    Procedure: LOW ANTERIOR COLON RESECTION, MOBILIZATION OF SPLENIC FLEXURE, DRAINAGE OF ABSCESS AND COLOSTOMY CREATION;  Surgeon: Sravan Vazquez MD;  Location: [facility];  Service: General;  Laterality: N/A;   • COLONOSCOPY     • FACIAL COSMETIC SURGERY  2001   • FACIAL COSMETIC SURGERY  2012   • HEMORRHOIDECTOMY  1974   • LAPAROSCOPIC CHOLECYSTECTOMY  2010   • REDUCTION MAMMAPLASTY Bilateral 04/13/2017    DCIS (3mm found incidentally right breast)   • REDUCTION MAMMAPLASTY Bilateral 1995   • SINUS SURGERY  1992    poylp   • THYROIDECTOMY, PARTIAL  1963   • TONSILLECTOMY  1953   • VARICOSE VEIN SURGERY Bilateral 1981   • VARICOSE VEIN SURGERY  2021       Family History   Problem Relation Age of Onset   • Breast cancer Neg Hx    • Ovarian cancer Neg Hx        Social History     Socioeconomic History   • Marital status:    Tobacco Use   • Smoking status: Never   • Smokeless tobacco: Never   Vaping Use   • Vaping Use: Never used   Substance and Sexual Activity   • Alcohol use: No   • Drug use: No       Allergies   Allergen Reactions   • Evista [Raloxifene] Rash   • Cephalexin Diarrhea   • Hydrochlorothiazide Diarrhea   • Meloxicam Other (See Comments)   • Metronidazole Diarrhea   • Oxybutynin Cough   • Propylthiouracil Other (See Comments)     Severally reduce WBC's    • Sulfa Antibiotics Hives   • Other Anxiety     Steroid injection for pinched nerve-  Got panic attack after        PMH/FH/SocH were reviewed by me and updates were made.     Review of Systems:    All systems were reviewed and negative except as noted in subjective.    Objective     Vital Signs  Temp:  [97.5 °F (36.4 °C)-98.6 °F (37 °C)] 97.5 °F (36.4 °C)  Heart Rate:  [] 62  Resp:  [16-18] 16  BP: ()/(40-84) 155/53    Physical Exam:     General Appearance:    Female sitting  up in chair talking on phone. Alert, cooperative, in no acute distress   Head:    Normocephalic, without obvious abnormality, atraumatic   Eyes:            Lids and lashes normal, conjunctivae and sclerae normal, no   icterus, no pallor, corneas clear, PERRLA   ENMT:   Ears appear intact with no abnormalities noted      No oral lesions, no thrush, oral mucosa moist      No adenopathy, supple, trachea midline, no thyromegaly, no   carotid bruit, no JVD       Lungs/resp:     Normal effort, symmetric chest rise, no crepitus, clear to      auscultation bilaterally, no chest wall tenderness, resonant to percussion throughout.                  Heart/CV:    Regular rhythm and normal rate, normal S1 and S2, no            murmur, no gallop, no rub, no click   Abdomen/GI:     Left ostomy with pink stoma. Midline incision dressing CDI. Normal bowel sounds, no masses, no organomegaly, soft        non-tender, non-distended, no guarding, no rebound                tenderness   G/U:     Deferred   Extremities/MSK:   No clubbing, cyanosis or edema.  No deformities.    Pulses:   Pulses palpable and equal bilaterally   Skin:   No bleeding, bruising or rash   Hem/Lymph:   No cervical or supraclavicular adenopathy.    Neurologic:   Moves all extremities with no obvious focal motor deficit.  Cranial nerves 2 - 12 grossly intact            Psychiatric:  Normal mood and affect, oriented x 3.      Results Review:     I reviewed the patient's new clinical results.   Results from last 7 days   Lab Units 11/14/22  0543 11/13/22  0521 11/12/22  0659 11/11/22  2056   SODIUM mmol/L  --  142 142 144   POTASSIUM mmol/L 3.8 3.8 4.0 4.8   CHLORIDE mmol/L  --  114* 115* 116*   CO2 mmol/L  --  21.0* 18.0* 18.0*   BUN mg/dL  --  19 25* 27*   CREATININE mg/dL  --  0.50* 0.68 0.94   CALCIUM mg/dL  --  6.8* 6.6* 6.6*   BILIRUBIN mg/dL  --   --   --  0.6   ALK PHOS U/L  --   --   --  39   ALT (SGPT) U/L  --   --   --  9   AST (SGOT) U/L  --   --   --  19    GLUCOSE mg/dL  --  105* 134* 122*     Results from last 7 days   Lab Units 11/14/22  0543 11/13/22  2035 11/13/22  0814 11/13/22  0521 11/12/22  1745 11/12/22  0659   WBC 10*3/mm3 9.43  --   --  9.93  --  11.34*   HEMOGLOBIN g/dL 9.6* 9.5* 9.2* 9.7*   < > 10.6*  10.6*   HEMATOCRIT % 29.6* 28.8* 28.3* 29.5*   < > 32.9*  32.9*   PLATELETS 10*3/mm3 238  --   --  207  --  232    < > = values in this interval not displayed.           I reviewed the patient's new imaging including images and reports.    Medication Review:   acetaminophen, 650 mg, Oral, Q8H  alvimopan, 12 mg, Oral, BID  busPIRone, 15 mg, Oral, Daily  cetirizine, 10 mg, Oral, Daily  fluticasone, 1 spray, Nasal, Daily  gabapentin, 600 mg, Oral, Nightly  insulin regular, 0-7 Units, Subcutaneous, Q6H  levothyroxine, 88 mcg, Oral, Q AM  pantoprazole, 40 mg, Intravenous, Q12H  pravastatin, 80 mg, Oral, Nightly  senna-docusate sodium, 2 tablet, Oral, BID  sucralfate, 1 g, Oral, BID      sodium chloride, 50 mL/hr, Last Rate: 50 mL/hr (11/15/22 0420)        Assessment & Plan       Severe hypotension    Essential hypertension    Acquired hypothyroidism    Mixed urge and stress incontinence    High cholesterol    DM2 (diabetes mellitus, type 2) (HCC)    Fecal incontinence    Perineal resection, parastomal and ventral hernia repair with mesh, small bowel resection, extensive lysis of adhesions, 11/10/22    Acute blood loss anemia, probable    Hospital Course:  82 y.o.female with relevant PMH of bowel perforation, diverticular disease, IBS-d, OAB, uterovaginal prolapse, pessary use, HTN, hypothyroidism, fecal incontinence, T2DM, high cholesterol, & ductal carcinoma in situ. In March of 2020, she had a perforated colon in which she underwent a low anterior resection, appendectomy, left salpingo-oophorectomy, distal transverse and colostomy by Dr. Vazquez. Since then, she has been experiencing fecal incontinence, leakage, and urgency that has worsened over the last  year. She presented on 11/10 for surgical intervention.     She had abdominal perineal resection, extensive lysis of adhesions, parastomal hernia repair, ventral hernia repair with mesh, and omental flap in deep pelvis with Dr. Vazquez.     On 11/11 patient became hypotensive with SBP in the 50's that did not respond to 2 liters of crystalloid and was noted to have an acute drop in Hgb from 11.2 to 8.8.  CT / Abdomen / Pelvis showed a large fluid collection posterior to the uterus in the presacral region c/w large hematoma.  Initially required pressors and 2 units PRBC.     Plan:     Post Op Care  Intra-Abdominal Hematoma  Acute Blood Loss Anemia  Hemorrhagic Shock  - Hypotensive after surgery likely due to drop in hgb.  - Received Blood products and IVF  - Has since improved  - No further signs of bleeding  - Will check H/H this AM for trending purposes  - Defer further management to colorectal surgery: Diet, Abx, timing of pharmacologic DVT ppx, etc.     Hypoxemia  Bi-Basilar Atelectasis  - On arrival to ICU required supplemental oxygen  - On 3L currently sating 98%, wean as able to maintain SpO2 93% or higher, did desaturate while walking  - Give 40 mg IV lasix  - IS q2h  - Mobilize  - PT/OT     HTN  - BP stable this AM  - Restart home ACEi     T2DM  - Correction scale insulin     Hypophosphatemia  - Replace     DVT / PUD Prophylaxis  - SCDs, PPI    AM Labs  Tele orders      LAUREEN Hamilton  11/15/22  08:34 EST      Level of Risk Moderate due to: mild exacerbation of one chronic illness and acute illness with sytemic symptoms     Electronically signed by LAUREEN Hamilton, 11/15/22, 8:34 AM EST.    Copied text in this note has been reviewed and is accurate as of 11/15/22.

## 2022-11-15 NOTE — THERAPY TREATMENT NOTE
Patient Name: Neema Richard  : 1940    MRN: [id number]                              Today's Date: 11/15/2022       Admit Date: 11/10/2022    Visit Dx:     ICD-10-CM ICD-9-CM   1. Fecal incontinence  R15.9 787.60   2. Perineal resection, parastomal and ventral hernia repair with mesh, small bowel resection, extensive lysis of adhesions, 11/10/22  Z90.49 V45.89   3. Severe hypotension  I95.9 458.9     Patient Active Problem List   Diagnosis   • Annual GYN exam in  S/P left S&O   • Osteoporosis   • Essential hypertension   • Acquired hypothyroidism   • IBS-d (takes Buspar)   • DCIS (ductal carcinoma in situ)   • Cystocele and rectocele with complete uterovaginal prolapse   • Mixed urge and stress incontinence   • Pessary maintenance   • High cholesterol   • Arthritis   • Dyspepsia   • DM2 (diabetes mellitus, type 2) (HCC)   • Fecal incontinence   • Perineal resection, parastomal and ventral hernia repair with mesh, small bowel resection, extensive lysis of adhesions, 11/10/22   • Acute blood loss anemia, probable   • Severe hypotension     Past Medical History:   Diagnosis Date   • Acquired hypothyroidism    • Anesthesia complication     after gb surgery - anesthesia paralyzed her lungs too early   • Arthritis    • Breast cancer (HCC)     precancerous cell   • Breast injury 2019    previous fall caused bruising to right breast   • Colon polyp - sees Dr. Vazquez 2016   • DCIS (ductal carcinoma in situ) 2017    Dx at the time of breast reduction. ER and OR (+)   • Diabetes mellitus (HCC)     prediabetic- doesnt check sugar   • Diverticular disease 2020   • Essential hypertension    • High cholesterol    • History of diverticular abscess 2020    S/p LAR, abscess drainage, appy, left maldonado, distal transverse end colostomy 3/17/20 by Dr. Vazquez   • Hx of herpes genitalis    • IBS-d (takes Buspar)    • Pneumonia 2020   • Sepsis (HCC) 2020   • SOBOE (shortness of breath on  exertion)    • Wears glasses      Past Surgical History:   Procedure Laterality Date   • ABDOMINAL PERINEAL RESECTION N/A 11/10/2022    Procedure: ABDOMINAL PERINEAL RESECTION, REPAIR OF PARASTOMAL HERNIA WITH MESH;  Surgeon: Sravan Vazquez MD;  Location: [facility];  Service: General;  Laterality: N/A;   • APPENDECTOMY     • BILATERAL BREAST REDUCTION Bilateral 1995   • BREAST BIOPSY Left 2009   • BREAST EXCISIONAL BIOPSY Left 2009    ADH   • CATARACT EXTRACTION, BILATERAL Bilateral 2018   • CERVICAL CONIZATION  1983   • CHOLECYSTECTOMY     • COLON RESECTION N/A 03/17/2020    Procedure: LOW ANTERIOR COLON RESECTION, MOBILIZATION OF SPLENIC FLEXURE, DRAINAGE OF ABSCESS AND COLOSTOMY CREATION;  Surgeon: Sravan Vazquez MD;  Location: [facility];  Service: General;  Laterality: N/A;   • COLONOSCOPY     • FACIAL COSMETIC SURGERY  2001   • FACIAL COSMETIC SURGERY  2012   • HEMORRHOIDECTOMY  1974   • LAPAROSCOPIC CHOLECYSTECTOMY  2010   • REDUCTION MAMMAPLASTY Bilateral 04/13/2017    DCIS (3mm found incidentally right breast)   • REDUCTION MAMMAPLASTY Bilateral 1995   • SINUS SURGERY  1992    poylp   • THYROIDECTOMY, PARTIAL  1963   • TONSILLECTOMY  1953   • VARICOSE VEIN SURGERY Bilateral 1981   • VARICOSE VEIN SURGERY  2021      General Information     Row Name 11/15/22 1121          Physical Therapy Time and Intention    Document Type therapy note (daily note)  -     Mode of Treatment physical therapy  -     Row Name 11/15/22 1121          General Information    Patient Profile Reviewed yes  -     Existing Precautions/Restrictions fall;oxygen therapy device and L/min;other (see comments)  abd incision, ostomy  -     Barriers to Rehab medically complex  -     Row Name 11/15/22 1121          Cognition    Orientation Status (Cognition) oriented x 3  -     Row Name 11/15/22 1121          Safety Issues, Functional Mobility    Safety Issues Affecting Function (Mobility) insight into deficits/self-awareness  -      Impairments Affecting Function (Mobility) balance;endurance/activity tolerance;strength;pain  -           User Key  (r) = Recorded By, (t) = Taken By, (c) = Cosigned By    Initials Name Provider Type     Angelina Casillas PT Physical Therapist               Mobility     Row Name 11/15/22 1141          Sit-Stand Transfer    Sit-Stand [place] (Transfers) contact guard;verbal cues  -     Assistive Device (Sit-Stand Transfers) walker, front-wheeled  -HM     Row Name 11/15/22 1141          Gait/Stairs (Locomotion)    [place] Level (Gait) contact guard  -     Assistive Device (Gait) walker, front-wheeled  -HM     Distance in Feet (Gait) 300  -HM     Deviations/Abnormal Patterns (Gait) altagracia decreased;gait speed decreased;stride length decreased;weight shifting decreased;base of support, narrow  -     Bilateral Gait Deviations heel strike decreased;forward flexed posture  -     [place] Level (Stairs) contact guard  -     Handrail Location (Stairs) left side (ascending);left side (descending)  -     Number of Steps (Stairs) 2  -HM     Ascending Technique (Stairs) step-to-step  -HM     Descending Technique (Stairs) step-to-step  -HM     Comment, (Gait/Stairs) required verbal cueing to ambulate close to walker for safety and PLB. ambulated on RA with RN clearance, desat to 84% noted. required 3 standing rest breaks to focus on PLB for O2 saturation. cueing with stairs for step to step technique to decrease fatigue with stairs.  -           User Key  (r) = Recorded By, (t) = Taken By, (c) = Cosigned By    Initials Name Provider Type     Angelina Casillas PT Physical Therapist               Obj/Interventions     Row Name 11/15/22 1143          Motor Skills    Therapeutic Exercise --  BLE LAQ, APs, Hip AB/AD, SLR  -     Row Name 11/15/22 1146          Balance    Balance Assessment sitting static balance;sitting dynamic balance;sit to stand dynamic balance;standing static balance;standing  dynamic balance  -HM     Static Sitting Balance standby assist  -HM     Dynamic Sitting Balance standby assist  -HM     Position, Sitting Balance sitting in chair  -     Sit to Stand Dynamic Balance contact guard  -HM     Static Standing Balance contact guard  -HM     Dynamic Standing Balance contact guard  -HM     Position/Device Used, Standing Balance supported;walker, front-wheeled  -HM           User Key  (r) = Recorded By, (t) = Taken By, (c) = Cosigned By    Initials Name Provider Type     Angelina Casillas, PT Physical Therapist               Goals/Plan    No documentation.                Clinical Impression     Row Name 11/15/22 1146          Pain Scale: FACES Pre/Post-Treatment    Pain: FACES Scale, Pretreatment 0-->no hurt  -HM     Posttreatment Pain Rating 0-->no hurt  -HM     Row Name 11/15/22 1146          Plan of Care Review    Plan of Care Reviewed With patient  -     Progress improving  -     Outcome Evaluation Pt ambulated 300 feet with front wheeled walker and ascended/descended 2 stairs with one handrail with CGA. Pt required verbal cueing for ambulating in close proximity to walker and PLB. Desat noted to 84% on RA with ambulation and RN notified. Pt continues to be limited by SOA and decreased activity tolerance. d/c rec home with assist, HH PT/OT, and FWx.  -     Row Name 11/15/22 1145          Therapy Assessment/Plan (PT)    Rehab Potential (PT) good, to achieve stated therapy goals  -     Criteria for Skilled Interventions Met (PT) yes;meets criteria;skilled treatment is necessary  -     Therapy Frequency (PT) daily  -     Row Name 11/15/22 1145          Vital Signs    Pre Systolic BP Rehab 140  -HM     Pre Treatment Diastolic BP 72  -HM     Post Systolic BP Rehab 137  -HM     Post Treatment Diastolic BP 68  -HM     Pretreatment Heart Rate (beats/min) 80  -HM     Posttreatment Heart Rate (beats/min) 79  -HM     Pre SpO2 (%) 97  -HM     O2 Delivery Pre Treatment nasal cannula   -HM     O2 Delivery Intra Treatment room air  RN clearance  -HM     Post SpO2 (%) 90  -HM     O2 Delivery Post Treatment room air  RN clearance  -HM     Pre Patient Position Sitting  -HM     Intra Patient Position Standing  -HM     Post Patient Position Sitting  -HM     Row Name 11/15/22 1146          Positioning and Restraints    Pre-Treatment Position sitting in chair/recliner  -HM     Post Treatment Position chair  -HM     In Chair notified nsg;reclined;sitting;call light within reach;encouraged to call for assist;exit alarm on;legs elevated;waffle cushion  -           User Key  (r) = Recorded By, (t) = Taken By, (c) = Cosigned By    Initials Name Provider Type     Angelina Casillas PT Physical Therapist               Outcome Measures     Row Name 11/15/22 1152          How much help from another person do you currently need...    Turning from your back to your side while in flat bed without using bedrails? 3  -HM     Moving from lying on back to sitting on the side of a flat bed without bedrails? 3  -HM     Moving to and from a bed to a chair (including a wheelchair)? 3  -HM     Standing up from a chair using your arms (e.g., wheelchair, bedside chair)? 3  -HM     Climbing 3-5 steps with a railing? 3  -HM     To walk in hospital room? 3  -HM     AM-PAC 6 Clicks Score (PT) 18  -HM     Highest level of mobility 6 --> Walked 10 steps or more  -     Row Name 11/15/22 1152          Functional Assessment    Outcome Measure Options AM-PAC 6 Clicks Basic Mobility (PT)  -           User Key  (r) = Recorded By, (t) = Taken By, (c) = Cosigned By    Initials Name Provider Type     Angelina Casillas PT Physical Therapist                             Physical Therapy Education     Title: PT OT SLP Therapies (In Progress)     Topic: Physical Therapy (Done)     Point: Mobility training (Done)     Learning Progress Summary           Patient Acceptance, E,TB, VU,NR by  at 11/15/2022 1152    Acceptance, E,TB, VU,NR by  AY at 11/14/2022 0900                   Point: Home exercise program (Done)     Learning Progress Summary           Patient Acceptance, E,TB, VU,NR by  at 11/15/2022 1152                   Point: Body mechanics (Done)     Learning Progress Summary           Patient Acceptance, E,TB, VU,NR by  at 11/15/2022 1152    Acceptance, E,TB, VU,NR by AY at 11/14/2022 0900                   Point: Precautions (Done)     Learning Progress Summary           Patient Acceptance, E,TB, VU,NR by  at 11/15/2022 1152    Acceptance, E,TB, VU,NR by AY at 11/14/2022 0900                               User Key     Initials Effective Dates Name Provider Type Discipline     11/10/20 -  Kathie Mccarty, PT Physical Therapist PT     09/22/22 -  Angelina Casillas PT Physical Therapist PT              PT Recommendation and Plan     Plan of Care Reviewed With: patient  Progress: improving  Outcome Evaluation: Pt ambulated 300 feet with front wheeled walker and ascended/descended 2 stairs with one handrail with CGA. Pt required verbal cueing for ambulating in close proximity to walker and PLB. Desat noted to 84% on RA with ambulation and RN notified. Pt continues to be limited by SOA and decreased activity tolerance. d/c rec home with assist, HH PT/OT, and FWx.     Time Calculation:    PT Charges     Row Name 11/15/22 1153             Time Calculation    Start Time 0850  -HM      PT Received On 11/15/22  -HM         Timed Charges    57666 - PT Therapeutic Exercise Minutes 10  -HM      58761 - PT Therapeutic Activity Minutes 30  -HM         Total Minutes    Timed Charges Total Minutes 40  -HM       Total Minutes 40  -HM            User Key  (r) = Recorded By, (t) = Taken By, (c) = Cosigned By    Initials Name Provider Type     Angelina Casillas, PT Physical Therapist              Therapy Charges for Today     Code Description Service Date Service Provider Modifiers Qty    93448085105 HC PT THER PROC EA 15 MIN 11/15/2022 Angelina Casillas,  PT GP 1    [id number]  PT THERAPEUTIC ACT EA 15 MIN 11/15/2022 Angelina Casillas, PT GP 2          PT G-Codes  Outcome Measure Options: AM-PAC 6 Clicks Basic Mobility (PT)  AM-PAC 6 Clicks Score (PT): 18  AM-PAC 6 Clicks Score (OT): 17  PT Discharge Summary  Anticipated Discharge Disposition (PT): home with assist, home with home health    Angelina Casillas, PT  11/15/2022

## 2022-11-16 ENCOUNTER — HOME HEALTH ADMISSION (OUTPATIENT)
Dept: HOME HEALTH SERVICES | Facility: HOME HEALTHCARE | Age: 82
End: 2022-11-16
Payer: MEDICARE

## 2022-11-16 ENCOUNTER — READMISSION MANAGEMENT (OUTPATIENT)
Dept: CALL CENTER | Facility: HOSPITAL | Age: 82
End: 2022-11-16

## 2022-11-16 VITALS
WEIGHT: 135 LBS | OXYGEN SATURATION: 92 % | HEART RATE: 74 BPM | DIASTOLIC BLOOD PRESSURE: 61 MMHG | HEIGHT: 66 IN | TEMPERATURE: 98.4 F | SYSTOLIC BLOOD PRESSURE: 131 MMHG | BODY MASS INDEX: 21.69 KG/M2 | RESPIRATION RATE: 18 BRPM

## 2022-11-16 PROBLEM — R09.02 POSTOPERATIVE HYPOXIA: Status: ACTIVE | Noted: 2022-11-16

## 2022-11-16 PROBLEM — J81.1 PULMONARY EDEMA: Status: ACTIVE | Noted: 2022-11-16

## 2022-11-16 PROBLEM — Z98.890 POSTOPERATIVE HYPOXIA: Status: ACTIVE | Noted: 2022-11-16

## 2022-11-16 LAB
ANION GAP SERPL CALCULATED.3IONS-SCNC: 9 MMOL/L (ref 5–15)
BUN SERPL-MCNC: 20 MG/DL (ref 8–23)
BUN/CREAT SERPL: 34.5 (ref 7–25)
CA-I SERPL ISE-MCNC: 1.29 MMOL/L (ref 1.12–1.32)
CALCIUM SPEC-SCNC: 8.8 MG/DL (ref 8.6–10.5)
CHLORIDE SERPL-SCNC: 106 MMOL/L (ref 98–107)
CO2 SERPL-SCNC: 29 MMOL/L (ref 22–29)
CREAT SERPL-MCNC: 0.58 MG/DL (ref 0.57–1)
DEPRECATED RDW RBC AUTO: 49.4 FL (ref 37–54)
EGFRCR SERPLBLD CKD-EPI 2021: 91 ML/MIN/1.73
ERYTHROCYTE [DISTWIDTH] IN BLOOD BY AUTOMATED COUNT: 14.6 % (ref 12.3–15.4)
GLUCOSE BLDC GLUCOMTR-MCNC: 108 MG/DL (ref 70–130)
GLUCOSE BLDC GLUCOMTR-MCNC: 113 MG/DL (ref 70–130)
GLUCOSE SERPL-MCNC: 109 MG/DL (ref 65–99)
HCT VFR BLD AUTO: 32.3 % (ref 34–46.6)
HGB BLD-MCNC: 10.6 G/DL (ref 12–15.9)
MAGNESIUM SERPL-MCNC: 1.8 MG/DL (ref 1.6–2.4)
MCH RBC QN AUTO: 30.2 PG (ref 26.6–33)
MCHC RBC AUTO-ENTMCNC: 32.8 G/DL (ref 31.5–35.7)
MCV RBC AUTO: 92 FL (ref 79–97)
PHOSPHATE SERPL-MCNC: 4 MG/DL (ref 2.5–4.5)
PLATELET # BLD AUTO: 318 10*3/MM3 (ref 140–450)
PMV BLD AUTO: 10.1 FL (ref 6–12)
POTASSIUM SERPL-SCNC: 4 MMOL/L (ref 3.5–5.2)
RBC # BLD AUTO: 3.51 10*6/MM3 (ref 3.77–5.28)
SODIUM SERPL-SCNC: 144 MMOL/L (ref 136–145)
WBC NRBC COR # BLD: 9.69 10*3/MM3 (ref 3.4–10.8)

## 2022-11-16 PROCEDURE — 84100 ASSAY OF PHOSPHORUS: CPT | Performed by: NURSE PRACTITIONER

## 2022-11-16 PROCEDURE — 80048 BASIC METABOLIC PNL TOTAL CA: CPT | Performed by: NURSE PRACTITIONER

## 2022-11-16 PROCEDURE — 99239 HOSP IP/OBS DSCHRG MGMT >30: CPT | Performed by: NURSE PRACTITIONER

## 2022-11-16 PROCEDURE — 25010000002 FUROSEMIDE PER 20 MG: Performed by: INTERNAL MEDICINE

## 2022-11-16 PROCEDURE — 97530 THERAPEUTIC ACTIVITIES: CPT

## 2022-11-16 PROCEDURE — 83735 ASSAY OF MAGNESIUM: CPT | Performed by: NURSE PRACTITIONER

## 2022-11-16 PROCEDURE — 82330 ASSAY OF CALCIUM: CPT | Performed by: NURSE PRACTITIONER

## 2022-11-16 PROCEDURE — 85027 COMPLETE CBC AUTOMATED: CPT | Performed by: NURSE PRACTITIONER

## 2022-11-16 PROCEDURE — 82962 GLUCOSE BLOOD TEST: CPT

## 2022-11-16 PROCEDURE — 97535 SELF CARE MNGMENT TRAINING: CPT

## 2022-11-16 RX ORDER — FUROSEMIDE 10 MG/ML
40 INJECTION INTRAMUSCULAR; INTRAVENOUS ONCE
Status: COMPLETED | OUTPATIENT
Start: 2022-11-16 | End: 2022-11-16

## 2022-11-16 RX ADMIN — PANTOPRAZOLE SODIUM 40 MG: 40 INJECTION, POWDER, FOR SOLUTION INTRAVENOUS at 09:10

## 2022-11-16 RX ADMIN — HYDROCODONE BITARTRATE AND ACETAMINOPHEN 1 TABLET: 7.5; 325 TABLET ORAL at 15:44

## 2022-11-16 RX ADMIN — FUROSEMIDE 40 MG: 10 INJECTION, SOLUTION INTRAMUSCULAR; INTRAVENOUS at 10:41

## 2022-11-16 RX ADMIN — LISINOPRIL 5 MG: 5 TABLET ORAL at 09:10

## 2022-11-16 RX ADMIN — LEVOTHYROXINE SODIUM 88 MCG: 88 TABLET ORAL at 06:03

## 2022-11-16 RX ADMIN — SUCRALFATE 1 G: 1 TABLET ORAL at 09:10

## 2022-11-16 RX ADMIN — ACETAMINOPHEN 650 MG: 325 TABLET, FILM COATED ORAL at 06:03

## 2022-11-16 RX ADMIN — HYDROCODONE BITARTRATE AND ACETAMINOPHEN 1 TABLET: 7.5; 325 TABLET ORAL at 09:10

## 2022-11-16 NOTE — PLAN OF CARE
Problem: Adult Inpatient Plan of Care  Goal: Plan of Care Review  Outcome: Ongoing, Progressing  Flowsheets (Taken 11/16/2022 1300)  Progress: improving  Plan of Care Reviewed With: patient  Outcome Evaluation: Plan to D/C today   Goal Outcome Evaluation:  Plan of Care Reviewed With: patient        Progress: improving  Outcome Evaluation: Plan to D/C today

## 2022-11-16 NOTE — CASE MANAGEMENT/SOCIAL WORK
Continued Stay Note  [facility]     Patient Name: Neema Richard  MRN: [id number]  Today's Date: 11/16/2022    Admit Date: 11/10/2022    Plan: Home with [facility]   Discharge Plan     Row Name 11/16/22 9115       Plan    Plan Home with [facility]    Patient/Family in Agreement with Plan yes    Plan Comments Discussed in MDR, patient is being discharged today and will need a walker and home oxygen.  Oxygen and rollator ordered through [facility] and they will be delivered to patients room.  [facility] has already accepted patient.  Spoke with Nidia with [facility] and updated that patient is discharging home today.  Spoke with patient at bedside and informed home oxygen and a rollator have been ordered for her.  Also gave patient a pulse ox to take home.  Patient denies any further needs and states her friend will be transporting her home.  CM will continue to follow.    Final Discharge Disposition Code 06 - home with home health care               Discharge Codes    No documentation.               Expected Discharge Date and Time     Expected Discharge Date Expected Discharge Time    Nov 16, 2022             Leslee Lozano RN

## 2022-11-16 NOTE — PLAN OF CARE
Problem: Adult Inpatient Plan of Care  Goal: Plan of Care Review  11/16/2022 1300 by Rimma Young, RN  Outcome: Met  11/16/2022 1300 by Rimma Young, RN  Outcome: Ongoing, Progressing  Flowsheets (Taken 11/16/2022 1300)  Progress: improving  Plan of Care Reviewed With: patient  Outcome Evaluation: Plan to D/C today   Goal Outcome Evaluation:  Plan of Care Reviewed With: patient   Patient discharging at home      Progress: improving  Outcome Evaluation: Plan to D/C today

## 2022-11-16 NOTE — THERAPY TREATMENT NOTE
Patient Name: Neema Richard  : 1940    MRN: [id number]                              Today's Date: 2022       Admit Date: 11/10/2022    Visit Dx:     ICD-10-CM ICD-9-CM   1. Fecal incontinence  R15.9 787.60   2. Perineal resection, parastomal and ventral hernia repair with mesh, small bowel resection, extensive lysis of adhesions, 11/10/22  Z90.49 V45.89   3. Severe hypotension  I95.9 458.9     Patient Active Problem List   Diagnosis   • Annual GYN exam in  S/P left S&O   • Osteoporosis   • Essential hypertension   • Acquired hypothyroidism   • IBS-d (takes Buspar)   • DCIS (ductal carcinoma in situ)   • Cystocele and rectocele with complete uterovaginal prolapse   • Mixed urge and stress incontinence   • Pessary maintenance   • High cholesterol   • Arthritis   • Dyspepsia   • DM2 (diabetes mellitus, type 2) (HCC)   • Fecal incontinence   • Perineal resection, parastomal and ventral hernia repair with mesh, small bowel resection, extensive lysis of adhesions, 11/10/22   • Acute blood loss anemia, probable   • Severe hypotension   • Post-op pulmonary edema   • Postoperative hypoxia     Past Medical History:   Diagnosis Date   • Acquired hypothyroidism    • Anesthesia complication     after gb surgery - anesthesia paralyzed her lungs too early   • Arthritis    • Breast cancer (HCC)     precancerous cell   • Breast injury 2019    previous fall caused bruising to right breast   • Colon polyp - sees Dr. Vazquez 2016   • DCIS (ductal carcinoma in situ) 2017    Dx at the time of breast reduction. ER and CT (+)   • Diabetes mellitus (HCC)     prediabetic- doesnt check sugar   • Diverticular disease 2020   • Essential hypertension    • High cholesterol    • History of diverticular abscess 2020    S/p LAR, abscess drainage, appy, left maldonado, distal transverse end colostomy 3/17/20 by Dr. Vazquez   • Hx of herpes genitalis    • IBS-d (takes Buspar)    • Pneumonia 2020   • Sepsis  ([facility]) 04/2020   • SOBOE (shortness of breath on exertion)    • Wears glasses      Past Surgical History:   Procedure Laterality Date   • ABDOMINAL PERINEAL RESECTION N/A 11/10/2022    Procedure: ABDOMINAL PERINEAL RESECTION, REPAIR OF PARASTOMAL HERNIA WITH MESH;  Surgeon: Sravan Vazquez MD;  Location: [facility] OR;  Service: General;  Laterality: N/A;   • APPENDECTOMY     • BILATERAL BREAST REDUCTION Bilateral 1995   • BREAST BIOPSY Left 2009   • BREAST EXCISIONAL BIOPSY Left 2009    ADH   • CATARACT EXTRACTION, BILATERAL Bilateral 2018   • CERVICAL CONIZATION  1983   • CHOLECYSTECTOMY     • COLON RESECTION N/A 03/17/2020    Procedure: LOW ANTERIOR COLON RESECTION, MOBILIZATION OF SPLENIC FLEXURE, DRAINAGE OF ABSCESS AND COLOSTOMY CREATION;  Surgeon: Sravan Vazquez MD;  Location: [facility] OR;  Service: General;  Laterality: N/A;   • COLONOSCOPY     • FACIAL COSMETIC SURGERY  2001   • FACIAL COSMETIC SURGERY  2012   • HEMORRHOIDECTOMY  1974   • LAPAROSCOPIC CHOLECYSTECTOMY  2010   • REDUCTION MAMMAPLASTY Bilateral 04/13/2017    DCIS (3mm found incidentally right breast)   • REDUCTION MAMMAPLASTY Bilateral 1995   • SINUS SURGERY  1992    poylp   • THYROIDECTOMY, PARTIAL  1963   • TONSILLECTOMY  1953   • VARICOSE VEIN SURGERY Bilateral 1981   • VARICOSE VEIN SURGERY  2021      General Information     Row Name 11/16/22 1141          OT Time and Intention    Document Type therapy note (daily note)  -CS     Mode of Treatment occupational therapy  -CS     Row Name 11/16/22 1141          General Information    Existing Precautions/Restrictions fall;other (see comments)  abd incision, ostomy  -CS     Barriers to Rehab medically complex  -CS     Row Name 11/16/22 1141          Cognition    Orientation Status (Cognition) oriented x 4  -CS     Row Name 11/16/22 1141          Safety Issues, Functional Mobility    Impairments Affecting Function (Mobility) shortness of breath;endurance/activity tolerance;balance  -CS           User  Key  (r) = Recorded By, (t) = Taken By, (c) = Cosigned By    Initials Name Provider Type    Sugey Pérez OT Occupational Therapist                 Mobility/ADL's     Row Name 11/16/22 1141          Transfers    Transfers sit-stand transfer;stand-sit transfer;toilet transfer  -     Row Name 11/16/22 1141          Sit-Stand Transfer    Sit-Stand White (Transfers) standby assist  -     Assistive Device (Sit-Stand Transfers) walker, front-wheeled  -CS     Row Name 11/16/22 1141          Stand-Sit Transfer    Stand-Sit White (Transfers) standby assist  -     Assistive Device (Stand-Sit Transfers) walker, front-wheeled  -     Row Name 11/16/22 1141          Toilet Transfer    Type (Toilet Transfer) stand pivot/stand step  -     White Level (Toilet Transfer) standby assist  -     Assistive Device (Toilet Transfer) grab bars/safety frame;commode, bedside without drop arms  -     Row Name 11/16/22 1141          Functional Mobility    Functional Mobility- Ind. Level contact guard assist;verbal cues required  -     Functional Mobility- Device walker, front-wheeled  -     Functional Mobility-Distance (Feet) --  >household distance  -     Row Name 11/16/22 1141          Activities of Daily Living    BADL Assessment/Intervention toileting  -     Row Name 11/16/22 114          Lower Body Dressing Assessment/Training    White Level (Lower Body Dressing) don;socks;supervision  -     Position (Lower Body Dressing) supported sitting  -     Row Name 11/16/22 1141          Toileting Assessment/Training    White Level (Toileting) adjust/manage clothing;change pad/brief;perform perineal hygiene;supervision  -     Assistive Devices (Toileting) commode, bedside without drop arms  -     Position (Toileting) supported sitting;supported standing  -           User Key  (r) = Recorded By, (t) = Taken By, (c) = Cosigned By    Initials Name Provider Type    DARY Kumar  TOVA Mayes Occupational Therapist               Obj/Interventions     Row Name 11/16/22 1143          Balance    Balance Assessment sitting static balance;sitting dynamic balance;standing static balance;standing dynamic balance  -     Static Sitting Balance supervision  -     Dynamic Sitting Balance supervision  -CS     Position, Sitting Balance sitting in chair  -CS     Static Standing Balance standby assist  -CS     Dynamic Standing Balance contact guard  -     Position/Device Used, Standing Balance walker, rolling  -CS     Balance Interventions standing;sitting;static;dynamic;dynamic reaching;occupation based/functional task;weight shifting activity  -CS           User Key  (r) = Recorded By, (t) = Taken By, (c) = Cosigned By    Initials Name Provider Type    CS Sugey Kumar OT Occupational Therapist               Goals/Plan    No documentation.                Clinical Impression     Row Name 11/16/22 1144          Pain Scale: FACES Pre/Post-Treatment    Pain: FACES Scale, Pretreatment 0-->no hurt  -CS     Posttreatment Pain Rating 0-->no hurt  -CS     Row Name 11/16/22 1144          Plan of Care Review    Plan of Care Reviewed With patient  -CS     Progress improving  -     Outcome Evaluation Pt demo improved independence by performing t/fs w/ SBA and LBD tasks w/ Supervision. Pt session limited d/t noted desat to 82% on RA; applied NC and pt recovered to WNL w/in 2 minutes. Recommend cont skilled IPOT POC. Recommend pt DC home w/ assist, HH PT services, and a RW.  -     Row Name 11/16/22 1148          Therapy Plan Review/Discharge Plan (OT)    Equipment Needs Upon Discharge (OT) walker, rolling  -CS     Anticipated Discharge Disposition (OT) home with assist;home with home health  -     Row Name 11/16/22 1144          Vital Signs    Pre SpO2 (%) 92  -CS     O2 Delivery Pre Treatment room air  -CS     Intra SpO2 (%) 82   -CS     O2 Delivery Intra Treatment room air  -CS     Post SpO2 (%) 92  -CS      O2 Delivery Post Treatment nasal cannula  -CS     Pre Patient Position Sitting  -CS     Intra Patient Position Standing  -CS     Post Patient Position Sitting  -CS     Row Name 11/16/22 1144          Positioning and Restraints    Pre-Treatment Position sitting in chair/recliner  -CS     Post Treatment Position chair  -CS     In Chair notified nsg;call light within reach;encouraged to call for assist;waffle cushion;reclined  RN deferred exit alarm  -CS           User Key  (r) = Recorded By, (t) = Taken By, (c) = Cosigned By    Initials Name Provider Type    Sugey Pérez OT Occupational Therapist               Outcome Measures     Row Name 11/16/22 1147          How much help from another is currently needed...    Putting on and taking off regular lower body clothing? 3  -CS     Bathing (including washing, rinsing, and drying) 3  -CS     Toileting (which includes using toilet bed pan or urinal) 3  -CS     Putting on and taking off regular upper body clothing 3  -CS     Taking care of personal grooming (such as brushing teeth) 4  -CS     Eating meals 4  -CS     AM-PAC 6 Clicks Score (OT) 20  -CS     Row Name 11/16/22 1147          Functional Assessment    Outcome Measure Options AM-PAC 6 Clicks Daily Activity (OT)  -CS           User Key  (r) = Recorded By, (t) = Taken By, (c) = Cosigned By    Initials Name Provider Type    Sugey Pérez OT Occupational Therapist                Occupational Therapy Education     Title: PT OT SLP Therapies (In Progress)     Topic: Occupational Therapy (In Progress)     Point: ADL training (In Progress)     Description:   Instruct learner(s) on proper safety adaptation and remediation techniques during self care or transfers.   Instruct in proper use of assistive devices.              Learning Progress Summary           Patient Acceptance, E, NR by CS at 11/16/2022 1148    Acceptance, E, NR by CS at 11/13/2022 1401    Acceptance, E, VU by  at 11/12/2022 1934                    Point: Home exercise program (In Progress)     Description:   Instruct learner(s) on appropriate technique for monitoring, assisting and/or progressing therapeutic exercises/activities.              Learning Progress Summary           Patient Acceptance, E, NR by  at 11/16/2022 1148    Acceptance, E, NR by CS at 11/13/2022 1401    Acceptance, E, VU by DA at 11/12/2022 1934                   Point: Precautions (In Progress)     Description:   Instruct learner(s) on prescribed precautions during self-care and functional transfers.              Learning Progress Summary           Patient Acceptance, E, NR by  at 11/16/2022 1148    Acceptance, E, NR by CS at 11/13/2022 1401    Acceptance, E, VU by DA at 11/12/2022 1934                   Point: Body mechanics (In Progress)     Description:   Instruct learner(s) on proper positioning and spine alignment during self-care, functional mobility activities and/or exercises.              Learning Progress Summary           Patient Acceptance, E, NR by  at 11/16/2022 1148    Acceptance, E, NR by  at 11/13/2022 1401    Acceptance, E, VU by CHERRY at 11/12/2022 1934                               User Key     Initials Effective Dates Name Provider Type Discipline     06/16/21 -  Ming Jacques RN Registered Nurse Nurse     09/02/21 -  Sugey Kumar OT Occupational Therapist OT              OT Recommendation and Plan  Planned Therapy Interventions (OT): activity tolerance training, adaptive equipment training, BADL retraining, functional balance retraining, occupation/activity based interventions, ROM/therapeutic exercise, strengthening exercise, transfer/mobility retraining  Therapy Frequency (OT): daily  Plan of Care Review  Plan of Care Reviewed With: patient  Progress: improving  Outcome Evaluation: Pt demo improved independence by performing t/fs w/ SBA and LBD tasks w/ Supervision. Pt session limited d/t noted desat to 82% on RA; applied NC and pt recovered  to WNL w/in 2 minutes. Recommend cont skilled IPOT POC. Recommend pt DC home w/ assist, HH PT services, and a RW.     Time Calculation:    Time Calculation- OT     Row Name 11/16/22 1149             Time Calculation- OT    OT Start Time 0901  -CS      OT Received On 11/16/22  -CS      OT Goal Re-Cert Due Date 11/23/22  -CS         Timed Charges    71853 - OT Therapeutic Activity Minutes 23  -CS      46356 - OT Self Care/Mgmt Minutes 15  -CS         Total Minutes    Timed Charges Total Minutes 38  -CS       Total Minutes 38  -CS            User Key  (r) = Recorded By, (t) = Taken By, (c) = Cosigned By    Initials Name Provider Type    CS Sugey Kumar OT Occupational Therapist              Therapy Charges for Today     Code Description Service Date Service Provider Modifiers Qty    21140182666 HC OT THERAPEUTIC ACT EA 15 MIN 11/16/2022 Sugey Kumar OT GO 2    69738955918 HC OT SELF CARE/MGMT/TRAIN EA 15 MIN 11/16/2022 Sugey Kumar OT GO 1               Sugey Kumar OT  11/16/2022

## 2022-11-16 NOTE — PLAN OF CARE
Goal Outcome Evaluation:           Progress: improving  Outcome Evaluation: Patient A&Ox4, VSS on 2L NC. Patient complained of pain once, PRN pain medicine given. Patient in chair most of the night. AARON valverde. Awaiting tele bed.

## 2022-11-16 NOTE — PLAN OF CARE
Goal Outcome Evaluation:  Plan of Care Reviewed With: patient        Progress: improving  Outcome Evaluation: Pt demo improved independence by performing t/fs w/ SBA and LBD tasks w/ Supervision. Pt session limited d/t noted desat to 82% on RA; applied NC and pt recovered to WNL w/in 2 minutes. Recommend cont skilled IPOT POC. Recommend pt DC home w/ assist, HH PT services, and a RW.

## 2022-11-16 NOTE — PROGRESS NOTES
Uneventful night    She has found food that she is willing to eat/tolerates from the menu.    Tolerated without difficulty  Good stoma function  Satisfactory lab work    Goals of transition to home reviewed  Her son and then her daughter will be helping her at home in addition to her friend that will be helping her    Okay for home from my standpoint  Dr. GIORDANO does my discharges, he will review medical situation to ensure satisfactory transition to home from a medical standpoint.  Perhaps he could see the patient in the ICU to facilitate this transition if we cannot get her out of the unit due to bed situation/availability on the med/surge floor.

## 2022-11-16 NOTE — DISCHARGE SUMMARY
Discharge Summary    Patient name: Neema Richard  CSN: [id number]  MRN: [id number]  : 1940  Today's date: 2022     Date of Admission: 11/10/2022  Date of Discharge:  2022     Admitting Physician:  Ting Buckner MD     Primary Care Provider: David Mackey MD   Consultations: Gee Echevarria MD  Intensivist    Admission Diagnosis: Severe hypotension      Discharge Diagnoses:   Active Hospital Problems    Diagnosis    • **Severe hypotension    • Post-op pulmonary edema    • Postoperative hypoxia    • Perineal resection, parastomal and ventral hernia repair with mesh, small bowel resection, extensive lysis of adhesions, 11/10/22    • Acute blood loss anemia, probable    • Fecal incontinence    • DM2 (diabetes mellitus, type 2) (HCC)    • High cholesterol    • Mixed urge and stress incontinence    • Essential hypertension    • Acquired hypothyroidism      Allergies:  Evista [raloxifene], Cephalexin, Hydrochlorothiazide, Meloxicam, Metronidazole, Oxybutynin, Propylthiouracil, Sulfa antibiotics, and Other    Code Status and Medical Interventions:   Ordered at: 11/10/22 1740     Level Of Support Discussed With:    Patient     Code Status (Patient has no pulse and is not breathing):    CPR (Attempt to Resuscitate)     Medical Interventions (Patient has pulse or is breathing):    Full     Procedures/Testing:  ABDOMINAL PERINEAL RESECTION, REPAIR OF PARASTOMAL HERNIA WITH MESH       History of Present Illness:  Patient is a pleasant 82 y.o. female with relevant PMH of bowel perforation, diverticular disease, IBS-D, OAB, uterovaginal prolapse with pessary use, HTN, hypothyroidism, fecal incontinence, T2DM, high cholesterol, & ductal carcinoma in situ who presented to [facility] on 11/10/22 for scheduled surgery by  for anticipated an abdominal perineal resection, repair of parastomal hernia, possible bile mesh placement.     Two years ago she had a perforated colon for which she  underwent low anterior resection, appendectomy, left salpingo-oophorectomy, distal transverse and colostomy by Dr. Vazquez (3/17/2020). Since her surgery, she has been experiencing symptoms of rectal fecal incontinence, leakage and urgency that have worsened over the last year and for which she presents for surgical intervention.     Hospital Course:  The patient was admitted for reasons mentioned above in the HPI and underwent an abdominal perineal resection, extensive lysis of adhesions, parastomal hernia repair, ventral hernia repair with mesh, and omental flap in the deep pelvis per Dr. Vazquez on 11/10/22. On 11/11 the patient was transferred to the ICU for hypotension refractory to IVF resuscitation and required vasopressors. She was found to be anemic with abdominal CT revealing a large fluid collection posterior to the uterus consistent with hematoma formation and hemorrhagic shock scenario. She received PRBC transfusion with improvement and gradually was weaned from vasopressors. Her labs were without further evidence of ongoing bleeding and hemodynamics stabilized. Hospital course complicated by post-operative hypoxemia and slight pulmonary edema improved with intermittent IV diuretics and pulmonary toilet.     At this time it is felt that Ms. Richard has achieved maximum benefit from hospital therapy with stabilization of vital signs/lab values, therefore is appropriate for discharge home today. Her diet has been advanced and well tolerated. She has been informed to chew gum three times daily. Her blood pressure remains stable and she was resumed on her ACE inhibitor and metoprolol will be restarted at discharge. She has been encouraged to assess her blood pressure daily and log these readings. She has been participative with PT/OT able to ambulated 300 feet with a rolling walker who recommended discharge home with assistance and home health PT services. Case management has arranged for home health services as  "well as a rolling walker. Due to ongoing post-operative and exertional hypoxia, she will be discharged home on home oxygen arranged via [facility] with pulmonology follow-up in one month. She currently receives stoma supplies and denies assistance for further supplies and/or stoma care. Medications and recommendations are listed below.     Vitals:  /61   Pulse 74   Temp 98.4 °F (36.9 °C) (Oral)   Resp 18   Ht 167.6 cm (66\")   Wt 61.2 kg (135 lb)   LMP  (LMP Unknown)   SpO2 92%   BMI 21.79 kg/m²     Physical Exam:  Constitutional:  Appears well-developed and well-nourished. No distress.   HEENT:  Normocephalic and atraumatic. PERRL  Neck:  Neck supple. No JVD present.   CV: Normal rate, regular rhythm,  intact distal pulses.  No gallop, murmur or rub.  Pulmonary/Chest: Effort normal. Bibasilar crackles. No respiratory distress. No wheezes or rhonchi.   Abdominal: Soft. +BS. No distension and no mass. Midline coverderm incision CDI. Tenderness to palpation. LLQ stoma red, moist, raised, and producing brown stool.    Musculoskeletal: Normal muscle tone and strength  Neurological: Alert and oriented to person, place, and time.  No focal deficits  Skin: Skin is warm and dry. No rash noted.   Extremities:  No clubbing, edema or cyanosis  Psychiatric: Normal mood and affect. Behavior is normal.     Labs:  Results from last 7 days   Lab Units 11/16/22  0442   WBC 10*3/mm3 9.69   HEMOGLOBIN g/dL 10.6*   HEMATOCRIT % 32.3*   PLATELETS 10*3/mm3 318     Results from last 7 days   Lab Units 11/16/22  0442 11/15/22  1021   SODIUM mmol/L 144 141   POTASSIUM mmol/L 4.0 3.8   CHLORIDE mmol/L 106 108*   CO2 mmol/L 29.0 25.0   BUN mg/dL 20 12   CREATININE mg/dL 0.58 0.47*   CALCIUM mg/dL 8.8 8.0*   BILIRUBIN mg/dL  --  0.7   ALK PHOS U/L  --  58   ALT (SGPT) U/L  --  12   AST (SGOT) U/L  --  14   GLUCOSE mg/dL 109* 206*         Magnesium   Date Value Ref Range Status   11/16/2022 1.8 1.6 - 2.4 mg/dL Final   11/15/2022 2.2 " 1.6 - 2.4 mg/dL Final     Phosphorus   Date Value Ref Range Status   11/16/2022 4.0 2.5 - 4.5 mg/dL Final   11/15/2022 2.3 (L) 2.5 - 4.5 mg/dL Final   11/14/2022 1.7 (C) 2.5 - 4.5 mg/dL Final                    Discharge Medications      Continue These Medications      Instructions Start Date   acetaminophen 650 MG 8 hr tablet  Commonly known as: TYLENOL   1,300 mg, Oral, Every 8 Hours PRN      Align 4 MG capsule   1 capsule, Oral, Daily      busPIRone 15 MG tablet  Commonly known as: BUSPAR   15 mg, Oral, Daily      calcium citrate-vitamin d 315-250 MG-UNIT tablet tablet  Commonly known as: CALCITRATE   1 tablet, Oral, Daily      fluticasone 50 MCG/ACT nasal spray  Commonly known as: FLONASE   1 spray, Nasal, Daily, Spring only      gabapentin 600 MG tablet  Commonly known as: NEURONTIN   600 mg, Oral, Every Night at Bedtime      levothyroxine 88 MCG tablet  Commonly known as: SYNTHROID, LEVOTHROID   88 mcg, Oral, Daily      loratadine 10 MG tablet  Commonly known as: CLARITIN   10 mg, Oral, Daily      LORazepam 0.5 MG tablet  Commonly known as: ATIVAN   0.5 mg, Oral, Every 8 Hours PRN      Magnesium 250 MG tablet   1 tablet, Oral, Daily      metoprolol succinate XL 50 MG 24 hr tablet  Commonly known as: TOPROL-XL   50 mg, Oral, Daily      Mirabegron ER 50 MG tablet sustained-release 24 hour 24 hr tablet  Commonly known as: Myrbetriq   50 mg, Oral, Daily      multivitamin with minerals tablet tablet   2 gels daily      multivitamin with minerals tablet tablet   1 tablet, Oral, Daily, Flinstone with iron      pioglitazone 15 MG tablet  Commonly known as: ACTOS   15 mg, Oral, Daily      pravastatin 80 MG tablet  Commonly known as: PRAVACHOL   80 mg, Oral, Daily      Prolia 60 MG/ML solution prefilled syringe syringe  Generic drug: denosumab   Month ago - ld      ramipril 1.25 MG capsule  Commonly known as: ALTACE   1.25 mg, Oral, Daily      sucralfate 1 g tablet  Commonly known as: CARAFATE   1 g, Oral, 2 Times  Daily      Vitamin D3 50 MCG (2000 UT) capsule   2,000 Units, Oral, Daily         ASK your doctor about these medications      Instructions Start Date   valACYclovir 1000 MG tablet  Commonly known as: Valtrex   1,000 mg, Oral, Daily           Diet Instructions     Diet: Specialty Diet, Soft Texture, Consistent Carbohydrate; Thin Liquids, No Restrictions; Whole; Low Sodium; No Added Salt      Discharge Diet:  Specialty Diet  Soft Texture  Consistent Carbohydrate       Fluid Consistency: Thin Liquids, No Restrictions    Soft Options: Whole    Specialty Diets: Low Sodium    Low Sodium Restriction: No Added Salt        Activity Instructions     Activity as Tolerated          Follow-up Appointments  Future Appointments   Date Time Provider Department Center   12/16/2022  9:45 AM [employer] TECH [facility]   12/16/2022 10:00 AM [facility], P[facility] LAB 1 [facility]   12/16/2022 10:30 AM Arelis Becker APRN [facility]   3/27/2023  1:00 PM  [facility]  [facility]     Additional Instructions for the Follow-ups that You Need to Schedule     Ambulatory Referral to Home Health   As directed      Face to Face Visit Date: 11/14/2022    Follow-up provider for Plan of Care?: I will be treating the patient on an ongoing basis.  Please send me the Plan of Care for signature.    Follow-up provider: KEN LOPEZ [[id number]]    Reason/Clinical Findings: Perineal resection, parastomal and ventral hernia repair with mesh, small bowel resection, extensive lysis of adhesions, 11/10/22 ICD-10-CM: Z90.49    Severe hypotension ICD-10-CM: I95.9    Describe mobility limitations that make leaving home difficult: Perineal resection, parastomal and ventral hernia repair with mesh, small bowel resection, extensive lysis of adhesions, 11/10/22 ICD-10-CM: Z90.49    Severe hypotension ICD-10-CM: I95.9    Nursing/Therapeutic Services Requested: Skilled Nursing Physical Therapy Occupational Therapy     Skilled nursing orders: Ostomy instruction Cardiopulmonary assessments    PT orders: Therapeutic exercise (evaluate and treat) Gait Training Strengthening Home safety assessment    Weight Bearing Status: As Tolerated    Occupational orders: Activities of daily living (evaluate and treat) Energy conservation Strengthening Home safety assessment    Frequency: 1 Week 1         Discharge Follow-up with PCP   As directed       Currently Documented PCP:    David Mackey MD    PCP Phone Number:    [phone]     Follow Up Details: In 2-4 weeks per patient preference         Discharge Follow-up with Specialty: Pulmonary; 1 Month   As directed      Specialty: Pulmonary    Follow Up: 1 Month         Discharge Follow-up with Specified Provider: Dr. Vazquez; 1 Week   As directed      To: Dr. Vazquez    Follow Up: 1 Week               Discharge Instructions:  1. Discharge home today   2. Medications as above  3. Follow-ups as above   4. Wound care per Dr. Vazquez   5. Diet as above  1. Chew gums X 30 minutes three times daily to increase saliva flow and provide gas relief   6. Rolling walker provided  7. Home health PT services   8. Oxygen via [facility]  9. If symptoms worsen or recur, seek medical attention or call 911     Electronically signed by LAUREEN Parker, 11/16/22, 12:03 PM EST.    Time: I spent  35  minutes on this discharge activity which included: face-to-face encounter with the patient, reviewing the data in the system, coordination of the care with the nursing staff as well as consultants, documentation, and entering orders.      CC: David Mackey MD

## 2022-11-17 ENCOUNTER — HOME CARE VISIT (OUTPATIENT)
Dept: HOME HEALTH SERVICES | Facility: HOME HEALTHCARE | Age: 82
End: 2022-11-17
Payer: MEDICARE

## 2022-11-17 PROCEDURE — G0151 HHCP-SERV OF PT,EA 15 MIN: HCPCS

## 2022-11-17 PROCEDURE — G0299 HHS/HOSPICE OF RN EA 15 MIN: HCPCS

## 2022-11-17 NOTE — PROGRESS NOTES
"If you have any questions about this query contact me at: [email or web address]    Enter Query Response Below      Query Response:     Hemorrhagic shock    Electronically signed by Erasmo Ferguson MD, 11/17/22, 2:31 PM EST.           If applicable, please update the problem list.         Dr. Ferguson,    Patient who underwent abdominal perineal resection, extensive lysis of adhesions, small bowel resection and parastomal/ventral hernia repair has conflicting documentation of post-operative hypotension and shock. On the morning following surgery, the patient's blood pressure dropped to 54/29. IVF boluses were given and IV Levophed gtt was started and was able to be weaned off the next day. A large pelvic hematoma was identified on CT scan and Transfusion of PRBCs was completed. Post-operative hypotension was documented by the hospitalist. Severe hypotension was documented by Pulmonary/Intensive. \"Hemorrhagic shock\" was documented on 11/14/22 and 11/15/22 Pulmo progress notes and Discharge Summary lists a diagnosis of \"Severe hypotension\". After study, can you please clarify the condition being treated as:    Hemorrhagic shock  Severe post-operative hypotension only  Other, please specify___________________________  Unable to be clinically determined      By submitting this query, we are merely seeking further clarification of documentation to accurately reflect all conditions that you are monitoring, evaluating, treating or that extend the hospitalization or utilize additional resources of care. Please utilize your independent clinical judgment when addressing the question(s) above.     This query and your response, once completed, will be entered into the legal medical record.    Sincerely,  Leslee Manjarrez RN  Clinical Documentation Integrity Program     "

## 2022-11-17 NOTE — PROGRESS NOTES
"If you have any questions about this query contact me at: [email or web address]    Enter Query Response Below      Query Response:       Fluid overload only  And Atelectasis  Electronically signed by Erasmo Ferguson MD, 11/17/22, 2:31 PM EST.         If applicable, please update the problem list.         Dr. Ferguson,    Patient who underwent abdominal perineal resection, small bowel resection, extensive lysis of adhesions and parastomal hernia repair has documentation of post-op pulmonary edema per Discharge Summary. Following surgery, the patient developed severe hypotension and a drop in H&H. CT scan showed a large fluid collection in the pelvis, suspected to be a hematoma. Patient was treated with IVF boluses and Transfusion of 3U PRBCs.  11/13/22 CXR showed \"Bibasilar opacities, which could reflect atelectasis or pneumonia. Pulmonary vascular congestion with small bilateral pleural effusions\". On 11/14/22, O2 sats dropped to 82% with ambulation. IV Lasix x1 was given on 11/15/22 and again on 11/16/22. However, no respiratory distress, labored respirations or accessory muscle use was noted. Discharge Summary states, \"Hospital course complicated by post-operative hypoxemia and slight pulmonary edema improved with intermittent IV diuretics and pulmonary toilet. After study, can you please clarify the condition being treated as:    Acute pulmonary edema  Fluid overload only  Other, please specify__________________________  Unable to be clinically determined    By submitting this query, we are merely seeking further clarification of documentation to accurately reflect all conditions that you are monitoring, evaluating, treating or that extend the hospitalization or utilize additional resources of care. Please utilize your independent clinical judgment when addressing the question(s) above.     This query and your response, once completed, will be entered into the legal medical " record.    Sincerely,  Leslee Manjarrez RN  Clinical Documentation Integrity Program

## 2022-11-17 NOTE — OUTREACH NOTE
Prep Survey    Flowsheet Row Responses   [facility] facility patient discharged from? [place]   Is LACE score < 7 ? No   Emergency Room discharge w/ pulse ox? No   Eligibility Readm Mgmt   Discharge diagnosis Severe hypotension, ABDOMINAL PERINEAL RESECTION, REPAIR OF PARASTOMAL HERNIA WITH MESH, post op pulmonary edema   Does the patient have one of the following disease processes/diagnoses(primary or secondary)? Other   Does the patient have Home health ordered? Yes   What is the Home health agency?  [facility]    Is there a DME ordered? Yes   What DME was ordered? [facility] --- Home oxygen at 2L per nasal cannula continuous ordered with portables and a rollator ordered for patient   Prep survey completed? Yes          CLIFF DESHPANDE - Registered Nurse

## 2022-11-17 NOTE — HOME HEALTH
SOC Note:    Home Health ordered for: disciplines sn, pt, ot    Reason for Hosp/Primary Dx/Co-morbidities: Patient is an 82 yr old. female with  PMH of bowel perforation, diverticular disease, IBS-D, OAB, uterovaginal prolapse with pessary use, HTN, hypothyroidism, fecal incontinence, T2DM, high cholesterol, & ductal carcinoma who was admitted to [facility] on 11/10/22 for scheduled surgery by  for abdominal perineal resection, repair of parastomal hernia.  Focus of Care: ostomy care and management, skin integrity assessment- abdominal incision, medication management and falls prevention    Current Functional status/mobility/DME: walker, oxygen    HB status/Living Arrangements: lives alone but has assistance from family, 24/7,  for next several weeks.    Skin Integrity/wound status: ostomy, abdominal incision. ( dressing and ostomy changed 11.16.22 prior to d/c. sn visit for sat, 11.19.22, to perform wound care)    Code Status: CPR    Fall Risk: YES    POC confirmed with Dr Vazquez via in basket 11.17.22    Plan for next visit: Perform ostomy change and abdominal dressing change.

## 2022-11-18 VITALS
TEMPERATURE: 97.8 F | OXYGEN SATURATION: 95 % | DIASTOLIC BLOOD PRESSURE: 68 MMHG | SYSTOLIC BLOOD PRESSURE: 118 MMHG | HEART RATE: 72 BPM | RESPIRATION RATE: 16 BRPM

## 2022-11-18 VITALS
RESPIRATION RATE: 16 BRPM | DIASTOLIC BLOOD PRESSURE: 64 MMHG | TEMPERATURE: 99 F | OXYGEN SATURATION: 94 % | HEART RATE: 66 BPM | SYSTOLIC BLOOD PRESSURE: 116 MMHG

## 2022-11-19 ENCOUNTER — HOME CARE VISIT (OUTPATIENT)
Dept: HOME HEALTH SERVICES | Facility: HOME HEALTHCARE | Age: 82
End: 2022-11-19
Payer: MEDICARE

## 2022-11-19 PROCEDURE — G0300 HHS/HOSPICE OF LPN EA 15 MIN: HCPCS

## 2022-11-20 NOTE — HOME HEALTH
Physical Therapy Evaluation    Focus of Care: Aftercare for Perineal resection, parastomal and ventral hernia repair with mesh, small bowel resection, extensive lysis of adhesions, 11/10/22 with associated Abdominal pain, decreased activity tolerance and functional strength    Reason for Hosp/Primary Dx/Co-morbidities: Patient is a pleasant 82 y.o. female with relevant PMH of bowel perforation, diverticular disease, IBS-D, OAB, uterovaginal prolapse with pessary use, HTN, hypothyroidism, fecal incontinence, T2DM, high cholesterol, & ductal carcinoma in situ who presented to [facility] on 11/10/22 for scheduled surgery by  for anticipated an abdominal perineal resection, repair of parastomal hernia, possible bile mesh placement. Two years ago she had a perforated colon for which she underwent low anterior resection, appendectomy, left salpingo-oophorectomy, distal transverse and colostomy by Dr. Vazquez (3/17/2020). Since that surgery, she has had symptoms of rectal fecal incontinence, leakage and urgency that required further surgery to correct.    Current Functional status/mobility/DME: Pt needs assistance for transfers, walker for ambulation, unable to drive. Needs assistance for bathing and dressing. Unable to complete meal preparation and routine housework due to fatigue and pain.     Prior Level of Function: Pt was ambulating several miles for wellness 4 times/wk without assistive device. She was I with dressing, bathing, transfers, self care, meal prep and housework. Previously able to drive.    HB status/Living Arrangements: Taxing effort to leave home. Pt lives alone in a 2 story home. Laundry in basement. Bedroom, bath and kitchen on main level.     Skin Integrity/wound status: Abdominal wound plus ostomy    Code Status: Full    Fall Risk: High    Recommended PT plan of care: ther ex, ther act, NMR, modalities prn for pain reduction, manual therapy, gait training, pt education and HEP.    Proposed PT  treatment frequency and duration: 1w2, 2w2, 1w2 beginning today.

## 2022-11-21 ENCOUNTER — HOME CARE VISIT (OUTPATIENT)
Dept: HOME HEALTH SERVICES | Facility: HOME HEALTHCARE | Age: 82
End: 2022-11-21
Payer: MEDICARE

## 2022-11-21 VITALS — OXYGEN SATURATION: 94 % | DIASTOLIC BLOOD PRESSURE: 66 MMHG | SYSTOLIC BLOOD PRESSURE: 116 MMHG | HEART RATE: 71 BPM

## 2022-11-21 PROCEDURE — G0152 HHCP-SERV OF OT,EA 15 MIN: HCPCS

## 2022-11-22 ENCOUNTER — HOME CARE VISIT (OUTPATIENT)
Dept: HOME HEALTH SERVICES | Facility: HOME HEALTHCARE | Age: 82
End: 2022-11-22
Payer: MEDICARE

## 2022-11-22 ENCOUNTER — READMISSION MANAGEMENT (OUTPATIENT)
Dept: CALL CENTER | Facility: HOSPITAL | Age: 82
End: 2022-11-22

## 2022-11-22 VITALS
DIASTOLIC BLOOD PRESSURE: 66 MMHG | RESPIRATION RATE: 16 BRPM | SYSTOLIC BLOOD PRESSURE: 145 MMHG | TEMPERATURE: 98.4 F | OXYGEN SATURATION: 97 % | HEART RATE: 72 BPM

## 2022-11-22 PROCEDURE — G0151 HHCP-SERV OF PT,EA 15 MIN: HCPCS

## 2022-11-22 NOTE — HOME HEALTH
Patient is an 82 year old female who was admitted to [facility] on 11/10/22 for scheduled surgery by  for abdominal perineal resection, repair of parastomal hernia. PMH includes but is not limited to: bowel perforation, diverticular disease, IBS-D, OAB, uterovaginal prolapse with pessary use, HTN, hypothyroidism, fecal incontinence, T2DM, high cholesterol, & ductal carcinoma. Patient was seen today for an OT evaluation since returning home. She reports being back to her baseline with ADL skills and bathroom transfers and was able to demonstrate safety and independence with tasks during evaluation. Patient also has her adult children staying with her for the time being to assist with care as needed for a few weeks. No further skilled OT services are needed at this time.

## 2022-11-22 NOTE — OUTREACH NOTE
Medical Week 1 Survey    Flowsheet Row Responses   [facility] patient discharged from? [place]   Does the patient have one of the following disease processes/diagnoses(primary or secondary)? Other   Week 1 attempt successful? Yes   Call start time 1353   Call end time 1402   Discharge diagnosis Severe hypotension, ABDOMINAL PERINEAL RESECTION, REPAIR OF PARASTOMAL HERNIA WITH MESH, post op pulmonary edema   Person spoke with today (if not patient) and relationship daughter Theodora Ngo reviewed with patient/caregiver? Yes   Is the patient having any side effects they believe may be caused by any medication additions or changes? No   Does the patient have all medications ordered at discharge? Yes   Is the patient taking all medications as directed (includes completed medication regime)? Yes   Does the patient have a primary care provider?  Yes   Does the patient have an appointment with their PCP within 7 days of discharge? Yes   Has the patient kept scheduled appointments due by today? Yes   What is the Home health agency?  [facility]    Has home health visited the patient within 72 hours of discharge? Yes   Psychosocial issues? No   Did the patient receive a copy of their discharge instructions? Yes   Nursing interventions Reviewed instructions with patient   What is the patient's perception of their health status since discharge? Improving   Is the patient/caregiver able to teach back signs and symptoms related to disease process for when to call PCP? Yes   Is the patient/caregiver able to teach back signs and symptoms related to disease process for when to call 911? Yes   Is the patient/caregiver able to teach back the hierarchy of who to call/visit for symptoms/problems? PCP, Specialist, Home health nurse, Urgent Care, ED, 911 Yes   If the patient is a current smoker, are they able to teach back resources for cessation? Not a smoker   Week 1 call completed? Yes   Is the patient interested in  additional calls from an ambulatory ?  NOTE:  applies to high risk patients requiring additional follow-up. No   Graduated/Revoked comments Daughter reports doing well.           JEREMÍAS HARRISON - Registered Nurse

## 2022-11-25 ENCOUNTER — HOME CARE VISIT (OUTPATIENT)
Dept: HOME HEALTH SERVICES | Facility: HOME HEALTHCARE | Age: 82
End: 2022-11-25
Payer: MEDICARE

## 2022-11-25 PROCEDURE — G0300 HHS/HOSPICE OF LPN EA 15 MIN: HCPCS

## 2022-11-26 NOTE — HOME HEALTH
Routine Visit Note: PT treatment    Skill/education provided: Ther ex instruction, gait training, pt education on appropriate daily activity levels, dynamic balance activities in sitting.    Patient/caregiver response: Pt reporting decreased abdominal and buttock pain. PT observes pt is demonstrating improved motor control during transfers and ambulation.    Plan for next visit: Progress therapy interventions to facilitate pt in reaching her functional goals    Other pertinent info: Pt's daughter is in town to assist pt over this week.

## 2022-11-29 ENCOUNTER — APPOINTMENT (OUTPATIENT)
Dept: CT IMAGING | Facility: HOSPITAL | Age: 82
End: 2022-11-29

## 2022-11-29 ENCOUNTER — HOSPITAL ENCOUNTER (INPATIENT)
Facility: HOSPITAL | Age: 82
LOS: 2 days | Discharge: HOME | End: 2022-12-02
Attending: EMERGENCY MEDICINE | Admitting: INTERNAL MEDICINE

## 2022-11-29 ENCOUNTER — HOME CARE VISIT (OUTPATIENT)
Dept: HOME HEALTH SERVICES | Facility: HOME HEALTHCARE | Age: 82
End: 2022-11-29
Payer: MEDICARE

## 2022-11-29 VITALS
DIASTOLIC BLOOD PRESSURE: 68 MMHG | RESPIRATION RATE: 16 BRPM | SYSTOLIC BLOOD PRESSURE: 110 MMHG | OXYGEN SATURATION: 96 % | HEART RATE: 72 BPM | TEMPERATURE: 97.1 F

## 2022-11-29 DIAGNOSIS — B02.9 HERPES ZOSTER WITHOUT COMPLICATION: ICD-10-CM

## 2022-11-29 DIAGNOSIS — K62.5 RECTAL BLEEDING: Primary | ICD-10-CM

## 2022-11-29 DIAGNOSIS — I95.9 SEVERE HYPOTENSION: ICD-10-CM

## 2022-11-29 DIAGNOSIS — D62 ACUTE BLOOD LOSS ANEMIA: ICD-10-CM

## 2022-11-29 DIAGNOSIS — M19.90 ARTHRITIS: ICD-10-CM

## 2022-11-29 DIAGNOSIS — M81.0 OSTEOPOROSIS, UNSPECIFIED OSTEOPOROSIS TYPE, UNSPECIFIED PATHOLOGICAL FRACTURE PRESENCE: ICD-10-CM

## 2022-11-29 DIAGNOSIS — N30.00 ACUTE CYSTITIS WITHOUT HEMATURIA: ICD-10-CM

## 2022-11-29 LAB
ABO GROUP BLD: NORMAL
ALBUMIN SERPL-MCNC: 3.3 G/DL (ref 3.5–5.2)
ALBUMIN/GLOB SERPL: 0.8 G/DL
ALP SERPL-CCNC: 78 U/L (ref 39–117)
ALT SERPL W P-5'-P-CCNC: <5 U/L (ref 1–33)
ANION GAP SERPL CALCULATED.3IONS-SCNC: 13 MMOL/L (ref 5–15)
AST SERPL-CCNC: 19 U/L (ref 1–32)
BASOPHILS # BLD AUTO: 0.09 10*3/MM3 (ref 0–0.2)
BASOPHILS NFR BLD AUTO: 0.6 % (ref 0–1.5)
BILIRUB SERPL-MCNC: 0.6 MG/DL (ref 0–1.2)
BLD GP AB SCN SERPL QL: NEGATIVE
BUN SERPL-MCNC: 18 MG/DL (ref 8–23)
BUN/CREAT SERPL: 24.7 (ref 7–25)
CALCIUM SPEC-SCNC: 9 MG/DL (ref 8.6–10.5)
CHLORIDE SERPL-SCNC: 108 MMOL/L (ref 98–107)
CO2 SERPL-SCNC: 24 MMOL/L (ref 22–29)
CREAT SERPL-MCNC: 0.73 MG/DL (ref 0.57–1)
D-LACTATE SERPL-SCNC: 1.8 MMOL/L (ref 0.5–2)
DEPRECATED RDW RBC AUTO: 46.9 FL (ref 37–54)
EGFRCR SERPLBLD CKD-EPI 2021: 82.7 ML/MIN/1.73
EOSINOPHIL # BLD AUTO: 0.18 10*3/MM3 (ref 0–0.4)
EOSINOPHIL NFR BLD AUTO: 1.1 % (ref 0.3–6.2)
ERYTHROCYTE [DISTWIDTH] IN BLOOD BY AUTOMATED COUNT: 13.8 % (ref 12.3–15.4)
GLOBULIN UR ELPH-MCNC: 3.9 GM/DL
GLUCOSE SERPL-MCNC: 145 MG/DL (ref 65–99)
HCT VFR BLD AUTO: 34.1 % (ref 34–46.6)
HGB BLD-MCNC: 11.2 G/DL (ref 12–15.9)
HOLD SPECIMEN: NORMAL
IMM GRANULOCYTES # BLD AUTO: 0.12 10*3/MM3 (ref 0–0.05)
IMM GRANULOCYTES NFR BLD AUTO: 0.8 % (ref 0–0.5)
LYMPHOCYTES # BLD AUTO: 1.22 10*3/MM3 (ref 0.7–3.1)
LYMPHOCYTES NFR BLD AUTO: 7.7 % (ref 19.6–45.3)
MCH RBC QN AUTO: 30 PG (ref 26.6–33)
MCHC RBC AUTO-ENTMCNC: 32.8 G/DL (ref 31.5–35.7)
MCV RBC AUTO: 91.4 FL (ref 79–97)
MONOCYTES # BLD AUTO: 1.21 10*3/MM3 (ref 0.1–0.9)
MONOCYTES NFR BLD AUTO: 7.6 % (ref 5–12)
NEUTROPHILS NFR BLD AUTO: 13.04 10*3/MM3 (ref 1.7–7)
NEUTROPHILS NFR BLD AUTO: 82.2 % (ref 42.7–76)
NRBC BLD AUTO-RTO: 0 /100 WBC (ref 0–0.2)
PLATELET # BLD AUTO: 570 10*3/MM3 (ref 140–450)
PMV BLD AUTO: 10.1 FL (ref 6–12)
POTASSIUM SERPL-SCNC: 3.9 MMOL/L (ref 3.5–5.2)
PROCALCITONIN SERPL-MCNC: 0.11 NG/ML (ref 0–0.25)
PROT SERPL-MCNC: 7.2 G/DL (ref 6–8.5)
RBC # BLD AUTO: 3.73 10*6/MM3 (ref 3.77–5.28)
RH BLD: NEGATIVE
SODIUM SERPL-SCNC: 145 MMOL/L (ref 136–145)
T&S EXPIRATION DATE: NORMAL
WBC NRBC COR # BLD: 15.86 10*3/MM3 (ref 3.4–10.8)
WHOLE BLOOD HOLD COAG: NORMAL
WHOLE BLOOD HOLD SPECIMEN: NORMAL

## 2022-11-29 PROCEDURE — 87040 BLOOD CULTURE FOR BACTERIA: CPT | Performed by: EMERGENCY MEDICINE

## 2022-11-29 PROCEDURE — 25010000002 VANCOMYCIN 10 G RECONSTITUTED SOLUTION: Performed by: EMERGENCY MEDICINE

## 2022-11-29 PROCEDURE — 86850 RBC ANTIBODY SCREEN: CPT | Performed by: EMERGENCY MEDICINE

## 2022-11-29 PROCEDURE — 86901 BLOOD TYPING SEROLOGIC RH(D): CPT | Performed by: EMERGENCY MEDICINE

## 2022-11-29 PROCEDURE — G0151 HHCP-SERV OF PT,EA 15 MIN: HCPCS

## 2022-11-29 PROCEDURE — 84145 PROCALCITONIN (PCT): CPT | Performed by: INTERNAL MEDICINE

## 2022-11-29 PROCEDURE — 80053 COMPREHEN METABOLIC PANEL: CPT | Performed by: EMERGENCY MEDICINE

## 2022-11-29 PROCEDURE — 86900 BLOOD TYPING SEROLOGIC ABO: CPT | Performed by: EMERGENCY MEDICINE

## 2022-11-29 PROCEDURE — 36415 COLL VENOUS BLD VENIPUNCTURE: CPT

## 2022-11-29 PROCEDURE — 99285 EMERGENCY DEPT VISIT HI MDM: CPT

## 2022-11-29 PROCEDURE — 0 IOPAMIDOL PER 1 ML: Performed by: EMERGENCY MEDICINE

## 2022-11-29 PROCEDURE — 85025 COMPLETE CBC W/AUTO DIFF WBC: CPT | Performed by: EMERGENCY MEDICINE

## 2022-11-29 PROCEDURE — 74174 CTA ABD&PLVS W/CONTRAST: CPT

## 2022-11-29 PROCEDURE — 25010000002 PIPERACILLIN SOD-TAZOBACTAM PER 1 G: Performed by: EMERGENCY MEDICINE

## 2022-11-29 PROCEDURE — G0495 RN CARE TRAIN/EDU IN HH: HCPCS

## 2022-11-29 PROCEDURE — 83605 ASSAY OF LACTIC ACID: CPT | Performed by: EMERGENCY MEDICINE

## 2022-11-29 RX ORDER — GABAPENTIN 300 MG/1
600 CAPSULE ORAL NIGHTLY
Status: DISCONTINUED | OUTPATIENT
Start: 2022-11-29 | End: 2022-12-02 | Stop reason: HOSPADM

## 2022-11-29 RX ORDER — SODIUM CHLORIDE 0.9 % (FLUSH) 0.9 %
10 SYRINGE (ML) INJECTION AS NEEDED
Status: DISCONTINUED | OUTPATIENT
Start: 2022-11-29 | End: 2022-12-02 | Stop reason: HOSPADM

## 2022-11-29 RX ORDER — LEVOTHYROXINE SODIUM 88 UG/1
88 TABLET ORAL
Status: DISCONTINUED | OUTPATIENT
Start: 2022-11-30 | End: 2022-12-02 | Stop reason: HOSPADM

## 2022-11-29 RX ORDER — LISINOPRIL 2.5 MG/1
2.5 TABLET ORAL
Status: DISCONTINUED | OUTPATIENT
Start: 2022-11-30 | End: 2022-12-02 | Stop reason: HOSPADM

## 2022-11-29 RX ORDER — METOPROLOL SUCCINATE 50 MG/1
50 TABLET, EXTENDED RELEASE ORAL DAILY
Status: DISCONTINUED | OUTPATIENT
Start: 2022-11-30 | End: 2022-12-02 | Stop reason: HOSPADM

## 2022-11-29 RX ORDER — LORAZEPAM 0.5 MG/1
0.5 TABLET ORAL EVERY 8 HOURS PRN
Status: DISCONTINUED | OUTPATIENT
Start: 2022-11-29 | End: 2022-12-02 | Stop reason: HOSPADM

## 2022-11-29 RX ORDER — BUSPIRONE HYDROCHLORIDE 15 MG/1
15 TABLET ORAL DAILY
Status: DISCONTINUED | OUTPATIENT
Start: 2022-11-30 | End: 2022-11-30

## 2022-11-29 RX ORDER — PRAVASTATIN SODIUM 40 MG
80 TABLET ORAL DAILY
Status: DISCONTINUED | OUTPATIENT
Start: 2022-11-30 | End: 2022-11-30

## 2022-11-29 RX ADMIN — GABAPENTIN 600 MG: 300 CAPSULE ORAL at 23:17

## 2022-11-29 RX ADMIN — TAZOBACTAM SODIUM AND PIPERACILLIN SODIUM 4.5 G: 500; 4 INJECTION, SOLUTION INTRAVENOUS at 23:04

## 2022-11-29 RX ADMIN — VANCOMYCIN HYDROCHLORIDE 1250 MG: 10 INJECTION, POWDER, LYOPHILIZED, FOR SOLUTION INTRAVENOUS at 23:59

## 2022-11-29 RX ADMIN — IOPAMIDOL 85 ML: 755 INJECTION, SOLUTION INTRAVENOUS at 20:12

## 2022-11-29 RX ADMIN — LORAZEPAM 0.5 MG: 0.5 TABLET ORAL at 23:15

## 2022-11-30 PROBLEM — B02.9 SHINGLES: Status: ACTIVE | Noted: 2022-11-30

## 2022-11-30 PROBLEM — E87.6 HYPOKALEMIA: Status: ACTIVE | Noted: 2022-11-30

## 2022-11-30 PROBLEM — D72.829 LEUKOCYTOSIS: Status: ACTIVE | Noted: 2022-11-30

## 2022-11-30 LAB
ANION GAP SERPL CALCULATED.3IONS-SCNC: 7 MMOL/L (ref 5–15)
BASOPHILS # BLD AUTO: 0.07 10*3/MM3 (ref 0–0.2)
BASOPHILS # BLD AUTO: 0.08 10*3/MM3 (ref 0–0.2)
BASOPHILS NFR BLD AUTO: 0.7 % (ref 0–1.5)
BASOPHILS NFR BLD AUTO: 0.7 % (ref 0–1.5)
BUN SERPL-MCNC: 17 MG/DL (ref 8–23)
BUN/CREAT SERPL: 23.6 (ref 7–25)
CALCIUM SPEC-SCNC: 8.2 MG/DL (ref 8.6–10.5)
CHLORIDE SERPL-SCNC: 108 MMOL/L (ref 98–107)
CO2 SERPL-SCNC: 26 MMOL/L (ref 22–29)
CREAT SERPL-MCNC: 0.72 MG/DL (ref 0.57–1)
DEPRECATED RDW RBC AUTO: 46.5 FL (ref 37–54)
DEPRECATED RDW RBC AUTO: 47.6 FL (ref 37–54)
EGFRCR SERPLBLD CKD-EPI 2021: 84.1 ML/MIN/1.73
EOSINOPHIL # BLD AUTO: 0.65 10*3/MM3 (ref 0–0.4)
EOSINOPHIL # BLD AUTO: 1.09 10*3/MM3 (ref 0–0.4)
EOSINOPHIL NFR BLD AUTO: 10.1 % (ref 0.3–6.2)
EOSINOPHIL NFR BLD AUTO: 5.5 % (ref 0.3–6.2)
ERYTHROCYTE [DISTWIDTH] IN BLOOD BY AUTOMATED COUNT: 13.7 % (ref 12.3–15.4)
ERYTHROCYTE [DISTWIDTH] IN BLOOD BY AUTOMATED COUNT: 13.9 % (ref 12.3–15.4)
GLUCOSE BLDC GLUCOMTR-MCNC: 107 MG/DL (ref 70–130)
GLUCOSE SERPL-MCNC: 110 MG/DL (ref 65–99)
HCT VFR BLD AUTO: 28.9 % (ref 34–46.6)
HCT VFR BLD AUTO: 30.8 % (ref 34–46.6)
HGB BLD-MCNC: 9.5 G/DL (ref 12–15.9)
HGB BLD-MCNC: 9.8 G/DL (ref 12–15.9)
IMM GRANULOCYTES # BLD AUTO: 0.1 10*3/MM3 (ref 0–0.05)
IMM GRANULOCYTES # BLD AUTO: 0.12 10*3/MM3 (ref 0–0.05)
IMM GRANULOCYTES NFR BLD AUTO: 0.9 % (ref 0–0.5)
IMM GRANULOCYTES NFR BLD AUTO: 1 % (ref 0–0.5)
LYMPHOCYTES # BLD AUTO: 1.17 10*3/MM3 (ref 0.7–3.1)
LYMPHOCYTES # BLD AUTO: 1.29 10*3/MM3 (ref 0.7–3.1)
LYMPHOCYTES NFR BLD AUTO: 10.9 % (ref 19.6–45.3)
LYMPHOCYTES NFR BLD AUTO: 10.9 % (ref 19.6–45.3)
MAGNESIUM SERPL-MCNC: 2.2 MG/DL (ref 1.6–2.4)
MCH RBC QN AUTO: 29.6 PG (ref 26.6–33)
MCH RBC QN AUTO: 30.1 PG (ref 26.6–33)
MCHC RBC AUTO-ENTMCNC: 31.8 G/DL (ref 31.5–35.7)
MCHC RBC AUTO-ENTMCNC: 32.9 G/DL (ref 31.5–35.7)
MCV RBC AUTO: 91.5 FL (ref 79–97)
MCV RBC AUTO: 93.1 FL (ref 79–97)
MONOCYTES # BLD AUTO: 0.8 10*3/MM3 (ref 0.1–0.9)
MONOCYTES # BLD AUTO: 0.99 10*3/MM3 (ref 0.1–0.9)
MONOCYTES NFR BLD AUTO: 7.4 % (ref 5–12)
MONOCYTES NFR BLD AUTO: 8.3 % (ref 5–12)
NEUTROPHILS NFR BLD AUTO: 7.52 10*3/MM3 (ref 1.7–7)
NEUTROPHILS NFR BLD AUTO: 70 % (ref 42.7–76)
NEUTROPHILS NFR BLD AUTO: 73.6 % (ref 42.7–76)
NEUTROPHILS NFR BLD AUTO: 8.73 10*3/MM3 (ref 1.7–7)
NRBC BLD AUTO-RTO: 0 /100 WBC (ref 0–0.2)
NRBC BLD AUTO-RTO: 0 /100 WBC (ref 0–0.2)
PLATELET # BLD AUTO: 469 10*3/MM3 (ref 140–450)
PLATELET # BLD AUTO: 510 10*3/MM3 (ref 140–450)
PMV BLD AUTO: 10.3 FL (ref 6–12)
PMV BLD AUTO: 9.9 FL (ref 6–12)
POTASSIUM SERPL-SCNC: 3.3 MMOL/L (ref 3.5–5.2)
POTASSIUM SERPL-SCNC: 3.4 MMOL/L (ref 3.5–5.2)
RBC # BLD AUTO: 3.16 10*6/MM3 (ref 3.77–5.28)
RBC # BLD AUTO: 3.31 10*6/MM3 (ref 3.77–5.28)
SODIUM SERPL-SCNC: 141 MMOL/L (ref 136–145)
WBC NRBC COR # BLD: 10.75 10*3/MM3 (ref 3.4–10.8)
WBC NRBC COR # BLD: 11.86 10*3/MM3 (ref 3.4–10.8)

## 2022-11-30 PROCEDURE — 85025 COMPLETE CBC W/AUTO DIFF WBC: CPT | Performed by: COLON & RECTAL SURGERY

## 2022-11-30 PROCEDURE — 83735 ASSAY OF MAGNESIUM: CPT | Performed by: INTERNAL MEDICINE

## 2022-11-30 PROCEDURE — 80048 BASIC METABOLIC PNL TOTAL CA: CPT | Performed by: INTERNAL MEDICINE

## 2022-11-30 PROCEDURE — 85025 COMPLETE CBC W/AUTO DIFF WBC: CPT | Performed by: INTERNAL MEDICINE

## 2022-11-30 PROCEDURE — 84132 ASSAY OF SERUM POTASSIUM: CPT | Performed by: INTERNAL MEDICINE

## 2022-11-30 PROCEDURE — 25010000002 SODIUM CHLORIDE 0.9 % WITH KCL 20 MEQ 20-0.9 MEQ/L-% SOLUTION: Performed by: INTERNAL MEDICINE

## 2022-11-30 PROCEDURE — 25010000002 PIPERACILLIN SOD-TAZOBACTAM PER 1 G: Performed by: INTERNAL MEDICINE

## 2022-11-30 PROCEDURE — 82962 GLUCOSE BLOOD TEST: CPT

## 2022-11-30 RX ORDER — BUSPIRONE HYDROCHLORIDE 10 MG/1
15 TABLET ORAL DAILY
Status: DISCONTINUED | OUTPATIENT
Start: 2022-11-30 | End: 2022-12-02 | Stop reason: HOSPADM

## 2022-11-30 RX ORDER — POTASSIUM CHLORIDE 750 MG/1
40 CAPSULE, EXTENDED RELEASE ORAL AS NEEDED
Status: DISCONTINUED | OUTPATIENT
Start: 2022-11-30 | End: 2022-12-02 | Stop reason: HOSPADM

## 2022-11-30 RX ORDER — VALACYCLOVIR HYDROCHLORIDE 500 MG/1
1000 TABLET, FILM COATED ORAL DAILY
Status: DISCONTINUED | OUTPATIENT
Start: 2022-11-30 | End: 2022-12-02 | Stop reason: HOSPADM

## 2022-11-30 RX ORDER — SODIUM CHLORIDE 0.9 % (FLUSH) 0.9 %
10 SYRINGE (ML) INJECTION AS NEEDED
Status: DISCONTINUED | OUTPATIENT
Start: 2022-11-30 | End: 2022-12-02 | Stop reason: HOSPADM

## 2022-11-30 RX ORDER — POTASSIUM CHLORIDE 1.5 G/1.77G
40 POWDER, FOR SOLUTION ORAL AS NEEDED
Status: DISCONTINUED | OUTPATIENT
Start: 2022-11-30 | End: 2022-12-02 | Stop reason: HOSPADM

## 2022-11-30 RX ORDER — ONDANSETRON 4 MG/1
4 TABLET, FILM COATED ORAL EVERY 6 HOURS PRN
Status: DISCONTINUED | OUTPATIENT
Start: 2022-11-30 | End: 2022-12-02 | Stop reason: HOSPADM

## 2022-11-30 RX ORDER — ONDANSETRON 2 MG/ML
4 INJECTION INTRAMUSCULAR; INTRAVENOUS EVERY 6 HOURS PRN
Status: DISCONTINUED | OUTPATIENT
Start: 2022-11-30 | End: 2022-12-02 | Stop reason: HOSPADM

## 2022-11-30 RX ORDER — SODIUM CHLORIDE 9 MG/ML
40 INJECTION, SOLUTION INTRAVENOUS AS NEEDED
Status: DISCONTINUED | OUTPATIENT
Start: 2022-11-30 | End: 2022-12-02 | Stop reason: HOSPADM

## 2022-11-30 RX ORDER — NICOTINE POLACRILEX 4 MG
15 LOZENGE BUCCAL
Status: DISCONTINUED | OUTPATIENT
Start: 2022-11-30 | End: 2022-12-02 | Stop reason: HOSPADM

## 2022-11-30 RX ORDER — SODIUM CHLORIDE 0.9 % (FLUSH) 0.9 %
10 SYRINGE (ML) INJECTION EVERY 12 HOURS SCHEDULED
Status: DISCONTINUED | OUTPATIENT
Start: 2022-11-30 | End: 2022-12-02 | Stop reason: HOSPADM

## 2022-11-30 RX ORDER — SODIUM CHLORIDE AND POTASSIUM CHLORIDE 150; 900 MG/100ML; MG/100ML
100 INJECTION, SOLUTION INTRAVENOUS CONTINUOUS
Status: DISCONTINUED | OUTPATIENT
Start: 2022-11-30 | End: 2022-12-02

## 2022-11-30 RX ORDER — MAGNESIUM SULFATE HEPTAHYDRATE 40 MG/ML
4 INJECTION, SOLUTION INTRAVENOUS AS NEEDED
Status: DISCONTINUED | OUTPATIENT
Start: 2022-11-30 | End: 2022-12-02 | Stop reason: HOSPADM

## 2022-11-30 RX ORDER — POTASSIUM CHLORIDE 7.45 MG/ML
10 INJECTION INTRAVENOUS
Status: DISCONTINUED | OUTPATIENT
Start: 2022-11-30 | End: 2022-12-02 | Stop reason: HOSPADM

## 2022-11-30 RX ORDER — INSULIN LISPRO 100 [IU]/ML
0-7 INJECTION, SOLUTION INTRAVENOUS; SUBCUTANEOUS
Status: DISCONTINUED | OUTPATIENT
Start: 2022-11-30 | End: 2022-12-02 | Stop reason: HOSPADM

## 2022-11-30 RX ORDER — DEXTROSE MONOHYDRATE 25 G/50ML
25 INJECTION, SOLUTION INTRAVENOUS
Status: DISCONTINUED | OUTPATIENT
Start: 2022-11-30 | End: 2022-12-02 | Stop reason: HOSPADM

## 2022-11-30 RX ORDER — MAGNESIUM SULFATE HEPTAHYDRATE 40 MG/ML
2 INJECTION, SOLUTION INTRAVENOUS AS NEEDED
Status: DISCONTINUED | OUTPATIENT
Start: 2022-11-30 | End: 2022-12-02 | Stop reason: HOSPADM

## 2022-11-30 RX ORDER — PRAVASTATIN SODIUM 40 MG
80 TABLET ORAL NIGHTLY
Status: DISCONTINUED | OUTPATIENT
Start: 2022-11-30 | End: 2022-12-02 | Stop reason: HOSPADM

## 2022-11-30 RX ADMIN — VALACYCLOVIR HYDROCHLORIDE 1000 MG: 500 TABLET, FILM COATED ORAL at 18:12

## 2022-11-30 RX ADMIN — TAZOBACTAM SODIUM AND PIPERACILLIN SODIUM 3.38 G: 375; 3 INJECTION, SOLUTION INTRAVENOUS at 23:22

## 2022-11-30 RX ADMIN — TAZOBACTAM SODIUM AND PIPERACILLIN SODIUM 3.38 G: 375; 3 INJECTION, SOLUTION INTRAVENOUS at 11:13

## 2022-11-30 RX ADMIN — POTASSIUM CHLORIDE AND SODIUM CHLORIDE 100 ML/HR: 900; 150 INJECTION, SOLUTION INTRAVENOUS at 10:12

## 2022-11-30 RX ADMIN — LEVOTHYROXINE SODIUM 88 MCG: 88 TABLET ORAL at 10:03

## 2022-11-30 RX ADMIN — METOPROLOL SUCCINATE 50 MG: 50 TABLET, EXTENDED RELEASE ORAL at 11:13

## 2022-11-30 RX ADMIN — PRAVASTATIN SODIUM 80 MG: 40 TABLET ORAL at 20:38

## 2022-11-30 RX ADMIN — Medication 10 ML: at 10:12

## 2022-11-30 RX ADMIN — POTASSIUM CHLORIDE AND SODIUM CHLORIDE 100 ML/HR: 900; 150 INJECTION, SOLUTION INTRAVENOUS at 23:22

## 2022-11-30 RX ADMIN — BUSPIRONE HYDROCHLORIDE 15 MG: 10 TABLET ORAL at 20:38

## 2022-11-30 RX ADMIN — LISINOPRIL 2.5 MG: 2.5 TABLET ORAL at 10:03

## 2022-11-30 RX ADMIN — GABAPENTIN 600 MG: 300 CAPSULE ORAL at 20:37

## 2022-11-30 RX ADMIN — TAZOBACTAM SODIUM AND PIPERACILLIN SODIUM 3.38 G: 375; 3 INJECTION, SOLUTION INTRAVENOUS at 18:55

## 2022-11-30 RX ADMIN — Medication 10 ML: at 20:38

## 2022-11-30 NOTE — HOME HEALTH
sn visit today. Abdominal staples d/c'd and steri strips intact. No s/s of infection. Colostomy intact with rosebud stoma. Plan for sn visit as scheduled.

## 2022-12-01 ENCOUNTER — HOME CARE VISIT (OUTPATIENT)
Dept: HOME HEALTH SERVICES | Facility: HOME HEALTHCARE | Age: 82
End: 2022-12-01
Payer: MEDICARE

## 2022-12-01 LAB
BACTERIA UR QL AUTO: ABNORMAL /HPF
BASOPHILS # BLD AUTO: 0.05 10*3/MM3 (ref 0–0.2)
BASOPHILS NFR BLD AUTO: 0.4 % (ref 0–1.5)
BILIRUB UR QL STRIP: NEGATIVE
CLARITY UR: ABNORMAL
COLOR UR: YELLOW
DEPRECATED RDW RBC AUTO: 47.1 FL (ref 37–54)
EOSINOPHIL # BLD AUTO: 1.64 10*3/MM3 (ref 0–0.4)
EOSINOPHIL NFR BLD AUTO: 14.6 % (ref 0.3–6.2)
ERYTHROCYTE [DISTWIDTH] IN BLOOD BY AUTOMATED COUNT: 13.9 % (ref 12.3–15.4)
GLUCOSE BLDC GLUCOMTR-MCNC: 102 MG/DL (ref 70–130)
GLUCOSE BLDC GLUCOMTR-MCNC: 103 MG/DL (ref 70–130)
GLUCOSE BLDC GLUCOMTR-MCNC: 117 MG/DL (ref 70–130)
GLUCOSE UR STRIP-MCNC: NEGATIVE MG/DL
HCT VFR BLD AUTO: 28.9 % (ref 34–46.6)
HGB BLD-MCNC: 9.4 G/DL (ref 12–15.9)
HGB UR QL STRIP.AUTO: ABNORMAL
HYALINE CASTS UR QL AUTO: ABNORMAL /LPF
IMM GRANULOCYTES # BLD AUTO: 0.11 10*3/MM3 (ref 0–0.05)
IMM GRANULOCYTES NFR BLD AUTO: 1 % (ref 0–0.5)
KETONES UR QL STRIP: ABNORMAL
LEUKOCYTE ESTERASE UR QL STRIP.AUTO: ABNORMAL
LYMPHOCYTES # BLD AUTO: 1.41 10*3/MM3 (ref 0.7–3.1)
LYMPHOCYTES NFR BLD AUTO: 12.6 % (ref 19.6–45.3)
MCH RBC QN AUTO: 30 PG (ref 26.6–33)
MCHC RBC AUTO-ENTMCNC: 32.5 G/DL (ref 31.5–35.7)
MCV RBC AUTO: 92.3 FL (ref 79–97)
MONOCYTES # BLD AUTO: 0.98 10*3/MM3 (ref 0.1–0.9)
MONOCYTES NFR BLD AUTO: 8.7 % (ref 5–12)
NEUTROPHILS NFR BLD AUTO: 62.7 % (ref 42.7–76)
NEUTROPHILS NFR BLD AUTO: 7.04 10*3/MM3 (ref 1.7–7)
NITRITE UR QL STRIP: NEGATIVE
NRBC BLD AUTO-RTO: 0 /100 WBC (ref 0–0.2)
PH UR STRIP.AUTO: 6 [PH] (ref 5–8)
PLATELET # BLD AUTO: 493 10*3/MM3 (ref 140–450)
PMV BLD AUTO: 10.3 FL (ref 6–12)
PROT UR QL STRIP: ABNORMAL
RBC # BLD AUTO: 3.13 10*6/MM3 (ref 3.77–5.28)
RBC # UR STRIP: ABNORMAL /HPF
REF LAB TEST METHOD: ABNORMAL
RENAL EPI CELLS #/AREA URNS HPF: ABNORMAL /HPF
SP GR UR STRIP: 1.03 (ref 1–1.03)
SQUAMOUS #/AREA URNS HPF: ABNORMAL /HPF
UROBILINOGEN UR QL STRIP: ABNORMAL
WBC # UR STRIP: ABNORMAL /HPF
WBC NRBC COR # BLD: 11.23 10*3/MM3 (ref 3.4–10.8)

## 2022-12-01 PROCEDURE — 85025 COMPLETE CBC W/AUTO DIFF WBC: CPT | Performed by: COLON & RECTAL SURGERY

## 2022-12-01 PROCEDURE — 87086 URINE CULTURE/COLONY COUNT: CPT | Performed by: EMERGENCY MEDICINE

## 2022-12-01 PROCEDURE — 81001 URINALYSIS AUTO W/SCOPE: CPT | Performed by: EMERGENCY MEDICINE

## 2022-12-01 PROCEDURE — 25010000002 PIPERACILLIN SOD-TAZOBACTAM PER 1 G: Performed by: INTERNAL MEDICINE

## 2022-12-01 PROCEDURE — 25010000002 SODIUM CHLORIDE 0.9 % WITH KCL 20 MEQ 20-0.9 MEQ/L-% SOLUTION: Performed by: INTERNAL MEDICINE

## 2022-12-01 PROCEDURE — 82962 GLUCOSE BLOOD TEST: CPT

## 2022-12-01 PROCEDURE — 25010000002 ONDANSETRON PER 1 MG: Performed by: INTERNAL MEDICINE

## 2022-12-01 RX ORDER — TRAMADOL HYDROCHLORIDE 50 MG/1
50 TABLET ORAL EVERY 8 HOURS PRN
Status: DISCONTINUED | OUTPATIENT
Start: 2022-12-01 | End: 2022-12-02 | Stop reason: HOSPADM

## 2022-12-01 RX ADMIN — Medication 10 ML: at 08:38

## 2022-12-01 RX ADMIN — POTASSIUM CHLORIDE AND SODIUM CHLORIDE 100 ML/HR: 900; 150 INJECTION, SOLUTION INTRAVENOUS at 13:33

## 2022-12-01 RX ADMIN — LISINOPRIL 2.5 MG: 2.5 TABLET ORAL at 08:36

## 2022-12-01 RX ADMIN — GABAPENTIN 600 MG: 300 CAPSULE ORAL at 21:20

## 2022-12-01 RX ADMIN — PRAVASTATIN SODIUM 80 MG: 40 TABLET ORAL at 21:20

## 2022-12-01 RX ADMIN — VALACYCLOVIR HYDROCHLORIDE 1000 MG: 500 TABLET, FILM COATED ORAL at 08:36

## 2022-12-01 RX ADMIN — LEVOTHYROXINE SODIUM 88 MCG: 88 TABLET ORAL at 07:29

## 2022-12-01 RX ADMIN — TAZOBACTAM SODIUM AND PIPERACILLIN SODIUM 3.38 G: 375; 3 INJECTION, SOLUTION INTRAVENOUS at 08:37

## 2022-12-01 RX ADMIN — TRAMADOL HYDROCHLORIDE 50 MG: 50 TABLET ORAL at 00:39

## 2022-12-01 RX ADMIN — ONDANSETRON 4 MG: 2 INJECTION INTRAMUSCULAR; INTRAVENOUS at 08:40

## 2022-12-01 RX ADMIN — METOPROLOL SUCCINATE 50 MG: 50 TABLET, EXTENDED RELEASE ORAL at 08:36

## 2022-12-01 RX ADMIN — BUSPIRONE HYDROCHLORIDE 15 MG: 10 TABLET ORAL at 21:20

## 2022-12-01 NOTE — PROGRESS NOTES
Appears to be on solid ground  Hematocrit stable at 28 on 3 consecutive draws  Initial drop likely hemodilution  No evidence of active bleeding throughout the hospitalization and initial presentation    Findings most consistent with pelvic hematoma with interval liquefaction and some drainage across the perineum.    Patient is seems comfortable with wound care    Okay without any antibiotics at the time of discharge  Okay for home    Office follow-up in 2 to 3 weeks.

## 2022-12-01 NOTE — PLAN OF CARE
Goal Outcome Evaluation:      Small bleeding noted from rectal wound. Kept CDI, mesh panties and peripads in place. IVF and IV atbx continued as ordered. VSS. Will continue to monitor for changes

## 2022-12-01 NOTE — CASE MANAGEMENT/SOCIAL WORK
Discharge Planning Assessment  [facility]     Patient Name: Neema Richard  MRN: [id number]  Today's Date: 12/1/2022    Admit Date: 11/29/2022    Plan: Home with Home Health   Discharge Needs Assessment     Row Name 12/01/22 1837       Living Environment    People in Home alone    Unique Family Situation Lives alone in [place]    Current Living Arrangements home    Primary Care Provided by self    Provides Primary Care For no one    Caregiving Concerns Daughter/friends available to assist as needed    Family Caregiver if Needed child(sary), adult    Family Caregiver Names DaughterTheodora @ [phone]    Quality of Family Relationships helpful;involved    Able to Return to Prior Arrangements yes    Living Arrangement Comments Resides in private residence alone       Resource/Environmental Concerns    Resource/Environmental Concerns none    Transportation Concerns none       Transition Planning    Patient/Family Anticipates Transition to home with help/services    Patient/Family Anticipated Services at Transition ;home health care    Transportation Anticipated family or friend will provide       Discharge Needs Assessment    Readmission Within the Last 30 Days current reason for admission unrelated to previous admission    Current Outpatient/Agency/Support Group homecare agency    Equipment Currently Used at Home rollator;walker, rolling;pulse ox;oxygen    Concerns to be Addressed discharge planning    Concerns Comments Case Management following for all needs/discharge planning when medically stable    Anticipated Changes Related to Illness none    Equipment Needed After Discharge none    Outpatient/Agency/Support Group Needs homecare agency    Discharge Facility/Level of Care Needs home with home health    Patient's Choice of Community Agency(s) [facility]; [facility] for DME needs    Discharge Coordination/Progress Anticipate home with home health at hospital discharge                Discharge Plan     Row Name 12/01/22 1841       Plan    Plan Home with Home Health    Plan Comments Anticipate discharge home tomorrow, reports she has transportation.  Currently active with [facility] for SN/PT.    Final Discharge Disposition Code 06 - home with home health care              Continued Care and Services - Admitted Since 11/29/2022    Coordination has not been started for this encounter.     Selected Continued Care - Prior Encounters Includes continued care and service providers with selected services from prior encounters from 8/31/2022 to 12/1/2022    Discharged on 11/16/2022 Admission date: 11/10/2022 - Discharge disposition: Home-Health Care c    Durable Medical Equipment     Service Provider Selected Services Address Phone Fax Patient Preferred    [facility] [address], [facility] [id number] [phone] --       Internal Comment last updated by Leslee Lozano RN 11/16/2022 1315    Home oxygen at 2L per nasal cannula continuous ordered with portables and a rollator ordered for patient.                     Home Medical Care     Service Provider Selected Services Address Phone Fax Patient Preferred     [facility] [address], [facility] [id number] [phone] --                    Expected Discharge Date and Time     Expected Discharge Date Expected Discharge Time    Dec 2, 2022          Demographic Summary     Row Name 12/01/22 1838       General Information    Referral Source admission list;interdisciplinary rounds    Reason for Consult discharge planning    Preferred Language English               Functional Status    No documentation.                Psychosocial    No documentation.                Abuse/Neglect    No documentation.                Legal    No documentation.                Substance Abuse    No documentation.                Patient Forms    No documentation.                    Ayleen Davey MSW

## 2022-12-01 NOTE — CONSULTS
INFECTIOUS DISEASE CONSULT/INITIAL HOSPITAL VISIT    Neema Richard  1940  [id number]    Date of Consult: 12/1/2022    Admission Date: 11/29/2022      Requesting Provider: No ref. provider found  Evaluating Physician: Phi Mae MD    Reason for Consultation: Pelvic hematoma with questionable infection    History of present illness:    Patient is a 82 y.o. female presents to [facility] with rectal bleeding patient recent hospitalization from November 10 through number 16th or patient had a parastomal hernia repair patient had a large fluid collection posterior to the uterus concerning for hematoma patient given red blood cells briefly on pressors patient's had worsening nausea and vomiting and bright red blood from the peritoneum.  Patient admitted to the hospital    We are consulted by colorectal surgery because of question about not antibiotics are necessary for pelvic hematoma    Patient started on empiric Zosyn    Past Medical History:   Diagnosis Date   • Acquired hypothyroidism 1980   • Anesthesia complication     after gb surgery - anesthesia paralyzed her lungs too early   • Arthritis    • Breast cancer (HCC)     precancerous cell   • Breast injury 2019    previous fall caused bruising to right breast   • Colon polyp - sees Dr. 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 ER and MD (+)   • Diabetes mellitus (HCC)     prediabetic- doesnt check sugar   • Diverticular disease 03/2020   • Essential hypertension 2012   • High cholesterol 2019   • History of diverticular abscess 04/16/2020    S/p LAR, abscess drainage, appy, left maldonado, distal transverse end colostomy 3/17/20 by Dr. Vazquez   • Hx of herpes genitalis 2013   • IBS-d (takes Buspar)    • Pneumonia 04/2020   • Sepsis (HCC) 04/2020   • SOBOE (shortness of breath on exertion)    • Wears glasses        Past Surgical History:   Procedure Laterality Date   • ABDOMINAL  PERINEAL RESECTION N/A 11/10/2022    Procedure: ABDOMINAL PERINEAL RESECTION, REPAIR OF PARASTOMAL HERNIA WITH MESH;  Surgeon: Sravan Vazquez MD;  Location:  [facility];  Service: General;  Laterality: N/A;   • APPENDECTOMY     • BILATERAL BREAST REDUCTION Bilateral 1995   • BREAST BIOPSY Left 2009   • BREAST EXCISIONAL BIOPSY Left 2009    ADH   • CATARACT EXTRACTION, BILATERAL Bilateral 2018   • CERVICAL CONIZATION  1983   • CHOLECYSTECTOMY     • COLON RESECTION N/A 03/17/2020    Procedure: LOW ANTERIOR COLON RESECTION, MOBILIZATION OF SPLENIC FLEXURE, DRAINAGE OF ABSCESS AND COLOSTOMY CREATION;  Surgeon: Sravan Vazquez MD;  Location:  [facility];  Service: General;  Laterality: N/A;   • COLONOSCOPY     • FACIAL COSMETIC SURGERY  2001   • FACIAL COSMETIC SURGERY  2012   • HEMORRHOIDECTOMY  1974   • LAPAROSCOPIC CHOLECYSTECTOMY  2010   • REDUCTION MAMMAPLASTY Bilateral 04/13/2017    DCIS (3mm found incidentally right breast)   • REDUCTION MAMMAPLASTY Bilateral 1995   • SINUS SURGERY  1992    poylp   • THYROIDECTOMY, PARTIAL  1963   • TONSILLECTOMY  1953   • VARICOSE VEIN SURGERY Bilateral 1981   • VARICOSE VEIN SURGERY  2021       Family History   Problem Relation Age of Onset   • Breast cancer Neg Hx    • Ovarian cancer Neg Hx        Social History     Socioeconomic History   • Marital status:    Tobacco Use   • Smoking status: Never   • Smokeless tobacco: Never   Vaping Use   • Vaping Use: Never used   Substance and Sexual Activity   • Alcohol use: No   • Drug use: No       Allergies   Allergen Reactions   • Evista [Raloxifene] Rash   • Cephalexin Diarrhea   • Hydrochlorothiazide Diarrhea   • Meloxicam Other (See Comments)   • Metronidazole Diarrhea   • Oxybutynin Cough   • Propylthiouracil Other (See Comments)     Severally reduce WBC's    • Sulfa Antibiotics Hives   • Other Anxiety     Steroid injection for pinched nerve-  Got panic attack after          Medication:    Current Facility-Administered  Medications:   •  busPIRone (BUSPAR) tablet 15 mg, 15 mg, Oral, Daily, Ting Buckner MD, 15 mg at 11/30/22 2038  •  dextrose (D50W) (25 g/50 mL) IV injection 25 g, 25 g, Intravenous, Q15 Min PRN, Susanne Alcantara APRN  •  dextrose (GLUTOSE) oral gel 15 g, 15 g, Oral, Q15 Min PRN, Susanne Alcantara APRN  •  gabapentin (NEURONTIN) capsule 600 mg, 600 mg, Oral, Nightly, Ting Buckner MD, 600 mg at 11/30/22 2037  •  glucagon (human recombinant) (GLUCAGEN DIAGNOSTIC) injection 1 mg, 1 mg, Intramuscular, Q15 Min PRN, Susanne Alcantara APRN  •  Insulin Lispro (humaLOG) injection 0-7 Units, 0-7 Units, Subcutaneous, TID AC, Susanne Alcantara APRN  •  levothyroxine (SYNTHROID, LEVOTHROID) tablet 88 mcg, 88 mcg, Oral, Q AM, Ting Buckner MD, 88 mcg at 12/01/22 0729  •  lisinopril (PRINIVIL,ZESTRIL) tablet 2.5 mg, 2.5 mg, Oral, Q24H, Ting Buckner MD, 2.5 mg at 12/01/22 0836  •  LORazepam (ATIVAN) tablet 0.5 mg, 0.5 mg, Oral, Q8H PRN, Ting Buckner MD, 0.5 mg at 11/29/22 2315  •  Magnesium Sulfate 2 gram Bolus, followed by 8 gram infusion (total Mg dose 10 grams)- Mg less than or equal to 1mg/dL, 2 g, Intravenous, PRN **OR** Magnesium Sulfate 2 gram / 50mL Infusion (GIVE X 3 BAGS TO EQUAL 6GM TOTAL DOSE) - Mg 1.1 - 1.5 mg/dl, 2 g, Intravenous, PRN **OR** Magnesium Sulfate 4 gram infusion- Mg 1.6-1.9 mg/dL, 4 g, Intravenous, PRN, Ting Buckner MD  •  metoprolol succinate XL (TOPROL-XL) 24 hr tablet 50 mg, 50 mg, Oral, Daily, Ting Buckner MD, 50 mg at 12/01/22 0836  •  O2 (OXYGEN), 2 L/min, Inhalation, Daily, Ting Buckner MD, 2 L/min at 11/30/22 1007  •  ondansetron (ZOFRAN) tablet 4 mg, 4 mg, Oral, Q6H PRN **OR** ondansetron (ZOFRAN) injection 4 mg, 4 mg, Intravenous, Q6H PRN, Ting Buckner MD, 4 mg at 12/01/22 0840  •  piperacillin-tazobactam (ZOSYN) 3.375 g in iso-osmotic dextrose 50 ml (premix), 3.375 g, Intravenous, Q8H, Ting Buckner MD, 3.375 g at  12/01/22 0837  •  potassium chloride (MICRO-K) CR capsule 40 mEq, 40 mEq, Oral, PRN **OR** potassium chloride (KLOR-CON) packet 40 mEq, 40 mEq, Oral, PRN **OR** potassium chloride 10 mEq in 100 mL IVPB, 10 mEq, Intravenous, Q1H PRN, Ting Buckner MD  •  pravastatin (PRAVACHOL) tablet 80 mg, 80 mg, Oral, Nightly, Ting Buckner MD, 80 mg at 11/30/22 2038  •  sodium chloride 0.9 % flush 10 mL, 10 mL, Intravenous, PRN, Josue Kumar MD  •  sodium chloride 0.9 % flush 10 mL, 10 mL, Intravenous, Q12H, Ting Buckner MD, 10 mL at 12/01/22 0838  •  sodium chloride 0.9 % flush 10 mL, 10 mL, Intravenous, PRN, Ting Buckner MD  •  sodium chloride 0.9 % infusion 40 mL, 40 mL, Intravenous, PRN, Ting Buckner MD  •  sodium chloride 0.9 % with KCl 20 mEq/L infusion, 100 mL/hr, Intravenous, Continuous, Ting Buckner MD, Last Rate: 100 mL/hr at 11/30/22 2322, 100 mL/hr at 11/30/22 2322  •  traMADol (ULTRAM) tablet 50 mg, 50 mg, Oral, Q8H PRN, Ting Buckner MD, 50 mg at 12/01/22 0039  •  valACYclovir (VALTREX) tablet 1,000 mg, 1,000 mg, Oral, Daily, Susanne Alcantara APRN, 1,000 mg at 12/01/22 0836    Antibiotics:  Anti-Infectives (From admission, onward)    Ordered     Dose/Rate Route Frequency Start Stop    11/30/22 0821  piperacillin-tazobactam (ZOSYN) 3.375 g in iso-osmotic dextrose 50 ml (premix)        Ordering Provider: Ting Buckner MD    3.375 g  over 4 Hours Intravenous Every 8 Hours 11/30/22 1600 12/03/22 1559    11/30/22 1358  valACYclovir (VALTREX) tablet 1,000 mg        Ordering Provider: Susanne Alcantara APRN    1,000 mg Oral Daily 11/30/22 1445 12/05/22 0859    11/30/22 0821  piperacillin-tazobactam (ZOSYN) 3.375 g in iso-osmotic dextrose 50 ml (premix)        Ordering Provider: Ting Buckner MD    3.375 g  over 30 Minutes Intravenous Once 11/30/22 0823 11/30/22 1151    11/29/22 2150  vancomycin 1250 mg/250 mL 0.9% NS IVPB ([facility])        Ordering  Provider: Josue Kumar MD    20 mg/kg × 59.9 kg Intravenous Once 22 0126    22  piperacillin-tazobactam (ZOSYN) 4.5 g in iso-osmotic dextrose 100 mL IVPB (premix)        Ordering Provider: Josue Kumar MD    4.5 g  over 30 Minutes Intravenous Once 22 0009            Review of Systems:  Reports nausea vomiting reports bleeding in perineal area reports low-grade fever has mild dysuria reports history of HSV infection    Rest of review systems reviewed and were unremarkable    Physical Exam:   Vital Signs  Temp (24hrs), Av.6 °F (37 °C), Min:98 °F (36.7 °C), Max:99.4 °F (37.4 °C)    Temp  Min: 98 °F (36.7 °C)  Max: 99.4 °F (37.4 °C)  BP  Min: 99/47  Max: 155/57  Pulse  Min: 55  Max: 75  Resp  Min: 16  Max: 18  SpO2  Min: 90 %  Max: 98 %    GENERAL: Awake and alert, in no acute distress.   HEENT: Normocephalic, atraumatic.  PERRL. EOMI. No conjunctival injection. No icterus. Oropharynx clear without evidence of thrush or exudate. No evidence of peridontal disease.      HEART: RRR; No murmur, rubs, gallops.   LUNGS: Clear to auscultation bilaterally   ABDOMEN: Soft, nontender, nondistended. Positive bowel sounds. No rebound or guarding. NO mass or HSM.  EXT:  No cyanosis, clubbing or edema. No cord.  :  Without Gaytan catheter.  MSK: No joint effusions or erythema  SKIN: Warm and dry without cutaneous eruptions on Inspection/palpation.    NEURO: Oriented to PPT.  Motor 5/5 strength  PSYCHIATRIC: Normal insight and judgment. Cooperative with PE    Laboratory Data    Results from last 7 days   Lab Units 22  0519 22  18022  0614   WBC 10*3/mm3 11.23* 10.75 11.86*   HEMOGLOBIN g/dL 9.4* 9.8* 9.5*   HEMATOCRIT % 28.9* 30.8* 28.9*   PLATELETS 10*3/mm3 493* 510* 469*     Results from last 7 days   Lab Units 22  1807 22  0614   SODIUM mmol/L  --  141   POTASSIUM mmol/L 3.4* 3.3*   CHLORIDE mmol/L  --  108*   CO2 mmol/L  --  26.0    BUN mg/dL  --  17   CREATININE mg/dL  --  0.72   GLUCOSE mg/dL  --  110*   CALCIUM mg/dL  --  8.2*     Results from last 7 days   Lab Units 11/29/22 1938   ALK PHOS U/L 78   BILIRUBIN mg/dL 0.6   ALT (SGPT) U/L <5   AST (SGOT) U/L 19             Results from last 7 days   Lab Units 11/29/22 1938   LACTATE mmol/L 1.8             Estimated Creatinine Clearance: 49.6 mL/min (by C-G formula based on SCr of 0.72 mg/dL).      Microbiology:  Blood Culture   Date Value Ref Range Status   11/29/2022 No growth at 24 hours  Preliminary   11/29/2022 No growth at 24 hours  Preliminary     No results found for: BCIDPCR, CXREFLEX, CSFCX, CULTURETIS  No results found for: CULTURES, HSVCX, URCX  No results found for: EYECULTURE, GCCX, HSVCULTURE, LABHSV  No results found for: LEGIONELLA, MRSACX, MUMPSCX, MYCOPLASCX  No results found for: NOCARDIACX, STOOLCX  No results found for: THROATCX, UNSTIMCULT, URINECX, CULTURE, VZVCULTUR  No results found for: VIRALCULTU, WOUNDCX        Radiology:  Imaging Results (Last 72 Hours)     Procedure Component Value Units Date/Time    CT Angiogram Abdomen Pelvis [[id number]] Collected: 11/29/22 2046     Updated: 11/29/22 2104    Narrative:      DATE OF EXAM:11/29/2022 7:58 PM     PROCEDURE: CT ANGIOGRAM ABDOMEN PELVIS-     INDICATIONS: post op w/ rectal hemorrhage     COMPARISON: 11/11/2022     TECHNIQUE: Routine transaxial slices were obtained through the abdomen  without the administration of intravenous contrast. Additional scanning  of the abdomen and pelvis followed by the intravenous administration of  85 mL of Isovue 370. Reconstructed coronal and sagittal images were also  obtained. In addition, a 3-D volume rendered image was obtained after  postprocessing. Automated exposure control and iterative reconstruction  methods were used.     The radiation dose reduction device was turned on for each scan per the  ALARA (As Low as Reasonably Achievable) protocol.     FINDINGS:  The lung bases  are grossly clear. Evaluation of the body wall soft  tissues redemonstrates a left lower quadrant ostomy and evolving  superficial soft tissue changes from midline laparotomy. The osseous  structures demonstrate no evidence of acute fracture or aggressive  osseous lesion. The liver, spleen, pancreas and bilateral adrenal glands  demonstrate homogeneous arterial enhancement without evidence of  suspicious focal lesion. The kidneys demonstrate no evidence of mass,  stone or hydronephrosis. There is some persistent mild wall edema  involving small bowel segments in the anterior abdomen adjacent to the  anastomosis, without evidence of increased distention. There is no overt  pneumoperitoneum. Within the pelvis, the previously noted hyperdense  presacral collection now demonstrates uniform low density, suggesting  aging blood products, otherwise appearing similar in size and extent to  comparison, including some more superior extent anterior to the uterus  adjacent to distal small bowel loops. Additionally, best appreciated on  the delayed images, there is contiguous extension of this fluid to the  right perianal soft tissues, delayed series a image 147 through 154.  Atherosclerotic, nonaneurysmal abdominal aorta. There is no evidence of  arterial extravasation or definite accumulation of contrast on delayed  phase imaging. Gas is present within the urinary bladder which could  relate to recent catheterization. In the absence of catheterization,  fistula is not excluded.       Impression:      Redemonstrated fairly extensive postoperative changes as above,  including interval evolution of a previously noted prominent presacral  fluid collection, now appearing somewhat uniformly hypodense, consistent  with aging blood products. This finding demonstrates some thin  peripheral enhancement and sterility is not specifically evaluated,  without overt gas formation or other more specific findings of abscess.  This pelvic fluid  collection does appear to extend inferior and lateral  to the right aspect of the anus, within the right perianal soft tissues  as above. Consider correlation with direct exam. Gas is present within  the urinary bladder which could relate to recent catheterization. In the  absence of catheterization, fistula is not excluded.     This report was finalized on 11/29/2022 9:01 PM by Zeke Mccollum.               Impression:   Rectal bleeding  Diabetes mellitus type 2  Leukocytosis with neutrophilia  Sulfa allergy    PLAN/RECOMMENDATIONS:   Thank you for asking us to see Neema Richard, I recommend the following:  In the absence of fevers hemodynamic instability, I am not certain if there is any absolute benefit from broad-spectrum antibiotics at this time     D/c zosyn    Follow wbc      Consider observation off antibiotics and close following    Phi Mae MD  12/1/2022  08:56 EST

## 2022-12-01 NOTE — PROGRESS NOTES
"IM progress note      Neema Richard  [id number]  1940     LOS: 1 day     Attending: Ting Buckner MD    Primary Care Provider: David Mackey MD      Chief Complaint/Reason for visit:    Chief Complaint   Patient presents with   • GI Problem       Subjective    Doing very well today. tolerating regular diet. Stoma with output. No f/c/n/vom/sob.   Was seen by Te and Carmelita. Antibiotics to stop at dc ok.      Objective      Visit Vitals  /63 (BP Location: Left arm, Patient Position: Lying)   Pulse 74   Temp 98.4 °F (36.9 °C) (Temporal)   Resp 17   Ht 152.4 cm (60\")   Wt 59.9 kg (132 lb)   LMP  (LMP Unknown)   SpO2 91%   BMI 25.78 kg/m²     Temp (24hrs), Av.5 °F (36.9 °C), Min:97.3 °F (36.3 °C), Max:99.4 °F (37.4 °C)      Intake/Output:    Intake/Output Summary (Last 24 hours) at 2022 1823  Last data filed at 2022 1804  Gross per 24 hour   Intake 2090 ml   Output 950 ml   Net 1140 ml          Physical Exam:     General Appearance:    Alert, cooperative, in no acute distress   Head:    Normocephalic, without obvious abnormality, atraumatic    Lungs:     Normal effort, symmetric chest rise,  clear to  auscultation bilaterally                 Heart:    Regular rhythm and normal rate, normal S1 and S2   Abdomen:     Soft and benign   Extremities:   No clubbing, cyanosis or edema.  No deformities.    Pulses:   Pulses palpable and equal bilaterally   Skin:   No bleeding, bruising or rash          Results Review:     I reviewed the patient's new clinical results.   Results from last 7 days   Lab Units 22  0522  0614   WBC 10*3/mm3 11.23* 10.75 11.86*   HEMOGLOBIN g/dL 9.4* 9.8* 9.5*   HEMATOCRIT % 28.9* 30.8* 28.9*   PLATELETS 10*3/mm3 493* 510* 469*     Results from last 7 days   Lab Units 22  1938   SODIUM mmol/L  --  141 145   POTASSIUM mmol/L 3.4* 3.3* 3.9   CHLORIDE mmol/L  --  108* 108*   CO2 mmol/L  --  26.0 " 24.0   BUN mg/dL  --  17 18   CREATININE mg/dL  --  0.72 0.73   CALCIUM mg/dL  --  8.2* 9.0   BILIRUBIN mg/dL  --   --  0.6   ALK PHOS U/L  --   --  78   ALT (SGPT) U/L  --   --  <5   AST (SGOT) U/L  --   --  19   GLUCOSE mg/dL  --  110* 145*     I reviewed the patient's new imaging including images and reports.    All medications reviewed.   busPIRone, 15 mg, Oral, Daily  gabapentin, 600 mg, Oral, Nightly  insulin lispro, 0-7 Units, Subcutaneous, TID AC  levothyroxine, 88 mcg, Oral, Q AM  lisinopril, 2.5 mg, Oral, Q24H  metoprolol succinate XL, 50 mg, Oral, Daily  O2, 2 L/min, Inhalation, Daily  pravastatin, 80 mg, Oral, Nightly  sodium chloride, 10 mL, Intravenous, Q12H  valACYclovir, 1,000 mg, Oral, Daily          Assessment & Plan       Rectal bleeding, no new.    Essential hypertension    Acquired hypothyroidism    High cholesterol    DM2 (diabetes mellitus, type 2) (HCC)    Leukocytosis, improving 11/30/2022    Hypokalemia, replacement protocol    Shingles       Plan  Will stop IVF after current bag.  Continue PO diet as able.  Stopped abx, watch off antibiotics overnight, patient will have a ride home after lunch tomorrow.     HTN, Hyperlipidemia  - Continue home metoprolol, ACE I, statin  - Monitor BP   - Holding parameters for BP meds  - Labetalol PRN for SBP>170     DM2  - hgb A1c on 11/4/2022 5.5  - Accu-Chek AC and HS with low dose SSI     Hypothyroidism  -Continue Synthroid    Dragon disclaimer:  Part of this encounter note is an electronic transcription/translation of spoken language to printed text. The electronic translation of spoken language may permit erroneous, or at times, nonsensical words or phrases to be inadvertently transcribed; Although I have reviewed the note for such errors, some may still exist.    Ting Buckner MD  12/01/22  18:23 EST

## 2022-12-02 VITALS — RESPIRATION RATE: 16 BRPM | HEART RATE: 77 BPM | TEMPERATURE: 99.2 F | OXYGEN SATURATION: 93 %

## 2022-12-02 VITALS
RESPIRATION RATE: 16 BRPM | HEIGHT: 60 IN | BODY MASS INDEX: 25.91 KG/M2 | SYSTOLIC BLOOD PRESSURE: 156 MMHG | TEMPERATURE: 98.3 F | WEIGHT: 132 LBS | HEART RATE: 75 BPM | OXYGEN SATURATION: 91 % | DIASTOLIC BLOOD PRESSURE: 64 MMHG

## 2022-12-02 LAB
BACTERIA SPEC AEROBE CULT: NO GROWTH
GLUCOSE BLDC GLUCOMTR-MCNC: 105 MG/DL (ref 70–130)
GLUCOSE BLDC GLUCOMTR-MCNC: 110 MG/DL (ref 70–130)

## 2022-12-02 PROCEDURE — 63710000001 ONDANSETRON PER 8 MG: Performed by: INTERNAL MEDICINE

## 2022-12-02 PROCEDURE — 82962 GLUCOSE BLOOD TEST: CPT

## 2022-12-02 RX ADMIN — LEVOTHYROXINE SODIUM 88 MCG: 88 TABLET ORAL at 06:15

## 2022-12-02 RX ADMIN — VALACYCLOVIR HYDROCHLORIDE 1000 MG: 500 TABLET, FILM COATED ORAL at 08:10

## 2022-12-02 RX ADMIN — ONDANSETRON HYDROCHLORIDE 4 MG: 4 TABLET, FILM COATED ORAL at 04:23

## 2022-12-02 RX ADMIN — LISINOPRIL 2.5 MG: 2.5 TABLET ORAL at 08:10

## 2022-12-02 RX ADMIN — METOPROLOL SUCCINATE 50 MG: 50 TABLET, EXTENDED RELEASE ORAL at 08:10

## 2022-12-02 RX ADMIN — Medication 10 ML: at 08:11

## 2022-12-02 NOTE — PROGRESS NOTES
Clinical Nutrition     Nutrition Assessment  Reason for Visit:   Identified at risk by screening criteria, MST score 2+, Malnutrition Severity Assessment      Patient Name: Neema Richard  YOB: 1940  MRN: [id number]  Date of Encounter: 12/02/22 14:59 EST  Admission date: 11/29/2022      Comments:   Pt meets criteria for non severe acute malnutrition based on intake hx w some mild wasting. See flowsheet note.     Admission Diagnosis    Rectal bleeding [K62.5]     Hospital Problem List    Rectal bleeding    Essential hypertension    Acquired hypothyroidism    High cholesterol    DM2 (diabetes mellitus, type 2) (HCC)    Leukocytosis, improving 11/30/2022    Hypokalemia, replacement protocol      Other Applicable: abd perineal resect on 11/10    Applicable Interval History:      Applicable PMH/PSxH:     PMH: She  has a past medical history of Acquired hypothyroidism (1980), Anesthesia complication, Arthritis, Breast cancer (HCC), Breast injury (2019), Colon polyp - sees Dr. 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   PSxH: She  has a past surgical history that includes Tonsillectomy (1953); Cervical Corpectomy (1983); Sinus surgery (1992); Breast Reduction (Bilateral, 1995); Laparoscopic cholecystectomy (2010); Thyroidectomy, partial (1963); Hemorrhoid surgery (1974); Varicose vein surgery (Bilateral, 1981); Facial cosmetic surgery (2001); Facial cosmetic surgery (2012); Cataract extraction, bilateral (Bilateral, 2018); Colectomy (N/A, 03/17/2020); Varicose vein surgery (2021); Breast biopsy (Left, 2009); Breast excisional biopsy (Left, 2009); Reduction  "mammaplasty (Bilateral, 04/13/2017); Reduction mammaplasty (Bilateral, 1995); Colonoscopy; Cholecystectomy; Appendectomy; and Abdominal Perineal Resection (N/A, 11/10/2022).         Diet/Nutrition Related History:     Pt rpt wt of 136 lbs 1 mo ago. < 1/2 usual intake over several wks w perceived wt loss. Does rpt good activity level - walking whenever able.         Labs reviewed   Yes   replacing K+  Results from last 7 days   Lab Units 11/30/22  1807 11/30/22  0614 11/29/22  1938   GLUCOSE mg/dL  --  110* 145*   BUN mg/dL  --  17 18   CREATININE mg/dL  --  0.72 0.73   SODIUM mmol/L  --  141 145   CHLORIDE mmol/L  --  108* 108*   POTASSIUM mmol/L 3.4* 3.3* 3.9   MAGNESIUM mg/dL 2.2  --   --    ALT (SGPT) U/L  --   --  <5     Results from last 7 days   Lab Units 11/29/22 1938   ALBUMIN g/dL 3.30*         Lab 11/29/22 1938   LACTATE 1.8        Results from last 7 days   Lab Units 12/02/22  1134 12/02/22  0729 12/01/22  1710 12/01/22  1220 12/01/22  0723 11/30/22  1819   GLUCOSE mg/dL 110 105 103 117 102 107       Lab Results   Lab Value Date/Time    HGBA1C 5.50 11/04/2022 0924    HGBA1C 5.50 04/17/2020 0545       Medications reviewed   Yes  Insulin, Abx, Antiviral    Intake/Ouptut 24 hrs reviewed   Yes  Intake & Output (last day)       12/01 0701  12/02 0700 12/02 0701  12/03 0700    P.O. 740 720    I.V. (mL/kg)      IV Piggyback      Total Intake(mL/kg) 740 (12.4) 720 (12)    Urine (mL/kg/hr) 1250 (0.9)     Total Output 1250     Net -510 +720          Urine Unmeasured Occurrence  2 x          Anthropometrics     Flowsheet Rows    Flowsheet Row First Filed Value   Admission Height 152.4 cm (60\") Documented at 11/29/2022 1934   Admission Weight 59.9 kg (132 lb) Documented at 11/29/2022 1934          Height: 152.4 cm (60\")    Last filed wt: Weight: 59.9 kg (132 lb) (11/29/22 1934)  Weight Method: Stated    BMI: BMI (Calculated): 25.8  Overweight: 25.0-29.9kg/m2     Ideal Body Weight (IBW) (kg): 45.86  Weight Change "   UBW:135 lbs standing scale on 11/4 136 lbs per pt  Weight change: 3 lbs  % wt change: 2%  Time frame of weight loss: 1 mo      Nutrition Focused Physical Exam  Date: 12/2     Pt meets criteria for non severe acute malnutrition based on intake hx w some mild wasting. See flowsheet note.      Current Nutrition Prescription     PO: Diet: Regular/House Diet; Texture: Regular Texture (IDDSI 7); Fluid Consistency: Thin (IDDSI 0)      Intake: avg 25% x 2 meals (one at 0 one at 50% )      Nutrition Diagnosis     12/2  Problem Malnutrition  non severe acute   Etiology Alt GI fx   Signs/Symptoms Intake hx w some wasting   Status:      Goal:   General: Nutrition to support treatment  PO: Increase intake  Additional goals:      Nutrition Intervention     Follow treatment progress, Care plan reviewed, Advise alternate selection      Monitoring/Evaluation:   Per protocol, I&O, PO intake, Pertinent labs, Weight, GI status, Symptoms if adm extended         Gina Harrison RD,   Time Spent: 30  min

## 2022-12-02 NOTE — PLAN OF CARE
Goal Outcome Evaluation:              Outcome Evaluation: (P) VSS. RA. No c/o pain. Prn zofran x1. Tolerating clear liquids. Voiding well w/bedside commode. Assist x1. Colostomy bag changed. Scant rectal bleeding. Slept well through night. Calm, cooperative, pleasant.

## 2022-12-02 NOTE — CASE MANAGEMENT/SOCIAL WORK
Case Management Discharge Note      Final Note: Plan is home with resume [facility] for Skilled nursing and PT. [facility] to provide transportation home. MSW called [facility] to let them know of pt's discharge today.         Selected Continued Care - Admitted Since 11/29/2022     Destination    No services have been selected for the patient.              Durable Medical Equipment    No services have been selected for the patient.              Dialysis/Infusion    No services have been selected for the patient.              Home Medical Care     Service Provider Selected Services Address Phone Fax Patient Preferred    [facility] [address] [address] [phone] --          Therapy    No services have been selected for the patient.              Community Resources    No services have been selected for the patient.              Community & DME    No services have been selected for the patient.                Selected Continued Care - Prior Encounters Includes continued care and service providers with selected services from prior encounters from 8/31/2022 to 12/2/2022    Discharged on 11/16/2022 Admission date: 11/10/2022 - Discharge disposition: Home-Health Care [facility]    Durable Medical Equipment     Service Provider Selected Services Address Phone Fax Patient Preferred    [facility] [place] [facility] [address] [id number] [phone] --       Internal Comment last updated by Leslee Lozano RN 11/16/2022 1315    Home oxygen at 2L per nasal cannula continuous ordered with portables and a rollator ordered for patient.                     Home Medical Care     Service Provider Selected Services Address Phone Fax Patient Preferred     [facility] [address] [address] [phone] --                          Final Discharge Disposition Code: 06 - home with home health care

## 2022-12-02 NOTE — PROGRESS NOTES
INFECTIOUS DISEASE CONSULT/INITIAL HOSPITAL VISIT    Neema Richard  1940  [id number]    Date of Consult: 12/2/2022    Admission Date: 11/29/2022      Requesting Provider: No ref. provider found  Evaluating Physician: Phi Mae MD    Reason for Consultation: Pelvic hematoma with questionable infection    History of present illness:    Patient is a 82 y.o. female presents to [facility] with rectal bleeding patient recent hospitalization from November 10 through number 16th or patient had a parastomal hernia repair patient had a large fluid collection posterior to the uterus concerning for hematoma patient given red blood cells briefly on pressors patient's had worsening nausea and vomiting and bright red blood from the peritoneum.  Patient admitted to the hospital    We are consulted by colorectal surgery because of question about not antibiotics are necessary for pelvic hematoma    Patient started on empiric Zosyn    12/2/22; going home today; no complaints, no fever, rash, sore throat    Past Medical History:   Diagnosis Date   • Acquired hypothyroidism 1980   • Anesthesia complication     after gb surgery - anesthesia paralyzed her lungs too early   • Arthritis    • Breast cancer (HCC)     precancerous cell   • Breast injury 2019    previous fall caused bruising to right breast   • Colon polyp - sees Dr. 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 ER and MS (+)   • Diabetes mellitus (HCC)     prediabetic- doesnt check sugar   • Diverticular disease 03/2020   • Essential hypertension 2012   • High cholesterol 2019   • History of diverticular abscess 04/16/2020    S/p LAR, abscess drainage, appy, left maldonado, distal transverse end colostomy 3/17/20 by Dr. Vazquez   • Hx of herpes genitalis 2013   • IBS-d (takes Buspar)    • Pneumonia 04/2020   • Sepsis (HCC) 04/2020   • SOBOE (shortness of breath on exertion)    • Wears glasses         Past Surgical History:   Procedure Laterality Date   • ABDOMINAL PERINEAL RESECTION N/A 11/10/2022    Procedure: ABDOMINAL PERINEAL RESECTION, REPAIR OF PARASTOMAL HERNIA WITH MESH;  Surgeon: Sravan Vazquez MD;  Location:  [facility];  Service: General;  Laterality: N/A;   • APPENDECTOMY     • BILATERAL BREAST REDUCTION Bilateral 1995   • BREAST BIOPSY Left 2009   • BREAST EXCISIONAL BIOPSY Left 2009    ADH   • CATARACT EXTRACTION, BILATERAL Bilateral 2018   • CERVICAL CONIZATION  1983   • CHOLECYSTECTOMY     • COLON RESECTION N/A 03/17/2020    Procedure: LOW ANTERIOR COLON RESECTION, MOBILIZATION OF SPLENIC FLEXURE, DRAINAGE OF ABSCESS AND COLOSTOMY CREATION;  Surgeon: Sravan Vazquez MD;  Location:  [facility];  Service: General;  Laterality: N/A;   • COLONOSCOPY     • FACIAL COSMETIC SURGERY  2001   • FACIAL COSMETIC SURGERY  2012   • HEMORRHOIDECTOMY  1974   • LAPAROSCOPIC CHOLECYSTECTOMY  2010   • REDUCTION MAMMAPLASTY Bilateral 04/13/2017    DCIS (3mm found incidentally right breast)   • REDUCTION MAMMAPLASTY Bilateral 1995   • SINUS SURGERY  1992    poylp   • THYROIDECTOMY, PARTIAL  1963   • TONSILLECTOMY  1953   • VARICOSE VEIN SURGERY Bilateral 1981   • VARICOSE VEIN SURGERY  2021       Family History   Problem Relation Age of Onset   • Breast cancer Neg Hx    • Ovarian cancer Neg Hx        Social History     Socioeconomic History   • Marital status:    Tobacco Use   • Smoking status: Never   • Smokeless tobacco: Never   Vaping Use   • Vaping Use: Never used   Substance and Sexual Activity   • Alcohol use: No   • Drug use: No       Allergies   Allergen Reactions   • Evista [Raloxifene] Rash   • Cephalexin Diarrhea   • Hydrochlorothiazide Diarrhea   • Meloxicam Other (See Comments)   • Metronidazole Diarrhea   • Oxybutynin Cough   • Propylthiouracil Other (See Comments)     Severally reduce WBC's    • Sulfa Antibiotics Hives   • Other Anxiety     Steroid injection for pinched nerve-  Got panic  attack after          Medication:    Current Facility-Administered Medications:   •  busPIRone (BUSPAR) tablet 15 mg, 15 mg, Oral, Daily, Ting Buckner MD, 15 mg at 12/01/22 2120  •  dextrose (D50W) (25 g/50 mL) IV injection 25 g, 25 g, Intravenous, Q15 Min PRN, Susanne Alcantara APRN  •  dextrose (GLUTOSE) oral gel 15 g, 15 g, Oral, Q15 Min PRN, Susanne Alcantara APRN  •  gabapentin (NEURONTIN) capsule 600 mg, 600 mg, Oral, Nightly, Ting Buckner MD, 600 mg at 12/01/22 2120  •  glucagon (human recombinant) (GLUCAGEN DIAGNOSTIC) injection 1 mg, 1 mg, Intramuscular, Q15 Min PRN, Susanne Alcantara APRN  •  Insulin Lispro (humaLOG) injection 0-7 Units, 0-7 Units, Subcutaneous, TID AC, Susanne Alcantara APRN  •  levothyroxine (SYNTHROID, LEVOTHROID) tablet 88 mcg, 88 mcg, Oral, Q AM, Ting Buckner MD, 88 mcg at 12/02/22 0615  •  lisinopril (PRINIVIL,ZESTRIL) tablet 2.5 mg, 2.5 mg, Oral, Q24H, Ting Buckner MD, 2.5 mg at 12/02/22 0810  •  LORazepam (ATIVAN) tablet 0.5 mg, 0.5 mg, Oral, Q8H PRN, Ting Buckner MD, 0.5 mg at 11/29/22 2315  •  Magnesium Sulfate 2 gram Bolus, followed by 8 gram infusion (total Mg dose 10 grams)- Mg less than or equal to 1mg/dL, 2 g, Intravenous, PRN **OR** Magnesium Sulfate 2 gram / 50mL Infusion (GIVE X 3 BAGS TO EQUAL 6GM TOTAL DOSE) - Mg 1.1 - 1.5 mg/dl, 2 g, Intravenous, PRN **OR** Magnesium Sulfate 4 gram infusion- Mg 1.6-1.9 mg/dL, 4 g, Intravenous, PRN, Ting Buckner MD  •  metoprolol succinate XL (TOPROL-XL) 24 hr tablet 50 mg, 50 mg, Oral, Daily, Ting Buckner MD, 50 mg at 12/02/22 0810  •  O2 (OXYGEN), 2 L/min, Inhalation, Daily, Ting Buckner MD, 2 L/min at 11/30/22 1007  •  ondansetron (ZOFRAN) tablet 4 mg, 4 mg, Oral, Q6H PRN, 4 mg at 12/02/22 0423 **OR** ondansetron (ZOFRAN) injection 4 mg, 4 mg, Intravenous, Q6H PRN, Ting Buckner MD, 4 mg at 12/01/22 0840  •  potassium chloride (MICRO-K) CR capsule 40 mEq, 40 mEq,  Oral, PRN **OR** potassium chloride (KLOR-CON) packet 40 mEq, 40 mEq, Oral, PRN **OR** potassium chloride 10 mEq in 100 mL IVPB, 10 mEq, Intravenous, Q1H PRN, Ting Buckner MD  •  pravastatin (PRAVACHOL) tablet 80 mg, 80 mg, Oral, Nightly, Ting Buckner MD, 80 mg at 12/01/22 2120  •  sodium chloride 0.9 % flush 10 mL, 10 mL, Intravenous, PRN, Josue Kumar MD  •  sodium chloride 0.9 % flush 10 mL, 10 mL, Intravenous, Q12H, Ting Buckner MD, 10 mL at 12/02/22 0811  •  sodium chloride 0.9 % flush 10 mL, 10 mL, Intravenous, PRN, Ting Buckner MD  •  sodium chloride 0.9 % infusion 40 mL, 40 mL, Intravenous, PRN, Ting Buckner MD  •  traMADol (ULTRAM) tablet 50 mg, 50 mg, Oral, Q8H PRN, Ting Buckner MD, 50 mg at 12/01/22 0039  •  valACYclovir (VALTREX) tablet 1,000 mg, 1,000 mg, Oral, Daily, Susanne Alcantara APRN, 1,000 mg at 12/02/22 0810    Antibiotics:  Anti-Infectives (From admission, onward)    Ordered     Dose/Rate Route Frequency Start Stop    11/30/22 1358  valACYclovir (VALTREX) tablet 1,000 mg        Ordering Provider: Susanne Alcantara APRN    1,000 mg Oral Daily 11/30/22 1445 12/05/22 0859    11/30/22 0821  piperacillin-tazobactam (ZOSYN) 3.375 g in iso-osmotic dextrose 50 ml (premix)        Ordering Provider: Ting Buckner MD    3.375 g  over 30 Minutes Intravenous Once 11/30/22 0823 11/30/22 1151    11/29/22 2150  vancomycin 1250 mg/250 mL 0.9% NS IVPB ([facility])        Ordering Provider: Josue Kumar MD    20 mg/kg × 59.9 kg Intravenous Once 11/29/22 2152 11/30/22 0126    11/29/22 2150  piperacillin-tazobactam (ZOSYN) 4.5 g in iso-osmotic dextrose 100 mL IVPB (premix)        Ordering Provider: Josue Kumar MD    4.5 g  over 30 Minutes Intravenous Once 11/29/22 2152 11/30/22 0009            Review of Systems:  Reports nausea vomiting reports bleeding in perineal area reports low-grade fever has mild dysuria reports history of HSV  infection    Rest of review systems reviewed and were unremarkable    Physical Exam:   Vital Signs  Temp (24hrs), Av.4 °F (36.9 °C), Min:98.1 °F (36.7 °C), Max:98.6 °F (37 °C)    Temp  Min: 98.1 °F (36.7 °C)  Max: 98.6 °F (37 °C)  BP  Min: 124/63  Max: 162/61  Pulse  Min: 54  Max: 75  Resp  Min: 16  Max: 18  SpO2  Min: 91 %  Max: 93 %    GENERAL: Awake and alert, in no acute distress.   HEENT: Normocephalic, atraumatic.  PERRL. EOMI. No conjunctival injection. No icterus. Oropharynx clear without evidence of thrush or exudate. No evidence of peridontal disease.      HEART: RRR; No murmur, rubs, gallops.   LUNGS: Clear to auscultation bilaterally   ABDOMEN: Soft, nontender  EXT:  No edema  :  Without Gaytan catheter.  MSK: No joint effusions or erythema  SKIN: Warm and dry without cutaneous eruptions on Inspection/palpation.        Laboratory Data    Results from last 7 days   Lab Units 22  0519 22   WBC 10*3/mm3 11.23* 10.75 11.86*   HEMOGLOBIN g/dL 9.4* 9.8* 9.5*   HEMATOCRIT % 28.9* 30.8* 28.9*   PLATELETS 10*3/mm3 493* 510* 469*     Results from last 7 days   Lab Units 22   SODIUM mmol/L  --  141   POTASSIUM mmol/L 3.4* 3.3*   CHLORIDE mmol/L  --  108*   CO2 mmol/L  --  26.0   BUN mg/dL  --  17   CREATININE mg/dL  --  0.72   GLUCOSE mg/dL  --  110*   CALCIUM mg/dL  --  8.2*     Results from last 7 days   Lab Units 22   ALK PHOS U/L 78   BILIRUBIN mg/dL 0.6   ALT (SGPT) U/L <5   AST (SGOT) U/L 19             Results from last 7 days   Lab Units 22   LACTATE mmol/L 1.8             Estimated Creatinine Clearance: 49.6 mL/min (by C-G formula based on SCr of 0.72 mg/dL).      Microbiology:  Blood Culture   Date Value Ref Range Status   2022 No growth at 24 hours  Preliminary   2022 No growth at 24 hours  Preliminary     No results found for: BCIDPCR, CXREFLEX, CSFCX, CULTURETIS  No results found for: CULTURES, HSVCX,  URCX  No results found for: EYECULTURE, GCCX, HSVCULTURE, LABHSV  No results found for: LEGIONELLA, MRSACX, MUMPSCX, MYCOPLASCX  No results found for: NOCARDIACX, STOOLCX  No results found for: THROATCX, UNSTIMCULT, URINECX, CULTURE, VZVCULTUR  No results found for: VIRALCULTU, WOUNDCX        Radiology:  Imaging Results (Last 72 Hours)     Procedure Component Value Units Date/Time    CT Angiogram Abdomen Pelvis [[id number]] Collected: 11/29/22 2046     Updated: 11/29/22 2104    Narrative:      DATE OF EXAM:11/29/2022 7:58 PM     PROCEDURE: CT ANGIOGRAM ABDOMEN PELVIS-     INDICATIONS: post op w/ rectal hemorrhage     COMPARISON: 11/11/2022     TECHNIQUE: Routine transaxial slices were obtained through the abdomen  without the administration of intravenous contrast. Additional scanning  of the abdomen and pelvis followed by the intravenous administration of  85 mL of Isovue 370. Reconstructed coronal and sagittal images were also  obtained. In addition, a 3-D volume rendered image was obtained after  postprocessing. Automated exposure control and iterative reconstruction  methods were used.     The radiation dose reduction device was turned on for each scan per the  ALARA (As Low as Reasonably Achievable) protocol.     FINDINGS:  The lung bases are grossly clear. Evaluation of the body wall soft  tissues redemonstrates a left lower quadrant ostomy and evolving  superficial soft tissue changes from midline laparotomy. The osseous  structures demonstrate no evidence of acute fracture or aggressive  osseous lesion. The liver, spleen, pancreas and bilateral adrenal glands  demonstrate homogeneous arterial enhancement without evidence of  suspicious focal lesion. The kidneys demonstrate no evidence of mass,  stone or hydronephrosis. There is some persistent mild wall edema  involving small bowel segments in the anterior abdomen adjacent to the  anastomosis, without evidence of increased distention. There is no  overt  pneumoperitoneum. Within the pelvis, the previously noted hyperdense  presacral collection now demonstrates uniform low density, suggesting  aging blood products, otherwise appearing similar in size and extent to  comparison, including some more superior extent anterior to the uterus  adjacent to distal small bowel loops. Additionally, best appreciated on  the delayed images, there is contiguous extension of this fluid to the  right perianal soft tissues, delayed series a image 147 through 154.  Atherosclerotic, nonaneurysmal abdominal aorta. There is no evidence of  arterial extravasation or definite accumulation of contrast on delayed  phase imaging. Gas is present within the urinary bladder which could  relate to recent catheterization. In the absence of catheterization,  fistula is not excluded.       Impression:      Redemonstrated fairly extensive postoperative changes as above,  including interval evolution of a previously noted prominent presacral  fluid collection, now appearing somewhat uniformly hypodense, consistent  with aging blood products. This finding demonstrates some thin  peripheral enhancement and sterility is not specifically evaluated,  without overt gas formation or other more specific findings of abscess.  This pelvic fluid collection does appear to extend inferior and lateral  to the right aspect of the anus, within the right perianal soft tissues  as above. Consider correlation with direct exam. Gas is present within  the urinary bladder which could relate to recent catheterization. In the  absence of catheterization, fistula is not excluded.     This report was finalized on 11/29/2022 9:01 PM by Zeke Mccollum.               Impression:   Rectal bleeding  Diabetes mellitus type 2  Leukocytosis with neutrophilia  Sulfa allergy    PLAN/RECOMMENDATIONS:   Thank you for asking us to see Neema Richard, I recommend the following:      Ok to d/c home    Observe off abx    No f/u  required  Phi Mae MD  12/2/2022  13:30 EST

## 2022-12-02 NOTE — DISCHARGE SUMMARY
Patient Name: Neema Richard  MRN: [id number]  : 1940  DOS: 2022    Attending: Ting Buckner MD    Primary Care Provider: David Mackey MD    Date of Admission:.2022  7:32 PM    Date of Discharge:  2022    Discharge Diagnosis:       Rectal bleeding, resolved.     Essential hypertension    Acquired hypothyroidism    High cholesterol    DM2 (diabetes mellitus, type 2) (HCC)    Leukocytosis, improving 2022    Hypokalemia, replacement protocol    Shinkarthik    Consults:  Phi Mae MD. ID.    Hospital Course  At admit    Patient is a pleasant 82 y.o. female presented to [facility] ER with complaints of rectal bleeding. She was hospitalized 11/10/2022 through 2022 after undergoing abdominal perineal resection and parastomal hernia repair.  On postop day 1 she was transferred to ICU due to hypotension that responded to IVF resuscitation and vasopressors.  CT scan showed a large fluid collection posterior to the uterus consistent with hematoma.  She responded well to PRBC transfusion and was able to be weaned from pressors.  She reported onset of nausea with emesis yesterday followed by bright red bleeding from the perineum and was in instructed to report to the emergency room. She was seen by Dr. Vazquez in the emergency room last night.  Per Dr. Nuñez's note: (((Findings thus far suggestive of draining hematoma, spontaneously across the perineum after coughing. Difficult to reconcile leukocytosis though... CT will provide further insight into clinical situation.)))     When seen she is feeling much better.  She reports mild nausea, no vomiting.  She reports that about a week ago she had a low-grade fever and felt like she had a urinary tract infection.  She was prescribed oral antibiotics by her PCP and had been taking them up until yesterday.  She denies current urinary symptoms.  She also reports her history of herpes and has a breakout of pustules on her right buttock; she  "takes acyclovir at home.    After admit    She initially received IV abx and IVF. She was followed clinically and was seen by  and  with ID.    She was started on PO diet and tolerated it well.  Stoma function has been adequate.    No bleed was observed during her stay.  And per  ( Findings most consistent with pelvic hematoma with interval liquefaction and some drainage across the perineum)    Antibiotics were stopped with consensus with ID and CRS.    Pt is doing very well at time of discharge.        Pertinent Test Results:    I reviewed the patient's new clinical results.   Results from last 7 days   Lab Units 22  [id number]   WBC 10*3/mm3 11.23* 10.75 11.86*   HEMOGLOBIN g/dL 9.4* 9.8* 9.5*   HEMATOCRIT % 28.9* 30.8* 28.9*   PLATELETS 10*3/mm3 493* 510* 469*     Results from last 7 days   Lab Units 2214 22  1938   SODIUM mmol/L  --  141 145   POTASSIUM mmol/L 3.4* 3.3* 3.9   CHLORIDE mmol/L  --  108* 108*   CO2 mmol/L  --  26.0 24.0   BUN mg/dL  --  17 18   CREATININE mg/dL  --  0.72 0.73   CALCIUM mg/dL  --  8.2* 9.0   BILIRUBIN mg/dL  --   --  0.6   ALK PHOS U/L  --   --  78   ALT (SGPT) U/L  --   --  <5   AST (SGOT) U/L  --   --  19   GLUCOSE mg/dL  --  110* 145*     I reviewed the patient's new imaging including images and reports.          Physical therapy    Discharge Assessment:       Visit Vitals  /74 (BP Location: Left arm, Patient Position: Lying)   Pulse 72   Temp 98.1 °F (36.7 °C) (Temporal)   Resp 18   Ht 152.4 cm (60\")   Wt 59.9 kg (132 lb)   LMP  (LMP Unknown)   SpO2 91%   BMI 25.78 kg/m²     Temp (24hrs), Av.2 °F (36.8 °C), Min:97.3 °F (36.3 °C), Max:98.6 °F (37 °C)      General Appearance:    Alert, cooperative, in no acute distress   Lungs:     Clear to auscultation,respirations regular, even and                   unlabored    Heart:    Regular rhythm and normal rate, normal S1 and S2    Abdomen:   " Soft and benign . Midline incision clean with steri strips on. Stoma pink. Brown soft stool in.   Extremities:   Moves all extremities well, no edema, no cyanosis, no              redness   Pulses:   Pulses palpable and equal bilaterally   Skin:   No bleeding, bruising or rash            Discharge Disposition:Home.           Discharge Medications      Continue These Medications      Instructions Start Date   acetaminophen 650 MG 8 hr tablet  Commonly known as: TYLENOL   1,300 mg, Oral, Every 8 Hours PRN      Align 4 MG capsule   1 capsule, Oral, Daily      busPIRone 15 MG tablet  Commonly known as: BUSPAR   15 mg, Oral, Nightly      ciprofloxacin 500 MG tablet  Commonly known as: CIPRO   500 mg, Oral, 2 Times Daily, for 7 days      fluticasone 50 MCG/ACT nasal spray  Commonly known as: FLONASE   1 spray, Nasal, Daily, Spring only      gabapentin 600 MG tablet  Commonly known as: NEURONTIN   600 mg, Oral, Every Night at Bedtime      levothyroxine 88 MCG tablet  Commonly known as: SYNTHROID, LEVOTHROID   88 mcg, Oral, Daily      LORazepam 0.5 MG tablet  Commonly known as: ATIVAN   0.5 mg, Oral, Every 8 Hours PRN      Magnesium 250 MG tablet   1 tablet, Oral, Daily      metoprolol succinate XL 50 MG 24 hr tablet  Commonly known as: TOPROL-XL   50 mg, Oral, Daily      Mirabegron ER 50 MG tablet sustained-release 24 hour 24 hr tablet  Commonly known as: Myrbetriq   50 mg, Oral, Daily      multivitamin with minerals tablet tablet   2 gels daily      multivitamin with minerals tablet tablet   1 tablet, Oral, Daily, Flinstone with iron      O2  Commonly known as: OXYGEN   2 L/min, Inhalation, Daily      pioglitazone 15 MG tablet  Commonly known as: ACTOS   15 mg, Oral, Daily      pravastatin 80 MG tablet  Commonly known as: PRAVACHOL   80 mg, Oral, Daily      Prolia 60 MG/ML solution prefilled syringe syringe  Generic drug: denosumab   every 6 months      ramipril 1.25 MG capsule  Commonly known as: ALTACE   1.25 mg, Oral,  Daily      sucralfate 1 g tablet  Commonly known as: CARAFATE   1 g, Oral, 2 Times Daily      traMADol 50 MG tablet  Commonly known as: ULTRAM   50 mg, Oral, Every 8 Hours PRN      Vitamin D3 50 MCG (2000 UT) capsule   2,000 Units, Oral, Daily         Stop These Medications    valACYclovir 1000 MG tablet  Commonly known as: Valtrex            Discharge Diet: regular.     Activity at Discharge: ambulate. Restrictions per CRS.    Follow-up Appointments   as scheduled.     Dragon disclaimer:  Part of this encounter note is an electronic transcription/translation of spoken language to printed text. The electronic translation of spoken language may permit erroneous, or at times, nonsensical words or phrases to be inadvertently transcribed; Although I have reviewed the note for such errors, some may still exist.       Ting Buckner MD  12/02/22  11:03 EST

## 2022-12-02 NOTE — PROGRESS NOTES
Malnutrition Severity Assessment    Patient Name:  Neema Richard  YOB: 1940  MRN: [id number]  Admit Date:  11/29/2022    Patient meets criteria for : Moderate (non-severe) Malnutrition (Pt meets criteria for non severe acute malnutrition based on intake hx w some mild wasting.)    Comments:      Malnutrition Severity Assessment  Malnutrition Type: Acute Disease or Injury - Related Malnutrition  Malnutrition Type (last 8 hours)     Malnutrition Severity Assessment     Row Name 12/02/22 1455       Malnutrition Severity Assessment    Malnutrition Type Acute Disease or Injury - Related Malnutrition    Row Name 12/02/22 1455       Insufficient Energy Intake     Insufficient Energy Intake Findings Severe    Insufficient Energy Intake  < or equal to 50% of est. energy requirement for > or equal to 5d)    Row Name 12/02/22 1455       Unintentional Weight Loss     Unintentional Weight Loss Findings --  loss of 2% over 1 mo < criterion    Row Name 12/02/22 1455       Muscle Loss    Loss of Muscle Mass Findings Mild    Restorationist Region None    Clavicle Bone Region --  mild    Acromion Bone Region --  mild    Scapular Bone Region --  mild    Dorsal Hand Region None    Patellar Region Moderate - patella more prominent, less muscle definition around patella    Anterior Thigh Region --  mild    Posterior Calf Region Moderate - some roundness, slight firmness    Row Name 12/02/22 1455       Fat Loss    Subcutaneous Fat Loss Findings Mild    Orbital Region  --  mild    Upper Arm Region None    Thoracic & Lumbar Region --  mild    Row Name 12/02/22 1455       Criteria Met (Must meet criteria for severity in at least 2 of these categories: M Wasting, Fat Loss, Fluid, Secondary Signs, Wt. Status, Intake)    Patient meets criteria for  Moderate (non-severe) Malnutrition  Pt meets criteria for non severe acute malnutrition based on intake hx w some mild wasting.                Electronically signed by:  Gina Harrison  FRANCIS  12/02/22 15:08 EST

## 2022-12-02 NOTE — PLAN OF CARE
Goal Outcome Evaluation:               Patient to discharge this afternoon. VSS, pain well controlled. Scant Rectal bleeding noted. Discharge instructions, education, and medication list with patient verbalizing understanding. Patient is awaiting family member to transport her via private vehicle

## 2022-12-04 ENCOUNTER — HOME CARE VISIT (OUTPATIENT)
Dept: HOME HEALTH SERVICES | Facility: HOME HEALTHCARE | Age: 82
End: 2022-12-04
Payer: MEDICARE

## 2022-12-04 VITALS
DIASTOLIC BLOOD PRESSURE: 70 MMHG | HEIGHT: 66 IN | BODY MASS INDEX: 21.21 KG/M2 | OXYGEN SATURATION: 98 % | SYSTOLIC BLOOD PRESSURE: 124 MMHG | WEIGHT: 132 LBS | RESPIRATION RATE: 16 BRPM | HEART RATE: 76 BPM | TEMPERATURE: 98 F

## 2022-12-04 PROCEDURE — G0299 HHS/HOSPICE OF RN EA 15 MIN: HCPCS

## 2022-12-04 NOTE — HOME HEALTH
Routine Visit Note: Recent hospitalization related to rectal hematoma. Patient has had extensive GI surgery over the last few months and developed a rectal hematoma. Patient began vomitting and the hematoma ruptured resulting in hernia repairs and removal of rectum.  Internal sutures in rectal area and no wound care. MIdline abdominal incision with internal sutrues and steri strips. Patient independant with ostomy care. Ambulating with rollator walker. Patient has declined PT and OT services and is agreeable to SN care.     Skill/education provided:Instructed on disease process and s/s of infection at incision site    Patient/caregiver response: toelrated well    Plan for next visit: Disease teaching, incision check related to abdominal surgery    Other pertinent info:     Midline abdominal wound-steri strips: 18in long-steri strips  Rectum internal stitches

## 2022-12-05 VITALS
TEMPERATURE: 98 F | DIASTOLIC BLOOD PRESSURE: 64 MMHG | HEART RATE: 80 BPM | OXYGEN SATURATION: 95 % | SYSTOLIC BLOOD PRESSURE: 138 MMHG | RESPIRATION RATE: 16 BRPM

## 2022-12-05 LAB
BACTERIA SPEC AEROBE CULT: NORMAL
BACTERIA SPEC AEROBE CULT: NORMAL

## 2022-12-06 NOTE — HOME HEALTH
Routine Visit Note: PT treatment    Skill/education provided: Ther ex instruction, gait training, HEP progression, pt education regarding pain management and energy conservation.    Patient/caregiver response: Pt reporting increased rectum pain and more fatigue today such that sitting is much less tolerable.    Plan for next visit: Progress PT interventions and modify HEP to facilitate pt in reaching her functional goals

## 2022-12-08 ENCOUNTER — HOME CARE VISIT (OUTPATIENT)
Dept: HOME HEALTH SERVICES | Facility: HOME HEALTHCARE | Age: 82
End: 2022-12-08
Payer: MEDICARE

## 2022-12-08 PROCEDURE — G0151 HHCP-SERV OF PT,EA 15 MIN: HCPCS

## 2022-12-09 VITALS
OXYGEN SATURATION: 97 % | HEART RATE: 74 BPM | TEMPERATURE: 98.2 F | SYSTOLIC BLOOD PRESSURE: 160 MMHG | RESPIRATION RATE: 16 BRPM | DIASTOLIC BLOOD PRESSURE: 68 MMHG

## 2022-12-10 NOTE — HOME HEALTH
PT Re-evaluation:    Patient required hospitalization on 11/29/22 due to rectal hemorrhage and hematoma related to recent surgery: Perineal resection, parastomal and ventral hernia repair with mesh, small bowel resection, extensive lysis of adhesions on 11/10/22. Pt required blood transfusion prior to discharge.    Patient is a pleasant 82 y.o. female with relevant PMH of bowel perforation, diverticular disease, IBS-D, OAB, uterovaginal prolapse with pessary use, HTN, hypothyroidism, fecal incontinence, T2DM, high cholesterol, & ductal carcinoma in situ     Current Functional status/mobility/DME: Pt is independent for transfers and walking. Pt uses a rollator walker for energy conservation and fall prevention. She is currently unable to drive. Pt has a friend and private caregiver who are assisting pt with bathing, laundry, transportation, meals and housework.     Prior Level of Function: Pt was ambulating several miles for wellness 4 times/wk without assistive device. She was I with dressing, bathing, transfers, self care, meal prep and housework. Previously able to drive.     HB status/Living Arrangements: Taxing effort to leave home. Pt lives alone in a 2 story home. Laundry in basement. Bedroom, bath and kitchen on main level.     Skin Integrity/wound status: Abdominal wound plus ostomy     Code Status: Full     Fall Risk:  low    Pt's current level of function is lower than her prior level of function and PT would likely benefit this patient; However, pt declines HH PT beyond today's visit. PT provided pt with HEP instruction and energy conservation strategy education. PT also provided pt with Fall risk precautions and safety education Pt voiced understanding and requests HH PT discharge today.    Plan of Care: 1 day 1 evaluate and treat  No further PT visits planned at this time per pt request.   PT discipline DC effective 12/8/22

## 2022-12-13 ENCOUNTER — HOME CARE VISIT (OUTPATIENT)
Dept: HOME HEALTH SERVICES | Facility: HOME HEALTHCARE | Age: 82
End: 2022-12-13
Payer: MEDICARE

## 2022-12-13 VITALS
TEMPERATURE: 97.4 F | RESPIRATION RATE: 16 BRPM | OXYGEN SATURATION: 99 % | DIASTOLIC BLOOD PRESSURE: 62 MMHG | SYSTOLIC BLOOD PRESSURE: 120 MMHG | HEART RATE: 78 BPM

## 2022-12-13 PROCEDURE — G0495 RN CARE TRAIN/EDU IN HH: HCPCS

## 2022-12-13 NOTE — HOME HEALTH
"Sn visit today. Pt \"doing better\". Saw Dr Mackey yesterday. Appt went well, per pt. Pt following up with md luis alberto. Reporting that she is \"trying to eat more\". Was not eating much in hospital. Spoke with nutritionist in hospital. Eating good sources of protein. Plan to see pt next week as scheduled."

## 2022-12-19 ENCOUNTER — HOME CARE VISIT (OUTPATIENT)
Dept: HOME HEALTH SERVICES | Facility: HOME HEALTHCARE | Age: 82
End: 2022-12-19
Payer: MEDICARE

## 2022-12-19 PROCEDURE — G0300 HHS/HOSPICE OF LPN EA 15 MIN: HCPCS

## 2022-12-28 ENCOUNTER — HOME CARE VISIT (OUTPATIENT)
Dept: HOME HEALTH SERVICES | Facility: HOME HEALTHCARE | Age: 82
End: 2022-12-28
Payer: MEDICARE

## 2022-12-28 VITALS
DIASTOLIC BLOOD PRESSURE: 68 MMHG | HEART RATE: 74 BPM | TEMPERATURE: 97.8 F | SYSTOLIC BLOOD PRESSURE: 118 MMHG | RESPIRATION RATE: 16 BRPM | OXYGEN SATURATION: 96 %

## 2022-12-28 PROCEDURE — G0495 RN CARE TRAIN/EDU IN HH: HCPCS

## 2023-01-01 VITALS
HEART RATE: 68 BPM | RESPIRATION RATE: 16 BRPM | DIASTOLIC BLOOD PRESSURE: 52 MMHG | TEMPERATURE: 97.1 F | SYSTOLIC BLOOD PRESSURE: 126 MMHG | OXYGEN SATURATION: 97 %

## 2023-01-09 RX ORDER — VALACYCLOVIR HYDROCHLORIDE 1 G/1
1000 TABLET, FILM COATED ORAL AS NEEDED
Qty: 5 TABLET | Refills: 2 | Status: SHIPPED | OUTPATIENT
Start: 2023-01-09

## 2023-03-27 ENCOUNTER — INFUSION (OUTPATIENT)
Dept: ONCOLOGY | Facility: HOSPITAL | Age: 83
End: 2023-03-27
Payer: MEDICARE

## 2023-03-27 VITALS
RESPIRATION RATE: 16 BRPM | SYSTOLIC BLOOD PRESSURE: 133 MMHG | DIASTOLIC BLOOD PRESSURE: 64 MMHG | TEMPERATURE: 97.1 F | BODY MASS INDEX: 25.78 KG/M2 | HEART RATE: 92 BPM | HEIGHT: 60 IN

## 2023-03-27 DIAGNOSIS — M81.0 AGE-RELATED OSTEOPOROSIS WITHOUT CURRENT PATHOLOGICAL FRACTURE: Primary | ICD-10-CM

## 2023-03-27 PROCEDURE — 25010000002 DENOSUMAB 60 MG/ML SOLUTION PREFILLED SYRINGE: Performed by: INTERNAL MEDICINE

## 2023-03-27 PROCEDURE — 96372 THER/PROPH/DIAG INJ SC/IM: CPT

## 2023-03-27 RX ADMIN — DENOSUMAB 60 MG: 60 INJECTION SUBCUTANEOUS at 10:27

## 2023-03-31 ENCOUNTER — LAB (OUTPATIENT)
Dept: LAB | Facility: HOSPITAL | Age: 83
End: 2023-03-31
Payer: MEDICARE

## 2023-03-31 ENCOUNTER — TRANSCRIBE ORDERS (OUTPATIENT)
Dept: LAB | Facility: HOSPITAL | Age: 83
End: 2023-03-31
Payer: MEDICARE

## 2023-03-31 DIAGNOSIS — R19.7 DIARRHEA OF PRESUMED INFECTIOUS ORIGIN: ICD-10-CM

## 2023-03-31 DIAGNOSIS — R91.8 LUNG MASS: ICD-10-CM

## 2023-03-31 DIAGNOSIS — J18.9 UNRESOLVED PNEUMONIA: ICD-10-CM

## 2023-03-31 DIAGNOSIS — A04.72 INTESTINAL INFECTION DUE TO CLOSTRIDIUM DIFFICILE: ICD-10-CM

## 2023-03-31 DIAGNOSIS — A04.72 INTESTINAL INFECTION DUE TO CLOSTRIDIUM DIFFICILE: Primary | ICD-10-CM

## 2023-03-31 PROCEDURE — 87493 C DIFF AMPLIFIED PROBE: CPT

## 2023-03-31 PROCEDURE — 87324 CLOSTRIDIUM AG IA: CPT

## 2023-04-03 LAB
C DIFF TOX A+B STL QL IA: NEGATIVE
C DIFF TOX GENS STL QL NAA+PROBE: NEGATIVE

## 2023-04-10 ENCOUNTER — OFFICE (OUTPATIENT)
Dept: URBAN - METROPOLITAN AREA CLINIC 4 | Facility: CLINIC | Age: 83
End: 2023-04-10

## 2023-04-10 VITALS — SYSTOLIC BLOOD PRESSURE: 141 MMHG | DIASTOLIC BLOOD PRESSURE: 68 MMHG | HEIGHT: 66 IN | WEIGHT: 120 LBS

## 2023-04-10 DIAGNOSIS — R11.0 NAUSEA: ICD-10-CM

## 2023-04-10 DIAGNOSIS — R19.4 CHANGE IN BOWEL HABIT: ICD-10-CM

## 2023-04-10 DIAGNOSIS — A04.72 ENTEROCOLITIS DUE TO CLOSTRIDIUM DIFFICILE, NOT SPECIFIED AS: ICD-10-CM

## 2023-04-10 DIAGNOSIS — K30 FUNCTIONAL DYSPEPSIA: ICD-10-CM

## 2023-04-10 PROCEDURE — 99204 OFFICE O/P NEW MOD 45 MIN: CPT | Performed by: NURSE PRACTITIONER

## 2023-04-10 RX ORDER — SUCRALFATE 1 G/1
TABLET ORAL
Qty: 360 | Refills: 3 | Status: ACTIVE

## 2023-05-11 ENCOUNTER — OFFICE (OUTPATIENT)
Dept: URBAN - METROPOLITAN AREA PATHOLOGY 4 | Facility: PATHOLOGY | Age: 83
End: 2023-05-11

## 2023-05-11 ENCOUNTER — AMBULATORY SURGICAL CENTER (OUTPATIENT)
Dept: URBAN - METROPOLITAN AREA SURGERY 10 | Facility: SURGERY | Age: 83
End: 2023-05-11

## 2023-05-11 DIAGNOSIS — K63.3 ULCER OF INTESTINE: ICD-10-CM

## 2023-05-11 DIAGNOSIS — K52.9 NONINFECTIVE GASTROENTERITIS AND COLITIS, UNSPECIFIED: ICD-10-CM

## 2023-05-11 PROCEDURE — 88305 TISSUE EXAM BY PATHOLOGIST: CPT | Performed by: INTERNAL MEDICINE

## 2023-05-11 PROCEDURE — 44389 COLONOSCOPY WITH BIOPSY: CPT | Performed by: INTERNAL MEDICINE

## 2023-05-16 ENCOUNTER — TRANSCRIBE ORDERS (OUTPATIENT)
Dept: LAB | Facility: HOSPITAL | Age: 83
End: 2023-05-16
Payer: MEDICARE

## 2023-05-16 ENCOUNTER — LAB (OUTPATIENT)
Dept: LAB | Facility: HOSPITAL | Age: 83
End: 2023-05-16
Payer: MEDICARE

## 2023-05-16 DIAGNOSIS — R11.0 NAUSEA: ICD-10-CM

## 2023-05-16 DIAGNOSIS — R19.7 DIARRHEA OF PRESUMED INFECTIOUS ORIGIN: ICD-10-CM

## 2023-05-16 DIAGNOSIS — A04.71 RECURRENT CLOSTRIDIUM DIFFICILE DIARRHEA: ICD-10-CM

## 2023-05-16 DIAGNOSIS — A04.71 RECURRENT CLOSTRIDIUM DIFFICILE DIARRHEA: Primary | ICD-10-CM

## 2023-05-16 LAB
25(OH)D3 SERPL-MCNC: 32.1 NG/ML (ref 30–100)
ALBUMIN SERPL-MCNC: 3.5 G/DL (ref 3.5–5.2)
ALBUMIN/GLOB SERPL: 1.2 G/DL
ALP SERPL-CCNC: 65 U/L (ref 39–117)
ALT SERPL W P-5'-P-CCNC: 5 U/L (ref 1–33)
ANION GAP SERPL CALCULATED.3IONS-SCNC: 7 MMOL/L (ref 5–15)
AST SERPL-CCNC: 12 U/L (ref 1–32)
BASOPHILS # BLD AUTO: 0.1 10*3/MM3 (ref 0–0.2)
BASOPHILS NFR BLD AUTO: 1.1 % (ref 0–1.5)
BILIRUB SERPL-MCNC: <0.2 MG/DL (ref 0–1.2)
BUN SERPL-MCNC: 22 MG/DL (ref 8–23)
BUN/CREAT SERPL: 20.6 (ref 7–25)
C DIFF GDH + TOXINS A+B STL QL IA.RAPID: NEGATIVE
C DIFF TOX GENS STL QL NAA+PROBE: DETECTED
CALCIUM SPEC-SCNC: 9 MG/DL (ref 8.6–10.5)
CHLORIDE SERPL-SCNC: 107 MMOL/L (ref 98–107)
CO2 SERPL-SCNC: 25 MMOL/L (ref 22–29)
CREAT SERPL-MCNC: 1.07 MG/DL (ref 0.57–1)
CRP SERPL-MCNC: 2.45 MG/DL (ref 0–0.5)
DEPRECATED RDW RBC AUTO: 41.6 FL (ref 37–54)
EGFRCR SERPLBLD CKD-EPI 2021: 52.3 ML/MIN/1.73
EOSINOPHIL # BLD AUTO: 1.18 10*3/MM3 (ref 0–0.4)
EOSINOPHIL NFR BLD AUTO: 13.2 % (ref 0.3–6.2)
ERYTHROCYTE [DISTWIDTH] IN BLOOD BY AUTOMATED COUNT: 12.6 % (ref 12.3–15.4)
ERYTHROCYTE [SEDIMENTATION RATE] IN BLOOD: 25 MM/HR (ref 0–30)
FERRITIN SERPL-MCNC: 190 NG/ML (ref 13–150)
GLOBULIN UR ELPH-MCNC: 2.9 GM/DL
GLUCOSE SERPL-MCNC: 154 MG/DL (ref 65–99)
HCT VFR BLD AUTO: 29.3 % (ref 34–46.6)
HGB BLD-MCNC: 9.4 G/DL (ref 12–15.9)
IMM GRANULOCYTES # BLD AUTO: 0.14 10*3/MM3 (ref 0–0.05)
IMM GRANULOCYTES NFR BLD AUTO: 1.6 % (ref 0–0.5)
IRON 24H UR-MRATE: 43 MCG/DL (ref 37–145)
IRON SATN MFR SERPL: 15 % (ref 20–50)
LYMPHOCYTES # BLD AUTO: 1.62 10*3/MM3 (ref 0.7–3.1)
LYMPHOCYTES NFR BLD AUTO: 18.1 % (ref 19.6–45.3)
MCH RBC QN AUTO: 29.3 PG (ref 26.6–33)
MCHC RBC AUTO-ENTMCNC: 32.1 G/DL (ref 31.5–35.7)
MCV RBC AUTO: 91.3 FL (ref 79–97)
MONOCYTES # BLD AUTO: 0.89 10*3/MM3 (ref 0.1–0.9)
MONOCYTES NFR BLD AUTO: 9.9 % (ref 5–12)
NEUTROPHILS NFR BLD AUTO: 5.02 10*3/MM3 (ref 1.7–7)
NEUTROPHILS NFR BLD AUTO: 56.1 % (ref 42.7–76)
NRBC BLD AUTO-RTO: 0 /100 WBC (ref 0–0.2)
PLATELET # BLD AUTO: 482 10*3/MM3 (ref 140–450)
PMV BLD AUTO: 9.2 FL (ref 6–12)
POTASSIUM SERPL-SCNC: 4.2 MMOL/L (ref 3.5–5.2)
PROT SERPL-MCNC: 6.4 G/DL (ref 6–8.5)
RBC # BLD AUTO: 3.21 10*6/MM3 (ref 3.77–5.28)
SODIUM SERPL-SCNC: 139 MMOL/L (ref 136–145)
TIBC SERPL-MCNC: 279 MCG/DL (ref 298–536)
TRANSFERRIN SERPL-MCNC: 187 MG/DL (ref 200–360)
VIT B12 BLD-MCNC: 518 PG/ML (ref 211–946)
WBC NRBC COR # BLD: 8.95 10*3/MM3 (ref 3.4–10.8)

## 2023-05-16 PROCEDURE — 85025 COMPLETE CBC W/AUTO DIFF WBC: CPT

## 2023-05-16 PROCEDURE — 87493 C DIFF AMPLIFIED PROBE: CPT

## 2023-05-16 PROCEDURE — 86140 C-REACTIVE PROTEIN: CPT

## 2023-05-16 PROCEDURE — 82728 ASSAY OF FERRITIN: CPT

## 2023-05-16 PROCEDURE — 83993 ASSAY FOR CALPROTECTIN FECAL: CPT

## 2023-05-16 PROCEDURE — 83540 ASSAY OF IRON: CPT

## 2023-05-16 PROCEDURE — 87449 NOS EACH ORGANISM AG IA: CPT

## 2023-05-16 PROCEDURE — 86037 ANCA TITER EACH ANTIBODY: CPT

## 2023-05-16 PROCEDURE — 84466 ASSAY OF TRANSFERRIN: CPT

## 2023-05-16 PROCEDURE — 80053 COMPREHEN METABOLIC PANEL: CPT

## 2023-05-16 PROCEDURE — 85652 RBC SED RATE AUTOMATED: CPT

## 2023-05-16 PROCEDURE — 82607 VITAMIN B-12: CPT

## 2023-05-16 PROCEDURE — 82306 VITAMIN D 25 HYDROXY: CPT

## 2023-05-16 PROCEDURE — 86671 FUNGUS NES ANTIBODY: CPT

## 2023-05-16 PROCEDURE — 36415 COLL VENOUS BLD VENIPUNCTURE: CPT

## 2023-05-18 LAB
BAKER'S YEAST IGA QN: <20 UNITS (ref 0–24.9)
BAKER'S YEAST IGG QN: <20 UNITS (ref 0–24.9)
P-ANCA ATYPICAL TITR SER IF: ABNORMAL TITER

## 2023-05-25 LAB — CALPROTECTIN STL-MCNT: 1860 UG/G (ref 0–120)

## 2023-05-30 ENCOUNTER — LAB (OUTPATIENT)
Dept: LAB | Facility: HOSPITAL | Age: 83
End: 2023-05-30

## 2023-05-30 ENCOUNTER — HOSPITAL ENCOUNTER (OUTPATIENT)
Dept: GENERAL RADIOLOGY | Facility: HOSPITAL | Age: 83
Discharge: HOME | End: 2023-05-30

## 2023-05-30 ENCOUNTER — OFFICE (OUTPATIENT)
Dept: URBAN - METROPOLITAN AREA CLINIC 4 | Facility: CLINIC | Age: 83
End: 2023-05-30

## 2023-05-30 ENCOUNTER — TRANSCRIBE ORDERS (OUTPATIENT)
Dept: ADMINISTRATIVE | Facility: HOSPITAL | Age: 83
End: 2023-05-30

## 2023-05-30 ENCOUNTER — TRANSCRIBE ORDERS (OUTPATIENT)
Dept: LAB | Facility: HOSPITAL | Age: 83
End: 2023-05-30

## 2023-05-30 VITALS — WEIGHT: 123 LBS | SYSTOLIC BLOOD PRESSURE: 116 MMHG | HEIGHT: 66 IN | DIASTOLIC BLOOD PRESSURE: 72 MMHG

## 2023-05-30 DIAGNOSIS — A04.72 ENTEROCOLITIS DUE TO CLOSTRIDIUM DIFFICILE, NOT SPECIFIED AS: ICD-10-CM

## 2023-05-30 DIAGNOSIS — A04.71 RECURRENT CLOSTRIDIUM DIFFICILE DIARRHEA: ICD-10-CM

## 2023-05-30 DIAGNOSIS — K50.119 CROHN'S DISEASE OF LARGE INTESTINE WITH UNSPECIFIED COMPLICA: ICD-10-CM

## 2023-05-30 DIAGNOSIS — K51.90 ULCERATIVE COLITIS, UNSPECIFIED, WITHOUT COMPLICATIONS: ICD-10-CM

## 2023-05-30 DIAGNOSIS — K51.919: Primary | ICD-10-CM

## 2023-05-30 DIAGNOSIS — K51.919 MILD CHRONIC ULCERATIVE COLITIS WITH COMPLICATION: ICD-10-CM

## 2023-05-30 DIAGNOSIS — K51.919 MILD CHRONIC ULCERATIVE COLITIS WITH COMPLICATION: Primary | ICD-10-CM

## 2023-05-30 PROCEDURE — 99214 OFFICE O/P EST MOD 30 MIN: CPT | Performed by: INTERNAL MEDICINE

## 2023-05-30 PROCEDURE — 86480 TB TEST CELL IMMUN MEASURE: CPT

## 2023-05-30 PROCEDURE — 86706 HEP B SURFACE ANTIBODY: CPT

## 2023-05-30 PROCEDURE — 36415 COLL VENOUS BLD VENIPUNCTURE: CPT

## 2023-05-30 PROCEDURE — 87340 HEPATITIS B SURFACE AG IA: CPT

## 2023-05-30 PROCEDURE — 71046 X-RAY EXAM CHEST 2 VIEWS: CPT

## 2023-05-30 RX ORDER — PREDNISONE 10 MG/1
TABLET ORAL
Qty: 105 | Refills: 1 | Status: COMPLETED
Start: 2023-05-30 | End: 2023-10-17

## 2023-05-31 LAB
HBV SURFACE AB SER RIA-ACNC: NORMAL
HBV SURFACE AG SERPL QL IA: NORMAL

## 2023-06-02 LAB
GAMMA INTERFERON BACKGROUND BLD IA-ACNC: 0.11 IU/ML
M TB IFN-G BLD-IMP: NEGATIVE
M TB IFN-G CD4+ T-CELLS BLD-ACNC: 0.41 IU/ML
M TBIFN-G CD4+ CD8+T-CELLS BLD-ACNC: 0.1 IU/ML
MITOGEN IGNF BLD-ACNC: >10 IU/ML
QUANTIFERON INCUBATION: NORMAL
SERVICE CMNT-IMP: NORMAL

## 2023-07-27 ENCOUNTER — OFFICE VISIT (OUTPATIENT)
Dept: OBSTETRICS AND GYNECOLOGY | Facility: CLINIC | Age: 83
End: 2023-07-27
Payer: MEDICARE

## 2023-07-27 VITALS
DIASTOLIC BLOOD PRESSURE: 82 MMHG | WEIGHT: 128 LBS | BODY MASS INDEX: 25 KG/M2 | SYSTOLIC BLOOD PRESSURE: 140 MMHG | RESPIRATION RATE: 14 BRPM

## 2023-07-27 DIAGNOSIS — N81.3 CYSTOCELE AND RECTOCELE WITH COMPLETE UTEROVAGINAL PROLAPSE: Primary | ICD-10-CM

## 2023-07-27 PROBLEM — R09.02 POSTOPERATIVE HYPOXIA: Status: RESOLVED | Noted: 2022-11-16 | Resolved: 2023-07-27

## 2023-07-27 PROBLEM — K62.5 RECTAL BLEEDING: Status: RESOLVED | Noted: 2022-11-29 | Resolved: 2023-07-27

## 2023-07-27 PROBLEM — Z90.49 STATUS POST SMALL BOWEL RESECTION: Status: RESOLVED | Noted: 2022-11-11 | Resolved: 2023-07-27

## 2023-07-27 PROBLEM — D72.829 LEUKOCYTOSIS: Status: RESOLVED | Noted: 2022-11-30 | Resolved: 2023-07-27

## 2023-07-27 PROBLEM — J81.1 PULMONARY EDEMA: Status: RESOLVED | Noted: 2022-11-16 | Resolved: 2023-07-27

## 2023-07-27 PROBLEM — D62 ACUTE BLOOD LOSS ANEMIA: Status: RESOLVED | Noted: 2022-11-11 | Resolved: 2023-07-27

## 2023-07-27 PROBLEM — E87.6 HYPOKALEMIA: Status: RESOLVED | Noted: 2022-11-30 | Resolved: 2023-07-27

## 2023-07-27 PROBLEM — Z98.890 POSTOPERATIVE HYPOXIA: Status: RESOLVED | Noted: 2022-11-16 | Resolved: 2023-07-27

## 2023-07-27 PROBLEM — I95.9 SEVERE HYPOTENSION: Status: RESOLVED | Noted: 2022-11-11 | Resolved: 2023-07-27

## 2023-07-27 PROBLEM — Z46.89 PESSARY MAINTENANCE: Status: RESOLVED | Noted: 2019-01-16 | Resolved: 2023-07-27

## 2023-07-27 PROBLEM — B02.9 SHINGLES: Status: RESOLVED | Noted: 2022-11-30 | Resolved: 2023-07-27

## 2023-07-27 PROCEDURE — 99214 OFFICE O/P EST MOD 30 MIN: CPT | Performed by: OBSTETRICS & GYNECOLOGY

## 2023-07-27 PROCEDURE — 3079F DIAST BP 80-89 MM HG: CPT | Performed by: OBSTETRICS & GYNECOLOGY

## 2023-07-27 PROCEDURE — 3077F SYST BP >= 140 MM HG: CPT | Performed by: OBSTETRICS & GYNECOLOGY

## 2023-07-27 NOTE — PROGRESS NOTES
Subjective   Chief Complaint   Patient presents with    Follow-up     Neema Richard is a 83 y.o. year old who presents to be seen for follow-up.  She has had quite a past 6 months.  She was last seen here to have her pessary removed in anticipation of colon surgery.  She comes back to have the pessary replaced.  She has had quite a period of time.  She has had issues with colitis.  She has now been diagnosed with ulcerative colitis and is seeing Ambrocio Manzo.  Following her surgery she had a hematoma.  She was given blood transfusions.  She was in the hospital with what sounds like sepsis.  She had C. difficile.  She believes the infectious diarrhea has resolved itself.     OTHER THINGS SHE WANTS TO DISCUSS TODAY:  Nothing else    The following portions of the patient's history were reviewed and updated as appropriate:current medications, allergies, past medical history, and past surgical history    Review of Systems  Constitutional POS: nothing reported    NEG: anorexia or night sweats   Genitourinary POS: see HPI       NEG: dysuria or hematuria   Gastointestinal POS: see HPI    NEG: bloating, change in bowel habits, melena, or reflux symptoms   Integument POS: nothing reported    NEG: moles that are changing in size, shape, color or rashes        Objective   Resp 14   Wt 58.1 kg (128 lb)   LMP  (LMP Unknown)   BMI 25.00 kg/m²     General:  well developed; well nourished  no acute distress   Pelvis: Clinical staff was present for exam  External genitalia:  normal appearance of the external genitalia including Bartholin's and [place]'s glands.  :  urethral meatus normal;  Vaginal: atrophic mucosal changes are present; markedly shortened  Cervix is not visualized on today's exam  Cystocele GRADE 1  Rectocele n/a  With Valsalva there is a pronounced protrusion lateral to what used to be the anus     Lab Review   No data reviewed    Imaging   No data reviewed        Assessment   Symptomatic prolapse -no longer  present following her most recent surgery  Protrusion of some kind of a structural lesion in the previously resected surgical area from her APR.  I do wonder if this could represent utesome kind of herniation through the fascial supports where the rectum was removed.     Plan   I do not think she needs a pessary replaced  I do think she needs to speak with colorectal surgery about this area of protrusion in the area of her prior surgery to have this further characterized.    She may ultimately need imaging done to better characterize what this area is protruding into the right buttocks region  The importance of keeping all planned follow-up and taking all medications as prescribed was emphasized.  Follow up after she sees George           This note was electronically signed.    Walter Caro M.D.  July 27, 2023    Part of this note may be an electronic transcription/translation of spoken language to printed text using the Dragon Dictation System.

## 2023-07-27 NOTE — LETTER
July 27, 2023     David Mackey MD  [address]  [address]  [facility] [address]    Patient: Neema Richard   YOB: 1940   Date of Visit: 7/27/2023       Dear David Mackey MD    Neema Richard was in my office today. Below is a copy of my note.    If you have questions, please do not hesitate to call me. I look forward to following Neema along with you.         Sincerely,        Walter Caro MD        CC: Sravan Vazquez MD    Subjective  Chief Complaint   Patient presents with   • Follow-up     Neema Richard is a 83 y.o. year old who presents to be seen for follow-up.  She has had quite a past 6 months.  She was last seen here to have her pessary removed in anticipation of colon surgery.  She comes back to have the pessary replaced.  She has had quite a period of time.  She has had issues with colitis.  She has now been diagnosed with ulcerative colitis and is seeing Ambrocio Manzo.  Following her surgery she had a hematoma.  She was given blood transfusions.  She was in the hospital with what sounds like sepsis.  She had C. difficile.  She believes the infectious diarrhea has resolved itself.     OTHER THINGS SHE WANTS TO DISCUSS TODAY:  Nothing else    The following portions of the patient's history were reviewed and updated as appropriate:current medications, allergies, past medical history, and past surgical history    Review of Systems  Constitutional POS: nothing reported    NEG: anorexia or night sweats   Genitourinary POS: see HPI       NEG: dysuria or hematuria   Gastointestinal POS: see HPI    NEG: bloating, change in bowel habits, melena, or reflux symptoms   Integument POS: nothing reported    NEG: moles that are changing in size, shape, color or rashes        Objective  Resp 14   Wt 58.1 kg (128 lb)   LMP  (LMP Unknown)   BMI 25.00 kg/m²     General:  well developed; well nourished  no acute distress   Pelvis: Clinical staff was present for exam  External genitalia:   normal appearance of the external genitalia including Bartholin's and [place]'s glands.  :  urethral meatus normal;  Vaginal: atrophic mucosal changes are present; markedly shortened  Cervix is not visualized on today's exam  Cystocele GRADE 1  Rectocele n/a  With Valsalva there is a pronounced protrusion lateral to what used to be the anus     Lab Review   No data reviewed    Imaging   No data reviewed        Assessment  Symptomatic prolapse -no longer present following her most recent surgery  Protrusion of some kind of a structural lesion in the previously resected surgical area from her APR.  I do wonder if this could represent utesome kind of herniation through the fascial supports where the rectum was removed.     Plan  I do not think she needs a pessary replaced  I do think she needs to speak with colorectal surgery about this area of protrusion in the area of her prior surgery to have this further characterized.    She may ultimately need imaging done to better characterize what this area is protruding into the right buttocks region  The importance of keeping all planned follow-up and taking all medications as prescribed was emphasized.  Follow up after she sees George           This note was electronically signed.    Walter Caro M.D.  July 27, 2023    Part of this note may be an electronic transcription/translation of spoken language to printed text using the Dragon Dictation System.

## 2023-08-17 ENCOUNTER — TELEPHONE (OUTPATIENT)
Dept: WOUND CARE | Facility: HOSPITAL | Age: 83
End: 2023-08-17
Payer: MEDICARE

## 2023-08-17 NOTE — TELEPHONE ENCOUNTER
Neema Richard  1940  [id number]    Summary: Received voicemail from patient that she would like [facility] nurse to call her back.  Got a hold of patient and she states that there was an irritated area due to friction near her ostomy equipment related to the compression garment that she was wearing for her perineal hernia.  She states that this happened yesterday but that she thinks she has a good understanding of why and that it seems to be doing better.  She states that her stoma has been bleeding around it from time to time.  She is now on blood thinners.  I recommended gentle cleansing and removal of previous equipment as well as possibly cutting the ostomy appliance slightly larger than she has been.  Also made recommendations on getting precut bag from company instead of backs that she has to cut herself.  Patient lets me know that she will call back should further needs arise.    Time spent teleconferencing with patient was: 15 minutes

## 2023-08-18 ENCOUNTER — TRANSCRIBE ORDERS (OUTPATIENT)
Dept: ADMINISTRATIVE | Facility: HOSPITAL | Age: 83
End: 2023-08-18
Payer: MEDICARE

## 2023-08-18 DIAGNOSIS — Z12.31 SCREENING MAMMOGRAM FOR BREAST CANCER: Primary | ICD-10-CM

## 2023-08-18 PROBLEM — K50.119 CROHN'S DISEASE OF LARGE INTESTINE WITH COMPLICATION: Status: ACTIVE | Noted: 2023-08-18

## 2023-09-07 ENCOUNTER — INFUSION (OUTPATIENT)
Dept: ONCOLOGY | Facility: HOSPITAL | Age: 83
End: 2023-09-07
Payer: MEDICARE

## 2023-09-07 VITALS
WEIGHT: 131.8 LBS | BODY MASS INDEX: 25.74 KG/M2 | SYSTOLIC BLOOD PRESSURE: 132 MMHG | TEMPERATURE: 97.1 F | RESPIRATION RATE: 16 BRPM | DIASTOLIC BLOOD PRESSURE: 62 MMHG | HEART RATE: 100 BPM

## 2023-09-07 DIAGNOSIS — K50.119 CROHN'S DISEASE OF LARGE INTESTINE WITH COMPLICATION: Primary | ICD-10-CM

## 2023-09-07 PROCEDURE — 25010000002 USTEKINUMAB 130 MG/26ML SOLUTION 26 ML VIAL: Performed by: INTERNAL MEDICINE

## 2023-09-07 PROCEDURE — 96365 THER/PROPH/DIAG IV INF INIT: CPT

## 2023-09-07 RX ORDER — CLOPIDOGREL BISULFATE 75 MG/1
75 TABLET ORAL DAILY
COMMUNITY

## 2023-09-07 RX ADMIN — USTEKINUMAB 390 MG: 130 SOLUTION INTRAVENOUS at 08:54

## 2023-09-14 ENCOUNTER — HOSPITAL ENCOUNTER (OUTPATIENT)
Dept: CARDIOLOGY | Facility: HOSPITAL | Age: 83
Discharge: HOME | End: 2023-09-14
Admitting: INTERNAL MEDICINE
Payer: MEDICARE

## 2023-09-14 VITALS — HEIGHT: 60 IN | BODY MASS INDEX: 25.88 KG/M2 | WEIGHT: 131.84 LBS

## 2023-09-14 DIAGNOSIS — R06.00 DYSPNEA, UNSPECIFIED TYPE: ICD-10-CM

## 2023-09-14 LAB
ASCENDING AORTA: 3.4 CM
BH CV ECHO MEAS - AO MAX PG: 8.7 MMHG
BH CV ECHO MEAS - AO MEAN PG: 4.5 MMHG
BH CV ECHO MEAS - AO ROOT DIAM: 3.3 CM
BH CV ECHO MEAS - AO V2 MAX: 147.4 CM/SEC
BH CV ECHO MEAS - AO V2 VTI: 27.2 CM
BH CV ECHO MEAS - AVA(I,D): 2.45 CM2
BH CV ECHO MEAS - EDV(CUBED): 131.3 ML
BH CV ECHO MEAS - EDV(MOD-SP2): 58 ML
BH CV ECHO MEAS - EDV(MOD-SP4): 65.6 ML
BH CV ECHO MEAS - EF(MOD-BP): 62.2 %
BH CV ECHO MEAS - EF(MOD-SP2): 68.4 %
BH CV ECHO MEAS - EF(MOD-SP4): 55.6 %
BH CV ECHO MEAS - ESV(CUBED): 36.3 ML
BH CV ECHO MEAS - ESV(MOD-SP2): 18.3 ML
BH CV ECHO MEAS - ESV(MOD-SP4): 29.1 ML
BH CV ECHO MEAS - FS: 34.9 %
BH CV ECHO MEAS - IVS/LVPW: 0.89 CM
BH CV ECHO MEAS - IVSD: 1.02 CM
BH CV ECHO MEAS - LA DIMENSION: 4.4 CM
BH CV ECHO MEAS - LAT PEAK E' VEL: 8.3 CM/SEC
BH CV ECHO MEAS - LV DIASTOLIC VOL/BSA (35-75): 42.1 CM2
BH CV ECHO MEAS - LV MASS(C)D: 207.4 GRAMS
BH CV ECHO MEAS - LV MAX PG: 3.6 MMHG
BH CV ECHO MEAS - LV MEAN PG: 1.44 MMHG
BH CV ECHO MEAS - LV SYSTOLIC VOL/BSA (12-30): 18.7 CM2
BH CV ECHO MEAS - LV V1 MAX: 94.3 CM/SEC
BH CV ECHO MEAS - LV V1 VTI: 22.1 CM
BH CV ECHO MEAS - LVIDD: 5.1 CM
BH CV ECHO MEAS - LVIDS: 3.3 CM
BH CV ECHO MEAS - LVOT AREA: 3 CM2
BH CV ECHO MEAS - LVOT DIAM: 1.96 CM
BH CV ECHO MEAS - LVPWD: 1.14 CM
BH CV ECHO MEAS - MED PEAK E' VEL: 5.7 CM/SEC
BH CV ECHO MEAS - MV A MAX VEL: 113.1 CM/SEC
BH CV ECHO MEAS - MV DEC SLOPE: 890.3 CM/SEC2
BH CV ECHO MEAS - MV DEC TIME: 0.08 MSEC
BH CV ECHO MEAS - MV E MAX VEL: 75.2 CM/SEC
BH CV ECHO MEAS - MV E/A: 0.66
BH CV ECHO MEAS - MV MAX PG: 6.4 MMHG
BH CV ECHO MEAS - MV MEAN PG: 2.8 MMHG
BH CV ECHO MEAS - MV V2 VTI: 20 CM
BH CV ECHO MEAS - MVA(VTI): 3.3 CM2
BH CV ECHO MEAS - PA V2 MAX: 43.5 CM/SEC
BH CV ECHO MEAS - PI END-D VEL: 104.4 CM/SEC
BH CV ECHO MEAS - RAP SYSTOLE: 3 MMHG
BH CV ECHO MEAS - RVSP: 19 MMHG
BH CV ECHO MEAS - SI(MOD-SP2): 25.5 ML/M2
BH CV ECHO MEAS - SI(MOD-SP4): 23.5 ML/M2
BH CV ECHO MEAS - SV(LVOT): 66.7 ML
BH CV ECHO MEAS - SV(MOD-SP2): 39.7 ML
BH CV ECHO MEAS - SV(MOD-SP4): 36.5 ML
BH CV ECHO MEAS - TAPSE (>1.6): 1.83 CM
BH CV ECHO MEAS - TR MAX PG: 16.2 MMHG
BH CV ECHO MEAS - TR MAX VEL: 201.5 CM/SEC
BH CV ECHO MEASUREMENTS AVERAGE E/E' RATIO: 10.74
BH CV VAS BP LEFT ARM: NORMAL MMHG
BH CV XLRA - RV BASE: 2.6 CM
BH CV XLRA - RV LENGTH: 6.6 CM
BH CV XLRA - RV MID: 2.13 CM
BH CV XLRA - TDI S': 13.8 CM/SEC
IVRT: 124 MSEC
LEFT ATRIUM VOLUME INDEX: 35.9 ML/M2

## 2023-09-14 PROCEDURE — 93306 TTE W/DOPPLER COMPLETE: CPT

## 2023-09-25 ENCOUNTER — INFUSION (OUTPATIENT)
Dept: ONCOLOGY | Facility: HOSPITAL | Age: 83
End: 2023-09-25

## 2023-09-25 VITALS
TEMPERATURE: 97.3 F | HEART RATE: 97 BPM | RESPIRATION RATE: 16 BRPM | HEIGHT: 60 IN | DIASTOLIC BLOOD PRESSURE: 67 MMHG | SYSTOLIC BLOOD PRESSURE: 145 MMHG | BODY MASS INDEX: 25.75 KG/M2

## 2023-09-25 DIAGNOSIS — M81.0 AGE-RELATED OSTEOPOROSIS WITHOUT CURRENT PATHOLOGICAL FRACTURE: Primary | ICD-10-CM

## 2023-09-25 PROCEDURE — 96372 THER/PROPH/DIAG INJ SC/IM: CPT

## 2023-09-25 PROCEDURE — 25010000002 DENOSUMAB 60 MG/ML SOLUTION PREFILLED SYRINGE: Performed by: INTERNAL MEDICINE

## 2023-09-25 RX ADMIN — DENOSUMAB 60 MG: 60 INJECTION SUBCUTANEOUS at 12:56

## 2023-09-26 ENCOUNTER — TRANSCRIBE ORDERS (OUTPATIENT)
Dept: LAB | Facility: HOSPITAL | Age: 83
End: 2023-09-26
Payer: MEDICARE

## 2023-09-26 ENCOUNTER — LAB (OUTPATIENT)
Dept: LAB | Facility: HOSPITAL | Age: 83
End: 2023-09-26
Payer: MEDICARE

## 2023-09-26 DIAGNOSIS — E78.00 HIGH CHOLESTEROL: ICD-10-CM

## 2023-09-26 DIAGNOSIS — A04.71 RECURRENT CLOSTRIDIUM DIFFICILE DIARRHEA: ICD-10-CM

## 2023-09-26 DIAGNOSIS — K51.919 MILD CHRONIC ULCERATIVE COLITIS WITH COMPLICATION: ICD-10-CM

## 2023-09-26 DIAGNOSIS — K51.919 MILD CHRONIC ULCERATIVE COLITIS WITH COMPLICATION: Primary | ICD-10-CM

## 2023-09-26 LAB
25(OH)D3 SERPL-MCNC: 40.5 NG/ML (ref 30–100)
ALBUMIN SERPL-MCNC: 3.6 G/DL (ref 3.5–5.2)
ALBUMIN/GLOB SERPL: 1.3 G/DL
ALP SERPL-CCNC: 57 U/L (ref 39–117)
ALT SERPL W P-5'-P-CCNC: <5 U/L (ref 1–33)
ANION GAP SERPL CALCULATED.3IONS-SCNC: 10.6 MMOL/L (ref 5–15)
AST SERPL-CCNC: 15 U/L (ref 1–32)
BASOPHILS # BLD AUTO: 0.11 10*3/MM3 (ref 0–0.2)
BASOPHILS NFR BLD AUTO: 1.4 % (ref 0–1.5)
BILIRUB SERPL-MCNC: <0.2 MG/DL (ref 0–1.2)
BUN SERPL-MCNC: 19 MG/DL (ref 8–23)
BUN/CREAT SERPL: 19.4 (ref 7–25)
CALCIUM SPEC-SCNC: 8.7 MG/DL (ref 8.6–10.5)
CHLORIDE SERPL-SCNC: 111 MMOL/L (ref 98–107)
CO2 SERPL-SCNC: 22.4 MMOL/L (ref 22–29)
CREAT SERPL-MCNC: 0.98 MG/DL (ref 0.57–1)
CRP SERPL-MCNC: 0.7 MG/DL (ref 0–0.5)
DEPRECATED RDW RBC AUTO: 40.5 FL (ref 37–54)
EGFRCR SERPLBLD CKD-EPI 2021: 57.7 ML/MIN/1.73
EOSINOPHIL # BLD AUTO: 0.76 10*3/MM3 (ref 0–0.4)
EOSINOPHIL NFR BLD AUTO: 10 % (ref 0.3–6.2)
ERYTHROCYTE [DISTWIDTH] IN BLOOD BY AUTOMATED COUNT: 12.8 % (ref 12.3–15.4)
ERYTHROCYTE [SEDIMENTATION RATE] IN BLOOD: 13 MM/HR (ref 0–30)
FERRITIN SERPL-MCNC: 38 NG/ML (ref 13–150)
GLOBULIN UR ELPH-MCNC: 2.8 GM/DL
GLUCOSE SERPL-MCNC: 129 MG/DL (ref 65–99)
HCT VFR BLD AUTO: 29.3 % (ref 34–46.6)
HGB BLD-MCNC: 9.4 G/DL (ref 12–15.9)
IMM GRANULOCYTES # BLD AUTO: 0.07 10*3/MM3 (ref 0–0.05)
IMM GRANULOCYTES NFR BLD AUTO: 0.9 % (ref 0–0.5)
IRON 24H UR-MRATE: 41 MCG/DL (ref 37–145)
IRON SATN MFR SERPL: 13 % (ref 20–50)
LYMPHOCYTES # BLD AUTO: 1.57 10*3/MM3 (ref 0.7–3.1)
LYMPHOCYTES NFR BLD AUTO: 20.6 % (ref 19.6–45.3)
MCH RBC QN AUTO: 28 PG (ref 26.6–33)
MCHC RBC AUTO-ENTMCNC: 32.1 G/DL (ref 31.5–35.7)
MCV RBC AUTO: 87.2 FL (ref 79–97)
MONOCYTES # BLD AUTO: 0.73 10*3/MM3 (ref 0.1–0.9)
MONOCYTES NFR BLD AUTO: 9.6 % (ref 5–12)
NEUTROPHILS NFR BLD AUTO: 4.38 10*3/MM3 (ref 1.7–7)
NEUTROPHILS NFR BLD AUTO: 57.5 % (ref 42.7–76)
NRBC BLD AUTO-RTO: 0 /100 WBC (ref 0–0.2)
PLATELET # BLD AUTO: 419 10*3/MM3 (ref 140–450)
PMV BLD AUTO: 9.9 FL (ref 6–12)
POTASSIUM SERPL-SCNC: 4.1 MMOL/L (ref 3.5–5.2)
PROT SERPL-MCNC: 6.4 G/DL (ref 6–8.5)
RBC # BLD AUTO: 3.36 10*6/MM3 (ref 3.77–5.28)
SODIUM SERPL-SCNC: 144 MMOL/L (ref 136–145)
TIBC SERPL-MCNC: 317 MCG/DL (ref 298–536)
TRANSFERRIN SERPL-MCNC: 213 MG/DL (ref 200–360)
VIT B12 BLD-MCNC: 340 PG/ML (ref 211–946)
WBC NRBC COR # BLD: 7.62 10*3/MM3 (ref 3.4–10.8)

## 2023-09-26 PROCEDURE — 82306 VITAMIN D 25 HYDROXY: CPT

## 2023-09-26 PROCEDURE — 80053 COMPREHEN METABOLIC PANEL: CPT | Performed by: INTERNAL MEDICINE

## 2023-09-26 PROCEDURE — 82607 VITAMIN B-12: CPT

## 2023-09-26 PROCEDURE — 85652 RBC SED RATE AUTOMATED: CPT

## 2023-09-26 PROCEDURE — 82728 ASSAY OF FERRITIN: CPT

## 2023-09-26 PROCEDURE — 85025 COMPLETE CBC W/AUTO DIFF WBC: CPT

## 2023-09-26 PROCEDURE — 84466 ASSAY OF TRANSFERRIN: CPT

## 2023-09-26 PROCEDURE — 83540 ASSAY OF IRON: CPT

## 2023-09-26 PROCEDURE — 36415 COLL VENOUS BLD VENIPUNCTURE: CPT

## 2023-09-26 PROCEDURE — 86140 C-REACTIVE PROTEIN: CPT

## 2023-09-29 ENCOUNTER — LAB (OUTPATIENT)
Dept: LAB | Facility: HOSPITAL | Age: 83
End: 2023-09-29
Payer: MEDICARE

## 2023-09-29 DIAGNOSIS — A04.71 RECURRENT CLOSTRIDIUM DIFFICILE DIARRHEA: ICD-10-CM

## 2023-09-29 DIAGNOSIS — E78.00 HIGH CHOLESTEROL: ICD-10-CM

## 2023-09-29 DIAGNOSIS — K51.919 MILD CHRONIC ULCERATIVE COLITIS WITH COMPLICATION: ICD-10-CM

## 2023-09-29 PROCEDURE — 83993 ASSAY FOR CALPROTECTIN FECAL: CPT

## 2023-10-02 LAB — CALPROTECTIN STL-MCNT: 1830 UG/G (ref 0–120)

## 2023-10-16 ENCOUNTER — APPOINTMENT (OUTPATIENT)
Dept: GENERAL RADIOLOGY | Facility: HOSPITAL | Age: 83
End: 2023-10-16
Payer: MEDICARE

## 2023-10-16 ENCOUNTER — HOSPITAL ENCOUNTER (EMERGENCY)
Facility: HOSPITAL | Age: 83
Discharge: LEFT BEFORE DISCHARGE | End: 2023-10-17
Attending: EMERGENCY MEDICINE | Admitting: EMERGENCY MEDICINE
Payer: MEDICARE

## 2023-10-16 VITALS
SYSTOLIC BLOOD PRESSURE: 134 MMHG | HEART RATE: 77 BPM | HEIGHT: 66 IN | OXYGEN SATURATION: 97 % | WEIGHT: 123 LBS | DIASTOLIC BLOOD PRESSURE: 56 MMHG | BODY MASS INDEX: 19.77 KG/M2 | RESPIRATION RATE: 18 BRPM | TEMPERATURE: 98.4 F

## 2023-10-16 DIAGNOSIS — N28.9 ACUTE RENAL INSUFFICIENCY: ICD-10-CM

## 2023-10-16 DIAGNOSIS — Z86.79 HISTORY OF PERIPHERAL VASCULAR DISEASE: ICD-10-CM

## 2023-10-16 DIAGNOSIS — R19.7 DIARRHEA, UNSPECIFIED TYPE: ICD-10-CM

## 2023-10-16 DIAGNOSIS — Z86.39 HISTORY OF HYPOTHYROIDISM: ICD-10-CM

## 2023-10-16 DIAGNOSIS — E86.0 MILD DEHYDRATION: ICD-10-CM

## 2023-10-16 DIAGNOSIS — Z86.79 HISTORY OF HYPERTENSION: ICD-10-CM

## 2023-10-16 DIAGNOSIS — Z87.19 HISTORY OF ULCERATIVE COLITIS: ICD-10-CM

## 2023-10-16 DIAGNOSIS — R06.02 EXERTIONAL SHORTNESS OF BREATH: ICD-10-CM

## 2023-10-16 DIAGNOSIS — Z79.01 CHRONIC ANTICOAGULATION: ICD-10-CM

## 2023-10-16 DIAGNOSIS — R53.1 GENERALIZED WEAKNESS: Primary | ICD-10-CM

## 2023-10-16 DIAGNOSIS — R19.5 GUAIAC POSITIVE STOOLS: ICD-10-CM

## 2023-10-16 DIAGNOSIS — D50.9 IRON DEFICIENCY ANEMIA, UNSPECIFIED IRON DEFICIENCY ANEMIA TYPE: ICD-10-CM

## 2023-10-16 DIAGNOSIS — Z86.39 HISTORY OF HYPERLIPIDEMIA: ICD-10-CM

## 2023-10-16 PROBLEM — K92.2 GI BLEED: Status: ACTIVE | Noted: 2023-10-16

## 2023-10-16 LAB
ALBUMIN SERPL-MCNC: 3.7 G/DL (ref 3.5–5.2)
ALBUMIN/GLOB SERPL: 1.2 G/DL
ALP SERPL-CCNC: 62 U/L (ref 39–117)
ALT SERPL W P-5'-P-CCNC: 8 U/L (ref 1–33)
ANION GAP SERPL CALCULATED.3IONS-SCNC: 16 MMOL/L (ref 5–15)
AST SERPL-CCNC: 22 U/L (ref 1–32)
BASOPHILS # BLD AUTO: 0.05 10*3/MM3 (ref 0–0.2)
BASOPHILS NFR BLD AUTO: 0.8 % (ref 0–1.5)
BILIRUB SERPL-MCNC: 0.2 MG/DL (ref 0–1.2)
BILIRUB UR QL STRIP: NEGATIVE
BUN SERPL-MCNC: 36 MG/DL (ref 8–23)
BUN/CREAT SERPL: 25.5 (ref 7–25)
CALCIUM SPEC-SCNC: 8.6 MG/DL (ref 8.6–10.5)
CHLORIDE SERPL-SCNC: 105 MMOL/L (ref 98–107)
CLARITY UR: ABNORMAL
CO2 SERPL-SCNC: 18 MMOL/L (ref 22–29)
COLOR UR: YELLOW
CREAT SERPL-MCNC: 1.41 MG/DL (ref 0.57–1)
DEPRECATED RDW RBC AUTO: 48.8 FL (ref 37–54)
DEVELOPER EXPIRATION DATE: ABNORMAL
DEVELOPER LOT NUMBER: ABNORMAL
EGFRCR SERPLBLD CKD-EPI 2021: 37.3 ML/MIN/1.73
EOSINOPHIL # BLD AUTO: 0.01 10*3/MM3 (ref 0–0.4)
EOSINOPHIL NFR BLD AUTO: 0.2 % (ref 0.3–6.2)
ERYTHROCYTE [DISTWIDTH] IN BLOOD BY AUTOMATED COUNT: 14.6 % (ref 12.3–15.4)
EXPIRATION DATE: ABNORMAL
FECAL OCCULT BLOOD SCREEN, POC: POSITIVE
FERRITIN SERPL-MCNC: 77.54 NG/ML (ref 13–150)
GLOBULIN UR ELPH-MCNC: 3.2 GM/DL
GLUCOSE SERPL-MCNC: 209 MG/DL (ref 65–99)
GLUCOSE UR STRIP-MCNC: NEGATIVE MG/DL
HCT VFR BLD AUTO: 31.8 % (ref 34–46.6)
HGB BLD-MCNC: 9.8 G/DL (ref 12–15.9)
HGB UR QL STRIP.AUTO: NEGATIVE
HOLD SPECIMEN: NORMAL
IMM GRANULOCYTES # BLD AUTO: 0.02 10*3/MM3 (ref 0–0.05)
IMM GRANULOCYTES NFR BLD AUTO: 0.3 % (ref 0–0.5)
IRON 24H UR-MRATE: 34 MCG/DL (ref 37–145)
IRON SATN MFR SERPL: 10 % (ref 20–50)
KETONES UR QL STRIP: ABNORMAL
LEUKOCYTE ESTERASE UR QL STRIP.AUTO: ABNORMAL
LYMPHOCYTES # BLD AUTO: 1.17 10*3/MM3 (ref 0.7–3.1)
LYMPHOCYTES NFR BLD AUTO: 19.7 % (ref 19.6–45.3)
Lab: ABNORMAL
MAGNESIUM SERPL-MCNC: 2.5 MG/DL (ref 1.6–2.4)
MCH RBC QN AUTO: 28.2 PG (ref 26.6–33)
MCHC RBC AUTO-ENTMCNC: 30.8 G/DL (ref 31.5–35.7)
MCV RBC AUTO: 91.4 FL (ref 79–97)
MONOCYTES # BLD AUTO: 0.87 10*3/MM3 (ref 0.1–0.9)
MONOCYTES NFR BLD AUTO: 14.6 % (ref 5–12)
NEGATIVE CONTROL: POSITIVE
NEUTROPHILS NFR BLD AUTO: 3.83 10*3/MM3 (ref 1.7–7)
NEUTROPHILS NFR BLD AUTO: 64.4 % (ref 42.7–76)
NITRITE UR QL STRIP: NEGATIVE
NRBC BLD AUTO-RTO: 0 /100 WBC (ref 0–0.2)
NT-PROBNP SERPL-MCNC: 336.8 PG/ML (ref 0–1800)
PH UR STRIP.AUTO: <=5 [PH] (ref 5–8)
PLATELET # BLD AUTO: 324 10*3/MM3 (ref 140–450)
PMV BLD AUTO: 10.3 FL (ref 6–12)
POSITIVE CONTROL: POSITIVE
POTASSIUM SERPL-SCNC: 3.9 MMOL/L (ref 3.5–5.2)
PROT SERPL-MCNC: 6.9 G/DL (ref 6–8.5)
PROT UR QL STRIP: ABNORMAL
QT INTERVAL: 384 MS
QTC INTERVAL: 451 MS
RBC # BLD AUTO: 3.48 10*6/MM3 (ref 3.77–5.28)
RETICS # AUTO: 0.05 10*6/MM3 (ref 0.02–0.13)
RETICS/RBC NFR AUTO: 1.41 % (ref 0.7–1.9)
SODIUM SERPL-SCNC: 139 MMOL/L (ref 136–145)
SP GR UR STRIP: 1.03 (ref 1–1.03)
T4 FREE SERPL-MCNC: 1.65 NG/DL (ref 0.93–1.7)
TIBC SERPL-MCNC: 332 MCG/DL (ref 298–536)
TRANSFERRIN SERPL-MCNC: 223 MG/DL (ref 200–360)
TROPONIN T SERPL HS-MCNC: 12 NG/L
TROPONIN T SERPL HS-MCNC: 13 NG/L
TSH SERPL DL<=0.05 MIU/L-ACNC: 0.2 UIU/ML (ref 0.27–4.2)
UROBILINOGEN UR QL STRIP: ABNORMAL
WBC NRBC COR # BLD: 5.95 10*3/MM3 (ref 3.4–10.8)
WHOLE BLOOD HOLD COAG: NORMAL
WHOLE BLOOD HOLD SPECIMEN: NORMAL

## 2023-10-16 PROCEDURE — 84484 ASSAY OF TROPONIN QUANT: CPT | Performed by: PHYSICIAN ASSISTANT

## 2023-10-16 PROCEDURE — 81001 URINALYSIS AUTO W/SCOPE: CPT | Performed by: EMERGENCY MEDICINE

## 2023-10-16 PROCEDURE — 84443 ASSAY THYROID STIM HORMONE: CPT | Performed by: PHYSICIAN ASSISTANT

## 2023-10-16 PROCEDURE — 85025 COMPLETE CBC W/AUTO DIFF WBC: CPT | Performed by: EMERGENCY MEDICINE

## 2023-10-16 PROCEDURE — 83540 ASSAY OF IRON: CPT | Performed by: PHYSICIAN ASSISTANT

## 2023-10-16 PROCEDURE — 84484 ASSAY OF TROPONIN QUANT: CPT | Performed by: EMERGENCY MEDICINE

## 2023-10-16 PROCEDURE — 85045 AUTOMATED RETICULOCYTE COUNT: CPT | Performed by: PHYSICIAN ASSISTANT

## 2023-10-16 PROCEDURE — 84466 ASSAY OF TRANSFERRIN: CPT | Performed by: PHYSICIAN ASSISTANT

## 2023-10-16 PROCEDURE — 84439 ASSAY OF FREE THYROXINE: CPT | Performed by: PHYSICIAN ASSISTANT

## 2023-10-16 PROCEDURE — 25810000003 SODIUM CHLORIDE 0.9 % SOLUTION: Performed by: PHYSICIAN ASSISTANT

## 2023-10-16 PROCEDURE — 83735 ASSAY OF MAGNESIUM: CPT | Performed by: EMERGENCY MEDICINE

## 2023-10-16 PROCEDURE — 82728 ASSAY OF FERRITIN: CPT | Performed by: PHYSICIAN ASSISTANT

## 2023-10-16 PROCEDURE — 82746 ASSAY OF FOLIC ACID SERUM: CPT | Performed by: PHYSICIAN ASSISTANT

## 2023-10-16 PROCEDURE — 36415 COLL VENOUS BLD VENIPUNCTURE: CPT

## 2023-10-16 PROCEDURE — 83880 ASSAY OF NATRIURETIC PEPTIDE: CPT | Performed by: PHYSICIAN ASSISTANT

## 2023-10-16 PROCEDURE — 99284 EMERGENCY DEPT VISIT MOD MDM: CPT

## 2023-10-16 PROCEDURE — 82270 OCCULT BLOOD FECES: CPT | Performed by: PHYSICIAN ASSISTANT

## 2023-10-16 PROCEDURE — 82607 VITAMIN B-12: CPT | Performed by: PHYSICIAN ASSISTANT

## 2023-10-16 PROCEDURE — 71045 X-RAY EXAM CHEST 1 VIEW: CPT

## 2023-10-16 PROCEDURE — 80053 COMPREHEN METABOLIC PANEL: CPT | Performed by: EMERGENCY MEDICINE

## 2023-10-16 PROCEDURE — 93005 ELECTROCARDIOGRAM TRACING: CPT | Performed by: PHYSICIAN ASSISTANT

## 2023-10-16 RX ORDER — PANTOPRAZOLE SODIUM 40 MG/10ML
40 INJECTION, POWDER, LYOPHILIZED, FOR SOLUTION INTRAVENOUS EVERY 12 HOURS SCHEDULED
Status: DISCONTINUED | OUTPATIENT
Start: 2023-10-17 | End: 2023-10-17

## 2023-10-16 RX ORDER — MORPHINE SULFATE 2 MG/ML
1 INJECTION, SOLUTION INTRAMUSCULAR; INTRAVENOUS EVERY 4 HOURS PRN
Status: DISCONTINUED | OUTPATIENT
Start: 2023-10-16 | End: 2023-10-17

## 2023-10-16 RX ORDER — CHOLECALCIFEROL (VITAMIN D3) 125 MCG
5 CAPSULE ORAL NIGHTLY PRN
Status: DISCONTINUED | OUTPATIENT
Start: 2023-10-16 | End: 2023-10-17 | Stop reason: HOSPADM

## 2023-10-16 RX ORDER — NITROGLYCERIN 0.4 MG/1
0.4 TABLET SUBLINGUAL
Status: DISCONTINUED | OUTPATIENT
Start: 2023-10-16 | End: 2023-10-17

## 2023-10-16 RX ORDER — NALOXONE HCL 0.4 MG/ML
0.4 VIAL (ML) INJECTION
Status: DISCONTINUED | OUTPATIENT
Start: 2023-10-16 | End: 2023-10-17

## 2023-10-16 RX ORDER — SODIUM CHLORIDE 0.9 % (FLUSH) 0.9 %
10 SYRINGE (ML) INJECTION EVERY 12 HOURS SCHEDULED
Status: DISCONTINUED | OUTPATIENT
Start: 2023-10-17 | End: 2023-10-17 | Stop reason: HOSPADM

## 2023-10-16 RX ORDER — SODIUM CHLORIDE 0.9 % (FLUSH) 0.9 %
10 SYRINGE (ML) INJECTION AS NEEDED
Status: DISCONTINUED | OUTPATIENT
Start: 2023-10-16 | End: 2023-10-17 | Stop reason: HOSPADM

## 2023-10-16 RX ORDER — SODIUM CHLORIDE 9 MG/ML
100 INJECTION, SOLUTION INTRAVENOUS CONTINUOUS
Status: DISCONTINUED | OUTPATIENT
Start: 2023-10-17 | End: 2023-10-17

## 2023-10-16 RX ORDER — SODIUM CHLORIDE 9 MG/ML
40 INJECTION, SOLUTION INTRAVENOUS AS NEEDED
Status: DISCONTINUED | OUTPATIENT
Start: 2023-10-16 | End: 2023-10-17 | Stop reason: HOSPADM

## 2023-10-16 RX ORDER — ONDANSETRON 2 MG/ML
4 INJECTION INTRAMUSCULAR; INTRAVENOUS EVERY 6 HOURS PRN
Status: DISCONTINUED | OUTPATIENT
Start: 2023-10-16 | End: 2023-10-17

## 2023-10-16 RX ORDER — ACETAMINOPHEN 325 MG/1
650 TABLET ORAL EVERY 4 HOURS PRN
Status: DISCONTINUED | OUTPATIENT
Start: 2023-10-16 | End: 2023-10-17

## 2023-10-16 RX ADMIN — SODIUM CHLORIDE 1000 ML: 9 INJECTION, SOLUTION INTRAVENOUS at 22:14

## 2023-10-16 NOTE — ED PROVIDER NOTES
Subjective   History of Present Illness  This is a 83-year-old female that presents the ER with generalized weakness, malaise/fatigue, shortness of breath with exertion, and history of anemia.  Patient presents with concerns for worsening anemia.  She follows with MICHAEL Gregg, and was diagnosed with ulcerative colitis in May, 2023, which is when her last colonoscopy was.  She also has colostomy.  She says that she normally has loose stools and increased output.  She has changed her colostomy 3 times today and stools have been darker in nature.  She denies any fever or chills.  She denies any abdominal pain.  She reports anorexia and nausea at times but denies any vomiting.  She denies any dysuria, urgency, or frequency.  Patient says that she started on Stelaro infusion approximately 6 weeks ago for ulcerative colitis and is getting ready to start another oral tablet.  She denies any recent antibiotics but has had history of C. difficile in the past.  Patient feels dehydrated and said that she had a near syncopal episode this afternoon.  She denied any syncope.  Patient denies any other new medication changes other than her newly started Stelaro infusion.  She has chronic iron deficiency anemia.  She takes a [place] vitamin with iron, but is supposed to be set up for iron infusions in the near future.  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 difficile colitis, osteoarthritis, ulcerative colitis.    History provided by:  Patient  Weakness - Generalized  Duration:  2 days  Timing:  Constant  Progression:  Worsening  Chronicity:  Recurrent  Context comment:  History of iron-deficiency anemia. Increased fatigue, weakness, SOA with activity. History of ulcerative colitis with colostomy. Dark stools in bag x 2 days. Pt on Plavix and ASA. Pt sees Dr. MICHAEL  Anais.  Relieved by:  Nothing  Worsened by:  Nothing  Ineffective treatments:  Drinking fluids  Associated symptoms: anorexia, near-syncope and shortness of breath (SOA with exertion)    Associated symptoms: no abdominal pain, no ataxia, no chest pain, no cough, no diarrhea, no difficulty walking, no dizziness, no drooling, no dysuria, no numbness in extremities, no falls, no fever, no foul-smelling urine, no frequency, no headaches, no hematochezia, no melena, no nausea, no stroke symptoms, no syncope, no urgency and no vomiting    Associated symptoms comment:  Pt has changed her colostomy 3 times today.  Stools have been darker in nature x2 days.  History of C. Diff.  No recent abx.  Patient takes Plavix due to history of stent in her legs from peripheral vascular disease.  Risk factors: anemia    Risk factors comment:  Pt has h/o ulcerative colitis with colostomy.  Pt says Dr. Manzo last did colonoscopy in 5/2023 and that's when she was diagnosed with inflammatory bowel disease.  Just started on Stelaro infusions.  First infusion was approximately 6 wks ago.      Review of Systems   Constitutional:  Positive for activity change, appetite change and fatigue. Negative for fever.   HENT: Negative.  Negative for congestion and drooling.    Respiratory:  Positive for shortness of breath (SOA with exertion). Negative for cough.    Cardiovascular:  Positive for near-syncope. Negative for chest pain, palpitations, leg swelling and syncope.   Gastrointestinal:  Positive for anorexia. Negative for abdominal pain, diarrhea, hematochezia, melena, nausea and vomiting.        History of ulcerative colitis; dx in 5/2023.  Colostomy in place.  Last colonoscopy was 5/2023.   Genitourinary: Negative.  Negative for dysuria, flank pain, frequency and urgency.   Musculoskeletal: Negative.  Negative for back pain and falls.   Neurological:  Positive for weakness. Negative for dizziness, syncope and headaches.        Near syncope  today.  Generally weak with fatigue/malaise.   Hematological:         History of iron-deficiency anemia.  Pt takes Flinstone vitamins with iron.   All other systems reviewed and are negative.      Past Medical History:   Diagnosis Date    Acquired hypothyroidism 1980    Arthritis     Atypical ductal hyperplasia of breast 2009    Breast injury 2019    previous fall caused bruising to right breast    Clostridium difficile colitis 12/2022    Colon polyp - sees Dr. 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 ER and CO (+)    Diverticular disease 03/2020    Essential hypertension 2012    High cholesterol 2019    History of diverticular abscess 04/16/2020    S/p LAR, abscess drainage, appy, left maldonado, distal transverse end colostomy 3/17/20 by Dr. Vazquez    Hx of herpes genitalis 2013    IBS-d (takes Buspar)     Pneumonia 04/2020    PRBC's 11/2022    Sepsis 04/2020    Ulcerative colitis 2023       Allergies   Allergen Reactions    Penicillins Hives    Cephalexin Diarrhea    Ciprofibrate Headache    Evista [Raloxifene] Rash    Hydrochlorothiazide Diarrhea    Meloxicam Other (See Comments)    Metronidazole Diarrhea    Nickel Hives    Oxybutynin Cough    Oxybutynin Chloride Headache    Propylthiouracil Other (See Comments)     Severally reduce WBC's    Sulfa Antibiotics Hives    Trazodone Itching    Sulfur Dioxide Hives    Other Anxiety     Steroid injection for pinched nerve-  Got panic attack after        Past Surgical History:   Procedure Laterality Date    ABDOMINAL PERINEAL RESECTION N/A 11/10/2022    Procedure: ABDOMINAL PERINEAL RESECTION, REPAIR OF PARASTOMAL HERNIA WITH MESH;  Surgeon: Sravan Vazquez MD;  Location: [facility];  Service: General;  Laterality: N/A;    BILATERAL BREAST REDUCTION Bilateral 1995    BREAST BIOPSY Left 2009    BREAST EXCISIONAL BIOPSY Left 2009    ADH    CATARACT EXTRACTION, BILATERAL Bilateral 2018    CERVICAL CONIZATION  1983    COLON  RESECTION N/A 03/17/2020    Procedure: LOW ANTERIOR COLON RESECTION, MOBILIZATION OF SPLENIC FLEXURE, DRAINAGE OF ABSCESS AND COLOSTOMY CREATION;  Surgeon: Sravan Vazquez MD;  Location: [facility] OR;  Service: General;  Laterality: N/A;    COLONOSCOPY      FACIAL COSMETIC SURGERY  2001    FACIAL COSMETIC SURGERY  2012    HEMORRHOIDECTOMY  1974    LAPAROSCOPIC CHOLECYSTECTOMY  2010    REDUCTION MAMMAPLASTY Bilateral 04/13/2017    DCIS (3mm found incidentally right breast)    REDUCTION MAMMAPLASTY Bilateral 1995    SINUS SURGERY  1992    poylp    THYROIDECTOMY, PARTIAL  1963    TONSILLECTOMY  1953    VARICOSE VEIN SURGERY Bilateral 1981    VARICOSE VEIN SURGERY  2021       Family History   Problem Relation Age of Onset    Breast cancer Neg Hx     Ovarian cancer Neg Hx        Social History     Socioeconomic History    Marital status:    Tobacco Use    Smoking status: Never    Smokeless tobacco: Never   Vaping Use    Vaping Use: Never used   Substance and Sexual Activity    Alcohol use: No    Drug use: No           Objective   Physical Exam  Vitals and nursing note reviewed.   Constitutional:       General: She is not in acute distress.     Appearance: Normal appearance. She is not ill-appearing, toxic-appearing or diaphoretic.      Comments: Thin build, generally weak.  No acute sign of pain or distress.  Nontoxic.   HENT:      Head: Normocephalic and atraumatic.      Nose: Nose normal.      Mouth/Throat:      Mouth: Mucous membranes are moist.      Comments: Oral mucous membranes are still moist  Eyes:      Extraocular Movements: Extraocular movements intact.      Conjunctiva/sclera: Conjunctivae normal.      Pupils: Pupils are equal, round, and reactive to light.   Cardiovascular:      Rate and Rhythm: Regular rhythm. Tachycardia present. No extrasystoles are present.     Pulses: Normal pulses.      Heart sounds: Normal heart sounds.      Comments: Mild tachycardia.  No ectopy  Pulmonary:      Effort:  Pulmonary effort is normal. No tachypnea or retractions.      Breath sounds: Normal breath sounds.      Comments: Lungs are clear to auscultation bilaterally.  Regular respiratory effort.  Abdominal:      General: Bowel sounds are normal. There is no distension.      Palpations: Abdomen is soft.      Tenderness: There is no abdominal tenderness. There is no right CVA tenderness, left CVA tenderness, guarding or rebound.      Comments: Colostomy in place to left abdomen.  Stool in bag was soft and dark brown in nature.  Guaiac was strongly positive.  Abdomen soft with active bowel sounds in all 4 quadrants.  Nontender to palpation.  No flank or CVA tenderness.   Genitourinary:     Rectum: Guaiac result positive.   Musculoskeletal:         General: Normal range of motion.      Cervical back: Normal range of motion and neck supple.      Right lower leg: No edema.      Left lower leg: No edema.   Skin:     General: Skin is warm and dry.      Coloration: Skin is not pale.      Findings: No bruising, ecchymosis or petechiae.      Comments: No pallor noted.  No evidence of increased bleeding or bruising to the skin.   Neurological:      General: No focal deficit present.      Mental Status: She is alert and oriented to person, place, and time.      Cranial Nerves: Cranial nerves 2-12 are intact.      Sensory: Sensation is intact.      Motor: Motor function is intact.      Coordination: Coordination is intact.      Comments: Neuro intact and nonfocal.  Malaise with fatigue.         Procedures           ED Course  ED Course as of 10/17/23 0040   Mon Oct 16, 2023   2127 Creatinine(!): 1.41  Acute kidney injury noted. [RS]   2128 Hemoglobin(!): 9.8  Stable chronic anemia when compared to prior. [RS]   2245 CBC reveals H&H of 9.8 and 31.8.  Platelet count is normal at 324.  Differential shows elevated monocytes at 14% but otherwise differential was within normal limits.  Chemistries revealed BUN and creatinine 36 and 1.41,  which is increased.  Last creatinine was 0.982 weeks ago.  LFTs were within normal limits.  BNP is 336.  Initial troponin is 13.  TSH is 0.202 and free T4 is normal at 1.65.  Magnesium is 2.5.  Initial high-sensitivity troponin is 13.  Iron studies revealed serum iron of 34 which is slightly low with low normal being 37.  Iron saturation is 10% and transferrin is 223.  Ferritin level is 77.  Reticulocyte count is 1.41%.  I personally reviewed the EKG and it showed normal sinus rhythm.  Dr. Martin, ER attending physician also reviewed EKG.  There were no acute ST-T wave changes consistent with ischemia.  I personally reviewed the chest x-ray and there was no acute cardiopulmonary process.  We will repeat 2-hour EKG and troponin.  I obtained stool specimen from colostomy and stool was dark brown.  Guaiac was grossly positive. Dr. Martin is going to contact Dr. Mackey to get his recommendations. [FC]   2320 Dr. Martin paged Dr. Mackey, patient's routine PCP, to obtain his recommendations, but he has not responded.  I will page hospitalist, to discuss admission due to weakness, positive guaiac with blood on chronic anticoagulation, and iron deficiency anemia as well as acute renal insufficiency. [FC]   2344 I communicated with Dr. Mackey who is agreeable with the patient's admission to the hospital.  Reviewed the presentation and findings on her evaluation. [RS]   Tue Oct 17, 2023   0036 Discussed admission with Dr. Aguilar and Dr. Romero, hospitalists.  After decision was made for patient to be admitted, she was evaluated by hospitalist team and then decided against admission.  She said that she has a follow-up with Dr. Manzo, her routine GI, today, and she wants to keep that follow-up appointment.  After discussion with Dr. Martin, we feel that patient needs to leave AGAINST MEDICAL ADVICE.  She signed all appropriate AMA paperwork and is ready for discharge at her discretion. [FC]   0037 Repeat 2-hour  high-sensitivity troponin was 12. [FC]      ED Course User Index  [FC] Kaya Yuan PA-C  [RS] Orville Martin MD                Recent Results (from the past 24 hour(s))   Comprehensive Metabolic Panel    Collection Time: 10/16/23  8:05 PM    Specimen: Blood   Result Value Ref Range    Glucose 209 (H) 65 - 99 mg/dL    BUN 36 (H) 8 - 23 mg/dL    Creatinine 1.41 (H) 0.57 - 1.00 mg/dL    Sodium 139 136 - 145 mmol/L    Potassium 3.9 3.5 - 5.2 mmol/L    Chloride 105 98 - 107 mmol/L    CO2 18.0 (L) 22.0 - 29.0 mmol/L    Calcium 8.6 8.6 - 10.5 mg/dL    Total Protein 6.9 6.0 - 8.5 g/dL    Albumin 3.7 3.5 - 5.2 g/dL    ALT (SGPT) 8 1 - 33 U/L    AST (SGOT) 22 1 - 32 U/L    Alkaline Phosphatase 62 39 - 117 U/L    Total Bilirubin 0.2 0.0 - 1.2 mg/dL    Globulin 3.2 gm/dL    A/G Ratio 1.2 g/dL    BUN/Creatinine Ratio 25.5 (H) 7.0 - 25.0    Anion Gap 16.0 (H) 5.0 - 15.0 mmol/L    eGFR 37.3 (L) >60.0 mL/min/1.73   Single High Sensitivity Troponin T    Collection Time: 10/16/23  8:05 PM    Specimen: Blood   Result Value Ref Range    HS Troponin T 13 (H) <10 ng/L   Magnesium    Collection Time: 10/16/23  8:05 PM    Specimen: Blood   Result Value Ref Range    Magnesium 2.5 (H) 1.6 - 2.4 mg/dL   Green Top (Gel)    Collection Time: 10/16/23  8:05 PM   Result Value Ref Range    Extra Tube Hold for add-ons.    Lavender Top    Collection Time: 10/16/23  8:05 PM   Result Value Ref Range    Extra Tube hold for add-on    Gray Top    Collection Time: 10/16/23  8:05 PM   Result Value Ref Range    Extra Tube Hold for add-ons.    Light Blue Top    Collection Time: 10/16/23  8:05 PM   Result Value Ref Range    Extra Tube Hold for add-ons.    CBC Auto Differential    Collection Time: 10/16/23  8:05 PM    Specimen: Blood   Result Value Ref Range    WBC 5.95 3.40 - 10.80 10*3/mm3    RBC 3.48 (L) 3.77 - 5.28 10*6/mm3    Hemoglobin 9.8 (L) 12.0 - 15.9 g/dL    Hematocrit 31.8 (L) 34.0 - 46.6 %    MCV 91.4 79.0 - 97.0 fL    MCH 28.2 26.6 -  33.0 pg    MCHC 30.8 (L) 31.5 - 35.7 g/dL    RDW 14.6 12.3 - 15.4 %    RDW-SD 48.8 37.0 - 54.0 fl    MPV 10.3 6.0 - 12.0 fL    Platelets 324 140 - 450 10*3/mm3    Neutrophil % 64.4 42.7 - 76.0 %    Lymphocyte % 19.7 19.6 - 45.3 %    Monocyte % 14.6 (H) 5.0 - 12.0 %    Eosinophil % 0.2 (L) 0.3 - 6.2 %    Basophil % 0.8 0.0 - 1.5 %    Immature Grans % 0.3 0.0 - 0.5 %    Neutrophils, Absolute 3.83 1.70 - 7.00 10*3/mm3    Lymphocytes, Absolute 1.17 0.70 - 3.10 10*3/mm3    Monocytes, Absolute 0.87 0.10 - 0.90 10*3/mm3    Eosinophils, Absolute 0.01 0.00 - 0.40 10*3/mm3    Basophils, Absolute 0.05 0.00 - 0.20 10*3/mm3    Immature Grans, Absolute 0.02 0.00 - 0.05 10*3/mm3    nRBC 0.0 0.0 - 0.2 /100 WBC   Reticulocytes    Collection Time: 10/16/23  8:05 PM    Specimen: Blood   Result Value Ref Range    Reticulocyte % 1.41 0.70 - 1.90 %    Reticulocyte Absolute 0.0491 0.0200 - 0.1300 10*6/mm3   Iron Profile    Collection Time: 10/16/23  8:05 PM    Specimen: Blood   Result Value Ref Range    Iron 34 (L) 37 - 145 mcg/dL    Iron Saturation (TSAT) 10 (L) 20 - 50 %    Transferrin 223 200 - 360 mg/dL    TIBC 332 298 - 536 mcg/dL   Ferritin    Collection Time: 10/16/23  8:05 PM    Specimen: Blood   Result Value Ref Range    Ferritin 77.54 13.00 - 150.00 ng/mL   TSH    Collection Time: 10/16/23  8:05 PM    Specimen: Blood   Result Value Ref Range    TSH 0.202 (L) 0.270 - 4.200 uIU/mL   T4, Free    Collection Time: 10/16/23  8:05 PM    Specimen: Blood   Result Value Ref Range    Free T4 1.65 0.93 - 1.70 ng/dL   BNP    Collection Time: 10/16/23  8:05 PM    Specimen: Blood   Result Value Ref Range    proBNP 336.8 0.0 - 1,800.0 pg/mL   ECG 12 Lead Dyspnea    Collection Time: 10/16/23 10:08 PM   Result Value Ref Range    QT Interval 384 ms    QTC Interval 451 ms   Single High Sensitivity Troponin T    Collection Time: 10/16/23 10:48 PM    Specimen: Blood   Result Value Ref Range    HS Troponin T 12 (H) <10 ng/L   Urinalysis With  Microscopic If Indicated (No Culture) - Urine, Clean Catch    Collection Time: 10/16/23 11:11 PM    Specimen: Urine, Clean Catch   Result Value Ref Range    Color, UA Yellow Yellow, Straw    Appearance, UA Cloudy (A) Clear    pH, UA <=5.0 5.0 - 8.0    Specific Gravity, UA 1.026 1.001 - 1.030    Glucose, UA Negative Negative    Ketones, UA Trace (A) Negative    Bilirubin, UA Negative Negative    Blood, UA Negative Negative    Protein, UA 30 mg/dL (1+) (A) Negative    Leuk Esterase, UA Moderate (2+) (A) Negative    Nitrite, UA Negative Negative    Urobilinogen, UA 1.0 E.U./dL 0.2 - 1.0 E.U./dL   Urinalysis, Microscopic Only - Urine, Clean Catch    Collection Time: 10/16/23 11:11 PM    Specimen: Urine, Clean Catch   Result Value Ref Range    RBC, UA 0-2 None Seen, 0-2 /HPF    WBC, UA 21-50 (A) None Seen, 0-2 /HPF    Bacteria, UA Trace None Seen, Trace /HPF    Squamous Epithelial Cells, UA 7-12 (A) None Seen, 0-2 /HPF    Renal Epithelial Cells, UA 0-2 0 - 2 /HPF    Hyaline Casts, UA 21-30 0 - 6 /LPF    Granular Casts, UA 0-2 None Seen /LPF    Mucus, UA Trace None Seen, Trace /HPF    Methodology Manual Light Microscopy    POC Occult Blood Stool    Collection Time: 10/16/23 11:17 PM    Specimen: Per Rectum; Stool   Result Value Ref Range    Fecal Occult Blood Positive (A)     Lot Number [id number]     Expiration Date 10/25     DEVELOPER LOT NUMBER [id number]     DEVELOPER EXPIRATION DATE 2/2,027     Positive Control Positive     Negative Control Positive (A)      Note: In addition to lab results from this visit, the labs listed above may include labs taken at another facility or during a different encounter within the last 24 hours. Please correlate lab times with ED admission and discharge times for further clarification of the services performed during this visit.    XR Chest 1 View   Final Result   Impression:    Diffuse interstitial coarsening, majority of which is likely related to chronic change, though mild pulmonary edema  or less likely multifocal infection is possible. No focal consolidation.      Electronically Signed: Emanuel Ware MD     10/16/2023 10:10 PM EDT     Workstation ID: [id number]        Vitals:    10/16/23 2230 10/16/23 2300 10/16/23 2330 10/16/23 2331   BP: 123/66 146/61 134/56    BP Location:       Patient Position:       Pulse: 74 75  77   Resp:       Temp:       TempSrc:       SpO2: 98% 99%  97%   Weight:       Height:         Medications   sodium chloride 0.9 % flush 10 mL (has no administration in time range)   sodium chloride 0.9 % flush 10 mL (has no administration in time range)   sodium chloride 0.9 % flush 10 mL (has no administration in time range)   sodium chloride 0.9 % infusion 40 mL (has no administration in time range)   Calcium Replacement - Follow Nurse / BPA Driven Protocol (has no administration in time range)   Magnesium Low Dose Replacement - Follow Nurse / BPA Driven Protocol (has no administration in time range)   melatonin tablet 5 mg (has no administration in time range)   sodium chloride 0.9 % bolus 1,000 mL (1,000 mL Intravenous New Bag 10/16/23 7578)     ECG/EMG Results (last 24 hours)       ** No results found for the last 24 hours. **          ECG 12 Lead Dyspnea   Final Result   Test Reason : Dyspnea   Blood Pressure :   */*   mmHG   Vent. Rate :  83 BPM     Atrial Rate :  83 BPM      P-R Int : 146 ms          QRS Dur : 102 ms       QT Int : 384 ms       P-R-T Axes :  27  -3 103 degrees      QTc Int : 451 ms      Normal sinus rhythm   Nonspecific T wave abnormality   Abnormal ECG   When compared with ECG of 04-NOV-2022 09:46,   Nonspecific T wave abnormality, worse in Anterolateral leads   Confirmed by OLEGARIO BHATTI MD ([id number]) on 10/16/2023 10:46:16 PM      Referred By: EDMD           Confirmed By: OLEGARIO BHATTI MD                                       Medical Decision Making  Amount and/or Complexity of Data Reviewed  Labs: ordered. Decision-making details documented in ED  Course.  Radiology: ordered.  ECG/medicine tests: ordered.    Risk  Prescription drug management.  Decision regarding hospitalization.        Final diagnoses:   Generalized weakness   Exertional shortness of breath   Iron deficiency anemia, unspecified iron deficiency anemia type   Diarrhea, unspecified type   Guaiac positive stools   History of ulcerative colitis   History of peripheral vascular disease   Chronic anticoagulation   History of hypertension   History of hyperlipidemia   History of hypothyroidism   Acute renal insufficiency   Mild dehydration       ED Disposition  ED Disposition       ED Disposition   AMA    Condition   --    Comment   --               No follow-up provider specified.       Medication List      No changes were made to your prescriptions during this visit.            Kaya Yuan PA-C  10/17/23 0040

## 2023-10-16 NOTE — Clinical Note
Level of Care: Telemetry [5]   Diagnosis: GI bleed [718000]   Admitting Physician: KAREN GEORGE III [[id number]   Attending Physician: KAREN GEORGE III [[id number]]   Bed Request Comments: tele inpt

## 2023-10-17 ENCOUNTER — OFFICE (OUTPATIENT)
Dept: URBAN - METROPOLITAN AREA CLINIC 4 | Facility: CLINIC | Age: 83
End: 2023-10-17

## 2023-10-17 VITALS — SYSTOLIC BLOOD PRESSURE: 118 MMHG | WEIGHT: 126 LBS | DIASTOLIC BLOOD PRESSURE: 61 MMHG | HEIGHT: 66 IN

## 2023-10-17 DIAGNOSIS — R19.5 OTHER FECAL ABNORMALITIES: ICD-10-CM

## 2023-10-17 DIAGNOSIS — D50.0 IRON DEFICIENCY ANEMIA SECONDARY TO BLOOD LOSS (CHRONIC): ICD-10-CM

## 2023-10-17 DIAGNOSIS — R53.83 OTHER FATIGUE: ICD-10-CM

## 2023-10-17 DIAGNOSIS — K52.9 NONINFECTIVE GASTROENTERITIS AND COLITIS, UNSPECIFIED: ICD-10-CM

## 2023-10-17 DIAGNOSIS — R42 DIZZINESS AND GIDDINESS: ICD-10-CM

## 2023-10-17 DIAGNOSIS — K91.5 POSTCHOLECYSTECTOMY SYNDROME: ICD-10-CM

## 2023-10-17 LAB
BACTERIA UR QL AUTO: ABNORMAL /HPF
FOLATE SERPL-MCNC: >20 NG/ML (ref 4.78–24.2)
GRAN CASTS URNS QL MICRO: ABNORMAL /LPF
HOLD SPECIMEN: NORMAL
HYALINE CASTS UR QL AUTO: ABNORMAL /LPF
MUCOUS THREADS URNS QL MICRO: ABNORMAL /HPF
RBC # UR STRIP: ABNORMAL /HPF
REF LAB TEST METHOD: ABNORMAL
RENAL EPI CELLS #/AREA URNS HPF: ABNORMAL /HPF
SQUAMOUS #/AREA URNS HPF: ABNORMAL /HPF
VIT B12 BLD-MCNC: 231 PG/ML (ref 211–946)
WBC # UR STRIP: ABNORMAL /HPF

## 2023-10-17 PROCEDURE — 99214 OFFICE O/P EST MOD 30 MIN: CPT | Performed by: INTERNAL MEDICINE

## 2023-10-17 RX ORDER — COLESEVELAM HYDROCHLORIDE 625 MG/1
TABLET, COATED ORAL
Qty: 60 | Refills: 11 | Status: ACTIVE
Start: 2023-10-17

## 2023-10-17 NOTE — ED NOTES
Neema Richard    Nursing Report ED to Floor:  Mental status: AO4  Ambulatory status: 1 assist   Oxygen Therapy:  Room Air   Cardiac Rhythm: Normal sinus   Admitted from: ED  Safety Concerns: n/a   Social Issues: N/A  ED Room #:  19    ED Nurse Phone Extension - [phone] or may call [phone].      HPI:   Chief Complaint   Patient presents with    Weakness - Generalized       Past Medical History:  Past Medical History:   Diagnosis Date    Acquired hypothyroidism 1980    Arthritis     Atypical ductal hyperplasia of breast 2009    Breast injury 2019    previous fall caused bruising to right breast    Clostridium difficile colitis 12/2022    Colon polyp - sees Dr. 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 ER and MA (+)    Diverticular disease 03/2020    Essential hypertension 2012    High cholesterol 2019    History of diverticular abscess 04/16/2020    S/p LAR, abscess drainage, appy, left maldonado, distal transverse end colostomy 3/17/20 by Dr. Vazquez    Hx of herpes genitalis 2013    IBS-d (takes Buspar)     Pneumonia 04/2020    PRBC's 11/2022    Sepsis 04/2020    Ulcerative colitis 2023        Past Surgical History:  Past Surgical History:   Procedure Laterality Date    ABDOMINAL PERINEAL RESECTION N/A 11/10/2022    Procedure: ABDOMINAL PERINEAL RESECTION, REPAIR OF PARASTOMAL HERNIA WITH MESH;  Surgeon: Sravan Vazquez MD;  Location: [facility] OR;  Service: General;  Laterality: N/A;    BILATERAL BREAST REDUCTION Bilateral 1995    BREAST BIOPSY Left 2009    BREAST EXCISIONAL BIOPSY Left 2009    ADH    CATARACT EXTRACTION, BILATERAL Bilateral 2018    CERVICAL CONIZATION  1983    COLON RESECTION N/A 03/17/2020    Procedure: LOW ANTERIOR COLON RESECTION, MOBILIZATION OF SPLENIC FLEXURE, DRAINAGE OF ABSCESS AND COLOSTOMY CREATION;  Surgeon: Sravan Vazquez MD;  Location:  [facility] OR;  Service: General;  Laterality: N/A;    COLONOSCOPY      FACIAL COSMETIC SURGERY  2001     FACIAL COSMETIC SURGERY  2012    HEMORRHOIDECTOMY  1974    LAPAROSCOPIC CHOLECYSTECTOMY  2010    REDUCTION MAMMAPLASTY Bilateral 04/13/2017    DCIS (3mm found incidentally right breast)    REDUCTION MAMMAPLASTY Bilateral 1995    SINUS SURGERY  1992    poylp    THYROIDECTOMY, PARTIAL  1963    TONSILLECTOMY  1953    VARICOSE VEIN SURGERY Bilateral 1981    VARICOSE VEIN SURGERY  2021        Admitting Doctor:   Frandy Romero III, DO    Consulting Provider(s):  Consults       Date and Time Order Name Status Description    10/16/2023 11:50 PM Inpatient Gastroenterology Consult               Admitting Diagnosis:   There were no encounter diagnoses.    Most Recent Vitals:   Vitals:    10/16/23 2230 10/16/23 2300 10/16/23 2330 10/16/23 2331   BP: 123/66 146/61 134/56    BP Location:       Patient Position:       Pulse: 74 75  77   Resp:       Temp:       TempSrc:       SpO2: 98% 99%  97%   Weight:       Height:           Active LDAs/IV Access:   Lines, Drains & Airways       Active LDAs       Name Placement date Placement time Site Days    Peripheral IV 10/16/23 2212 Anterior;Left Hand 10/16/23  2212  Hand  less than 1                    Labs (abnormal labs have a star):   Labs Reviewed   COMPREHENSIVE METABOLIC PANEL - Abnormal; Notable for the following components:       Result Value    Glucose 209 (*)     BUN 36 (*)     Creatinine 1.41 (*)     CO2 18.0 (*)     BUN/Creatinine Ratio 25.5 (*)     Anion Gap 16.0 (*)     eGFR 37.3 (*)     All other components within normal limits    Narrative:     GFR Normal >60  Chronic Kidney Disease <60  Kidney Failure <15    The GFR formula is only valid for adults with stable renal function between ages 18 and 70.   SINGLE HSTROPONIN T - Abnormal; Notable for the following components:    HS Troponin T 13 (*)     All other components within normal limits    Narrative:     High Sensitive Troponin T Reference Range:  <10.0 ng/L- Negative Female for AMI  <15.0 ng/L- Negative Male for  AMI  >=10 - Abnormal Female indicating possible myocardial injury.  >=15 - Abnormal Male indicating possible myocardial injury.   Clinicians would have to utilize clinical acumen, EKG, Troponin, and serial changes to determine if it is an Acute Myocardial Infarction or myocardial injury due to an underlying chronic condition.        MAGNESIUM - Abnormal; Notable for the following components:    Magnesium 2.5 (*)     All other components within normal limits   URINALYSIS W/ MICROSCOPIC IF INDICATED (NO CULTURE) - Abnormal; Notable for the following components:    Appearance, UA Cloudy (*)     Ketones, UA Trace (*)     Protein, UA 30 mg/dL (1+) (*)     Leuk Esterase, UA Moderate (2+) (*)     All other components within normal limits   CBC WITH AUTO DIFFERENTIAL - Abnormal; Notable for the following components:    RBC 3.48 (*)     Hemoglobin 9.8 (*)     Hematocrit 31.8 (*)     MCHC 30.8 (*)     Monocyte % 14.6 (*)     Eosinophil % 0.2 (*)     All other components within normal limits   IRON PROFILE - Abnormal; Notable for the following components:    Iron 34 (*)     Iron Saturation (TSAT) 10 (*)     All other components within normal limits   TSH - Abnormal; Notable for the following components:    TSH 0.202 (*)     All other components within normal limits    Narrative:     Due to abnormal TSH results, suggest ordering Free T4.   SINGLE HSTROPONIN T - Abnormal; Notable for the following components:    HS Troponin T 12 (*)     All other components within normal limits    Narrative:     High Sensitive Troponin T Reference Range:  <10.0 ng/L- Negative Female for AMI  <15.0 ng/L- Negative Male for AMI  >=10 - Abnormal Female indicating possible myocardial injury.  >=15 - Abnormal Male indicating possible myocardial injury.   Clinicians would have to utilize clinical acumen, EKG, Troponin, and serial changes to determine if it is an Acute Myocardial Infarction or myocardial injury due to an underlying chronic  condition.        POCT OCCULT BLOOD STOOL - Abnormal; Notable for the following components:    Fecal Occult Blood Positive (*)     Negative Control Positive (*)     All other components within normal limits   RETICULOCYTES - Normal   FERRITIN - Normal    Narrative:     Results may be falsely decreased if patient taking Biotin.     T4, FREE - Normal    Narrative:     Results may be falsely increased if patient taking Biotin.     BNP (IN-HOUSE) - Normal    Narrative:     This assay is used as an aid in the diagnosis of individuals suspected of having heart failure. It can be used as an aid in the diagnosis of acute decompensated heart failure (ADHF) in patients presenting with signs and symptoms of ADHF to the emergency department (ED). In addition, NT-proBNP of <300 pg/mL indicates ADHF is not likely.    Age Range Result Interpretation  NT-proBNP Concentration (pg/mL:      <50             Positive            >450                   Gray                 300-450                    Negative             <300    50-75           Positive            >900                  Gray                300-900                  Negative            <300      >75             Positive            >1800                  Gray                300-1800                  Negative            <300   RAINBOW DRAW    Narrative:     The following orders were created for panel order [place] Draw.  Procedure                               Abnormality         Status                     ---------                               -----------         ------                     Green Top (Gel)[[id number]]                                  Final result               Lavender Top[[id number]]                                     Final result               Gold Top - SST[[id number]]                                   In process                 Gray Top[[id number]]                                         In process                 Light Blue Top[[id number]]                                    Final result                 Please view results for these tests on the individual orders.   VITAMIN B12   FOLATE   URINALYSIS, MICROSCOPIC ONLY   HEMOGLOBIN AND HEMATOCRIT, BLOOD   HEMOGLOBIN AND HEMATOCRIT, BLOOD   CBC WITH AUTO DIFFERENTIAL   COMPREHENSIVE METABOLIC PANEL   MAGNESIUM   POCT GLUCOSE FINGERSTICK   CBC AND DIFFERENTIAL    Narrative:     The following orders were created for panel order CBC & Differential.  Procedure                               Abnormality         Status                     ---------                               -----------         ------                     CBC Auto Differential[[id number]]        Abnormal            Final result                 Please view results for these tests on the individual orders.   GREEN TOP   LAVENDER TOP   LIGHT BLUE TOP   GOLD TOP - SST   GRAY TOP       Meds Given in ED:   Medications   sodium chloride 0.9 % flush 10 mL (has no administration in time range)   sodium chloride 0.9 % flush 10 mL (has no administration in time range)   sodium chloride 0.9 % flush 10 mL (has no administration in time range)   sodium chloride 0.9 % infusion 40 mL (has no administration in time range)   nitroglycerin (NITROSTAT) SL tablet 0.4 mg (has no administration in time range)   sodium chloride 0.9 % infusion (has no administration in time range)   Potassium Replacement - Follow Nurse / BPA Driven Protocol (has no administration in time range)   Phosphorus Replacement - Follow Nurse / BPA Driven Protocol (has no administration in time range)   Calcium Replacement - Follow Nurse / BPA Driven Protocol (has no administration in time range)   Magnesium Low Dose Replacement - Follow Nurse / BPA Driven Protocol (has no administration in time range)   acetaminophen (TYLENOL) tablet 650 mg (has no administration in time range)   morphine injection 1 mg (has no administration in time range)     And   naloxone (NARCAN) injection 0.4 mg (has no administration in  time range)   melatonin tablet 5 mg (has no administration in time range)   ondansetron (ZOFRAN) injection 4 mg (has no administration in time range)   pantoprazole (PROTONIX) injection 40 mg (has no administration in time range)   sodium chloride 0.9 % bolus 1,000 mL (1,000 mL Intravenous New Bag 10/16/23 5065)     sodium chloride, 100 mL/hr

## 2023-10-30 ENCOUNTER — INFUSION (OUTPATIENT)
Dept: ONCOLOGY | Facility: HOSPITAL | Age: 83
End: 2023-10-30
Payer: MEDICARE

## 2023-10-30 VITALS
TEMPERATURE: 97.6 F | SYSTOLIC BLOOD PRESSURE: 115 MMHG | HEART RATE: 79 BPM | DIASTOLIC BLOOD PRESSURE: 63 MMHG | RESPIRATION RATE: 18 BRPM

## 2023-10-30 DIAGNOSIS — M81.0 AGE-RELATED OSTEOPOROSIS WITHOUT CURRENT PATHOLOGICAL FRACTURE: Primary | ICD-10-CM

## 2023-10-30 PROCEDURE — 96374 THER/PROPH/DIAG INJ IV PUSH: CPT

## 2023-10-30 PROCEDURE — 25010000002 IRON SUCROSE PER 1 MG: Performed by: INTERNAL MEDICINE

## 2023-10-30 RX ORDER — MIRABEGRON 50 MG/1
50 TABLET, FILM COATED, EXTENDED RELEASE ORAL DAILY
Qty: 90 TABLET | Refills: 3 | Status: SHIPPED | OUTPATIENT
Start: 2023-10-30

## 2023-10-30 RX ADMIN — IRON SUCROSE 200 MG: 20 INJECTION, SOLUTION INTRAVENOUS at 14:38

## 2023-10-31 ENCOUNTER — INFUSION (OUTPATIENT)
Dept: ONCOLOGY | Facility: HOSPITAL | Age: 83
End: 2023-10-31
Payer: MEDICARE

## 2023-10-31 VITALS
RESPIRATION RATE: 18 BRPM | HEART RATE: 84 BPM | SYSTOLIC BLOOD PRESSURE: 99 MMHG | DIASTOLIC BLOOD PRESSURE: 52 MMHG | TEMPERATURE: 96.8 F

## 2023-10-31 DIAGNOSIS — M81.0 AGE-RELATED OSTEOPOROSIS WITHOUT CURRENT PATHOLOGICAL FRACTURE: Primary | ICD-10-CM

## 2023-10-31 PROCEDURE — 96374 THER/PROPH/DIAG INJ IV PUSH: CPT

## 2023-10-31 PROCEDURE — 25010000002 IRON SUCROSE PER 1 MG: Performed by: INTERNAL MEDICINE

## 2023-10-31 RX ADMIN — IRON SUCROSE 200 MG: 20 INJECTION, SOLUTION INTRAVENOUS at 14:00

## 2023-11-01 ENCOUNTER — INFUSION (OUTPATIENT)
Dept: ONCOLOGY | Facility: HOSPITAL | Age: 83
End: 2023-11-01
Payer: MEDICARE

## 2023-11-01 ENCOUNTER — HOSPITAL ENCOUNTER (OUTPATIENT)
Dept: MAMMOGRAPHY | Facility: HOSPITAL | Age: 83
Discharge: HOME | End: 2023-11-01
Payer: MEDICARE

## 2023-11-01 VITALS
RESPIRATION RATE: 18 BRPM | DIASTOLIC BLOOD PRESSURE: 64 MMHG | HEART RATE: 82 BPM | TEMPERATURE: 97.8 F | SYSTOLIC BLOOD PRESSURE: 108 MMHG

## 2023-11-01 DIAGNOSIS — M81.0 AGE-RELATED OSTEOPOROSIS WITHOUT CURRENT PATHOLOGICAL FRACTURE: Primary | ICD-10-CM

## 2023-11-01 DIAGNOSIS — Z12.31 SCREENING MAMMOGRAM FOR BREAST CANCER: ICD-10-CM

## 2023-11-01 PROCEDURE — 96374 THER/PROPH/DIAG INJ IV PUSH: CPT

## 2023-11-01 PROCEDURE — 25010000002 IRON SUCROSE PER 1 MG: Performed by: INTERNAL MEDICINE

## 2023-11-01 PROCEDURE — 77067 SCR MAMMO BI INCL CAD: CPT

## 2023-11-01 PROCEDURE — 77063 BREAST TOMOSYNTHESIS BI: CPT

## 2023-11-01 RX ADMIN — IRON SUCROSE 200 MG: 20 INJECTION, SOLUTION INTRAVENOUS at 13:27

## 2023-11-02 ENCOUNTER — INFUSION (OUTPATIENT)
Dept: ONCOLOGY | Facility: HOSPITAL | Age: 83
End: 2023-11-02
Payer: MEDICARE

## 2023-11-02 VITALS
DIASTOLIC BLOOD PRESSURE: 59 MMHG | SYSTOLIC BLOOD PRESSURE: 102 MMHG | TEMPERATURE: 98.3 F | HEART RATE: 85 BPM | RESPIRATION RATE: 16 BRPM

## 2023-11-02 DIAGNOSIS — M81.0 AGE-RELATED OSTEOPOROSIS WITHOUT CURRENT PATHOLOGICAL FRACTURE: ICD-10-CM

## 2023-11-02 DIAGNOSIS — K50.119 CROHN'S DISEASE OF LARGE INTESTINE WITH COMPLICATION: Primary | ICD-10-CM

## 2023-11-02 PROCEDURE — 96372 THER/PROPH/DIAG INJ SC/IM: CPT

## 2023-11-02 PROCEDURE — 96374 THER/PROPH/DIAG INJ IV PUSH: CPT

## 2023-11-02 PROCEDURE — 25010000002 USTEKINUMAB 90 MG/ML SOLUTION PREFILLED SYRINGE: Performed by: INTERNAL MEDICINE

## 2023-11-02 PROCEDURE — 25010000002 IRON SUCROSE PER 1 MG: Performed by: INTERNAL MEDICINE

## 2023-11-02 RX ADMIN — IRON SUCROSE 200 MG: 20 INJECTION, SOLUTION INTRAVENOUS at 11:00

## 2023-11-02 RX ADMIN — USTEKINUMAB 90 MG: 90 INJECTION, SOLUTION SUBCUTANEOUS at 11:19

## 2023-11-03 ENCOUNTER — INFUSION (OUTPATIENT)
Dept: ONCOLOGY | Facility: HOSPITAL | Age: 83
End: 2023-11-03
Payer: MEDICARE

## 2023-11-03 VITALS
DIASTOLIC BLOOD PRESSURE: 53 MMHG | HEART RATE: 85 BPM | RESPIRATION RATE: 16 BRPM | SYSTOLIC BLOOD PRESSURE: 120 MMHG | TEMPERATURE: 97.7 F

## 2023-11-03 DIAGNOSIS — M81.0 AGE-RELATED OSTEOPOROSIS WITHOUT CURRENT PATHOLOGICAL FRACTURE: Primary | ICD-10-CM

## 2023-11-03 PROCEDURE — 25010000002 IRON SUCROSE PER 1 MG: Performed by: INTERNAL MEDICINE

## 2023-11-03 PROCEDURE — 96374 THER/PROPH/DIAG INJ IV PUSH: CPT

## 2023-11-03 RX ADMIN — IRON SUCROSE 200 MG: 20 INJECTION, SOLUTION INTRAVENOUS at 11:05

## 2023-11-08 ENCOUNTER — LAB (OUTPATIENT)
Dept: LAB | Facility: HOSPITAL | Age: 83
End: 2023-11-08
Payer: MEDICARE

## 2023-11-08 ENCOUNTER — TRANSCRIBE ORDERS (OUTPATIENT)
Dept: LAB | Facility: HOSPITAL | Age: 83
End: 2023-11-08
Payer: MEDICARE

## 2023-11-08 DIAGNOSIS — K50.119 CROHN'S DISEASE OF LARGE INTESTINE WITH COMPLICATION: ICD-10-CM

## 2023-11-08 DIAGNOSIS — D50.0 IRON DEFICIENCY ANEMIA SECONDARY TO BLOOD LOSS (CHRONIC): ICD-10-CM

## 2023-11-08 DIAGNOSIS — K50.119 CROHN'S DISEASE OF LARGE INTESTINE WITH COMPLICATION: Primary | ICD-10-CM

## 2023-11-08 PROCEDURE — 83993 ASSAY FOR CALPROTECTIN FECAL: CPT

## 2023-11-16 LAB — CALPROTECTIN STL-MCNT: 2710 UG/G (ref 0–120)

## 2023-11-30 ENCOUNTER — LAB (OUTPATIENT)
Dept: LAB | Facility: HOSPITAL | Age: 83
End: 2023-11-30
Payer: MEDICARE

## 2023-11-30 DIAGNOSIS — D50.0 IRON DEFICIENCY ANEMIA SECONDARY TO BLOOD LOSS (CHRONIC): Primary | ICD-10-CM

## 2023-11-30 LAB
BASOPHILS # BLD AUTO: 0.12 10*3/MM3 (ref 0–0.2)
BASOPHILS NFR BLD AUTO: 1.3 % (ref 0–1.5)
DEPRECATED RDW RBC AUTO: 46.9 FL (ref 37–54)
EOSINOPHIL # BLD AUTO: 0.93 10*3/MM3 (ref 0–0.4)
EOSINOPHIL NFR BLD AUTO: 10.4 % (ref 0.3–6.2)
ERYTHROCYTE [DISTWIDTH] IN BLOOD BY AUTOMATED COUNT: 14.7 % (ref 12.3–15.4)
FERRITIN SERPL-MCNC: 227 NG/ML (ref 13–150)
HCT VFR BLD AUTO: 32.3 % (ref 34–46.6)
HGB BLD-MCNC: 10.6 G/DL (ref 12–15.9)
IMM GRANULOCYTES # BLD AUTO: 0.06 10*3/MM3 (ref 0–0.05)
IMM GRANULOCYTES NFR BLD AUTO: 0.7 % (ref 0–0.5)
IRON 24H UR-MRATE: 57 MCG/DL (ref 37–145)
IRON SATN MFR SERPL: 20 % (ref 20–50)
LYMPHOCYTES # BLD AUTO: 2.16 10*3/MM3 (ref 0.7–3.1)
LYMPHOCYTES NFR BLD AUTO: 24.1 % (ref 19.6–45.3)
MCH RBC QN AUTO: 29 PG (ref 26.6–33)
MCHC RBC AUTO-ENTMCNC: 32.8 G/DL (ref 31.5–35.7)
MCV RBC AUTO: 88.3 FL (ref 79–97)
MONOCYTES # BLD AUTO: 0.95 10*3/MM3 (ref 0.1–0.9)
MONOCYTES NFR BLD AUTO: 10.6 % (ref 5–12)
NEUTROPHILS NFR BLD AUTO: 4.75 10*3/MM3 (ref 1.7–7)
NEUTROPHILS NFR BLD AUTO: 52.9 % (ref 42.7–76)
NRBC BLD AUTO-RTO: 0 /100 WBC (ref 0–0.2)
PLATELET # BLD AUTO: 390 10*3/MM3 (ref 140–450)
PMV BLD AUTO: 10.3 FL (ref 6–12)
RBC # BLD AUTO: 3.66 10*6/MM3 (ref 3.77–5.28)
TIBC SERPL-MCNC: 280 MCG/DL (ref 298–536)
TRANSFERRIN SERPL-MCNC: 188 MG/DL (ref 200–360)
WBC NRBC COR # BLD AUTO: 8.97 10*3/MM3 (ref 3.4–10.8)

## 2023-11-30 PROCEDURE — 84466 ASSAY OF TRANSFERRIN: CPT

## 2023-11-30 PROCEDURE — 85025 COMPLETE CBC W/AUTO DIFF WBC: CPT

## 2023-11-30 PROCEDURE — 83540 ASSAY OF IRON: CPT

## 2023-11-30 PROCEDURE — 36415 COLL VENOUS BLD VENIPUNCTURE: CPT

## 2023-11-30 PROCEDURE — 82728 ASSAY OF FERRITIN: CPT

## 2023-12-28 ENCOUNTER — INFUSION (OUTPATIENT)
Dept: ONCOLOGY | Facility: HOSPITAL | Age: 83
End: 2023-12-28
Payer: MEDICARE

## 2023-12-28 VITALS
DIASTOLIC BLOOD PRESSURE: 66 MMHG | SYSTOLIC BLOOD PRESSURE: 130 MMHG | HEART RATE: 108 BPM | TEMPERATURE: 97.3 F | RESPIRATION RATE: 18 BRPM

## 2023-12-28 DIAGNOSIS — K50.119 CROHN'S DISEASE OF LARGE INTESTINE WITH COMPLICATION: Primary | ICD-10-CM

## 2023-12-28 PROCEDURE — 25010000002 USTEKINUMAB 90 MG/ML SOLUTION PREFILLED SYRINGE: Performed by: INTERNAL MEDICINE

## 2023-12-28 PROCEDURE — 96372 THER/PROPH/DIAG INJ SC/IM: CPT

## 2023-12-28 RX ADMIN — USTEKINUMAB 90 MG: 90 INJECTION, SOLUTION SUBCUTANEOUS at 10:55

## 2024-01-03 ENCOUNTER — TRANSCRIBE ORDERS (OUTPATIENT)
Dept: LAB | Facility: HOSPITAL | Age: 84
End: 2024-01-03
Payer: MEDICARE

## 2024-01-03 ENCOUNTER — LAB (OUTPATIENT)
Dept: LAB | Facility: HOSPITAL | Age: 84
End: 2024-01-03
Payer: MEDICARE

## 2024-01-03 DIAGNOSIS — D50.0 IRON DEFICIENCY ANEMIA SECONDARY TO BLOOD LOSS (CHRONIC): ICD-10-CM

## 2024-01-03 DIAGNOSIS — K50.119 CROHN'S DISEASE OF LARGE INTESTINE WITH COMPLICATION: ICD-10-CM

## 2024-01-03 DIAGNOSIS — K50.119 CROHN'S DISEASE OF LARGE INTESTINE WITH COMPLICATION: Primary | ICD-10-CM

## 2024-01-03 LAB
ALBUMIN SERPL-MCNC: 3.7 G/DL (ref 3.5–5.2)
ALBUMIN/GLOB SERPL: 1.1 G/DL
ALP SERPL-CCNC: 64 U/L (ref 39–117)
ALT SERPL W P-5'-P-CCNC: 7 U/L (ref 1–33)
ANION GAP SERPL CALCULATED.3IONS-SCNC: 12.3 MMOL/L (ref 5–15)
AST SERPL-CCNC: 11 U/L (ref 1–32)
BASOPHILS # BLD AUTO: 0.16 10*3/MM3 (ref 0–0.2)
BASOPHILS NFR BLD AUTO: 1.9 % (ref 0–1.5)
BILIRUB SERPL-MCNC: 0.2 MG/DL (ref 0–1.2)
BUN SERPL-MCNC: 24 MG/DL (ref 8–23)
BUN/CREAT SERPL: 17.5 (ref 7–25)
CALCIUM SPEC-SCNC: 9.6 MG/DL (ref 8.6–10.5)
CHLORIDE SERPL-SCNC: 109 MMOL/L (ref 98–107)
CO2 SERPL-SCNC: 19.7 MMOL/L (ref 22–29)
CREAT SERPL-MCNC: 1.37 MG/DL (ref 0.57–1)
CRP SERPL-MCNC: 1.32 MG/DL (ref 0–0.5)
DEPRECATED RDW RBC AUTO: 49.3 FL (ref 37–54)
EGFRCR SERPLBLD CKD-EPI 2021: 38.6 ML/MIN/1.73
EOSINOPHIL # BLD AUTO: 1.29 10*3/MM3 (ref 0–0.4)
EOSINOPHIL NFR BLD AUTO: 15.7 % (ref 0.3–6.2)
ERYTHROCYTE [DISTWIDTH] IN BLOOD BY AUTOMATED COUNT: 14.9 % (ref 12.3–15.4)
ERYTHROCYTE [SEDIMENTATION RATE] IN BLOOD: 30 MM/HR (ref 0–30)
FERRITIN SERPL-MCNC: 263 NG/ML (ref 13–150)
GLOBULIN UR ELPH-MCNC: 3.5 GM/DL
GLUCOSE SERPL-MCNC: 114 MG/DL (ref 65–99)
HCT VFR BLD AUTO: 35.4 % (ref 34–46.6)
HGB BLD-MCNC: 11.2 G/DL (ref 12–15.9)
IMM GRANULOCYTES # BLD AUTO: 0.03 10*3/MM3 (ref 0–0.05)
IMM GRANULOCYTES NFR BLD AUTO: 0.4 % (ref 0–0.5)
IRON 24H UR-MRATE: 66 MCG/DL (ref 37–145)
IRON SATN MFR SERPL: 23 % (ref 20–50)
LYMPHOCYTES # BLD AUTO: 1.96 10*3/MM3 (ref 0.7–3.1)
LYMPHOCYTES NFR BLD AUTO: 23.8 % (ref 19.6–45.3)
MCH RBC QN AUTO: 28.6 PG (ref 26.6–33)
MCHC RBC AUTO-ENTMCNC: 31.6 G/DL (ref 31.5–35.7)
MCV RBC AUTO: 90.3 FL (ref 79–97)
MONOCYTES # BLD AUTO: 1.04 10*3/MM3 (ref 0.1–0.9)
MONOCYTES NFR BLD AUTO: 12.6 % (ref 5–12)
NEUTROPHILS NFR BLD AUTO: 3.75 10*3/MM3 (ref 1.7–7)
NEUTROPHILS NFR BLD AUTO: 45.6 % (ref 42.7–76)
NRBC BLD AUTO-RTO: 0 /100 WBC (ref 0–0.2)
PLATELET # BLD AUTO: 474 10*3/MM3 (ref 140–450)
PMV BLD AUTO: 9.9 FL (ref 6–12)
POTASSIUM SERPL-SCNC: 4.9 MMOL/L (ref 3.5–5.2)
PROT SERPL-MCNC: 7.2 G/DL (ref 6–8.5)
RBC # BLD AUTO: 3.92 10*6/MM3 (ref 3.77–5.28)
SODIUM SERPL-SCNC: 141 MMOL/L (ref 136–145)
TIBC SERPL-MCNC: 285 MCG/DL (ref 298–536)
TRANSFERRIN SERPL-MCNC: 191 MG/DL (ref 200–360)
WBC NRBC COR # BLD AUTO: 8.23 10*3/MM3 (ref 3.4–10.8)

## 2024-01-03 PROCEDURE — 86140 C-REACTIVE PROTEIN: CPT

## 2024-01-03 PROCEDURE — 80053 COMPREHEN METABOLIC PANEL: CPT | Performed by: INTERNAL MEDICINE

## 2024-01-03 PROCEDURE — 84466 ASSAY OF TRANSFERRIN: CPT

## 2024-01-03 PROCEDURE — 83540 ASSAY OF IRON: CPT

## 2024-01-03 PROCEDURE — 82728 ASSAY OF FERRITIN: CPT

## 2024-01-03 PROCEDURE — 36415 COLL VENOUS BLD VENIPUNCTURE: CPT

## 2024-01-03 PROCEDURE — 85652 RBC SED RATE AUTOMATED: CPT

## 2024-01-03 PROCEDURE — 85025 COMPLETE CBC W/AUTO DIFF WBC: CPT

## 2024-01-04 ENCOUNTER — TELEPHONE (OUTPATIENT)
Dept: OBSTETRICS AND GYNECOLOGY | Facility: CLINIC | Age: 84
End: 2024-01-04
Payer: MEDICARE

## 2024-01-04 NOTE — TELEPHONE ENCOUNTER
Called and spoke with patient. Informed her of advisement from Dr. Caro in full.  She states that she has been much more bothered day-to-day by the prolapse as of late and the discomfort caused by it and would like to know what other options there may be to address this.

## 2024-01-04 NOTE — TELEPHONE ENCOUNTER
Caller: Neema Richard    Relationship to patient: Self    Best call back number: [phone]    Type of visit: GYN F/U    Requested date: ASAP     Additional notes: PATIENT CALLED AND STATED THAT SHE RECENTLY HAD AN MRI THAT SHOWED THAT SHE NEEDS TO HAVE A HYSTERECTOMY - SHE WOULD LIKE TO KNOW IF DR CAMPOVERDE CAN LOOK AT THESE RESULTS AND POSSIBLE GET HER IN FOR AN APPT AS SOON AS POSSIBLE

## 2024-01-04 NOTE — TELEPHONE ENCOUNTER
I have reviewed the MRI scan.  I do not think she is needing hysterectomy.  It only shows some prolapse which we have known about as well as a fibroid.  These are not uncommon in menopause and do not need to be surgically addressed in menopause.

## 2024-01-08 ENCOUNTER — TRANSCRIBE ORDERS (OUTPATIENT)
Dept: ADMINISTRATIVE | Facility: HOSPITAL | Age: 84
End: 2024-01-08
Payer: MEDICARE

## 2024-01-08 DIAGNOSIS — R51.9 NONINTRACTABLE HEADACHE, UNSPECIFIED CHRONICITY PATTERN, UNSPECIFIED HEADACHE TYPE: Primary | ICD-10-CM

## 2024-01-09 ENCOUNTER — TRANSCRIBE ORDERS (OUTPATIENT)
Dept: ADMINISTRATIVE | Facility: HOSPITAL | Age: 84
End: 2024-01-09
Payer: MEDICARE

## 2024-01-09 DIAGNOSIS — R51.9 NONINTRACTABLE HEADACHE, UNSPECIFIED CHRONICITY PATTERN, UNSPECIFIED HEADACHE TYPE: Primary | ICD-10-CM

## 2024-01-10 ENCOUNTER — LAB (OUTPATIENT)
Dept: LAB | Facility: HOSPITAL | Age: 84
End: 2024-01-10
Payer: MEDICARE

## 2024-01-10 ENCOUNTER — HOSPITAL ENCOUNTER (OUTPATIENT)
Dept: CT IMAGING | Facility: HOSPITAL | Age: 84
Discharge: HOME | End: 2024-01-10
Payer: MEDICARE

## 2024-01-10 DIAGNOSIS — K50.119 CROHN'S DISEASE OF LARGE INTESTINE WITH COMPLICATION: ICD-10-CM

## 2024-01-10 DIAGNOSIS — D50.0 IRON DEFICIENCY ANEMIA SECONDARY TO BLOOD LOSS (CHRONIC): ICD-10-CM

## 2024-01-10 DIAGNOSIS — R51.9 NONINTRACTABLE HEADACHE, UNSPECIFIED CHRONICITY PATTERN, UNSPECIFIED HEADACHE TYPE: ICD-10-CM

## 2024-01-10 PROCEDURE — 70486 CT MAXILLOFACIAL W/O DYE: CPT

## 2024-01-10 PROCEDURE — 83993 ASSAY FOR CALPROTECTIN FECAL: CPT

## 2024-01-16 ENCOUNTER — OFFICE (OUTPATIENT)
Dept: URBAN - METROPOLITAN AREA CLINIC 4 | Facility: CLINIC | Age: 84
End: 2024-01-16

## 2024-01-16 ENCOUNTER — OFFICE VISIT (OUTPATIENT)
Dept: OBSTETRICS AND GYNECOLOGY | Facility: CLINIC | Age: 84
End: 2024-01-16
Payer: MEDICARE

## 2024-01-16 VITALS — RESPIRATION RATE: 14 BRPM | WEIGHT: 123 LBS | BODY MASS INDEX: 19.85 KG/M2

## 2024-01-16 VITALS — SYSTOLIC BLOOD PRESSURE: 104 MMHG | WEIGHT: 123 LBS | DIASTOLIC BLOOD PRESSURE: 66 MMHG | HEIGHT: 66 IN

## 2024-01-16 DIAGNOSIS — D50.9 IRON DEFICIENCY ANEMIA, UNSPECIFIED: ICD-10-CM

## 2024-01-16 DIAGNOSIS — N39.46 MIXED URGE AND STRESS INCONTINENCE: ICD-10-CM

## 2024-01-16 DIAGNOSIS — N81.3 CYSTOCELE AND RECTOCELE WITH COMPLETE UTEROVAGINAL PROLAPSE: Primary | ICD-10-CM

## 2024-01-16 DIAGNOSIS — K51.90 ULCERATIVE COLITIS, UNSPECIFIED, WITHOUT COMPLICATIONS: ICD-10-CM

## 2024-01-16 LAB — CALPROTECTIN STL-MCNT: 3000 UG/G (ref 0–120)

## 2024-01-16 PROCEDURE — 99214 OFFICE O/P EST MOD 30 MIN: CPT | Performed by: INTERNAL MEDICINE

## 2024-01-16 PROCEDURE — 99214 OFFICE O/P EST MOD 30 MIN: CPT | Performed by: OBSTETRICS & GYNECOLOGY

## 2024-01-16 NOTE — PROGRESS NOTES
Subjective   Chief Complaint   Patient presents with    Follow-up     Neema Richard is a 83 y.o. year old .  No LMP recorded (lmp unknown). Patient is postmenopausal.  She comes today to talk about problems she is having with a mass that is more prevalent when she has been on her feet for prolonged periods of time.  She notices the mass mostly through the right buttocks.  She does have a history of prolapse in the past that was treated with a pessary.  Since the time of her low anterior resection the vaginal prolapse has not required pessary use.  She saw her colorectal surgeon who recommended expectant management.  She ended up going to  for second opinion and an MRI showed a fibroid with displacement through the genital hiatus.  Which she describes symptomatically as pelvic pain and pressure with a mass that occupies the right buttocks.  There is nothing protruding out of the vagina.      The following portions of the patient's history were reviewed and updated as appropriate:no additional history reviewed    Social History    Tobacco Use      Smoking status: Never      Smokeless tobacco: Never            Objective   Resp 14   Wt 55.8 kg (123 lb)   LMP  (LMP Unknown)   BMI 19.85 kg/m²     General:  well developed; well nourished  no acute distress   Pelvis: Clinical staff was present for exam  When she is standing upright and bears down there is a prominent approximately 8 cm mass occupying the inferior portion of the right buttocks.  Speculum exam the vagina shows very little prolapse  Bimanual exam demonstrates a larger mass at the apex of the vagina which is the same feel as the mass occupying the buttocks     Lab Review   None    Imaging   MRI       Assessment   Prolapse after low anterior resection most likely with more than standard pelvic organ prolapse.  I think this is a complicated case that would require multiple disciplines to resolve.  Anticipate this will require both gynecologic and  colorectal surgery's involvement.     Plan   Given the complexity of this case I do think for her to see either the folks at [facility] or having the general surgeons at [facility] involved the pelvic reconstructive surgeons at [facility] would be a choice  If she wants me to be involved I think we will need to plan on getting defacography done at [facility] and try to coordinate surgery between myself and Dr. Colindres  The importance of keeping all planned follow-up and taking all medications as prescribed was emphasized.  Follow up PRN       Total time spent today with Neema was 30-39 minutes (level 4) - preparing to see the patient, documenting the encounter, independently interpreting test results, counseling and educating the patient, coordinating care and answering her questions and counseling regarding pathophysiology of her presenting problem along with plans for any diagnositc work-up and treatment.  The time reported only includes face-to-face time and excludes any procedural based activities that occurred on the same date of service.       This note was electronically signed.    Walter Caro M.D.  January 16, 2024    Part of this note may be an electronic transcription/translation of spoken language to printed text using the Dragon Dictation System.

## 2024-02-22 ENCOUNTER — INFUSION (OUTPATIENT)
Dept: ONCOLOGY | Facility: HOSPITAL | Age: 84
End: 2024-02-22
Payer: MEDICARE

## 2024-02-22 VITALS
SYSTOLIC BLOOD PRESSURE: 141 MMHG | DIASTOLIC BLOOD PRESSURE: 94 MMHG | RESPIRATION RATE: 16 BRPM | TEMPERATURE: 96.9 F | HEART RATE: 87 BPM

## 2024-02-22 DIAGNOSIS — K50.119 CROHN'S DISEASE OF LARGE INTESTINE WITH COMPLICATION: Primary | ICD-10-CM

## 2024-02-22 PROCEDURE — 25010000002 USTEKINUMAB 90 MG/ML SOLUTION PREFILLED SYRINGE: Performed by: INTERNAL MEDICINE

## 2024-02-22 PROCEDURE — 96372 THER/PROPH/DIAG INJ SC/IM: CPT

## 2024-02-22 RX ADMIN — USTEKINUMAB 90 MG: 90 INJECTION, SOLUTION SUBCUTANEOUS at 11:00

## 2024-03-25 ENCOUNTER — INFUSION (OUTPATIENT)
Dept: ONCOLOGY | Facility: HOSPITAL | Age: 84
End: 2024-03-25
Payer: MEDICARE

## 2024-03-25 VITALS
TEMPERATURE: 97.7 F | SYSTOLIC BLOOD PRESSURE: 140 MMHG | RESPIRATION RATE: 16 BRPM | HEART RATE: 88 BPM | DIASTOLIC BLOOD PRESSURE: 68 MMHG

## 2024-03-25 DIAGNOSIS — M81.0 AGE-RELATED OSTEOPOROSIS WITHOUT CURRENT PATHOLOGICAL FRACTURE: Primary | ICD-10-CM

## 2024-03-25 PROCEDURE — 96372 THER/PROPH/DIAG INJ SC/IM: CPT

## 2024-03-25 PROCEDURE — 25010000002 DENOSUMAB 60 MG/ML SOLUTION PREFILLED SYRINGE: Performed by: INTERNAL MEDICINE

## 2024-03-25 RX ADMIN — DENOSUMAB 60 MG: 60 INJECTION SUBCUTANEOUS at 14:01

## 2024-04-18 ENCOUNTER — INFUSION (OUTPATIENT)
Dept: ONCOLOGY | Facility: HOSPITAL | Age: 84
End: 2024-04-18
Payer: MEDICARE

## 2024-04-18 VITALS
TEMPERATURE: 97.2 F | SYSTOLIC BLOOD PRESSURE: 147 MMHG | DIASTOLIC BLOOD PRESSURE: 65 MMHG | RESPIRATION RATE: 16 BRPM | HEART RATE: 80 BPM

## 2024-04-18 DIAGNOSIS — K50.119 CROHN'S DISEASE OF LARGE INTESTINE WITH COMPLICATION: Primary | ICD-10-CM

## 2024-04-18 PROCEDURE — 25010000002 USTEKINUMAB 90 MG/ML SOLUTION PREFILLED SYRINGE: Performed by: INTERNAL MEDICINE

## 2024-04-18 PROCEDURE — 96372 THER/PROPH/DIAG INJ SC/IM: CPT

## 2024-04-18 RX ADMIN — USTEKINUMAB 90 MG: 90 INJECTION, SOLUTION SUBCUTANEOUS at 11:29

## 2024-05-07 ENCOUNTER — OFFICE (OUTPATIENT)
Dept: URBAN - METROPOLITAN AREA CLINIC 4 | Facility: CLINIC | Age: 84
End: 2024-05-07

## 2024-05-07 VITALS — HEIGHT: 66 IN | DIASTOLIC BLOOD PRESSURE: 68 MMHG | WEIGHT: 128 LBS | SYSTOLIC BLOOD PRESSURE: 138 MMHG

## 2024-05-07 DIAGNOSIS — D50.0 IRON DEFICIENCY ANEMIA SECONDARY TO BLOOD LOSS (CHRONIC): ICD-10-CM

## 2024-05-07 DIAGNOSIS — K51.90 ULCERATIVE COLITIS, UNSPECIFIED, WITHOUT COMPLICATIONS: ICD-10-CM

## 2024-05-07 DIAGNOSIS — R19.5 OTHER FECAL ABNORMALITIES: ICD-10-CM

## 2024-05-07 DIAGNOSIS — Z79.1 LONG TERM (CURRENT) USE OF NON-STEROIDAL ANTI-INFLAMMATORIES: ICD-10-CM

## 2024-05-07 PROCEDURE — 99214 OFFICE O/P EST MOD 30 MIN: CPT | Performed by: INTERNAL MEDICINE

## 2024-05-07 RX ORDER — MESALAMINE 0.38 G/1
CAPSULE, EXTENDED RELEASE ORAL
Qty: 120 | Refills: 11 | Status: ACTIVE
Start: 2024-05-07

## 2024-05-07 RX ORDER — PREDNISONE 10 MG/1
TABLET ORAL
Qty: 70 | Refills: 1 | Status: ACTIVE
Start: 2024-05-07

## 2024-06-13 ENCOUNTER — INFUSION (OUTPATIENT)
Dept: ONCOLOGY | Facility: HOSPITAL | Age: 84
End: 2024-06-13
Payer: MEDICARE

## 2024-06-13 VITALS
DIASTOLIC BLOOD PRESSURE: 60 MMHG | SYSTOLIC BLOOD PRESSURE: 145 MMHG | RESPIRATION RATE: 16 BRPM | HEART RATE: 81 BPM | TEMPERATURE: 98.3 F

## 2024-06-13 DIAGNOSIS — K50.119 CROHN'S DISEASE OF LARGE INTESTINE WITH COMPLICATION: Primary | ICD-10-CM

## 2024-06-13 PROCEDURE — 25010000002 USTEKINUMAB 90 MG/ML SOLUTION PREFILLED SYRINGE: Performed by: INTERNAL MEDICINE

## 2024-06-13 PROCEDURE — 96372 THER/PROPH/DIAG INJ SC/IM: CPT

## 2024-06-13 RX ADMIN — USTEKINUMAB 90 MG: 90 INJECTION, SOLUTION SUBCUTANEOUS at 11:36

## 2024-07-29 ENCOUNTER — OFFICE VISIT (OUTPATIENT)
Dept: OBSTETRICS AND GYNECOLOGY | Facility: CLINIC | Age: 84
End: 2024-07-29
Payer: MEDICARE

## 2024-07-29 VITALS
WEIGHT: 133 LBS | BODY MASS INDEX: 21.47 KG/M2 | RESPIRATION RATE: 14 BRPM | DIASTOLIC BLOOD PRESSURE: 80 MMHG | SYSTOLIC BLOOD PRESSURE: 128 MMHG

## 2024-07-29 DIAGNOSIS — N81.3 CYSTOCELE AND RECTOCELE WITH COMPLETE UTEROVAGINAL PROLAPSE: Primary | ICD-10-CM

## 2024-07-29 DIAGNOSIS — N39.46 MIXED URGE AND STRESS INCONTINENCE: ICD-10-CM

## 2024-07-29 DIAGNOSIS — D05.10 DUCTAL CARCINOMA IN SITU (DCIS) OF BREAST, UNSPECIFIED LATERALITY: ICD-10-CM

## 2024-07-29 DIAGNOSIS — M81.0 AGE-RELATED OSTEOPOROSIS WITHOUT CURRENT PATHOLOGICAL FRACTURE: ICD-10-CM

## 2024-07-29 PROCEDURE — 3079F DIAST BP 80-89 MM HG: CPT | Performed by: OBSTETRICS & GYNECOLOGY

## 2024-07-29 PROCEDURE — 99214 OFFICE O/P EST MOD 30 MIN: CPT | Performed by: OBSTETRICS & GYNECOLOGY

## 2024-07-29 PROCEDURE — 3074F SYST BP LT 130 MM HG: CPT | Performed by: OBSTETRICS & GYNECOLOGY

## 2024-07-29 NOTE — PROGRESS NOTES
"Subjective   Chief Complaint   Patient presents with    Gynecologic Exam     Neema Richard is a 84 y.o. year old  menopausal female presenting to be seen for her annual exam.      This past year she {has / HAS NOT:80329::\"has not\"} been on hormone replacasdfasdfement therapy.  She {Action - has/HAS NOT:48594::\"has not\"} had any vaginal bleeding in the last 12 months.  Menopausal symptoms {vasomotor symptoms:58608}.    SEXUAL Hx:  She {IS/is not:34084::\"is\"} currently sexually active.  In the past year there {New sexual partners?:61114::\"there has been NO new sexual partners\"}.    Condoms {Condom use:84158::\"are never used\"}.  She {would/WOULD NOT:42379::\"would not\"} like to be screened for STD's at today's exam.  [place] is painful: {Response - yes/not/not always (pre-select NO):54497::\"no\"}  HEALTH Hx:  She exercises regularly: {Responses; yes/NO/not asked:98312::\"no (and has no plans to become more active)\"}.  She wears her seat belt: {Responses; yes/not/not always (pre-select yes):48805::\"yes\"}.  She has concerns about domestic violence: {Responses; yes/NO/not asked:75395::\"no\"}.  She has noticed changes in height: {Responses; yes/NO/not asked:32479::\"no\"}.    The following portions of the patient's history were reviewed and updated as appropriate:{Misc - History reviewed:10123::\"problem list\",\"current medications\",\"allergies\",\"past family history\",\"past medical history\",\"past social history\",\"past surgical history\"}.    Social History    Tobacco Use      Smoking status: Never      Smokeless tobacco: Never      Review of Systems  Constitutional POS: {Constitutional (+) ROS:64607::\"nothing reported\"}    NEG: {Constitutional (-) ROS:99750::\"anorexia\",\"night sweats\"}   Genitourinary POS: { (+) ROS:92146::\"nothing reported\"}    NEG: { (-) ROS:30965::\"dysuria\",\"hematuria\"}      Gastointestinal POS: {GI (+) ROS:43085::\"nothing reported\"}    NEG: {GI (-) ROS:95565::\"bloating\",\"change in bowel " "habits\",\"melena\",\"reflux symptoms\"}   Integument POS: {Skin (+) ROS:17456::\"nothing reported\"}    NEG: {Skin (-) ROS:92540::\"moles that are changing in size, shape, color\",\"rashes\"}   Breast POS: {Breast (+) ROS:26083::\"nothing reported\"}    NEG: {Breast (-) ROS:46968::\"persistent breast lump\",\"skin dimpling\",\"nipple discharge\"}        Objective   /80   Resp 14   Wt 60.3 kg (133 lb)   LMP  (LMP Unknown)   BMI 21.47 kg/m²     General:  {Exam - general findings:07137::\"well developed; well nourished\",\"no acute distress\"}   Skin:  {Exam - skin:59279::\"No suspicious lesions seen\"}   Thyroid: {Exam - thyroid:32964::\"normal to inspection and palpation\"}   Breasts:  {Exam - breasts:99029::\"Examined in supine position\",\"Symmetric without masses or skin dimpling\",\"Nipples normal without inversion, lesions or discharge\",\"There are no palpable axillary nodes\"}   Abdomen: {Exam - abdomen:95302::\"soft, non-tender; no masses\",\"no umbilical or inguinal hernias are present\",\"no hepato-splenomegaly\"}   Pelvis: {Exam; GYN pelvic:88492::\"Clinical staff was present for exam\"}        Assessment   ***  {HRT for annual exam:92067::\"Menopausal female currently not on HRT - without significant symptoms affecting activities of daily living\"}  {Screening exam update:48398::\"She is up to date on all relevant gynecologic and colorectal screenings\"}       Plan   ***  I have counseled the patient on the importance of staying up to date with preventive vaccines as well as the risks and benefits of these vaccines.  Her vaccine record was reviewed and updated.  I discussed with Neema that she may be behind on needed vaccinations for {Vaccines to update:64549}.  She may be able to obtain these vaccinations at her local pharmacy OR speak about obtaining them with her primary care.  If she does obtain her vaccines, I have asked Neema to let us know the date each vaccine was obtained so that her medical record could be updated in our system.  The " "importance of keeping all planned follow-up and taking all medications as prescribed was emphasized.  Follow up {F/U reason:08038::\"for annual exam\"} *** {follow-up time:22868}    No orders of the defined types were placed in this encounter.         This note was electronically signed.    Walter Caro M.D.  July 29, 2024    Part of this note may be an electronic transcription/translation of spoken language to printed text using the Dragon Dictation System.   "

## 2024-07-29 NOTE — PROGRESS NOTES
Subjective   Chief Complaint   Patient presents with    Gynecologic Exam     Neema Richard is a 84 y.o. year old  Medicare patient presenting to be seen in follow up regarding her osteopenia, historic pelvic organ prolapse and overactive bladder.  She has tried to see several different doctors at [facility] regarding the area of prolapse previously seen.  She is seeing somebody in the Department of surgery and the department of urogynecology.  Neither one of them seems to want to take ownership of this problem and she feels the area unchanged as compared to prior.  She continues to wear supportive underwear.  She is no longer seeing Dr. Vazquez because she lost radha in him related to this issue.    This past year she has not been on hormone replacement therapy.  She has not had any vaginal bleeding in the last 12 months.   Menopausal symptoms are not present.    SEXUAL Hx:  She is not currently sexually active.  In the past year there she has not been sexually active.    Condoms are not needed because she is not sexually active.  She would not like to be screened for STD's at today's exam.  Herndon is painful: n/a  HEALTH Hx:  She exercises regularly: no (and has no plans to become more active).  She wears her seat belt: yes.  She has concerns about domestic violence: no.  She has noticed changes in height: no    The following portions of the patient's history were reviewed and updated as appropriate:problem list, current medications, allergies, past family history, past medical history, past social history, and past surgical history.    Social History    Tobacco Use      Smoking status: Never      Smokeless tobacco: Never    Review of Systems  Constitutional POS: nothing reported    NEG: anorexia or night sweats   Genitourinary POS: frequency and continues to use the Myrbetriq and primary care has been managing it mainly related to cost    NEG: dysuria or hematuria   Gastointestinal POS:  nothing reported    NEG: bloating, change in bowel habits, melena, or reflux symptoms   Integument POS: nothing reported    NEG: moles that are changing in size, shape, color or rashes   Breast POS: nothing reported    NEG: persistent breast lump, skin dimpling, or nipple discharge        Objective   /80   Resp 14   Wt 60.3 kg (133 lb)   LMP  (LMP Unknown)   BMI 21.47 kg/m²     General:  well developed; well nourished  no acute distress   Skin:  No suspicious lesions seen   Thyroid: normal to inspection and palpation   Breasts:  Examined in supine position  Symmetric without masses or skin dimpling  Nipples normal without inversion, lesions or discharge  There are no palpable axillary nodes   Abdomen: soft, non-tender; no masses  no umbilical or inguinal hernias are present  no hepato-splenomegaly   Pelvis: Clinical staff was present for exam  External genitalia:  The mass previously felt lateral to what used to be the anus is still present  :  urethral meatus normal;  Vaginal:  atrophic mucosal changes are present;  Cervix:  Difficult to see  Uterus:  May be present retroflexed and occupying the perirectal space  Adnexa:  non palpable bilaterally.  Cystocele GRADE 1  Rectocele GRADE 1        Assessment   Normal GYN exam in menopause S/P left S&O - with prior pelvic organ prolapse that improved after APR  Perirectal mass after APR - currently minimally symptomatic pain control with use of supportive garments.  Anticipate this represents the uterus which has protruded through the rectovaginal fascia.  Osteoporosis - on therapy 5/2018.  Now on Prolia managed by primary care.  DEXA's ordered by PCP  H/o DCIS - TESSA ~ 7 years  OAB - overall stable on medicine  Menopausal female currently not on HRT - without significant symptoms affecting activities of daily living  She is up to date on all relevant gynecologic and colorectal screenings       Plan   Pap was not done today.  I explained to Neema that the Pap  smears are no longer recommended in patient's after 65 years of age.   I stressed to Neema that she still should be seen to be seen yearly for a full physical including breast and pelvic exam.  Regarding breast cancer screening by yearly mammogram in women aged 75 years of age and older, I reviewed the USPSTF's recommendations.  The USPSTF concludes that the current evidence is insufficient to assess the balance of benefits and harms of screening mammography in women aged 75 years or older.  After hearing this information she is interested in continuing with yearly mammograms.  Regardless of her decision to continue to participate in screening, she should report any palpable breast lump(s) or skin changes regardless of mammographic findings.  If she chooses to continue with breast cancer screening, I explained to Neema that notification regarding her mammogram results will come from the center performing the study.  Our office will not be routinely calling with mammogram results.  It is her responsibility to make sure that the results from the mammogram are communicated to her by the breast center.  If she has any questions about the results, she is welcome to call our office anytime.  I have counseled the patient on the importance of staying up to date with preventive vaccines as well as the risks and benefits of these vaccines.  Her vaccine record was reviewed and updated.  I think surgically approaching this mass would be very complicated for variety reasons.  I think it would need to be done abdominally and given her prior abdominal surgery I think that poses risk.  Furthermore if the uterus is able to be removed I worry about what ever defect on the pelvic floor is present that would still not have been remedied.  The importance of keeping all planned follow-up and taking all medications as prescribed was emphasized.  Follow up for annual exam 1 year           Total time spent today with Neema was 30 minutes (level  4) - preparing to see the patient, documenting the encounter, independently interpreting test results, counseling and educating the patient, coordinating care and answering her questions and counseling regarding pathophysiology of her presenting problem along with plans for any diagnositc work-up and treatment.  The time reported only includes face-to-face time and excludes any procedural based activities that occurred on the same date of service.    This note was electronically signed.    Walter Caro M.D.  July 29, 2024    Part of this note may be an electronic transcription/translation of spoken language to printed text using the Dragon Dictation System.

## 2024-08-08 ENCOUNTER — INFUSION (OUTPATIENT)
Dept: ONCOLOGY | Facility: HOSPITAL | Age: 84
End: 2024-08-08
Payer: MEDICARE

## 2024-08-08 VITALS
RESPIRATION RATE: 16 BRPM | SYSTOLIC BLOOD PRESSURE: 132 MMHG | DIASTOLIC BLOOD PRESSURE: 60 MMHG | HEART RATE: 102 BPM | TEMPERATURE: 98.8 F

## 2024-08-08 DIAGNOSIS — K50.119 CROHN'S DISEASE OF LARGE INTESTINE WITH COMPLICATION: Primary | ICD-10-CM

## 2024-08-08 PROCEDURE — 96372 THER/PROPH/DIAG INJ SC/IM: CPT

## 2024-08-08 PROCEDURE — 25010000002 USTEKINUMAB 90 MG/ML SOLUTION PREFILLED SYRINGE: Performed by: INTERNAL MEDICINE

## 2024-08-08 RX ADMIN — USTEKINUMAB 90 MG: 90 INJECTION, SOLUTION SUBCUTANEOUS at 11:00

## 2024-09-25 ENCOUNTER — INFUSION (OUTPATIENT)
Dept: ONCOLOGY | Facility: HOSPITAL | Age: 84
End: 2024-09-25
Payer: MEDICARE

## 2024-09-25 ENCOUNTER — TELEPHONE (OUTPATIENT)
Dept: WOUND CARE | Facility: HOSPITAL | Age: 84
End: 2024-09-25
Payer: MEDICARE

## 2024-09-25 VITALS
TEMPERATURE: 97.7 F | HEART RATE: 87 BPM | RESPIRATION RATE: 16 BRPM | SYSTOLIC BLOOD PRESSURE: 134 MMHG | DIASTOLIC BLOOD PRESSURE: 65 MMHG

## 2024-09-25 DIAGNOSIS — M81.0 AGE-RELATED OSTEOPOROSIS WITHOUT CURRENT PATHOLOGICAL FRACTURE: Primary | ICD-10-CM

## 2024-09-25 PROCEDURE — 96372 THER/PROPH/DIAG INJ SC/IM: CPT

## 2024-09-25 PROCEDURE — 25010000002 DENOSUMAB 60 MG/ML SOLUTION PREFILLED SYRINGE: Performed by: INTERNAL MEDICINE

## 2024-09-25 RX ADMIN — DENOSUMAB 60 MG: 60 INJECTION SUBCUTANEOUS at 13:57

## 2024-10-03 ENCOUNTER — INFUSION (OUTPATIENT)
Dept: ONCOLOGY | Facility: HOSPITAL | Age: 84
End: 2024-10-03
Payer: MEDICARE

## 2024-10-03 ENCOUNTER — HOSPITAL ENCOUNTER (OUTPATIENT)
Dept: WOUND CARE | Facility: HOSPITAL | Age: 84
Discharge: HOME | End: 2024-10-03
Payer: MEDICARE

## 2024-10-03 VITALS
DIASTOLIC BLOOD PRESSURE: 60 MMHG | SYSTOLIC BLOOD PRESSURE: 162 MMHG | HEART RATE: 85 BPM | TEMPERATURE: 96.5 F | RESPIRATION RATE: 18 BRPM

## 2024-10-03 DIAGNOSIS — K50.119 CROHN'S DISEASE OF LARGE INTESTINE WITH COMPLICATION: Primary | ICD-10-CM

## 2024-10-03 PROCEDURE — 25010000002 USTEKINUMAB 90 MG/ML SOLUTION PREFILLED SYRINGE: Performed by: INTERNAL MEDICINE

## 2024-10-03 PROCEDURE — G0463 HOSPITAL OUTPT CLINIC VISIT: HCPCS

## 2024-10-03 PROCEDURE — 96372 THER/PROPH/DIAG INJ SC/IM: CPT

## 2024-10-03 RX ADMIN — USTEKINUMAB 90 MG: 90 INJECTION, SOLUTION SUBCUTANEOUS at 11:00

## 2024-10-03 NOTE — NURSING NOTE
[facility] outpatient ostomy visit.    History and current problem: recent issues with ulceration around stoma, some leaking, diagnosed with ulcerative colitis 18 months ago, taking Stelera, sees gastroenterologist Dr. Amrbocio Manzo. Gets supplies from [employer]. Has diarrhea sometimes changes closed end bag 4-5 times per day, not interested in drainable, prefers transparent bags, interested in support belt, has has hernia repair previously, likes to walk and work in yard for exercise, cleanses with dial soap, orders cut to fit 2 3/4 gi barrier with closed end transparent bags, cuts to about 30mm (states cannot cut smaller due to arthritis). Likes to change barrier every other day.    Surgery date: 2020  Surgeon: George    Patient arrives in good spirits. No family at beside.     Stoma Type: end colostomy  Diameter/shape: round, 24mm  Location: left lower  Protrusion: flush  Mucosal condition and color: pink, bleeding  Peristomal skin: maceration, painful superficial ulcer at 2o clock, pseudoverrucous lesions to immediate peristoma (likely from cutting too large)  Supportive Tissue: bulging abdomen, hernia?  Character of output: soft, loose  Emptying frequency per day: 4-5    Current pouching system: cut to fit 2 3/4 gi barrier with closed end transparent bags, paste  Last appliance change: unsure  Ostomy care: gi 2 3/4 flat two piece, cut to 25mm, slim barrier ring, closed end bag, light crusting  Goal wear time: 2-3 days    Recommendations:   Call [facility], order pro horizontal left sided stoma flange size 1 3/4 inch, small light hernia support  Call [employer] and order precut barriers to 25mm, #[id number], bag to match [phone]  [place] barrier ring [phone] (slim)  Powder # [id number]  Ask about barrier film spray, peristoma cleanser wipes, see if [employer] safe n simple  Reminded how to crust, thoroughly educated on barrier ring    I spent 60 minutes, face-to-face, caring for Neema today.     Follow up  appointment: No. Future appointment date and time: PRN

## 2024-11-04 ENCOUNTER — TRANSCRIBE ORDERS (OUTPATIENT)
Dept: ADMINISTRATIVE | Facility: HOSPITAL | Age: 84
End: 2024-11-04
Payer: MEDICARE

## 2024-11-04 DIAGNOSIS — Z12.31 VISIT FOR SCREENING MAMMOGRAM: Primary | ICD-10-CM

## 2024-11-08 ENCOUNTER — HOSPITAL ENCOUNTER (OUTPATIENT)
Dept: MAMMOGRAPHY | Facility: HOSPITAL | Age: 84
Discharge: HOME | End: 2024-11-08
Admitting: INTERNAL MEDICINE
Payer: MEDICARE

## 2024-11-08 DIAGNOSIS — Z12.31 VISIT FOR SCREENING MAMMOGRAM: ICD-10-CM

## 2024-11-08 PROCEDURE — 77063 BREAST TOMOSYNTHESIS BI: CPT

## 2024-11-08 PROCEDURE — 77067 SCR MAMMO BI INCL CAD: CPT

## 2024-11-22 ENCOUNTER — TRANSCRIBE ORDERS (OUTPATIENT)
Dept: ADMINISTRATIVE | Facility: HOSPITAL | Age: 84
End: 2024-11-22
Payer: MEDICARE

## 2024-11-22 DIAGNOSIS — Z12.31 VISIT FOR SCREENING MAMMOGRAM: Primary | ICD-10-CM

## 2024-11-27 ENCOUNTER — INFUSION (OUTPATIENT)
Dept: ONCOLOGY | Facility: HOSPITAL | Age: 84
End: 2024-11-27
Payer: MEDICARE

## 2024-11-27 VITALS
DIASTOLIC BLOOD PRESSURE: 56 MMHG | HEART RATE: 96 BPM | RESPIRATION RATE: 18 BRPM | TEMPERATURE: 96.8 F | SYSTOLIC BLOOD PRESSURE: 141 MMHG

## 2024-11-27 DIAGNOSIS — K50.119 CROHN'S DISEASE OF LARGE INTESTINE WITH COMPLICATION: Primary | ICD-10-CM

## 2024-11-27 PROCEDURE — 25010000002 USTEKINUMAB 90 MG/ML SOLUTION PREFILLED SYRINGE: Performed by: INTERNAL MEDICINE

## 2024-11-27 PROCEDURE — 96372 THER/PROPH/DIAG INJ SC/IM: CPT

## 2024-11-27 RX ADMIN — USTEKINUMAB 90 MG: 90 INJECTION, SOLUTION SUBCUTANEOUS at 11:02

## 2025-01-22 ENCOUNTER — INFUSION (OUTPATIENT)
Dept: ONCOLOGY | Facility: HOSPITAL | Age: 85
End: 2025-01-22
Payer: MEDICARE

## 2025-01-22 VITALS
DIASTOLIC BLOOD PRESSURE: 70 MMHG | RESPIRATION RATE: 16 BRPM | HEART RATE: 88 BPM | TEMPERATURE: 97.1 F | SYSTOLIC BLOOD PRESSURE: 135 MMHG

## 2025-01-22 DIAGNOSIS — K50.119 CROHN'S DISEASE OF LARGE INTESTINE WITH COMPLICATION: Primary | ICD-10-CM

## 2025-01-22 PROCEDURE — 25010000002 USTEKINUMAB 90 MG/ML SOLUTION PREFILLED SYRINGE: Performed by: INTERNAL MEDICINE

## 2025-01-22 PROCEDURE — 96372 THER/PROPH/DIAG INJ SC/IM: CPT

## 2025-01-22 RX ADMIN — USTEKINUMAB 90 MG: 90 INJECTION, SOLUTION SUBCUTANEOUS at 11:18

## 2025-03-04 ENCOUNTER — HOSPITAL ENCOUNTER (OUTPATIENT)
Dept: HOSPITAL 22 - LAB | Age: 85
Discharge: HOME | End: 2025-03-04
Payer: MEDICARE

## 2025-03-04 DIAGNOSIS — K51.90: Primary | ICD-10-CM

## 2025-03-04 DIAGNOSIS — B19.20: ICD-10-CM

## 2025-03-04 DIAGNOSIS — K74.69: ICD-10-CM

## 2025-03-04 LAB
ALBUMIN LEVEL: 4.3 G/DL (ref 3.5–5)
ALBUMIN/GLOB SERPL: 1.6 {RATIO} (ref 1.1–1.8)
ALP ISO SERPL-ACNC: 80 U/L (ref 38–126)
ALT SERPLBLD-CCNC: 15 U/L (ref 12–78)
ANION GAP SERPL CALC-SCNC: 12 MEQ/L (ref 5–15)
AST SERPL QL: 23 U/L (ref 14–36)
BILIRUBIN,TOTAL: 0.3 MG/DL (ref 0.2–1.3)
BUN SERPL-MCNC: 18 MG/DL (ref 7–17)
CALCIUM SPEC-MCNC: 10.2 MG/DL (ref 8.4–10.2)
CHLORIDE SPEC-SCNC: 106 MMOL/L (ref 98–107)
CO2 SERPL-SCNC: 27 MMOL/L (ref 22–30)
CREAT BLD-SCNC: 1.4 MG/DL (ref 0.52–1.04)
CRP SERPL HS-MCNC: 12.5 MG/L (ref 0–4)
ESTIMATED GLOMERULAR FILT RATE: 36 ML/MIN (ref 60–?)
GFR (AFRICAN AMERICAN): 43 ML/MIN (ref 60–?)
GLOBULIN SER CALC-MCNC: 2.7 G/DL (ref 1.3–3.2)
GLUCOSE: 103 MG/DL (ref 74–100)
HCT VFR BLD CALC: 36.5 % (ref 37–47)
HGB BLD-MCNC: 11.6 G/DL (ref 12.2–16.2)
MCHC RBC-ENTMCNC: 31.8 G/DL (ref 31.8–35.4)
MCV RBC: 93.1 FL (ref 81–99)
MEAN CORPUSCULAR HEMOGLOBIN: 29.6 PG (ref 27–31.2)
PLATELET # BLD: 317 K/MM3 (ref 142–424)
POTASSIUM: 5 MMOL/L (ref 3.5–5.1)
PROT SERPL-MCNC: 7 G/DL (ref 6.3–8.2)
RBC # BLD AUTO: 3.92 M/MM3 (ref 4.2–5.4)
SODIUM SPEC-SCNC: 140 MMOL/L (ref 136–145)
WBC # BLD AUTO: 10.4 K/MM3 (ref 4.8–10.8)

## 2025-03-04 PROCEDURE — 83993 ASSAY FOR CALPROTECTIN FECAL: CPT

## 2025-03-04 PROCEDURE — 36415 COLL VENOUS BLD VENIPUNCTURE: CPT

## 2025-03-04 PROCEDURE — 80053 COMPREHEN METABOLIC PANEL: CPT

## 2025-03-04 PROCEDURE — 85025 COMPLETE CBC W/AUTO DIFF WBC: CPT

## 2025-03-04 PROCEDURE — 85651 RBC SED RATE NONAUTOMATED: CPT

## 2025-03-04 PROCEDURE — 86140 C-REACTIVE PROTEIN: CPT

## 2025-03-06 LAB — CALPROTECTIN, FECAL: 1200 UG/G (ref 0–120)

## 2025-03-26 ENCOUNTER — INFUSION (OUTPATIENT)
Dept: ONCOLOGY | Facility: HOSPITAL | Age: 85
End: 2025-03-26
Payer: MEDICARE

## 2025-03-26 VITALS
DIASTOLIC BLOOD PRESSURE: 69 MMHG | RESPIRATION RATE: 16 BRPM | SYSTOLIC BLOOD PRESSURE: 147 MMHG | HEART RATE: 111 BPM | TEMPERATURE: 97.5 F

## 2025-03-26 DIAGNOSIS — M81.0 AGE-RELATED OSTEOPOROSIS WITHOUT CURRENT PATHOLOGICAL FRACTURE: Primary | ICD-10-CM

## 2025-03-26 PROCEDURE — 96372 THER/PROPH/DIAG INJ SC/IM: CPT

## 2025-03-26 PROCEDURE — 25010000002 DENOSUMAB 60 MG/ML SOLUTION PREFILLED SYRINGE: Performed by: INTERNAL MEDICINE

## 2025-03-26 RX ADMIN — DENOSUMAB 60 MG: 60 INJECTION SUBCUTANEOUS at 13:58

## 2025-05-13 ENCOUNTER — HOSPITAL ENCOUNTER (OUTPATIENT)
Dept: HOSPITAL 22 - LAB | Age: 85
Discharge: HOME | End: 2025-05-13
Payer: MEDICARE

## 2025-05-13 DIAGNOSIS — R06.00: Primary | ICD-10-CM

## 2025-05-13 DIAGNOSIS — K51.90: ICD-10-CM

## 2025-05-13 DIAGNOSIS — K74.69: ICD-10-CM

## 2025-05-13 DIAGNOSIS — K52.9: ICD-10-CM

## 2025-05-13 DIAGNOSIS — R50.9: ICD-10-CM

## 2025-05-13 DIAGNOSIS — B19.20: ICD-10-CM

## 2025-05-13 LAB
ALBUMIN LEVEL: 3.8 G/DL (ref 3.5–5)
ALBUMIN/GLOB SERPL: 1.3 {RATIO} (ref 1.1–1.8)
ALP ISO SERPL-ACNC: 80 U/L (ref 38–126)
ALT SERPLBLD-CCNC: 11 U/L (ref 12–78)
ANION GAP SERPL CALC-SCNC: 9.6 MEQ/L (ref 5–15)
AST SERPL QL: 20 U/L (ref 14–36)
BACTERIA URNS QL MICRO: (no result) /LPF
BILIRUB UR STRIP-MCNC: (no result) MG/DL
BILIRUBIN,TOTAL: 0.3 MG/DL (ref 0.2–1.3)
BUN SERPL-MCNC: 21 MG/DL (ref 7–17)
CALCIUM SPEC-MCNC: 8.7 MG/DL (ref 8.4–10.2)
CHLORIDE SPEC-SCNC: 108 MMOL/L (ref 98–107)
CO2 SERPL-SCNC: 23 MMOL/L (ref 22–30)
COLOR UR: YELLOW
ESTIMATED GLOMERULAR FILT RATE: 31 ML/MIN (ref 60–?)
GFR (AFRICAN AMERICAN): 37 ML/MIN (ref 60–?)
GLUCOSE: 115 MG/DL (ref 74–100)
HCT VFR BLD AUTO: 36.1 % (ref 37–47)
HGB BLD-MCNC: 11.5 G/DL (ref 12.2–16.2)
HYALINE CASTS URNS QL MICRO: (no result) #/LPF
IMMATURE GRANULOCYTES %: 0.6 %
LEUKOCYTE ESTERASE UR QL STRIP: (no result)
MCH RBC QN AUTO: 29.7 PG (ref 27–31.2)
MCHC RBC-ENTMCNC: 31.9 G/DL (ref 31.8–35.4)
MCV RBC: 93.3 FL (ref 81–99)
MICRO URNS: (no result)
NUCLEATED RED BLOOD CELLS %: 0 %
PH UR: 5.5 [PH] (ref 5–8.5)
PLATELET # BLD: 567 K/MM3 (ref 142–424)
POTASSIUM: 4.6 MMOL/L (ref 3.5–5.1)
PROT SERPL-MCNC: 6.8 G/DL (ref 6.3–8.2)
PROT UR STRIP-MCNC: (no result) MG/DL
RBC # BLD AUTO: 3.87 M/MM3 (ref 4.2–5.4)
SODIUM SPEC-SCNC: 136 MMOL/L (ref 136–145)
SP GR UR: >= 1.03 (ref 1–1.03)
UROBILINOGEN UR QL: 0.2 EU/DL
WBC # BLD AUTO: 10.8 K/MM3 (ref 4.8–10.8)

## 2025-05-13 PROCEDURE — 87086 URINE CULTURE/COLONY COUNT: CPT

## 2025-05-13 PROCEDURE — 85025 COMPLETE CBC W/AUTO DIFF WBC: CPT

## 2025-05-13 PROCEDURE — 81001 URINALYSIS AUTO W/SCOPE: CPT

## 2025-05-13 PROCEDURE — 80053 COMPREHEN METABOLIC PANEL: CPT

## 2025-05-13 PROCEDURE — 71046 X-RAY EXAM CHEST 2 VIEWS: CPT

## 2025-05-13 PROCEDURE — 36415 COLL VENOUS BLD VENIPUNCTURE: CPT

## 2025-05-23 ENCOUNTER — LAB (OUTPATIENT)
Dept: LAB | Facility: HOSPITAL | Age: 85
End: 2025-05-23
Payer: MEDICARE

## 2025-05-23 ENCOUNTER — TRANSCRIBE ORDERS (OUTPATIENT)
Dept: LAB | Facility: HOSPITAL | Age: 85
End: 2025-05-23
Payer: MEDICARE

## 2025-05-23 DIAGNOSIS — R50.9 FEVER OF UNKNOWN ORIGIN: ICD-10-CM

## 2025-05-23 DIAGNOSIS — I10 BENIGN ESSENTIAL HYPERTENSION: ICD-10-CM

## 2025-05-23 DIAGNOSIS — Z86.19 HX OF INFECTION: ICD-10-CM

## 2025-05-23 DIAGNOSIS — R50.9 FEVER OF UNKNOWN ORIGIN: Primary | ICD-10-CM

## 2025-05-23 LAB
CRYPTOC AG CSF QL: NEGATIVE
T4 SERPL-MCNC: 8.28 MCG/DL (ref 4.5–11.7)
TSH SERPL DL<=0.05 MIU/L-ACNC: 1.55 UIU/ML (ref 0.27–4.2)

## 2025-05-23 PROCEDURE — 84436 ASSAY OF TOTAL THYROXINE: CPT

## 2025-05-23 PROCEDURE — 84443 ASSAY THYROID STIM HORMONE: CPT

## 2025-05-23 PROCEDURE — 86480 TB TEST CELL IMMUN MEASURE: CPT

## 2025-05-23 PROCEDURE — 36415 COLL VENOUS BLD VENIPUNCTURE: CPT

## 2025-05-23 PROCEDURE — 87040 BLOOD CULTURE FOR BACTERIA: CPT

## 2025-05-23 PROCEDURE — 87899 AGENT NOS ASSAY W/OPTIC: CPT

## 2025-05-23 PROCEDURE — 87449 NOS EACH ORGANISM AG IA: CPT

## 2025-05-23 PROCEDURE — 87385 HISTOPLASMA CAPSUL AG IA: CPT

## 2025-05-26 LAB — H CAPSUL AG UR QL IA: NEGATIVE

## 2025-05-27 ENCOUNTER — TRANSCRIBE ORDERS (OUTPATIENT)
Dept: LAB | Facility: HOSPITAL | Age: 85
End: 2025-05-27
Payer: MEDICARE

## 2025-05-27 ENCOUNTER — LAB (OUTPATIENT)
Dept: LAB | Facility: HOSPITAL | Age: 85
End: 2025-05-27
Payer: MEDICARE

## 2025-05-27 DIAGNOSIS — I10 ESSENTIAL HYPERTENSION, MALIGNANT: ICD-10-CM

## 2025-05-27 DIAGNOSIS — R50.9 HYPERTHERMIA-INDUCED DEFECT: ICD-10-CM

## 2025-05-27 DIAGNOSIS — Z86.19 HISTORY OF HEPATITIS B: ICD-10-CM

## 2025-05-27 DIAGNOSIS — R50.9 HYPERTHERMIA-INDUCED DEFECT: Primary | ICD-10-CM

## 2025-05-27 LAB
GAMMA INTERFERON BACKGROUND BLD IA-ACNC: 0.11 IU/ML
M TB IFN-G BLD-IMP: NEGATIVE
M TB IFN-G CD4+ BCKGRND COR BLD-ACNC: 0.1 IU/ML
M TB IFN-G CD4+CD8+ BCKGRND COR BLD-ACNC: 0.11 IU/ML
MITOGEN IGNF BCKGRD COR BLD-ACNC: 7.33 IU/ML
QUANTIFERON INCUBATION: NORMAL
SERVICE CMNT-IMP: NORMAL

## 2025-05-27 PROCEDURE — 87177 OVA AND PARASITES SMEARS: CPT

## 2025-05-27 PROCEDURE — 87209 SMEAR COMPLEX STAIN: CPT

## 2025-05-28 LAB
BACTERIA SPEC AEROBE CULT: NORMAL
BACTERIA SPEC AEROBE CULT: NORMAL

## 2025-05-29 LAB
BLASTOMYCES AG FLD IA-MCNC: NORMAL NG/ML
IMP & REVIEW OF LAB RESULTS: NEGATIVE
SPECIMEN SOURCE: NORMAL

## 2025-06-04 LAB
O+P SPEC MICRO: NORMAL
O+P STL TRI STN: NORMAL

## 2025-07-01 ENCOUNTER — LAB (OUTPATIENT)
Dept: LAB | Facility: HOSPITAL | Age: 85
End: 2025-07-01
Payer: MEDICARE

## 2025-07-01 DIAGNOSIS — R50.9 HYPERTHERMIA-INDUCED DEFECT: Primary | ICD-10-CM

## 2025-07-01 LAB — C DIFF TOX GENS STL QL NAA+PROBE: NOT DETECTED

## 2025-07-01 PROCEDURE — 87493 C DIFF AMPLIFIED PROBE: CPT

## 2025-07-01 PROCEDURE — 87324 CLOSTRIDIUM AG IA: CPT

## 2025-07-02 LAB — C DIFF TOX A+B STL QL IA: NEGATIVE

## 2025-07-21 ENCOUNTER — TRANSCRIBE ORDERS (OUTPATIENT)
Dept: NUTRITION | Facility: HOSPITAL | Age: 85
End: 2025-07-21
Payer: MEDICARE

## 2025-07-21 DIAGNOSIS — K52.9 COLITIS: Primary | ICD-10-CM

## 2025-07-31 ENCOUNTER — OFFICE VISIT (OUTPATIENT)
Dept: GASTROENTEROLOGY | Facility: CLINIC | Age: 85
End: 2025-07-31
Payer: MEDICARE

## 2025-07-31 ENCOUNTER — LAB (OUTPATIENT)
Dept: LAB | Facility: HOSPITAL | Age: 85
End: 2025-07-31
Payer: MEDICARE

## 2025-07-31 VITALS
WEIGHT: 106.4 LBS | SYSTOLIC BLOOD PRESSURE: 144 MMHG | TEMPERATURE: 97.1 F | BODY MASS INDEX: 17.1 KG/M2 | HEIGHT: 66 IN | HEART RATE: 69 BPM | DIASTOLIC BLOOD PRESSURE: 69 MMHG

## 2025-07-31 DIAGNOSIS — K51.90 ULCERATIVE COLITIS WITHOUT COMPLICATIONS, UNSPECIFIED LOCATION: Primary | ICD-10-CM

## 2025-07-31 DIAGNOSIS — K51.90 ULCERATIVE COLITIS WITHOUT COMPLICATIONS, UNSPECIFIED LOCATION: ICD-10-CM

## 2025-07-31 LAB
BASOPHILS # BLD AUTO: 0.15 10*3/MM3 (ref 0–0.2)
BASOPHILS NFR BLD AUTO: 1.5 % (ref 0–1.5)
DEPRECATED RDW RBC AUTO: 40.6 FL (ref 37–54)
EOSINOPHIL # BLD AUTO: 1.15 10*3/MM3 (ref 0–0.4)
EOSINOPHIL NFR BLD AUTO: 11.1 % (ref 0.3–6.2)
ERYTHROCYTE [DISTWIDTH] IN BLOOD BY AUTOMATED COUNT: 12.1 % (ref 12.3–15.4)
HCT VFR BLD AUTO: 31.2 % (ref 34–46.6)
HGB BLD-MCNC: 9.9 G/DL (ref 12–15.9)
IMM GRANULOCYTES # BLD AUTO: 0.11 10*3/MM3 (ref 0–0.05)
IMM GRANULOCYTES NFR BLD AUTO: 1.1 % (ref 0–0.5)
LYMPHOCYTES # BLD AUTO: 2.29 10*3/MM3 (ref 0.7–3.1)
LYMPHOCYTES NFR BLD AUTO: 22.1 % (ref 19.6–45.3)
MCH RBC QN AUTO: 29.1 PG (ref 26.6–33)
MCHC RBC AUTO-ENTMCNC: 31.7 G/DL (ref 31.5–35.7)
MCV RBC AUTO: 91.8 FL (ref 79–97)
MONOCYTES # BLD AUTO: 1.1 10*3/MM3 (ref 0.1–0.9)
MONOCYTES NFR BLD AUTO: 10.6 % (ref 5–12)
NEUTROPHILS NFR BLD AUTO: 5.54 10*3/MM3 (ref 1.7–7)
NEUTROPHILS NFR BLD AUTO: 53.6 % (ref 42.7–76)
NRBC BLD AUTO-RTO: 0 /100 WBC (ref 0–0.2)
PLATELET # BLD AUTO: 634 10*3/MM3 (ref 140–450)
PMV BLD AUTO: 9.1 FL (ref 6–12)
RBC # BLD AUTO: 3.4 10*6/MM3 (ref 3.77–5.28)
WBC NRBC COR # BLD AUTO: 10.34 10*3/MM3 (ref 3.4–10.8)

## 2025-07-31 PROCEDURE — 86480 TB TEST CELL IMMUN MEASURE: CPT

## 2025-07-31 PROCEDURE — 36415 COLL VENOUS BLD VENIPUNCTURE: CPT

## 2025-07-31 PROCEDURE — 80053 COMPREHEN METABOLIC PANEL: CPT

## 2025-07-31 PROCEDURE — 85025 COMPLETE CBC W/AUTO DIFF WBC: CPT

## 2025-07-31 RX ORDER — CYANOCOBALAMIN 1000 UG/ML
INJECTION, SOLUTION INTRAMUSCULAR; SUBCUTANEOUS
COMMUNITY
Start: 2025-07-18

## 2025-07-31 RX ORDER — PNV NO.95/FERROUS FUM/FOLIC AC 28MG-0.8MG
TABLET ORAL
COMMUNITY

## 2025-07-31 RX ORDER — PANTOPRAZOLE SODIUM 40 MG/1
40 TABLET, DELAYED RELEASE ORAL DAILY
COMMUNITY

## 2025-07-31 RX ORDER — POTASSIUM CHLORIDE 1500 MG/1
20 TABLET, EXTENDED RELEASE ORAL DAILY
COMMUNITY

## 2025-07-31 RX ORDER — TOBRAMYCIN AND DEXAMETHASONE 3; 1 MG/ML; MG/ML
SUSPENSION/ DROPS OPHTHALMIC
COMMUNITY

## 2025-07-31 RX ORDER — UPADACITINIB 45 MG/1
45 TABLET, EXTENDED RELEASE ORAL DAILY
Qty: 30 TABLET | Refills: 1 | Status: SHIPPED | OUTPATIENT
Start: 2025-07-31

## 2025-07-31 RX ORDER — FAMOTIDINE 40 MG/1
40 TABLET, FILM COATED ORAL DAILY
COMMUNITY
Start: 2025-03-11

## 2025-07-31 RX ORDER — UPADACITINIB 30 MG/1
30 TABLET, EXTENDED RELEASE ORAL DAILY
Qty: 30 TABLET | Refills: 11 | Status: SHIPPED | OUTPATIENT
Start: 2025-07-31

## 2025-07-31 RX ORDER — DIPHENOXYLATE HYDROCHLORIDE AND ATROPINE SULFATE 2.5; .025 MG/1; MG/1
1 TABLET ORAL 3 TIMES DAILY
COMMUNITY
Start: 2025-06-10

## 2025-07-31 RX ORDER — COLESTIPOL HYDROCHLORIDE 1 G/1
1 TABLET ORAL DAILY PRN
COMMUNITY
End: 2025-07-31

## 2025-07-31 RX ORDER — LORATADINE 10 MG/1
TABLET ORAL
COMMUNITY

## 2025-07-31 NOTE — PROGRESS NOTES
"    Office Note     Name: Neema Richard    : 1940     MRN: [id number]     Chief Complaint  Diarrhea and GI Bleeding    Subjective     History of Present Illness:  History of Present Illness  The patient is an 85-year-old female with a history of ulcerative colitis, presenting for evaluation of ulcerative colitis.    She has a history of complicated diverticulitis with microperforation resulting in a diverting transverse colostomy and partial colectomy. In 2020, she experienced a perforated colon due to severe diverticulitis. Despite a scan indicating mild diverticulitis, her condition worsened within 3 weeks leading to the perforation. She was diagnosed with ulcerative colitis by Dr. Manzo following surgery. An MRI of her pelvis revealed \"prolapse of everything.\"     She has undergone colonoscopies every 5 years, with the most recent one performed by Dr. Manzo 2 years ago. Her colonoscopy in 2023 showed ulcerations and moderate chronic colitis. She had a strongly positive p-ANCA and biopsy demonstrating acute and chronic colitis with fecal calprotectin at that time of 2860. She was started on Stelara and mesalamine, which did not address her symptoms. She is status post cholecystectomy and has used Colestid in the past as well as probiotics, but is not currently, and did not notice significant improvement with it. Stool testing from 2025 showed fecal calprotectin of 1200, and she was initiated on Velsipity, which she took for a few weeks before discontinuation due to fever and chills.     She has been seen by [facility] in the past for perineal hernia with recommendations for compression garment underwear. She does have a known history of dense pelvic adhesions. She is currently not on any medication for ulcerative colitis due to a reaction to Velsipity, which caused fever and chills. She reports no history of cancer. She has been prescribed Lomotil for diarrhea, but it has not been " effective.    She experienced diarrhea after gallbladder removal. Her colostomy output is primarily diarrhea, requiring her to change the bag frequently. She reports no recent bleeding or abdominal pain but occasionally experiences mild pain when food passes through her system. She occasionally notices blood around the stoma during showers. She has lost weight and feels weak. She has lost weight, dropping from 135 pounds to 106 pounds. She has noticed undigested pills in her colostomy bag before.    She takes Carafate for acid reflux and pantoprazole before breakfast. She has never had an ulcer that she's aware of.    She had granulocytosis from Synthroid and had to be isolated. She had a stent placed in her leg due to a blockage.    PAST SURGICAL HISTORY:  Partial colectomy  Cholecystectomy  Stent placement in leg due to blockage    SOCIAL HISTORY  Exercise: Walks with a friend who encourages walking.  Diet: Consumes steak and vegetables. Reports difficulty absorbing nutrients from vegetables.    Past Medical History:   Past Medical History:   Diagnosis Date    Acquired hypothyroidism 1980    Arthritis     Atypical ductal hyperplasia of breast 2009    Breast injury 2019    previous fall caused bruising to right breast    Clostridium difficile colitis 12/2022    Colon polyp - sees Dr. 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 ER and SC (+)    Diverticular disease 03/2020    Essential hypertension 2012    High cholesterol 2019    History of diverticular abscess 04/16/2020    S/p LAR, abscess drainage, appy, left maldonado, distal transverse end colostomy 3/17/20 by Dr. Vazquez    Hx of herpes genitalis 2013    IBS-d (takes Buspar)     Pneumonia 04/2020    PRBC's 11/2022    Sepsis 04/2020    Ulcerative colitis 2023       Past Surgical History:   Past Surgical History:   Procedure Laterality Date    ABDOMINAL PERINEAL RESECTION N/A 11/10/2022    Procedure: ABDOMINAL  PERINEAL RESECTION, REPAIR OF PARASTOMAL HERNIA WITH MESH;  Surgeon: Sravan Vazquez MD;  Location:  [facility];  Service: General;  Laterality: N/A;    APPENDECTOMY      BILATERAL BREAST REDUCTION Bilateral 1995    BREAST BIOPSY Left 2009    BREAST EXCISIONAL BIOPSY Left 2009    ADH    CATARACT EXTRACTION, BILATERAL Bilateral 2018    CERVICAL CONIZATION  1983    COLON RESECTION N/A 03/17/2020    Procedure: LOW ANTERIOR COLON RESECTION, MOBILIZATION OF SPLENIC FLEXURE, DRAINAGE OF ABSCESS AND COLOSTOMY CREATION;  Surgeon: Sravan Vazquez MD;  Location:  [facility];  Service: General;  Laterality: N/A;    FACIAL COSMETIC SURGERY  2001    FACIAL COSMETIC SURGERY  2012    HEMORRHOIDECTOMY  1974    LAPAROSCOPIC CHOLECYSTECTOMY  2010    REDUCTION MAMMAPLASTY Bilateral 04/13/2017    DCIS (3mm found incidentally right breast)    REDUCTION MAMMAPLASTY Bilateral 1995    SINUS SURGERY  1992    poylp    THYROIDECTOMY, PARTIAL  1963    TONSILLECTOMY  1953    VARICOSE VEIN SURGERY Bilateral 1981    VARICOSE VEIN SURGERY  2021       Immunizations:   Immunization History   Administered Date(s) Administered    COVID-19 (PFIZER) Purple Cap Monovalent 01/20/2021, 02/17/2021, 10/18/2021    Fluzone High-Dose 65+YRS 10/24/2018, 10/23/2019    Fluzone Quad >6mos (Multi-dose) 10/01/2020, 11/15/2021    Hepatitis A 09/14/2018, 03/14/2019    Pneumococcal Conjugate 13-Valent (PCV13) 01/18/2016    Pneumococcal Polysaccharide (PPSV23) 01/08/2014    RSV, unspecified (Vaccine or MAB) 10/01/2023    Shingrix 04/30/2019, 07/07/2019    Tdap 03/13/2019, 08/22/2023    Zoster, Unspecified 01/02/2006, 04/08/2019, 07/08/2019        Medications:     Current Outpatient Medications:     acetaminophen (TYLENOL) 650 MG 8 hr tablet, Take 2 tablets by mouth Every 8 (Eight) Hours As Needed for Mild Pain., Disp: , Rfl:     busPIRone (BUSPAR) 15 MG tablet, Take 1 tablet by mouth Every Night., Disp: , Rfl: 3    Cholecalciferol (VITAMIN D3) 2000 UNITS capsule, Take 1  capsule by mouth Daily., Disp: , Rfl: 3    colestipol (COLESTID) 1 g tablet, Take 1 tablet by mouth Daily As Needed., Disp: , Rfl:     cyanocobalamin 1000 MCG/ML injection, ADMINISTER 1 ML UNDER THE SKIN EVERY MONTH, Disp: , Rfl:     denosumab (Prolia) 60 MG/ML solution prefilled syringe syringe, every 6 months, Disp: , Rfl:     diphenoxylate-atropine (LOMOTIL) 2.5-0.025 MG per tablet, Take 1 tablet by mouth 3 times a day., Disp: , Rfl:     fluticasone (FLONASE) 50 MCG/ACT nasal spray, Administer 1 spray into the nostril(s) as directed by provider Daily. Spring only, Disp: , Rfl:     gabapentin (NEURONTIN) 600 MG tablet, Take 1 tablet by mouth every night at bedtime., Disp: , Rfl:     levothyroxine (SYNTHROID, LEVOTHROID) 88 MCG tablet, Take 1 tablet by mouth Daily., Disp: , Rfl:     loratadine (Claritin) 10 MG tablet, Take 1 tablet every day by oral route as needed., Disp: , Rfl:     LORazepam (ATIVAN) 0.5 MG tablet, Take 1 tablet by mouth Every 8 (Eight) Hours As Needed for Anxiety., Disp: , Rfl:     Magnesium 250 MG tablet, Take 1 tablet by mouth Daily., Disp: , Rfl:     Magnesium Oxide -Mg Supplement 250 MG tablet, MAGNESIUM OXIDE -MG SUPPLEMENT 250 MG TABS, Disp: , Rfl:     multivitamin with minerals tablet tablet, 2 gels daily, Disp: , Rfl:     multivitamin with minerals tablet tablet, Take 1 tablet by mouth Daily. Flinstone with iron, Disp: , Rfl:     pantoprazole (PROTONIX) 40 MG EC tablet, Take 1 tablet by mouth Daily., Disp: , Rfl:     potassium chloride ER (K-TAB) 20 MEQ tablet controlled-release ER tablet, Take 1 tablet by mouth Daily., Disp: , Rfl:     pravastatin (PRAVACHOL) 80 MG tablet, Take 1 tablet by mouth Daily., Disp: , Rfl:     sucralfate (CARAFATE) 1 G tablet, Take 1 tablet by mouth 2 (Two) Times a Day., Disp: , Rfl: 3    tobramycin-dexAMETHasone (TOBRADEX) 0.3-0.1 % ophthalmic suspension, SHAKE LIQUID AND INSTILL 1 DROP IN RIGHT EYE FOUR TIMES DAILY FOR 1 WEEK, Disp: , Rfl:     valACYclovir  (VALTREX) 1000 MG tablet, Take 1 tablet by mouth As Needed (lesion)., Disp: 5 tablet, Rfl: 2    famotidine (PEPCID) 40 MG tablet, Take 1 tablet by mouth Daily. (Patient not taking: Reported on 7/31/2025), Disp: , Rfl:     Mirabegron ER (Myrbetriq) 50 MG tablet sustained-release 24 hour 24 hr tablet, TAKE 1 TABLET BY MOUTH  DAILY (Patient not taking: Reported on 7/31/2025), Disp: 90 tablet, Rfl: 3    pioglitazone (ACTOS) 15 MG tablet, Take 1 tablet by mouth Daily. (Patient not taking: Reported on 7/31/2025), Disp: , Rfl:     Probiotic Product (ALIGN) 4 MG capsule, Take 1 capsule by mouth Daily. (Patient not taking: Reported on 7/31/2025), Disp: , Rfl:     traMADol (ULTRAM) 50 MG tablet, Take 1 tablet by mouth Every 8 (Eight) Hours As Needed. (Patient not taking: Reported on 7/31/2025), Disp: , Rfl:     Allergies:   Allergies   Allergen Reactions    Penicillins Hives    Cephalexin Diarrhea    Ciprofibrate Headache    Evista [Raloxifene] Rash    Hydrochlorothiazide Diarrhea    Meloxicam Other (See Comments)    Metronidazole Diarrhea and Other (See Comments)    Nickel Hives    Oxybutynin Cough and Other (See Comments)    Oxybutynin Chloride Headache    Propylthiouracil Other (See Comments) and Unknown - Low Severity     Severally reduce WBC's    Sulfa Antibiotics Hives, Unknown - Low Severity, Nausea Only and Rash    Sulfacetamide Sodium-Sulfur Hives    Trazodone Itching    Sulfur Dioxide Hives    Other Anxiety     Steroid injection for pinched nerve-  Got panic attack after     Prednisone Other (See Comments)       Family History:   Family History   Problem Relation Age of Onset    Breast cancer Neg Hx     Ovarian cancer Neg Hx     Colon cancer Neg Hx        Social History:   Social History     Socioeconomic History    Marital status:    Tobacco Use    Smoking status: Never    Smokeless tobacco: Never   Vaping Use    Vaping status: Never Used   Substance and Sexual Activity    Alcohol use: No    Drug use: No  "        Objective     Vital Signs  /69 (BP Location: Right arm, Patient Position: Sitting, Cuff Size: Adult)   Pulse 69   Temp 97.1 °F (36.2 °C) (Temporal)   Ht 167.6 cm (66\")   Wt 48.3 kg (106 lb 6.4 oz)   BMI 17.17 kg/m²   Estimated body mass index is 17.17 kg/m² as calculated from the following:    Height as of this encounter: 167.6 cm (66\").    Weight as of this encounter: 48.3 kg (106 lb 6.4 oz).          Physical Exam  Vitals reviewed.   Constitutional:       General: She is awake.   Cardiovascular:      Rate and Rhythm: Normal rate.   Pulmonary:      Effort: Pulmonary effort is normal.   Abdominal:      Palpations: Abdomen is soft.      Tenderness: There is no abdominal tenderness.   Skin:     General: Skin is warm and dry.   Neurological:      Mental Status: She is alert.        Physical Exam  General: Patient appears thin and has likely lost weight due to malabsorption issues.  Abdomen: Abdomen appears healthy with no signs of bleeding or significant pain. Patient reports occasional mild pain when things are moving through.    Results  Labs   - Fecal calprotectin: 05/2023, 2860   - Fecal calprotectin: 03/2025, 1200    Imaging   - Colonoscopy: 05/2023, Ulcerations and moderate chronic colitis      Assessment and Plan     Assessment & Plan  Ulcerative colitis without complications, unspecified location    Orders:    Calprotectin, Fecal - Stool, Per Rectum; Future    Fecal Fat, Quantitative - Stool, Per Rectum; Future    Comprehensive Metabolic Panel    CBC & Differential    QuantiFERON TB Gold (Kit Test); Future    upadacitinib ER (Rinvoq) 45 MG tablet sustained-release 24 hour extended release tablet; Take 1 tablet by mouth Daily.    Upadacitinib ER (Rinvoq) 30 MG tablet sustained-release 24 hour; Take 1 tablet by mouth Daily.    Hepatitis B E Antigen; Future    Hepatitis B E Antibody; Future       Assessment & Plan  1. Ulcerative colitis:  - Inflammatory marker remains elevated, consistent with " active disease, aligning with ongoing diarrhea/ excess ostomy output.  - Discussed potential benefits and risks of Rinvoq, including rapid action and safety profile compared to Xeljanz.  - Consider Gattex if malabsorption issues persist despite treatment.  - Prescription for Rinvoq sent to specialty pharmacy, starting with a higher dose once daily for 2 months, followed by a reduced dose.  - She has noted pills in her ostomy before, so I would recommend dosing Rinvoq with loperamide to slow bowels and allow for improved absorption.   - Stool sample to be collected prior to starting medication to establish baseline fecal calprotectin.  - Monitor liver enzymes and blood counts before initiating medication to ensure a good baseline. Rule out active TB.  - Contact office immediately if any side effects or concerns arise while on Rinvoq.    2. Gastroesophageal reflux disease:  - Currently taking pantoprazole in the morning before breakfast for acid reflux. Symptoms controlled.   - Continued use of pantoprazole recommended.    Follow-up: Scheduled in 3 to 4 months.      Follow Up  4 months    Patient or patient representative verbalized consent for the use of Ambient Listening during the visit with  LAUREEN Morgan for chart documentation. 7/31/2025  12:01 EDT    LAUREEN Morgan  [id number]  [facility]  [address] [place] [id number]

## 2025-07-31 NOTE — ASSESSMENT & PLAN NOTE
Orders:    Calprotectin, Fecal - Stool, Per Rectum; Future    Fecal Fat, Quantitative - Stool, Per Rectum; Future    Comprehensive Metabolic Panel    CBC & Differential    QuantiFERON TB Gold (Kit Test); Future    upadacitinib ER (Rinvoq) 45 MG tablet sustained-release 24 hour extended release tablet; Take 1 tablet by mouth Daily.    Upadacitinib ER (Rinvoq) 30 MG tablet sustained-release 24 hour; Take 1 tablet by mouth Daily.    Hepatitis B E Antigen; Future    Hepatitis B E Antibody; Future

## 2025-08-01 LAB
ALBUMIN SERPL-MCNC: 3 G/DL (ref 3.5–5.2)
ALBUMIN/GLOB SERPL: 0.9 G/DL
ALP SERPL-CCNC: 94 U/L (ref 39–117)
ALT SERPL W P-5'-P-CCNC: <5 U/L (ref 1–33)
ANION GAP SERPL CALCULATED.3IONS-SCNC: 12.2 MMOL/L (ref 5–15)
AST SERPL-CCNC: 9 U/L (ref 1–32)
BILIRUB SERPL-MCNC: <0.2 MG/DL (ref 0–1.2)
BUN SERPL-MCNC: 19 MG/DL (ref 8–23)
BUN/CREAT SERPL: 13.4 (ref 7–25)
CALCIUM SPEC-SCNC: 9.2 MG/DL (ref 8.6–10.5)
CHLORIDE SERPL-SCNC: 106 MMOL/L (ref 98–107)
CO2 SERPL-SCNC: 21.8 MMOL/L (ref 22–29)
CREAT SERPL-MCNC: 1.42 MG/DL (ref 0.57–1)
EGFRCR SERPLBLD CKD-EPI 2021: 36.5 ML/MIN/1.73
GLOBULIN UR ELPH-MCNC: 3.4 GM/DL
GLUCOSE SERPL-MCNC: 73 MG/DL (ref 65–99)
POTASSIUM SERPL-SCNC: 4.3 MMOL/L (ref 3.5–5.2)
PROT SERPL-MCNC: 6.4 G/DL (ref 6–8.5)
SODIUM SERPL-SCNC: 140 MMOL/L (ref 136–145)

## 2025-08-04 ENCOUNTER — HOSPITAL ENCOUNTER (OUTPATIENT)
Dept: NUTRITION | Facility: HOSPITAL | Age: 85
Setting detail: RECURRING SERIES
Discharge: HOME | End: 2025-08-04

## 2025-08-04 ENCOUNTER — LAB (OUTPATIENT)
Dept: LAB | Facility: HOSPITAL | Age: 85
End: 2025-08-04
Payer: MEDICARE

## 2025-08-04 DIAGNOSIS — K51.90 ULCERATIVE COLITIS WITHOUT COMPLICATIONS, UNSPECIFIED LOCATION: ICD-10-CM

## 2025-08-04 PROCEDURE — 86707 HEPATITIS BE ANTIBODY: CPT

## 2025-08-04 PROCEDURE — 87350 HEPATITIS BE AG IA: CPT

## 2025-08-04 PROCEDURE — 97802 MEDICAL NUTRITION INDIV IN: CPT

## 2025-08-04 PROCEDURE — 83993 ASSAY FOR CALPROTECTIN FECAL: CPT

## 2025-08-04 PROCEDURE — 82710 FATS/LIPIDS FECES QUANT: CPT

## 2025-08-05 LAB
GAMMA INTERFERON BACKGROUND BLD IA-ACNC: 0.03 IU/ML
HBV E AB SERPL QL IA: NON REACTIVE
HBV E AG SERPL QL IA: NEGATIVE
M TB IFN-G BLD-IMP: NEGATIVE
M TB IFN-G CD4+ BCKGRND COR BLD-ACNC: 0.07 IU/ML
M TB IFN-G CD4+CD8+ BCKGRND COR BLD-ACNC: 0.25 IU/ML
MITOGEN IGNF BCKGRD COR BLD-ACNC: 4.06 IU/ML
SERVICE CMNT-IMP: NORMAL

## 2025-08-06 LAB
CALPROTECTIN STL-MCNT: 3380 UG/G (ref 0–120)
FAT 24H STL-MRATE: NORMAL G/(24.H)
SPECIMEN WT STL QN: NORMAL G

## 2025-08-22 ENCOUNTER — APPOINTMENT (OUTPATIENT)
Dept: GENERAL RADIOLOGY | Facility: HOSPITAL | Age: 85
End: 2025-08-22
Payer: MEDICARE

## 2025-08-22 ENCOUNTER — HOSPITAL ENCOUNTER (INPATIENT)
Facility: HOSPITAL | Age: 85
LOS: 3 days | Discharge: HOME | End: 2025-08-25
Attending: EMERGENCY MEDICINE | Admitting: STUDENT IN AN ORGANIZED HEALTH CARE EDUCATION/TRAINING PROGRAM
Payer: MEDICARE

## 2025-08-22 ENCOUNTER — APPOINTMENT (OUTPATIENT)
Dept: CT IMAGING | Facility: HOSPITAL | Age: 85
End: 2025-08-22
Payer: MEDICARE

## 2025-08-22 DIAGNOSIS — R53.1 GENERALIZED WEAKNESS: ICD-10-CM

## 2025-08-22 DIAGNOSIS — K51.919 ULCERATIVE COLITIS WITH COMPLICATION, UNSPECIFIED LOCATION: ICD-10-CM

## 2025-08-22 DIAGNOSIS — K52.9 ACUTE COLITIS: Primary | ICD-10-CM

## 2025-08-22 DIAGNOSIS — E86.0 DEHYDRATION: ICD-10-CM

## 2025-08-22 PROBLEM — R65.20 SEVERE SEPSIS: Status: ACTIVE | Noted: 2020-03-15

## 2025-08-22 PROBLEM — N39.0 ACUTE UTI (URINARY TRACT INFECTION): Status: ACTIVE | Noted: 2025-08-22

## 2025-08-22 PROBLEM — D63.8 ANEMIA, CHRONIC DISEASE: Chronic | Status: ACTIVE | Noted: 2025-08-22

## 2025-08-22 LAB
ALBUMIN SERPL-MCNC: 3.1 G/DL (ref 3.5–5.2)
ALBUMIN/GLOB SERPL: 1 G/DL
ALP SERPL-CCNC: 95 U/L (ref 39–117)
ALT SERPL W P-5'-P-CCNC: 5 U/L (ref 1–33)
ANION GAP SERPL CALCULATED.3IONS-SCNC: 13.5 MMOL/L (ref 5–15)
AST SERPL-CCNC: 10 U/L (ref 1–32)
BACTERIA UR QL AUTO: ABNORMAL /HPF
BASOPHILS # BLD AUTO: 0.07 10*3/MM3 (ref 0–0.2)
BASOPHILS NFR BLD AUTO: 0.7 % (ref 0–1.5)
BILIRUB SERPL-MCNC: 0.3 MG/DL (ref 0–1.2)
BILIRUB UR QL STRIP: NEGATIVE
BUN SERPL-MCNC: 25.3 MG/DL (ref 8–23)
BUN/CREAT SERPL: 21.8 (ref 7–25)
CALCIUM SPEC-SCNC: 9.7 MG/DL (ref 8.6–10.5)
CHLORIDE SERPL-SCNC: 103 MMOL/L (ref 98–107)
CLARITY UR: CLEAR
CO2 SERPL-SCNC: 19.5 MMOL/L (ref 22–29)
COLOR UR: ABNORMAL
CREAT SERPL-MCNC: 1.16 MG/DL (ref 0.57–1)
D-LACTATE SERPL-SCNC: 0.9 MMOL/L (ref 0.5–2)
D-LACTATE SERPL-SCNC: 2.3 MMOL/L (ref 0.5–2)
DEPRECATED RDW RBC AUTO: 41.7 FL (ref 37–54)
EGFRCR SERPLBLD CKD-EPI 2021: 46.6 ML/MIN/1.73
EOSINOPHIL # BLD AUTO: 0.1 10*3/MM3 (ref 0–0.4)
EOSINOPHIL NFR BLD AUTO: 0.9 % (ref 0.3–6.2)
ERYTHROCYTE [DISTWIDTH] IN BLOOD BY AUTOMATED COUNT: 12.9 % (ref 12.3–15.4)
GEN 5 1HR TROPONIN T REFLEX: 27 NG/L
GLOBULIN UR ELPH-MCNC: 3.2 GM/DL
GLUCOSE SERPL-MCNC: 105 MG/DL (ref 65–99)
GLUCOSE UR STRIP-MCNC: NEGATIVE MG/DL
HCT VFR BLD AUTO: 29.4 % (ref 34–46.6)
HGB BLD-MCNC: 9.5 G/DL (ref 12–15.9)
HGB UR QL STRIP.AUTO: NEGATIVE
HOLD SPECIMEN: NORMAL
HYALINE CASTS UR QL AUTO: ABNORMAL /LPF
IMM GRANULOCYTES # BLD AUTO: 0.07 10*3/MM3 (ref 0–0.05)
IMM GRANULOCYTES NFR BLD AUTO: 0.7 % (ref 0–0.5)
KETONES UR QL STRIP: ABNORMAL
LEUKOCYTE ESTERASE UR QL STRIP.AUTO: ABNORMAL
LIPASE SERPL-CCNC: 26 U/L (ref 13–60)
LYMPHOCYTES # BLD AUTO: 1.93 10*3/MM3 (ref 0.7–3.1)
LYMPHOCYTES NFR BLD AUTO: 18.2 % (ref 19.6–45.3)
MAGNESIUM SERPL-MCNC: 2 MG/DL (ref 1.6–2.4)
MCH RBC QN AUTO: 28.7 PG (ref 26.6–33)
MCHC RBC AUTO-ENTMCNC: 32.3 G/DL (ref 31.5–35.7)
MCV RBC AUTO: 88.8 FL (ref 79–97)
MONOCYTES # BLD AUTO: 1.59 10*3/MM3 (ref 0.1–0.9)
MONOCYTES NFR BLD AUTO: 15 % (ref 5–12)
NEUTROPHILS NFR BLD AUTO: 6.83 10*3/MM3 (ref 1.7–7)
NEUTROPHILS NFR BLD AUTO: 64.5 % (ref 42.7–76)
NITRITE UR QL STRIP: NEGATIVE
NRBC BLD AUTO-RTO: 0 /100 WBC (ref 0–0.2)
NT-PROBNP SERPL-MCNC: 941 PG/ML (ref 0–1800)
PH UR STRIP.AUTO: 6 [PH] (ref 5–8)
PLATELET # BLD AUTO: 713 10*3/MM3 (ref 140–450)
PMV BLD AUTO: 8.8 FL (ref 6–12)
POTASSIUM SERPL-SCNC: 4.7 MMOL/L (ref 3.5–5.2)
PROCALCITONIN SERPL-MCNC: 0.36 NG/ML (ref 0–0.25)
PROT SERPL-MCNC: 6.3 G/DL (ref 6–8.5)
PROT UR QL STRIP: ABNORMAL
RBC # BLD AUTO: 3.31 10*6/MM3 (ref 3.77–5.28)
RBC # UR STRIP: ABNORMAL /HPF
REF LAB TEST METHOD: ABNORMAL
RETICS # AUTO: 0.05 10*6/MM3 (ref 0.02–0.13)
RETICS/RBC NFR AUTO: 1.53 % (ref 0.7–1.9)
SODIUM SERPL-SCNC: 136 MMOL/L (ref 136–145)
SP GR UR STRIP: 1.02 (ref 1–1.03)
SQUAMOUS #/AREA URNS HPF: ABNORMAL /HPF
TROPONIN T NUMERIC DELTA: 16 NG/L
TROPONIN T SERPL HS-MCNC: 11 NG/L
UROBILINOGEN UR QL STRIP: ABNORMAL
WBC # UR STRIP: ABNORMAL /HPF
WBC NRBC COR # BLD AUTO: 10.59 10*3/MM3 (ref 3.4–10.8)
WHOLE BLOOD HOLD COAG: NORMAL
WHOLE BLOOD HOLD SPECIMEN: NORMAL

## 2025-08-22 PROCEDURE — 25010000002 PIPERACILLIN SOD-TAZOBACTAM PER 1 G: Performed by: INTERNAL MEDICINE

## 2025-08-22 PROCEDURE — 99223 1ST HOSP IP/OBS HIGH 75: CPT | Performed by: INTERNAL MEDICINE

## 2025-08-22 PROCEDURE — 82728 ASSAY OF FERRITIN: CPT | Performed by: INTERNAL MEDICINE

## 2025-08-22 PROCEDURE — 87040 BLOOD CULTURE FOR BACTERIA: CPT | Performed by: EMERGENCY MEDICINE

## 2025-08-22 PROCEDURE — 87493 C DIFF AMPLIFIED PROBE: CPT | Performed by: EMERGENCY MEDICINE

## 2025-08-22 PROCEDURE — 93005 ELECTROCARDIOGRAM TRACING: CPT | Performed by: INTERNAL MEDICINE

## 2025-08-22 PROCEDURE — 82746 ASSAY OF FOLIC ACID SERUM: CPT | Performed by: INTERNAL MEDICINE

## 2025-08-22 PROCEDURE — 81001 URINALYSIS AUTO W/SCOPE: CPT | Performed by: EMERGENCY MEDICINE

## 2025-08-22 PROCEDURE — 83540 ASSAY OF IRON: CPT | Performed by: INTERNAL MEDICINE

## 2025-08-22 PROCEDURE — 84484 ASSAY OF TROPONIN QUANT: CPT | Performed by: EMERGENCY MEDICINE

## 2025-08-22 PROCEDURE — 82607 VITAMIN B-12: CPT | Performed by: INTERNAL MEDICINE

## 2025-08-22 PROCEDURE — 83735 ASSAY OF MAGNESIUM: CPT | Performed by: EMERGENCY MEDICINE

## 2025-08-22 PROCEDURE — 25810000003 LACTATED RINGERS SOLUTION: Performed by: EMERGENCY MEDICINE

## 2025-08-22 PROCEDURE — 84145 PROCALCITONIN (PCT): CPT | Performed by: EMERGENCY MEDICINE

## 2025-08-22 PROCEDURE — 84466 ASSAY OF TRANSFERRIN: CPT | Performed by: INTERNAL MEDICINE

## 2025-08-22 PROCEDURE — 80053 COMPREHEN METABOLIC PANEL: CPT | Performed by: EMERGENCY MEDICINE

## 2025-08-22 PROCEDURE — 99285 EMERGENCY DEPT VISIT HI MDM: CPT

## 2025-08-22 PROCEDURE — 85025 COMPLETE CBC W/AUTO DIFF WBC: CPT | Performed by: EMERGENCY MEDICINE

## 2025-08-22 PROCEDURE — 93005 ELECTROCARDIOGRAM TRACING: CPT | Performed by: EMERGENCY MEDICINE

## 2025-08-22 PROCEDURE — 87507 IADNA-DNA/RNA PROBE TQ 12-25: CPT | Performed by: EMERGENCY MEDICINE

## 2025-08-22 PROCEDURE — 74177 CT ABD & PELVIS W/CONTRAST: CPT

## 2025-08-22 PROCEDURE — 25510000001 IOPAMIDOL 61 % SOLUTION: Performed by: EMERGENCY MEDICINE

## 2025-08-22 PROCEDURE — 71045 X-RAY EXAM CHEST 1 VIEW: CPT

## 2025-08-22 PROCEDURE — 83690 ASSAY OF LIPASE: CPT | Performed by: EMERGENCY MEDICINE

## 2025-08-22 PROCEDURE — 83880 ASSAY OF NATRIURETIC PEPTIDE: CPT | Performed by: EMERGENCY MEDICINE

## 2025-08-22 PROCEDURE — 83605 ASSAY OF LACTIC ACID: CPT | Performed by: EMERGENCY MEDICINE

## 2025-08-22 PROCEDURE — 85045 AUTOMATED RETICULOCYTE COUNT: CPT | Performed by: INTERNAL MEDICINE

## 2025-08-22 PROCEDURE — 36415 COLL VENOUS BLD VENIPUNCTURE: CPT

## 2025-08-22 RX ORDER — ACETAMINOPHEN 650 MG/1
650 SUPPOSITORY RECTAL EVERY 4 HOURS PRN
Status: DISCONTINUED | OUTPATIENT
Start: 2025-08-22 | End: 2025-08-25 | Stop reason: HOSPADM

## 2025-08-22 RX ORDER — SODIUM CHLORIDE 0.9 % (FLUSH) 0.9 %
10 SYRINGE (ML) INJECTION EVERY 12 HOURS SCHEDULED
Status: DISCONTINUED | OUTPATIENT
Start: 2025-08-22 | End: 2025-08-25 | Stop reason: HOSPADM

## 2025-08-22 RX ORDER — IOPAMIDOL 612 MG/ML
70 INJECTION, SOLUTION INTRAVASCULAR
Status: COMPLETED | OUTPATIENT
Start: 2025-08-22 | End: 2025-08-22

## 2025-08-22 RX ORDER — ACETAMINOPHEN 325 MG/1
650 TABLET ORAL EVERY 4 HOURS PRN
Status: DISCONTINUED | OUTPATIENT
Start: 2025-08-22 | End: 2025-08-25 | Stop reason: HOSPADM

## 2025-08-22 RX ORDER — SODIUM CHLORIDE 0.9 % (FLUSH) 0.9 %
10 SYRINGE (ML) INJECTION AS NEEDED
Status: DISCONTINUED | OUTPATIENT
Start: 2025-08-22 | End: 2025-08-25 | Stop reason: HOSPADM

## 2025-08-22 RX ORDER — PANTOPRAZOLE SODIUM 40 MG/10ML
40 INJECTION, POWDER, LYOPHILIZED, FOR SOLUTION INTRAVENOUS
Status: DISCONTINUED | OUTPATIENT
Start: 2025-08-23 | End: 2025-08-24

## 2025-08-22 RX ORDER — ALUMINA, MAGNESIA, AND SIMETHICONE 2400; 2400; 240 MG/30ML; MG/30ML; MG/30ML
15 SUSPENSION ORAL EVERY 6 HOURS PRN
Status: DISCONTINUED | OUTPATIENT
Start: 2025-08-22 | End: 2025-08-25 | Stop reason: HOSPADM

## 2025-08-22 RX ORDER — ONDANSETRON 2 MG/ML
4 INJECTION INTRAMUSCULAR; INTRAVENOUS EVERY 6 HOURS PRN
Status: DISCONTINUED | OUTPATIENT
Start: 2025-08-22 | End: 2025-08-25 | Stop reason: HOSPADM

## 2025-08-22 RX ORDER — DIPHENHYDRAMINE HCL 25 MG
25 CAPSULE ORAL EVERY 8 HOURS PRN
Status: DISCONTINUED | OUTPATIENT
Start: 2025-08-22 | End: 2025-08-25 | Stop reason: HOSPADM

## 2025-08-22 RX ORDER — ACETAMINOPHEN 160 MG/5ML
650 SOLUTION ORAL EVERY 4 HOURS PRN
Status: DISCONTINUED | OUTPATIENT
Start: 2025-08-22 | End: 2025-08-25 | Stop reason: HOSPADM

## 2025-08-22 RX ORDER — HEPARIN SODIUM 5000 [USP'U]/ML
5000 INJECTION, SOLUTION INTRAVENOUS; SUBCUTANEOUS EVERY 12 HOURS SCHEDULED
Status: DISCONTINUED | OUTPATIENT
Start: 2025-08-23 | End: 2025-08-25 | Stop reason: HOSPADM

## 2025-08-22 RX ORDER — MORPHINE SULFATE 2 MG/ML
2 INJECTION, SOLUTION INTRAMUSCULAR; INTRAVENOUS EVERY 4 HOURS PRN
Status: DISCONTINUED | OUTPATIENT
Start: 2025-08-22 | End: 2025-08-25 | Stop reason: HOSPADM

## 2025-08-22 RX ORDER — SODIUM CHLORIDE 9 MG/ML
100 INJECTION, SOLUTION INTRAVENOUS CONTINUOUS
Status: ACTIVE | OUTPATIENT
Start: 2025-08-22 | End: 2025-08-24

## 2025-08-22 RX ORDER — SODIUM CHLORIDE 0.9 % (FLUSH) 0.9 %
10 SYRINGE (ML) INJECTION AS NEEDED
Status: DISCONTINUED | OUTPATIENT
Start: 2025-08-22 | End: 2025-08-22

## 2025-08-22 RX ORDER — PANTOPRAZOLE SODIUM 40 MG/10ML
40 INJECTION, POWDER, LYOPHILIZED, FOR SOLUTION INTRAVENOUS
Status: COMPLETED | OUTPATIENT
Start: 2025-08-22 | End: 2025-08-23

## 2025-08-22 RX ORDER — NITROGLYCERIN 0.4 MG/1
0.4 TABLET SUBLINGUAL
Status: DISCONTINUED | OUTPATIENT
Start: 2025-08-22 | End: 2025-08-25 | Stop reason: HOSPADM

## 2025-08-22 RX ORDER — NALOXONE HCL 0.4 MG/ML
0.4 VIAL (ML) INJECTION
Status: DISCONTINUED | OUTPATIENT
Start: 2025-08-22 | End: 2025-08-25 | Stop reason: HOSPADM

## 2025-08-22 RX ADMIN — PIPERACILLIN AND TAZOBACTAM 3.38 G: 3; .375 INJECTION, POWDER, FOR SOLUTION INTRAVENOUS at 23:38

## 2025-08-22 RX ADMIN — IOPAMIDOL 70 ML: 612 INJECTION, SOLUTION INTRAVENOUS at 18:44

## 2025-08-22 RX ADMIN — SODIUM CHLORIDE, POTASSIUM CHLORIDE, SODIUM LACTATE AND CALCIUM CHLORIDE 1000 ML: 600; 310; 30; 20 INJECTION, SOLUTION INTRAVENOUS at 20:56

## 2025-08-23 LAB
ADV 40+41 DNA STL QL NAA+NON-PROBE: NOT DETECTED
ALBUMIN SERPL-MCNC: 2.7 G/DL (ref 3.5–5.2)
ALBUMIN/GLOB SERPL: 1 G/DL
ALP SERPL-CCNC: 83 U/L (ref 39–117)
ALT SERPL W P-5'-P-CCNC: <5 U/L (ref 1–33)
ANION GAP SERPL CALCULATED.3IONS-SCNC: 13 MMOL/L (ref 5–15)
AST SERPL-CCNC: 10 U/L (ref 1–32)
ASTRO TYP 1-8 RNA STL QL NAA+NON-PROBE: NOT DETECTED
B PARAPERT DNA SPEC QL NAA+PROBE: NOT DETECTED
B PERT DNA SPEC QL NAA+PROBE: NOT DETECTED
BASOPHILS # BLD AUTO: 0.06 10*3/MM3 (ref 0–0.2)
BASOPHILS NFR BLD AUTO: 0.5 % (ref 0–1.5)
BILIRUB SERPL-MCNC: 0.4 MG/DL (ref 0–1.2)
BUN SERPL-MCNC: 19.9 MG/DL (ref 8–23)
BUN/CREAT SERPL: 18.1 (ref 7–25)
C CAYETANENSIS DNA STL QL NAA+NON-PROBE: NOT DETECTED
C COLI+JEJ+UPSA DNA STL QL NAA+NON-PROBE: NOT DETECTED
C DIFF TOX GENS STL QL NAA+PROBE: NOT DETECTED
C PNEUM DNA NPH QL NAA+NON-PROBE: NOT DETECTED
CALCIUM SPEC-SCNC: 8.8 MG/DL (ref 8.6–10.5)
CHLORIDE SERPL-SCNC: 106 MMOL/L (ref 98–107)
CHOLEST SERPL-MCNC: 95 MG/DL (ref 0–200)
CO2 SERPL-SCNC: 18 MMOL/L (ref 22–29)
CREAT SERPL-MCNC: 1.1 MG/DL (ref 0.57–1)
CRYPTOSP DNA STL QL NAA+NON-PROBE: NOT DETECTED
D-LACTATE SERPL-SCNC: 1.3 MMOL/L (ref 0.5–2)
DEPRECATED RDW RBC AUTO: 41.1 FL (ref 37–54)
E HISTOLYT DNA STL QL NAA+NON-PROBE: NOT DETECTED
EAEC PAA PLAS AGGR+AATA ST NAA+NON-PRB: NOT DETECTED
EC STX1+STX2 GENES STL QL NAA+NON-PROBE: NOT DETECTED
EGFRCR SERPLBLD CKD-EPI 2021: 49.7 ML/MIN/1.73
EOSINOPHIL # BLD AUTO: 0.23 10*3/MM3 (ref 0–0.4)
EOSINOPHIL NFR BLD AUTO: 1.8 % (ref 0.3–6.2)
EPEC EAE GENE STL QL NAA+NON-PROBE: NOT DETECTED
ERYTHROCYTE [DISTWIDTH] IN BLOOD BY AUTOMATED COUNT: 13.1 % (ref 12.3–15.4)
ETEC LTA+ST1A+ST1B TOX ST NAA+NON-PROBE: NOT DETECTED
FERRITIN SERPL-MCNC: 412 NG/ML (ref 13–150)
FLUAV SUBTYP SPEC NAA+PROBE: NOT DETECTED
FLUBV RNA NPH QL NAA+NON-PROBE: NOT DETECTED
FOLATE SERPL-MCNC: 11.5 NG/ML (ref 4.78–24.2)
G LAMBLIA DNA STL QL NAA+NON-PROBE: NOT DETECTED
GLOBULIN UR ELPH-MCNC: 2.7 GM/DL
GLUCOSE BLDC GLUCOMTR-MCNC: 78 MG/DL (ref 70–130)
GLUCOSE BLDC GLUCOMTR-MCNC: 81 MG/DL (ref 70–130)
GLUCOSE BLDC GLUCOMTR-MCNC: 86 MG/DL (ref 70–130)
GLUCOSE SERPL-MCNC: 98 MG/DL (ref 65–99)
HADV DNA SPEC NAA+PROBE: NOT DETECTED
HBA1C MFR BLD: 6.41 % (ref 4.8–5.6)
HCOV 229E RNA SPEC QL NAA+PROBE: NOT DETECTED
HCOV HKU1 RNA SPEC QL NAA+PROBE: NOT DETECTED
HCOV NL63 RNA SPEC QL NAA+PROBE: NOT DETECTED
HCOV OC43 RNA SPEC QL NAA+PROBE: NOT DETECTED
HCT VFR BLD AUTO: 26.9 % (ref 34–46.6)
HDLC SERPL-MCNC: 36 MG/DL (ref 40–60)
HGB BLD-MCNC: 8.9 G/DL (ref 12–15.9)
HMPV RNA NPH QL NAA+NON-PROBE: NOT DETECTED
HPIV1 RNA ISLT QL NAA+PROBE: NOT DETECTED
HPIV2 RNA SPEC QL NAA+PROBE: NOT DETECTED
HPIV3 RNA NPH QL NAA+PROBE: NOT DETECTED
HPIV4 P GENE NPH QL NAA+PROBE: NOT DETECTED
IMM GRANULOCYTES # BLD AUTO: 0.08 10*3/MM3 (ref 0–0.05)
IMM GRANULOCYTES NFR BLD AUTO: 0.6 % (ref 0–0.5)
IRON 24H UR-MRATE: 30 MCG/DL (ref 37–145)
IRON SATN MFR SERPL: 14 % (ref 20–50)
LDLC SERPL CALC-MCNC: 35 MG/DL (ref 0–100)
LDLC/HDLC SERPL: 0.86 {RATIO}
LYMPHOCYTES # BLD AUTO: 0.58 10*3/MM3 (ref 0.7–3.1)
LYMPHOCYTES NFR BLD AUTO: 4.6 % (ref 19.6–45.3)
Lab: NORMAL
M PNEUMO IGG SER IA-ACNC: NOT DETECTED
MAGNESIUM SERPL-MCNC: 2.1 MG/DL (ref 1.6–2.4)
MCH RBC QN AUTO: 28.8 PG (ref 26.6–33)
MCHC RBC AUTO-ENTMCNC: 33.1 G/DL (ref 31.5–35.7)
MCV RBC AUTO: 87.1 FL (ref 79–97)
MONOCYTES # BLD AUTO: 0.94 10*3/MM3 (ref 0.1–0.9)
MONOCYTES NFR BLD AUTO: 7.5 % (ref 5–12)
MRSA DNA SPEC QL NAA+PROBE: NEGATIVE
NEUTROPHILS NFR BLD AUTO: 10.6 10*3/MM3 (ref 1.7–7)
NEUTROPHILS NFR BLD AUTO: 85 % (ref 42.7–76)
NOROVIRUS GI+II RNA STL QL NAA+NON-PROBE: NOT DETECTED
NRBC BLD AUTO-RTO: 0 /100 WBC (ref 0–0.2)
P SHIGELLOIDES DNA STL QL NAA+NON-PROBE: NOT DETECTED
PHOSPHATE SERPL-MCNC: 4.2 MG/DL (ref 2.5–4.5)
PLATELET # BLD AUTO: 602 10*3/MM3 (ref 140–450)
PMV BLD AUTO: 8.7 FL (ref 6–12)
POTASSIUM SERPL-SCNC: 3.6 MMOL/L (ref 3.5–5.2)
POTASSIUM SERPL-SCNC: 4.2 MMOL/L (ref 3.5–5.2)
PROT SERPL-MCNC: 5.4 G/DL (ref 6–8.5)
QT INTERVAL: 342 MS
QT INTERVAL: 358 MS
QTC INTERVAL: 449 MS
QTC INTERVAL: 458 MS
RBC # BLD AUTO: 3.09 10*6/MM3 (ref 3.77–5.28)
RHINOVIRUS RNA SPEC NAA+PROBE: NOT DETECTED
RSV RNA NPH QL NAA+NON-PROBE: NOT DETECTED
RVA RNA STL QL NAA+NON-PROBE: NOT DETECTED
S ENT+BONG DNA STL QL NAA+NON-PROBE: NOT DETECTED
SAPO I+II+IV+V RNA STL QL NAA+NON-PROBE: NOT DETECTED
SARS-COV-2 RNA RESP QL NAA+PROBE: NOT DETECTED
SHIGELLA SP+EIEC IPAH ST NAA+NON-PROBE: NOT DETECTED
SODIUM SERPL-SCNC: 137 MMOL/L (ref 136–145)
TIBC SERPL-MCNC: 222 MCG/DL (ref 298–536)
TRANSFERRIN SERPL-MCNC: 149 MG/DL (ref 200–360)
TRIGL SERPL-MCNC: 141 MG/DL (ref 0–150)
TROPONIN T SERPL HS-MCNC: 19 NG/L
V CHOL+PARA+VUL DNA STL QL NAA+NON-PROBE: NOT DETECTED
V CHOLERAE DNA STL QL NAA+NON-PROBE: NOT DETECTED
VIT B12 BLD-MCNC: 1200 PG/ML (ref 211–946)
VLDLC SERPL-MCNC: 24 MG/DL (ref 5–40)
WBC NRBC COR # BLD AUTO: 12.49 10*3/MM3 (ref 3.4–10.8)
Y ENTEROCOL DNA STL QL NAA+NON-PROBE: NOT DETECTED

## 2025-08-23 PROCEDURE — 97162 PT EVAL MOD COMPLEX 30 MIN: CPT

## 2025-08-23 PROCEDURE — 99232 SBSQ HOSP IP/OBS MODERATE 35: CPT | Performed by: STUDENT IN AN ORGANIZED HEALTH CARE EDUCATION/TRAINING PROGRAM

## 2025-08-23 PROCEDURE — 84484 ASSAY OF TROPONIN QUANT: CPT | Performed by: INTERNAL MEDICINE

## 2025-08-23 PROCEDURE — 84100 ASSAY OF PHOSPHORUS: CPT | Performed by: INTERNAL MEDICINE

## 2025-08-23 PROCEDURE — 97165 OT EVAL LOW COMPLEX 30 MIN: CPT | Performed by: OCCUPATIONAL THERAPIST

## 2025-08-23 PROCEDURE — 80053 COMPREHEN METABOLIC PANEL: CPT | Performed by: INTERNAL MEDICINE

## 2025-08-23 PROCEDURE — 80061 LIPID PANEL: CPT | Performed by: INTERNAL MEDICINE

## 2025-08-23 PROCEDURE — 83993 ASSAY FOR CALPROTECTIN FECAL: CPT | Performed by: NURSE PRACTITIONER

## 2025-08-23 PROCEDURE — 85025 COMPLETE CBC W/AUTO DIFF WBC: CPT | Performed by: INTERNAL MEDICINE

## 2025-08-23 PROCEDURE — 93010 ELECTROCARDIOGRAM REPORT: CPT | Performed by: INTERNAL MEDICINE

## 2025-08-23 PROCEDURE — 83605 ASSAY OF LACTIC ACID: CPT | Performed by: INTERNAL MEDICINE

## 2025-08-23 PROCEDURE — 25010000002 HEPARIN (PORCINE) PER 1000 UNITS: Performed by: INTERNAL MEDICINE

## 2025-08-23 PROCEDURE — 25810000003 SODIUM CHLORIDE 0.9 % SOLUTION: Performed by: INTERNAL MEDICINE

## 2025-08-23 PROCEDURE — 83036 HEMOGLOBIN GLYCOSYLATED A1C: CPT | Performed by: INTERNAL MEDICINE

## 2025-08-23 PROCEDURE — 89125 SPECIMEN FAT STAIN: CPT | Performed by: NURSE PRACTITIONER

## 2025-08-23 PROCEDURE — 82653 EL-1 FECAL QUANTITATIVE: CPT | Performed by: NURSE PRACTITIONER

## 2025-08-23 PROCEDURE — 25010000002 PIPERACILLIN SOD-TAZOBACTAM PER 1 G: Performed by: INTERNAL MEDICINE

## 2025-08-23 PROCEDURE — 83735 ASSAY OF MAGNESIUM: CPT | Performed by: INTERNAL MEDICINE

## 2025-08-23 PROCEDURE — 82948 REAGENT STRIP/BLOOD GLUCOSE: CPT

## 2025-08-23 PROCEDURE — 84132 ASSAY OF SERUM POTASSIUM: CPT | Performed by: INTERNAL MEDICINE

## 2025-08-23 PROCEDURE — 0202U NFCT DS 22 TRGT SARS-COV-2: CPT | Performed by: INTERNAL MEDICINE

## 2025-08-23 PROCEDURE — 87641 MR-STAPH DNA AMP PROBE: CPT | Performed by: INTERNAL MEDICINE

## 2025-08-23 PROCEDURE — 99223 1ST HOSP IP/OBS HIGH 75: CPT | Performed by: NURSE PRACTITIONER

## 2025-08-23 RX ORDER — SUCRALFATE 1 G/1
1 TABLET ORAL 2 TIMES DAILY
Status: DISCONTINUED | OUTPATIENT
Start: 2025-08-23 | End: 2025-08-25 | Stop reason: HOSPADM

## 2025-08-23 RX ORDER — INSULIN LISPRO 100 [IU]/ML
2-7 INJECTION, SOLUTION INTRAVENOUS; SUBCUTANEOUS
Status: DISCONTINUED | OUTPATIENT
Start: 2025-08-23 | End: 2025-08-25 | Stop reason: HOSPADM

## 2025-08-23 RX ORDER — DEXTROSE MONOHYDRATE 25 G/50ML
25 INJECTION, SOLUTION INTRAVENOUS
Status: DISCONTINUED | OUTPATIENT
Start: 2025-08-23 | End: 2025-08-25 | Stop reason: HOSPADM

## 2025-08-23 RX ORDER — NICOTINE POLACRILEX 4 MG
15 LOZENGE BUCCAL
Status: DISCONTINUED | OUTPATIENT
Start: 2025-08-23 | End: 2025-08-25 | Stop reason: HOSPADM

## 2025-08-23 RX ORDER — GABAPENTIN 300 MG/1
600 CAPSULE ORAL DAILY
Status: DISCONTINUED | OUTPATIENT
Start: 2025-08-23 | End: 2025-08-25 | Stop reason: HOSPADM

## 2025-08-23 RX ORDER — LORAZEPAM 0.5 MG/1
0.5 TABLET ORAL EVERY 8 HOURS PRN
Status: DISCONTINUED | OUTPATIENT
Start: 2025-08-23 | End: 2025-08-25 | Stop reason: HOSPADM

## 2025-08-23 RX ORDER — PRAVASTATIN SODIUM 40 MG
80 TABLET ORAL DAILY
Status: DISCONTINUED | OUTPATIENT
Start: 2025-08-23 | End: 2025-08-25 | Stop reason: HOSPADM

## 2025-08-23 RX ORDER — LEVOTHYROXINE SODIUM 88 UG/1
88 TABLET ORAL DAILY
Status: DISCONTINUED | OUTPATIENT
Start: 2025-08-23 | End: 2025-08-25 | Stop reason: HOSPADM

## 2025-08-23 RX ORDER — POTASSIUM CHLORIDE 1500 MG/1
20 TABLET, EXTENDED RELEASE ORAL ONCE
Status: COMPLETED | OUTPATIENT
Start: 2025-08-23 | End: 2025-08-23

## 2025-08-23 RX ADMIN — Medication 10 ML: at 02:16

## 2025-08-23 RX ADMIN — SUCRALFATE 1 G: 1 TABLET ORAL at 09:51

## 2025-08-23 RX ADMIN — SUCRALFATE 1 G: 1 TABLET ORAL at 18:38

## 2025-08-23 RX ADMIN — PRAVASTATIN SODIUM 80 MG: 40 TABLET ORAL at 21:53

## 2025-08-23 RX ADMIN — SODIUM CHLORIDE 100 ML/HR: 9 INJECTION, SOLUTION INTRAVENOUS at 02:09

## 2025-08-23 RX ADMIN — AVOBENZONE, HOMOSALATE, OCTISALATE, OCTOCRYLENE, AND OXYBENZONE 1 PACKET: 29.4; 147; 49; 25.4; 58.8 LOTION TOPICAL at 18:39

## 2025-08-23 RX ADMIN — PANTOPRAZOLE SODIUM 40 MG: 40 INJECTION, POWDER, FOR SOLUTION INTRAVENOUS at 12:50

## 2025-08-23 RX ADMIN — PIPERACILLIN AND TAZOBACTAM 3.38 G: 3; .375 INJECTION, POWDER, FOR SOLUTION INTRAVENOUS at 05:30

## 2025-08-23 RX ADMIN — GABAPENTIN 600 MG: 300 CAPSULE ORAL at 21:46

## 2025-08-23 RX ADMIN — PIPERACILLIN AND TAZOBACTAM 3.38 G: 3; .375 INJECTION, POWDER, FOR SOLUTION INTRAVENOUS at 15:03

## 2025-08-23 RX ADMIN — RIFAXIMIN 550 MG: 550 TABLET ORAL at 21:46

## 2025-08-23 RX ADMIN — PIPERACILLIN AND TAZOBACTAM 3.38 G: 3; .375 INJECTION, POWDER, FOR SOLUTION INTRAVENOUS at 21:47

## 2025-08-23 RX ADMIN — HEPARIN SODIUM 5000 UNITS: 5000 INJECTION, SOLUTION INTRAVENOUS; SUBCUTANEOUS at 10:00

## 2025-08-23 RX ADMIN — POTASSIUM CHLORIDE 20 MEQ: 1500 TABLET, EXTENDED RELEASE ORAL at 09:52

## 2025-08-23 RX ADMIN — BUSPIRONE HYDROCHLORIDE 15 MG: 10 TABLET ORAL at 21:46

## 2025-08-23 RX ADMIN — PANTOPRAZOLE SODIUM 40 MG: 40 INJECTION, POWDER, FOR SOLUTION INTRAVENOUS at 02:09

## 2025-08-24 LAB
ANION GAP SERPL CALCULATED.3IONS-SCNC: 6 MMOL/L (ref 5–15)
BUN SERPL-MCNC: 16.4 MG/DL (ref 8–23)
BUN/CREAT SERPL: 15.2 (ref 7–25)
CALCIUM SPEC-SCNC: 8 MG/DL (ref 8.6–10.5)
CHLORIDE SERPL-SCNC: 113 MMOL/L (ref 98–107)
CO2 SERPL-SCNC: 19 MMOL/L (ref 22–29)
CREAT SERPL-MCNC: 1.08 MG/DL (ref 0.57–1)
DEPRECATED RDW RBC AUTO: 43.6 FL (ref 37–54)
EGFRCR SERPLBLD CKD-EPI 2021: 50.8 ML/MIN/1.73
ERYTHROCYTE [DISTWIDTH] IN BLOOD BY AUTOMATED COUNT: 13.2 % (ref 12.3–15.4)
FATTY ACIDS: NORMAL
GLUCOSE BLDC GLUCOMTR-MCNC: 153 MG/DL (ref 70–130)
GLUCOSE BLDC GLUCOMTR-MCNC: 155 MG/DL (ref 70–130)
GLUCOSE BLDC GLUCOMTR-MCNC: 80 MG/DL (ref 70–130)
GLUCOSE BLDC GLUCOMTR-MCNC: 94 MG/DL (ref 70–130)
GLUCOSE SERPL-MCNC: 100 MG/DL (ref 65–99)
HCT VFR BLD AUTO: 24.1 % (ref 34–46.6)
HGB BLD-MCNC: 7.5 G/DL (ref 12–15.9)
Lab: ABNORMAL
MAGNESIUM SERPL-MCNC: 2.1 MG/DL (ref 1.6–2.4)
MCH RBC QN AUTO: 28.2 PG (ref 26.6–33)
MCHC RBC AUTO-ENTMCNC: 31.1 G/DL (ref 31.5–35.7)
MCV RBC AUTO: 90.6 FL (ref 79–97)
NEUTRAL FATS: NORMAL
PLATELET # BLD AUTO: 493 10*3/MM3 (ref 140–450)
PMV BLD AUTO: 8.6 FL (ref 6–12)
POTASSIUM SERPL-SCNC: 3.8 MMOL/L (ref 3.5–5.2)
RBC # BLD AUTO: 2.66 10*6/MM3 (ref 3.77–5.28)
SODIUM SERPL-SCNC: 138 MMOL/L (ref 136–145)
WBC NRBC COR # BLD AUTO: 6.71 10*3/MM3 (ref 3.4–10.8)

## 2025-08-24 PROCEDURE — 85027 COMPLETE CBC AUTOMATED: CPT | Performed by: STUDENT IN AN ORGANIZED HEALTH CARE EDUCATION/TRAINING PROGRAM

## 2025-08-24 PROCEDURE — 25010000002 HEPARIN (PORCINE) PER 1000 UNITS: Performed by: INTERNAL MEDICINE

## 2025-08-24 PROCEDURE — 63710000001 INSULIN LISPRO (HUMAN) PER 5 UNITS: Performed by: INTERNAL MEDICINE

## 2025-08-24 PROCEDURE — 99232 SBSQ HOSP IP/OBS MODERATE 35: CPT | Performed by: NURSE PRACTITIONER

## 2025-08-24 PROCEDURE — 99232 SBSQ HOSP IP/OBS MODERATE 35: CPT | Performed by: STUDENT IN AN ORGANIZED HEALTH CARE EDUCATION/TRAINING PROGRAM

## 2025-08-24 PROCEDURE — 82948 REAGENT STRIP/BLOOD GLUCOSE: CPT | Performed by: INTERNAL MEDICINE

## 2025-08-24 PROCEDURE — 82948 REAGENT STRIP/BLOOD GLUCOSE: CPT

## 2025-08-24 PROCEDURE — 83735 ASSAY OF MAGNESIUM: CPT | Performed by: STUDENT IN AN ORGANIZED HEALTH CARE EDUCATION/TRAINING PROGRAM

## 2025-08-24 PROCEDURE — 80048 BASIC METABOLIC PNL TOTAL CA: CPT | Performed by: STUDENT IN AN ORGANIZED HEALTH CARE EDUCATION/TRAINING PROGRAM

## 2025-08-24 PROCEDURE — 25010000002 PIPERACILLIN SOD-TAZOBACTAM PER 1 G: Performed by: INTERNAL MEDICINE

## 2025-08-24 RX ORDER — PANTOPRAZOLE SODIUM 40 MG/1
40 TABLET, DELAYED RELEASE ORAL
Status: DISCONTINUED | OUTPATIENT
Start: 2025-08-25 | End: 2025-08-25 | Stop reason: HOSPADM

## 2025-08-24 RX ORDER — MIRTAZAPINE 15 MG/1
15 TABLET, FILM COATED ORAL NIGHTLY
Status: DISCONTINUED | OUTPATIENT
Start: 2025-08-24 | End: 2025-08-25 | Stop reason: HOSPADM

## 2025-08-24 RX ADMIN — ACETAMINOPHEN 650 MG: 325 TABLET ORAL at 21:17

## 2025-08-24 RX ADMIN — LEVOTHYROXINE SODIUM 88 MCG: 88 TABLET ORAL at 05:41

## 2025-08-24 RX ADMIN — GABAPENTIN 600 MG: 300 CAPSULE ORAL at 21:17

## 2025-08-24 RX ADMIN — AVOBENZONE, HOMOSALATE, OCTISALATE, OCTOCRYLENE, AND OXYBENZONE 1 PACKET: 29.4; 147; 49; 25.4; 58.8 LOTION TOPICAL at 09:18

## 2025-08-24 RX ADMIN — BUSPIRONE HYDROCHLORIDE 15 MG: 10 TABLET ORAL at 21:15

## 2025-08-24 RX ADMIN — HEPARIN SODIUM 5000 UNITS: 5000 INJECTION, SOLUTION INTRAVENOUS; SUBCUTANEOUS at 21:15

## 2025-08-24 RX ADMIN — SUCRALFATE 1 G: 1 TABLET ORAL at 09:18

## 2025-08-24 RX ADMIN — PRAVASTATIN SODIUM 80 MG: 40 TABLET ORAL at 21:17

## 2025-08-24 RX ADMIN — PANTOPRAZOLE SODIUM 40 MG: 40 INJECTION, POWDER, FOR SOLUTION INTRAVENOUS at 05:41

## 2025-08-24 RX ADMIN — Medication 10 ML: at 22:30

## 2025-08-24 RX ADMIN — INSULIN LISPRO 2 UNITS: 100 INJECTION, SOLUTION INTRAVENOUS; SUBCUTANEOUS at 21:17

## 2025-08-24 RX ADMIN — RIFAXIMIN 550 MG: 550 TABLET ORAL at 21:15

## 2025-08-24 RX ADMIN — RIFAXIMIN 550 MG: 550 TABLET ORAL at 13:45

## 2025-08-24 RX ADMIN — RIFAXIMIN 550 MG: 550 TABLET ORAL at 05:41

## 2025-08-24 RX ADMIN — PIPERACILLIN AND TAZOBACTAM 3.38 G: 3; .375 INJECTION, POWDER, FOR SOLUTION INTRAVENOUS at 13:45

## 2025-08-24 RX ADMIN — SUCRALFATE 1 G: 1 TABLET ORAL at 17:47

## 2025-08-24 RX ADMIN — PIPERACILLIN AND TAZOBACTAM 3.38 G: 3; .375 INJECTION, POWDER, FOR SOLUTION INTRAVENOUS at 05:41

## 2025-08-25 ENCOUNTER — TELEPHONE (OUTPATIENT)
Dept: GASTROENTEROLOGY | Facility: CLINIC | Age: 85
End: 2025-08-25

## 2025-08-25 VITALS
RESPIRATION RATE: 18 BRPM | TEMPERATURE: 97.7 F | WEIGHT: 103 LBS | SYSTOLIC BLOOD PRESSURE: 110 MMHG | BODY MASS INDEX: 16.55 KG/M2 | DIASTOLIC BLOOD PRESSURE: 49 MMHG | HEART RATE: 78 BPM | OXYGEN SATURATION: 100 % | HEIGHT: 66 IN

## 2025-08-25 PROBLEM — E43 SEVERE PROTEIN-CALORIE MALNUTRITION: Status: ACTIVE | Noted: 2025-08-25

## 2025-08-25 LAB
ANION GAP SERPL CALCULATED.3IONS-SCNC: 6.6 MMOL/L (ref 5–15)
BUN SERPL-MCNC: 14 MG/DL (ref 8–23)
BUN/CREAT SERPL: 14.1 (ref 7–25)
CALCIUM SPEC-SCNC: 8.3 MG/DL (ref 8.6–10.5)
CHLORIDE SERPL-SCNC: 112 MMOL/L (ref 98–107)
CO2 SERPL-SCNC: 19.4 MMOL/L (ref 22–29)
CREAT SERPL-MCNC: 0.99 MG/DL (ref 0.57–1)
DEPRECATED RDW RBC AUTO: 43.7 FL (ref 37–54)
EGFRCR SERPLBLD CKD-EPI 2021: 56.3 ML/MIN/1.73
ERYTHROCYTE [DISTWIDTH] IN BLOOD BY AUTOMATED COUNT: 13.1 % (ref 12.3–15.4)
GLUCOSE BLDC GLUCOMTR-MCNC: 101 MG/DL (ref 70–130)
GLUCOSE BLDC GLUCOMTR-MCNC: 156 MG/DL (ref 70–130)
GLUCOSE BLDC GLUCOMTR-MCNC: 93 MG/DL (ref 70–130)
GLUCOSE SERPL-MCNC: 90 MG/DL (ref 65–99)
HCT VFR BLD AUTO: 25.7 % (ref 34–46.6)
HGB BLD-MCNC: 8 G/DL (ref 12–15.9)
Lab: ABNORMAL
Lab: NORMAL
Lab: NORMAL
MCH RBC QN AUTO: 28.7 PG (ref 26.6–33)
MCHC RBC AUTO-ENTMCNC: 31.1 G/DL (ref 31.5–35.7)
MCV RBC AUTO: 92.1 FL (ref 79–97)
PLATELET # BLD AUTO: 515 10*3/MM3 (ref 140–450)
PMV BLD AUTO: 8.8 FL (ref 6–12)
POTASSIUM SERPL-SCNC: 3.7 MMOL/L (ref 3.5–5.2)
RBC # BLD AUTO: 2.79 10*6/MM3 (ref 3.77–5.28)
SODIUM SERPL-SCNC: 138 MMOL/L (ref 136–145)
WBC NRBC COR # BLD AUTO: 8.88 10*3/MM3 (ref 3.4–10.8)

## 2025-08-25 PROCEDURE — 99239 HOSP IP/OBS DSCHRG MGMT >30: CPT | Performed by: STUDENT IN AN ORGANIZED HEALTH CARE EDUCATION/TRAINING PROGRAM

## 2025-08-25 PROCEDURE — 85027 COMPLETE CBC AUTOMATED: CPT | Performed by: STUDENT IN AN ORGANIZED HEALTH CARE EDUCATION/TRAINING PROGRAM

## 2025-08-25 PROCEDURE — 80048 BASIC METABOLIC PNL TOTAL CA: CPT | Performed by: STUDENT IN AN ORGANIZED HEALTH CARE EDUCATION/TRAINING PROGRAM

## 2025-08-25 PROCEDURE — 82948 REAGENT STRIP/BLOOD GLUCOSE: CPT | Performed by: INTERNAL MEDICINE

## 2025-08-25 PROCEDURE — 82948 REAGENT STRIP/BLOOD GLUCOSE: CPT

## 2025-08-25 PROCEDURE — 25010000002 HEPARIN (PORCINE) PER 1000 UNITS: Performed by: INTERNAL MEDICINE

## 2025-08-25 PROCEDURE — 97530 THERAPEUTIC ACTIVITIES: CPT

## 2025-08-25 RX ORDER — MIRTAZAPINE 15 MG/1
15 TABLET, FILM COATED ORAL NIGHTLY
Qty: 30 TABLET | Refills: 0 | Status: SHIPPED | OUTPATIENT
Start: 2025-08-25

## 2025-08-25 RX ADMIN — SUCRALFATE 1 G: 1 TABLET ORAL at 09:39

## 2025-08-25 RX ADMIN — HEPARIN SODIUM 5000 UNITS: 5000 INJECTION, SOLUTION INTRAVENOUS; SUBCUTANEOUS at 09:42

## 2025-08-25 RX ADMIN — Medication 10 ML: at 09:49

## 2025-08-25 RX ADMIN — LEVOTHYROXINE SODIUM 88 MCG: 88 TABLET ORAL at 05:35

## 2025-08-25 RX ADMIN — AVOBENZONE, HOMOSALATE, OCTISALATE, OCTOCRYLENE, AND OXYBENZONE 1 PACKET: 29.4; 147; 49; 25.4; 58.8 LOTION TOPICAL at 09:42

## 2025-08-25 RX ADMIN — PANTOPRAZOLE SODIUM 40 MG: 40 TABLET, DELAYED RELEASE ORAL at 09:39

## 2025-08-25 RX ADMIN — RIFAXIMIN 550 MG: 550 TABLET ORAL at 05:35

## 2025-08-25 RX ADMIN — RIFAXIMIN 550 MG: 550 TABLET ORAL at 14:24

## 2025-08-26 ENCOUNTER — TELEPHONE (OUTPATIENT)
Dept: GASTROENTEROLOGY | Facility: CLINIC | Age: 85
End: 2025-08-26
Payer: MEDICARE

## 2025-08-27 LAB
BACTERIA SPEC AEROBE CULT: NORMAL
BACTERIA SPEC AEROBE CULT: NORMAL
CALPROTECTIN STL-MCNT: 1030 UG/G (ref 0–120)
ELASTASE PANC STL-MCNT: 238 UG ELAST./G

## (undated) DEVICE — SUT VIC PLS CTD ANTIB 2/0 18IN VCP111G

## (undated) DEVICE — DRSNG WND BORDR/ADHS NONADHR/GZ LF 2X2IN STRL

## (undated) DEVICE — GLV SURG SENSICARE PI MIC PF SZ7.5 LF STRL

## (undated) DEVICE — TUBING, SUCTION, 1/4" X 10', STRAIGHT: Brand: MEDLINE

## (undated) DEVICE — POOLE SUCTION INSTRUMENT WITH REMOVABLE SHEATH: Brand: POOLE

## (undated) DEVICE — SUT MNCRYL PLS ANTIB UD 4/0 PS2 18IN

## (undated) DEVICE — DRSNG PAD ABD 8X10IN STRL

## (undated) DEVICE — DRAPE,UNDERBUTTOCKS,PCH,STERILE: Brand: MEDLINE

## (undated) DEVICE — SUT PDS LP 1 TP1 96IN VIO PDP880GA

## (undated) DEVICE — SWABSTK SKINPREP PVPI PRE/SAT 8IN STRL

## (undated) DEVICE — SUT VIC 3/0 SH CR8 27IN VCP784D

## (undated) DEVICE — SUT VIC 0 CTD BR 18IN UNDYE VCP724D

## (undated) DEVICE — TBG PENCL TELESCP MEGADYNE SMOKE EVAC 10FT

## (undated) DEVICE — PENCL E/S HNDSWCH ROCKRBTN HOLSTR 10FT

## (undated) DEVICE — INTENDED FOR TISSUE SEPARATION, AND OTHER PROCEDURES THAT REQUIRE A SHARP SURGICAL BLADE TO PUNCTURE OR CUT.: Brand: BARD-PARKER ® STAINLESS STEEL BLADES

## (undated) DEVICE — NITINOL WIRE WITH HYDROPHILIC TIP: Brand: SENSOR

## (undated) DEVICE — COVER,MAYO STAND,XL,STERILE: Brand: MEDLINE

## (undated) DEVICE — SAFESECURE,SECUREMENT,FOLEY CATH,STERILE: Brand: MEDLINE

## (undated) DEVICE — SUT SILK 2/0 PS 18IN 1588H

## (undated) DEVICE — 3M™ IOBAN™ 2 ANTIMICROBIAL INCISE DRAPE 6650EZ: Brand: IOBAN™ 2

## (undated) DEVICE — CULT ANAERB

## (undated) DEVICE — BARRIER, ABSORBABLE, ADHESION: Brand: SEPRAFILM®

## (undated) DEVICE — LEGGINGS, PAIR, 29X43, STERILE: Brand: MEDLINE

## (undated) DEVICE — LEX BASIC NO DRAPE: Brand: MEDLINE INDUSTRIES, INC.

## (undated) DEVICE — PK MINOR SPLT 10

## (undated) DEVICE — SUT VIC PLS 1 CTX 18IN VIL VCP365H

## (undated) DEVICE — SKIN AFFIX SURG ADHESIVE 72/CS 0.55ML: Brand: MEDLINE

## (undated) DEVICE — ANTIBACTERIAL UNDYED BRAIDED (POLYGLACTIN 910), SYNTHETIC ABSORBABLE SUTURE: Brand: COATED VICRYL

## (undated) DEVICE — PREMIUM DRY TRAY LF: Brand: MEDLINE INDUSTRIES, INC.

## (undated) DEVICE — DBD-DRAPE,LAP,CHOLE,W/TROUGHS,STERILE: Brand: MEDLINE

## (undated) DEVICE — CLTH CLENS READYCLEANSE PERI CARE PK/5

## (undated) DEVICE — INTENDED TO BE USED TO OCCLUDE, RETRACT AND IDENTIFY ARTERIES, VEINS, TENDONS AND NERVES IN SURGICAL PROCEDURES: Brand: STERION®  VESSEL LOOP

## (undated) DEVICE — SOL BETADINE 1GL

## (undated) DEVICE — GLV SURG SENSICARE PI MIC PF SZ6.5 LF STRL

## (undated) DEVICE — SUT VIC 0 TIES 18IN J912G

## (undated) DEVICE — JELLY,LUBE,STERILE,FLIP TOP,TUBE,2-OZ: Brand: MEDLINE

## (undated) DEVICE — DRSNG TELFA PAD NONADH STR 1S 3X8IN

## (undated) DEVICE — TRAP FLD MINIVAC MEGADYNE 100ML

## (undated) DEVICE — APPL CHLORAPREP W/TINT 26ML BLU

## (undated) DEVICE — DRN PENRS SIL 1X18IN LXF

## (undated) DEVICE — SUT VIC 2/0 SUTUPAK TIES 18IN J911T

## (undated) DEVICE — SUT PROLN 1 CTX 30IN 8455H

## (undated) DEVICE — GOWN,REINFORCE,POLY,SIRUS,BREATH SLV,XLG: Brand: MEDLINE

## (undated) DEVICE — ENSEAL 20 CM SHAFT, LARGE JAW: Brand: ENSEAL X1

## (undated) DEVICE — COVER,LIGHT HANDLE,FLX,1/PK: Brand: MEDLINE INDUSTRIES, INC.

## (undated) DEVICE — TOWEL,OR,DSP,ST,BLUE,STD,4/PK,20PK/CS: Brand: MEDLINE

## (undated) DEVICE — SUT PROLN 0/0 CTX 30IN 8454H

## (undated) DEVICE — GLV SURG SENSICARE PI ORTHO PF SZ7 LF STRL

## (undated) DEVICE — KT POSTN TRENDELENBURG DLX WING PAD TABL STRAP HDRST XL 1P/U

## (undated) DEVICE — ROD OS LP SURFIT 2 1/2IN

## (undated) DEVICE — SUT PROLN 2/0 KS 30IN 8623H

## (undated) DEVICE — MEDI-VAC YANKAUER SUCTION HANDLE: Brand: CARDINAL HEALTH

## (undated) DEVICE — TOTAL TRAY, 16FR 10ML SIL FOLEY, URN: Brand: MEDLINE

## (undated) DEVICE — PROXIMATE RH ROTATING HEAD SKIN STAPLERS (35 WIDE) CONTAINS 35 STAINLESS STEEL STAPLES: Brand: PROXIMATE

## (undated) DEVICE — SPNG LAP PREWSH SFTPK 18X18IN STRL PK/5

## (undated) DEVICE — CVR HNDL LT SURG ACCSSRY BLU STRL

## (undated) DEVICE — ADHS SKIN PREMIERPRO EXOFIN TOPICAL HI/VISC .5ML

## (undated) DEVICE — CONMED REFLEX RHS SINGLE USE, RELOADABLE, ROTATING HEAD SKIN STAPLER: Brand: CONMED REFLEX

## (undated) DEVICE — STERILE PVP: Brand: MEDLINE INDUSTRIES, INC.

## (undated) DEVICE — PENCL ROCKRSWCH MEGADYNE W/HOLSTR 10FT SS

## (undated) DEVICE — SUT VIC 3/0 TIES J104T

## (undated) DEVICE — DEV OPN LIGASURE CRV 180D 36MM 13.5CM  1P/U

## (undated) DEVICE — DRSNG WND BORDR/ADHS NONADHR/GZ LF 4X14IN STRL

## (undated) DEVICE — DRAPE,UNDERBUTTOCKS,STERILE: Brand: MEDLINE

## (undated) DEVICE — JP 3-SPRING RES W/10FR PVC DRAIN/TR: Brand: CARDINAL HEALTH

## (undated) DEVICE — JP PERF DRN SIL FLT 10MM FULL: Brand: CARDINAL HEALTH

## (undated) DEVICE — GOWN,REINF,POLY,ECL,PP SLV,XL: Brand: MEDLINE

## (undated) DEVICE — 3M™ STERI-DRAPE™ INSTRUMENT POUCH 1018: Brand: STERI-DRAPE™